# Patient Record
Sex: FEMALE | Race: WHITE | Employment: UNEMPLOYED | URBAN - METROPOLITAN AREA
[De-identification: names, ages, dates, MRNs, and addresses within clinical notes are randomized per-mention and may not be internally consistent; named-entity substitution may affect disease eponyms.]

---

## 2017-01-01 ENCOUNTER — OFFICE VISIT (OUTPATIENT)
Dept: SURGERY | Age: 82
End: 2017-01-01

## 2017-01-01 VITALS — SYSTOLIC BLOOD PRESSURE: 169 MMHG | HEIGHT: 59 IN | HEART RATE: 72 BPM | DIASTOLIC BLOOD PRESSURE: 67 MMHG

## 2017-01-01 DIAGNOSIS — E08.22: Primary | ICD-10-CM

## 2017-01-01 DIAGNOSIS — Z79.4: Primary | ICD-10-CM

## 2017-01-01 DIAGNOSIS — N18.5: Primary | ICD-10-CM

## 2017-01-01 DIAGNOSIS — Z79.4 TYPE 2 DIABETES MELLITUS WITHOUT COMPLICATION, WITH LONG-TERM CURRENT USE OF INSULIN (HCC): ICD-10-CM

## 2017-01-01 DIAGNOSIS — E11.9 TYPE 2 DIABETES MELLITUS WITHOUT COMPLICATION, WITH LONG-TERM CURRENT USE OF INSULIN (HCC): ICD-10-CM

## 2017-01-01 DIAGNOSIS — E03.9 HYPOTHYROIDISM, UNSPECIFIED TYPE: ICD-10-CM

## 2017-01-01 LAB
ANION GAP SERPL CALCULATED.3IONS-SCNC: 10 MEQ/L (ref 7–13)
BUN BLDV-MCNC: 20 MG/DL (ref 8–23)
CALCIUM SERPL-MCNC: 7.7 MG/DL (ref 8.6–10.2)
CHLORIDE BLD-SCNC: 100 MEQ/L (ref 98–107)
CO2: 28 MEQ/L (ref 22–29)
CREAT SERPL-MCNC: 3.03 MG/DL (ref 0.5–0.9)
GFR AFRICAN AMERICAN: 17.9
GFR NON-AFRICAN AMERICAN: 14.8
GLUCOSE BLD-MCNC: 196 MG/DL (ref 74–109)
GLUCOSE BLD-MCNC: 199 MG/DL
HBA1C MFR BLD: 5.1 % (ref 4.8–5.9)
MAGNESIUM: 2.3 MG/DL (ref 1.7–2.3)
POTASSIUM SERPL-SCNC: 4.6 MEQ/L (ref 3.5–5.1)
SODIUM BLD-SCNC: 138 MEQ/L (ref 132–144)

## 2017-01-01 PROCEDURE — G8400 PT W/DXA NO RESULTS DOC: HCPCS | Performed by: INTERNAL MEDICINE

## 2017-01-01 PROCEDURE — 1123F ACP DISCUSS/DSCN MKR DOCD: CPT | Performed by: INTERNAL MEDICINE

## 2017-01-01 PROCEDURE — 1111F DSCHRG MED/CURRENT MED MERGE: CPT | Performed by: INTERNAL MEDICINE

## 2017-01-01 PROCEDURE — G8427 DOCREV CUR MEDS BY ELIG CLIN: HCPCS | Performed by: INTERNAL MEDICINE

## 2017-01-01 PROCEDURE — 99213 OFFICE O/P EST LOW 20 MIN: CPT | Performed by: INTERNAL MEDICINE

## 2017-01-01 PROCEDURE — 4040F PNEUMOC VAC/ADMIN/RCVD: CPT | Performed by: INTERNAL MEDICINE

## 2017-01-01 PROCEDURE — 1090F PRES/ABSN URINE INCON ASSESS: CPT | Performed by: INTERNAL MEDICINE

## 2017-01-01 PROCEDURE — 82962 GLUCOSE BLOOD TEST: CPT | Performed by: INTERNAL MEDICINE

## 2017-01-01 PROCEDURE — 1036F TOBACCO NON-USER: CPT | Performed by: INTERNAL MEDICINE

## 2017-01-01 PROCEDURE — G8420 CALC BMI NORM PARAMETERS: HCPCS | Performed by: INTERNAL MEDICINE

## 2017-01-01 PROCEDURE — G8484 FLU IMMUNIZE NO ADMIN: HCPCS | Performed by: INTERNAL MEDICINE

## 2017-01-01 RX ORDER — INSULIN GLARGINE 100 [IU]/ML
INJECTION, SOLUTION SUBCUTANEOUS
Qty: 75 ML | Refills: 2 | Status: ON HOLD | OUTPATIENT
Start: 2017-01-01 | End: 2018-01-01

## 2017-01-01 ASSESSMENT — ENCOUNTER SYMPTOMS: BLURRED VISION: 1

## 2017-01-10 ENCOUNTER — HOSPITAL ENCOUNTER (OUTPATIENT)
Dept: PHARMACY | Age: 82
Discharge: HOME | End: 2017-01-10
Payer: MEDICARE

## 2017-01-10 DIAGNOSIS — I48.91 ATRIAL FIBRILLATION, UNSPECIFIED TYPE (HCC): ICD-10-CM

## 2017-01-10 LAB
INR BLD: 1.9
PROTIME: 22.9 SECONDS

## 2017-01-10 PROCEDURE — G0463 HOSPITAL OUTPT CLINIC VISIT: HCPCS

## 2017-01-10 PROCEDURE — 85610 PROTHROMBIN TIME: CPT

## 2017-01-31 ENCOUNTER — HOSPITAL ENCOUNTER (OUTPATIENT)
Dept: PHARMACY | Age: 82
Discharge: HOME | End: 2017-01-31
Payer: MEDICARE

## 2017-01-31 DIAGNOSIS — I48.91 ATRIAL FIBRILLATION, UNSPECIFIED TYPE (HCC): ICD-10-CM

## 2017-01-31 LAB
INR BLD: 2.3
PROTIME: 27.9 SECONDS

## 2017-01-31 PROCEDURE — G0463 HOSPITAL OUTPT CLINIC VISIT: HCPCS | Performed by: PHARMACIST

## 2017-01-31 PROCEDURE — 85610 PROTHROMBIN TIME: CPT | Performed by: PHARMACIST

## 2017-02-07 DIAGNOSIS — E10.649 HYPOGLYCEMIA UNAWARENESS IN TYPE 1 DIABETES MELLITUS (HCC): ICD-10-CM

## 2017-02-07 RX ORDER — WARFARIN SODIUM 2.5 MG/1
TABLET ORAL
Qty: 200 TABLET | Refills: 1 | Status: SHIPPED | OUTPATIENT
Start: 2017-02-07 | End: 2017-05-09 | Stop reason: SDUPTHER

## 2017-02-09 ENCOUNTER — APPOINTMENT (OUTPATIENT)
Dept: GENERAL RADIOLOGY | Age: 82
DRG: 309 | End: 2017-02-09
Payer: MEDICARE

## 2017-02-09 ENCOUNTER — HOSPITAL ENCOUNTER (INPATIENT)
Age: 82
LOS: 5 days | Discharge: HOME | DRG: 309 | End: 2017-02-14
Attending: STUDENT IN AN ORGANIZED HEALTH CARE EDUCATION/TRAINING PROGRAM | Admitting: INTERNAL MEDICINE
Payer: MEDICARE

## 2017-02-09 DIAGNOSIS — I48.19 PERSISTENT ATRIAL FIBRILLATION (HCC): Primary | ICD-10-CM

## 2017-02-09 DIAGNOSIS — R79.1 SUBTHERAPEUTIC INTERNATIONAL NORMALIZED RATIO (INR): ICD-10-CM

## 2017-02-09 DIAGNOSIS — N18.9 CHRONIC RENAL FAILURE, UNSPECIFIED STAGE: ICD-10-CM

## 2017-02-09 DIAGNOSIS — R74.8 CARDIAC ENZYMES ELEVATED: ICD-10-CM

## 2017-02-09 LAB
ALBUMIN SERPL-MCNC: 4.5 G/DL (ref 3.9–4.9)
ALP BLD-CCNC: 104 U/L (ref 40–130)
ALT SERPL-CCNC: 20 U/L (ref 0–33)
AMORPHOUS: ABNORMAL
ANION GAP SERPL CALCULATED.3IONS-SCNC: 17 MEQ/L (ref 7–13)
APTT: 27.8 SEC (ref 21.6–35.4)
AST SERPL-CCNC: 20 U/L (ref 0–35)
BACTERIA: ABNORMAL /HPF
BASOPHILS ABSOLUTE: 0 K/UL (ref 0–0.2)
BASOPHILS RELATIVE PERCENT: 0.5 %
BILIRUB SERPL-MCNC: 0.1 MG/DL (ref 0–1.2)
BILIRUBIN URINE: NEGATIVE
BLOOD, URINE: ABNORMAL
BUN BLDV-MCNC: 63 MG/DL (ref 8–23)
C-REACTIVE PROTEIN, HIGH SENSITIVITY: 4.3 MG/L (ref 0–5)
CALCIUM SERPL-MCNC: 9.2 MG/DL (ref 8.6–10.2)
CHLORIDE BLD-SCNC: 95 MEQ/L (ref 98–107)
CK MB: 4.5 NG/ML (ref 0–3.8)
CLARITY: CLEAR
CO2: 20 MEQ/L (ref 22–29)
COLOR: YELLOW
CREAT SERPL-MCNC: 2.75 MG/DL (ref 0.5–0.9)
CREATINE KINASE-MB INDEX: 3.2 % (ref 0–3.5)
EOSINOPHILS ABSOLUTE: 0 K/UL (ref 0–0.7)
EOSINOPHILS RELATIVE PERCENT: 0.5 %
GFR AFRICAN AMERICAN: 20.1
GFR NON-AFRICAN AMERICAN: 16.6
GLOBULIN: 3.1 G/DL (ref 2.3–3.5)
GLUCOSE BLD-MCNC: 239 MG/DL (ref 74–109)
GLUCOSE URINE: 250 MG/DL
HCT VFR BLD CALC: 40.1 % (ref 37–47)
HEMOGLOBIN: 13.3 G/DL (ref 12–16)
INR BLD: 1.1
KETONES, URINE: NEGATIVE MG/DL
LEUKOCYTE ESTERASE, URINE: NEGATIVE
LYMPHOCYTES ABSOLUTE: 1.5 K/UL (ref 1–4.8)
LYMPHOCYTES RELATIVE PERCENT: 18.3 %
MCH RBC QN AUTO: 29.3 PG (ref 27–31.3)
MCHC RBC AUTO-ENTMCNC: 33.2 % (ref 33–37)
MCV RBC AUTO: 88.2 FL (ref 82–100)
MONOCYTES ABSOLUTE: 0.7 K/UL (ref 0.2–0.8)
MONOCYTES RELATIVE PERCENT: 9.2 %
NEUTROPHILS ABSOLUTE: 5.7 K/UL (ref 1.4–6.5)
NEUTROPHILS RELATIVE PERCENT: 71.5 %
NITRITE, URINE: NEGATIVE
PDW BLD-RTO: 15.1 % (ref 11.5–14.5)
PH UA: 7 (ref 5–9)
PLATELET # BLD: 137 K/UL (ref 130–400)
POTASSIUM SERPL-SCNC: 4 MEQ/L (ref 3.5–5.1)
PROTEIN UA: >=300 MG/DL
PROTHROMBIN TIME: 11.7 SEC (ref 8.1–13.7)
RBC # BLD: 4.54 M/UL (ref 4.2–5.4)
RBC UA: ABNORMAL /HPF (ref 0–2)
SODIUM BLD-SCNC: 132 MEQ/L (ref 132–144)
SPECIFIC GRAVITY UA: 1.01 (ref 1–1.03)
TOTAL CK: 140 U/L (ref 0–170)
TOTAL PROTEIN: 7.6 G/DL (ref 6.4–8.1)
TROPONIN: 0.01 NG/ML (ref 0–0.01)
TSH SERPL DL<=0.05 MIU/L-ACNC: 1.22 UIU/ML (ref 0.27–4.2)
URINE REFLEX TO CULTURE: YES
UROBILINOGEN, URINE: 0.2 E.U./DL
WBC # BLD: 8 K/UL (ref 4.8–10.8)
WBC UA: ABNORMAL /HPF (ref 0–5)

## 2017-02-09 PROCEDURE — 2060000000 HC ICU INTERMEDIATE R&B

## 2017-02-09 PROCEDURE — 6370000000 HC RX 637 (ALT 250 FOR IP): Performed by: INTERNAL MEDICINE

## 2017-02-09 PROCEDURE — 85025 COMPLETE CBC W/AUTO DIFF WBC: CPT

## 2017-02-09 PROCEDURE — 85610 PROTHROMBIN TIME: CPT

## 2017-02-09 PROCEDURE — 2580000003 HC RX 258: Performed by: STUDENT IN AN ORGANIZED HEALTH CARE EDUCATION/TRAINING PROGRAM

## 2017-02-09 PROCEDURE — 6360000002 HC RX W HCPCS: Performed by: STUDENT IN AN ORGANIZED HEALTH CARE EDUCATION/TRAINING PROGRAM

## 2017-02-09 PROCEDURE — 84443 ASSAY THYROID STIM HORMONE: CPT

## 2017-02-09 PROCEDURE — 84484 ASSAY OF TROPONIN QUANT: CPT

## 2017-02-09 PROCEDURE — 96375 TX/PRO/DX INJ NEW DRUG ADDON: CPT

## 2017-02-09 PROCEDURE — 36415 COLL VENOUS BLD VENIPUNCTURE: CPT

## 2017-02-09 PROCEDURE — 96365 THER/PROPH/DIAG IV INF INIT: CPT

## 2017-02-09 PROCEDURE — 96372 THER/PROPH/DIAG INJ SC/IM: CPT

## 2017-02-09 PROCEDURE — 85730 THROMBOPLASTIN TIME PARTIAL: CPT

## 2017-02-09 PROCEDURE — 2500000003 HC RX 250 WO HCPCS: Performed by: STUDENT IN AN ORGANIZED HEALTH CARE EDUCATION/TRAINING PROGRAM

## 2017-02-09 PROCEDURE — 80053 COMPREHEN METABOLIC PANEL: CPT

## 2017-02-09 PROCEDURE — 99285 EMERGENCY DEPT VISIT HI MDM: CPT

## 2017-02-09 PROCEDURE — 71010 XR CHEST PORTABLE: CPT

## 2017-02-09 PROCEDURE — 81001 URINALYSIS AUTO W/SCOPE: CPT

## 2017-02-09 PROCEDURE — 87086 URINE CULTURE/COLONY COUNT: CPT

## 2017-02-09 PROCEDURE — 82550 ASSAY OF CK (CPK): CPT

## 2017-02-09 PROCEDURE — 86141 C-REACTIVE PROTEIN HS: CPT

## 2017-02-09 PROCEDURE — 93005 ELECTROCARDIOGRAM TRACING: CPT

## 2017-02-09 PROCEDURE — 82553 CREATINE MB FRACTION: CPT

## 2017-02-09 RX ORDER — ISOSORBIDE MONONITRATE 60 MG/1
60 TABLET, EXTENDED RELEASE ORAL DAILY
Status: DISCONTINUED | OUTPATIENT
Start: 2017-02-10 | End: 2017-02-14 | Stop reason: HOSPADM

## 2017-02-09 RX ORDER — PANTOPRAZOLE SODIUM 40 MG/1
40 TABLET, DELAYED RELEASE ORAL
Status: DISCONTINUED | OUTPATIENT
Start: 2017-02-10 | End: 2017-02-14 | Stop reason: HOSPADM

## 2017-02-09 RX ORDER — SODIUM CHLORIDE 0.9 % (FLUSH) 0.9 %
10 SYRINGE (ML) INJECTION EVERY 12 HOURS SCHEDULED
Status: DISCONTINUED | OUTPATIENT
Start: 2017-02-09 | End: 2017-02-14 | Stop reason: HOSPADM

## 2017-02-09 RX ORDER — WARFARIN SODIUM 2.5 MG/1
2.5 TABLET ORAL DAILY
Status: DISCONTINUED | OUTPATIENT
Start: 2017-02-09 | End: 2017-02-10

## 2017-02-09 RX ORDER — ACETAMINOPHEN 325 MG/1
650 TABLET ORAL EVERY 4 HOURS PRN
Status: DISCONTINUED | OUTPATIENT
Start: 2017-02-09 | End: 2017-02-14 | Stop reason: HOSPADM

## 2017-02-09 RX ORDER — DEXTROSE MONOHYDRATE 25 G/50ML
12.5 INJECTION, SOLUTION INTRAVENOUS PRN
Status: DISCONTINUED | OUTPATIENT
Start: 2017-02-09 | End: 2017-02-14 | Stop reason: HOSPADM

## 2017-02-09 RX ORDER — SODIUM CHLORIDE 0.9 % (FLUSH) 0.9 %
10 SYRINGE (ML) INJECTION PRN
Status: DISCONTINUED | OUTPATIENT
Start: 2017-02-09 | End: 2017-02-14 | Stop reason: HOSPADM

## 2017-02-09 RX ORDER — DOCUSATE SODIUM 100 MG/1
100 CAPSULE, LIQUID FILLED ORAL 2 TIMES DAILY
Status: DISCONTINUED | OUTPATIENT
Start: 2017-02-09 | End: 2017-02-14 | Stop reason: HOSPADM

## 2017-02-09 RX ORDER — INSULIN GLARGINE 100 [IU]/ML
14 INJECTION, SOLUTION SUBCUTANEOUS EVERY MORNING
Status: DISCONTINUED | OUTPATIENT
Start: 2017-02-10 | End: 2017-02-14 | Stop reason: HOSPADM

## 2017-02-09 RX ORDER — CLONIDINE HYDROCHLORIDE 0.1 MG/1
0.1 TABLET ORAL 3 TIMES DAILY
Status: DISCONTINUED | OUTPATIENT
Start: 2017-02-09 | End: 2017-02-14 | Stop reason: HOSPADM

## 2017-02-09 RX ORDER — DILTIAZEM HYDROCHLORIDE 5 MG/ML
10 INJECTION INTRAVENOUS ONCE
Status: COMPLETED | OUTPATIENT
Start: 2017-02-09 | End: 2017-02-09

## 2017-02-09 RX ORDER — ASPIRIN 81 MG/1
81 TABLET ORAL DAILY
Status: DISCONTINUED | OUTPATIENT
Start: 2017-02-10 | End: 2017-02-14 | Stop reason: HOSPADM

## 2017-02-09 RX ORDER — DEXTROSE MONOHYDRATE 50 MG/ML
100 INJECTION, SOLUTION INTRAVENOUS PRN
Status: DISCONTINUED | OUTPATIENT
Start: 2017-02-09 | End: 2017-02-14 | Stop reason: HOSPADM

## 2017-02-09 RX ORDER — ONDANSETRON 2 MG/ML
4 INJECTION INTRAMUSCULAR; INTRAVENOUS EVERY 6 HOURS PRN
Status: DISCONTINUED | OUTPATIENT
Start: 2017-02-09 | End: 2017-02-14 | Stop reason: HOSPADM

## 2017-02-09 RX ORDER — LEVOTHYROXINE SODIUM 0.03 MG/1
25 TABLET ORAL DAILY
Status: DISCONTINUED | OUTPATIENT
Start: 2017-02-10 | End: 2017-02-14 | Stop reason: HOSPADM

## 2017-02-09 RX ORDER — ROSUVASTATIN CALCIUM 40 MG/1
40 TABLET, COATED ORAL NIGHTLY
Status: DISCONTINUED | OUTPATIENT
Start: 2017-02-09 | End: 2017-02-14 | Stop reason: HOSPADM

## 2017-02-09 RX ORDER — NICOTINE POLACRILEX 4 MG
15 LOZENGE BUCCAL PRN
Status: DISCONTINUED | OUTPATIENT
Start: 2017-02-09 | End: 2017-02-14 | Stop reason: HOSPADM

## 2017-02-09 RX ORDER — METOPROLOL TARTRATE 50 MG/1
50 TABLET, FILM COATED ORAL 2 TIMES DAILY
Status: DISCONTINUED | OUTPATIENT
Start: 2017-02-09 | End: 2017-02-14 | Stop reason: HOSPADM

## 2017-02-09 RX ORDER — AMIODARONE HYDROCHLORIDE 200 MG/1
100 TABLET ORAL DAILY
Status: DISCONTINUED | OUTPATIENT
Start: 2017-02-10 | End: 2017-02-10

## 2017-02-09 RX ORDER — ACETAMINOPHEN 80 MG
TABLET,CHEWABLE ORAL ONCE
Status: DISCONTINUED | OUTPATIENT
Start: 2017-02-09 | End: 2017-02-14 | Stop reason: HOSPADM

## 2017-02-09 RX ORDER — MORPHINE SULFATE 2 MG/ML
2 INJECTION, SOLUTION INTRAMUSCULAR; INTRAVENOUS
Status: DISCONTINUED | OUTPATIENT
Start: 2017-02-09 | End: 2017-02-14 | Stop reason: HOSPADM

## 2017-02-09 RX ORDER — CALCIUM CARBONATE 500(1250)
500 TABLET ORAL DAILY
Status: DISCONTINUED | OUTPATIENT
Start: 2017-02-10 | End: 2017-02-14 | Stop reason: HOSPADM

## 2017-02-09 RX ADMIN — DILTIAZEM HYDROCHLORIDE 5 MG/HR: 5 INJECTION INTRAVENOUS at 21:31

## 2017-02-09 RX ADMIN — ROSUVASTATIN CALCIUM 40 MG: 40 TABLET, FILM COATED ORAL at 23:30

## 2017-02-09 RX ADMIN — CLONIDINE HYDROCHLORIDE 0.1 MG: 0.1 TABLET ORAL at 23:31

## 2017-02-09 RX ADMIN — DILTIAZEM HYDROCHLORIDE 10 MG: 5 INJECTION INTRAVENOUS at 19:10

## 2017-02-09 RX ADMIN — DOCUSATE SODIUM 100 MG: 100 CAPSULE, LIQUID FILLED ORAL at 23:30

## 2017-02-09 RX ADMIN — ENOXAPARIN SODIUM 60 MG: 60 INJECTION, SOLUTION INTRAVENOUS; SUBCUTANEOUS at 21:02

## 2017-02-09 RX ADMIN — CARBIDOPA AND LEVODOPA 1 TABLET: 25; 100 TABLET ORAL at 23:30

## 2017-02-09 RX ADMIN — METOPROLOL TARTRATE 50 MG: 50 TABLET ORAL at 23:30

## 2017-02-09 ASSESSMENT — PAIN DESCRIPTION - LOCATION
LOCATION: CHEST;JAW
LOCATION: HEAD

## 2017-02-09 ASSESSMENT — ENCOUNTER SYMPTOMS
VOMITING: 0
SHORTNESS OF BREATH: 1
ABDOMINAL PAIN: 0
CHEST TIGHTNESS: 0
TROUBLE SWALLOWING: 0
NAUSEA: 1
BACK PAIN: 0
COUGH: 0
DIARRHEA: 0
SINUS PRESSURE: 0

## 2017-02-09 ASSESSMENT — PAIN DESCRIPTION - ORIENTATION: ORIENTATION: LEFT

## 2017-02-09 ASSESSMENT — PAIN DESCRIPTION - FREQUENCY
FREQUENCY: INTERMITTENT
FREQUENCY: CONTINUOUS

## 2017-02-09 ASSESSMENT — PAIN DESCRIPTION - DESCRIPTORS
DESCRIPTORS: HEADACHE
DESCRIPTORS: BURNING

## 2017-02-09 ASSESSMENT — PAIN DESCRIPTION - ONSET: ONSET: GRADUAL

## 2017-02-09 ASSESSMENT — PAIN DESCRIPTION - PROGRESSION: CLINICAL_PROGRESSION: GRADUALLY WORSENING

## 2017-02-09 ASSESSMENT — PAIN SCALES - GENERAL
PAINLEVEL_OUTOF10: 6
PAINLEVEL_OUTOF10: 4

## 2017-02-09 ASSESSMENT — PAIN DESCRIPTION - PAIN TYPE: TYPE: ACUTE PAIN

## 2017-02-10 LAB
ALBUMIN SERPL-MCNC: 3.8 G/DL (ref 3.9–4.9)
ALP BLD-CCNC: 99 U/L (ref 40–130)
ALT SERPL-CCNC: <5 U/L (ref 0–33)
ANION GAP SERPL CALCULATED.3IONS-SCNC: 16 MEQ/L (ref 7–13)
AST SERPL-CCNC: <5 U/L (ref 0–35)
BILIRUB SERPL-MCNC: 0.1 MG/DL (ref 0–1.2)
BUN BLDV-MCNC: 64 MG/DL (ref 8–23)
CALCIUM SERPL-MCNC: 9 MG/DL (ref 8.6–10.2)
CHLORIDE BLD-SCNC: 103 MEQ/L (ref 98–107)
CO2: 19 MEQ/L (ref 22–29)
CREAT SERPL-MCNC: 2.88 MG/DL (ref 0.5–0.9)
GFR AFRICAN AMERICAN: 19
GFR NON-AFRICAN AMERICAN: 15.7
GLOBULIN: 2.9 G/DL (ref 2.3–3.5)
GLUCOSE BLD-MCNC: 117 MG/DL (ref 60–115)
GLUCOSE BLD-MCNC: 150 MG/DL (ref 74–109)
GLUCOSE BLD-MCNC: 156 MG/DL (ref 60–115)
GLUCOSE BLD-MCNC: 167 MG/DL (ref 60–115)
HBA1C MFR BLD: 6.5 % (ref 4.8–5.9)
HCT VFR BLD CALC: 40 % (ref 37–47)
HEMOGLOBIN: 13.5 G/DL (ref 12–16)
INR BLD: 1
MCH RBC QN AUTO: 30.4 PG (ref 27–31.3)
MCHC RBC AUTO-ENTMCNC: 33.8 % (ref 33–37)
MCV RBC AUTO: 89.9 FL (ref 82–100)
PDW BLD-RTO: 15.1 % (ref 11.5–14.5)
PERFORMED ON: ABNORMAL
PLATELET # BLD: 140 K/UL (ref 130–400)
POTASSIUM SERPL-SCNC: 4.3 MEQ/L (ref 3.5–5.1)
PROTHROMBIN TIME: 11.3 SEC (ref 8.1–13.7)
RBC # BLD: 4.44 M/UL (ref 4.2–5.4)
SODIUM BLD-SCNC: 138 MEQ/L (ref 132–144)
TOTAL PROTEIN: 6.7 G/DL (ref 6.4–8.1)
TROPONIN: 0.06 NG/ML (ref 0–0.01)
TROPONIN: 0.07 NG/ML (ref 0–0.01)
WBC # BLD: 7.2 K/UL (ref 4.8–10.8)

## 2017-02-10 PROCEDURE — 83036 HEMOGLOBIN GLYCOSYLATED A1C: CPT

## 2017-02-10 PROCEDURE — 36415 COLL VENOUS BLD VENIPUNCTURE: CPT

## 2017-02-10 PROCEDURE — 97161 PT EVAL LOW COMPLEX 20 MIN: CPT

## 2017-02-10 PROCEDURE — 84484 ASSAY OF TROPONIN QUANT: CPT

## 2017-02-10 PROCEDURE — G8988 SELF CARE GOAL STATUS: HCPCS

## 2017-02-10 PROCEDURE — 80053 COMPREHEN METABOLIC PANEL: CPT

## 2017-02-10 PROCEDURE — 6370000000 HC RX 637 (ALT 250 FOR IP): Performed by: INTERNAL MEDICINE

## 2017-02-10 PROCEDURE — G8980 MOBILITY D/C STATUS: HCPCS

## 2017-02-10 PROCEDURE — 85610 PROTHROMBIN TIME: CPT

## 2017-02-10 PROCEDURE — G8989 SELF CARE D/C STATUS: HCPCS

## 2017-02-10 PROCEDURE — 2060000000 HC ICU INTERMEDIATE R&B

## 2017-02-10 PROCEDURE — G8979 MOBILITY GOAL STATUS: HCPCS

## 2017-02-10 PROCEDURE — G8987 SELF CARE CURRENT STATUS: HCPCS

## 2017-02-10 PROCEDURE — 2580000003 HC RX 258: Performed by: INTERNAL MEDICINE

## 2017-02-10 PROCEDURE — 6360000002 HC RX W HCPCS: Performed by: INTERNAL MEDICINE

## 2017-02-10 PROCEDURE — 97165 OT EVAL LOW COMPLEX 30 MIN: CPT

## 2017-02-10 PROCEDURE — G8978 MOBILITY CURRENT STATUS: HCPCS

## 2017-02-10 PROCEDURE — 85027 COMPLETE CBC AUTOMATED: CPT

## 2017-02-10 RX ORDER — AMIODARONE HYDROCHLORIDE 200 MG/1
400 TABLET ORAL DAILY
Status: DISCONTINUED | OUTPATIENT
Start: 2017-02-11 | End: 2017-02-11

## 2017-02-10 RX ORDER — WARFARIN SODIUM 5 MG/1
5 TABLET ORAL DAILY
Status: DISCONTINUED | OUTPATIENT
Start: 2017-02-10 | End: 2017-02-14 | Stop reason: HOSPADM

## 2017-02-10 RX ADMIN — AMIODARONE HYDROCHLORIDE 100 MG: 200 TABLET ORAL at 10:23

## 2017-02-10 RX ADMIN — ENOXAPARIN SODIUM 40 MG: 40 INJECTION SUBCUTANEOUS at 10:24

## 2017-02-10 RX ADMIN — CLONIDINE HYDROCHLORIDE 0.1 MG: 0.1 TABLET ORAL at 14:22

## 2017-02-10 RX ADMIN — INSULIN LISPRO 2 UNITS: 100 INJECTION, SOLUTION INTRAVENOUS; SUBCUTANEOUS at 16:59

## 2017-02-10 RX ADMIN — Medication 500 MG: at 10:23

## 2017-02-10 RX ADMIN — ROSUVASTATIN CALCIUM 40 MG: 40 TABLET, FILM COATED ORAL at 20:20

## 2017-02-10 RX ADMIN — Medication 10 ML: at 10:25

## 2017-02-10 RX ADMIN — SERTRALINE 50 MG: 50 TABLET, FILM COATED ORAL at 10:23

## 2017-02-10 RX ADMIN — CARBIDOPA AND LEVODOPA 1 TABLET: 25; 100 TABLET ORAL at 14:22

## 2017-02-10 RX ADMIN — Medication 400 MG: at 10:22

## 2017-02-10 RX ADMIN — INSULIN LISPRO 1 UNITS: 100 INJECTION, SOLUTION INTRAVENOUS; SUBCUTANEOUS at 10:32

## 2017-02-10 RX ADMIN — PANTOPRAZOLE SODIUM 40 MG: 40 TABLET, DELAYED RELEASE ORAL at 07:13

## 2017-02-10 RX ADMIN — DOCUSATE SODIUM 100 MG: 100 CAPSULE, LIQUID FILLED ORAL at 10:23

## 2017-02-10 RX ADMIN — INSULIN LISPRO 2 UNITS: 100 INJECTION, SOLUTION INTRAVENOUS; SUBCUTANEOUS at 13:09

## 2017-02-10 RX ADMIN — CLONIDINE HYDROCHLORIDE 0.1 MG: 0.1 TABLET ORAL at 10:23

## 2017-02-10 RX ADMIN — CLONIDINE HYDROCHLORIDE 0.1 MG: 0.1 TABLET ORAL at 20:20

## 2017-02-10 RX ADMIN — INSULIN LISPRO 1 UNITS: 100 INJECTION, SOLUTION INTRAVENOUS; SUBCUTANEOUS at 13:08

## 2017-02-10 RX ADMIN — INSULIN LISPRO 2 UNITS: 100 INJECTION, SOLUTION INTRAVENOUS; SUBCUTANEOUS at 10:32

## 2017-02-10 RX ADMIN — Medication 5000 UNITS: at 10:23

## 2017-02-10 RX ADMIN — WARFARIN SODIUM 5 MG: 5 TABLET ORAL at 16:59

## 2017-02-10 RX ADMIN — METOPROLOL TARTRATE 50 MG: 50 TABLET ORAL at 10:25

## 2017-02-10 RX ADMIN — DOCUSATE SODIUM 100 MG: 100 CAPSULE, LIQUID FILLED ORAL at 20:20

## 2017-02-10 RX ADMIN — ISOSORBIDE MONONITRATE 60 MG: 60 TABLET, EXTENDED RELEASE ORAL at 10:25

## 2017-02-10 RX ADMIN — CARBIDOPA AND LEVODOPA 1 TABLET: 25; 100 TABLET ORAL at 20:20

## 2017-02-10 RX ADMIN — ASPIRIN 81 MG: 81 TABLET, COATED ORAL at 10:24

## 2017-02-10 RX ADMIN — Medication 10 ML: at 20:21

## 2017-02-10 RX ADMIN — METOPROLOL TARTRATE 50 MG: 50 TABLET ORAL at 20:20

## 2017-02-10 RX ADMIN — CARBIDOPA AND LEVODOPA 1 TABLET: 25; 100 TABLET ORAL at 10:24

## 2017-02-10 RX ADMIN — LEVOTHYROXINE SODIUM 25 MCG: 25 TABLET ORAL at 07:13

## 2017-02-10 ASSESSMENT — PAIN DESCRIPTION - PAIN TYPE
TYPE: CHRONIC PAIN
TYPE: CHRONIC PAIN

## 2017-02-10 ASSESSMENT — PAIN SCALES - GENERAL
PAINLEVEL_OUTOF10: 3
PAINLEVEL_OUTOF10: 0
PAINLEVEL_OUTOF10: 4
PAINLEVEL_OUTOF10: 0
PAINLEVEL_OUTOF10: 0

## 2017-02-10 ASSESSMENT — ENCOUNTER SYMPTOMS
SPUTUM PRODUCTION: 0
DOUBLE VISION: 0
HEARTBURN: 0
DIARRHEA: 0
WHEEZING: 0
BLOOD IN STOOL: 0
HEMOPTYSIS: 0
VOMITING: 0
CONSTIPATION: 0
BACK PAIN: 0
ABDOMINAL PAIN: 0
NAUSEA: 0
COUGH: 0
BLURRED VISION: 0

## 2017-02-10 ASSESSMENT — PAIN DESCRIPTION - LOCATION
LOCATION: BACK
LOCATION: BACK

## 2017-02-11 LAB
ALBUMIN SERPL-MCNC: 3.1 G/DL (ref 3.9–4.9)
ALBUMIN SERPL-MCNC: 3.4 G/DL (ref 3.9–4.9)
ALP BLD-CCNC: 68 U/L (ref 40–130)
ALP BLD-CCNC: 71 U/L (ref 40–130)
ALT SERPL-CCNC: <5 U/L (ref 0–33)
ALT SERPL-CCNC: <5 U/L (ref 0–33)
ANION GAP SERPL CALCULATED.3IONS-SCNC: 11 MEQ/L (ref 7–13)
ANION GAP SERPL CALCULATED.3IONS-SCNC: 13 MEQ/L (ref 7–13)
AST SERPL-CCNC: 14 U/L (ref 0–35)
AST SERPL-CCNC: 14 U/L (ref 0–35)
BILIRUB SERPL-MCNC: 0.1 MG/DL (ref 0–1.2)
BILIRUB SERPL-MCNC: 0.2 MG/DL (ref 0–1.2)
BUN BLDV-MCNC: 72 MG/DL (ref 8–23)
BUN BLDV-MCNC: 72 MG/DL (ref 8–23)
C-REACTIVE PROTEIN, HIGH SENSITIVITY: 4.5 MG/L (ref 0–5)
C-REACTIVE PROTEIN, HIGH SENSITIVITY: 4.6 MG/L (ref 0–5)
CALCIUM SERPL-MCNC: 8.2 MG/DL (ref 8.6–10.2)
CALCIUM SERPL-MCNC: 8.3 MG/DL (ref 8.6–10.2)
CHLORIDE BLD-SCNC: 100 MEQ/L (ref 98–107)
CHLORIDE BLD-SCNC: 102 MEQ/L (ref 98–107)
CO2: 20 MEQ/L (ref 22–29)
CO2: 21 MEQ/L (ref 22–29)
CREAT SERPL-MCNC: 3.26 MG/DL (ref 0.5–0.9)
CREAT SERPL-MCNC: 3.32 MG/DL (ref 0.5–0.9)
GFR AFRICAN AMERICAN: 16.1
GFR AFRICAN AMERICAN: 16.5
GFR NON-AFRICAN AMERICAN: 13.3
GFR NON-AFRICAN AMERICAN: 13.6
GLOBULIN: 2.2 G/DL (ref 2.3–3.5)
GLOBULIN: 2.2 G/DL (ref 2.3–3.5)
GLUCOSE BLD-MCNC: 128 MG/DL (ref 60–115)
GLUCOSE BLD-MCNC: 129 MG/DL (ref 60–115)
GLUCOSE BLD-MCNC: 154 MG/DL (ref 60–115)
GLUCOSE BLD-MCNC: 169 MG/DL (ref 74–109)
GLUCOSE BLD-MCNC: 192 MG/DL (ref 60–115)
GLUCOSE BLD-MCNC: 237 MG/DL (ref 74–109)
HCT VFR BLD CALC: 29.4 % (ref 37–47)
HCT VFR BLD CALC: 31.1 % (ref 37–47)
HEMOGLOBIN: 10.2 G/DL (ref 12–16)
HEMOGLOBIN: 9.8 G/DL (ref 12–16)
INR BLD: 1
INR BLD: 1
MAGNESIUM: 2.9 MG/DL (ref 1.7–2.3)
MAGNESIUM: 3.1 MG/DL (ref 1.7–2.3)
MCH RBC QN AUTO: 29.2 PG (ref 27–31.3)
MCH RBC QN AUTO: 29.7 PG (ref 27–31.3)
MCHC RBC AUTO-ENTMCNC: 32.8 % (ref 33–37)
MCHC RBC AUTO-ENTMCNC: 33.3 % (ref 33–37)
MCV RBC AUTO: 89.2 FL (ref 82–100)
MCV RBC AUTO: 89.2 FL (ref 82–100)
PDW BLD-RTO: 15 % (ref 11.5–14.5)
PDW BLD-RTO: 15.1 % (ref 11.5–14.5)
PERFORMED ON: ABNORMAL
PLATELET # BLD: 103 K/UL (ref 130–400)
PLATELET # BLD: 99 K/UL (ref 130–400)
POTASSIUM SERPL-SCNC: 4.4 MEQ/L (ref 3.5–5.1)
POTASSIUM SERPL-SCNC: 4.4 MEQ/L (ref 3.5–5.1)
PROTHROMBIN TIME: 10.8 SEC (ref 8.1–13.7)
PROTHROMBIN TIME: 10.8 SEC (ref 8.1–13.7)
RBC # BLD: 3.29 M/UL (ref 4.2–5.4)
RBC # BLD: 3.49 M/UL (ref 4.2–5.4)
SODIUM BLD-SCNC: 132 MEQ/L (ref 132–144)
SODIUM BLD-SCNC: 135 MEQ/L (ref 132–144)
TOTAL PROTEIN: 5.3 G/DL (ref 6.4–8.1)
TOTAL PROTEIN: 5.6 G/DL (ref 6.4–8.1)
URINE CULTURE, ROUTINE: NORMAL
WBC # BLD: 4.6 K/UL (ref 4.8–10.8)
WBC # BLD: 4.7 K/UL (ref 4.8–10.8)

## 2017-02-11 PROCEDURE — 80053 COMPREHEN METABOLIC PANEL: CPT

## 2017-02-11 PROCEDURE — 2580000003 HC RX 258

## 2017-02-11 PROCEDURE — 86141 C-REACTIVE PROTEIN HS: CPT

## 2017-02-11 PROCEDURE — 2060000000 HC ICU INTERMEDIATE R&B

## 2017-02-11 PROCEDURE — 6370000000 HC RX 637 (ALT 250 FOR IP): Performed by: INTERNAL MEDICINE

## 2017-02-11 PROCEDURE — 83735 ASSAY OF MAGNESIUM: CPT

## 2017-02-11 PROCEDURE — 36415 COLL VENOUS BLD VENIPUNCTURE: CPT

## 2017-02-11 PROCEDURE — 2580000003 HC RX 258: Performed by: INTERNAL MEDICINE

## 2017-02-11 PROCEDURE — 85610 PROTHROMBIN TIME: CPT

## 2017-02-11 PROCEDURE — 6360000002 HC RX W HCPCS: Performed by: INTERNAL MEDICINE

## 2017-02-11 PROCEDURE — 85027 COMPLETE CBC AUTOMATED: CPT

## 2017-02-11 RX ORDER — SODIUM CHLORIDE 9 MG/ML
INJECTION, SOLUTION INTRAVENOUS
Status: COMPLETED
Start: 2017-02-11 | End: 2017-02-11

## 2017-02-11 RX ORDER — AMIODARONE HYDROCHLORIDE 200 MG/1
100 TABLET ORAL DAILY
Status: DISCONTINUED | OUTPATIENT
Start: 2017-02-12 | End: 2017-02-14 | Stop reason: HOSPADM

## 2017-02-11 RX ORDER — SODIUM CHLORIDE 9 MG/ML
INJECTION, SOLUTION INTRAVENOUS CONTINUOUS
Status: DISPENSED | OUTPATIENT
Start: 2017-02-11 | End: 2017-02-11

## 2017-02-11 RX ADMIN — AMIODARONE HYDROCHLORIDE 400 MG: 200 TABLET ORAL at 09:06

## 2017-02-11 RX ADMIN — Medication 5000 UNITS: at 09:06

## 2017-02-11 RX ADMIN — CARBIDOPA AND LEVODOPA 1 TABLET: 25; 100 TABLET ORAL at 09:06

## 2017-02-11 RX ADMIN — METOPROLOL TARTRATE 50 MG: 50 TABLET ORAL at 09:07

## 2017-02-11 RX ADMIN — WARFARIN SODIUM 5 MG: 5 TABLET ORAL at 16:38

## 2017-02-11 RX ADMIN — ROSUVASTATIN CALCIUM 40 MG: 40 TABLET, FILM COATED ORAL at 22:25

## 2017-02-11 RX ADMIN — DOCUSATE SODIUM 100 MG: 100 CAPSULE, LIQUID FILLED ORAL at 09:06

## 2017-02-11 RX ADMIN — Medication 500 MG: at 09:06

## 2017-02-11 RX ADMIN — INSULIN LISPRO 2 UNITS: 100 INJECTION, SOLUTION INTRAVENOUS; SUBCUTANEOUS at 09:10

## 2017-02-11 RX ADMIN — Medication 400 MG: at 09:06

## 2017-02-11 RX ADMIN — PANTOPRAZOLE SODIUM 40 MG: 40 TABLET, DELAYED RELEASE ORAL at 06:07

## 2017-02-11 RX ADMIN — LEVOTHYROXINE SODIUM 25 MCG: 25 TABLET ORAL at 06:08

## 2017-02-11 RX ADMIN — DOCUSATE SODIUM 100 MG: 100 CAPSULE, LIQUID FILLED ORAL at 22:25

## 2017-02-11 RX ADMIN — INSULIN LISPRO 1 UNITS: 100 INJECTION, SOLUTION INTRAVENOUS; SUBCUTANEOUS at 09:16

## 2017-02-11 RX ADMIN — CLONIDINE HYDROCHLORIDE 0.1 MG: 0.1 TABLET ORAL at 09:06

## 2017-02-11 RX ADMIN — CARBIDOPA AND LEVODOPA 1 TABLET: 25; 100 TABLET ORAL at 13:48

## 2017-02-11 RX ADMIN — INSULIN GLARGINE 14 UNITS: 100 INJECTION, SOLUTION SUBCUTANEOUS at 09:10

## 2017-02-11 RX ADMIN — ASPIRIN 81 MG: 81 TABLET, COATED ORAL at 09:06

## 2017-02-11 RX ADMIN — SODIUM CHLORIDE: 900 INJECTION, SOLUTION INTRAVENOUS at 11:30

## 2017-02-11 RX ADMIN — ISOSORBIDE MONONITRATE 60 MG: 60 TABLET, EXTENDED RELEASE ORAL at 09:07

## 2017-02-11 RX ADMIN — CLONIDINE HYDROCHLORIDE 0.1 MG: 0.1 TABLET ORAL at 13:48

## 2017-02-11 RX ADMIN — ENOXAPARIN SODIUM 60 MG: 60 INJECTION SUBCUTANEOUS at 09:06

## 2017-02-11 RX ADMIN — CARBIDOPA AND LEVODOPA 1 TABLET: 25; 100 TABLET ORAL at 22:25

## 2017-02-11 RX ADMIN — Medication 10 ML: at 09:37

## 2017-02-11 RX ADMIN — CLONIDINE HYDROCHLORIDE 0.1 MG: 0.1 TABLET ORAL at 22:25

## 2017-02-11 RX ADMIN — SERTRALINE 50 MG: 50 TABLET, FILM COATED ORAL at 09:06

## 2017-02-11 ASSESSMENT — PAIN SCALES - GENERAL
PAINLEVEL_OUTOF10: 0

## 2017-02-12 LAB
ALBUMIN SERPL-MCNC: 3.5 G/DL (ref 3.9–4.9)
ALP BLD-CCNC: 76 U/L (ref 40–130)
ALT SERPL-CCNC: <5 U/L (ref 0–33)
ANION GAP SERPL CALCULATED.3IONS-SCNC: 12 MEQ/L (ref 7–13)
AST SERPL-CCNC: 15 U/L (ref 0–35)
BILIRUB SERPL-MCNC: 0.1 MG/DL (ref 0–1.2)
BUN BLDV-MCNC: 68 MG/DL (ref 8–23)
CALCIUM SERPL-MCNC: 8.6 MG/DL (ref 8.6–10.2)
CHLORIDE BLD-SCNC: 108 MEQ/L (ref 98–107)
CO2: 20 MEQ/L (ref 22–29)
CREAT SERPL-MCNC: 3.6 MG/DL (ref 0.5–0.9)
GFR AFRICAN AMERICAN: 14.7
GFR NON-AFRICAN AMERICAN: 12.2
GLOBULIN: 2.3 G/DL (ref 2.3–3.5)
GLUCOSE BLD-MCNC: 114 MG/DL (ref 74–109)
GLUCOSE BLD-MCNC: 124 MG/DL (ref 60–115)
GLUCOSE BLD-MCNC: 180 MG/DL (ref 60–115)
GLUCOSE BLD-MCNC: 320 MG/DL (ref 60–115)
GLUCOSE BLD-MCNC: 86 MG/DL (ref 60–115)
HCT VFR BLD CALC: 30.8 % (ref 37–47)
HEMOGLOBIN: 10.2 G/DL (ref 12–16)
INR BLD: 1
MCH RBC QN AUTO: 29.6 PG (ref 27–31.3)
MCHC RBC AUTO-ENTMCNC: 33.1 % (ref 33–37)
MCV RBC AUTO: 89.2 FL (ref 82–100)
PDW BLD-RTO: 15.1 % (ref 11.5–14.5)
PERFORMED ON: ABNORMAL
PERFORMED ON: NORMAL
PLATELET # BLD: 105 K/UL (ref 130–400)
POTASSIUM SERPL-SCNC: 4.9 MEQ/L (ref 3.5–5.1)
PROTHROMBIN TIME: 10.9 SEC (ref 8.1–13.7)
RBC # BLD: 3.45 M/UL (ref 4.2–5.4)
SODIUM BLD-SCNC: 140 MEQ/L (ref 132–144)
TOTAL PROTEIN: 5.8 G/DL (ref 6.4–8.1)
WBC # BLD: 4.5 K/UL (ref 4.8–10.8)

## 2017-02-12 PROCEDURE — 2060000000 HC ICU INTERMEDIATE R&B

## 2017-02-12 PROCEDURE — 6370000000 HC RX 637 (ALT 250 FOR IP): Performed by: INTERNAL MEDICINE

## 2017-02-12 PROCEDURE — 2580000003 HC RX 258: Performed by: INTERNAL MEDICINE

## 2017-02-12 PROCEDURE — 93005 ELECTROCARDIOGRAM TRACING: CPT

## 2017-02-12 PROCEDURE — 6360000002 HC RX W HCPCS: Performed by: INTERNAL MEDICINE

## 2017-02-12 PROCEDURE — 36415 COLL VENOUS BLD VENIPUNCTURE: CPT

## 2017-02-12 PROCEDURE — 85610 PROTHROMBIN TIME: CPT

## 2017-02-12 PROCEDURE — 85027 COMPLETE CBC AUTOMATED: CPT

## 2017-02-12 PROCEDURE — 80053 COMPREHEN METABOLIC PANEL: CPT

## 2017-02-12 RX ORDER — SODIUM CHLORIDE 9 MG/ML
INJECTION, SOLUTION INTRAVENOUS CONTINUOUS
Status: DISPENSED | OUTPATIENT
Start: 2017-02-12 | End: 2017-02-12

## 2017-02-12 RX ADMIN — DOCUSATE SODIUM 100 MG: 100 CAPSULE, LIQUID FILLED ORAL at 08:26

## 2017-02-12 RX ADMIN — Medication 500 MG: at 08:26

## 2017-02-12 RX ADMIN — INSULIN LISPRO 2 UNITS: 100 INJECTION, SOLUTION INTRAVENOUS; SUBCUTANEOUS at 21:53

## 2017-02-12 RX ADMIN — CLONIDINE HYDROCHLORIDE 0.1 MG: 0.1 TABLET ORAL at 22:47

## 2017-02-12 RX ADMIN — ROSUVASTATIN CALCIUM 40 MG: 40 TABLET, FILM COATED ORAL at 22:48

## 2017-02-12 RX ADMIN — Medication 5000 UNITS: at 08:26

## 2017-02-12 RX ADMIN — INSULIN LISPRO 2 UNITS: 100 INJECTION, SOLUTION INTRAVENOUS; SUBCUTANEOUS at 16:57

## 2017-02-12 RX ADMIN — CLONIDINE HYDROCHLORIDE 0.1 MG: 0.1 TABLET ORAL at 08:26

## 2017-02-12 RX ADMIN — AMIODARONE HYDROCHLORIDE 100 MG: 200 TABLET ORAL at 08:26

## 2017-02-12 RX ADMIN — INSULIN GLARGINE 14 UNITS: 100 INJECTION, SOLUTION SUBCUTANEOUS at 08:27

## 2017-02-12 RX ADMIN — ISOSORBIDE MONONITRATE 60 MG: 60 TABLET, EXTENDED RELEASE ORAL at 08:26

## 2017-02-12 RX ADMIN — Medication 400 MG: at 08:26

## 2017-02-12 RX ADMIN — ASPIRIN 81 MG: 81 TABLET, COATED ORAL at 08:26

## 2017-02-12 RX ADMIN — CARBIDOPA AND LEVODOPA 1 TABLET: 25; 100 TABLET ORAL at 08:26

## 2017-02-12 RX ADMIN — INSULIN LISPRO 1 UNITS: 100 INJECTION, SOLUTION INTRAVENOUS; SUBCUTANEOUS at 11:55

## 2017-02-12 RX ADMIN — INSULIN LISPRO 2 UNITS: 100 INJECTION, SOLUTION INTRAVENOUS; SUBCUTANEOUS at 11:56

## 2017-02-12 RX ADMIN — WARFARIN SODIUM 5 MG: 5 TABLET ORAL at 16:57

## 2017-02-12 RX ADMIN — SODIUM CHLORIDE: 9 INJECTION, SOLUTION INTRAVENOUS at 13:18

## 2017-02-12 RX ADMIN — PANTOPRAZOLE SODIUM 40 MG: 40 TABLET, DELAYED RELEASE ORAL at 06:44

## 2017-02-12 RX ADMIN — METOPROLOL TARTRATE 50 MG: 50 TABLET ORAL at 08:26

## 2017-02-12 RX ADMIN — CLONIDINE HYDROCHLORIDE 0.1 MG: 0.1 TABLET ORAL at 13:17

## 2017-02-12 RX ADMIN — CARBIDOPA AND LEVODOPA 1 TABLET: 25; 100 TABLET ORAL at 22:48

## 2017-02-12 RX ADMIN — Medication 10 ML: at 08:27

## 2017-02-12 RX ADMIN — LEVOTHYROXINE SODIUM 25 MCG: 25 TABLET ORAL at 06:44

## 2017-02-12 RX ADMIN — CARBIDOPA AND LEVODOPA 1 TABLET: 25; 100 TABLET ORAL at 13:17

## 2017-02-12 RX ADMIN — DOCUSATE SODIUM 100 MG: 100 CAPSULE, LIQUID FILLED ORAL at 22:47

## 2017-02-12 RX ADMIN — ENOXAPARIN SODIUM 60 MG: 60 INJECTION SUBCUTANEOUS at 08:26

## 2017-02-12 RX ADMIN — METOPROLOL TARTRATE 50 MG: 50 TABLET ORAL at 22:47

## 2017-02-12 RX ADMIN — SERTRALINE 50 MG: 50 TABLET, FILM COATED ORAL at 08:26

## 2017-02-12 ASSESSMENT — PAIN SCALES - GENERAL
PAINLEVEL_OUTOF10: 0

## 2017-02-13 ENCOUNTER — APPOINTMENT (OUTPATIENT)
Dept: ULTRASOUND IMAGING | Age: 82
DRG: 309 | End: 2017-02-13
Payer: MEDICARE

## 2017-02-13 LAB
ALBUMIN SERPL-MCNC: 3.2 G/DL (ref 3.9–4.9)
ALP BLD-CCNC: 61 U/L (ref 40–130)
ALT SERPL-CCNC: <5 U/L (ref 0–33)
ANION GAP SERPL CALCULATED.3IONS-SCNC: 12 MEQ/L (ref 7–13)
AST SERPL-CCNC: 12 U/L (ref 0–35)
BILIRUB SERPL-MCNC: 0.1 MG/DL (ref 0–1.2)
BUN BLDV-MCNC: 63 MG/DL (ref 8–23)
CALCIUM SERPL-MCNC: 8 MG/DL (ref 8.6–10.2)
CHLORIDE BLD-SCNC: 109 MEQ/L (ref 98–107)
CO2: 18 MEQ/L (ref 22–29)
CREAT SERPL-MCNC: 3.26 MG/DL (ref 0.5–0.9)
GFR AFRICAN AMERICAN: 16.5
GFR NON-AFRICAN AMERICAN: 13.6
GLOBULIN: 1.9 G/DL (ref 2.3–3.5)
GLUCOSE BLD-MCNC: 114 MG/DL (ref 60–115)
GLUCOSE BLD-MCNC: 122 MG/DL (ref 74–109)
GLUCOSE BLD-MCNC: 124 MG/DL (ref 60–115)
GLUCOSE BLD-MCNC: 174 MG/DL (ref 60–115)
GLUCOSE BLD-MCNC: 193 MG/DL (ref 60–115)
HCT VFR BLD CALC: 27.6 % (ref 37–47)
HEMOGLOBIN: 9.3 G/DL (ref 12–16)
INR BLD: 1.1
MAGNESIUM: 2.8 MG/DL (ref 1.7–2.3)
MCH RBC QN AUTO: 30 PG (ref 27–31.3)
MCHC RBC AUTO-ENTMCNC: 33.7 % (ref 33–37)
MCV RBC AUTO: 89 FL (ref 82–100)
PDW BLD-RTO: 15.2 % (ref 11.5–14.5)
PERFORMED ON: ABNORMAL
PERFORMED ON: NORMAL
PLATELET # BLD: 94 K/UL (ref 130–400)
PLATELET SLIDE REVIEW: ABNORMAL
POTASSIUM SERPL-SCNC: 4.3 MEQ/L (ref 3.5–5.1)
PROTHROMBIN TIME: 11.4 SEC (ref 8.1–13.7)
RBC # BLD: 3.11 M/UL (ref 4.2–5.4)
SODIUM BLD-SCNC: 139 MEQ/L (ref 132–144)
TOTAL PROTEIN: 5.1 G/DL (ref 6.4–8.1)
WBC # BLD: 4 K/UL (ref 4.8–10.8)

## 2017-02-13 PROCEDURE — 2580000003 HC RX 258: Performed by: INTERNAL MEDICINE

## 2017-02-13 PROCEDURE — 6370000000 HC RX 637 (ALT 250 FOR IP): Performed by: INTERNAL MEDICINE

## 2017-02-13 PROCEDURE — 93005 ELECTROCARDIOGRAM TRACING: CPT

## 2017-02-13 PROCEDURE — 2060000000 HC ICU INTERMEDIATE R&B

## 2017-02-13 PROCEDURE — 6360000002 HC RX W HCPCS: Performed by: INTERNAL MEDICINE

## 2017-02-13 PROCEDURE — 85610 PROTHROMBIN TIME: CPT

## 2017-02-13 PROCEDURE — 76775 US EXAM ABDO BACK WALL LIM: CPT

## 2017-02-13 PROCEDURE — 85027 COMPLETE CBC AUTOMATED: CPT

## 2017-02-13 PROCEDURE — 83735 ASSAY OF MAGNESIUM: CPT

## 2017-02-13 PROCEDURE — 80053 COMPREHEN METABOLIC PANEL: CPT

## 2017-02-13 PROCEDURE — 36415 COLL VENOUS BLD VENIPUNCTURE: CPT

## 2017-02-13 RX ADMIN — CARBIDOPA AND LEVODOPA 1 TABLET: 25; 100 TABLET ORAL at 09:54

## 2017-02-13 RX ADMIN — CLONIDINE HYDROCHLORIDE 0.1 MG: 0.1 TABLET ORAL at 09:54

## 2017-02-13 RX ADMIN — AMIODARONE HYDROCHLORIDE 100 MG: 200 TABLET ORAL at 09:55

## 2017-02-13 RX ADMIN — METOPROLOL TARTRATE 50 MG: 50 TABLET ORAL at 09:54

## 2017-02-13 RX ADMIN — CLONIDINE HYDROCHLORIDE 0.1 MG: 0.1 TABLET ORAL at 21:02

## 2017-02-13 RX ADMIN — INSULIN LISPRO 2 UNITS: 100 INJECTION, SOLUTION INTRAVENOUS; SUBCUTANEOUS at 11:36

## 2017-02-13 RX ADMIN — Medication 400 MG: at 09:54

## 2017-02-13 RX ADMIN — ENOXAPARIN SODIUM 60 MG: 60 INJECTION SUBCUTANEOUS at 09:53

## 2017-02-13 RX ADMIN — Medication 10 ML: at 21:04

## 2017-02-13 RX ADMIN — INSULIN LISPRO 1 UNITS: 100 INJECTION, SOLUTION INTRAVENOUS; SUBCUTANEOUS at 11:37

## 2017-02-13 RX ADMIN — INSULIN LISPRO 1 UNITS: 100 INJECTION, SOLUTION INTRAVENOUS; SUBCUTANEOUS at 16:35

## 2017-02-13 RX ADMIN — METOPROLOL TARTRATE 50 MG: 50 TABLET ORAL at 21:02

## 2017-02-13 RX ADMIN — INSULIN LISPRO 2 UNITS: 100 INJECTION, SOLUTION INTRAVENOUS; SUBCUTANEOUS at 16:36

## 2017-02-13 RX ADMIN — ASPIRIN 81 MG: 81 TABLET, COATED ORAL at 09:54

## 2017-02-13 RX ADMIN — CLONIDINE HYDROCHLORIDE 0.1 MG: 0.1 TABLET ORAL at 13:50

## 2017-02-13 RX ADMIN — CARBIDOPA AND LEVODOPA 1 TABLET: 25; 100 TABLET ORAL at 13:50

## 2017-02-13 RX ADMIN — WARFARIN SODIUM 5 MG: 5 TABLET ORAL at 16:09

## 2017-02-13 RX ADMIN — SERTRALINE 50 MG: 50 TABLET, FILM COATED ORAL at 09:54

## 2017-02-13 RX ADMIN — Medication 5000 UNITS: at 09:54

## 2017-02-13 RX ADMIN — Medication 500 MG: at 09:54

## 2017-02-13 RX ADMIN — ROSUVASTATIN CALCIUM 40 MG: 40 TABLET, FILM COATED ORAL at 21:02

## 2017-02-13 RX ADMIN — DARBEPOETIN ALFA 40 MCG: 40 SOLUTION INTRAVENOUS; SUBCUTANEOUS at 11:13

## 2017-02-13 RX ADMIN — ISOSORBIDE MONONITRATE 60 MG: 60 TABLET, EXTENDED RELEASE ORAL at 09:54

## 2017-02-13 RX ADMIN — DOCUSATE SODIUM 100 MG: 100 CAPSULE, LIQUID FILLED ORAL at 09:54

## 2017-02-13 RX ADMIN — PANTOPRAZOLE SODIUM 40 MG: 40 TABLET, DELAYED RELEASE ORAL at 07:48

## 2017-02-13 RX ADMIN — LEVOTHYROXINE SODIUM 25 MCG: 25 TABLET ORAL at 07:48

## 2017-02-13 RX ADMIN — CARBIDOPA AND LEVODOPA 1 TABLET: 25; 100 TABLET ORAL at 21:02

## 2017-02-13 RX ADMIN — INSULIN GLARGINE 14 UNITS: 100 INJECTION, SOLUTION SUBCUTANEOUS at 09:52

## 2017-02-13 ASSESSMENT — PAIN SCALES - GENERAL
PAINLEVEL_OUTOF10: 0

## 2017-02-13 ASSESSMENT — ENCOUNTER SYMPTOMS
SHORTNESS OF BREATH: 0
VOMITING: 1
NAUSEA: 0

## 2017-02-14 VITALS
SYSTOLIC BLOOD PRESSURE: 149 MMHG | OXYGEN SATURATION: 99 % | DIASTOLIC BLOOD PRESSURE: 62 MMHG | WEIGHT: 134.04 LBS | RESPIRATION RATE: 18 BRPM | HEART RATE: 52 BPM | TEMPERATURE: 97.9 F | HEIGHT: 59 IN | BODY MASS INDEX: 27.02 KG/M2

## 2017-02-14 LAB
ANION GAP SERPL CALCULATED.3IONS-SCNC: 11 MEQ/L (ref 7–13)
BUN BLDV-MCNC: 55 MG/DL (ref 8–23)
CALCIUM SERPL-MCNC: 8.6 MG/DL (ref 8.6–10.2)
CHLORIDE BLD-SCNC: 110 MEQ/L (ref 98–107)
CO2: 18 MEQ/L (ref 22–29)
CREAT SERPL-MCNC: 3.07 MG/DL (ref 0.5–0.9)
GFR AFRICAN AMERICAN: 17.7
GFR NON-AFRICAN AMERICAN: 14.6
GLUCOSE BLD-MCNC: 107 MG/DL (ref 60–115)
GLUCOSE BLD-MCNC: 233 MG/DL (ref 60–115)
GLUCOSE BLD-MCNC: 99 MG/DL (ref 74–109)
HEMOCCULT STL QL: NORMAL
INR BLD: 1.2
PERFORMED ON: ABNORMAL
PERFORMED ON: NORMAL
POTASSIUM SERPL-SCNC: 4.9 MEQ/L (ref 3.5–5.1)
PROTHROMBIN TIME: 12.5 SEC (ref 8.1–13.7)
SODIUM BLD-SCNC: 139 MEQ/L (ref 132–144)

## 2017-02-14 PROCEDURE — 85610 PROTHROMBIN TIME: CPT

## 2017-02-14 PROCEDURE — 6370000000 HC RX 637 (ALT 250 FOR IP): Performed by: INTERNAL MEDICINE

## 2017-02-14 PROCEDURE — 36415 COLL VENOUS BLD VENIPUNCTURE: CPT

## 2017-02-14 PROCEDURE — 82274 ASSAY TEST FOR BLOOD FECAL: CPT

## 2017-02-14 PROCEDURE — 2580000003 HC RX 258: Performed by: INTERNAL MEDICINE

## 2017-02-14 PROCEDURE — 80048 BASIC METABOLIC PNL TOTAL CA: CPT

## 2017-02-14 RX ADMIN — Medication 500 MG: at 08:37

## 2017-02-14 RX ADMIN — INSULIN LISPRO 2 UNITS: 100 INJECTION, SOLUTION INTRAVENOUS; SUBCUTANEOUS at 11:57

## 2017-02-14 RX ADMIN — CARBIDOPA AND LEVODOPA 1 TABLET: 25; 100 TABLET ORAL at 13:43

## 2017-02-14 RX ADMIN — ASPIRIN 81 MG: 81 TABLET, COATED ORAL at 08:38

## 2017-02-14 RX ADMIN — ISOSORBIDE MONONITRATE 60 MG: 60 TABLET, EXTENDED RELEASE ORAL at 08:37

## 2017-02-14 RX ADMIN — DOCUSATE SODIUM 100 MG: 100 CAPSULE, LIQUID FILLED ORAL at 08:38

## 2017-02-14 RX ADMIN — METOPROLOL TARTRATE 50 MG: 50 TABLET ORAL at 08:38

## 2017-02-14 RX ADMIN — Medication 10 ML: at 08:38

## 2017-02-14 RX ADMIN — AMIODARONE HYDROCHLORIDE 100 MG: 200 TABLET ORAL at 08:37

## 2017-02-14 RX ADMIN — SERTRALINE 50 MG: 50 TABLET, FILM COATED ORAL at 08:38

## 2017-02-14 RX ADMIN — INSULIN GLARGINE 14 UNITS: 100 INJECTION, SOLUTION SUBCUTANEOUS at 08:38

## 2017-02-14 RX ADMIN — CLONIDINE HYDROCHLORIDE 0.1 MG: 0.1 TABLET ORAL at 08:38

## 2017-02-14 RX ADMIN — INSULIN LISPRO 2 UNITS: 100 INJECTION, SOLUTION INTRAVENOUS; SUBCUTANEOUS at 11:54

## 2017-02-14 RX ADMIN — CARBIDOPA AND LEVODOPA 1 TABLET: 25; 100 TABLET ORAL at 08:38

## 2017-02-14 RX ADMIN — Medication 5000 UNITS: at 08:38

## 2017-02-14 RX ADMIN — LEVOTHYROXINE SODIUM 25 MCG: 25 TABLET ORAL at 08:38

## 2017-02-14 RX ADMIN — Medication 400 MG: at 08:38

## 2017-02-14 RX ADMIN — CLONIDINE HYDROCHLORIDE 0.1 MG: 0.1 TABLET ORAL at 13:43

## 2017-02-14 ASSESSMENT — PAIN SCALES - GENERAL: PAINLEVEL_OUTOF10: 0

## 2017-02-14 ASSESSMENT — ENCOUNTER SYMPTOMS
VOMITING: 1
NAUSEA: 0
SHORTNESS OF BREATH: 0

## 2017-02-15 ENCOUNTER — CARE COORDINATION (OUTPATIENT)
Dept: CASE MANAGEMENT | Age: 82
End: 2017-02-15

## 2017-02-15 LAB
EKG ATRIAL RATE: 141 BPM
EKG ATRIAL RATE: 49 BPM
EKG ATRIAL RATE: 52 BPM
EKG P AXIS: 52 DEGREES
EKG P AXIS: 65 DEGREES
EKG P-R INTERVAL: 234 MS
EKG P-R INTERVAL: 246 MS
EKG Q-T INTERVAL: 306 MS
EKG Q-T INTERVAL: 496 MS
EKG Q-T INTERVAL: 504 MS
EKG QRS DURATION: 90 MS
EKG QRS DURATION: 92 MS
EKG QRS DURATION: 94 MS
EKG QTC CALCULATION (BAZETT): 448 MS
EKG QTC CALCULATION (BAZETT): 468 MS
EKG QTC CALCULATION (BAZETT): 480 MS
EKG R AXIS: -2 DEGREES
EKG R AXIS: 0 DEGREES
EKG R AXIS: 33 DEGREES
EKG T AXIS: -87 DEGREES
EKG T AXIS: 13 DEGREES
EKG T AXIS: 17 DEGREES
EKG VENTRICULAR RATE: 148 BPM
EKG VENTRICULAR RATE: 49 BPM
EKG VENTRICULAR RATE: 52 BPM

## 2017-02-21 ENCOUNTER — ANTI-COAG VISIT (OUTPATIENT)
Dept: PHARMACY | Age: 82
End: 2017-02-21

## 2017-02-21 DIAGNOSIS — I48.91 ATRIAL FIBRILLATION, UNSPECIFIED TYPE (HCC): ICD-10-CM

## 2017-02-22 ENCOUNTER — HOSPITAL ENCOUNTER (OUTPATIENT)
Dept: PHARMACY | Age: 82
Discharge: HOME | End: 2017-02-22
Payer: MEDICARE

## 2017-02-22 DIAGNOSIS — I48.91 ATRIAL FIBRILLATION, UNSPECIFIED TYPE (HCC): ICD-10-CM

## 2017-02-22 LAB
INR BLD: 2.3
PROTIME: 28 SECONDS

## 2017-02-22 PROCEDURE — G0463 HOSPITAL OUTPT CLINIC VISIT: HCPCS

## 2017-02-22 PROCEDURE — 85610 PROTHROMBIN TIME: CPT

## 2017-02-24 ENCOUNTER — CARE COORDINATION (OUTPATIENT)
Dept: CASE MANAGEMENT | Age: 82
End: 2017-02-24

## 2017-03-15 ENCOUNTER — HOSPITAL ENCOUNTER (OUTPATIENT)
Dept: PHARMACY | Age: 82
Discharge: HOME | End: 2017-03-15
Payer: MEDICARE

## 2017-03-15 DIAGNOSIS — I48.91 ATRIAL FIBRILLATION, UNSPECIFIED TYPE (HCC): ICD-10-CM

## 2017-03-15 LAB
INR BLD: 1.7
PROTIME: 19.8 SECONDS

## 2017-03-15 PROCEDURE — 85610 PROTHROMBIN TIME: CPT

## 2017-03-15 PROCEDURE — G0463 HOSPITAL OUTPT CLINIC VISIT: HCPCS

## 2017-03-16 ENCOUNTER — CARE COORDINATION (OUTPATIENT)
Dept: CASE MANAGEMENT | Age: 82
End: 2017-03-16

## 2017-04-05 ENCOUNTER — HOSPITAL ENCOUNTER (OUTPATIENT)
Dept: PHARMACY | Age: 82
Discharge: HOME | End: 2017-04-05
Payer: MEDICARE

## 2017-04-05 DIAGNOSIS — I48.91 ATRIAL FIBRILLATION, UNSPECIFIED TYPE (HCC): ICD-10-CM

## 2017-04-05 LAB
INR BLD: 1.4
PROTIME: 17 SECONDS

## 2017-04-05 PROCEDURE — 85610 PROTHROMBIN TIME: CPT

## 2017-04-05 PROCEDURE — G0463 HOSPITAL OUTPT CLINIC VISIT: HCPCS

## 2017-04-19 ENCOUNTER — HOSPITAL ENCOUNTER (EMERGENCY)
Age: 82
Discharge: HOME | End: 2017-04-19
Attending: EMERGENCY MEDICINE
Payer: MEDICARE

## 2017-04-19 VITALS
DIASTOLIC BLOOD PRESSURE: 51 MMHG | TEMPERATURE: 98.2 F | HEIGHT: 59 IN | SYSTOLIC BLOOD PRESSURE: 125 MMHG | WEIGHT: 133 LBS | BODY MASS INDEX: 26.81 KG/M2 | RESPIRATION RATE: 12 BRPM | OXYGEN SATURATION: 97 % | HEART RATE: 55 BPM

## 2017-04-19 DIAGNOSIS — E86.0 DEHYDRATION: Primary | ICD-10-CM

## 2017-04-19 LAB
ALBUMIN SERPL-MCNC: 4.1 G/DL (ref 3.9–4.9)
ALP BLD-CCNC: 84 U/L (ref 40–130)
ALT SERPL-CCNC: <5 U/L (ref 0–33)
ANION GAP SERPL CALCULATED.3IONS-SCNC: 16 MEQ/L (ref 7–13)
AST SERPL-CCNC: 25 U/L (ref 0–35)
BACTERIA: ABNORMAL /HPF
BASOPHILS ABSOLUTE: 0 K/UL (ref 0–0.2)
BASOPHILS RELATIVE PERCENT: 0.7 %
BILIRUB SERPL-MCNC: 0.2 MG/DL (ref 0–1.2)
BILIRUBIN URINE: NEGATIVE
BLOOD, URINE: ABNORMAL
BUN BLDV-MCNC: 72 MG/DL (ref 8–23)
CALCIUM SERPL-MCNC: 9.1 MG/DL (ref 8.6–10.2)
CHLORIDE BLD-SCNC: 100 MEQ/L (ref 98–107)
CLARITY: CLEAR
CO2: 21 MEQ/L (ref 22–29)
COLOR: YELLOW
CREAT SERPL-MCNC: 3.5 MG/DL (ref 0.5–0.9)
EOSINOPHILS ABSOLUTE: 0.1 K/UL (ref 0–0.7)
EOSINOPHILS RELATIVE PERCENT: 1 %
EPITHELIAL CELLS, UA: ABNORMAL /HPF
GFR AFRICAN AMERICAN: 15.2
GFR NON-AFRICAN AMERICAN: 12.5
GLOBULIN: 2.8 G/DL (ref 2.3–3.5)
GLUCOSE BLD-MCNC: 128 MG/DL (ref 74–109)
GLUCOSE URINE: 100 MG/DL
HCT VFR BLD CALC: 35.6 % (ref 37–47)
HEMOGLOBIN: 11.9 G/DL (ref 12–16)
KETONES, URINE: NEGATIVE MG/DL
LEUKOCYTE ESTERASE, URINE: ABNORMAL
LYMPHOCYTES ABSOLUTE: 1.2 K/UL (ref 1–4.8)
LYMPHOCYTES RELATIVE PERCENT: 24.3 %
MCH RBC QN AUTO: 29.4 PG (ref 27–31.3)
MCHC RBC AUTO-ENTMCNC: 33.3 % (ref 33–37)
MCV RBC AUTO: 88.2 FL (ref 82–100)
MONOCYTES ABSOLUTE: 0.5 K/UL (ref 0.2–0.8)
MONOCYTES RELATIVE PERCENT: 9.2 %
NEUTROPHILS ABSOLUTE: 3.3 K/UL (ref 1.4–6.5)
NEUTROPHILS RELATIVE PERCENT: 64.8 %
NITRITE, URINE: NEGATIVE
PDW BLD-RTO: 15.9 % (ref 11.5–14.5)
PH UA: 6.5 (ref 5–9)
PLATELET # BLD: 128 K/UL (ref 130–400)
POTASSIUM SERPL-SCNC: 3.9 MEQ/L (ref 3.5–5.1)
PROTEIN UA: >=300 MG/DL
RBC # BLD: 4.04 M/UL (ref 4.2–5.4)
RBC UA: ABNORMAL /HPF (ref 0–2)
RENAL EPITHELIAL, UA: ABNORMAL /HPF
SODIUM BLD-SCNC: 137 MEQ/L (ref 132–144)
SPECIFIC GRAVITY UA: 1.01 (ref 1–1.03)
TOTAL PROTEIN: 6.9 G/DL (ref 6.4–8.1)
TROPONIN: <0.01 NG/ML (ref 0–0.01)
URINE REFLEX TO CULTURE: YES
UROBILINOGEN, URINE: 0.2 E.U./DL
WBC # BLD: 5.1 K/UL (ref 4.8–10.8)
WBC UA: ABNORMAL /HPF (ref 0–5)
YEAST: PRESENT

## 2017-04-19 PROCEDURE — 81001 URINALYSIS AUTO W/SCOPE: CPT

## 2017-04-19 PROCEDURE — 93005 ELECTROCARDIOGRAM TRACING: CPT

## 2017-04-19 PROCEDURE — 99284 EMERGENCY DEPT VISIT MOD MDM: CPT

## 2017-04-19 PROCEDURE — 96360 HYDRATION IV INFUSION INIT: CPT

## 2017-04-19 PROCEDURE — 80053 COMPREHEN METABOLIC PANEL: CPT

## 2017-04-19 PROCEDURE — 85025 COMPLETE CBC W/AUTO DIFF WBC: CPT

## 2017-04-19 PROCEDURE — 36415 COLL VENOUS BLD VENIPUNCTURE: CPT

## 2017-04-19 PROCEDURE — 2580000003 HC RX 258: Performed by: EMERGENCY MEDICINE

## 2017-04-19 PROCEDURE — 87086 URINE CULTURE/COLONY COUNT: CPT

## 2017-04-19 PROCEDURE — 84484 ASSAY OF TROPONIN QUANT: CPT

## 2017-04-19 RX ORDER — 0.9 % SODIUM CHLORIDE 0.9 %
500 INTRAVENOUS SOLUTION INTRAVENOUS ONCE
Status: COMPLETED | OUTPATIENT
Start: 2017-04-19 | End: 2017-04-19

## 2017-04-19 RX ADMIN — SODIUM CHLORIDE 500 ML: 9 INJECTION, SOLUTION INTRAVENOUS at 11:34

## 2017-04-19 ASSESSMENT — ENCOUNTER SYMPTOMS
SHORTNESS OF BREATH: 0
ABDOMINAL PAIN: 0
CHEST TIGHTNESS: 0
EYE PAIN: 0
SORE THROAT: 0
VOMITING: 0
NAUSEA: 0

## 2017-04-20 ENCOUNTER — HOSPITAL ENCOUNTER (OUTPATIENT)
Dept: LAB | Age: 82
Discharge: HOME | End: 2017-04-20
Payer: MEDICARE

## 2017-04-20 LAB
ALBUMIN SERPL-MCNC: 4 G/DL (ref 3.9–4.9)
ALP BLD-CCNC: 78 U/L (ref 40–130)
ALT SERPL-CCNC: 13 U/L (ref 0–33)
AST SERPL-CCNC: 24 U/L (ref 0–35)
BILIRUB SERPL-MCNC: 0.1 MG/DL (ref 0–1.2)
BILIRUBIN DIRECT: 0 MG/DL (ref 0–0.3)
BILIRUBIN, INDIRECT: 0.1 MG/DL (ref 0–0.6)
CHOLESTEROL, TOTAL: 338 MG/DL (ref 0–199)
EKG ATRIAL RATE: 208 BPM
EKG Q-T INTERVAL: 412 MS
EKG QRS DURATION: 92 MS
EKG QTC CALCULATION (BAZETT): 484 MS
EKG R AXIS: 6 DEGREES
EKG T AXIS: 14 DEGREES
EKG VENTRICULAR RATE: 83 BPM
HDLC SERPL-MCNC: 43 MG/DL (ref 40–59)
LDL CHOLESTEROL CALCULATED: 240 MG/DL (ref 0–129)
TOTAL PROTEIN: 6.6 G/DL (ref 6.4–8.1)
TRIGL SERPL-MCNC: 276 MG/DL (ref 0–200)

## 2017-04-20 PROCEDURE — 80061 LIPID PANEL: CPT

## 2017-04-20 PROCEDURE — 80076 HEPATIC FUNCTION PANEL: CPT

## 2017-04-20 PROCEDURE — 36415 COLL VENOUS BLD VENIPUNCTURE: CPT

## 2017-04-21 LAB — URINE CULTURE, ROUTINE: NORMAL

## 2017-04-25 ENCOUNTER — ANTI-COAG VISIT (OUTPATIENT)
Dept: PHARMACY | Age: 82
End: 2017-04-25

## 2017-04-25 DIAGNOSIS — I48.91 ATRIAL FIBRILLATION, UNSPECIFIED TYPE (HCC): ICD-10-CM

## 2017-04-27 ENCOUNTER — ANTI-COAG VISIT (OUTPATIENT)
Dept: PHARMACY | Age: 82
End: 2017-04-27

## 2017-04-27 DIAGNOSIS — I48.91 ATRIAL FIBRILLATION, UNSPECIFIED TYPE (HCC): ICD-10-CM

## 2017-05-01 DIAGNOSIS — E03.9 HYPOTHYROIDISM, UNSPECIFIED TYPE: ICD-10-CM

## 2017-05-01 LAB
ALBUMIN SERPL-MCNC: 4.1 G/DL (ref 3.9–4.9)
ANION GAP SERPL CALCULATED.3IONS-SCNC: 11 MEQ/L (ref 7–13)
ANION GAP SERPL CALCULATED.3IONS-SCNC: 13 MEQ/L (ref 7–13)
BUN BLDV-MCNC: 74 MG/DL (ref 8–23)
BUN BLDV-MCNC: 75 MG/DL (ref 8–23)
CALCIUM SERPL-MCNC: 9 MG/DL (ref 8.6–10.2)
CALCIUM SERPL-MCNC: 9 MG/DL (ref 8.6–10.2)
CHLORIDE BLD-SCNC: 101 MEQ/L (ref 98–107)
CHLORIDE BLD-SCNC: 102 MEQ/L (ref 98–107)
CO2: 24 MEQ/L (ref 22–29)
CO2: 25 MEQ/L (ref 22–29)
CREAT SERPL-MCNC: 3.49 MG/DL (ref 0.5–0.9)
CREAT SERPL-MCNC: 3.57 MG/DL (ref 0.5–0.9)
CREATININE URINE: 43.3 MG/DL
GFR AFRICAN AMERICAN: 14.8
GFR AFRICAN AMERICAN: 15.2
GFR NON-AFRICAN AMERICAN: 12.3
GFR NON-AFRICAN AMERICAN: 12.6
GLUCOSE BLD-MCNC: 118 MG/DL (ref 74–109)
GLUCOSE BLD-MCNC: 119 MG/DL (ref 74–109)
HBA1C MFR BLD: 6.4 % (ref 4.8–5.9)
HCT VFR BLD CALC: 34.5 % (ref 37–47)
HEMOGLOBIN: 11.5 G/DL (ref 12–16)
MCH RBC QN AUTO: 29.7 PG (ref 27–31.3)
MCHC RBC AUTO-ENTMCNC: 33.3 % (ref 33–37)
MCV RBC AUTO: 89.2 FL (ref 82–100)
MICROALBUMIN UR-MCNC: 179.5 MG/DL
MICROALBUMIN/CREAT UR-RTO: 4145.5 MG/G (ref 0–30)
PDW BLD-RTO: 15.5 % (ref 11.5–14.5)
PHOSPHORUS: 4.8 MG/DL (ref 2.5–4.5)
PLATELET # BLD: 122 K/UL (ref 130–400)
POTASSIUM SERPL-SCNC: 4.2 MEQ/L (ref 3.5–5.1)
POTASSIUM SERPL-SCNC: 4.2 MEQ/L (ref 3.5–5.1)
RBC # BLD: 3.87 M/UL (ref 4.2–5.4)
SODIUM BLD-SCNC: 138 MEQ/L (ref 132–144)
SODIUM BLD-SCNC: 138 MEQ/L (ref 132–144)
TSH SERPL DL<=0.05 MIU/L-ACNC: 1.93 UIU/ML (ref 0.27–4.2)
WBC # BLD: 4.8 K/UL (ref 4.8–10.8)

## 2017-05-02 LAB — T4 FREE: 1.07 NG/DL (ref 0.93–1.7)

## 2017-05-04 RX ORDER — LEVOTHYROXINE SODIUM 0.05 MG/1
TABLET ORAL
Qty: 45 TABLET | Refills: 2 | Status: SHIPPED | OUTPATIENT
Start: 2017-05-04 | End: 2017-05-08 | Stop reason: SDUPTHER

## 2017-05-04 RX ORDER — LEVOTHYROXINE SODIUM 0.03 MG/1
TABLET ORAL
Qty: 45 TABLET | Refills: 2 | Status: SHIPPED | OUTPATIENT
Start: 2017-05-04 | End: 2017-05-08 | Stop reason: SDUPTHER

## 2017-05-08 ENCOUNTER — OFFICE VISIT (OUTPATIENT)
Dept: SURGERY | Age: 82
End: 2017-05-08

## 2017-05-08 ENCOUNTER — ANTI-COAG VISIT (OUTPATIENT)
Dept: PHARMACY | Age: 82
End: 2017-05-08

## 2017-05-08 VITALS
BODY MASS INDEX: 28.83 KG/M2 | HEART RATE: 55 BPM | SYSTOLIC BLOOD PRESSURE: 178 MMHG | WEIGHT: 143 LBS | DIASTOLIC BLOOD PRESSURE: 80 MMHG | HEIGHT: 59 IN

## 2017-05-08 DIAGNOSIS — I48.91 ATRIAL FIBRILLATION, UNSPECIFIED TYPE (HCC): ICD-10-CM

## 2017-05-08 DIAGNOSIS — E03.9 HYPOTHYROIDISM, UNSPECIFIED TYPE: Primary | ICD-10-CM

## 2017-05-08 LAB — GLUCOSE BLD-MCNC: 95 MG/DL

## 2017-05-08 PROCEDURE — 82962 GLUCOSE BLOOD TEST: CPT | Performed by: INTERNAL MEDICINE

## 2017-05-08 PROCEDURE — G8420 CALC BMI NORM PARAMETERS: HCPCS | Performed by: INTERNAL MEDICINE

## 2017-05-08 PROCEDURE — 1036F TOBACCO NON-USER: CPT | Performed by: INTERNAL MEDICINE

## 2017-05-08 PROCEDURE — 1123F ACP DISCUSS/DSCN MKR DOCD: CPT | Performed by: INTERNAL MEDICINE

## 2017-05-08 PROCEDURE — 4040F PNEUMOC VAC/ADMIN/RCVD: CPT | Performed by: INTERNAL MEDICINE

## 2017-05-08 PROCEDURE — G8427 DOCREV CUR MEDS BY ELIG CLIN: HCPCS | Performed by: INTERNAL MEDICINE

## 2017-05-08 PROCEDURE — 99213 OFFICE O/P EST LOW 20 MIN: CPT | Performed by: INTERNAL MEDICINE

## 2017-05-08 PROCEDURE — G8400 PT W/DXA NO RESULTS DOC: HCPCS | Performed by: INTERNAL MEDICINE

## 2017-05-08 PROCEDURE — 1090F PRES/ABSN URINE INCON ASSESS: CPT | Performed by: INTERNAL MEDICINE

## 2017-05-08 RX ORDER — LEVOTHYROXINE SODIUM 0.03 MG/1
TABLET ORAL
Qty: 50 TABLET | Refills: 2 | Status: SHIPPED | OUTPATIENT
Start: 2017-05-08 | End: 2018-01-01 | Stop reason: SDUPTHER

## 2017-05-08 RX ORDER — LEVOTHYROXINE SODIUM 0.05 MG/1
TABLET ORAL
Qty: 50 TABLET | Refills: 3 | Status: SHIPPED | OUTPATIENT
Start: 2017-05-08 | End: 2018-01-01 | Stop reason: SDUPTHER

## 2017-05-09 ENCOUNTER — HOSPITAL ENCOUNTER (OUTPATIENT)
Dept: PHARMACY | Age: 82
Discharge: HOME | End: 2017-05-09
Payer: MEDICARE

## 2017-05-09 DIAGNOSIS — I48.91 ATRIAL FIBRILLATION, UNSPECIFIED TYPE (HCC): ICD-10-CM

## 2017-05-09 DIAGNOSIS — E10.649 HYPOGLYCEMIA UNAWARENESS IN TYPE 1 DIABETES MELLITUS (HCC): ICD-10-CM

## 2017-05-09 LAB
INR BLD: 1.4
PROTIME: 16.3 SECONDS

## 2017-05-09 PROCEDURE — 85610 PROTHROMBIN TIME: CPT

## 2017-05-09 PROCEDURE — 99211 OFF/OP EST MAY X REQ PHY/QHP: CPT

## 2017-05-09 RX ORDER — WARFARIN SODIUM 2.5 MG/1
TABLET ORAL
Qty: 200 TABLET | Refills: 1 | Status: ON HOLD | OUTPATIENT
Start: 2017-05-09 | End: 2017-11-24 | Stop reason: HOSPADM

## 2017-05-14 ASSESSMENT — ENCOUNTER SYMPTOMS: BLURRED VISION: 1

## 2017-05-26 ENCOUNTER — HOSPITAL ENCOUNTER (OUTPATIENT)
Dept: PHARMACY | Age: 82
Discharge: HOME | End: 2017-05-26
Payer: MEDICARE

## 2017-05-26 DIAGNOSIS — I48.91 ATRIAL FIBRILLATION, UNSPECIFIED TYPE (HCC): ICD-10-CM

## 2017-05-26 LAB
INR BLD: 1.6
PROTIME: 19.4 SECONDS

## 2017-05-26 PROCEDURE — 99211 OFF/OP EST MAY X REQ PHY/QHP: CPT | Performed by: PHARMACIST

## 2017-05-26 PROCEDURE — 85610 PROTHROMBIN TIME: CPT | Performed by: PHARMACIST

## 2017-05-26 RX ORDER — VENLAFAXINE HYDROCHLORIDE 75 MG/1
150 CAPSULE, EXTENDED RELEASE ORAL
Status: ON HOLD | COMMUNITY
Start: 2017-05-03 | End: 2018-01-01 | Stop reason: HOSPADM

## 2017-05-26 RX ORDER — POLYSACCHARIDE-IRON COMPLEX 150 MG/1
CAPSULE ORAL
Status: ON HOLD | COMMUNITY
Start: 2017-04-29 | End: 2017-11-03 | Stop reason: HOSPADM

## 2017-06-08 ENCOUNTER — TELEPHONE (OUTPATIENT)
Dept: PHARMACY | Age: 82
End: 2017-06-08

## 2017-06-08 ENCOUNTER — ANTI-COAG VISIT (OUTPATIENT)
Dept: PHARMACY | Age: 82
End: 2017-06-08

## 2017-06-08 DIAGNOSIS — I48.91 ATRIAL FIBRILLATION, UNSPECIFIED TYPE (HCC): ICD-10-CM

## 2017-06-14 ENCOUNTER — HOSPITAL ENCOUNTER (OUTPATIENT)
Dept: PHARMACY | Age: 82
Setting detail: THERAPIES SERIES
Discharge: HOME | End: 2017-06-14
Payer: MEDICARE

## 2017-06-14 DIAGNOSIS — I48.91 ATRIAL FIBRILLATION, UNSPECIFIED TYPE (HCC): ICD-10-CM

## 2017-06-14 LAB
INR BLD: 1.7
PROTIME: 19.9 SECONDS

## 2017-06-14 PROCEDURE — 85610 PROTHROMBIN TIME: CPT

## 2017-06-14 PROCEDURE — 99211 OFF/OP EST MAY X REQ PHY/QHP: CPT

## 2017-06-28 ENCOUNTER — HOSPITAL ENCOUNTER (OUTPATIENT)
Dept: PHARMACY | Age: 82
Setting detail: THERAPIES SERIES
Discharge: HOME | End: 2017-06-28
Payer: MEDICARE

## 2017-06-28 DIAGNOSIS — I48.91 ATRIAL FIBRILLATION, UNSPECIFIED TYPE (HCC): ICD-10-CM

## 2017-06-28 LAB
INR BLD: 1.6
PROTIME: 19 SECONDS

## 2017-06-28 PROCEDURE — 99211 OFF/OP EST MAY X REQ PHY/QHP: CPT

## 2017-06-28 PROCEDURE — 85610 PROTHROMBIN TIME: CPT

## 2017-07-12 ENCOUNTER — HOSPITAL ENCOUNTER (OUTPATIENT)
Dept: PHARMACY | Age: 82
Setting detail: THERAPIES SERIES
Discharge: HOME | End: 2017-07-12
Payer: MEDICARE

## 2017-07-12 DIAGNOSIS — I48.91 ATRIAL FIBRILLATION, UNSPECIFIED TYPE (HCC): ICD-10-CM

## 2017-07-12 LAB
INR BLD: 1.7
PROTIME: 20.5 SECONDS

## 2017-07-12 PROCEDURE — 85610 PROTHROMBIN TIME: CPT

## 2017-07-12 PROCEDURE — 99211 OFF/OP EST MAY X REQ PHY/QHP: CPT

## 2017-07-17 ENCOUNTER — APPOINTMENT (OUTPATIENT)
Dept: INFUSION THERAPY | Age: 82
End: 2017-07-17
Payer: MEDICARE

## 2017-07-17 ENCOUNTER — HOSPITAL ENCOUNTER (OUTPATIENT)
Dept: INFUSION THERAPY | Age: 82
Setting detail: INFUSION SERIES
Discharge: HOME | End: 2017-07-17
Payer: MEDICARE

## 2017-07-17 DIAGNOSIS — N18.4 CKD (CHRONIC KIDNEY DISEASE), STAGE 4 (SEVERE): ICD-10-CM

## 2017-07-17 PROCEDURE — 6360000002 HC RX W HCPCS: Performed by: INTERNAL MEDICINE

## 2017-07-17 PROCEDURE — 96372 THER/PROPH/DIAG INJ SC/IM: CPT

## 2017-07-17 RX ADMIN — EPOETIN ALFA 4000 UNITS: 4000 SOLUTION INTRAVENOUS; SUBCUTANEOUS at 11:34

## 2017-07-21 ENCOUNTER — HOSPITAL ENCOUNTER (EMERGENCY)
Age: 82
Discharge: HOME | End: 2017-07-21
Attending: EMERGENCY MEDICINE
Payer: MEDICARE

## 2017-07-21 ENCOUNTER — APPOINTMENT (OUTPATIENT)
Dept: GENERAL RADIOLOGY | Age: 82
End: 2017-07-21
Payer: MEDICARE

## 2017-07-21 VITALS
HEIGHT: 59 IN | RESPIRATION RATE: 20 BRPM | HEART RATE: 62 BPM | SYSTOLIC BLOOD PRESSURE: 137 MMHG | WEIGHT: 138 LBS | DIASTOLIC BLOOD PRESSURE: 83 MMHG | OXYGEN SATURATION: 98 % | TEMPERATURE: 97.6 F | BODY MASS INDEX: 27.82 KG/M2

## 2017-07-21 DIAGNOSIS — R14.2 ERUCTATION: ICD-10-CM

## 2017-07-21 DIAGNOSIS — K59.00 ACUTE CONSTIPATION: Primary | ICD-10-CM

## 2017-07-21 LAB
ALBUMIN SERPL-MCNC: 3.8 G/DL (ref 3.9–4.9)
ALP BLD-CCNC: 73 U/L (ref 40–130)
ALT SERPL-CCNC: 21 U/L (ref 0–33)
ANION GAP SERPL CALCULATED.3IONS-SCNC: 11 MEQ/L (ref 7–13)
AST SERPL-CCNC: 18 U/L (ref 0–35)
BASOPHILS ABSOLUTE: 0 K/UL (ref 0–0.2)
BASOPHILS RELATIVE PERCENT: 0.6 %
BILIRUB SERPL-MCNC: 0.2 MG/DL (ref 0–1.2)
BILIRUBIN DIRECT: 0 MG/DL (ref 0–0.3)
BILIRUBIN, INDIRECT: 0.2 MG/DL (ref 0–0.6)
BUN BLDV-MCNC: 57 MG/DL (ref 8–23)
CALCIUM SERPL-MCNC: 8.4 MG/DL (ref 8.6–10.2)
CHLORIDE BLD-SCNC: 104 MEQ/L (ref 98–107)
CO2: 22 MEQ/L (ref 22–29)
CREAT SERPL-MCNC: 3.5 MG/DL (ref 0.5–0.9)
EOSINOPHILS ABSOLUTE: 0 K/UL (ref 0–0.7)
EOSINOPHILS RELATIVE PERCENT: 0 %
GFR AFRICAN AMERICAN: 15.2
GFR NON-AFRICAN AMERICAN: 12.5
GLUCOSE BLD-MCNC: 189 MG/DL (ref 74–109)
HCT VFR BLD CALC: 25.8 % (ref 37–47)
HEMOGLOBIN: 8.6 G/DL (ref 12–16)
LIPASE: 36 U/L (ref 13–60)
LYMPHOCYTES ABSOLUTE: 0.8 K/UL (ref 1–4.8)
LYMPHOCYTES RELATIVE PERCENT: 16.8 %
MCH RBC QN AUTO: 30.3 PG (ref 27–31.3)
MCHC RBC AUTO-ENTMCNC: 33.6 % (ref 33–37)
MCV RBC AUTO: 90.2 FL (ref 82–100)
MONOCYTES ABSOLUTE: 0.5 K/UL (ref 0.2–0.8)
MONOCYTES RELATIVE PERCENT: 10 %
NEUTROPHILS ABSOLUTE: 3.4 K/UL (ref 1.4–6.5)
NEUTROPHILS RELATIVE PERCENT: 72.6 %
PDW BLD-RTO: 14.7 % (ref 11.5–14.5)
PLATELET # BLD: 116 K/UL (ref 130–400)
POTASSIUM SERPL-SCNC: 3.7 MEQ/L (ref 3.5–5.1)
RBC # BLD: 2.85 M/UL (ref 4.2–5.4)
SODIUM BLD-SCNC: 137 MEQ/L (ref 132–144)
TOTAL PROTEIN: 5.9 G/DL (ref 6.4–8.1)
TROPONIN: <0.01 NG/ML (ref 0–0.01)
WBC # BLD: 4.6 K/UL (ref 4.8–10.8)

## 2017-07-21 PROCEDURE — 80048 BASIC METABOLIC PNL TOTAL CA: CPT

## 2017-07-21 PROCEDURE — 36415 COLL VENOUS BLD VENIPUNCTURE: CPT

## 2017-07-21 PROCEDURE — 80076 HEPATIC FUNCTION PANEL: CPT

## 2017-07-21 PROCEDURE — 84484 ASSAY OF TROPONIN QUANT: CPT

## 2017-07-21 PROCEDURE — 85025 COMPLETE CBC W/AUTO DIFF WBC: CPT

## 2017-07-21 PROCEDURE — 6370000000 HC RX 637 (ALT 250 FOR IP): Performed by: EMERGENCY MEDICINE

## 2017-07-21 PROCEDURE — 93005 ELECTROCARDIOGRAM TRACING: CPT

## 2017-07-21 PROCEDURE — 74022 RADEX COMPL AQT ABD SERIES: CPT

## 2017-07-21 PROCEDURE — 99284 EMERGENCY DEPT VISIT MOD MDM: CPT

## 2017-07-21 PROCEDURE — 83690 ASSAY OF LIPASE: CPT

## 2017-07-21 RX ORDER — SODIUM CHLORIDE 0.9 % (FLUSH) 0.9 %
3 SYRINGE (ML) INJECTION EVERY 8 HOURS
Status: DISCONTINUED | OUTPATIENT
Start: 2017-07-21 | End: 2017-07-21 | Stop reason: HOSPADM

## 2017-07-21 RX ORDER — SIMETHICONE 80 MG
80 TABLET,CHEWABLE ORAL EVERY 6 HOURS PRN
Status: DISCONTINUED | OUTPATIENT
Start: 2017-07-21 | End: 2017-07-21 | Stop reason: HOSPADM

## 2017-07-21 RX ADMIN — Medication 45 ML: at 12:43

## 2017-07-21 RX ADMIN — SIMETHICONE CHEW TAB 80 MG 80 MG: 80 TABLET ORAL at 13:36

## 2017-07-21 RX ADMIN — MAGESIUM CITRATE 296 ML: 1.75 LIQUID ORAL at 13:54

## 2017-07-21 ASSESSMENT — ENCOUNTER SYMPTOMS
BACK PAIN: 0
SINUS PRESSURE: 0
EYE DISCHARGE: 0
PHOTOPHOBIA: 0
RECTAL PAIN: 0
SORE THROAT: 0
EYE ITCHING: 0
SHORTNESS OF BREATH: 0
CHEST TIGHTNESS: 0
VOMITING: 0
APNEA: 0
COUGH: 0
CHOKING: 0
NAUSEA: 1
ABDOMINAL PAIN: 1
TROUBLE SWALLOWING: 0
FACIAL SWELLING: 0
CONSTIPATION: 0
ANAL BLEEDING: 0
COLOR CHANGE: 0
EYE REDNESS: 0
BLOOD IN STOOL: 0
WHEEZING: 0
ABDOMINAL DISTENTION: 0
VOICE CHANGE: 0
DIARRHEA: 0
EYE PAIN: 0
RHINORRHEA: 0
STRIDOR: 0

## 2017-07-21 ASSESSMENT — PAIN DESCRIPTION - LOCATION: LOCATION: CHEST

## 2017-07-21 ASSESSMENT — PAIN DESCRIPTION - ORIENTATION: ORIENTATION: ANTERIOR

## 2017-07-21 ASSESSMENT — PAIN SCALES - GENERAL: PAINLEVEL_OUTOF10: 3

## 2017-07-24 ENCOUNTER — HOSPITAL ENCOUNTER (OUTPATIENT)
Dept: INFUSION THERAPY | Age: 82
Setting detail: INFUSION SERIES
Discharge: HOME | End: 2017-07-24
Payer: MEDICARE

## 2017-07-24 VITALS
DIASTOLIC BLOOD PRESSURE: 89 MMHG | TEMPERATURE: 97.9 F | HEART RATE: 55 BPM | SYSTOLIC BLOOD PRESSURE: 144 MMHG | RESPIRATION RATE: 20 BRPM

## 2017-07-24 DIAGNOSIS — N18.4 CKD (CHRONIC KIDNEY DISEASE), STAGE 4 (SEVERE): ICD-10-CM

## 2017-07-24 LAB
EKG ATRIAL RATE: 57 BPM
EKG P AXIS: 65 DEGREES
EKG P-R INTERVAL: 206 MS
EKG Q-T INTERVAL: 468 MS
EKG QRS DURATION: 98 MS
EKG QTC CALCULATION (BAZETT): 455 MS
EKG R AXIS: -2 DEGREES
EKG T AXIS: 16 DEGREES
EKG VENTRICULAR RATE: 57 BPM

## 2017-07-24 PROCEDURE — 96372 THER/PROPH/DIAG INJ SC/IM: CPT

## 2017-07-24 PROCEDURE — 6360000002 HC RX W HCPCS: Performed by: INTERNAL MEDICINE

## 2017-07-24 RX ADMIN — EPOETIN ALFA 4000 UNITS: 4000 SOLUTION INTRAVENOUS; SUBCUTANEOUS at 12:36

## 2017-07-26 ENCOUNTER — HOSPITAL ENCOUNTER (OUTPATIENT)
Dept: PHARMACY | Age: 82
Setting detail: THERAPIES SERIES
Discharge: HOME | End: 2017-07-26
Payer: MEDICARE

## 2017-07-26 DIAGNOSIS — I48.91 ATRIAL FIBRILLATION, UNSPECIFIED TYPE (HCC): ICD-10-CM

## 2017-07-26 LAB
INR BLD: 1.8
PROTIME: 21.7 SECONDS

## 2017-07-26 PROCEDURE — 85610 PROTHROMBIN TIME: CPT

## 2017-07-26 PROCEDURE — 99211 OFF/OP EST MAY X REQ PHY/QHP: CPT

## 2017-07-31 ENCOUNTER — HOSPITAL ENCOUNTER (OUTPATIENT)
Dept: INFUSION THERAPY | Age: 82
Setting detail: INFUSION SERIES
Discharge: HOME | End: 2017-07-31
Payer: MEDICARE

## 2017-07-31 VITALS
HEART RATE: 55 BPM | DIASTOLIC BLOOD PRESSURE: 70 MMHG | SYSTOLIC BLOOD PRESSURE: 151 MMHG | TEMPERATURE: 97.9 F | RESPIRATION RATE: 16 BRPM

## 2017-07-31 DIAGNOSIS — N18.4 CKD (CHRONIC KIDNEY DISEASE), STAGE 4 (SEVERE): ICD-10-CM

## 2017-07-31 PROCEDURE — 6360000002 HC RX W HCPCS: Performed by: INTERNAL MEDICINE

## 2017-07-31 PROCEDURE — 96372 THER/PROPH/DIAG INJ SC/IM: CPT

## 2017-07-31 RX ADMIN — EPOETIN ALFA 4000 UNITS: 4000 SOLUTION INTRAVENOUS; SUBCUTANEOUS at 12:18

## 2017-08-03 ENCOUNTER — TELEPHONE (OUTPATIENT)
Dept: GASTROENTEROLOGY | Age: 82
End: 2017-08-03

## 2017-08-07 ENCOUNTER — HOSPITAL ENCOUNTER (OUTPATIENT)
Dept: INFUSION THERAPY | Age: 82
Setting detail: INFUSION SERIES
Discharge: HOME | End: 2017-08-07
Payer: MEDICARE

## 2017-08-07 VITALS
RESPIRATION RATE: 18 BRPM | SYSTOLIC BLOOD PRESSURE: 159 MMHG | HEART RATE: 55 BPM | DIASTOLIC BLOOD PRESSURE: 67 MMHG | TEMPERATURE: 98.6 F

## 2017-08-07 DIAGNOSIS — N18.4 CKD (CHRONIC KIDNEY DISEASE), STAGE 4 (SEVERE): ICD-10-CM

## 2017-08-07 PROCEDURE — 6360000002 HC RX W HCPCS: Performed by: INTERNAL MEDICINE

## 2017-08-07 PROCEDURE — 96372 THER/PROPH/DIAG INJ SC/IM: CPT

## 2017-08-07 RX ADMIN — EPOETIN ALFA 4000 UNITS: 4000 SOLUTION INTRAVENOUS; SUBCUTANEOUS at 12:20

## 2017-08-09 ENCOUNTER — HOSPITAL ENCOUNTER (OUTPATIENT)
Dept: PHARMACY | Age: 82
Setting detail: THERAPIES SERIES
Discharge: HOME | End: 2017-08-09
Payer: MEDICARE

## 2017-08-09 DIAGNOSIS — I48.91 ATRIAL FIBRILLATION, UNSPECIFIED TYPE (HCC): ICD-10-CM

## 2017-08-09 LAB
INR BLD: 2
PROTIME: 24 SECONDS

## 2017-08-09 PROCEDURE — 85610 PROTHROMBIN TIME: CPT

## 2017-08-09 PROCEDURE — 99211 OFF/OP EST MAY X REQ PHY/QHP: CPT

## 2017-08-14 ENCOUNTER — HOSPITAL ENCOUNTER (OUTPATIENT)
Dept: INFUSION THERAPY | Age: 82
Setting detail: INFUSION SERIES
Discharge: HOME | End: 2017-08-14
Payer: MEDICARE

## 2017-08-14 ENCOUNTER — APPOINTMENT (OUTPATIENT)
Dept: INFUSION THERAPY | Age: 82
End: 2017-08-14
Payer: MEDICARE

## 2017-08-14 VITALS — SYSTOLIC BLOOD PRESSURE: 151 MMHG | HEART RATE: 51 BPM | TEMPERATURE: 97.7 F | DIASTOLIC BLOOD PRESSURE: 65 MMHG

## 2017-08-14 DIAGNOSIS — N18.4 CKD (CHRONIC KIDNEY DISEASE), STAGE 4 (SEVERE): ICD-10-CM

## 2017-08-14 PROCEDURE — 96372 THER/PROPH/DIAG INJ SC/IM: CPT

## 2017-08-14 PROCEDURE — 6360000002 HC RX W HCPCS: Performed by: INTERNAL MEDICINE

## 2017-08-14 RX ADMIN — EPOETIN ALFA 4000 UNITS: 4000 SOLUTION INTRAVENOUS; SUBCUTANEOUS at 12:10

## 2017-08-21 ENCOUNTER — HOSPITAL ENCOUNTER (OUTPATIENT)
Dept: INFUSION THERAPY | Age: 82
Setting detail: INFUSION SERIES
Discharge: HOME | End: 2017-08-21
Payer: MEDICARE

## 2017-08-21 VITALS
DIASTOLIC BLOOD PRESSURE: 58 MMHG | SYSTOLIC BLOOD PRESSURE: 136 MMHG | TEMPERATURE: 98.5 F | HEART RATE: 54 BPM | RESPIRATION RATE: 18 BRPM

## 2017-08-21 DIAGNOSIS — N18.4 CKD (CHRONIC KIDNEY DISEASE), STAGE 4 (SEVERE): ICD-10-CM

## 2017-08-21 LAB
HCT VFR BLD CALC: 26.1 % (ref 37–47)
HEMOGLOBIN: 8.5 G/DL (ref 12–16)
MCH RBC QN AUTO: 29.8 PG (ref 27–31.3)
MCHC RBC AUTO-ENTMCNC: 32.8 % (ref 33–37)
MCV RBC AUTO: 90.8 FL (ref 82–100)
PDW BLD-RTO: 14.6 % (ref 11.5–14.5)
PLATELET # BLD: 138 K/UL (ref 130–400)
RBC # BLD: 2.87 M/UL (ref 4.2–5.4)
WBC # BLD: 4.3 K/UL (ref 4.8–10.8)

## 2017-08-21 PROCEDURE — 6360000002 HC RX W HCPCS: Performed by: INTERNAL MEDICINE

## 2017-08-21 PROCEDURE — 96372 THER/PROPH/DIAG INJ SC/IM: CPT

## 2017-08-21 PROCEDURE — 85027 COMPLETE CBC AUTOMATED: CPT

## 2017-08-21 RX ADMIN — EPOETIN ALFA 4000 UNITS: 4000 SOLUTION INTRAVENOUS; SUBCUTANEOUS at 12:51

## 2017-08-23 ENCOUNTER — HOSPITAL ENCOUNTER (OUTPATIENT)
Dept: PHARMACY | Age: 82
Setting detail: THERAPIES SERIES
Discharge: HOME | End: 2017-08-23
Payer: MEDICARE

## 2017-08-23 DIAGNOSIS — I48.91 ATRIAL FIBRILLATION, UNSPECIFIED TYPE (HCC): ICD-10-CM

## 2017-08-23 LAB
INR BLD: 2.3
PROTIME: 27.4 SECONDS

## 2017-08-23 PROCEDURE — 99211 OFF/OP EST MAY X REQ PHY/QHP: CPT | Performed by: PHARMACIST

## 2017-08-23 PROCEDURE — 85610 PROTHROMBIN TIME: CPT | Performed by: PHARMACIST

## 2017-08-28 ENCOUNTER — HOSPITAL ENCOUNTER (OUTPATIENT)
Dept: INFUSION THERAPY | Age: 82
Setting detail: INFUSION SERIES
Discharge: HOME | End: 2017-08-28
Payer: MEDICARE

## 2017-08-28 VITALS
HEART RATE: 51 BPM | RESPIRATION RATE: 18 BRPM | TEMPERATURE: 97.2 F | SYSTOLIC BLOOD PRESSURE: 139 MMHG | DIASTOLIC BLOOD PRESSURE: 69 MMHG

## 2017-08-28 DIAGNOSIS — N18.4 CKD (CHRONIC KIDNEY DISEASE), STAGE 4 (SEVERE): ICD-10-CM

## 2017-08-28 PROCEDURE — 6360000002 HC RX W HCPCS: Performed by: INTERNAL MEDICINE

## 2017-08-28 PROCEDURE — 96372 THER/PROPH/DIAG INJ SC/IM: CPT

## 2017-08-28 RX ADMIN — EPOETIN ALFA 4000 UNITS: 4000 SOLUTION INTRAVENOUS; SUBCUTANEOUS at 12:35

## 2017-08-31 ENCOUNTER — HOSPITAL ENCOUNTER (OUTPATIENT)
Dept: INFUSION THERAPY | Age: 82
Setting detail: INFUSION SERIES
Discharge: HOME | End: 2017-08-31
Payer: MEDICARE

## 2017-08-31 VITALS
SYSTOLIC BLOOD PRESSURE: 113 MMHG | RESPIRATION RATE: 10 BRPM | TEMPERATURE: 98.3 F | HEART RATE: 54 BPM | DIASTOLIC BLOOD PRESSURE: 56 MMHG | OXYGEN SATURATION: 99 %

## 2017-08-31 DIAGNOSIS — N18.4 CKD (CHRONIC KIDNEY DISEASE), STAGE 4 (SEVERE): ICD-10-CM

## 2017-08-31 PROCEDURE — 6360000002 HC RX W HCPCS: Performed by: INTERNAL MEDICINE

## 2017-08-31 PROCEDURE — 96365 THER/PROPH/DIAG IV INF INIT: CPT

## 2017-08-31 PROCEDURE — 2580000003 HC RX 258

## 2017-08-31 PROCEDURE — 2580000003 HC RX 258: Performed by: INTERNAL MEDICINE

## 2017-08-31 PROCEDURE — 96367 TX/PROPH/DG ADDL SEQ IV INF: CPT

## 2017-08-31 RX ORDER — SODIUM CHLORIDE 0.9 % (FLUSH) 0.9 %
10 SYRINGE (ML) INJECTION 2 TIMES DAILY
Status: DISCONTINUED | OUTPATIENT
Start: 2017-08-31 | End: 2017-09-01 | Stop reason: HOSPADM

## 2017-08-31 RX ADMIN — IRON SUCROSE 200 MG: 20 INJECTION, SOLUTION INTRAVENOUS at 12:50

## 2017-08-31 RX ADMIN — SODIUM CHLORIDE, PRESERVATIVE FREE 10 ML: 5 INJECTION INTRAVENOUS at 12:48

## 2017-09-05 ENCOUNTER — HOSPITAL ENCOUNTER (OUTPATIENT)
Dept: INFUSION THERAPY | Age: 82
Setting detail: INFUSION SERIES
Discharge: HOME | End: 2017-09-05
Payer: MEDICARE

## 2017-09-05 VITALS
TEMPERATURE: 98.6 F | HEART RATE: 55 BPM | SYSTOLIC BLOOD PRESSURE: 152 MMHG | RESPIRATION RATE: 16 BRPM | DIASTOLIC BLOOD PRESSURE: 68 MMHG

## 2017-09-05 DIAGNOSIS — N18.4 CKD (CHRONIC KIDNEY DISEASE), STAGE 4 (SEVERE): ICD-10-CM

## 2017-09-05 LAB
HCT VFR BLD CALC: 27.1 % (ref 37–47)
HEMOGLOBIN: 8.7 G/DL (ref 12–16)
MCH RBC QN AUTO: 29.1 PG (ref 27–31.3)
MCHC RBC AUTO-ENTMCNC: 32.2 % (ref 33–37)
MCV RBC AUTO: 90.5 FL (ref 82–100)
PDW BLD-RTO: 15.2 % (ref 11.5–14.5)
PLATELET # BLD: 115 K/UL (ref 130–400)
RBC # BLD: 3 M/UL (ref 4.2–5.4)
WBC # BLD: 3.8 K/UL (ref 4.8–10.8)

## 2017-09-05 PROCEDURE — 6360000002 HC RX W HCPCS: Performed by: INTERNAL MEDICINE

## 2017-09-05 PROCEDURE — 36415 COLL VENOUS BLD VENIPUNCTURE: CPT

## 2017-09-05 PROCEDURE — 85027 COMPLETE CBC AUTOMATED: CPT

## 2017-09-05 PROCEDURE — 96372 THER/PROPH/DIAG INJ SC/IM: CPT

## 2017-09-05 RX ADMIN — EPOETIN ALFA 4000 UNITS: 4000 SOLUTION INTRAVENOUS; SUBCUTANEOUS at 11:34

## 2017-09-06 ENCOUNTER — HOSPITAL ENCOUNTER (OUTPATIENT)
Dept: INFUSION THERAPY | Age: 82
Setting detail: INFUSION SERIES
Discharge: HOME | End: 2017-09-06
Payer: MEDICARE

## 2017-09-06 DIAGNOSIS — N18.4 CKD (CHRONIC KIDNEY DISEASE), STAGE 4 (SEVERE): ICD-10-CM

## 2017-09-06 PROCEDURE — 2580000003 HC RX 258: Performed by: INTERNAL MEDICINE

## 2017-09-06 PROCEDURE — 96365 THER/PROPH/DIAG IV INF INIT: CPT

## 2017-09-06 PROCEDURE — 2580000003 HC RX 258

## 2017-09-06 PROCEDURE — 6360000002 HC RX W HCPCS: Performed by: INTERNAL MEDICINE

## 2017-09-06 RX ORDER — SODIUM CHLORIDE 0.9 % (FLUSH) 0.9 %
10 SYRINGE (ML) INJECTION 2 TIMES DAILY
Status: DISCONTINUED | OUTPATIENT
Start: 2017-09-06 | End: 2017-09-07 | Stop reason: HOSPADM

## 2017-09-06 RX ADMIN — SODIUM CHLORIDE, PRESERVATIVE FREE 10 ML: 5 INJECTION INTRAVENOUS at 10:22

## 2017-09-06 RX ADMIN — IRON SUCROSE 200 MG: 20 INJECTION, SOLUTION INTRAVENOUS at 10:51

## 2017-09-11 ENCOUNTER — HOSPITAL ENCOUNTER (OUTPATIENT)
Dept: INFUSION THERAPY | Age: 82
Setting detail: INFUSION SERIES
Discharge: HOME | End: 2017-09-11
Payer: MEDICARE

## 2017-09-11 VITALS
RESPIRATION RATE: 16 BRPM | SYSTOLIC BLOOD PRESSURE: 144 MMHG | DIASTOLIC BLOOD PRESSURE: 62 MMHG | TEMPERATURE: 97.5 F | HEART RATE: 52 BPM

## 2017-09-11 DIAGNOSIS — N18.4 CKD (CHRONIC KIDNEY DISEASE), STAGE 4 (SEVERE): ICD-10-CM

## 2017-09-11 PROCEDURE — 6360000002 HC RX W HCPCS: Performed by: INTERNAL MEDICINE

## 2017-09-11 PROCEDURE — 96372 THER/PROPH/DIAG INJ SC/IM: CPT

## 2017-09-11 RX ADMIN — EPOETIN ALFA 4000 UNITS: 4000 SOLUTION INTRAVENOUS; SUBCUTANEOUS at 12:18

## 2017-09-12 ENCOUNTER — HOSPITAL ENCOUNTER (OUTPATIENT)
Dept: INFUSION THERAPY | Age: 82
Setting detail: INFUSION SERIES
Discharge: HOME | End: 2017-09-12
Payer: MEDICARE

## 2017-09-12 VITALS
TEMPERATURE: 97.2 F | RESPIRATION RATE: 18 BRPM | HEART RATE: 50 BPM | DIASTOLIC BLOOD PRESSURE: 63 MMHG | SYSTOLIC BLOOD PRESSURE: 161 MMHG

## 2017-09-12 DIAGNOSIS — N18.4 CKD (CHRONIC KIDNEY DISEASE), STAGE 4 (SEVERE): ICD-10-CM

## 2017-09-12 PROCEDURE — 2580000003 HC RX 258: Performed by: INTERNAL MEDICINE

## 2017-09-12 PROCEDURE — 96365 THER/PROPH/DIAG IV INF INIT: CPT

## 2017-09-12 PROCEDURE — 6360000002 HC RX W HCPCS: Performed by: INTERNAL MEDICINE

## 2017-09-13 ENCOUNTER — HOSPITAL ENCOUNTER (OUTPATIENT)
Dept: PHARMACY | Age: 82
Setting detail: THERAPIES SERIES
Discharge: HOME | End: 2017-09-13
Payer: MEDICARE

## 2017-09-13 DIAGNOSIS — I48.91 ATRIAL FIBRILLATION, UNSPECIFIED TYPE (HCC): ICD-10-CM

## 2017-09-13 LAB
INR BLD: 4.1
PROTIME: 49.1 SECONDS

## 2017-09-13 PROCEDURE — 99211 OFF/OP EST MAY X REQ PHY/QHP: CPT

## 2017-09-13 PROCEDURE — 85610 PROTHROMBIN TIME: CPT

## 2017-09-18 ENCOUNTER — HOSPITAL ENCOUNTER (OUTPATIENT)
Dept: INFUSION THERAPY | Age: 82
Setting detail: INFUSION SERIES
Discharge: HOME | End: 2017-09-18
Payer: MEDICARE

## 2017-09-18 VITALS
TEMPERATURE: 97.2 F | HEART RATE: 51 BPM | SYSTOLIC BLOOD PRESSURE: 152 MMHG | RESPIRATION RATE: 18 BRPM | DIASTOLIC BLOOD PRESSURE: 64 MMHG

## 2017-09-18 DIAGNOSIS — N18.4 CKD (CHRONIC KIDNEY DISEASE), STAGE 4 (SEVERE): ICD-10-CM

## 2017-09-18 LAB
HCT VFR BLD CALC: 27.8 % (ref 37–47)
HEMOGLOBIN: 9 G/DL (ref 12–16)
MCH RBC QN AUTO: 29.3 PG (ref 27–31.3)
MCHC RBC AUTO-ENTMCNC: 32.3 % (ref 33–37)
MCV RBC AUTO: 90.6 FL (ref 82–100)
PDW BLD-RTO: 15.3 % (ref 11.5–14.5)
PLATELET # BLD: 120 K/UL (ref 130–400)
RBC # BLD: 3.06 M/UL (ref 4.2–5.4)
WBC # BLD: 4.1 K/UL (ref 4.8–10.8)

## 2017-09-18 PROCEDURE — 85027 COMPLETE CBC AUTOMATED: CPT

## 2017-09-18 PROCEDURE — 36415 COLL VENOUS BLD VENIPUNCTURE: CPT

## 2017-09-18 PROCEDURE — 6360000002 HC RX W HCPCS: Performed by: INTERNAL MEDICINE

## 2017-09-18 PROCEDURE — 96372 THER/PROPH/DIAG INJ SC/IM: CPT

## 2017-09-18 RX ADMIN — ERYTHROPOIETIN 6000 UNITS: 10000 INJECTION, SOLUTION INTRAVENOUS; SUBCUTANEOUS at 12:37

## 2017-09-18 NOTE — FLOWSHEET NOTE
Procrit injection given in left arm due to bruising on right. Tolerated well. Ambulated off the unit. No distress is noted.

## 2017-09-19 ENCOUNTER — HOSPITAL ENCOUNTER (OUTPATIENT)
Dept: INFUSION THERAPY | Age: 82
Setting detail: INFUSION SERIES
Discharge: HOME | End: 2017-09-19
Payer: MEDICARE

## 2017-09-19 DIAGNOSIS — N18.4 CKD (CHRONIC KIDNEY DISEASE), STAGE 4 (SEVERE): ICD-10-CM

## 2017-09-19 PROCEDURE — 96365 THER/PROPH/DIAG IV INF INIT: CPT

## 2017-09-19 PROCEDURE — 2580000003 HC RX 258: Performed by: INTERNAL MEDICINE

## 2017-09-19 PROCEDURE — 2580000003 HC RX 258

## 2017-09-19 PROCEDURE — 6360000002 HC RX W HCPCS: Performed by: INTERNAL MEDICINE

## 2017-09-19 RX ORDER — SODIUM CHLORIDE 0.9 % (FLUSH) 0.9 %
10 SYRINGE (ML) INJECTION 2 TIMES DAILY
Status: DISCONTINUED | OUTPATIENT
Start: 2017-09-19 | End: 2017-09-20 | Stop reason: HOSPADM

## 2017-09-19 RX ADMIN — IRON SUCROSE 200 MG: 20 INJECTION, SOLUTION INTRAVENOUS at 12:34

## 2017-09-19 RX ADMIN — SODIUM CHLORIDE, PRESERVATIVE FREE 10 ML: 5 INJECTION INTRAVENOUS at 12:33

## 2017-09-22 ENCOUNTER — HOSPITAL ENCOUNTER (OUTPATIENT)
Dept: PHARMACY | Age: 82
Setting detail: THERAPIES SERIES
Discharge: HOME | End: 2017-09-22
Payer: MEDICARE

## 2017-09-22 DIAGNOSIS — I48.91 ATRIAL FIBRILLATION, UNSPECIFIED TYPE (HCC): ICD-10-CM

## 2017-09-22 LAB
INR BLD: 2.8
PROTIME: 33.7 SECONDS

## 2017-09-22 PROCEDURE — 85610 PROTHROMBIN TIME: CPT

## 2017-09-22 PROCEDURE — 99211 OFF/OP EST MAY X REQ PHY/QHP: CPT

## 2017-09-25 ENCOUNTER — HOSPITAL ENCOUNTER (OUTPATIENT)
Dept: INFUSION THERAPY | Age: 82
Setting detail: INFUSION SERIES
Discharge: HOME | End: 2017-09-25
Payer: MEDICARE

## 2017-09-25 VITALS
DIASTOLIC BLOOD PRESSURE: 55 MMHG | HEART RATE: 52 BPM | SYSTOLIC BLOOD PRESSURE: 155 MMHG | TEMPERATURE: 97.3 F | RESPIRATION RATE: 18 BRPM

## 2017-09-25 DIAGNOSIS — N18.4 CKD (CHRONIC KIDNEY DISEASE), STAGE 4 (SEVERE): ICD-10-CM

## 2017-09-25 LAB
HCT VFR BLD CALC: 29.3 % (ref 37–47)
HEMOGLOBIN: 9.5 G/DL (ref 12–16)
MCH RBC QN AUTO: 29 PG (ref 27–31.3)
MCHC RBC AUTO-ENTMCNC: 32.3 % (ref 33–37)
MCV RBC AUTO: 89.7 FL (ref 82–100)
PDW BLD-RTO: 16.1 % (ref 11.5–14.5)
PLATELET # BLD: 135 K/UL (ref 130–400)
RBC # BLD: 3.27 M/UL (ref 4.2–5.4)
WBC # BLD: 4.3 K/UL (ref 4.8–10.8)

## 2017-09-25 PROCEDURE — 6360000002 HC RX W HCPCS: Performed by: INTERNAL MEDICINE

## 2017-09-25 PROCEDURE — 96372 THER/PROPH/DIAG INJ SC/IM: CPT

## 2017-09-25 PROCEDURE — 36415 COLL VENOUS BLD VENIPUNCTURE: CPT

## 2017-09-25 PROCEDURE — 85027 COMPLETE CBC AUTOMATED: CPT

## 2017-09-25 RX ADMIN — ERYTHROPOIETIN 6000 UNITS: 10000 INJECTION, SOLUTION INTRAVENOUS; SUBCUTANEOUS at 12:44

## 2017-09-26 LAB
ALBUMIN SERPL-MCNC: 3.9 G/DL (ref 3.9–4.9)
ALP BLD-CCNC: 84 U/L (ref 40–130)
ALT SERPL-CCNC: 17 U/L (ref 0–33)
ANION GAP SERPL CALCULATED.3IONS-SCNC: 16 MEQ/L (ref 7–13)
AST SERPL-CCNC: 19 U/L (ref 0–35)
BILIRUB SERPL-MCNC: 0.1 MG/DL (ref 0–1.2)
BILIRUBIN DIRECT: 0 MG/DL (ref 0–0.3)
BILIRUBIN, INDIRECT: 0.1 MG/DL (ref 0–0.6)
BUN BLDV-MCNC: 75 MG/DL (ref 8–23)
CALCIUM SERPL-MCNC: 8.1 MG/DL (ref 8.6–10.2)
CHLORIDE BLD-SCNC: 104 MEQ/L (ref 98–107)
CHOLESTEROL, TOTAL: 360 MG/DL (ref 0–199)
CO2: 17 MEQ/L (ref 22–29)
CREAT SERPL-MCNC: 4.12 MG/DL (ref 0.5–0.9)
GFR AFRICAN AMERICAN: 12.6
GFR NON-AFRICAN AMERICAN: 10.4
GLUCOSE BLD-MCNC: 94 MG/DL (ref 74–109)
HCT VFR BLD CALC: 31.4 % (ref 37–47)
HDLC SERPL-MCNC: 37 MG/DL (ref 40–59)
HEMOGLOBIN: 10.1 G/DL (ref 12–16)
LDL CHOLESTEROL CALCULATED: 256 MG/DL (ref 0–129)
MCH RBC QN AUTO: 29.2 PG (ref 27–31.3)
MCHC RBC AUTO-ENTMCNC: 32 % (ref 33–37)
MCV RBC AUTO: 91.2 FL (ref 82–100)
PDW BLD-RTO: 16 % (ref 11.5–14.5)
PLATELET # BLD: 147 K/UL (ref 130–400)
POTASSIUM SERPL-SCNC: 4.9 MEQ/L (ref 3.5–5.1)
RBC # BLD: 3.45 M/UL (ref 4.2–5.4)
SODIUM BLD-SCNC: 137 MEQ/L (ref 132–144)
TOTAL PROTEIN: 6.6 G/DL (ref 6.4–8.1)
TRIGL SERPL-MCNC: 333 MG/DL (ref 0–200)
WBC # BLD: 3.9 K/UL (ref 4.8–10.8)

## 2017-09-28 ENCOUNTER — HOSPITAL ENCOUNTER (EMERGENCY)
Age: 82
Discharge: HOME | End: 2017-09-28
Payer: MEDICARE

## 2017-09-28 ENCOUNTER — APPOINTMENT (OUTPATIENT)
Dept: GENERAL RADIOLOGY | Age: 82
End: 2017-09-28
Payer: MEDICARE

## 2017-09-28 VITALS
HEART RATE: 57 BPM | DIASTOLIC BLOOD PRESSURE: 71 MMHG | TEMPERATURE: 98.2 F | BODY MASS INDEX: 27.21 KG/M2 | WEIGHT: 135 LBS | RESPIRATION RATE: 16 BRPM | SYSTOLIC BLOOD PRESSURE: 173 MMHG | OXYGEN SATURATION: 97 % | HEIGHT: 59 IN

## 2017-09-28 DIAGNOSIS — M25.562 ACUTE PAIN OF LEFT KNEE: Primary | ICD-10-CM

## 2017-09-28 DIAGNOSIS — M79.672 LEFT FOOT PAIN: ICD-10-CM

## 2017-09-28 PROCEDURE — 73562 X-RAY EXAM OF KNEE 3: CPT

## 2017-09-28 PROCEDURE — 99283 EMERGENCY DEPT VISIT LOW MDM: CPT

## 2017-09-28 PROCEDURE — 73630 X-RAY EXAM OF FOOT: CPT

## 2017-09-28 ASSESSMENT — ENCOUNTER SYMPTOMS
NAUSEA: 0
VOMITING: 0
ABDOMINAL PAIN: 0
DIARRHEA: 0
COUGH: 0
SHORTNESS OF BREATH: 0

## 2017-09-28 ASSESSMENT — PAIN DESCRIPTION - DESCRIPTORS: DESCRIPTORS: SHARP

## 2017-09-28 ASSESSMENT — PAIN DESCRIPTION - LOCATION: LOCATION: KNEE

## 2017-09-28 ASSESSMENT — PAIN DESCRIPTION - ORIENTATION: ORIENTATION: LEFT

## 2017-09-28 ASSESSMENT — PAIN SCALES - GENERAL: PAINLEVEL_OUTOF10: 9

## 2017-09-28 ASSESSMENT — PAIN DESCRIPTION - PAIN TYPE: TYPE: ACUTE PAIN

## 2017-10-02 ENCOUNTER — HOSPITAL ENCOUNTER (OUTPATIENT)
Dept: INFUSION THERAPY | Age: 82
Setting detail: INFUSION SERIES
Discharge: HOME | End: 2017-10-02
Payer: MEDICARE

## 2017-10-02 VITALS
HEART RATE: 55 BPM | RESPIRATION RATE: 16 BRPM | TEMPERATURE: 96.7 F | DIASTOLIC BLOOD PRESSURE: 64 MMHG | SYSTOLIC BLOOD PRESSURE: 135 MMHG

## 2017-10-02 DIAGNOSIS — N18.4 CKD (CHRONIC KIDNEY DISEASE), STAGE 4 (SEVERE): ICD-10-CM

## 2017-10-02 LAB
HCT VFR BLD CALC: 27.9 % (ref 37–47)
HEMOGLOBIN: 9.2 G/DL (ref 12–16)
MCH RBC QN AUTO: 29.4 PG (ref 27–31.3)
MCHC RBC AUTO-ENTMCNC: 32.9 % (ref 33–37)
MCV RBC AUTO: 89.6 FL (ref 82–100)
PDW BLD-RTO: 16.3 % (ref 11.5–14.5)
PLATELET # BLD: 133 K/UL (ref 130–400)
RBC # BLD: 3.11 M/UL (ref 4.2–5.4)
WBC # BLD: 4.4 K/UL (ref 4.8–10.8)

## 2017-10-02 PROCEDURE — 96372 THER/PROPH/DIAG INJ SC/IM: CPT

## 2017-10-02 PROCEDURE — 36415 COLL VENOUS BLD VENIPUNCTURE: CPT

## 2017-10-02 PROCEDURE — 6360000002 HC RX W HCPCS: Performed by: INTERNAL MEDICINE

## 2017-10-02 PROCEDURE — 85027 COMPLETE CBC AUTOMATED: CPT

## 2017-10-02 RX ADMIN — ERYTHROPOIETIN 6000 UNITS: 10000 INJECTION, SOLUTION INTRAVENOUS; SUBCUTANEOUS at 13:03

## 2017-10-06 ENCOUNTER — HOSPITAL ENCOUNTER (OUTPATIENT)
Dept: PHARMACY | Age: 82
Setting detail: THERAPIES SERIES
Discharge: HOME | End: 2017-10-06
Payer: MEDICARE

## 2017-10-06 DIAGNOSIS — I48.91 ATRIAL FIBRILLATION, UNSPECIFIED TYPE (HCC): ICD-10-CM

## 2017-10-06 LAB
INR BLD: 3.6
PROTIME: 43.6 SECONDS

## 2017-10-06 PROCEDURE — 85610 PROTHROMBIN TIME: CPT

## 2017-10-06 PROCEDURE — 99211 OFF/OP EST MAY X REQ PHY/QHP: CPT

## 2017-10-09 ENCOUNTER — HOSPITAL ENCOUNTER (OUTPATIENT)
Dept: INFUSION THERAPY | Age: 82
Setting detail: INFUSION SERIES
Discharge: HOME | End: 2017-10-09
Payer: MEDICARE

## 2017-10-09 VITALS
HEART RATE: 57 BPM | RESPIRATION RATE: 18 BRPM | SYSTOLIC BLOOD PRESSURE: 167 MMHG | DIASTOLIC BLOOD PRESSURE: 64 MMHG | TEMPERATURE: 97.1 F

## 2017-10-09 DIAGNOSIS — N18.4 CKD (CHRONIC KIDNEY DISEASE), STAGE 4 (SEVERE): ICD-10-CM

## 2017-10-09 PROCEDURE — 96372 THER/PROPH/DIAG INJ SC/IM: CPT

## 2017-10-09 PROCEDURE — 6360000002 HC RX W HCPCS: Performed by: INTERNAL MEDICINE

## 2017-10-09 RX ADMIN — ERYTHROPOIETIN 6000 UNITS: 10000 INJECTION, SOLUTION INTRAVENOUS; SUBCUTANEOUS at 14:10

## 2017-10-16 ENCOUNTER — HOSPITAL ENCOUNTER (OUTPATIENT)
Dept: INFUSION THERAPY | Age: 82
Setting detail: INFUSION SERIES
Discharge: HOME | End: 2017-10-16
Payer: MEDICARE

## 2017-10-16 VITALS
DIASTOLIC BLOOD PRESSURE: 63 MMHG | SYSTOLIC BLOOD PRESSURE: 149 MMHG | RESPIRATION RATE: 18 BRPM | TEMPERATURE: 98.1 F | HEART RATE: 51 BPM

## 2017-10-16 PROCEDURE — 96372 THER/PROPH/DIAG INJ SC/IM: CPT

## 2017-10-16 PROCEDURE — 6360000002 HC RX W HCPCS: Performed by: INTERNAL MEDICINE

## 2017-10-16 RX ADMIN — EPOETIN ALFA 6000 UNITS: 4000 SOLUTION INTRAVENOUS; SUBCUTANEOUS at 11:49

## 2017-10-20 ENCOUNTER — HOSPITAL ENCOUNTER (OUTPATIENT)
Dept: PHARMACY | Age: 82
Setting detail: THERAPIES SERIES
Discharge: HOME | End: 2017-10-20
Payer: MEDICARE

## 2017-10-20 DIAGNOSIS — I48.91 ATRIAL FIBRILLATION, UNSPECIFIED TYPE (HCC): ICD-10-CM

## 2017-10-20 LAB
INR BLD: 1.4
PROTIME: 16.5 SECONDS

## 2017-10-20 PROCEDURE — 99211 OFF/OP EST MAY X REQ PHY/QHP: CPT

## 2017-10-20 PROCEDURE — 85610 PROTHROMBIN TIME: CPT

## 2017-10-20 NOTE — PROGRESS NOTES
Ms. Kishor Loja is a 81 y.o. y/o female with history of Afib who presents today for anticoagulation monitoring and adjustment.   INR 1.4 is subtherapeutic for this patient (goal range 2-3) and is reflective of 17.5 mg TWD  Patient verifies current dosing regimen, patient able to verbally recall dose  Patient reports holding for procedure since last INR   Patient denies s/sx clotting and/or stroke  Patient denies hematuria, epistaxis, rectal bleeding  Patient denies changes in diet, alcohol, or tobacco use  Reviewed medication list and drug allergies with patient, updated any medication additions or modifications accordingly  Patient also denies any pending medical or dental procedures scheduled at this time  Patient was instructed to take 7.5 mg today and tomorrow then return to previous regimen of 42.5 mg TWD and RTC 2 weeks    Juliann Daily, PharmD  Staff Pharmacist  10/20/2017 10:49 AM

## 2017-10-23 ENCOUNTER — HOSPITAL ENCOUNTER (OUTPATIENT)
Dept: INFUSION THERAPY | Age: 82
Setting detail: INFUSION SERIES
Discharge: HOME | End: 2017-10-23
Payer: MEDICARE

## 2017-10-23 VITALS
TEMPERATURE: 97.3 F | HEART RATE: 59 BPM | DIASTOLIC BLOOD PRESSURE: 56 MMHG | SYSTOLIC BLOOD PRESSURE: 162 MMHG | RESPIRATION RATE: 18 BRPM

## 2017-10-23 DIAGNOSIS — N18.4 STAGE 4 CHRONIC KIDNEY DISEASE (HCC): ICD-10-CM

## 2017-10-23 PROCEDURE — 6360000002 HC RX W HCPCS: Performed by: INTERNAL MEDICINE

## 2017-10-23 PROCEDURE — 96372 THER/PROPH/DIAG INJ SC/IM: CPT

## 2017-10-23 RX ADMIN — ERYTHROPOIETIN 6000 UNITS: 10000 INJECTION, SOLUTION INTRAVENOUS; SUBCUTANEOUS at 11:15

## 2017-10-24 ENCOUNTER — HOSPITAL ENCOUNTER (INPATIENT)
Age: 82
LOS: 10 days | Discharge: NURSING, REHAB OR LONG-TERM CARE | DRG: 177 | End: 2017-11-03
Attending: INTERNAL MEDICINE | Admitting: INTERNAL MEDICINE
Payer: MEDICARE

## 2017-10-24 ENCOUNTER — APPOINTMENT (OUTPATIENT)
Dept: GENERAL RADIOLOGY | Age: 82
DRG: 177 | End: 2017-10-24
Payer: MEDICARE

## 2017-10-24 DIAGNOSIS — N18.9 CHRONIC KIDNEY DISEASE, UNSPECIFIED CKD STAGE: ICD-10-CM

## 2017-10-24 DIAGNOSIS — J18.9 PNEUMONIA DUE TO ORGANISM: Primary | ICD-10-CM

## 2017-10-24 LAB
ALBUMIN SERPL-MCNC: 3.7 G/DL (ref 3.9–4.9)
ALP BLD-CCNC: 93 U/L (ref 40–130)
ALT SERPL-CCNC: 10 U/L (ref 0–33)
ANION GAP SERPL CALCULATED.3IONS-SCNC: 15 MEQ/L (ref 7–13)
APTT: 40.2 SEC (ref 21.6–35.4)
AST SERPL-CCNC: 34 U/L (ref 0–35)
BASOPHILS ABSOLUTE: 0 K/UL (ref 0–0.2)
BASOPHILS RELATIVE PERCENT: 0.9 %
BILIRUB SERPL-MCNC: 0.2 MG/DL (ref 0–1.2)
BUN BLDV-MCNC: 62 MG/DL (ref 8–23)
CALCIUM SERPL-MCNC: 8.1 MG/DL (ref 8.6–10.2)
CHLORIDE BLD-SCNC: 102 MEQ/L (ref 98–107)
CO2: 22 MEQ/L (ref 22–29)
CREAT SERPL-MCNC: 3.99 MG/DL (ref 0.5–0.9)
EOSINOPHILS ABSOLUTE: 0.1 K/UL (ref 0–0.7)
EOSINOPHILS RELATIVE PERCENT: 2.2 %
GFR AFRICAN AMERICAN: 13
GFR NON-AFRICAN AMERICAN: 10.8
GLOBULIN: 2.9 G/DL (ref 2.3–3.5)
GLUCOSE BLD-MCNC: 101 MG/DL (ref 74–109)
GLUCOSE BLD-MCNC: 82 MG/DL (ref 60–115)
HCT VFR BLD CALC: 27.6 % (ref 37–47)
HEMOGLOBIN: 8.8 G/DL (ref 12–16)
INR BLD: 2.1
LYMPHOCYTES ABSOLUTE: 0.5 K/UL (ref 1–4.8)
LYMPHOCYTES RELATIVE PERCENT: 14.7 %
MCH RBC QN AUTO: 28.9 PG (ref 27–31.3)
MCHC RBC AUTO-ENTMCNC: 32 % (ref 33–37)
MCV RBC AUTO: 90.3 FL (ref 82–100)
MONOCYTES ABSOLUTE: 0.4 K/UL (ref 0.2–0.8)
MONOCYTES RELATIVE PERCENT: 10.6 %
NEUTROPHILS ABSOLUTE: 2.6 K/UL (ref 1.4–6.5)
NEUTROPHILS RELATIVE PERCENT: 71.6 %
PDW BLD-RTO: 16.5 % (ref 11.5–14.5)
PERFORMED ON: NORMAL
PLATELET # BLD: 134 K/UL (ref 130–400)
POTASSIUM SERPL-SCNC: 4.7 MEQ/L (ref 3.5–5.1)
PRO-BNP: 8882 PG/ML
PROTHROMBIN TIME: 22.5 SEC (ref 8.1–13.7)
RBC # BLD: 3.05 M/UL (ref 4.2–5.4)
SLIDE REVIEW: ABNORMAL
SODIUM BLD-SCNC: 139 MEQ/L (ref 132–144)
TOTAL CK: 60 U/L (ref 0–170)
TOTAL PROTEIN: 6.6 G/DL (ref 6.4–8.1)
TROPONIN: <0.01 NG/ML (ref 0–0.01)
WBC # BLD: 3.6 K/UL (ref 4.8–10.8)

## 2017-10-24 PROCEDURE — 36415 COLL VENOUS BLD VENIPUNCTURE: CPT

## 2017-10-24 PROCEDURE — 71020 XR CHEST STANDARD TWO VW: CPT

## 2017-10-24 PROCEDURE — 6360000002 HC RX W HCPCS: Performed by: PHYSICIAN ASSISTANT

## 2017-10-24 PROCEDURE — 93005 ELECTROCARDIOGRAM TRACING: CPT

## 2017-10-24 PROCEDURE — 85610 PROTHROMBIN TIME: CPT

## 2017-10-24 PROCEDURE — 2580000003 HC RX 258: Performed by: PHYSICIAN ASSISTANT

## 2017-10-24 PROCEDURE — 85730 THROMBOPLASTIN TIME PARTIAL: CPT

## 2017-10-24 PROCEDURE — 80053 COMPREHEN METABOLIC PANEL: CPT

## 2017-10-24 PROCEDURE — 85025 COMPLETE CBC W/AUTO DIFF WBC: CPT

## 2017-10-24 PROCEDURE — 99285 EMERGENCY DEPT VISIT HI MDM: CPT

## 2017-10-24 PROCEDURE — 83880 ASSAY OF NATRIURETIC PEPTIDE: CPT

## 2017-10-24 PROCEDURE — 87040 BLOOD CULTURE FOR BACTERIA: CPT

## 2017-10-24 PROCEDURE — 1210000000 HC MED SURG R&B

## 2017-10-24 PROCEDURE — 82550 ASSAY OF CK (CPK): CPT

## 2017-10-24 PROCEDURE — 84484 ASSAY OF TROPONIN QUANT: CPT

## 2017-10-24 RX ORDER — SODIUM CHLORIDE 0.9 % (FLUSH) 0.9 %
10 SYRINGE (ML) INJECTION PRN
Status: DISCONTINUED | OUTPATIENT
Start: 2017-10-24 | End: 2017-11-03 | Stop reason: HOSPADM

## 2017-10-24 RX ORDER — SODIUM CHLORIDE 0.9 % (FLUSH) 0.9 %
10 SYRINGE (ML) INJECTION EVERY 12 HOURS SCHEDULED
Status: DISCONTINUED | OUTPATIENT
Start: 2017-10-24 | End: 2017-11-03 | Stop reason: HOSPADM

## 2017-10-24 RX ORDER — DEXTROSE MONOHYDRATE 50 MG/ML
100 INJECTION, SOLUTION INTRAVENOUS PRN
Status: DISCONTINUED | OUTPATIENT
Start: 2017-10-24 | End: 2017-11-03 | Stop reason: HOSPADM

## 2017-10-24 RX ORDER — ACETAMINOPHEN 325 MG/1
650 TABLET ORAL EVERY 4 HOURS PRN
Status: DISCONTINUED | OUTPATIENT
Start: 2017-10-24 | End: 2017-11-03 | Stop reason: HOSPADM

## 2017-10-24 RX ORDER — NICOTINE POLACRILEX 4 MG
15 LOZENGE BUCCAL PRN
Status: DISCONTINUED | OUTPATIENT
Start: 2017-10-24 | End: 2017-11-03 | Stop reason: HOSPADM

## 2017-10-24 RX ORDER — DEXTROSE MONOHYDRATE 25 G/50ML
12.5 INJECTION, SOLUTION INTRAVENOUS PRN
Status: DISCONTINUED | OUTPATIENT
Start: 2017-10-24 | End: 2017-11-03 | Stop reason: HOSPADM

## 2017-10-24 RX ADMIN — AZITHROMYCIN MONOHYDRATE 500 MG: 500 INJECTION, POWDER, LYOPHILIZED, FOR SOLUTION INTRAVENOUS at 18:34

## 2017-10-24 RX ADMIN — CEFTRIAXONE 1 G: 1 INJECTION, POWDER, FOR SOLUTION INTRAMUSCULAR; INTRAVENOUS at 18:22

## 2017-10-24 ASSESSMENT — ENCOUNTER SYMPTOMS
SORE THROAT: 0
EYE DISCHARGE: 0
SHORTNESS OF BREATH: 1
RHINORRHEA: 0
ABDOMINAL PAIN: 0
COLOR CHANGE: 0
ABDOMINAL DISTENTION: 0
CONSTIPATION: 0

## 2017-10-24 NOTE — ED PROVIDER NOTES
1 [facility] [address]  eMERGENCY dEPARTMENT eNCOUnter      Pt Name: Mont Goldberg  MRN: [id number]  Gavingfbonifacio 1935  Date of evaluation: 10/24/2017  Provider: Ector Lock PA-C    CHIEF COMPLAINT       Chief Complaint   Patient presents with    Shortness of Breath     sent from [facility] x 3 days, told Pt to go to ER         HISTORY OF PRESENT ILLNESS   (Location/Symptom, Timing/Onset, Context/Setting, Quality, Duration, Modifying Factors, Severity)  Note limiting factors. Mont Goldberg is a 81 y.o. female who presents to the emergency department With a complaint of shortness of breath which patient states been ongoing for last 3 days. She'll contact her regular doctor through the [facility] clinic to schedule appointment but they advised her to come to the emergency department. She denies any fevers no cough no chest pain no nausea vomiting no dizziness. She states she feels short of breath all the time and doesn't know whether she is exerting herself or others she is at rest.    HPI    Nursing Notes were reviewed. REVIEW OF SYSTEMS    (2-9 systems for level 4, 10 or more for level 5)     Review of Systems   Constitutional: Negative for activity change and appetite change. HENT: Negative for congestion, ear discharge, ear pain, nosebleeds, rhinorrhea and sore throat. Eyes: Negative for discharge. Respiratory: Positive for shortness of breath. Cardiovascular: Negative for chest pain, palpitations and leg swelling. Gastrointestinal: Negative for abdominal distention, abdominal pain and constipation. Genitourinary: Negative for difficulty urinating and dysuria. Musculoskeletal: Negative for arthralgias. Skin: Negative for color change, pallor, rash and wound. Neurological: Negative for dizziness, syncope, numbness and headaches. Psychiatric/Behavioral: Negative for agitation and confusion. Except as noted above the remainder of the review of systems was reviewed and negative. PAST MEDICAL HISTORY     Past Medical History:   Diagnosis Date    Abnormal presence of protein in urine 6/2004    Dr. Cyndi Johnson    Atrial fibrillation ([facility]     Constipation, chronic     Diverticular disease of the colon     GERD (gastroesophageal reflux disease)     GERD, PUD, Hiatal Hernia    Granulomatous lung disease ([facility]     History of endoscopy 2-21-12    Dr. Jayme Ruffin Hyperlipidemia     Hypothyroidism     Kidney stones 11/2001    Dr. Cyndi Johnson    Neuropathy in diabetes ([facility]     legs    Osteoarthritis     Positive ORTEGA (antinuclear antibody)     1:80    Tachycardia     Thrombophlebitis leg superficial     Type II or unspecified type diabetes mellitus without mention of complication, not stated as uncontrolled 1998         SURGICAL HISTORY       Past Surgical History:   Procedure Laterality Date    APPENDECTOMY  2007   [facility] BLADDER SUSPENSION  2008 & 2009    [facility], second at [address]  2/2006    COLONOSCOPY  4/2007    Dr. Anna Pittman  [phone]    Bilateral   [id number] [facility] THYROID SURGERY      URETHRAL STRICTURE DILATATION  3/2003         CURRENT MEDICATIONS       Current Discharge Medication List      CONTINUE these medications which have NOT CHANGED    Details   sodium bicarbonate 325 MG tablet Take 325 mg by mouth 3 times daily       venlafaxine (EFFEXOR XR) 75 MG extended release capsule 150 mg       warfarin (COUMADIN) 2.5 MG tablet Take as directed by [facility] EMERGENCY [facility]. Quantity equals 90 day supply. Qty: 200 tablet, Refills: 1    Associated Diagnoses: Hypoglycemia unawareness in type 1 diabetes mellitus ([facility]      ! ! levothyroxine (SYNTHROID) 50 MCG tablet TAKE 1 TABLET DAILY ALTERNATING WITH THE OTHER DOSE EVERY OTHER DAY  Qty: 50 tablet, Refills: 3    Associated Diagnoses: Hypothyroidism, unspecified type      !! levothyroxine (SYNTHROID) 25 MCG tablet TAKE 1 TABLET DAILY ALTERNATING WITH 50 MCG DOSE EVERY OTHER DAY  Qty: 50 tablet, Refills: 2    Associated Diagnoses: Hypothyroidism, unspecified type      LANTUS SOLOSTAR 100 UNIT/ML injection pen INJECT 14 UNITS IN THE MORNING  Qty: 75 mL, Refills: 2    Associated Diagnoses: Type 2 diabetes mellitus without complication ([facility])      IRON POLYSACCH [id number] PO Take 1 tablet by mouth daily      insulin lispro (HUMALOG KWIKPEN) 100 UNIT/ML pen Inject 2 Units into the skin 3 times daily (before meals)  Qty: 15 mL, Refills: 3    Associated Diagnoses: Type 2 diabetes mellitus without complication ([facility])      magnesium oxide (MAG-OX) 400 MG tablet Take 400 mg by mouth daily      buprenorphine (BUPRENEX) 5 MCG/HR PTWK Place 1 patch onto the skin      pantoprazole sodium (PROTONIX) 40 MG PACK packet Take 40 mg by mouth every morning (before breakfast)       amiodarone (CORDARONE) 200 MG tablet Take 100 mg by mouth daily 1/2 tablet       cloNIDine (CATAPRES) 0.1 MG tablet 3 times daily       isosorbide mononitrate (IMDUR) 60 MG CR tablet       carbidopa-levodopa (SINEMET)  MG per tablet Take 1 tablet by mouth daily     Associated Diagnoses: Diabetes mellitus ([facility])      glucose blood VI test strips (TRUETEST TEST) strip As needed. Qty: 200 strip, Refills: 11    Comments: 6 TIMES  A DAY  Associated Diagnoses: Diabetes mellitus ([address] Hypoglycemia unawareness in type 1 diabetes mellitus ([facility])      metoprolol (LOPRESSOR) 50 MG tablet Take 1 tablet by mouth 2 times daily. Qty: 180 tablet, Refills: 3      aspirin 81 MG EC tablet Take 81 mg by mouth daily       calcium carbonate (OSCAL) 500 MG TABS tablet Take 500 mg by mouth daily.         NOVOFINE 32G X 6 MM MISC USE 4 TIMES DAILY  Qty: 150 each, Refills: 3      IFEREX 150 150 MG capsule       Lomitapide Mesylate (JUXTAPID) 40 MG CAPS Take by mouth daily      CRESTOR 40 MG tablet       chlorthalidone (HYGROTON) 25 MG tablet Take 25 mg by mouth daily      fluticasone (FLONASE) 50 MCG/ACT nasal spray 2 sprays by Nasal route daily. !! - Potential duplicate medications found. Please discuss with provider. ALLERGIES     Arthrotec [diclofenac-misoprostol]; Depakote [divalproex sodium]; Dilantin [phenytoin]; Klonopin [clonazepam]; and Statins    FAMILY HISTORY       Family History   Problem Relation Age of Onset    Heart Disease Father     Early Death Father 47    Diabetes Father     Heart Disease Mother     Diabetes Mother     High Blood Pressure Mother     High Blood Pressure Sister     High Cholesterol Sister           SOCIAL HISTORY       Social History     Social History    Marital status:      Spouse name: N/A    Number of children: N/A    Years of education: N/A     Social History Main Topics    Smoking status: Former Smoker    Smokeless tobacco: Never Used    Alcohol use No    Drug use: No    Sexual activity: Not Asked     Other Topics Concern    None     Social History Narrative    None       SCREENINGS    Taberg Coma Scale  Eye Opening: Spontaneous  Best Verbal Response: Oriented  Best Motor Response: Obeys commands  Taberg Coma Scale Score: 15        PHYSICAL EXAM    (up to 7 for level 4, 8 or more for level 5)     ED Triage Vitals [10/24/17 1505]   BP Temp Temp Source Pulse Resp SpO2 Height Weight   (!) 156/84 97.7 °F (36.5 °C) Oral 54 18 97 % 4' 11\" (1.499 m) 133 lb (60.3 kg)       Physical Exam   Constitutional: She is oriented to person, place, and time. She appears well-developed and well-nourished. HENT:   Head: Normocephalic. Eyes: Pupils are equal, round, and reactive to light. Neck: Neck supple. No JVD present. No tracheal deviation present. Cardiovascular: Normal rate. Pulmonary/Chest: Effort normal. No respiratory distress. She has no wheezes. She has no rales. She exhibits no tenderness.    Lung sounds are clear in all fields no wheezes rales or rhonchi room air saturation is 97% patient is not using any accessory muscles she is able to speak in clear full sentences   Abdominal: Soft. Bowel sounds are normal. She exhibits no distension and no mass. There is no tenderness. There is no guarding. Musculoskeletal: She exhibits no edema or deformity. Neurological: She is oriented to person, place, and time. Coordination normal.   Skin: Skin is warm and dry. Psychiatric: She has a normal mood and affect. DIAGNOSTIC RESULTS     EKG: All EKG's are interpreted by the Emergency Department Physician who either signs or Co-signs this chart in the absence of a cardiologist.    EKG shows sinus bradycardia at 50 bpm there is T-wave inversions in leads 3 and V1 no changes in previous EKG since July 21, 2017. RADIOLOGY:   Non-plain film images such as CT, Ultrasound and MRI are read by the radiologist. Plain radiographic images are visualized and preliminarily interpreted by the emergency physician with the below findings:    Chest x-ray shows small bilateral effusions with bibasilar atelectasis versus pneumonia. Interpretation per the Radiologist below, if available at the time of this note:    XR CHEST STANDARD (2 VW)   Final Result      Small bilateral effusion with bibasilar atelectasis/pneumonia            ED BEDSIDE ULTRASOUND:   Performed by ED Physician - none    LABS:  Labs Reviewed   CBC WITH AUTO DIFFERENTIAL - Abnormal; Notable for the following:        Result Value    WBC 3.6 (*)     RBC 3.05 (*)     Hemoglobin 8.8 (*)     Hematocrit 27.6 (*)     MCHC 32.0 (*)     RDW 16.5 (*)     Lymphocytes # 0.5 (*)     All other components within normal limits   COMPREHENSIVE METABOLIC PANEL - Abnormal; Notable for the following:      Anion Gap 15 (*)     BUN 62 (*)     CREATININE 3.99 (*)     GFR Non- 10.8 (*)     GFR  13.0 (*)     Calcium 8.1 (*)     Alb 3.7 (*)     All other components within normal limits   PROTIME-INR - Abnormal; Notable for the following:     Protime 22.5 (*)     All other this note were completed with a voice recognition program.  Efforts were made to edit the dictations but occasionally words are mis-transcribed.)    Shanti Mcgee PA-C (electronically signed)  Attending Emergency Physician         Shanti Mcgee PA-C  10/25/17 1003    Attending Supervisory Note/Shared Visit   I have personally performed a face to face diagnostic evaluation on this patient. I have reviewed the mid-levels findings and agree.   History and Exam by me shows Pneumonia and will require admission      Araceli Evans DO  Attending Emergency Physician         Araceli Evans DO  10/25/17 8338

## 2017-10-24 NOTE — ED NOTES
PT RESTING COMFORTABLY IN HER BED WITH HER SON AND HER SISTER AND HER BROTHER IN LAW AT HER BEDSIDE. SHE IS ON THE CR MONITOR. SHE IS ALERT AND ORIENTED AND RESPIRATIONS ARE NON LABORED. Kath Irvign RN  10/24/17 2267

## 2017-10-24 NOTE — ED NOTES
PT RESTING COMFORTABLY IN HER BED WITH HER SON AT HER BEDSIDE. SHE IS ON THE CR MONITOR. SHE IS ALERT AND ORIENTED AND RESPIRATIONS ARE NON LABORED. Kath Irving RN  10/24/17 [phone]

## 2017-10-24 NOTE — ED NOTES
PT RESTING COMFORTABLY IN HER BED WITH HER SON AND HER SISTER AND HER BROTHER IN LAW AT HER BEDSIDE. SHE IS ON THE CR MONITOR. SHE IS ALERT AND ORIENTED AND RESPIRATIONS ARE NON LABORED. Kath Irving RN  10/24/17 1566

## 2017-10-25 PROBLEM — Z79.4: Status: ACTIVE | Noted: 2017-05-31

## 2017-10-25 PROBLEM — N18.5: Status: ACTIVE | Noted: 2017-05-31

## 2017-10-25 PROBLEM — J18.9 PNEUMONIA: Status: ACTIVE | Noted: 2017-10-25

## 2017-10-25 PROBLEM — I10 ESSENTIAL HYPERTENSION: Status: ACTIVE | Noted: 2017-05-31

## 2017-10-25 PROBLEM — F41.9 ANXIETY: Status: ACTIVE | Noted: 2017-05-03

## 2017-10-25 PROBLEM — E08.22: Status: ACTIVE | Noted: 2017-05-31

## 2017-10-25 LAB
C-REACTIVE PROTEIN, HIGH SENSITIVITY: 11.8 MG/L (ref 0–5)
GLUCOSE BLD-MCNC: 144 MG/DL (ref 60–115)
GLUCOSE BLD-MCNC: 149 MG/DL (ref 60–115)
GLUCOSE BLD-MCNC: 198 MG/DL (ref 60–115)
GLUCOSE BLD-MCNC: 207 MG/DL (ref 60–115)
HBA1C MFR BLD: 5.3 % (ref 4.8–5.9)
PERFORMED ON: ABNORMAL
SEDIMENTATION RATE, ERYTHROCYTE: 94 MM (ref 0–30)

## 2017-10-25 PROCEDURE — 6370000000 HC RX 637 (ALT 250 FOR IP): Performed by: HOSPITALIST

## 2017-10-25 PROCEDURE — 2700000000 HC OXYGEN THERAPY PER DAY

## 2017-10-25 PROCEDURE — 6370000000 HC RX 637 (ALT 250 FOR IP): Performed by: INTERNAL MEDICINE

## 2017-10-25 PROCEDURE — 36415 COLL VENOUS BLD VENIPUNCTURE: CPT

## 2017-10-25 PROCEDURE — 92610 EVALUATE SWALLOWING FUNCTION: CPT

## 2017-10-25 PROCEDURE — 85652 RBC SED RATE AUTOMATED: CPT

## 2017-10-25 PROCEDURE — G8997 SWALLOW GOAL STATUS: HCPCS

## 2017-10-25 PROCEDURE — G8978 MOBILITY CURRENT STATUS: HCPCS

## 2017-10-25 PROCEDURE — G8979 MOBILITY GOAL STATUS: HCPCS

## 2017-10-25 PROCEDURE — 6360000002 HC RX W HCPCS: Performed by: INTERNAL MEDICINE

## 2017-10-25 PROCEDURE — 97162 PT EVAL MOD COMPLEX 30 MIN: CPT

## 2017-10-25 PROCEDURE — 94760 N-INVAS EAR/PLS OXIMETRY 1: CPT

## 2017-10-25 PROCEDURE — 99223 1ST HOSP IP/OBS HIGH 75: CPT | Performed by: INTERNAL MEDICINE

## 2017-10-25 PROCEDURE — 94640 AIRWAY INHALATION TREATMENT: CPT

## 2017-10-25 PROCEDURE — 86141 C-REACTIVE PROTEIN HS: CPT

## 2017-10-25 PROCEDURE — 2580000003 HC RX 258: Performed by: PERSONAL EMERGENCY RESPONSE ATTENDANT

## 2017-10-25 PROCEDURE — G8996 SWALLOW CURRENT STATUS: HCPCS

## 2017-10-25 PROCEDURE — 93010 ELECTROCARDIOGRAM REPORT: CPT | Performed by: INTERNAL MEDICINE

## 2017-10-25 PROCEDURE — 83036 HEMOGLOBIN GLYCOSYLATED A1C: CPT

## 2017-10-25 PROCEDURE — 1210000000 HC MED SURG R&B

## 2017-10-25 PROCEDURE — 94664 DEMO&/EVAL PT USE INHALER: CPT

## 2017-10-25 RX ORDER — ONDANSETRON 2 MG/ML
4 INJECTION INTRAMUSCULAR; INTRAVENOUS EVERY 6 HOURS PRN
Status: DISCONTINUED | OUTPATIENT
Start: 2017-10-25 | End: 2017-11-03 | Stop reason: HOSPADM

## 2017-10-25 RX ORDER — FLUTICASONE PROPIONATE 50 MCG
2 SPRAY, SUSPENSION (ML) NASAL DAILY
Status: DISCONTINUED | OUTPATIENT
Start: 2017-10-25 | End: 2017-11-03 | Stop reason: HOSPADM

## 2017-10-25 RX ORDER — PANTOPRAZOLE SODIUM 40 MG/1
40 GRANULE, DELAYED RELEASE ORAL
Status: DISCONTINUED | OUTPATIENT
Start: 2017-10-25 | End: 2017-11-03 | Stop reason: HOSPADM

## 2017-10-25 RX ORDER — IPRATROPIUM BROMIDE AND ALBUTEROL SULFATE 2.5; .5 MG/3ML; MG/3ML
1 SOLUTION RESPIRATORY (INHALATION) 3 TIMES DAILY
Status: DISCONTINUED | OUTPATIENT
Start: 2017-10-25 | End: 2017-11-01

## 2017-10-25 RX ORDER — VENLAFAXINE HYDROCHLORIDE 150 MG/1
150 CAPSULE, EXTENDED RELEASE ORAL
Status: DISCONTINUED | OUTPATIENT
Start: 2017-10-25 | End: 2017-11-03 | Stop reason: HOSPADM

## 2017-10-25 RX ORDER — ACETAMINOPHEN 80 MG
TABLET,CHEWABLE ORAL ONCE
Status: DISCONTINUED | OUTPATIENT
Start: 2017-10-25 | End: 2017-11-03 | Stop reason: HOSPADM

## 2017-10-25 RX ORDER — IPRATROPIUM BROMIDE AND ALBUTEROL SULFATE 2.5; .5 MG/3ML; MG/3ML
1 SOLUTION RESPIRATORY (INHALATION)
Status: DISCONTINUED | OUTPATIENT
Start: 2017-10-25 | End: 2017-10-25

## 2017-10-25 RX ORDER — GUAIFENESIN/DEXTROMETHORPHAN 100-10MG/5
5 SYRUP ORAL EVERY 4 HOURS PRN
Status: DISCONTINUED | OUTPATIENT
Start: 2017-10-25 | End: 2017-11-03 | Stop reason: HOSPADM

## 2017-10-25 RX ORDER — ROSUVASTATIN CALCIUM 20 MG/1
40 TABLET, COATED ORAL NIGHTLY
Status: DISCONTINUED | OUTPATIENT
Start: 2017-10-25 | End: 2017-11-03 | Stop reason: HOSPADM

## 2017-10-25 RX ORDER — ALBUTEROL SULFATE 2.5 MG/3ML
2.5 SOLUTION RESPIRATORY (INHALATION)
Status: DISCONTINUED | OUTPATIENT
Start: 2017-10-25 | End: 2017-11-01

## 2017-10-25 RX ORDER — WARFARIN SODIUM 5 MG/1
5 TABLET ORAL
Status: DISCONTINUED | OUTPATIENT
Start: 2017-10-26 | End: 2017-10-27 | Stop reason: DRUGHIGH

## 2017-10-25 RX ORDER — LEVOTHYROXINE SODIUM 0.03 MG/1
25 TABLET ORAL EVERY OTHER DAY
Status: DISCONTINUED | OUTPATIENT
Start: 2017-10-25 | End: 2017-11-03 | Stop reason: HOSPADM

## 2017-10-25 RX ORDER — BUPRENORPHINE 5 UG/H
1 PATCH TRANSDERMAL WEEKLY
Status: DISCONTINUED | OUTPATIENT
Start: 2017-10-25 | End: 2017-11-03 | Stop reason: HOSPADM

## 2017-10-25 RX ORDER — CLONIDINE HYDROCHLORIDE 0.1 MG/1
0.1 TABLET ORAL 3 TIMES DAILY
Status: DISCONTINUED | OUTPATIENT
Start: 2017-10-25 | End: 2017-11-03 | Stop reason: HOSPADM

## 2017-10-25 RX ORDER — HYDRALAZINE HYDROCHLORIDE 20 MG/ML
20 INJECTION INTRAMUSCULAR; INTRAVENOUS EVERY 4 HOURS PRN
Status: DISCONTINUED | OUTPATIENT
Start: 2017-10-25 | End: 2017-10-28

## 2017-10-25 RX ORDER — SODIUM BICARBONATE 325 MG/1
325 TABLET ORAL 3 TIMES DAILY
Status: ON HOLD | COMMUNITY
End: 2018-01-01 | Stop reason: HOSPADM

## 2017-10-25 RX ORDER — IRON POLYSACCHARIDE COMPLEX 150 MG
150 CAPSULE ORAL DAILY
Status: DISCONTINUED | OUTPATIENT
Start: 2017-10-25 | End: 2017-10-25

## 2017-10-25 RX ORDER — CALCIUM CARBONATE 500(1250)
500 TABLET ORAL DAILY
Status: DISCONTINUED | OUTPATIENT
Start: 2017-10-25 | End: 2017-11-03 | Stop reason: HOSPADM

## 2017-10-25 RX ORDER — AMIODARONE HYDROCHLORIDE 200 MG/1
100 TABLET ORAL DAILY
Status: DISCONTINUED | OUTPATIENT
Start: 2017-10-25 | End: 2017-10-29

## 2017-10-25 RX ORDER — LEVOTHYROXINE SODIUM 0.05 MG/1
50 TABLET ORAL EVERY OTHER DAY
Status: DISCONTINUED | OUTPATIENT
Start: 2017-10-26 | End: 2017-11-03 | Stop reason: HOSPADM

## 2017-10-25 RX ORDER — METOPROLOL TARTRATE 50 MG/1
50 TABLET, FILM COATED ORAL 2 TIMES DAILY
Status: DISCONTINUED | OUTPATIENT
Start: 2017-10-25 | End: 2017-11-03 | Stop reason: HOSPADM

## 2017-10-25 RX ORDER — ISOSORBIDE MONONITRATE 60 MG/1
60 TABLET, EXTENDED RELEASE ORAL DAILY
Status: DISCONTINUED | OUTPATIENT
Start: 2017-10-25 | End: 2017-11-03 | Stop reason: HOSPADM

## 2017-10-25 RX ORDER — INSULIN GLARGINE 100 [IU]/ML
14 INJECTION, SOLUTION SUBCUTANEOUS EVERY MORNING
Status: DISCONTINUED | OUTPATIENT
Start: 2017-10-25 | End: 2017-11-03 | Stop reason: HOSPADM

## 2017-10-25 RX ORDER — SODIUM BICARBONATE 650 MG/1
325 TABLET ORAL 3 TIMES DAILY
Status: DISCONTINUED | OUTPATIENT
Start: 2017-10-25 | End: 2017-11-03 | Stop reason: HOSPADM

## 2017-10-25 RX ORDER — ASPIRIN 81 MG/1
81 TABLET ORAL DAILY
Status: DISCONTINUED | OUTPATIENT
Start: 2017-10-25 | End: 2017-11-03 | Stop reason: HOSPADM

## 2017-10-25 RX ORDER — CHLORTHALIDONE 25 MG/1
25 TABLET ORAL DAILY
Status: DISCONTINUED | OUTPATIENT
Start: 2017-10-25 | End: 2017-11-01

## 2017-10-25 RX ADMIN — SODIUM BICARBONATE 325 MG: 650 TABLET ORAL at 17:56

## 2017-10-25 RX ADMIN — IPRATROPIUM BROMIDE AND ALBUTEROL SULFATE 1 AMPULE: .5; 3 SOLUTION RESPIRATORY (INHALATION) at 05:21

## 2017-10-25 RX ADMIN — FLUTICASONE PROPIONATE 2 SPRAY: 50 SPRAY, METERED NASAL at 14:05

## 2017-10-25 RX ADMIN — METOPROLOL TARTRATE 50 MG: 50 TABLET ORAL at 00:54

## 2017-10-25 RX ADMIN — WARFARIN SODIUM 7.5 MG: 2.5 TABLET ORAL at 20:25

## 2017-10-25 RX ADMIN — Medication 10 ML: at 00:33

## 2017-10-25 RX ADMIN — Medication 10 ML: at 20:30

## 2017-10-25 RX ADMIN — METOPROLOL TARTRATE 50 MG: 50 TABLET ORAL at 09:23

## 2017-10-25 RX ADMIN — CLONIDINE HYDROCHLORIDE 0.1 MG: 0.1 TABLET ORAL at 09:23

## 2017-10-25 RX ADMIN — TAZOBACTAM SODIUM AND PIPERACILLIN SODIUM 3.38 G: 375; 3 INJECTION, SOLUTION INTRAVENOUS at 14:06

## 2017-10-25 RX ADMIN — AMIODARONE HYDROCHLORIDE 100 MG: 200 TABLET ORAL at 09:23

## 2017-10-25 RX ADMIN — SODIUM BICARBONATE 325 MG: 650 TABLET ORAL at 20:26

## 2017-10-25 RX ADMIN — ROSUVASTATIN CALCIUM 40 MG: 20 TABLET, FILM COATED ORAL at 20:26

## 2017-10-25 RX ADMIN — INSULIN LISPRO 2 UNITS: 100 INJECTION, SOLUTION INTRAVENOUS; SUBCUTANEOUS at 17:56

## 2017-10-25 RX ADMIN — CLONIDINE HYDROCHLORIDE 0.1 MG: 0.1 TABLET ORAL at 17:56

## 2017-10-25 RX ADMIN — VENLAFAXINE HYDROCHLORIDE 150 MG: 150 CAPSULE, EXTENDED RELEASE ORAL at 09:23

## 2017-10-25 RX ADMIN — METOPROLOL TARTRATE 50 MG: 50 TABLET ORAL at 20:26

## 2017-10-25 RX ADMIN — CLONIDINE HYDROCHLORIDE 0.1 MG: 0.1 TABLET ORAL at 20:26

## 2017-10-25 RX ADMIN — IPRATROPIUM BROMIDE AND ALBUTEROL SULFATE 1 AMPULE: .5; 3 SOLUTION RESPIRATORY (INHALATION) at 01:11

## 2017-10-25 RX ADMIN — CHLORTHALIDONE 25 MG: 25 TABLET ORAL at 09:23

## 2017-10-25 RX ADMIN — IPRATROPIUM BROMIDE AND ALBUTEROL SULFATE 1 AMPULE: .5; 3 SOLUTION RESPIRATORY (INHALATION) at 13:18

## 2017-10-25 RX ADMIN — IPRATROPIUM BROMIDE AND ALBUTEROL SULFATE 1 AMPULE: .5; 3 SOLUTION RESPIRATORY (INHALATION) at 19:37

## 2017-10-25 RX ADMIN — LEVOTHYROXINE SODIUM 25 MCG: 25 TABLET ORAL at 06:27

## 2017-10-25 RX ADMIN — CALCIUM 500 MG: 500 TABLET ORAL at 09:23

## 2017-10-25 RX ADMIN — PANTOPRAZOLE SODIUM 40 MG: 40 GRANULE, DELAYED RELEASE ORAL at 06:28

## 2017-10-25 RX ADMIN — Medication 400 MG: at 09:23

## 2017-10-25 RX ADMIN — HYDRALAZINE HYDROCHLORIDE 20 MG: 20 INJECTION INTRAMUSCULAR; INTRAVENOUS at 09:24

## 2017-10-25 RX ADMIN — SODIUM BICARBONATE 325 MG: 650 TABLET ORAL at 09:23

## 2017-10-25 RX ADMIN — CARBIDOPA AND LEVODOPA 1 TABLET: 25; 100 TABLET ORAL at 14:05

## 2017-10-25 RX ADMIN — ISOSORBIDE MONONITRATE 60 MG: 60 TABLET, EXTENDED RELEASE ORAL at 09:23

## 2017-10-25 RX ADMIN — ASPIRIN 81 MG: 81 TABLET, COATED ORAL at 14:05

## 2017-10-25 ASSESSMENT — ENCOUNTER SYMPTOMS
COUGH: 1
DOUBLE VISION: 0
VOMITING: 0
ORTHOPNEA: 0
DIARRHEA: 0
NAUSEA: 0
ABDOMINAL PAIN: 0
SHORTNESS OF BREATH: 1
HEARTBURN: 0

## 2017-10-25 ASSESSMENT — PAIN SCALES - GENERAL: PAINLEVEL_OUTOF10: 0

## 2017-10-25 NOTE — PROGRESS NOTES
SPEECH/LANGUAGE PATHOLOGY  BEDSIDE SWALLOWING EVALUATION    PATIENT NAME:  Shelly Sherman      :  1935      TODAY'S DATE:  10/25/2017  ROOM: [place] [address]    Time in: 6646    Time out:1000      [x]The admitting diagnosis and active problem list, as listed below have been reviewed prior to initiation of this evaluation.   Bilateral lower lobe pneumonia due to Escherichia coli (HCC) [J15.5]  Pneumonia [J18.9]  Patient Active Problem List   Diagnosis    Uncontrolled diabetes mellitus type 2 without complications ([facility]    Hypothyroidism    Hypercholesteremia    CKD (chronic kidney disease)    Afib (HCC)    Anemia    Acid reflux     Allergies   Allergen Reactions    Arthrotec [Diclofenac-Misoprostol] Nausea Only    Depakote [Divalproex Sodium] Itching    Dilantin [Phenytoin]     Klonopin [Clonazepam]     Statins Other (See Comments)     achy           DYSPHAGIA DIAGNOSIS:   WFL     DIET RECOMMENDATIONS: soft foods (NDD III)/thin liquids     FEEDING RECOMMENDATIONS:    []No assistance required   []Stand by assist    []Full assistance required  []Set up required    []Supervision with all PO intake [x]Up at 90     []Double swallow    []Chin tuck   []Multiple swallow     [x]Small bites/sips      []Alternate solids / liquids  []Check for oral pocketing   []No straw    []Throat clear       []Spoon sip liquids   []Effortful swallow       THERAPY RECOMMENDATIONS:   []  Therapy is not recommended     [x]  Therapy is recommended to:    []  Improve oral motor strength and range of motion    []  Improve tongue base retraction     []  Improve laryngeal strength and range of motion     [x]  Mealtime assessment of patient's tolerance of prescribed diet     []  Repeat bedside swallow study in    []  Modified Barium Swallow (MBS) is recommended and requires a Physician order    [x]  Franco Rueda RN notified                 DIET LEVEL PRIOR TO THIS EVALUATION :    DIET CARB CONTROL;    CURRENT RESPIRATORY plan of care.                  [] Reinforcement needed    Cindy Shields 10/25/17 10:05 AM          Zachary Osborne Current  CI   Swallow Goal  CI           Severity Levels  CH - 0% Impaired or limited  CI - At least 1%, but less than 20%  CJ - At least 20%, but less than 40%  CK - At least 40%, but less than 60%  CL - At least 60%, but less than 80%  CM - At least 80%, but less than 100%  CN - 100% impaired or limited

## 2017-10-25 NOTE — PLAN OF CARE
Problem: IP SWALLOWING  Goal: LTG - patient will tolerate the least restrictive diet consistency to allow for safe consumption of daily meals  Outcome: Ongoing  BSE completed; soft/thin

## 2017-10-25 NOTE — PROGRESS NOTES
Pulmonary Disorder  (acute or chronic)  []   Severe or Chronic w/ Exacerbation  []     Surgical Status No [x]   Surgeries     General []   Surgery Lower []   Abdominal Thoracic or []   Upper Abdominal Thoracic with  PulmonaryDisorder  []     Chest X-ray Clear/Not  Ordered     []  Chronic Changes  Results Pending  []  Infiltrates, atelectasis, pleural effusion, or edema  [x]  Infiltrates in more than one lobe []  Infiltrate + Atelectasis, &/or pleural effusion  []    Respiratory Pattern Regular,  RR = 12-20 [x]  Increased,  RR = 21-25 []  JAMES, irregular,  or RR = 26-30 []  Decreased FEV1  or RR = 31-35 []  Severe SOB, use  of accessory muscles, or RR ? 35  []    Mental Status Alert, oriented,  Cooperative [x]  Confused but Follows commands []  Lethargic or unable to follow commands []  Obtunded  []  Comatose  []    Breath Sounds Clear to  auscultation  []  Decreased unilaterally or  in bases only []  Decreased  bilaterally  []  Crackles or intermittent wheezes []  Wheezes [x]    Cough Strong, Spontan., & nonproductive [x]  Strong,  spontaneous, &  productive []  Weak,  Nonproductive []  Weak, productive or  with wheezes []  No spontaneous  cough or may require suctioning []    Level of Activity Ambulatory []  Ambulatory w/ Assist  [x]  Non-ambulatory []  Paraplegic []  Quadriplegic []    Total     Score:__8_____     Triage Score:__4______      Tri       Triage:     1. (>20) Freq: Q3    2. (16-20) Freq: Q4   3. (11-15) Freq: QID & Albuterol Q2 PRN    4. (6-10) Freq: TID & Albuterol Q2 PRN    5. (0-5) Freq Q4prn    .

## 2017-10-25 NOTE — PROGRESS NOTES
[facility]    HYSTERECTOMY  1990    JOINT REPLACEMENT  [phone]    Bilateral    ROTATOR CUFF REPAIR  1990    THYROID SURGERY      URETHRAL STRICTURE DILATATION  3/2003       General Comment  Comments: Son at bedside     Restrictions: Body mass index is 26.86 kg/m². SUBJECTIVE: Subjective: \"I feel better than yesterday\"  Pre Treatment Pain Screening  Pain at present: 0  Intervention List: Patient able to continue with treatment    Post Treatment Pain Screening:   Pain Screening  Patient Currently in Pain: No    Prior Level of Function:  Social/Functional History  Lives With: Son  Type of Home: House  Home Layout: One level  Home Access: Level entry  Home Equipment:  (none)  ADL Assistance: Independent  Homemaking Assistance: Independent  Ambulation Assistance: Independent  Transfer Assistance: Independent    OBJECTIVE:   Vision/Hearing:  Vision: Within Functional Limits  Hearing: Within functional limits    Cognition:  Overall Orientation Status: Within Normal Limits         ROM:  RLE AROM: WFL  LLE AROM : WFL    Strength:  Strength RLE  Strength RLE: WFL  Strength LLE  Strength LLE: WFL    Neuro:  Balance  Sitting - Static: Good  Sitting - Dynamic: Good  Standing - Static: Fair;+  Standing - Dynamic: Fair;+             Bed mobility  Supine to Sit: Independent (with bedrail)    Transfers  Sit to Stand: Supervision  Stand to sit: Supervision  Comment: Pt completed toilet transfer with use of grab bars and supervision/SBA, chair transfer    Ambulation  Ambulation?: Yes  Ambulation 1  Device: No Device  Assistance: Stand by assistance;Supervision  Quality of Gait: slightly unstable but otherwise no sig deviations  Distance: 25'    Activity Tolerance  Activity Tolerance: Patient Tolerated treatment well          ASSESSMENT:   Body structures, Functions, Activity limitations: Decreased functional mobility ; Decreased endurance  Decision Making: Medium Complexity    Prognosis: Good    DISCHARGE

## 2017-10-25 NOTE — H&P
Chance Wilson is an 81 y.o.  female. Past medical history of Parkinson's disease, diabetes type 2, hypertension, chronic kidney disease stage IV. States she was feeling well, felt shortness of breath with cough. Told to come to the ER. Chest x-ray was suspicious for pneumonia. Patient is admitted at this time was started on IV antibiotics, breathing treatments, oxygen. Past Medical History:   Diagnosis Date    Abnormal presence of protein in urine 6/2004    Dr. Anna Fung    Atrial [facility] ([facility]     Constipation, chronic     Diverticular disease of the colon     GERD (gastroesophageal reflux disease)     GERD, PUD, Hiatal Hernia    Granulomatous lung disease ([facility]     History of endoscopy 2-21-12    Dr. Deb Romero Hyperlipidemia     Hypothyroidism     Kidney stones 11/2001    Dr. Anna Fung    [facility]     legs    Osteoarthritis     Positive ORTEGA (antinuclear antibody)     1:80    Tachycardia     Thrombophlebitis leg superficial     Type II or unspecified type diabetes mellitus without mention of complication, not stated as uncontrolled 1998     Past Surgical History:   Procedure Laterality Date    APPENDECTOMY  2007    BLADDER SUSPENSION  2008 & 2009    [facility], second at [address]  2/2006    COLONOSCOPY  4/2007    Dr. July Dyer  [phone]    Bilateral    [address] THYROID SURGERY      URETHRAL STRICTURE DILATATION  3/2003     Social History     Social History    Marital status:       Spouse name: N/A    Number of children: N/A    Years of education: N/A     Social History Main Topics    Smoking status: Former Smoker    Smokeless tobacco: Never Used    Alcohol use No    Drug use: No    Sexual activity: Not Asked     Other Topics Concern    None     Social History Narrative    None     Family History   Problem Relation Age of Onset    Heart Disease Father Glucose   Result Value Ref Range    POC Glucose 82 60 - 115 mg/dl    Performed on ACCU-CHEK    EKG 12 Lead   Result Value Ref Range    Ventricular Rate 50 BPM    Atrial Rate 50 BPM    P-R Interval 198 ms    QRS Duration 98 ms    Q-T Interval 502 ms    QTc Calculation (Bazett) 457 ms    P Axis 3 degrees    R Axis 20 degrees    T Axis 16 degrees       Assessment:  Shortness of breath, rule out secondary to atelectasis versus pneumonia  Acute kidney injury on chronic kidney disease stage IV  Chronic anemia  Diabetes type 2  PAF  Hypertension  Parkinson's disease      Plan:  Admit to medical floor  Patient continued on Zithromax IV Rocephin IV  Breathing treatments  Insulin coverage  O2 per protocol  Consider IV fluids with caution  May need consult to nephrology  Daily PT and INR  Resume home medications  PT/OT    Cee Valdez PA-C  10/25/2017

## 2017-10-25 NOTE — CONSULTS
Standard (2 Vw)    Result Date: 10/24/2017  Chest 2 views. HISTORY: Shortness of breath. FINDINGS: Comparison, February 9, 2017. Osseous structures intact. Cardiac silhouette normal. Aorta calcified and tortuous. Small bilateral calcified granulomas again identified. Mild blunting right costophrenic angle with ill-defined area of increased opacities at lung bases. Small bilateral effusion with bibasilar atelectasis/pneumonia      Assessment and  plan:     1. Bibasilar pneumonia  2. Acute on chronic kidney disease stage IV  3. Chronic anemia   4. Hypertensive  5. Parkinson disease  6. Diabetes mellitus    Patient is currently on IV Zosyn 3.375 g every 12 hourly. She is on nebulizer with DuoNeb 3 times a day and albuterol every 2 hours when necessary. She is on cough syrup with Robitussin  DM 1 TSH every 4 hours when necessary. Chest x-ray showing Bibasilar infiltration. She had a cough with bloody mucus likely due to pneumonia. If continues then we'll consider CT chest for further evaluation.   Continue O2 to keep saturation 93% or above At this time agree with present treatment plan      Thank you for this consultation    Electronically signed by Kitty Carney MD, FCCP on 10/25/2017 at 12:38 PM

## 2017-10-25 NOTE — FLOWSHEET NOTE
8253 Patient BP elevated >  Call out to Dr. Mane Choudhury for further interventions. Patient asymptomatic of distress. HOB elevated. 0900 orders for BP intervention received. Patient remains asymptomatic.

## 2017-10-26 ENCOUNTER — APPOINTMENT (OUTPATIENT)
Dept: GENERAL RADIOLOGY | Age: 82
DRG: 177 | End: 2017-10-26
Payer: MEDICARE

## 2017-10-26 PROBLEM — E87.5 HYPERKALEMIA: Status: ACTIVE | Noted: 2017-10-26

## 2017-10-26 LAB
ALBUMIN SERPL-MCNC: 3.4 G/DL (ref 3.9–4.9)
ALP BLD-CCNC: 88 U/L (ref 40–130)
ALT SERPL-CCNC: 6 U/L (ref 0–33)
ANION GAP SERPL CALCULATED.3IONS-SCNC: 15 MEQ/L (ref 7–13)
ANION GAP SERPL CALCULATED.3IONS-SCNC: 21 MEQ/L (ref 7–13)
AST SERPL-CCNC: 21 U/L (ref 0–35)
BILIRUB SERPL-MCNC: 0.2 MG/DL (ref 0–1.2)
BUN BLDV-MCNC: 58 MG/DL (ref 8–23)
BUN BLDV-MCNC: 61 MG/DL (ref 8–23)
CALCIUM SERPL-MCNC: 8.2 MG/DL (ref 8.6–10.2)
CALCIUM SERPL-MCNC: 8.5 MG/DL (ref 8.6–10.2)
CHLORIDE BLD-SCNC: 101 MEQ/L (ref 98–107)
CHLORIDE BLD-SCNC: 96 MEQ/L (ref 98–107)
CO2: 18 MEQ/L (ref 22–29)
CO2: 22 MEQ/L (ref 22–29)
CREAT SERPL-MCNC: 4.2 MG/DL (ref 0.5–0.9)
CREAT SERPL-MCNC: 4.22 MG/DL (ref 0.5–0.9)
GFR AFRICAN AMERICAN: 12.2
GFR AFRICAN AMERICAN: 12.3
GFR NON-AFRICAN AMERICAN: 10.1
GFR NON-AFRICAN AMERICAN: 10.2
GLOBULIN: 2.6 G/DL (ref 2.3–3.5)
GLUCOSE BLD-MCNC: 136 MG/DL (ref 74–109)
GLUCOSE BLD-MCNC: 163 MG/DL (ref 60–115)
GLUCOSE BLD-MCNC: 196 MG/DL (ref 60–115)
GLUCOSE BLD-MCNC: 211 MG/DL (ref 60–115)
GLUCOSE BLD-MCNC: 212 MG/DL (ref 74–109)
GLUCOSE BLD-MCNC: 250 MG/DL (ref 60–115)
HCT VFR BLD CALC: 26.3 % (ref 37–47)
HEMOGLOBIN: 8.5 G/DL (ref 12–16)
INR BLD: 2
MCH RBC QN AUTO: 29.3 PG (ref 27–31.3)
MCHC RBC AUTO-ENTMCNC: 32.4 % (ref 33–37)
MCV RBC AUTO: 90.3 FL (ref 82–100)
PDW BLD-RTO: 16.8 % (ref 11.5–14.5)
PERFORMED ON: ABNORMAL
PLATELET # BLD: 123 K/UL (ref 130–400)
POTASSIUM SERPL-SCNC: 5.1 MEQ/L (ref 3.5–5.1)
POTASSIUM SERPL-SCNC: 5.2 MEQ/L (ref 3.5–5.1)
POTASSIUM SERPL-SCNC: 6 MEQ/L (ref 3.5–5.1)
PROTHROMBIN TIME: 20.6 SEC (ref 8.1–13.7)
RBC # BLD: 2.92 M/UL (ref 4.2–5.4)
SODIUM BLD-SCNC: 135 MEQ/L (ref 132–144)
SODIUM BLD-SCNC: 138 MEQ/L (ref 132–144)
TOTAL PROTEIN: 6 G/DL (ref 6.4–8.1)
WBC # BLD: 4.5 K/UL (ref 4.8–10.8)

## 2017-10-26 PROCEDURE — 6370000000 HC RX 637 (ALT 250 FOR IP): Performed by: INTERNAL MEDICINE

## 2017-10-26 PROCEDURE — 84132 ASSAY OF SERUM POTASSIUM: CPT

## 2017-10-26 PROCEDURE — 71010 XR CHEST PORTABLE: CPT

## 2017-10-26 PROCEDURE — 2500000003 HC RX 250 WO HCPCS: Performed by: INTERNAL MEDICINE

## 2017-10-26 PROCEDURE — 94640 AIRWAY INHALATION TREATMENT: CPT

## 2017-10-26 PROCEDURE — 80053 COMPREHEN METABOLIC PANEL: CPT

## 2017-10-26 PROCEDURE — 85027 COMPLETE CBC AUTOMATED: CPT

## 2017-10-26 PROCEDURE — 99232 SBSQ HOSP IP/OBS MODERATE 35: CPT | Performed by: INTERNAL MEDICINE

## 2017-10-26 PROCEDURE — 1210000000 HC MED SURG R&B

## 2017-10-26 PROCEDURE — 36415 COLL VENOUS BLD VENIPUNCTURE: CPT

## 2017-10-26 PROCEDURE — 85610 PROTHROMBIN TIME: CPT

## 2017-10-26 PROCEDURE — 6360000002 HC RX W HCPCS: Performed by: INTERNAL MEDICINE

## 2017-10-26 PROCEDURE — 2580000003 HC RX 258: Performed by: INTERNAL MEDICINE

## 2017-10-26 PROCEDURE — 2700000000 HC OXYGEN THERAPY PER DAY

## 2017-10-26 RX ORDER — SODIUM POLYSTYRENE SULFONATE 15 G/60ML
15 SUSPENSION ORAL; RECTAL ONCE
Status: COMPLETED | OUTPATIENT
Start: 2017-10-26 | End: 2017-10-26

## 2017-10-26 RX ORDER — METHYLPREDNISOLONE SODIUM SUCCINATE 40 MG/ML
40 INJECTION, POWDER, LYOPHILIZED, FOR SOLUTION INTRAMUSCULAR; INTRAVENOUS EVERY 8 HOURS
Status: DISCONTINUED | OUTPATIENT
Start: 2017-10-26 | End: 2017-10-27

## 2017-10-26 RX ADMIN — PANTOPRAZOLE SODIUM 40 MG: 40 GRANULE, DELAYED RELEASE ORAL at 06:10

## 2017-10-26 RX ADMIN — METOPROLOL TARTRATE 50 MG: 50 TABLET ORAL at 08:36

## 2017-10-26 RX ADMIN — HYDRALAZINE HYDROCHLORIDE 20 MG: 20 INJECTION INTRAMUSCULAR; INTRAVENOUS at 06:10

## 2017-10-26 RX ADMIN — Medication 400 MG: at 08:37

## 2017-10-26 RX ADMIN — SODIUM BICARBONATE 325 MG: 650 TABLET ORAL at 08:37

## 2017-10-26 RX ADMIN — ASPIRIN 81 MG: 81 TABLET, COATED ORAL at 08:37

## 2017-10-26 RX ADMIN — INSULIN LISPRO 4 UNITS: 100 INJECTION, SOLUTION INTRAVENOUS; SUBCUTANEOUS at 18:30

## 2017-10-26 RX ADMIN — DARBEPOETIN ALFA 40 MCG: 40 SOLUTION INTRAVENOUS; SUBCUTANEOUS at 14:17

## 2017-10-26 RX ADMIN — CLONIDINE HYDROCHLORIDE 0.1 MG: 0.1 TABLET ORAL at 16:14

## 2017-10-26 RX ADMIN — SODIUM BICARBONATE 325 MG: 650 TABLET ORAL at 16:14

## 2017-10-26 RX ADMIN — CHLORTHALIDONE 25 MG: 25 TABLET ORAL at 08:37

## 2017-10-26 RX ADMIN — TAZOBACTAM SODIUM AND PIPERACILLIN SODIUM 3.38 G: 375; 3 INJECTION, SOLUTION INTRAVENOUS at 14:46

## 2017-10-26 RX ADMIN — METHYLPREDNISOLONE SODIUM SUCCINATE 40 MG: 40 INJECTION, POWDER, FOR SOLUTION INTRAMUSCULAR; INTRAVENOUS at 09:40

## 2017-10-26 RX ADMIN — METHYLPREDNISOLONE SODIUM SUCCINATE 40 MG: 40 INJECTION, POWDER, FOR SOLUTION INTRAMUSCULAR; INTRAVENOUS at 18:26

## 2017-10-26 RX ADMIN — INSULIN GLARGINE 14 UNITS: 100 INJECTION, SOLUTION SUBCUTANEOUS at 08:44

## 2017-10-26 RX ADMIN — INSULIN LISPRO 2 UNITS: 100 INJECTION, SOLUTION INTRAVENOUS; SUBCUTANEOUS at 08:43

## 2017-10-26 RX ADMIN — HYDRALAZINE HYDROCHLORIDE 20 MG: 20 INJECTION INTRAMUSCULAR; INTRAVENOUS at 16:28

## 2017-10-26 RX ADMIN — TAZOBACTAM SODIUM AND PIPERACILLIN SODIUM 3.38 G: 375; 3 INJECTION, SOLUTION INTRAVENOUS at 01:17

## 2017-10-26 RX ADMIN — VENLAFAXINE HYDROCHLORIDE 150 MG: 150 CAPSULE, EXTENDED RELEASE ORAL at 08:36

## 2017-10-26 RX ADMIN — LEVOTHYROXINE SODIUM 50 MCG: 50 TABLET ORAL at 06:10

## 2017-10-26 RX ADMIN — SODIUM BICARBONATE 10 MEQ: 84 INJECTION INTRAVENOUS at 09:35

## 2017-10-26 RX ADMIN — CALCIUM 500 MG: 500 TABLET ORAL at 08:37

## 2017-10-26 RX ADMIN — WARFARIN SODIUM 5 MG: 2.5 TABLET ORAL at 18:26

## 2017-10-26 RX ADMIN — IPRATROPIUM BROMIDE AND ALBUTEROL SULFATE 1 AMPULE: .5; 3 SOLUTION RESPIRATORY (INHALATION) at 19:15

## 2017-10-26 RX ADMIN — FLUTICASONE PROPIONATE 2 SPRAY: 50 SPRAY, METERED NASAL at 08:38

## 2017-10-26 RX ADMIN — IPRATROPIUM BROMIDE AND ALBUTEROL SULFATE 1 AMPULE: .5; 3 SOLUTION RESPIRATORY (INHALATION) at 13:42

## 2017-10-26 RX ADMIN — AMIODARONE HYDROCHLORIDE 100 MG: 200 TABLET ORAL at 08:38

## 2017-10-26 RX ADMIN — INSULIN LISPRO 6 UNITS: 100 INJECTION, SOLUTION INTRAVENOUS; SUBCUTANEOUS at 14:16

## 2017-10-26 RX ADMIN — SODIUM POLYSTYRENE SULFONATE 15 G: 15 SUSPENSION ORAL; RECTAL at 14:46

## 2017-10-26 RX ADMIN — ISOSORBIDE MONONITRATE 60 MG: 60 TABLET, EXTENDED RELEASE ORAL at 08:37

## 2017-10-26 RX ADMIN — IPRATROPIUM BROMIDE AND ALBUTEROL SULFATE 1 AMPULE: .5; 3 SOLUTION RESPIRATORY (INHALATION) at 07:29

## 2017-10-26 RX ADMIN — CALCIUM GLUCONATE 1 G: 94 INJECTION, SOLUTION INTRAVENOUS at 09:40

## 2017-10-26 RX ADMIN — CLONIDINE HYDROCHLORIDE 0.1 MG: 0.1 TABLET ORAL at 08:37

## 2017-10-26 RX ADMIN — CARBIDOPA AND LEVODOPA 1 TABLET: 25; 100 TABLET ORAL at 08:37

## 2017-10-26 ASSESSMENT — ENCOUNTER SYMPTOMS
SHORTNESS OF BREATH: 1
COUGH: 1
NAUSEA: 0

## 2017-10-26 NOTE — CARE COORDINATION
Explained Care Transition Program to patient and informational flyer provided. She is agreeable to a high risk home nursing visit and follow up phone calls. Care Transition Nurse will phone patient after discharge to schedule an appointment for a home visit.

## 2017-10-26 NOTE — CONSULTS
[facility]. Nephrology  Consult Note           Reason for Consult:  ckd stage 5, hyperkalemia, anemia  Requesting Physician:  Dr. Mane Choudhury    Chief Complaint:  Shortness of breath  History Obtained From:  patient, electronic medical record    History of Present Ilness:    81y.o. year old female admitted with shortness of breath. Has bloody sputum. C/w pna. Being treated with abx, etc.  She sees Dr. Edward Conde for CKD stage 5. Has AV fistula placed recently by Dr. Moon Fitzgerald. No swelling. Has underlying proteinuria. GFR 10-12 dating back to April. Doesn't feel better yet. k 6 today. Not on ace/arb. Not on diuretic. Past Medical History:        Diagnosis Date    Abnormal presence of protein in urine 6/2004    Dr. Ryan Kaplan    Atrial fibrillation ([facility] [address]     Constipation, chronic     Diverticular disease of the colon     GERD (gastroesophageal reflux disease)     GERD, PUD, Hiatal Hernia    Granulomatous lung disease ([address]     History of endoscopy 2-21-12    Dr. Antonia Zazueta Hyperlipidemia     Hypothyroidism     Kidney stones 11/2001    Dr. Ryan Kaplan    Neuropathy in diabetes ([facility] [address]     legs    Osteoarthritis     Positive ORTEGA (antinuclear antibody)     1:80    Tachycardia     Thrombophlebitis leg superficial     Type II or unspecified type diabetes mellitus without mention of complication, not stated as uncontrolled 1998       Past Surgical History:        Procedure Laterality Date    APPENDECTOMY  2007    BLADDER SUSPENSION  2008 & 2009    [facility], second at [address]  2/2006    COLONOSCOPY  4/2007    Dr. Letha Kay  [phone]    Bilateral   1 [facility]    THYROID SURGERY      URETHRAL STRICTURE DILATATION  3/2003       Home Medications:    No current facility-administered medications on file prior to encounter.       Current Outpatient Prescriptions on File Prior to Encounter   Medication Sig Dispense Refill    chlorthalidone (HYGROTON) 25 MG tablet Take 25 mg by mouth daily      fluticasone (FLONASE) 50 MCG/ACT nasal spray 2 sprays by Nasal route daily. Allergies:  Arthrotec [diclofenac-misoprostol]; Depakote [divalproex sodium]; Dilantin [phenytoin]; Klonopin [clonazepam]; and Statins    Social History:    Social History     Social History    Marital status:      Spouse name: N/A    Number of children: N/A    Years of education: N/A     Occupational History    Not on file. Social History Main Topics    Smoking status: Former Smoker    Smokeless tobacco: Never Used    Alcohol use No    Drug use: No    Sexual activity: Not on file     Other Topics Concern    Not on file     Social History Narrative    No narrative on file       Family History:   Family History   Problem Relation Age of Onset    Heart Disease Father     Early Death Father 47    Diabetes Father     Heart Disease Mother     Diabetes Mother     High Blood Pressure Mother     High Blood Pressure Sister     High Cholesterol Sister        Review of Systems:   10 point review of systems negative other than what is in HPI      Physical exam:   Constitutional:  VITALS:  BP (!) 187/62   Pulse 67   Temp 98.1 °F (36.7 °C) (Oral)   Resp 22   Ht 4' 11\" (1.499 m)   Wt 133 lb (60.3 kg)   SpO2 99%   BMI 26.86 kg/m²   Gen: alert, awake, nad  Skin: no rash, turgor wnl  Heent:  eomi, mmm  Neck: no bruits or jvd noted, thyroid normal  Lungs:  rhonchi  Heart:  Heart sounds are normal.  Regular rate and rhythm without murmur, gallop or rub. Abdomen:  +bs, soft, nt, nd, no hepatosplenomegaly   Extremities: Extremities warm to touch, pink, with no edema.   Psychiatric: mood and affect appropriate; judgement and insight intact  Musculoskeletal:  Rom, muscular strength intact; digits, nails normal    Data/  CBC:   Lab Results   Component Value Date    WBC 4.5 10/26/2017    RBC 2.92 10/26/2017    RBC 3.24 12/17/2011    HGB 8.5 10/26/2017

## 2017-10-26 NOTE — PROGRESS NOTES
INPATIENT PROGRESS NOTES    PATIENT NAME: Maddie Li  MRN: [id number]  SERVICE DATE:  October 26, 2017   SERVICE TIME:  2:45 PM      PRIMARY SERVICE: Pulmonary Disease    INTERVAL HPI: Patient seen and examined at bedside, Interval Notes, orders reviewed. Nursing notes noted  Patient was having more SOB this morning but feeling better now. CXR show bibasilar pneumonia. No fever or chills. Cough+. No chest pain. OBJECTIVE    Body mass index is 26.86 kg/m². PHYSICAL EXAM:  Vitals:  BP (!) 187/62   Pulse 67   Temp 98.1 °F (36.7 °C) (Oral)   Resp 22   Ht 4' 11\" (1.499 m)   Wt 133 lb (60.3 kg)   SpO2 100%   BMI 26.86 kg/m²   General: Alert, awake . comfortable in bed, No distress. Head: Atraumatic , Normocephalic   Eyes: PERRL. No sclera icterus. No conjunctival injection. No discharge   ENT: No nasal  discharge. Pharynx clear. Neck:  Trachea midline. No thyromegaly, no JVD, No cervical adenopathy. Chest : Bilaterally symmetrical ,Normal effort,  No accessory muscle use  Lung : . Fair BS bilateral, decreased BS at bases. Bibasilar Rales. few scattered  wheezing. No rhonchi. No dullness on percussion. Heart[de-identified] Normal  rate. Regular rhythm. No mumur ,  Rub or gallop  ABD: Non-tender. Non-distended. No masses. No organmegaly. Normal bowel sounds. No hernia.   EXT: No Pitting both leg , No Cyanosis No clubbing  Neuro: no focal weakness        DATA:   Recent Labs      10/24/17   1545  10/26/17   0626   WBC  3.6*  4.5*   HGB  8.8*  8.5*   HCT  27.6*  26.3*   MCV  90.3  90.3   PLT  134  123*     Recent Labs      10/24/17   1545  10/26/17   0625   NA  139  138   K  4.7  6.0*   CL  102  101   CO2  22  22   BUN  62*  61*   CREATININE  3.99*  4.22*   GLUCOSE  101  136*   CALCIUM  8.1*  8.2*   PROT  6.6  6.0*   LABALBU  3.7*  3.4*   BILITOT  0.2  0.2   ALKPHOS  93  88   AST  34  21   ALT  10  6   LABGLOM  10.8*  10.1*   GFRAA  13.0*  12.2*   GLOB  2.9  2.6         No results for input(s): PHART, AYS4QFD, PO2ART, IJQ3TEL, BEART, T5BUSAVM in the last 72 hours. O2 Device: Nasal cannula  O2 Flow Rate (L/min): 2 L/min    DIET CARB CONTROL;     MEDICATIONS during current hospitalization:    Continuous Infusions:   dextrose         Scheduled Meds:   methylPREDNISolone  40 mg Intravenous Q8H    sodium polystyrene  15 g Oral Once    darbepoetin lizeth-polysorbate  40 mcg Subcutaneous Weekly    amiodarone  100 mg Oral Daily    aspirin  81 mg Oral Daily    buprenorphine  1 patch Transdermal Weekly    calcium elemental  500 mg Oral Daily    carbidopa-levodopa  1 tablet Oral Daily    chlorthalidone  25 mg Oral Daily    cloNIDine  0.1 mg Oral TID    rosuvastatin  40 mg Oral Nightly    fluticasone  2 spray Nasal Daily    iron polysaccharide complex-B12-folic acid  1 capsule Oral Daily with breakfast    isosorbide mononitrate  60 mg Oral Daily    levothyroxine  25 mcg Oral Every Other Day    levothyroxine  50 mcg Oral Every Other Day    magnesium oxide  400 mg Oral Daily    metoprolol tartrate  50 mg Oral BID    sodium bicarbonate  325 mg Oral TID    venlafaxine  150 mg Oral Daily with breakfast    warfarin  7.5 mg Oral Once per day on Sun Mon Tue Wed Fri Sat    pantoprazole sodium  40 mg Oral QAM AC    pill splitter   Does not apply Once    warfarin  5 mg Oral Once per day on Thu    ipratropium-albuterol  1 ampule Inhalation TID    insulin glargine  14 Units Subcutaneous QAM    piperacillin-tazobactam  3.375 g Intravenous Q12H    influenza virus vaccine  0.5 mL Intramuscular Once    sodium chloride flush  10 mL Intravenous 2 times per day    insulin lispro  0-12 Units Subcutaneous TID WC    insulin lispro  0-6 Units Subcutaneous Nightly       PRN Meds:guaiFENesin-dextromethorphan, albuterol, hydrALAZINE, ondansetron, sodium chloride flush, acetaminophen, glucose, dextrose, glucagon (rDNA), dextrose    Radiology  Xr Chest Standard (2 Vw)    Result Date: 10/24/2017  Chest 2 views.  HISTORY: Shortness of breath. FINDINGS: Comparison, February 9, 2017. Osseous structures intact. Cardiac silhouette normal. Aorta calcified and tortuous. Small bilateral calcified granulomas again identified. Mild blunting right costophrenic angle with ill-defined area of increased opacities at lung bases. Small bilateral effusion with bibasilar atelectasis/pneumonia  Xr Chest Portable    Result Date: 10/26/2017  XR CHEST PORTABLE : 10/26/2017 CLINICAL HISTORY: pneumonia . COMPARISON: 10/24/2017. TECHNIQUE: A portable upright AP radiograph of the chest was obtained. FINDINGS: Bibasilar infiltrates have worsened when compared to the prior study, greater on the right. Small bilateral pleural effusions appear mildly increased. Findings are suspicious for bronchopneumonia. Cardiac decompensation also possible. There is no significant cardiomegaly, pneumothorax or other findings of concern identified. WORSENING BIBASILAR INFILTRATE/CONSOLIDATION SUSPICIOUS FOR BRONCHOPNEUMONIA WITH SMALL EFFUSIONS. IMPRESSION AND SUGGESTION:  1.  Bibasilar pneumonia  2. Acute on chronic kidney disease stage IV  3. Chronic anemia   4. Hypertensive  5. Parkinson disease  6. Diabetes mellitus     Patient is currently on IV Zosyn 3.375 g every 12 hourly. She is on nebulizer with DuoNeb 3 times a day and albuterol every 2 hours when necessary. She is on cough syrup with Robitussin  DM 1 TSH every 4 hours when necessary. Check MBS for silent aspiration.            Electronically signed by Aashish Hansen MD, FCCP on 10/26/2017 at 2:45 PM

## 2017-10-27 ENCOUNTER — APPOINTMENT (OUTPATIENT)
Dept: GENERAL RADIOLOGY | Age: 82
DRG: 177 | End: 2017-10-27
Payer: MEDICARE

## 2017-10-27 LAB
ANION GAP SERPL CALCULATED.3IONS-SCNC: 14 MEQ/L (ref 7–13)
ANION GAP SERPL CALCULATED.3IONS-SCNC: 17 MEQ/L (ref 7–13)
BUN BLDV-MCNC: 58 MG/DL (ref 8–23)
BUN BLDV-MCNC: 60 MG/DL (ref 8–23)
CALCIUM SERPL-MCNC: 8.2 MG/DL (ref 8.6–10.2)
CALCIUM SERPL-MCNC: 8.5 MG/DL (ref 8.6–10.2)
CHLORIDE BLD-SCNC: 97 MEQ/L (ref 98–107)
CHLORIDE BLD-SCNC: 99 MEQ/L (ref 98–107)
CO2: 22 MEQ/L (ref 22–29)
CO2: 23 MEQ/L (ref 22–29)
CREAT SERPL-MCNC: 4.15 MG/DL (ref 0.5–0.9)
CREAT SERPL-MCNC: 4.19 MG/DL (ref 0.5–0.9)
FERRITIN: 277.9 NG/ML (ref 13–150)
GFR AFRICAN AMERICAN: 12.3
GFR AFRICAN AMERICAN: 12.5
GFR NON-AFRICAN AMERICAN: 10.2
GFR NON-AFRICAN AMERICAN: 10.3
GLUCOSE BLD-MCNC: 151 MG/DL (ref 74–109)
GLUCOSE BLD-MCNC: 186 MG/DL (ref 74–109)
GLUCOSE BLD-MCNC: 200 MG/DL (ref 60–115)
GLUCOSE BLD-MCNC: 224 MG/DL (ref 60–115)
GLUCOSE BLD-MCNC: 255 MG/DL (ref 60–115)
GLUCOSE BLD-MCNC: 305 MG/DL (ref 60–115)
HCT VFR BLD CALC: 25.3 % (ref 37–47)
HEMOGLOBIN: 8.1 G/DL (ref 12–16)
INR BLD: 3.1
IRON SATURATION: 15 % (ref 11–46)
IRON: 31 UG/DL (ref 37–145)
MCH RBC QN AUTO: 28.7 PG (ref 27–31.3)
MCHC RBC AUTO-ENTMCNC: 31.9 % (ref 33–37)
MCV RBC AUTO: 90.1 FL (ref 82–100)
PDW BLD-RTO: 16.8 % (ref 11.5–14.5)
PERFORMED ON: ABNORMAL
PLATELET # BLD: 128 K/UL (ref 130–400)
POTASSIUM SERPL-SCNC: 5 MEQ/L (ref 3.5–5.1)
POTASSIUM SERPL-SCNC: 5.7 MEQ/L (ref 3.5–5.1)
PROTHROMBIN TIME: 32.5 SEC (ref 8.1–13.7)
RBC # BLD: 2.81 M/UL (ref 4.2–5.4)
SODIUM BLD-SCNC: 136 MEQ/L (ref 132–144)
SODIUM BLD-SCNC: 136 MEQ/L (ref 132–144)
TOTAL IRON BINDING CAPACITY: 211 UG/DL (ref 178–450)
WBC # BLD: 6.9 K/UL (ref 4.8–10.8)

## 2017-10-27 PROCEDURE — 6370000000 HC RX 637 (ALT 250 FOR IP): Performed by: INTERNAL MEDICINE

## 2017-10-27 PROCEDURE — 1210000000 HC MED SURG R&B

## 2017-10-27 PROCEDURE — 2700000000 HC OXYGEN THERAPY PER DAY

## 2017-10-27 PROCEDURE — 83550 IRON BINDING TEST: CPT

## 2017-10-27 PROCEDURE — 85610 PROTHROMBIN TIME: CPT

## 2017-10-27 PROCEDURE — 2500000003 HC RX 250 WO HCPCS: Performed by: FAMILY MEDICINE

## 2017-10-27 PROCEDURE — 6370000000 HC RX 637 (ALT 250 FOR IP): Performed by: PERSONAL EMERGENCY RESPONSE ATTENDANT

## 2017-10-27 PROCEDURE — 74230 X-RAY XM SWLNG FUNCJ C+: CPT

## 2017-10-27 PROCEDURE — 94640 AIRWAY INHALATION TREATMENT: CPT

## 2017-10-27 PROCEDURE — 80048 BASIC METABOLIC PNL TOTAL CA: CPT

## 2017-10-27 PROCEDURE — 83540 ASSAY OF IRON: CPT

## 2017-10-27 PROCEDURE — G8996 SWALLOW CURRENT STATUS: HCPCS

## 2017-10-27 PROCEDURE — 82728 ASSAY OF FERRITIN: CPT

## 2017-10-27 PROCEDURE — 6370000000 HC RX 637 (ALT 250 FOR IP): Performed by: PHYSICIAN ASSISTANT

## 2017-10-27 PROCEDURE — 2580000003 HC RX 258: Performed by: PERSONAL EMERGENCY RESPONSE ATTENDANT

## 2017-10-27 PROCEDURE — G8997 SWALLOW GOAL STATUS: HCPCS

## 2017-10-27 PROCEDURE — 6360000002 HC RX W HCPCS: Performed by: INTERNAL MEDICINE

## 2017-10-27 PROCEDURE — 85027 COMPLETE CBC AUTOMATED: CPT

## 2017-10-27 PROCEDURE — 99232 SBSQ HOSP IP/OBS MODERATE 35: CPT | Performed by: INTERNAL MEDICINE

## 2017-10-27 PROCEDURE — 92611 MOTION FLUOROSCOPY/SWALLOW: CPT

## 2017-10-27 PROCEDURE — 84132 ASSAY OF SERUM POTASSIUM: CPT

## 2017-10-27 PROCEDURE — 36415 COLL VENOUS BLD VENIPUNCTURE: CPT

## 2017-10-27 RX ORDER — SODIUM POLYSTYRENE SULFONATE 15 G/60ML
15 SUSPENSION ORAL; RECTAL ONCE
Status: COMPLETED | OUTPATIENT
Start: 2017-10-27 | End: 2017-10-27

## 2017-10-27 RX ORDER — METHYLPREDNISOLONE SODIUM SUCCINATE 40 MG/ML
20 INJECTION, POWDER, LYOPHILIZED, FOR SOLUTION INTRAMUSCULAR; INTRAVENOUS EVERY 8 HOURS
Status: DISCONTINUED | OUTPATIENT
Start: 2017-10-27 | End: 2017-11-01

## 2017-10-27 RX ORDER — SODIUM POLYSTYRENE SULFONATE 15 G/60ML
15 SUSPENSION ORAL; RECTAL ONCE
Status: DISCONTINUED | OUTPATIENT
Start: 2017-10-27 | End: 2017-10-27

## 2017-10-27 RX ADMIN — LEVOTHYROXINE SODIUM 25 MCG: 25 TABLET ORAL at 06:42

## 2017-10-27 RX ADMIN — CARBIDOPA AND LEVODOPA 1 TABLET: 25; 100 TABLET ORAL at 09:29

## 2017-10-27 RX ADMIN — SODIUM BICARBONATE 325 MG: 650 TABLET ORAL at 00:38

## 2017-10-27 RX ADMIN — CLONIDINE HYDROCHLORIDE 0.1 MG: 0.1 TABLET ORAL at 13:42

## 2017-10-27 RX ADMIN — ISOSORBIDE MONONITRATE 60 MG: 60 TABLET, EXTENDED RELEASE ORAL at 09:29

## 2017-10-27 RX ADMIN — INSULIN LISPRO 8 UNITS: 100 INJECTION, SOLUTION INTRAVENOUS; SUBCUTANEOUS at 09:34

## 2017-10-27 RX ADMIN — INSULIN LISPRO 6 UNITS: 100 INJECTION, SOLUTION INTRAVENOUS; SUBCUTANEOUS at 17:47

## 2017-10-27 RX ADMIN — TAZOBACTAM SODIUM AND PIPERACILLIN SODIUM 3.38 G: 375; 3 INJECTION, SOLUTION INTRAVENOUS at 00:42

## 2017-10-27 RX ADMIN — BARIUM SULFATE 50 ML: 0.81 POWDER, FOR SUSPENSION ORAL at 13:53

## 2017-10-27 RX ADMIN — INSULIN GLARGINE 14 UNITS: 100 INJECTION, SOLUTION SUBCUTANEOUS at 09:33

## 2017-10-27 RX ADMIN — SODIUM BICARBONATE 325 MG: 650 TABLET ORAL at 09:29

## 2017-10-27 RX ADMIN — Medication 10 ML: at 22:39

## 2017-10-27 RX ADMIN — ACETAMINOPHEN 650 MG: 325 TABLET ORAL at 01:17

## 2017-10-27 RX ADMIN — PANTOPRAZOLE SODIUM 40 MG: 40 GRANULE, DELAYED RELEASE ORAL at 06:42

## 2017-10-27 RX ADMIN — CLONIDINE HYDROCHLORIDE 0.1 MG: 0.1 TABLET ORAL at 00:39

## 2017-10-27 RX ADMIN — CALCIUM 500 MG: 500 TABLET ORAL at 09:29

## 2017-10-27 RX ADMIN — ACETAMINOPHEN 650 MG: 325 TABLET ORAL at 22:10

## 2017-10-27 RX ADMIN — Medication 10 ML: at 00:40

## 2017-10-27 RX ADMIN — CLONIDINE HYDROCHLORIDE 0.1 MG: 0.1 TABLET ORAL at 09:29

## 2017-10-27 RX ADMIN — ASPIRIN 81 MG: 81 TABLET, COATED ORAL at 09:29

## 2017-10-27 RX ADMIN — METHYLPREDNISOLONE SODIUM SUCCINATE 40 MG: 40 INJECTION, POWDER, FOR SOLUTION INTRAMUSCULAR; INTRAVENOUS at 09:28

## 2017-10-27 RX ADMIN — AMIODARONE HYDROCHLORIDE 100 MG: 200 TABLET ORAL at 09:30

## 2017-10-27 RX ADMIN — HYDRALAZINE HYDROCHLORIDE 20 MG: 20 INJECTION INTRAMUSCULAR; INTRAVENOUS at 22:41

## 2017-10-27 RX ADMIN — METOPROLOL TARTRATE 50 MG: 50 TABLET ORAL at 22:10

## 2017-10-27 RX ADMIN — BARIUM SULFATE 80 ML: 400 SUSPENSION ORAL at 13:52

## 2017-10-27 RX ADMIN — FLUTICASONE PROPIONATE 2 SPRAY: 50 SPRAY, METERED NASAL at 09:30

## 2017-10-27 RX ADMIN — VENLAFAXINE HYDROCHLORIDE 150 MG: 150 CAPSULE, EXTENDED RELEASE ORAL at 09:29

## 2017-10-27 RX ADMIN — SODIUM BICARBONATE 325 MG: 650 TABLET ORAL at 13:42

## 2017-10-27 RX ADMIN — METOPROLOL TARTRATE 50 MG: 50 TABLET ORAL at 00:39

## 2017-10-27 RX ADMIN — METOPROLOL TARTRATE 50 MG: 50 TABLET ORAL at 09:29

## 2017-10-27 RX ADMIN — CHLORTHALIDONE 25 MG: 25 TABLET ORAL at 09:29

## 2017-10-27 RX ADMIN — ROSUVASTATIN CALCIUM 40 MG: 20 TABLET, FILM COATED ORAL at 00:38

## 2017-10-27 RX ADMIN — HYDRALAZINE HYDROCHLORIDE 20 MG: 20 INJECTION INTRAMUSCULAR; INTRAVENOUS at 09:29

## 2017-10-27 RX ADMIN — Medication 400 MG: at 09:29

## 2017-10-27 RX ADMIN — ROSUVASTATIN CALCIUM 40 MG: 20 TABLET, FILM COATED ORAL at 22:11

## 2017-10-27 RX ADMIN — METHYLPREDNISOLONE SODIUM SUCCINATE 20 MG: 40 INJECTION, POWDER, FOR SOLUTION INTRAMUSCULAR; INTRAVENOUS at 17:46

## 2017-10-27 RX ADMIN — INSULIN LISPRO 4 UNITS: 100 INJECTION, SOLUTION INTRAVENOUS; SUBCUTANEOUS at 12:29

## 2017-10-27 RX ADMIN — IPRATROPIUM BROMIDE AND ALBUTEROL SULFATE 1 AMPULE: .5; 3 SOLUTION RESPIRATORY (INHALATION) at 19:38

## 2017-10-27 RX ADMIN — TAZOBACTAM SODIUM AND PIPERACILLIN SODIUM 3.38 G: 375; 3 INJECTION, SOLUTION INTRAVENOUS at 13:41

## 2017-10-27 RX ADMIN — SODIUM BICARBONATE 325 MG: 650 TABLET ORAL at 22:10

## 2017-10-27 RX ADMIN — CLONIDINE HYDROCHLORIDE 0.1 MG: 0.1 TABLET ORAL at 22:11

## 2017-10-27 RX ADMIN — SODIUM POLYSTYRENE SULFONATE 15 G: 15 SUSPENSION ORAL; RECTAL at 09:28

## 2017-10-27 RX ADMIN — IPRATROPIUM BROMIDE AND ALBUTEROL SULFATE 1 AMPULE: .5; 3 SOLUTION RESPIRATORY (INHALATION) at 08:05

## 2017-10-27 RX ADMIN — METHYLPREDNISOLONE SODIUM SUCCINATE 40 MG: 40 INJECTION, POWDER, FOR SOLUTION INTRAMUSCULAR; INTRAVENOUS at 00:42

## 2017-10-27 ASSESSMENT — PAIN SCALES - GENERAL
PAINLEVEL_OUTOF10: 6
PAINLEVEL_OUTOF10: 6

## 2017-10-27 ASSESSMENT — ENCOUNTER SYMPTOMS
COUGH: 0
NAUSEA: 0
SHORTNESS OF BREATH: 0

## 2017-10-27 NOTE — PROCEDURES
[]  Therapy is not recommended     [x]  Therapy is recommended to:     []  Improve oral motor strength and range of motion    [x]  Improve tongue base retraction     [x]  Improve laryngeal strength and range of motion      []  Address cricopharyngeal dysfunction (Shaker Exercises)               [x]  Mealtime assessment of patient's tolerance of prescribed diet     [x] Roger Jung RN notified                   OBJECTIVE ASSESSMENT    Oral mechanism examination:    [x]  Adequate lingual/labial strength  []  Generalized oral weakness  []  Right labiobuccal weakness   []  Left labiobuccal weakness    []  Right lingual deviation    []  Left lingual deviation   []  Decreased velar elevation              []  Decreased lingual coordination               []  Gag response present   []  Gag response absent     []  Volitional swallow    []  Volitional cough   []  Oral apraxia               []  CNT    Dentition: [x] Natural  []  Missing teeth  []  Edentulous  []  Dentures   []  Other    Current respiratory status:    [x] Room Air   [] Nasal cannula [] O2 mask           []  Non-rebreather mask  []  Tracheostomy  []  Vent dependent    Vocal quality prior to PO intake:  [x]  WFL  []  Weak  []  Wet      Cognitive status:    [] Intact   [x] Functional   [] Confusion   [] Aphasia  [] Cognitive deficits                 DIET LEVEL PRIOR TO THIS EVALUATION :    DIET CARB CONTROL;      PROCEDURE   Patient positioning: Seated 70-90°  Viewing Plane(s): LATERAL ONLY  Contrast: commercially prepared, non-standardized barium viscosities; graduated from thin liquid to pudding consistency. Administered via tsp (5 ml boluses), by cup or straw in single and sequentially swallowed boluses as tolerated. Solid evaluated with 1/2 antoine cracker/3 ml barium pudding coated as tolerated.      Consistencies Administered During the Evaluation   Liquids: [x]Thin    []Nectar   []Honey   Solids:  [x]Pureed/Pudding  []Soft Solid   [x]Antoine cracker   Other: Method of Intake:     [x]Self Fed     []Set-Up     []Fed by Clinician     [x]Cup      [x]Spoon     []Straw                    RESULTS   ORAL STAGE []WNL  [x]WFL  []  Exceptions    [] Inadequate labial seal resulting anterior labial spillage from: []right[] left []midline     [x] Decreased mastication due to: [x]missing dentition-antoine cracker []decreased lingual control   []cognitive status    [] Delayed A-P transit due to: []decreased initiation [] reduced lingual strength []cognitive function     [] Decreased bolus formation resulting in premature pharyngeal spillage to the level of the: [] valleculae  [] pyriforms      [x] Oral residuals: []anterior sulcus  []sub lingually  []right buccal cavity     []left buccal cavity [x]on tongue base      []throughout oral cavity []on superior tongue  []on palate []on velum        [x]Oral Stage Impression:    WFL at this time    PHARYNGEAL STAGE:     [] WNL  []WFL  [x]  Exceptions    ONSET TIME:  [x] Onset time of the pharyngeal swallow was adequate    [x] Swallow reflex was triggered at:   [x]tongue base []valleculae []pyriform sinus     PHARYNGEAL RESIDUALS      Vallecula/Pharyngeal Wall  []No significant residuals were noted in the vallecula    [x]Reduced tongue base retraction resulting in:    [x]residuals in vallecula []residual on posterior pharyngeal wall    These residuals were noted for: [x]thin []nectar[] honey []pureed []solid  Which: [x]cleared  []did not clear  []partially cleared []mostly cleared  With:    [x]cued []spontaneous [x]double swallow []multiple swallow (  times)  []liquid  chaser      Pyriform Sinuses  [x]No significant residuals were noted in the pyriform sinuses    LARYNGEAL PENETRATION/ASPIRATION  []Laryngeal penetration was not present during this evaluation    [x]Aspiration was not present during this evaluation     Stasis -  valleculae Pooling - pyriform sinuses LARYNGEAL    PENETRATION ASPIRATION        Trace   Transient   Deep   Before

## 2017-10-27 NOTE — PROGRESS NOTES
INPATIENT PROGRESS NOTES    PATIENT NAME: Trista Weaver  MRN: [id number]  SERVICE DATE:  October 27, 2017   SERVICE TIME:  2:48 PM      PRIMARY SERVICE:  Pulmonary Medicine     INTERVAL HPI: Patient seen and examined at bedside, Interval Notes, orders reviewed. Nursing notes noted: Patient reports significant improvement in her symptoms since admission to the hospital. CXR showed bibasilar pneumonia and patient is being treated with IV zosyn. Patient denies and fever or chills, but does report some coughing and SOB which has remained the same. Review of Systems  Please see HPI above. OBJECTIVE    Body mass index is 26.86 kg/m². PHYSICAL EXAM:  Vitals:  BP (!) 169/58   Pulse 66   Temp 98 °F (36.7 °C) (Oral)   Resp 16   Ht 4' 11\" (1.499 m)   Wt 133 lb (60.3 kg)   SpO2 100%   BMI 26.86 kg/m²   General: Patient is comfortable in bed, No distress. Head: Atraumatic , Normocephalic   Eyes: PERRL. No sclera icterus. No conjunctival injection. No discharge   ENT: No nasal  discharge. Pharynx clear. Neck:  Trachea midline. No thyromegaly, no JVD, No cervical adenopathy. Resp : Fair BS bilaterally, decreased BS at the bases with bibasilar rales. No accessory use with normal effort  CV: Normal  rate. Regular rhythm. No mumur ,  Rub or gallop  ABD:  Non-tender. Non-distended. No masses. No organmegaly. Normal bowel sounds. No hernia. EXT: No Pitting, No Cyanosis No clubbing  Neuro: no focal weakness  Skin: Warm and dry. No erythema rash on exposed extremities.         DATA:   Recent Labs      10/26/17   0626  10/27/17   0606   WBC  4.5*  6.9   HGB  8.5*  8.1*   HCT  26.3*  25.3*   MCV  90.3  90.1   PLT  123*  128*     Recent Labs      10/24/17   1545  10/26/17   0625   10/26/17   2341  10/27/17   0606   NA  139  138   < >  136  136   K  4.7  6.0*   < >  5.0  5.7*   CL  102  101   < >  97*  99   CO2  22  22   < >  22  23   BUN  62*  61*   < >  58*  60*   CREATININE  3.99*  4.22*   < >  4.15*  4.19* GLUCOSE  101  136*   < >  186*  151*   CALCIUM  8.1*  8.2*   < >  8.5*  8.2*   PROT  6.6  6.0*   --    --    --    LABALBU  3.7*  3.4*   --    --    --    BILITOT  0.2  0.2   --    --    --    ALKPHOS  93  88   --    --    --    AST  34  21   --    --    --    ALT  10  6   --    --    --    LABGLOM  10.8*  10.1*   < >  10.3*  10.2*   GFRAA  13.0*  12.2*   < >  12.5*  12.3*   GLOB  2.9  2.6   --    --    --     < > = values in this interval not displayed. No results for input(s): PHART, XFE4JYU, PO2ART, YIX9JXL, BEART, I4ZJKVEG in the last 72 hours.   O2 Device: Nasal cannula  O2 Flow Rate (L/min): 2 L/min  Lab Results   Component Value Date    LACTA 0.7 04/14/2016        MEDICATIONS during current hospitalization:    Continuous Infusions:   dextrose         Scheduled Meds:   methylPREDNISolone  20 mg Intravenous Q8H    warfarin (COUMADIN) daily dosing (placeholder)   Other RX Placeholder    darbepoetin lizeth-polysorbate  40 mcg Subcutaneous Weekly    amiodarone  100 mg Oral Daily    aspirin  81 mg Oral Daily    buprenorphine  1 patch Transdermal Weekly    calcium elemental  500 mg Oral Daily    carbidopa-levodopa  1 tablet Oral Daily    chlorthalidone  25 mg Oral Daily    cloNIDine  0.1 mg Oral TID    rosuvastatin  40 mg Oral Nightly    fluticasone  2 spray Nasal Daily    iron polysaccharide complex-B12-folic acid  1 capsule Oral Daily with breakfast    isosorbide mononitrate  60 mg Oral Daily    levothyroxine  25 mcg Oral Every Other Day    levothyroxine  50 mcg Oral Every Other Day    magnesium oxide  400 mg Oral Daily    metoprolol tartrate  50 mg Oral BID    sodium bicarbonate  325 mg Oral TID    venlafaxine  150 mg Oral Daily with breakfast    pantoprazole sodium  40 mg Oral QAM AC    pill splitter   Does not apply Once    ipratropium-albuterol  1 ampule Inhalation TID    insulin glargine  14 Units Subcutaneous QAM    piperacillin-tazobactam  3.375 g Intravenous Q12H   

## 2017-10-27 NOTE — FLOWSHEET NOTE
AM assessment completed. A&Ox3. VSS. Denies pain/n/v at this time. C/O shortness of breath with exertion. Pt given kayexelate for potassium of 5.7. Will monitor for bowel movement. Denies any other needs at this time. Call light within reach. Will continue to monitor.

## 2017-10-27 NOTE — PROGRESS NOTES
Mont Goldberg is a 81 y.o. female patient. Admitted for pneumonia, possible aspiration.     Current Facility-Administered Medications   Medication Dose Route Frequency Provider Last Rate Last Dose    methylPREDNISolone sodium (SOLU-MEDROL) injection 40 mg  40 mg Intravenous Q8H Curry Santiago MD   40 mg at 10/27/17 0042    darbepoetin lizeth-polysorbate (ARANESP) injection 40 mcg  40 mcg Subcutaneous Weekly Kali Galvan MD   40 mcg at 10/26/17 1417    amiodarone (CORDARONE) tablet 100 mg  100 mg Oral Daily Keya Oakley MD   100 mg at 10/26/17 0838    aspirin EC tablet 81 mg  81 mg Oral Daily Keya Oakley MD   81 mg at 10/26/17 0837    buprenorphine (BUPRENEX) 5 MCG/HR 1 patch  1 patch Transdermal Weekly Keya Oakley MD        calcium elemental (OSCAL) tablet 500 mg  500 mg Oral Daily Keya Oakley MD   500 mg at 10/26/17 0837    carbidopa-levodopa (SINEMET)  MG per tablet 1 tablet  1 tablet Oral Daily Keya Oakley MD   1 tablet at 10/26/17 0837    chlorthalidone (HYGROTON) tablet 25 mg  25 mg Oral Daily Keya Oakley MD   25 mg at 10/26/17 0202    cloNIDine (CATAPRES) tablet 0.1 mg  0.1 mg Oral TID Keya Oakley MD   0.1 mg at 10/27/17 0039    rosuvastatin (CRESTOR) tablet 40 mg  40 mg Oral Nightly Keya Oakley MD   40 mg at 10/27/17 0038    fluticasone (FLONASE) 50 MCG/ACT nasal spray 2 spray  2 spray Nasal Daily Keya Oakley MD   2 spray at 10/26/17 1704    iron polysaccharide complex-B12-folic acid (FERREX-FORTE) 150-0.025-1 MG capsule 1 mg  1 capsule Oral Daily with breakfast Keya Oakley MD        isosorbide mononitrate (IMDUR) extended release tablet 60 mg  60 mg Oral Daily Keya Oakley MD   60 mg at 10/26/17 0837    levothyroxine (SYNTHROID) tablet 25 mcg  25 mcg Oral Every Other Day Keya Oakley MD   25 mcg at 10/27/17 9173    levothyroxine (SYNTHROID) tablet 50 mcg  50 mcg Oral Every Other Day chloride flush 0.9 % injection 10 mL  10 mL Intravenous 2 times per day DYLAN Orr   10 mL at 10/27/17 0040    sodium chloride flush 0.9 % injection 10 mL  10 mL Intravenous PRN DYLAN Orr        acetaminophen (TYLENOL) tablet 650 mg  650 mg Oral Q4H PRN DYLAN Orr   650 mg at 10/27/17 0117    glucose (GLUTOSE) 40 % oral gel 15 g  15 g Oral PRN Shu Mohamud MD        dextrose 50 % solution 12.5 g  12.5 g Intravenous PRN Shu Mohamud MD        glucagon (rDNA) injection 1 mg  1 mg Intramuscular PRN Shu Mohamud MD        dextrose 5 % solution  100 mL/hr Intravenous PRN Shu Mohamud MD        insulin lispro (HUMALOG) injection vial 0-12 Units  0-12 Units Subcutaneous TID WC Shu Mohamud MD   4 Units at 10/26/17 1830    insulin lispro (HUMALOG) injection vial 0-6 Units  0-6 Units Subcutaneous Nightly Shu Mohamud MD   1 Units at 10/27/17 0057     Allergies   Allergen Reactions    Arthrotec [Diclofenac-Misoprostol] Nausea Only    Depakote [Divalproex Sodium] Itching    Dilantin [Phenytoin]     Klonopin [Clonazepam]     Statins Other (See Comments)     achy     Principal Problem:    Pneumonia  Active Problems:    Hypothyroidism    CKD (chronic kidney disease)    Afib ([facility])    Anemia    Acid reflux    Anxiety    Diabetes mellitus due to underlying condition, controlled, with stage 5 chronic kidney disease not on chronic dialysis, with long-term current use of insulin ([facility])    Essential hypertension    Idiopathic Parkinson's disease ([facility])    Hyperkalemia    Blood pressure (!) 156/80, pulse 68, temperature 98.1 °F (36.7 °C), temperature source Oral, resp. rate 16, height 4' 11\" (1.499 m), weight 133 lb (60.3 kg), SpO2 98 %. Subjective:  Symptoms:  Improved. She reports malaise and weakness. No shortness of breath, cough or anxiety. Diet:  Poor intake. No nausea. Activity level: Impaired due to weakness.     Pain:  She complains of pain that is mild. Objective:  General Appearance: In no acute distress. Vital signs: (most recent): Blood pressure (!) 156/80, pulse 68, temperature 98.1 °F (36.7 °C), temperature source Oral, resp. rate 16, height 4' 11\" (1.499 m), weight 133 lb (60.3 kg), SpO2 98 %. No fever. Output: Producing urine. HEENT: Normal HEENT exam.    Lungs:  Normal effort and normal respiratory rate. There are decreased breath sounds. No wheezes. Heart: Normal rate. Regular rhythm. Abdomen: Abdomen is soft. Bowel sounds are normal.     Extremities: There is no dependent edema. Neurological: Patient is alert and oriented to person, place and time. Pupils:  Pupils are equal, round, and reactive to light. Skin:  Warm and dry. Assessment:    Condition: Worsening. (Pneumonia, possible aspiration  COPD, with acute exacerbation, improved today   Chronic kidney disease stage IV with hyper kalemia  Uncontrolled hypertension  Supratherapeutic INR). Plan:   (Chest x-ray shows worsening infiltrate, however patient's breathing better since addition of IV steroids  Noted to be hyperkalemic again today, we will add another dose of Kayexalate, follow labs, nephrology on case  Hold Coumadin today, due to supratherapeutic INR  Decrease steroids when able  MBS was ordered  ).        Mary Kate Andrea PA-C  10/27/2017

## 2017-10-27 NOTE — CARE COORDINATION
Met w pt and her son, explaining C3 role in pt care. Pt from home w son and states she is independent, using no assistive devices. No specific dc needs identified. Is sat well on O2 @ 2l/nc and pt does not have home O2. Remains on IV abx. C3 will follow.

## 2017-10-28 ENCOUNTER — APPOINTMENT (OUTPATIENT)
Dept: GENERAL RADIOLOGY | Age: 82
DRG: 177 | End: 2017-10-28
Payer: MEDICARE

## 2017-10-28 LAB
ANION GAP SERPL CALCULATED.3IONS-SCNC: 16 MEQ/L (ref 7–13)
BUN BLDV-MCNC: 57 MG/DL (ref 8–23)
CALCIUM SERPL-MCNC: 8 MG/DL (ref 8.6–10.2)
CHLORIDE BLD-SCNC: 100 MEQ/L (ref 98–107)
CO2: 24 MEQ/L (ref 22–29)
CREAT SERPL-MCNC: 4.19 MG/DL (ref 0.5–0.9)
GFR AFRICAN AMERICAN: 12.3
GFR NON-AFRICAN AMERICAN: 10.2
GLUCOSE BLD-MCNC: 172 MG/DL (ref 60–115)
GLUCOSE BLD-MCNC: 181 MG/DL (ref 60–115)
GLUCOSE BLD-MCNC: 188 MG/DL (ref 60–115)
GLUCOSE BLD-MCNC: 238 MG/DL (ref 60–115)
GLUCOSE BLD-MCNC: 239 MG/DL (ref 74–109)
HCT VFR BLD CALC: 25.1 % (ref 37–47)
HEMOGLOBIN: 8.1 G/DL (ref 12–16)
INR BLD: 2.9
MCH RBC QN AUTO: 28.5 PG (ref 27–31.3)
MCHC RBC AUTO-ENTMCNC: 32.4 % (ref 33–37)
MCV RBC AUTO: 88 FL (ref 82–100)
PDW BLD-RTO: 17.1 % (ref 11.5–14.5)
PERFORMED ON: ABNORMAL
PLATELET # BLD: 153 K/UL (ref 130–400)
POTASSIUM SERPL-SCNC: 4.8 MEQ/L (ref 3.5–5.1)
POTASSIUM SERPL-SCNC: 4.9 MEQ/L (ref 3.5–5.1)
POTASSIUM SERPL-SCNC: 5 MEQ/L (ref 3.5–5.1)
PROTHROMBIN TIME: 30.2 SEC (ref 8.1–13.7)
RBC # BLD: 2.85 M/UL (ref 4.2–5.4)
SODIUM BLD-SCNC: 140 MEQ/L (ref 132–144)
TOTAL CK: 77 U/L (ref 0–170)
TOTAL CK: 78 U/L (ref 0–170)
TROPONIN: 0.02 NG/ML (ref 0–0.01)
TROPONIN: 0.04 NG/ML (ref 0–0.01)
WBC # BLD: 9.1 K/UL (ref 4.8–10.8)

## 2017-10-28 PROCEDURE — 6370000000 HC RX 637 (ALT 250 FOR IP): Performed by: INTERNAL MEDICINE

## 2017-10-28 PROCEDURE — 6360000002 HC RX W HCPCS: Performed by: INTERNAL MEDICINE

## 2017-10-28 PROCEDURE — 2700000000 HC OXYGEN THERAPY PER DAY

## 2017-10-28 PROCEDURE — 36415 COLL VENOUS BLD VENIPUNCTURE: CPT

## 2017-10-28 PROCEDURE — 84132 ASSAY OF SERUM POTASSIUM: CPT

## 2017-10-28 PROCEDURE — 84484 ASSAY OF TROPONIN QUANT: CPT

## 2017-10-28 PROCEDURE — 94664 DEMO&/EVAL PT USE INHALER: CPT

## 2017-10-28 PROCEDURE — 1210000000 HC MED SURG R&B

## 2017-10-28 PROCEDURE — 93005 ELECTROCARDIOGRAM TRACING: CPT

## 2017-10-28 PROCEDURE — 85027 COMPLETE CBC AUTOMATED: CPT

## 2017-10-28 PROCEDURE — 85610 PROTHROMBIN TIME: CPT

## 2017-10-28 PROCEDURE — 94640 AIRWAY INHALATION TREATMENT: CPT

## 2017-10-28 PROCEDURE — 80048 BASIC METABOLIC PNL TOTAL CA: CPT

## 2017-10-28 PROCEDURE — 71020 XR CHEST STANDARD TWO VW: CPT

## 2017-10-28 PROCEDURE — 99232 SBSQ HOSP IP/OBS MODERATE 35: CPT | Performed by: INTERNAL MEDICINE

## 2017-10-28 PROCEDURE — 82550 ASSAY OF CK (CPK): CPT

## 2017-10-28 PROCEDURE — 2580000003 HC RX 258: Performed by: PERSONAL EMERGENCY RESPONSE ATTENDANT

## 2017-10-28 RX ORDER — HYDRALAZINE HYDROCHLORIDE 20 MG/ML
20 INJECTION INTRAMUSCULAR; INTRAVENOUS EVERY 6 HOURS SCHEDULED
Status: DISCONTINUED | OUTPATIENT
Start: 2017-10-28 | End: 2017-11-03

## 2017-10-28 RX ORDER — WARFARIN SODIUM 2 MG/1
4 TABLET ORAL
Status: COMPLETED | OUTPATIENT
Start: 2017-10-28 | End: 2017-10-28

## 2017-10-28 RX ORDER — CALCIUM CARBONATE 200(500)MG
500 TABLET,CHEWABLE ORAL 2 TIMES DAILY PRN
Status: DISCONTINUED | OUTPATIENT
Start: 2017-10-28 | End: 2017-11-03 | Stop reason: HOSPADM

## 2017-10-28 RX ORDER — METOPROLOL TARTRATE 5 MG/5ML
5 INJECTION INTRAVENOUS
Status: COMPLETED | OUTPATIENT
Start: 2017-10-28 | End: 2017-10-29

## 2017-10-28 RX ORDER — DIGOXIN 0.25 MG/ML
250 INJECTION INTRAMUSCULAR; INTRAVENOUS ONCE
Status: COMPLETED | OUTPATIENT
Start: 2017-10-28 | End: 2017-10-29

## 2017-10-28 RX ORDER — METOPROLOL TARTRATE 5 MG/5ML
5 INJECTION INTRAVENOUS ONCE
Status: COMPLETED | OUTPATIENT
Start: 2017-10-28 | End: 2017-10-29

## 2017-10-28 RX ORDER — AMLODIPINE BESYLATE 5 MG/1
5 TABLET ORAL DAILY
Status: DISCONTINUED | OUTPATIENT
Start: 2017-10-28 | End: 2017-11-03

## 2017-10-28 RX ADMIN — INSULIN GLARGINE 14 UNITS: 100 INJECTION, SOLUTION SUBCUTANEOUS at 10:22

## 2017-10-28 RX ADMIN — WARFARIN SODIUM 4 MG: 2 TABLET ORAL at 18:19

## 2017-10-28 RX ADMIN — ISOSORBIDE MONONITRATE 60 MG: 60 TABLET, EXTENDED RELEASE ORAL at 09:24

## 2017-10-28 RX ADMIN — AMLODIPINE BESYLATE 5 MG: 5 TABLET ORAL at 09:24

## 2017-10-28 RX ADMIN — SODIUM BICARBONATE 325 MG: 650 TABLET ORAL at 15:14

## 2017-10-28 RX ADMIN — HYDRALAZINE HYDROCHLORIDE 20 MG: 20 INJECTION INTRAMUSCULAR; INTRAVENOUS at 18:18

## 2017-10-28 RX ADMIN — METHYLPREDNISOLONE SODIUM SUCCINATE 20 MG: 40 INJECTION, POWDER, FOR SOLUTION INTRAMUSCULAR; INTRAVENOUS at 09:23

## 2017-10-28 RX ADMIN — SODIUM BICARBONATE 325 MG: 650 TABLET ORAL at 09:24

## 2017-10-28 RX ADMIN — ASPIRIN 81 MG: 81 TABLET, COATED ORAL at 09:24

## 2017-10-28 RX ADMIN — INSULIN LISPRO 2 UNITS: 100 INJECTION, SOLUTION INTRAVENOUS; SUBCUTANEOUS at 13:08

## 2017-10-28 RX ADMIN — TAZOBACTAM SODIUM AND PIPERACILLIN SODIUM 3.38 G: 375; 3 INJECTION, SOLUTION INTRAVENOUS at 01:07

## 2017-10-28 RX ADMIN — METOPROLOL TARTRATE 50 MG: 50 TABLET ORAL at 21:48

## 2017-10-28 RX ADMIN — INSULIN LISPRO 2 UNITS: 100 INJECTION, SOLUTION INTRAVENOUS; SUBCUTANEOUS at 18:20

## 2017-10-28 RX ADMIN — IPRATROPIUM BROMIDE AND ALBUTEROL SULFATE 1 AMPULE: .5; 3 SOLUTION RESPIRATORY (INHALATION) at 07:31

## 2017-10-28 RX ADMIN — Medication 400 MG: at 15:14

## 2017-10-28 RX ADMIN — CLONIDINE HYDROCHLORIDE 0.1 MG: 0.1 TABLET ORAL at 09:25

## 2017-10-28 RX ADMIN — METHYLPREDNISOLONE SODIUM SUCCINATE 20 MG: 40 INJECTION, POWDER, FOR SOLUTION INTRAMUSCULAR; INTRAVENOUS at 18:18

## 2017-10-28 RX ADMIN — TAZOBACTAM SODIUM AND PIPERACILLIN SODIUM 3.38 G: 375; 3 INJECTION, SOLUTION INTRAVENOUS at 15:15

## 2017-10-28 RX ADMIN — Medication 10 ML: at 09:31

## 2017-10-28 RX ADMIN — LEVOTHYROXINE SODIUM 50 MCG: 50 TABLET ORAL at 09:25

## 2017-10-28 RX ADMIN — CARBIDOPA AND LEVODOPA 1 TABLET: 25; 100 TABLET ORAL at 09:24

## 2017-10-28 RX ADMIN — INSULIN LISPRO 4 UNITS: 100 INJECTION, SOLUTION INTRAVENOUS; SUBCUTANEOUS at 09:28

## 2017-10-28 RX ADMIN — ROSUVASTATIN CALCIUM 40 MG: 20 TABLET, FILM COATED ORAL at 21:48

## 2017-10-28 RX ADMIN — Medication 10 ML: at 21:49

## 2017-10-28 RX ADMIN — CLONIDINE HYDROCHLORIDE 0.1 MG: 0.1 TABLET ORAL at 15:12

## 2017-10-28 RX ADMIN — IPRATROPIUM BROMIDE AND ALBUTEROL SULFATE 1 AMPULE: .5; 3 SOLUTION RESPIRATORY (INHALATION) at 12:51

## 2017-10-28 RX ADMIN — CALCIUM 500 MG: 500 TABLET ORAL at 09:24

## 2017-10-28 RX ADMIN — SODIUM BICARBONATE 325 MG: 650 TABLET ORAL at 21:48

## 2017-10-28 RX ADMIN — ANTACID TABLETS 500 MG: 500 TABLET, CHEWABLE ORAL at 15:12

## 2017-10-28 RX ADMIN — PANTOPRAZOLE SODIUM 40 MG: 40 GRANULE, DELAYED RELEASE ORAL at 09:24

## 2017-10-28 RX ADMIN — AMIODARONE HYDROCHLORIDE 100 MG: 200 TABLET ORAL at 09:24

## 2017-10-28 RX ADMIN — CHLORTHALIDONE 25 MG: 25 TABLET ORAL at 09:24

## 2017-10-28 RX ADMIN — VENLAFAXINE HYDROCHLORIDE 150 MG: 150 CAPSULE, EXTENDED RELEASE ORAL at 09:24

## 2017-10-28 RX ADMIN — FLUTICASONE PROPIONATE 2 SPRAY: 50 SPRAY, METERED NASAL at 09:32

## 2017-10-28 RX ADMIN — CLONIDINE HYDROCHLORIDE 0.1 MG: 0.1 TABLET ORAL at 21:48

## 2017-10-28 RX ADMIN — METOPROLOL TARTRATE 50 MG: 50 TABLET ORAL at 10:22

## 2017-10-28 RX ADMIN — HYDRALAZINE HYDROCHLORIDE 20 MG: 20 INJECTION INTRAMUSCULAR; INTRAVENOUS at 13:22

## 2017-10-28 RX ADMIN — METHYLPREDNISOLONE SODIUM SUCCINATE 20 MG: 40 INJECTION, POWDER, FOR SOLUTION INTRAMUSCULAR; INTRAVENOUS at 01:06

## 2017-10-28 ASSESSMENT — PAIN SCALES - GENERAL: PAINLEVEL_OUTOF10: 0

## 2017-10-28 NOTE — PROGRESS NOTES
Aura Jones is a 81 y.o. female patient.     Current Facility-Administered Medications   Medication Dose Route Frequency Provider Last Rate Last Dose    amLODIPine (NORVASC) tablet 5 mg  5 mg Oral Daily Aram Conner MD        methylPREDNISolone sodium (SOLU-MEDROL) injection 20 mg  20 mg Intravenous Q8H Ace Webster MD   20 mg at 10/28/17 0106    warfarin (COUMADIN) daily dosing (placeholder)   Other RX Placeholder Ace Webster MD        darbepoetin lizeth-polysorbate (ARANESP) injection 40 mcg  40 mcg Subcutaneous Weekly Aram Conner MD   40 mcg at 10/26/17 1417    amiodarone (CORDARONE) tablet 100 mg  100 mg Oral Daily Shu Mohamud MD   100 mg at 10/27/17 0930    aspirin EC tablet 81 mg  81 mg Oral Daily Shu Mohamud MD   81 mg at 10/27/17 0929    buprenorphine (BUPRENEX) 5 MCG/HR 1 patch  1 patch Transdermal Weekly Shu Mohamud MD        calcium elemental (OSCAL) tablet 500 mg  500 mg Oral Daily Shu Mohamud MD   500 mg at 10/27/17 0929    carbidopa-levodopa (SINEMET)  MG per tablet 1 tablet  1 tablet Oral Daily Shu Mohamud MD   1 tablet at 10/27/17 0929    chlorthalidone (HYGROTON) tablet 25 mg  25 mg Oral Daily Shu Mohamud MD   25 mg at 10/27/17 4303    cloNIDine (CATAPRES) tablet 0.1 mg  0.1 mg Oral TID Shu Mohamud MD   0.1 mg at 10/27/17 2211    rosuvastatin (CRESTOR) tablet 40 mg  40 mg Oral Nightly Shu Mohamud MD   40 mg at 10/27/17 2211    fluticasone (FLONASE) 50 MCG/ACT nasal spray 2 spray  2 spray Nasal Daily Shu Mohamud MD   2 spray at 10/27/17 0930    iron polysaccharide complex-B12-folic acid (FERREX-FORTE) 150-0.025-1 MG capsule 1 mg  1 capsule Oral Daily with breakfast Shu Mohamud MD        isosorbide mononitrate (IMDUR) extended release tablet 60 mg  60 mg Oral Daily Shu Mohamud MD   60 mg at 10/27/17 0929    levothyroxine (SYNTHROID) tablet 25 mcg  25 mcg Oral Every Other Day chloride flush 0.9 % injection 10 mL  10 mL Intravenous PRN DYLAN Fischer        acetaminophen (TYLENOL) tablet 650 mg  650 mg Oral Q4H PRN DYLAN Fischer   650 mg at 10/27/17 2210    glucose (GLUTOSE) 40 % oral gel 15 g  15 g Oral PRN Ryan Naranjo MD        dextrose 50 % solution 12.5 g  12.5 g Intravenous PRN Ryan Naranjo MD        glucagon (rDNA) injection 1 mg  1 mg Intramuscular PRN Ryan Naranjo MD        dextrose 5 % solution  100 mL/hr Intravenous PRN Ryan Naranjo MD        insulin lispro (HUMALOG) injection vial 0-12 Units  0-12 Units Subcutaneous TID WC Ryan Naranjo MD   6 Units at 10/27/17 1747    insulin lispro (HUMALOG) injection vial 0-6 Units  0-6 Units Subcutaneous Nightly Ryan Naranjo MD   2 Units at 10/27/17 2234     Allergies   Allergen Reactions    Arthrotec [Diclofenac-Misoprostol] Nausea Only    Depakote [Divalproex Sodium] Itching    Dilantin [Phenytoin]     Klonopin [Clonazepam]     Statins Other (See Comments)     achy     Principal Problem:    Pneumonia of lower lobe due to infectious organism [facility])  Active Problems:    Hypothyroidism    CKD (chronic kidney disease)    Afib ([facility] [address]    Anemia    Acid reflux    Anxiety    Diabetes mellitus due to underlying condition, controlled, with stage 5 chronic kidney disease not on chronic dialysis, with long-term current use of insulin (HCC)    Essential hypertension    Idiopathic Parkinson's disease (HCC)    Hyperkalemia    Blood pressure (!) 194/74, pulse 79, temperature 98.4 °F (36.9 °C), temperature source Oral, resp. rate 16, height 4' 11\" (1.499 m), weight 133 lb (60.3 kg), SpO2 97 %. Subjective:  Symptoms:  Stable. Diet:  Adequate intake. Pain:  She reports pain is improving. Objective:  General Appearance:  Comfortable. Vital signs: (most recent): Blood pressure (!) 194/74, pulse 79, temperature 98.4 °F (36.9 °C), temperature source Oral, resp.  rate 16, height 4' 11\"

## 2017-10-28 NOTE — PROGRESS NOTES
normocephalic  Neck:supple with no thyromegally  Cardiovascular:  S1, S2 without m/r/g   Respiratory:  Few rhonchi  Abdomen: +bs, soft, nt  Ext: no LE edema  Musculoskeletal:Intact  Neuro:Alert and oriented with no deficit      Electronically signed by Jean Paul Cazares MD on 10/28/2017 at 8:32 AM

## 2017-10-28 NOTE — FLOWSHEET NOTE
Patient c/o SOB with exertion. No chest pain reported. She is on 3L NC and her oxygen saturation is stable. Her BP is elevated. Dr. Ramila Jensen made aware and Norvasc was added to her BP meds. Hydralazine PRN available for additional management. She is asymptomatic of distress. LS diminished, wheezing bilaterally. No edema noted. No crackles. Will continue to monitor.

## 2017-10-29 ENCOUNTER — APPOINTMENT (OUTPATIENT)
Dept: GENERAL RADIOLOGY | Age: 82
DRG: 177 | End: 2017-10-29
Payer: MEDICARE

## 2017-10-29 LAB
ANION GAP SERPL CALCULATED.3IONS-SCNC: 14 MEQ/L (ref 7–13)
BASOPHILS ABSOLUTE: 0 K/UL (ref 0–0.2)
BASOPHILS RELATIVE PERCENT: 0.1 %
BLOOD CULTURE, ROUTINE: NORMAL
BUN BLDV-MCNC: 60 MG/DL (ref 8–23)
CALCIUM SERPL-MCNC: 8.2 MG/DL (ref 8.6–10.2)
CHLORIDE BLD-SCNC: 102 MEQ/L (ref 98–107)
CO2: 23 MEQ/L (ref 22–29)
CREAT SERPL-MCNC: 4.1 MG/DL (ref 0.5–0.9)
CULTURE, BLOOD 2: NORMAL
EOSINOPHILS ABSOLUTE: 0 K/UL (ref 0–0.7)
EOSINOPHILS RELATIVE PERCENT: 0 %
GFR AFRICAN AMERICAN: 12.6
GFR NON-AFRICAN AMERICAN: 10.4
GLUCOSE BLD-MCNC: 162 MG/DL (ref 60–115)
GLUCOSE BLD-MCNC: 186 MG/DL (ref 74–109)
GLUCOSE BLD-MCNC: 200 MG/DL (ref 60–115)
GLUCOSE BLD-MCNC: 223 MG/DL (ref 60–115)
GLUCOSE BLD-MCNC: 243 MG/DL (ref 60–115)
HCT VFR BLD CALC: 26.4 % (ref 37–47)
HEMOGLOBIN: 8.3 G/DL (ref 12–16)
INR BLD: 1.7
LYMPHOCYTES ABSOLUTE: 0.4 K/UL (ref 1–4.8)
LYMPHOCYTES RELATIVE PERCENT: 4.5 %
MCH RBC QN AUTO: 28.5 PG (ref 27–31.3)
MCHC RBC AUTO-ENTMCNC: 31.5 % (ref 33–37)
MCV RBC AUTO: 90.4 FL (ref 82–100)
MONOCYTES ABSOLUTE: 0.5 K/UL (ref 0.2–0.8)
MONOCYTES RELATIVE PERCENT: 5.6 %
NEUTROPHILS ABSOLUTE: 7.9 K/UL (ref 1.4–6.5)
NEUTROPHILS RELATIVE PERCENT: 89.8 %
PDW BLD-RTO: 17.6 % (ref 11.5–14.5)
PERFORMED ON: ABNORMAL
PLATELET # BLD: 171 K/UL (ref 130–400)
POTASSIUM SERPL-SCNC: 5.4 MEQ/L (ref 3.5–5.1)
PROTHROMBIN TIME: 18 SEC (ref 8.1–13.7)
RBC # BLD: 2.92 M/UL (ref 4.2–5.4)
SODIUM BLD-SCNC: 139 MEQ/L (ref 132–144)
TOTAL CK: 57 U/L (ref 0–170)
TOTAL CK: 62 U/L (ref 0–170)
TROPONIN: 0.04 NG/ML (ref 0–0.01)
TROPONIN: 0.04 NG/ML (ref 0–0.01)
WBC # BLD: 8.8 K/UL (ref 4.8–10.8)

## 2017-10-29 PROCEDURE — 6370000000 HC RX 637 (ALT 250 FOR IP): Performed by: INTERNAL MEDICINE

## 2017-10-29 PROCEDURE — 2580000003 HC RX 258: Performed by: PERSONAL EMERGENCY RESPONSE ATTENDANT

## 2017-10-29 PROCEDURE — 85610 PROTHROMBIN TIME: CPT

## 2017-10-29 PROCEDURE — 6360000002 HC RX W HCPCS: Performed by: INTERNAL MEDICINE

## 2017-10-29 PROCEDURE — 94640 AIRWAY INHALATION TREATMENT: CPT

## 2017-10-29 PROCEDURE — 2700000000 HC OXYGEN THERAPY PER DAY

## 2017-10-29 PROCEDURE — 80048 BASIC METABOLIC PNL TOTAL CA: CPT

## 2017-10-29 PROCEDURE — 82550 ASSAY OF CK (CPK): CPT

## 2017-10-29 PROCEDURE — 85025 COMPLETE CBC W/AUTO DIFF WBC: CPT

## 2017-10-29 PROCEDURE — 36415 COLL VENOUS BLD VENIPUNCTURE: CPT

## 2017-10-29 PROCEDURE — 99232 SBSQ HOSP IP/OBS MODERATE 35: CPT | Performed by: INTERNAL MEDICINE

## 2017-10-29 PROCEDURE — 71020 XR CHEST STANDARD TWO VW: CPT

## 2017-10-29 PROCEDURE — 84484 ASSAY OF TROPONIN QUANT: CPT

## 2017-10-29 PROCEDURE — 2500000003 HC RX 250 WO HCPCS: Performed by: INTERNAL MEDICINE

## 2017-10-29 PROCEDURE — 1210000000 HC MED SURG R&B

## 2017-10-29 RX ORDER — METOPROLOL TARTRATE 5 MG/5ML
5 INJECTION INTRAVENOUS EVERY 4 HOURS PRN
Status: DISCONTINUED | OUTPATIENT
Start: 2017-10-29 | End: 2017-11-03 | Stop reason: HOSPADM

## 2017-10-29 RX ORDER — AMIODARONE HYDROCHLORIDE 200 MG/1
200 TABLET ORAL 2 TIMES DAILY
Status: DISCONTINUED | OUTPATIENT
Start: 2017-10-29 | End: 2017-11-03 | Stop reason: HOSPADM

## 2017-10-29 RX ORDER — WARFARIN SODIUM 3 MG/1
6 TABLET ORAL
Status: COMPLETED | OUTPATIENT
Start: 2017-10-29 | End: 2017-10-29

## 2017-10-29 RX ADMIN — ASPIRIN 81 MG: 81 TABLET, COATED ORAL at 09:04

## 2017-10-29 RX ADMIN — INSULIN LISPRO 2 UNITS: 100 INJECTION, SOLUTION INTRAVENOUS; SUBCUTANEOUS at 17:51

## 2017-10-29 RX ADMIN — HYDRALAZINE HYDROCHLORIDE 20 MG: 20 INJECTION INTRAMUSCULAR; INTRAVENOUS at 06:46

## 2017-10-29 RX ADMIN — INSULIN LISPRO 4 UNITS: 100 INJECTION, SOLUTION INTRAVENOUS; SUBCUTANEOUS at 09:05

## 2017-10-29 RX ADMIN — TAZOBACTAM SODIUM AND PIPERACILLIN SODIUM 3.38 G: 375; 3 INJECTION, SOLUTION INTRAVENOUS at 13:35

## 2017-10-29 RX ADMIN — HYDRALAZINE HYDROCHLORIDE 20 MG: 20 INJECTION INTRAMUSCULAR; INTRAVENOUS at 11:35

## 2017-10-29 RX ADMIN — PANTOPRAZOLE SODIUM 40 MG: 40 GRANULE, DELAYED RELEASE ORAL at 06:46

## 2017-10-29 RX ADMIN — IPRATROPIUM BROMIDE AND ALBUTEROL SULFATE 1 AMPULE: .5; 3 SOLUTION RESPIRATORY (INHALATION) at 21:03

## 2017-10-29 RX ADMIN — CHLORTHALIDONE 25 MG: 25 TABLET ORAL at 09:03

## 2017-10-29 RX ADMIN — IPRATROPIUM BROMIDE AND ALBUTEROL SULFATE 1 AMPULE: .5; 3 SOLUTION RESPIRATORY (INHALATION) at 07:26

## 2017-10-29 RX ADMIN — CLONIDINE HYDROCHLORIDE 0.1 MG: 0.1 TABLET ORAL at 22:52

## 2017-10-29 RX ADMIN — WARFARIN SODIUM 6 MG: 3 TABLET ORAL at 17:50

## 2017-10-29 RX ADMIN — CARBIDOPA AND LEVODOPA 1 TABLET: 25; 100 TABLET ORAL at 09:02

## 2017-10-29 RX ADMIN — SODIUM BICARBONATE 325 MG: 650 TABLET ORAL at 13:34

## 2017-10-29 RX ADMIN — INSULIN LISPRO 4 UNITS: 100 INJECTION, SOLUTION INTRAVENOUS; SUBCUTANEOUS at 12:42

## 2017-10-29 RX ADMIN — IPRATROPIUM BROMIDE AND ALBUTEROL SULFATE 1 AMPULE: .5; 3 SOLUTION RESPIRATORY (INHALATION) at 13:16

## 2017-10-29 RX ADMIN — METOPROLOL TARTRATE 5 MG: 5 INJECTION INTRAVENOUS at 00:04

## 2017-10-29 RX ADMIN — AMLODIPINE BESYLATE 5 MG: 5 TABLET ORAL at 09:02

## 2017-10-29 RX ADMIN — DIGOXIN 250 MCG: 250 INJECTION, SOLUTION INTRAMUSCULAR; INTRAVENOUS; PARENTERAL at 00:04

## 2017-10-29 RX ADMIN — LEVOTHYROXINE SODIUM 25 MCG: 25 TABLET ORAL at 06:46

## 2017-10-29 RX ADMIN — METOPROLOL TARTRATE 50 MG: 50 TABLET ORAL at 22:53

## 2017-10-29 RX ADMIN — METHYLPREDNISOLONE SODIUM SUCCINATE 20 MG: 40 INJECTION, POWDER, FOR SOLUTION INTRAMUSCULAR; INTRAVENOUS at 01:43

## 2017-10-29 RX ADMIN — Medication 400 MG: at 09:04

## 2017-10-29 RX ADMIN — HYDRALAZINE HYDROCHLORIDE 20 MG: 20 INJECTION INTRAMUSCULAR; INTRAVENOUS at 17:50

## 2017-10-29 RX ADMIN — SODIUM BICARBONATE 325 MG: 650 TABLET ORAL at 09:04

## 2017-10-29 RX ADMIN — METHYLPREDNISOLONE SODIUM SUCCINATE 20 MG: 40 INJECTION, POWDER, FOR SOLUTION INTRAMUSCULAR; INTRAVENOUS at 17:50

## 2017-10-29 RX ADMIN — METOPROLOL TARTRATE 5 MG: 5 INJECTION INTRAVENOUS at 00:09

## 2017-10-29 RX ADMIN — ROSUVASTATIN CALCIUM 40 MG: 20 TABLET, FILM COATED ORAL at 22:52

## 2017-10-29 RX ADMIN — TAZOBACTAM SODIUM AND PIPERACILLIN SODIUM 3.38 G: 375; 3 INJECTION, SOLUTION INTRAVENOUS at 01:44

## 2017-10-29 RX ADMIN — CLONIDINE HYDROCHLORIDE 0.1 MG: 0.1 TABLET ORAL at 09:02

## 2017-10-29 RX ADMIN — VENLAFAXINE HYDROCHLORIDE 150 MG: 150 CAPSULE, EXTENDED RELEASE ORAL at 09:35

## 2017-10-29 RX ADMIN — METOPROLOL TARTRATE 50 MG: 50 TABLET ORAL at 09:03

## 2017-10-29 RX ADMIN — ISOSORBIDE MONONITRATE 60 MG: 60 TABLET, EXTENDED RELEASE ORAL at 09:04

## 2017-10-29 RX ADMIN — CALCIUM 500 MG: 500 TABLET ORAL at 09:05

## 2017-10-29 RX ADMIN — AMIODARONE HYDROCHLORIDE 200 MG: 200 TABLET ORAL at 22:53

## 2017-10-29 RX ADMIN — CLONIDINE HYDROCHLORIDE 0.1 MG: 0.1 TABLET ORAL at 13:34

## 2017-10-29 RX ADMIN — INSULIN GLARGINE 14 UNITS: 100 INJECTION, SOLUTION SUBCUTANEOUS at 09:05

## 2017-10-29 RX ADMIN — Medication 10 ML: at 06:49

## 2017-10-29 RX ADMIN — FLUTICASONE PROPIONATE 2 SPRAY: 50 SPRAY, METERED NASAL at 09:25

## 2017-10-29 RX ADMIN — METHYLPREDNISOLONE SODIUM SUCCINATE 20 MG: 40 INJECTION, POWDER, FOR SOLUTION INTRAMUSCULAR; INTRAVENOUS at 09:05

## 2017-10-29 RX ADMIN — AMIODARONE HYDROCHLORIDE 100 MG: 200 TABLET ORAL at 09:04

## 2017-10-29 RX ADMIN — METOPROLOL TARTRATE 5 MG: 5 INJECTION INTRAVENOUS at 01:45

## 2017-10-29 RX ADMIN — Medication 10 ML: at 09:24

## 2017-10-29 ASSESSMENT — PAIN SCALES - GENERAL: PAINLEVEL_OUTOF10: 0

## 2017-10-29 NOTE — FLOWSHEET NOTE
Patient assessment complete, she is resting in bed at this time with no c/o pain or discomfort. Updated patient on plan of care for the evening, and she verbalizes understanding. Gave pt HS snack of juice, antoine crackers and peanut butter. Patient verbalizes no further needs at this time, will continue to monitor.

## 2017-10-29 NOTE — CONSULTS
response. She denies any chest pain. She denies any orthopnea, PND, or worsening peripheral edema. She denies any palpitations, lightheadedness, near-syncope or syncope. Allergies:   Allergies   Allergen Reactions    Arthrotec [Diclofenac-Misoprostol] Nausea Only    Depakote [Divalproex Sodium] Itching    Dilantin [Phenytoin]     Klonopin [Clonazepam]     Statins Other (See Comments)     achy         Past Medical History:  Past Medical History:   Diagnosis Date    Abnormal presence of protein in urine 6/2004    Dr. Arlene Calderon    Atrial fibrillation ([facility] [address]     Constipation, chronic     Diverticular disease of the colon     GERD (gastroesophageal reflux disease)     GERD, PUD, Hiatal Hernia    Granulomatous lung disease ([facility] [address]     History of endoscopy 2-21-12    Dr. Leif Hernandez Hyperlipidemia     Hypothyroidism     Kidney stones 11/2001    Dr. Arlene Calderon    Neuropathy in diabetes ([facility] [address]     legs    Osteoarthritis     Positive ORTEGA (antinuclear antibody)     1:80    Tachycardia     Thrombophlebitis leg superficial     Type II or unspecified type diabetes mellitus without mention of complication, not stated as uncontrolled 1998       Past Surgical History:  Past Surgical History:   Procedure Laterality Date    APPENDECTOMY  2007    BLADDER SUSPENSION  2008 & 2009    [facility], second at [address]  2/2006    COLONOSCOPY  4/2007    Dr. Michael Genao  [phone]    Bilateral    [place]    THYROID SURGERY      URETHRAL STRICTURE DILATATION  3/2003       Family History:       Problem Relation Age of Onset    Heart Disease Father     Early Death Father 47    Diabetes Father     Heart Disease Mother     Diabetes Mother     High Blood Pressure Mother     High Blood Pressure Sister     High Cholesterol Sister        Social  History:     Social History   Substance Use Topics    Smoking status: Former Smoker    Smokeless [id number]    aspirin EC tablet 81 mg  81 mg Oral Daily Rayo Molina MD   81 mg at 10/29/17 0904    buprenorphine (BUPRENEX) 5 MCG/HR 1 patch  1 patch Transdermal Weekly Rayo Molina MD        calcium elemental (OSCAL) tablet 500 mg  500 mg Oral Daily Rayo Molina MD   500 mg at 10/29/17 0905    carbidopa-levodopa (SINEMET)  MG per tablet 1 tablet  1 tablet Oral Daily Rayo Molina MD   1 tablet at 10/29/17 0902    chlorthalidone (HYGROTON) tablet 25 mg  25 mg Oral Daily Rayo Molina MD   25 mg at 10/29/17 9801    cloNIDine (CATAPRES) tablet 0.1 mg  0.1 mg Oral TID Rayo Molina MD   0.1 mg at 10/29/17 0902    rosuvastatin (CRESTOR) tablet 40 mg  40 mg Oral Nightly Rayo Molina MD   40 mg at 10/28/17 2148    fluticasone (FLONASE) 50 MCG/ACT nasal spray 2 spray  2 spray Nasal Daily Rayo Molina MD   2 spray at 10/29/17 7071    iron polysaccharide complex-B12-folic acid (FERREX-FORTE) 150-0.025-1 MG capsule 1 mg  1 capsule Oral Daily with breakfast Rayo Molina MD        isosorbide mononitrate (IMDUR) extended release tablet 60 mg  60 mg Oral Daily Rayo Molina MD   60 mg at 10/29/17 0904    levothyroxine (SYNTHROID) tablet 25 mcg  25 mcg Oral Every Other Day Rayo Molina MD   25 mcg at 10/29/17 0646    levothyroxine (SYNTHROID) tablet 50 mcg  50 mcg Oral Every Other Day Rayo Molina MD   50 mcg at 10/28/17 0925    magnesium oxide (MAG-OX) tablet 400 mg  400 mg Oral Daily Rayo Molina MD   400 mg at 10/29/17 0904    metoprolol tartrate (LOPRESSOR) tablet 50 mg  50 mg Oral BID Rayo Molina MD   50 mg at 10/29/17 4435    sodium bicarbonate tablet 325 mg  325 mg Oral TID Rayo Molina MD   325 mg at 10/29/17 0904    venlafaxine (EFFEXOR XR) extended release capsule 150 mg  150 mg Oral Daily with breakfast Rayo Molina MD   150 mg at 10/29/17 0935    guaiFENesin-dextromethorphan (ROBITUSSIN DM) disease ([facility] [address]    Hyperkalemia      Recurrent atrial fibrillation with rapid ventricular response. Likely precipitated by underlying acute lung process. Chronically treated with amiodarone and warfarin. Borderline elevated troponin levels in a flat pattern. Likely related to underlying chronic kidney disease. Doubt acute coronary syndrome. Moderate to severe calcific aortic stenosis. Currently well compensated  No Overt Congestive Heart Failure. Recommendations:  1. Monitor on telemetry. 2. Will increase amiodarone to 200 mg by mouth twice daily. 3. Continue oral Lopressor. We'll also give IV Lopressor as needed for increased heart rates. 4. Continue warfarin with target INR 2-3. Will need to hold if she has persistent hemoptysis. 5. Continue intravenous antibiotics. Thank you for the opportunity to participate in the care of your patient. Do not hesitate to call if you have any questions.     Electronically signed by Maria Luisa Gabriel MD, [facility] on 10/29/2017 at 10:01 AM

## 2017-10-29 NOTE — PROGRESS NOTES
Renal Progress Note    Assessment and Plan:   82 yo with ckd stage 5. Has av fistula maturing but not ready. Has proteinuria. Has pna. Looks euvolemic. Has anemia and high k as well. Sees Dr. Peng Joy as outpt     Plan/  1- no hd for now  2-k is better. Can stop q 12 hour k checks  3- aranesp for anemia given. Iron levels ok  4- norvasc 5 added for bp.   5- ok for d/c at any point from renal stanpoint    Patient Active Problem List:     Hypothyroidism     Hypercholesteremia     CKD (chronic kidney disease)     Afib (HCC)     Anemia     Acid reflux     Anxiety     Diabetes mellitus due to underlying condition, controlled, with stage 5 chronic kidney disease not on chronic dialysis, with long-term current use of insulin (HCC)     Essential hypertension     Idiopathic Parkinson's disease ([facility] [address]     Pneumonia     Hyperkalemia      Subjective:   Admit Date: 10/24/2017    Interval History: no complaints. Some sob. On steroids and abx.   No swelling        Medications:   Scheduled Meds:   amiodarone  200 mg Oral BID    warfarin  6 mg Oral Once    amLODIPine  5 mg Oral Daily    hydrALAZINE  20 mg Intravenous 4 times per day    methylPREDNISolone  20 mg Intravenous Q8H    warfarin (COUMADIN) daily dosing (placeholder)   Other RX Placeholder    darbepoetin lizeth-polysorbate  40 mcg Subcutaneous Weekly    aspirin  81 mg Oral Daily    buprenorphine  1 patch Transdermal Weekly    calcium elemental  500 mg Oral Daily    carbidopa-levodopa  1 tablet Oral Daily    chlorthalidone  25 mg Oral Daily    cloNIDine  0.1 mg Oral TID    rosuvastatin  40 mg Oral Nightly    fluticasone  2 spray Nasal Daily    iron polysaccharide complex-B12-folic acid  1 capsule Oral Daily with breakfast    isosorbide mononitrate  60 mg Oral Daily    levothyroxine  25 mcg Oral Every Other Day    levothyroxine  50 mcg Oral Every Other Day    magnesium oxide  400 mg Oral Daily    metoprolol tartrate  50 mg Oral BID    sodium hours) at 10/29/17 1516  Last data filed at 10/29/17 0609   Gross per 24 hour   Intake             1610 ml   Output              660 ml   Net              950 ml       Constitutional:  Alert, awake, no apparent distress   Skin:normal, no rash  HEENT:sclera anicteric.   Head atraumatic normocephalic  Neck:supple with no thyromegally  Cardiovascular:  S1, S2 without m/r/g   Respiratory:  Few rhonchi  Abdomen: +bs, soft, nt  Ext: no LE edema  Musculoskeletal:Intact  Neuro:Alert and oriented with no deficit      Electronically signed by Young Cobb MD on 10/29/2017 at 3:16 PM

## 2017-10-29 NOTE — PROGRESS NOTES
INPATIENT PROGRESS NOTES    PATIENT NAME: Hoa Post  MRN: [id number]  SERVICE DATE:  October 29, 2017   SERVICE TIME:  4:28 PM      PRIMARY SERVICE:  Pulmonary Medicine     INTERVAL HPI: Patient seen and examined at bedside, Interval Notes, orders reviewed. Nursing notes noted:     Patient said she was having palpitations last night. She had paroxysmal A. fib with rapid ventricular rate. Cardiology is following. Shortness of breath is at. She has no chest pain or pleuritic pain no fever or chills   Review of Systems  Please see HPI above. OBJECTIVE    Body mass index is 26.86 kg/m². PHYSICAL EXAM:  Vitals:  /73   Pulse 65   Temp 98 °F (36.7 °C) (Oral)   Resp 16   Ht 4' 11\" (1.499 m)   Wt 133 lb (60.3 kg)   SpO2 99%   BMI 26.86 kg/m²   General: Patient is comfortable in bed, No distress. Head: Atraumatic , Normocephalic   Eyes: PERRL. No sclera icterus. No conjunctival injection. No discharge   ENT: No nasal  discharge. Pharynx clear. Neck:  Trachea midline. No thyromegaly, no JVD, No cervical adenopathy. Resp : Fair BS bilaterally, decreased BS at the bases with bibasilar rales. No accessory use with normal effort  CV: Normal  rate. Regular rhythm. No mumur ,  Rub or gallop  ABD:  Non-tender. Non-distended. No masses. No organmegaly. Normal bowel sounds. No hernia. EXT: No Pitting, No Cyanosis No clubbing  Neuro: no focal weakness  Skin: Warm and dry. No erythema rash on exposed extremities.         DATA:   Recent Labs      10/28/17   0611  10/29/17   0826   WBC  9.1  8.8   HGB  8.1*  8.3*   HCT  25.1*  26.4*   MCV  88.0  90.4   PLT  153  171     Recent Labs      10/28/17   0611  10/29/17   0655   NA  140  139   K  4.9  4.8  5.4*   CL  100  102   CO2  24  23   BUN  57*  60*   CREATININE  4.19*  4.10*   GLUCOSE  239*  186*   CALCIUM  8.0*  8.2*   LABGLOM  10.2*  10.4*   GFRAA  12.3*  12.6*         No results for input(s): PHART, IST3MZH, PO2ART, AFD3KBH, BEART, R3TFTTTP in the last 72 hours.   O2 Device: Nasal cannula  O2 Flow Rate (L/min): 2 L/min  Lab Results   Component Value Date    BUBBA 0.Tho 04/14/2016        MEDICATIONS during current hospitalization:    Continuous Infusions:   dextrose         Scheduled Meds:   amiodarone  200 mg Oral BID    warfarin  6 mg Oral Once    amLODIPine  5 mg Oral Daily    hydrALAZINE  20 mg Intravenous 4 times per day    methylPREDNISolone  20 mg Intravenous Q8H    warfarin (COUMADIN) daily dosing (placeholder)   Other RX Placeholder    darbepoetin lizeth-polysorbate  40 mcg Subcutaneous Weekly    aspirin  81 mg Oral Daily    buprenorphine  1 patch Transdermal Weekly    calcium elemental  500 mg Oral Daily    carbidopa-levodopa  1 tablet Oral Daily    chlorthalidone  25 mg Oral Daily    cloNIDine  0.1 mg Oral TID    rosuvastatin  40 mg Oral Nightly    fluticasone  2 spray Nasal Daily    iron polysaccharide complex-B12-folic acid  1 capsule Oral Daily with breakfast    isosorbide mononitrate  60 mg Oral Daily    levothyroxine  25 mcg Oral Every Other Day    levothyroxine  50 mcg Oral Every Other Day    magnesium oxide  400 mg Oral Daily    metoprolol tartrate  50 mg Oral BID    sodium bicarbonate  325 mg Oral TID    venlafaxine  150 mg Oral Daily with breakfast    pantoprazole sodium  40 mg Oral QAM AC    pill splitter   Does not apply Once    ipratropium-albuterol  1 ampule Inhalation TID    insulin glargine  14 Units Subcutaneous QAM    piperacillin-tazobactam  3.375 g Intravenous Q12H    influenza virus vaccine  0.5 mL Intramuscular Once    sodium chloride flush  10 mL Intravenous 2 times per day    insulin lispro  0-12 Units Subcutaneous TID WC    insulin lispro  0-6 Units Subcutaneous Nightly       PRN Meds:metoprolol, calcium carbonate, guaiFENesin-dextromethorphan, albuterol, ondansetron, sodium chloride flush, acetaminophen, glucose, dextrose, glucagon (rDNA), dextrose    Radiology  Xr Chest Standard (2 Vw)    Result Date:

## 2017-10-29 NOTE — PROGRESS NOTES
Jose J Masters is a 81 y.o. female patient.     Current Facility-Administered Medications   Medication Dose Route Frequency Provider Last Rate Last Dose    amiodarone (CORDARONE) tablet 200 mg  200 mg Oral BID Jami Brewer MD        metoprolol (LOPRESSOR) injection 5 mg  5 mg Intravenous Q4H PRN Jami Brewer MD        warfarin (COUMADIN) tablet 6 mg  6 mg Oral Once Luis Daniel Hoffman MD        amLODIPine (NORVASC) tablet 5 mg  5 mg Oral Daily Terri Mcduffie MD   5 mg at 10/29/17 1886    hydrALAZINE (APRESOLINE) injection 20 mg  20 mg Intravenous 4 times per day Rachele Croft MD   20 mg at 10/29/17 1135    calcium carbonate (TUMS) chewable tablet 500 mg  500 mg Oral BID PRN Vimal Casillas MD   500 mg at 10/28/17 1512    methylPREDNISolone sodium (SOLU-MEDROL) injection 20 mg  20 mg Intravenous Q8H Luis Danile Hoffman MD   20 mg at 10/29/17 9269    warfarin (COUMADIN) daily dosing (placeholder)   Other RX Placeholder Luis Daniel Hoffman MD        darbepoetin lizeth-polysorbate (ARANESP) injection 40 mcg  40 mcg Subcutaneous Weekly Terri Mcduffie MD   40 mcg at 10/26/17 1417    aspirin EC tablet 81 mg  81 mg Oral Daily Rachele Croft MD   81 mg at 10/29/17 0904    buprenorphine (BUPRENEX) 5 MCG/HR 1 patch  1 patch Transdermal Weekly Rachele Croft MD        calcium elemental (OSCAL) tablet 500 mg  500 mg Oral Daily Rachele Croft MD   500 mg at 10/29/17 0905    carbidopa-levodopa (SINEMET)  MG per tablet 1 tablet  1 tablet Oral Daily Rachele Croft MD   1 tablet at 10/29/17 0902    chlorthalidone (HYGROTON) tablet 25 mg  25 mg Oral Daily Rachele Croft MD   25 mg at 10/29/17 0903    cloNIDine (CATAPRES) tablet 0.1 mg  0.1 mg Oral TID Rachele Croft MD   0.1 mg at 10/29/17 0902    rosuvastatin (CRESTOR) tablet 40 mg  40 mg Oral Nightly Rachele Croft MD   40 mg at 10/28/17 2148    fluticasone (FLONASE) 50 MCG/ACT nasal spray 2 spray  2 spray Nasal Daily Ry Jones MD   2 spray at 10/29/17 3450    iron polysaccharide complex-B12-folic acid (FERREX-FORTE) 150-0.025-1 MG capsule 1 mg  1 capsule Oral Daily with breakfast Ry Jones MD        isosorbide mononitrate (IMDUR) extended release tablet 60 mg  60 mg Oral Daily Ry Jones MD   60 mg at 10/29/17 0904    levothyroxine (SYNTHROID) tablet 25 mcg  25 mcg Oral Every Other Day Ry Jones MD   25 mcg at 10/29/17 0646    levothyroxine (SYNTHROID) tablet 50 mcg  50 mcg Oral Every Other Day Ry Jones MD   50 mcg at 10/28/17 0925    magnesium oxide (MAG-OX) tablet 400 mg  400 mg Oral Daily Ry Jones MD   400 mg at 10/29/17 0904    metoprolol tartrate (LOPRESSOR) tablet 50 mg  50 mg Oral BID Ry Jones MD   50 mg at 10/29/17 5088    sodium bicarbonate tablet 325 mg  325 mg Oral TID Ry Jones MD   325 mg at 10/29/17 0904    venlafaxine (EFFEXOR XR) extended release capsule 150 mg  150 mg Oral Daily with breakfast Ry Jones MD   150 mg at 10/29/17 0935    guaiFENesin-dextromethorphan (ROBITUSSIN DM) 100-10 MG/5ML syrup 5 mL  5 mL Oral Q4H PRN Faith Partida MD        pantoprazole sodium (PROTONIX) packet 40 mg  40 mg Oral QAM AC Ry Jones MD   40 mg at 10/29/17 0646    pill splitter   Does not apply Once Ry Jones MD   Stopped at 10/25/17 0055    ipratropium-albuterol (DUONEB) nebulizer solution 1 ampule  1 ampule Inhalation TID Ry Jones MD   1 ampule at 10/29/17 0726    albuterol (PROVENTIL) nebulizer solution 2.5 mg  2.5 mg Nebulization Q2H PRN Ry Jones MD        insulin glargine (LANTUS) injection vial 14 Units  14 Units Subcutaneous QAM Fauzia Hannon MD   14 Units at 10/29/17 0905    ondansetron (ZOFRAN) injection 4 mg  4 mg Intravenous Q6H PRN Fauzia Hannon MD        piperacillin-tazobactam (ZOSYN) 3.375 g in dextrose 50 mL IVPB (premix)  3.375 g Intravenous SpO2 100 %. Subjective:  Symptoms:  Stable. Diet:  Adequate intake. Pain:  She reports pain is improving. Objective:  General Appearance:  Comfortable. Vital signs: (most recent): Blood pressure 138/73, pulse 117, temperature 98 °F (36.7 °C), temperature source Oral, resp. rate 18, height 4' 11\" (1.499 m), weight 133 lb (60.3 kg), SpO2 100 %. No fever. Lungs:  Normal effort and tachypnea. Heart: Normal rate. Regular rhythm. Abdomen: Abdomen is soft. Bowel sounds are normal.       Assessment:  (1) bronchopneumonia remained stable continue antibiotics. She still has shortness of breath and fairly significant findings on the chest x-ray, patient will stay in the hospital through the weekend. 2) Uncontrolled hypertension, possibly renal related, she cannot be dialyzed using her fistula is still maturing. She is currently on multiple antihypertensive medications including amlodipine most recently added today. Has appeared to have responded to treatment and blood pressure is improving  3) hypothyroidism. Stable  4) generalized anxiety. Continue Effexor  Start physical therapy and occupational therapy today. ).        Jonathan Laughlin MD  10/29/2017

## 2017-10-29 NOTE — PROGRESS NOTES
Clinical Pharmacy Note    Recent Labs      10/29/17   0655   INR  1.7     Recent Labs      10/27/17   0606  10/28/17   0611  10/29/17   0826   HGB  8.1*  8.1*  8.3*   HCT  25.3*  25.1*  26.4*   PLT  128*  153  171       Significant drug:drug interactions:  New warfarin drug-drug interactions: -  Discontinued drug-drug interactions: -      Current warfarin drug-drug interactions: methylprednisolone, levothyroxine, amiodarone, venlafaxine, rosuvastatin    Date INR Warfarin Dose   10/25/17  7.5 mg   10/26/17 2 5 mg   10/27/17 3.1 HOLD (Pharmacy Consulted)   10/28/17 2.9 4 mg   10/29/17 1.7 6 mg               INR is 1.7 today which is sub-therapeutic for target goal is 2-3. Will dose  warfarin 6 mg today in an attempt to boost to therapeutic range. Will continue to monitor INR and adjust dose   Daily PT/INR until stable within therapeutic range.

## 2017-10-30 ENCOUNTER — HOSPITAL ENCOUNTER (OUTPATIENT)
Dept: INFUSION THERAPY | Age: 82
Setting detail: INFUSION SERIES
Discharge: HOME | End: 2017-10-30
Payer: MEDICARE

## 2017-10-30 PROBLEM — I50.33 ACUTE ON CHRONIC DIASTOLIC CONGESTIVE HEART FAILURE (HCC): Status: ACTIVE | Noted: 2017-10-30

## 2017-10-30 LAB
ANION GAP SERPL CALCULATED.3IONS-SCNC: 17 MEQ/L (ref 7–13)
BASE EXCESS ARTERIAL: 1 (ref -3–3)
BUN BLDV-MCNC: 65 MG/DL (ref 8–23)
CALCIUM SERPL-MCNC: 8 MG/DL (ref 8.6–10.2)
CHLORIDE BLD-SCNC: 100 MEQ/L (ref 98–107)
CO2: 23 MEQ/L (ref 22–29)
CREAT SERPL-MCNC: 4.77 MG/DL (ref 0.5–0.9)
GFR AFRICAN AMERICAN: 10.6
GFR NON-AFRICAN AMERICAN: 8.8
GLUCOSE BLD-MCNC: 251 MG/DL (ref 60–115)
GLUCOSE BLD-MCNC: 302 MG/DL (ref 74–109)
GLUCOSE BLD-MCNC: 337 MG/DL (ref 60–115)
GLUCOSE BLD-MCNC: 341 MG/DL (ref 60–115)
GLUCOSE BLD-MCNC: 349 MG/DL (ref 60–115)
HCO3 ARTERIAL: 24.5 MMOL/L (ref 21–29)
HCT VFR BLD CALC: 24.6 % (ref 37–47)
HEMOGLOBIN: 7.8 G/DL (ref 12–16)
INR BLD: 1.7
LACTATE: 0.8 MMOL/L (ref 0.4–2)
MCH RBC QN AUTO: 28.4 PG (ref 27–31.3)
MCHC RBC AUTO-ENTMCNC: 31.6 % (ref 33–37)
MCV RBC AUTO: 89.9 FL (ref 82–100)
O2 SAT, ARTERIAL: 99 % (ref 93–100)
PCO2 ARTERIAL: 35 MM HG (ref 35–45)
PDW BLD-RTO: 17.6 % (ref 11.5–14.5)
PERFORMED ON: ABNORMAL
PH ARTERIAL: 7.45 (ref 7.35–7.45)
PLATELET # BLD: 161 K/UL (ref 130–400)
PO2 ARTERIAL: 151 MM HG (ref 75–108)
POC SAMPLE TYPE: ABNORMAL
POTASSIUM SERPL-SCNC: 5.3 MEQ/L (ref 3.5–5.1)
PRO-BNP: NORMAL PG/ML
PROTHROMBIN TIME: 18.4 SEC (ref 8.1–13.7)
RBC # BLD: 2.74 M/UL (ref 4.2–5.4)
SODIUM BLD-SCNC: 140 MEQ/L (ref 132–144)
TCO2 ARTERIAL: 26 (ref 22–29)
TROPONIN: 0.03 NG/ML (ref 0–0.01)
WBC # BLD: 8 K/UL (ref 4.8–10.8)

## 2017-10-30 PROCEDURE — 84484 ASSAY OF TROPONIN QUANT: CPT

## 2017-10-30 PROCEDURE — 94640 AIRWAY INHALATION TREATMENT: CPT

## 2017-10-30 PROCEDURE — 93005 ELECTROCARDIOGRAM TRACING: CPT

## 2017-10-30 PROCEDURE — 6370000000 HC RX 637 (ALT 250 FOR IP): Performed by: INTERNAL MEDICINE

## 2017-10-30 PROCEDURE — 36600 WITHDRAWAL OF ARTERIAL BLOOD: CPT

## 2017-10-30 PROCEDURE — 6370000000 HC RX 637 (ALT 250 FOR IP): Performed by: OTOLARYNGOLOGY

## 2017-10-30 PROCEDURE — 83605 ASSAY OF LACTIC ACID: CPT

## 2017-10-30 PROCEDURE — 80048 BASIC METABOLIC PNL TOTAL CA: CPT

## 2017-10-30 PROCEDURE — 1210000000 HC MED SURG R&B

## 2017-10-30 PROCEDURE — 6360000002 HC RX W HCPCS: Performed by: INTERNAL MEDICINE

## 2017-10-30 PROCEDURE — 85027 COMPLETE CBC AUTOMATED: CPT

## 2017-10-30 PROCEDURE — 99222 1ST HOSP IP/OBS MODERATE 55: CPT | Performed by: INTERNAL MEDICINE

## 2017-10-30 PROCEDURE — 93010 ELECTROCARDIOGRAM REPORT: CPT | Performed by: INTERNAL MEDICINE

## 2017-10-30 PROCEDURE — 99232 SBSQ HOSP IP/OBS MODERATE 35: CPT | Performed by: INTERNAL MEDICINE

## 2017-10-30 PROCEDURE — 83880 ASSAY OF NATRIURETIC PEPTIDE: CPT

## 2017-10-30 PROCEDURE — 82948 REAGENT STRIP/BLOOD GLUCOSE: CPT

## 2017-10-30 PROCEDURE — 82803 BLOOD GASES ANY COMBINATION: CPT

## 2017-10-30 PROCEDURE — 2580000003 HC RX 258: Performed by: PERSONAL EMERGENCY RESPONSE ATTENDANT

## 2017-10-30 PROCEDURE — 6370000000 HC RX 637 (ALT 250 FOR IP): Performed by: PERSONAL EMERGENCY RESPONSE ATTENDANT

## 2017-10-30 PROCEDURE — 2700000000 HC OXYGEN THERAPY PER DAY

## 2017-10-30 PROCEDURE — 85610 PROTHROMBIN TIME: CPT

## 2017-10-30 PROCEDURE — 36415 COLL VENOUS BLD VENIPUNCTURE: CPT

## 2017-10-30 RX ORDER — FUROSEMIDE 10 MG/ML
40 INJECTION INTRAMUSCULAR; INTRAVENOUS ONCE
Status: COMPLETED | OUTPATIENT
Start: 2017-10-30 | End: 2017-10-30

## 2017-10-30 RX ORDER — ALPRAZOLAM 0.5 MG/1
0.5 TABLET ORAL 3 TIMES DAILY PRN
Status: DISCONTINUED | OUTPATIENT
Start: 2017-10-30 | End: 2017-11-03 | Stop reason: HOSPADM

## 2017-10-30 RX ORDER — FUROSEMIDE 10 MG/ML
40 INJECTION INTRAMUSCULAR; INTRAVENOUS 2 TIMES DAILY
Status: DISCONTINUED | OUTPATIENT
Start: 2017-10-30 | End: 2017-11-01

## 2017-10-30 RX ORDER — MORPHINE SULFATE 2 MG/ML
2 INJECTION, SOLUTION INTRAMUSCULAR; INTRAVENOUS ONCE
Status: COMPLETED | OUTPATIENT
Start: 2017-10-30 | End: 2017-10-30

## 2017-10-30 RX ORDER — WARFARIN SODIUM 3 MG/1
6 TABLET ORAL
Status: COMPLETED | OUTPATIENT
Start: 2017-10-30 | End: 2017-10-30

## 2017-10-30 RX ORDER — MORPHINE SULFATE 2 MG/ML
2 INJECTION, SOLUTION INTRAMUSCULAR; INTRAVENOUS EVERY 4 HOURS PRN
Status: DISCONTINUED | OUTPATIENT
Start: 2017-10-30 | End: 2017-11-03 | Stop reason: HOSPADM

## 2017-10-30 RX ADMIN — CLONIDINE HYDROCHLORIDE 0.1 MG: 0.1 TABLET ORAL at 22:22

## 2017-10-30 RX ADMIN — IPRATROPIUM BROMIDE AND ALBUTEROL SULFATE 1 AMPULE: .5; 3 SOLUTION RESPIRATORY (INHALATION) at 07:38

## 2017-10-30 RX ADMIN — METHYLPREDNISOLONE SODIUM SUCCINATE 20 MG: 40 INJECTION, POWDER, FOR SOLUTION INTRAMUSCULAR; INTRAVENOUS at 01:34

## 2017-10-30 RX ADMIN — AMIODARONE HYDROCHLORIDE 200 MG: 200 TABLET ORAL at 09:16

## 2017-10-30 RX ADMIN — ACETAMINOPHEN 650 MG: 325 TABLET ORAL at 01:34

## 2017-10-30 RX ADMIN — HYDRALAZINE HYDROCHLORIDE 20 MG: 20 INJECTION INTRAMUSCULAR; INTRAVENOUS at 13:23

## 2017-10-30 RX ADMIN — FUROSEMIDE 40 MG: 10 INJECTION, SOLUTION INTRAMUSCULAR; INTRAVENOUS at 13:23

## 2017-10-30 RX ADMIN — CLONIDINE HYDROCHLORIDE 0.1 MG: 0.1 TABLET ORAL at 13:24

## 2017-10-30 RX ADMIN — INSULIN GLARGINE 14 UNITS: 100 INJECTION, SOLUTION SUBCUTANEOUS at 10:05

## 2017-10-30 RX ADMIN — CARBIDOPA AND LEVODOPA 1 TABLET: 25; 100 TABLET ORAL at 09:16

## 2017-10-30 RX ADMIN — CARBAMIDE PEROXIDE 6.5% 5 DROP: 6.5 LIQUID AURICULAR (OTIC) at 22:33

## 2017-10-30 RX ADMIN — WARFARIN SODIUM 6 MG: 3 TABLET ORAL at 18:26

## 2017-10-30 RX ADMIN — CALCIUM 500 MG: 500 TABLET ORAL at 09:17

## 2017-10-30 RX ADMIN — METOPROLOL TARTRATE 50 MG: 50 TABLET ORAL at 22:21

## 2017-10-30 RX ADMIN — METHYLPREDNISOLONE SODIUM SUCCINATE 20 MG: 40 INJECTION, POWDER, FOR SOLUTION INTRAMUSCULAR; INTRAVENOUS at 09:16

## 2017-10-30 RX ADMIN — ROSUVASTATIN CALCIUM 40 MG: 20 TABLET, FILM COATED ORAL at 22:22

## 2017-10-30 RX ADMIN — SODIUM BICARBONATE 325 MG: 650 TABLET ORAL at 18:02

## 2017-10-30 RX ADMIN — CLONIDINE HYDROCHLORIDE 0.1 MG: 0.1 TABLET ORAL at 09:17

## 2017-10-30 RX ADMIN — HYDRALAZINE HYDROCHLORIDE 20 MG: 20 INJECTION INTRAMUSCULAR; INTRAVENOUS at 06:18

## 2017-10-30 RX ADMIN — SODIUM BICARBONATE 325 MG: 650 TABLET ORAL at 11:27

## 2017-10-30 RX ADMIN — IPRATROPIUM BROMIDE AND ALBUTEROL SULFATE 1 AMPULE: .5; 3 SOLUTION RESPIRATORY (INHALATION) at 11:38

## 2017-10-30 RX ADMIN — FUROSEMIDE 40 MG: 10 INJECTION, SOLUTION INTRAVENOUS at 07:14

## 2017-10-30 RX ADMIN — Medication 10 ML: at 13:25

## 2017-10-30 RX ADMIN — ASPIRIN 81 MG: 81 TABLET, COATED ORAL at 09:16

## 2017-10-30 RX ADMIN — Medication 400 MG: at 09:17

## 2017-10-30 RX ADMIN — Medication 10 ML: at 01:35

## 2017-10-30 RX ADMIN — TAZOBACTAM SODIUM AND PIPERACILLIN SODIUM 3.38 G: 375; 3 INJECTION, SOLUTION INTRAVENOUS at 01:32

## 2017-10-30 RX ADMIN — Medication 10 ML: at 22:25

## 2017-10-30 RX ADMIN — HYDRALAZINE HYDROCHLORIDE 20 MG: 20 INJECTION INTRAMUSCULAR; INTRAVENOUS at 01:33

## 2017-10-30 RX ADMIN — INSULIN LISPRO 8 UNITS: 100 INJECTION, SOLUTION INTRAVENOUS; SUBCUTANEOUS at 18:02

## 2017-10-30 RX ADMIN — SODIUM BICARBONATE 325 MG: 650 TABLET ORAL at 01:33

## 2017-10-30 RX ADMIN — METHYLPREDNISOLONE SODIUM SUCCINATE 20 MG: 40 INJECTION, POWDER, FOR SOLUTION INTRAMUSCULAR; INTRAVENOUS at 18:20

## 2017-10-30 RX ADMIN — AMIODARONE HYDROCHLORIDE 200 MG: 200 TABLET ORAL at 22:22

## 2017-10-30 RX ADMIN — IPRATROPIUM BROMIDE AND ALBUTEROL SULFATE 1 AMPULE: .5; 3 SOLUTION RESPIRATORY (INHALATION) at 19:59

## 2017-10-30 RX ADMIN — INSULIN LISPRO 8 UNITS: 100 INJECTION, SOLUTION INTRAVENOUS; SUBCUTANEOUS at 13:23

## 2017-10-30 RX ADMIN — ANTACID TABLETS 500 MG: 500 TABLET, CHEWABLE ORAL at 09:16

## 2017-10-30 RX ADMIN — Medication 2 MG: at 09:17

## 2017-10-30 RX ADMIN — AMLODIPINE BESYLATE 5 MG: 5 TABLET ORAL at 09:16

## 2017-10-30 RX ADMIN — CHLORTHALIDONE 25 MG: 25 TABLET ORAL at 09:16

## 2017-10-30 RX ADMIN — VENLAFAXINE HYDROCHLORIDE 150 MG: 150 CAPSULE, EXTENDED RELEASE ORAL at 09:16

## 2017-10-30 RX ADMIN — SODIUM BICARBONATE 325 MG: 650 TABLET ORAL at 22:33

## 2017-10-30 RX ADMIN — ISOSORBIDE MONONITRATE 60 MG: 60 TABLET, EXTENDED RELEASE ORAL at 10:04

## 2017-10-30 RX ADMIN — TAZOBACTAM SODIUM AND PIPERACILLIN SODIUM 3.38 G: 375; 3 INJECTION, SOLUTION INTRAVENOUS at 13:25

## 2017-10-30 RX ADMIN — HYDRALAZINE HYDROCHLORIDE 20 MG: 20 INJECTION INTRAMUSCULAR; INTRAVENOUS at 22:21

## 2017-10-30 RX ADMIN — LEVOTHYROXINE SODIUM 50 MCG: 50 TABLET ORAL at 06:17

## 2017-10-30 RX ADMIN — PANTOPRAZOLE SODIUM 40 MG: 40 GRANULE, DELAYED RELEASE ORAL at 06:17

## 2017-10-30 RX ADMIN — METOPROLOL TARTRATE 50 MG: 50 TABLET ORAL at 09:17

## 2017-10-30 ASSESSMENT — ENCOUNTER SYMPTOMS
NAUSEA: 0
HEMOPTYSIS: 0
SHORTNESS OF BREATH: 1
WHEEZING: 0
COUGH: 0
GASTROINTESTINAL NEGATIVE: 1
SPUTUM PRODUCTION: 0

## 2017-10-30 ASSESSMENT — PAIN SCALES - GENERAL
PAINLEVEL_OUTOF10: 4
PAINLEVEL_OUTOF10: 5

## 2017-10-30 NOTE — PROGRESS NOTES
[facility] Occupational Therapy      Date: 10/30/2017  Patient Name: Janie Villafana        MRN: [id number]  Account: [de-identified]   : 1935  (81 y.o.)  Room: [place] [address]    Chart reviewed, attempted OT tx at 11:57 AM, but pt. unavailable 2° to:    [] Hold per nsg request    [] Pt declined     [x] Pt with ST. Will attempt again when able.     Electronically signed by PRIYA Schmitt on 10/30/2017 at 11:57 AM

## 2017-10-30 NOTE — PROGRESS NOTES
metoprolol tartrate  50 mg Oral BID    sodium bicarbonate  325 mg Oral TID    venlafaxine  150 mg Oral Daily with breakfast    pantoprazole sodium  40 mg Oral QAM AC    pill splitter   Does not apply Once    ipratropium-albuterol  1 ampule Inhalation TID    insulin glargine  14 Units Subcutaneous QAM    piperacillin-tazobactam  3.375 g Intravenous Q12H    influenza virus vaccine  0.5 mL Intramuscular Once    sodium chloride flush  10 mL Intravenous 2 times per day    insulin lispro  0-12 Units Subcutaneous TID WC    insulin lispro  0-6 Units Subcutaneous Nightly     Continuous Infusions:   dextrose         CBC:   Recent Labs      10/28/17   0611  10/29/17   0826   WBC  9.1  8.8   HGB  8.1*  8.3*   PLT  153  171     CMP:    Recent Labs      10/27/17   2028  10/28/17   0611  10/29/17   0655   NA   --   140  139   K  5.0  4.9  4.8  5.4*   CL   --   100  102   CO2   --   24  23   BUN   --   57*  60*   CREATININE   --   4.19*  4.10*   GLUCOSE   --   239*  186*   CALCIUM   --   8.0*  8.2*   LABGLOM   --   10.2*  10.4*     Troponin:   Recent Labs      10/30/17   0644   TROPONINI  0.030*     BNP: No results for input(s): BNP in the last 72 hours. INR:   Recent Labs      10/30/17   0639   INR  1.7     Lipids: No results for input(s): CHOL, LDLDIRECT, TRIG, HDL, AMYLASE, LIPASE in the last 72 hours. Liver:   No results for input(s): AST, ALT, ALKPHOS, PROT, LABALBU, BILITOT in the last 72 hours. Invalid input(s): BILDIR  Iron:    No results for input(s): IRONS, FERRITIN in the last 72 hours. Invalid input(s): LABIRONS  Urinalysis: No results for input(s): UA in the last 72 hours.     Objective:   Vitals: BP (!) 166/60   Pulse 76   Temp 98.3 °F (36.8 °C) (Oral)   Resp 28   Ht 4' 11\" (1.499 m)   Wt 133 lb (60.3 kg)   SpO2 100%   BMI 26.86 kg/m²    Wt Readings from Last 3 Encounters:   10/24/17 133 lb (60.3 kg)   09/28/17 135 lb (61.2 kg)   07/21/17 138 lb (62.6 kg)      24HR INTAKE/OUTPUT:    No intake or output data in the 24 hours ending 10/30/17 0571    Constitutional:  Alert, awake, no apparent distress   Skin:normal, no rash  HEENT:sclera anicteric.   Head atraumatic normocephalic  Neck:supple with no thyromegally  Cardiovascular:  S1, S2 without m/r/g   Respiratory:  Few rhonchi  Abdomen: +bs, soft, nt  Ext: no LE edema  Musculoskeletal:Intact  Neuro:Alert and oriented with no deficit      Electronically signed by Anna Foster MD on 10/30/2017 at 9:38 AM

## 2017-10-30 NOTE — PROGRESS NOTES
Rapid response called on the patient at 0615 related to patient complaints of being unable to breath and having chest tightness , rapid ran by Dr. Ary Edward  Who determined all labs and vitals looked good and 40 mg of iv lasix and a breathing treatment was given , however afterward the patient was still complaining of the same symptoms and a stat page was placed to Dr. Penny Cleary orders for a stat portable chest xray and Morphine 2 mg iv stat were received , patient remained stable at 145/58,67 , 18 98.0 temperature , spo2 on 2 liters 100% , patient still had same complaints , ecg normal sinus , telemetry normal sinus with hr of 66 , care handed off to the morning nurse , Dr. Brett Alexander will be into see the patient this am   .Electronically signed by Lulu Peñaloza RN on 10/30/2017 at 8:19 AM

## 2017-10-30 NOTE — FLOWSHEET NOTE
[id number]  Patient c/o sob but denies chest pain. Patient's VS reviewed. Patient's lab values reviewed. No visible bleeding noted. Per Dr. Ric Tinsley, repeat lasix dose. Dose given. Family members at bedside around 1800; educated. Right hearing aid given to family member (son)  Dr. Castrejon First advised for the patient not to wear her hearing aid on right side.

## 2017-10-30 NOTE — PROGRESS NOTES
pantoprazole sodium  40 mg Oral QAM AC    pill splitter   Does not apply Once    ipratropium-albuterol  1 ampule Inhalation TID    insulin glargine  14 Units Subcutaneous QAM    piperacillin-tazobactam  3.375 g Intravenous Q12H    influenza virus vaccine  0.5 mL Intramuscular Once    sodium chloride flush  10 mL Intravenous 2 times per day    insulin lispro  0-12 Units Subcutaneous TID WC    insulin lispro  0-6 Units Subcutaneous Nightly       PRN Meds:ALPRAZolam, morphine, metoprolol, calcium carbonate, guaiFENesin-dextromethorphan, albuterol, ondansetron, sodium chloride flush, acetaminophen, glucose, dextrose, glucagon (rDNA), dextrose    Radiology  Xr Chest Standard (2 Vw)    Result Date: 10/29/2017  EXAMINATION: CHEST TWO VIEWS  CLINICAL HISTORY: Pneumonia, follow-up COMPARISONS: October 28, 2017 at 0820 hours  FINDINGS: Two views of the chest are submitted. The cardiac silhouette is enlarged. Unchanged. The mediastinum is unremarkable. Pulmonary vascular is attenuated, lungs are hyperinflated and there is some widening of the AP diameter the chest and coarsening of the interstitium. Right sided trachea. There is worsening patchy multifocal to confluent areas of airspace disease within the parenchyma. There is bibasilar trace to small pleural effusions. . Trace bilateral pleural effusions Pneumothoraces. EXAMINATION: CHEST TWO VIEWS  CLINICAL HISTORY: Pneumonia, follow-up COMPARISONS: October 26, 2017  FINDINGS: Two views of the chest are submitted. The cardiac silhouette is enlarged. Unchanged. The mediastinum is unremarkable. Pulmonary vascular is attenuated, lungs are hyperinflated and there is some widening of the AP diameter the chest and coarsening of the interstitium. Right sided trachea. There is worsening patchy multifocal to confluent areas of airspace disease within the parenchyma.  There is bibasilar trace to FINDINGS: A poor inspiratory volume is present with worsening patchy probable bilateral pulmonary edema and/or bronchopneumonia. Vascular congestion appears worsening. Small pleural effusions appear to be developing. There is no pneumothorax, or displaced fractures identified. Small calcified granulomas in the right lung apex are again noted. WORSENING BRONCHOPNEUMONIA AND/OR PULMONARY EDEMA. SMALL BILATERAL PLEURAL EFFUSIONS. ASSESSMENT Plan:  1.  Bibasilar pneumonia chest x-ray showed worsening infiltration vs pulmonary edema  2.  Acute on chronic kidney disease stage IV  3. Congestive heart failure/ fluid overload BNP 31,080 today  4.  Hypertension  5.  Parkinson disease  6. A. fib with rapid ventricular rate, pt managed by cardiology    Patient is being treated with zosyn and duoneb. Hospitalist consulted for ID to see is antibiotic adjustment is needed. CXR shows possible bronchopneumonia and pulmonary edema. Patient has increasing BNP to 31,080. Cardiology is following, BNP very elevated and concerning for heart failure. We will continue to follow as well with other speciality care.      Electronically signed by Aditya Young PA-C on 10/30/2017 at 12:28 PM

## 2017-10-30 NOTE — PROGRESS NOTES
P-R Interval 180 ms    QRS Duration 90 ms    Q-T Interval 446 ms    QTc Calculation (Bazett) 463 ms    P Axis 63 degrees    R Axis 20 degrees    T Axis 43 degrees   Protime-INR    Collection Time: 10/30/17  6:39 AM   Result Value Ref Range    Protime 18.4 (H) 8.1 - 13.7 sec    INR 1.7    Troponin    Collection Time: 10/30/17  6:44 AM   Result Value Ref Range    Troponin 0.030 (HH) 0.000 - 0.010 ng/mL   Brain Natriuretic Peptide    Collection Time: 10/30/17  6:44 AM   Result Value Ref Range    Pro-BNP 31,080 pg/mL   POCT Arterial    Collection Time: 10/30/17  6:48 AM   Result Value Ref Range    pH, Arterial 7.452 (H) 7.350 - 7.450    pCO2, Arterial 35 35 - 45 mm Hg    pO2, Arterial 151 (HH) 75 - 108 mm Hg    HCO3, Arterial 24.5 21.0 - 29.0 mmol/L    Base Excess, Arterial 1 -3 - 3    O2 Sat, Arterial 99 (HH) 93 - 100 %    TCO2, Arterial 26 22 - 29    Lactate 0.80 0.40 - 2.00 mmol/L    Sample Type ART     Performed on SEE BELOW    POCT Glucose    Collection Time: 10/30/17  8:20 AM   Result Value Ref Range    POC Glucose 251 (H) 60 - 115 mg/dl    Performed on ACCU-CHEK    POCT Glucose    Collection Time: 10/30/17 11:38 AM   Result Value Ref Range    POC Glucose 337 (H) 60 - 115 mg/dl    Performed on ACCU-CHEK    Basic Metabolic Panel    Collection Time: 10/30/17 12:16 PM   Result Value Ref Range    Sodium 140 132 - 144 mEq/L    Potassium 5.3 (H) 3.5 - 5.1 mEq/L    Chloride 100 98 - 107 mEq/L    CO2 23 22 - 29 mEq/L    Anion Gap 17 (H) 7 - 13 mEq/L    Glucose 302 (H) 74 - 109 mg/dL    BUN 65 (H) 8 - 23 mg/dL    CREATININE 4.77 (H) 0.50 - 0.90 mg/dL    GFR Non-African American 8.8 (L) >60    GFR  10.6 (L) >60    Calcium 8.0 (L) 8.6 - 10.2 mg/dL   CBC    Collection Time: 10/30/17 12:16 PM   Result Value Ref Range    WBC 8.0 4.8 - 10.8 K/uL    RBC 2.74 (L) 4.20 - 5.40 M/uL    Hemoglobin 7.8 (L) 12.0 - 16.0 g/dL    Hematocrit 24.6 (L) 37.0 - 47.0 %    MCV 89.9 82.0 - 100.0 fL    MCH 28.4 27.0 - 31.3 pg

## 2017-10-30 NOTE — PROGRESS NOTES
Clinical Pharmacy Note    Warfarin consult follow-up    Recent Labs      10/30/17   0639   INR  1.7     Recent Labs      10/28/17   0611  10/29/17   0826   HGB  8.1*  8.3*   HCT  25.1*  26.4*   PLT  153  171       Significant drug:drug interactions:  Current warfarin drug-drug interactions: methylprednisolone, levothyroxine, amiodarone, venlafaxine, rosuvastatin  New warfarin drug-drug interactions: N/A  Discontinued drug-drug interactions: N/A    Notes:  Date INR Warfarin Dose   10/25/17   7.5 mg   10/26/17 2 5 mg   10/27/17 3.1 HOLD (Pharmacy Consulted)   10/28/17 2.9 4 mg   10/29/17 1.7 6 mg   10/30/17 1.7  6 mg             INR is subtherapeutic at 1.7 goal is 2-3. Will order warfarin 6 mg PO ONCE again for today. Daily PT/INR until stable within therapeutic range. Thank you,  Ellen Olmos.  Leana Melendez, PharmD  PGY-1 Pharmacy Resident  10/30/2017 12:44 PM

## 2017-10-30 NOTE — CARE COORDINATION
SPOKE WITH PATIENT REGARDING D/C PLANS/NEEDS. PATIENT STATES SHE IS FROM HOME WITH HER SON. SHE STILL DRIVES, USES NO DME'S OR SERVICES. SHE PLANS TO RETURN HOME. SHE IS CURRENTLY USING O2 HERE. WILL NEED TO FOLLOW FOR ? HOME O2 NEED. PAGE 2 OF IMM INITIALED PER PATIENT WITH VERBALIZATION OF UNDERSTANDING.  Electronically signed by Villa Denson RN on 10/30/2017 at 12:54 PM

## 2017-10-30 NOTE — PROGRESS NOTES
While caring for patient said nurse observed wet blood on the pillow case and upon investigation of the cause the right ear was observed to be filled with wet blood and a moderate size clot came out of the ear , patient has also had dry nose and some blood from her nose , on call doctor was messaged about the that and the need for medication for restless leg , on call md answered back to have the rounding doctor look at patients ear and also address the restless leg syndrome , continuing to monitor patient no further bleeding observed   . Electronically signed by Joan Flores RN on 10/30/2017 at 4:53 AM

## 2017-10-30 NOTE — CONSULTS
Consults   Infectious Disease     Patient Name: Soila Escobar  Date: 10/30/2017  YOB: 1935  Medical Record Number: [id number]    Shortness of breath pulmonary edema    History of Present Illness:    From nursing home after being in [facility] clinic brought in with shortness of breath  Fevers chills sweats  Nausea vomiting  Productive sputum  No Cough          Review of Systems   Constitutional: Negative. HENT: Negative. Respiratory: Positive for shortness of breath. Negative for cough, hemoptysis, sputum production and wheezing. Cardiovascular: Negative. Gastrointestinal: Negative. Genitourinary: Negative. Musculoskeletal: Negative. Neurological: Negative.         Review of Systems: All 14 review of systems negative other than as stated above    Social History   Substance Use Topics    Smoking status: Former Smoker    Smokeless tobacco: Never Used    Alcohol use No         Past Medical History:   Diagnosis Date    Abnormal presence of protein in urine 6/2004    Dr. Nahed Perdomo. Twylla Savory Atrial fibrillation ([address]     Constipation, chronic     Diverticular disease of the colon     GERD (gastroesophageal reflux disease)     GERD, PUD, Hiatal Hernia    Granulomatous lung disease ([address]     History of endoscopy 2-21-12    Dr. Megan Wilkinson Hyperlipidemia     Hypothyroidism     Kidney stones 11/2001    Dr. 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 Eloy Salazar  [phone]    Bilateral    ROTATOR CUFF REPAIR  1990    THYROID SURGERY      URETHRAL STRICTURE DILATATION  3/2003         No current facility-administered medications on file prior to encounter. Current Outpatient Prescriptions on File Prior to Encounter   Medication Sig Dispense Refill    venlafaxine (EFFEXOR XR) 75 MG extended release capsule 150 mg       warfarin (COUMADIN) 2.5 MG tablet Take as directed by [facility]. Quantity equals 90 day supply. (Patient taking differently: Take 7.5 mg by mouth Take as directed by [facility]. Quantity equals 90 day supply. Everyday except Thursday) 200 tablet 1    levothyroxine (SYNTHROID) 50 MCG tablet TAKE 1 TABLET DAILY ALTERNATING WITH THE OTHER DOSE EVERY OTHER DAY 50 tablet 3    levothyroxine (SYNTHROID) 25 MCG tablet TAKE 1 TABLET DAILY ALTERNATING WITH 50 MCG DOSE EVERY OTHER DAY 50 tablet 2    LANTUS SOLOSTAR 100 UNIT/ML injection pen INJECT 14 UNITS IN THE MORNING 75 mL 2    IRON POLYSACCH [id number] PO Take 1 tablet by mouth daily      insulin lispro (HUMALOG KWIKPEN) 100 UNIT/ML pen Inject 2 Units into the skin 3 times daily (before meals) 15 mL 3    magnesium oxide (MAG-OX) 400 MG tablet Take 400 mg by mouth daily      buprenorphine (BUPRENEX) 5 MCG/HR PTWK Place 1 patch onto the skin      pantoprazole sodium (PROTONIX) 40 MG PACK packet Take 40 mg by mouth every morning (before breakfast)       amiodarone (CORDARONE) 200 MG tablet Take 100 mg by mouth daily 1/2 tablet       cloNIDine (CATAPRES) 0.1 MG tablet 3 times daily       isosorbide mononitrate (IMDUR) 60 MG CR tablet       carbidopa-levodopa (SINEMET)  MG per tablet Take 1 tablet by mouth daily       glucose blood VI test strips (TRUETEST TEST) strip As needed. 200 strip 11    metoprolol (LOPRESSOR) 50 MG tablet Take 1 tablet by mouth 2 times daily.  180 tablet 3    aspirin 81 MG EC tablet Take 81 mg by mouth daily       calcium no pneumothorax, or displaced fractures identified.  Small calcified granulomas in the right lung apex are again noted.                   Impression       WORSENING BRONCHOPNEUMONIA AND/OR PULMONARY EDEMA.       SMALL BILATERAL PLEURAL EFFUSIONS     Chest x-ray appears consistent with pulmonary edema from congestive heart failure not pneumonia      Patient Active Problem List   Diagnosis    Hypothyroidism    Hypercholesteremia    CKD (chronic kidney disease)    Afib ([facility]    Anemia    Acid reflux    Anxiety    Diabetes mellitus due to underlying condition, controlled, with stage 5 chronic kidney disease not on chronic dialysis, with long-term current use of insulin ([facility])    Essential hypertension    Idiopathic Parkinson's disease ([address]    Pneumonia of lower lobe due to infectious organism ([address]    Hyperkalemia    Acute on chronic diastolic congestive heart failure ([facility])         ASSESSMENT:PLAN:    Shortness of breath pulmonary edema  No fevers chills leukocytosis sputum production  Doubt pneumonia  DC Zosyn and observe

## 2017-10-31 ENCOUNTER — APPOINTMENT (OUTPATIENT)
Dept: CT IMAGING | Age: 82
DRG: 177 | End: 2017-10-31
Payer: MEDICARE

## 2017-10-31 ENCOUNTER — APPOINTMENT (OUTPATIENT)
Dept: GENERAL RADIOLOGY | Age: 82
DRG: 177 | End: 2017-10-31
Payer: MEDICARE

## 2017-10-31 ENCOUNTER — APPOINTMENT (OUTPATIENT)
Dept: NUCLEAR MEDICINE | Age: 82
DRG: 177 | End: 2017-10-31
Payer: MEDICARE

## 2017-10-31 LAB
ANION GAP SERPL CALCULATED.3IONS-SCNC: 17 MEQ/L (ref 7–13)
BUN BLDV-MCNC: 75 MG/DL (ref 8–23)
CALCIUM SERPL-MCNC: 7.9 MG/DL (ref 8.6–10.2)
CHLORIDE BLD-SCNC: 99 MEQ/L (ref 98–107)
CO2: 24 MEQ/L (ref 22–29)
CREAT SERPL-MCNC: 4.39 MG/DL (ref 0.5–0.9)
GFR AFRICAN AMERICAN: 11.7
GFR NON-AFRICAN AMERICAN: 9.6
GLUCOSE BLD-MCNC: 196 MG/DL (ref 60–115)
GLUCOSE BLD-MCNC: 227 MG/DL (ref 74–109)
GLUCOSE BLD-MCNC: 243 MG/DL (ref 60–115)
GLUCOSE BLD-MCNC: 280 MG/DL (ref 60–115)
GLUCOSE BLD-MCNC: 283 MG/DL (ref 60–115)
GLUCOSE BLD-MCNC: 298 MG/DL (ref 60–115)
HCT VFR BLD CALC: 22.8 % (ref 37–47)
HEMOGLOBIN: 7.3 G/DL (ref 12–16)
INR BLD: 2.2
MCH RBC QN AUTO: 28.6 PG (ref 27–31.3)
MCHC RBC AUTO-ENTMCNC: 32 % (ref 33–37)
MCV RBC AUTO: 89.4 FL (ref 82–100)
PDW BLD-RTO: 17.8 % (ref 11.5–14.5)
PERFORMED ON: ABNORMAL
PLATELET # BLD: 155 K/UL (ref 130–400)
POTASSIUM SERPL-SCNC: 5.1 MEQ/L (ref 3.5–5.1)
PROTHROMBIN TIME: 22.8 SEC (ref 8.1–13.7)
RBC # BLD: 2.55 M/UL (ref 4.2–5.4)
SODIUM BLD-SCNC: 140 MEQ/L (ref 132–144)
WBC # BLD: 8.5 K/UL (ref 4.8–10.8)

## 2017-10-31 PROCEDURE — 71020 XR CHEST STANDARD TWO VW: CPT

## 2017-10-31 PROCEDURE — 6370000000 HC RX 637 (ALT 250 FOR IP): Performed by: INTERNAL MEDICINE

## 2017-10-31 PROCEDURE — 94640 AIRWAY INHALATION TREATMENT: CPT

## 2017-10-31 PROCEDURE — 80048 BASIC METABOLIC PNL TOTAL CA: CPT

## 2017-10-31 PROCEDURE — A9540 TC99M MAA: HCPCS | Performed by: INTERNAL MEDICINE

## 2017-10-31 PROCEDURE — 6360000002 HC RX W HCPCS: Performed by: INTERNAL MEDICINE

## 2017-10-31 PROCEDURE — 3430000000 HC RX DIAGNOSTIC RADIOPHARMACEUTICAL: Performed by: INTERNAL MEDICINE

## 2017-10-31 PROCEDURE — 92526 ORAL FUNCTION THERAPY: CPT

## 2017-10-31 PROCEDURE — 36415 COLL VENOUS BLD VENIPUNCTURE: CPT

## 2017-10-31 PROCEDURE — 78582 LUNG VENTILAT&PERFUS IMAGING: CPT

## 2017-10-31 PROCEDURE — 1210000000 HC MED SURG R&B

## 2017-10-31 PROCEDURE — 2700000000 HC OXYGEN THERAPY PER DAY

## 2017-10-31 PROCEDURE — 85610 PROTHROMBIN TIME: CPT

## 2017-10-31 PROCEDURE — A9539 TC99M PENTETATE: HCPCS | Performed by: INTERNAL MEDICINE

## 2017-10-31 PROCEDURE — 99232 SBSQ HOSP IP/OBS MODERATE 35: CPT | Performed by: INTERNAL MEDICINE

## 2017-10-31 PROCEDURE — 85027 COMPLETE CBC AUTOMATED: CPT

## 2017-10-31 PROCEDURE — 71250 CT THORAX DX C-: CPT

## 2017-10-31 PROCEDURE — 2580000003 HC RX 258: Performed by: PERSONAL EMERGENCY RESPONSE ATTENDANT

## 2017-10-31 RX ORDER — WARFARIN SODIUM 2 MG/1
4 TABLET ORAL
Status: COMPLETED | OUTPATIENT
Start: 2017-10-31 | End: 2017-10-31

## 2017-10-31 RX ORDER — KIT FOR THE PREPARATION OF TECHNETIUM TC 99M PENTETATE 20 MG/1
1 INJECTION, POWDER, LYOPHILIZED, FOR SOLUTION INTRAVENOUS; RESPIRATORY (INHALATION)
Status: COMPLETED | OUTPATIENT
Start: 2017-10-31 | End: 2017-10-31

## 2017-10-31 RX ADMIN — VENLAFAXINE HYDROCHLORIDE 150 MG: 150 CAPSULE, EXTENDED RELEASE ORAL at 09:01

## 2017-10-31 RX ADMIN — Medication 400 MG: at 09:01

## 2017-10-31 RX ADMIN — CLONIDINE HYDROCHLORIDE 0.1 MG: 0.1 TABLET ORAL at 20:27

## 2017-10-31 RX ADMIN — CLONIDINE HYDROCHLORIDE 0.1 MG: 0.1 TABLET ORAL at 09:01

## 2017-10-31 RX ADMIN — Medication 10 ML: at 09:05

## 2017-10-31 RX ADMIN — METHYLPREDNISOLONE SODIUM SUCCINATE 20 MG: 40 INJECTION, POWDER, FOR SOLUTION INTRAMUSCULAR; INTRAVENOUS at 18:12

## 2017-10-31 RX ADMIN — INSULIN GLARGINE 14 UNITS: 100 INJECTION, SOLUTION SUBCUTANEOUS at 09:15

## 2017-10-31 RX ADMIN — LEVOTHYROXINE SODIUM 25 MCG: 25 TABLET ORAL at 05:57

## 2017-10-31 RX ADMIN — METOPROLOL TARTRATE 50 MG: 50 TABLET ORAL at 09:00

## 2017-10-31 RX ADMIN — CARBAMIDE PEROXIDE 6.5% 5 DROP: 6.5 LIQUID AURICULAR (OTIC) at 20:28

## 2017-10-31 RX ADMIN — INSULIN LISPRO 6 UNITS: 100 INJECTION, SOLUTION INTRAVENOUS; SUBCUTANEOUS at 13:44

## 2017-10-31 RX ADMIN — FUROSEMIDE 40 MG: 10 INJECTION, SOLUTION INTRAMUSCULAR; INTRAVENOUS at 02:08

## 2017-10-31 RX ADMIN — KIT FOR THE PREPARATION OF TECHNETIUM TC 99M PENTETATE 1 MILLICURIE: 20 INJECTION, POWDER, LYOPHILIZED, FOR SOLUTION INTRAVENOUS; RESPIRATORY (INHALATION) at 15:18

## 2017-10-31 RX ADMIN — WARFARIN SODIUM 4 MG: 2 TABLET ORAL at 19:05

## 2017-10-31 RX ADMIN — METOPROLOL TARTRATE 50 MG: 50 TABLET ORAL at 20:26

## 2017-10-31 RX ADMIN — HYDRALAZINE HYDROCHLORIDE 20 MG: 20 INJECTION INTRAMUSCULAR; INTRAVENOUS at 13:54

## 2017-10-31 RX ADMIN — Medication 10 ML: at 15:40

## 2017-10-31 RX ADMIN — CARBAMIDE PEROXIDE 6.5% 5 DROP: 6.5 LIQUID AURICULAR (OTIC) at 09:02

## 2017-10-31 RX ADMIN — FUROSEMIDE 40 MG: 10 INJECTION, SOLUTION INTRAMUSCULAR; INTRAVENOUS at 13:54

## 2017-10-31 RX ADMIN — SODIUM BICARBONATE 325 MG: 650 TABLET ORAL at 13:34

## 2017-10-31 RX ADMIN — ROSUVASTATIN CALCIUM 40 MG: 20 TABLET, FILM COATED ORAL at 20:26

## 2017-10-31 RX ADMIN — CARBAMIDE PEROXIDE 6.5% 5 DROP: 6.5 LIQUID AURICULAR (OTIC) at 13:34

## 2017-10-31 RX ADMIN — CALCIUM 500 MG: 500 TABLET ORAL at 09:01

## 2017-10-31 RX ADMIN — Medication 10 ML: at 20:30

## 2017-10-31 RX ADMIN — AMIODARONE HYDROCHLORIDE 200 MG: 200 TABLET ORAL at 09:01

## 2017-10-31 RX ADMIN — CARBIDOPA AND LEVODOPA 1 TABLET: 25; 100 TABLET ORAL at 09:01

## 2017-10-31 RX ADMIN — FLUTICASONE PROPIONATE 2 SPRAY: 50 SPRAY, METERED NASAL at 09:00

## 2017-10-31 RX ADMIN — AMIODARONE HYDROCHLORIDE 200 MG: 200 TABLET ORAL at 20:26

## 2017-10-31 RX ADMIN — INSULIN LISPRO 2 UNITS: 100 INJECTION, SOLUTION INTRAVENOUS; SUBCUTANEOUS at 17:47

## 2017-10-31 RX ADMIN — METHYLPREDNISOLONE SODIUM SUCCINATE 20 MG: 40 INJECTION, POWDER, FOR SOLUTION INTRAMUSCULAR; INTRAVENOUS at 10:30

## 2017-10-31 RX ADMIN — PANTOPRAZOLE SODIUM 40 MG: 40 GRANULE, DELAYED RELEASE ORAL at 09:00

## 2017-10-31 RX ADMIN — Medication 10 ML: at 13:54

## 2017-10-31 RX ADMIN — INSULIN LISPRO 4 UNITS: 100 INJECTION, SOLUTION INTRAVENOUS; SUBCUTANEOUS at 09:11

## 2017-10-31 RX ADMIN — HYDRALAZINE HYDROCHLORIDE 20 MG: 20 INJECTION INTRAMUSCULAR; INTRAVENOUS at 20:26

## 2017-10-31 RX ADMIN — IPRATROPIUM BROMIDE AND ALBUTEROL SULFATE 1 AMPULE: .5; 3 SOLUTION RESPIRATORY (INHALATION) at 18:57

## 2017-10-31 RX ADMIN — Medication 5.5 MILLICURIE: at 15:40

## 2017-10-31 RX ADMIN — AMLODIPINE BESYLATE 5 MG: 5 TABLET ORAL at 09:01

## 2017-10-31 RX ADMIN — SODIUM BICARBONATE 325 MG: 650 TABLET ORAL at 09:10

## 2017-10-31 RX ADMIN — ASPIRIN 81 MG: 81 TABLET, COATED ORAL at 09:01

## 2017-10-31 RX ADMIN — SODIUM BICARBONATE 325 MG: 650 TABLET ORAL at 20:27

## 2017-10-31 RX ADMIN — CHLORTHALIDONE 25 MG: 25 TABLET ORAL at 09:00

## 2017-10-31 RX ADMIN — IPRATROPIUM BROMIDE AND ALBUTEROL SULFATE 1 AMPULE: .5; 3 SOLUTION RESPIRATORY (INHALATION) at 12:01

## 2017-10-31 RX ADMIN — IPRATROPIUM BROMIDE AND ALBUTEROL SULFATE 1 AMPULE: .5; 3 SOLUTION RESPIRATORY (INHALATION) at 07:59

## 2017-10-31 RX ADMIN — CLONIDINE HYDROCHLORIDE 0.1 MG: 0.1 TABLET ORAL at 13:34

## 2017-10-31 RX ADMIN — Medication 10 ML: at 05:46

## 2017-10-31 RX ADMIN — HYDRALAZINE HYDROCHLORIDE 20 MG: 20 INJECTION INTRAMUSCULAR; INTRAVENOUS at 05:46

## 2017-10-31 RX ADMIN — Medication 10 ML: at 02:08

## 2017-10-31 RX ADMIN — ISOSORBIDE MONONITRATE 60 MG: 60 TABLET, EXTENDED RELEASE ORAL at 09:01

## 2017-10-31 RX ADMIN — METHYLPREDNISOLONE SODIUM SUCCINATE 20 MG: 40 INJECTION, POWDER, FOR SOLUTION INTRAMUSCULAR; INTRAVENOUS at 02:41

## 2017-10-31 ASSESSMENT — ENCOUNTER SYMPTOMS
COUGH: 0
NAUSEA: 0
SHORTNESS OF BREATH: 0

## 2017-10-31 NOTE — PROGRESS NOTES
INTAKE/OUTPUT:      Intake/Output Summary (Last 24 hours) at 10/31/17 1307  Last data filed at 10/30/17 1848   Gross per 24 hour   Intake              600 ml   Output              350 ml   Net              250 ml       Constitutional:  Alert, awake, no apparent distress   Skin:normal, no rash  HEENT:sclera anicteric.   Head atraumatic normocephalic  Neck:supple with no thyromegally  Cardiovascular:  S1, S2 without m/r/g   Respiratory:  Few rhonchi  Abdomen: +bs, soft, nt  Ext: no LE edema  Musculoskeletal:Intact  Neuro:Alert and oriented with no deficit      Electronically signed by Sruthi Gomes MD on 10/31/2017 at 1:07 PM

## 2017-10-31 NOTE — PROGRESS NOTES
[facility]  Speech Language/Pathology  Dysphagia Note    Bonnie Sherman  10/31/2017    Time in: 1455 Time out: 1305      Pt being seen for : [x] Dysphagia exercises  [] Oral Motor Exercises             [x] Therapeutic meal monitor  [] Other:    Subjective:  Behavior: [x] Alert  [x] Cooperative  [x]  Pleasant  [] Confused  [] Agitated   [] Uncooperative  [] Distractible [] Motivated  [] Self-Limiting [] Anxious         [] Other:    Endurance:  [x] Adequate for participation in SLP sessions  [] Reduced overall              [] Lethargic  [] Other:      Objective/Assessment:     Pt seen with family present. Pt did not recall the swallow strategy of chin tuck for thin liquids, indicating that the safe swallow strategy has not been followed, putting pt as risk for aspiration. Pt re-educated on results of MBS and importance of the chin tuck for liquids. Family stated they will aid in pt's recall of the strategy. Pt tolerated sips of thin liquid via cup with no overt s/s of aspiration. Pt completed effortful swallows x6 with good effort. [x] Pt demonstrated no s/s of pain during this visit. none  N/A  [] Nursing notified    Safety Devices:  [x] Call light within reach [] Chair alarm activated         [x] Bed alarm activated    Plan:  [x] Continue as per plan of care  [] Prepare for Discharge   [] Discharge      Patient/Caregiver Education:  Treatment goals discussed with [x]  Patient  [x]  family. The [x]  Patient  [x]  family understand the diagnosis, prognosis and plan of care.     Signature:  Beto Tenorio, CF-SLP

## 2017-10-31 NOTE — PROGRESS NOTES
has significant hyperlipidemia but is been intolerant to statin therapy. Mega Vaughan has stage V chronic kidney disease and follows regularly with Dr. Vanesa Rowe. Mega Vaughan had an AV fistula placed however that has not completely matured.  Most recently saw Dr. Moises Tran in early September and was noted on EKG to be in normal sinus rhythm.     She presented to the emergency department 5 days ago with complaints of worsening shortness of breath occurring over a period of 3 days.  She's had a productive cough and has been having some bloody sputum. Mega Vaughan was diagnosed with pneumonia and is being treated with intravenous antibiotics. Mega Vaughan notes that she is now feeling much better. Mega Vaughan is also being treated for hyperkalemia.  She initially was noted to be normal sinus rhythm, however, yesterday she developed tachycardia and was noted to be in atrial fibrillation with a rapid ventricular response.  She denies any chest pain.  She denies any orthopnea, PND, or worsening peripheral edema.  She denies any palpitations, lightheadedness, near-syncope or syncope.     OBJECTIVE:     MEDICATIONS:     Scheduled Meds:   warfarin  4 mg Oral Once    furosemide  40 mg Intravenous BID    carbamide peroxide  5 drop Right Ear TID    amiodarone  200 mg Oral BID    amLODIPine  5 mg Oral Daily    hydrALAZINE  20 mg Intravenous 4 times per day    methylPREDNISolone  20 mg Intravenous Q8H    warfarin (COUMADIN) daily dosing (placeholder)   Other RX Placeholder    darbepoetin lizeth-polysorbate  40 mcg Subcutaneous Weekly    aspirin  81 mg Oral Daily    buprenorphine  1 patch Transdermal Weekly    calcium elemental  500 mg Oral Daily    carbidopa-levodopa  1 tablet Oral Daily    chlorthalidone  25 mg Oral Daily    cloNIDine  0.1 mg Oral TID    rosuvastatin  40 mg Oral Nightly    fluticasone  2 spray Nasal Daily    iron polysaccharide complex-B12-folic acid  1 capsule Oral Daily with breakfast    isosorbide mononitrate  60 mg Oral Daily    levothyroxine  25 mcg Oral Every Other Day    levothyroxine  50 mcg Oral Every Other Day    magnesium oxide  400 mg Oral Daily    metoprolol tartrate  50 mg Oral BID    sodium bicarbonate  325 mg Oral TID    venlafaxine  150 mg Oral Daily with breakfast    pantoprazole sodium  40 mg Oral QAM AC    pill splitter   Does not apply Once    ipratropium-albuterol  1 ampule Inhalation TID    insulin glargine  14 Units Subcutaneous QAM    influenza virus vaccine  0.5 mL Intramuscular Once    sodium chloride flush  10 mL Intravenous 2 times per day    insulin lispro  0-12 Units Subcutaneous TID WC    insulin lispro  0-6 Units Subcutaneous Nightly     Continuous Infusions:   dextrose       PRN Meds:ALPRAZolam, morphine, sodium chloride, phenylephrine, metoprolol, calcium carbonate, guaiFENesin-dextromethorphan, albuterol, ondansetron, sodium chloride flush, acetaminophen, glucose, dextrose, glucagon (rDNA), dextrose    PHYSICAL EXAM:    CURRENT VITALS: BP (!) 182/57   Pulse 71   Temp 98.2 °F (36.8 °C) (Oral)   Resp 16   Ht 4' 11\" (1.499 m)   Wt 133 lb (60.3 kg)   SpO2 98%   BMI 26.86 kg/m²     CONSTITUTIONAL:  awake, alert, cooperative, no apparent distress,   ENT:  Normocephalic, without obvious abnormality, atraumatic, sinuses nontender on palpation, external ears without lesions,  NECK:  Supple, symmetrical, trachea midline, no adenopathy, thyroid symmetric, not enlarged and no tenderness, skin normal  LUNGS:  No increased work of breathing, good air exchange, clear to auscultation bilaterally, no crackles or wheezing, PM left Chest.  CARDIOVASCULAR:  Normal apical impulse, regular rate and rhythm, normal S1 and S2,  and 2/6 systolic murmur noted  ABDOMEN:  Normal bowel sounds, soft, non-distended, non-tender, no masses palpated, no hepatosplenomegally  EXTREMITIES:  No edema, Pulses strong throughout. NEUROLOGIC:  Awake, alert, oriented to name, place and time.  Following all commands and moving all

## 2017-10-31 NOTE — PROGRESS NOTES
Clinical Pharmacy Note    Warfarin consult follow-up    Recent Labs      10/31/17   0617   INR  2.2     Recent Labs      10/29/17   0826  10/30/17   1216  10/31/17   0616   HGB  8.3*  7.8*  7.3*   HCT  26.4*  24.6*  22.8*   PLT  171  161  155       Significant drug:drug interactions:  Current warfarin drug-drug interactions: methylprednisolone, levothyroxine, amiodarone, venlafaxine, rosuvastatin  New warfarin drug-drug interactions: N/A  Discontinued drug-drug interactions: N/A    Notes:  Date INR Warfarin Dose   10/25/17   7.5 mg   10/26/17 2 5 mg   10/27/17 3.1 HOLD (Pharmacy Consulted)   10/28/17 2.9 4 mg   10/29/17 1.7 6 mg   10/30/17 1.7  6 mg   10/31/17 2.2  4 mg       INR is therapeutic at 2.2 goal is 2-3. Will order warfarin 4 mg PO ONCE for today. Daily PT/INR until stable within therapeutic range. Thank you,  Maggie Ram.  Jenaro Baltazar, PharmD  PGY-1 Pharmacy Resident  10/31/2017 11:50 AM

## 2017-10-31 NOTE — PLAN OF CARE
Problem: Airway Clearance - Ineffective:  Goal: Clear lung sounds  Clear lung sounds   Outcome: Ongoing    Goal: Ability to maintain a clear airway will improve  Ability to maintain a clear airway will improve   Outcome: Ongoing      Problem: Falls - Risk of  Goal: Absence of falls  Outcome: Ongoing      Problem: Pain:  Goal: Pain level will decrease  Pain level will decrease   Outcome: Ongoing    Goal: Control of acute pain  Control of acute pain   Outcome: Ongoing

## 2017-10-31 NOTE — CONSULTS
[address]                     [address], [address] [facility]                                 CONSULTATION    PATIENT NAME: Alee Theodore                   :             1935  MED REC NO:   [id number]                           ROOM:           [id number]  ACCOUNT NO:   [de-identified]                          ADMISSION DATE:  10/24/2017  PROVIDER:     Lucia Solano MD    CONSULT DATE:  10/30/2017    REASON FOR CONSULTATION:  ENT evaluation and management for bleeding  from the right ear. REPORT OF CONSULTATION:  This is an 71-year-old female who has been  admitted at [facility] with history of pneumonia. She has multiple other medical problems. Her medical problems include  hypothyroidism, hypercholesterolemia, chronic kidney disease, atrial  fibrillation, anemia, gastroesophageal reflux disease, anxiety,  diabetes mellitus, essential hypertension, idiopathic Parkinson  disease, hearing loss bilateral, hearing aid use. The patient's meds were reviewed and chart was reviewed. Labs were  reviewed. It is noted the patient's potassium is high, BUN is high,  and creatinine is high. She is going to be on dialysis. PHYSICAL EXAMINATION:  GENERAL:  This is an 71-year-old female who is awake, alert, and not  in any distress. VITAL SIGNS:  Stable. HEENT:  Ears, auricles, no lesions noted. Ear canals revealed right  ear to be completely plugged with cerumen and there is suggestion of  recent bleeding, no active bleeding. Left ear is clear. Tympanic  membrane is intact. The patient advised to use the left ear hearing  aid only. Eyes, pupils equal and reactive to light. EOM intact. Nose, no external nasal deformity. Anterior rhinoscopy, mucosa is  congested, crusting is noted bilaterally. Oral cavity, no mucosal  lesions noted, many missing teeth.   There is a suggestion of oral  cavity buccal mucosa trauma, but no ecchymosis, no

## 2017-10-31 NOTE — PROGRESS NOTES
Arabella Theodore is a 81 y.o. female patient. Admitted for pneumonia, possible aspiration.     Current Facility-Administered Medications   Medication Dose Route Frequency Provider Last Rate Last Dose    furosemide (LASIX) injection 40 mg  40 mg Intravenous BID Martha Nuñez MD   40 mg at 10/31/17 0208    ALPRAZolam [place]) tablet 0.5 mg  0.5 mg Oral TID PRN Martha Nuñez MD        morphine injection 2 mg  2 mg Intravenous Q4H PRN Martha Nuñez MD        sodium chloride (OCEAN, BABY AYR) 0.65 % nasal spray 2 spray  2 spray Each Nare Q4H PRN James Goncalves MD        phenylephrine (BEL-SYNEPHRINE) 0.5 % nasal spray 1 spray  1 spray Each Nare Q6H PRN James Goncalves MD        carbamide peroxide (DEBROX) 6.5 % otic solution 5 drop  5 drop Right Ear TID James Goncalves MD   5 drop at 10/30/17 2233    amiodarone (CORDARONE) tablet 200 mg  200 mg Oral BID Heather Rojas MD   200 mg at 10/30/17 2222    metoprolol (LOPRESSOR) injection 5 mg  5 mg Intravenous Q4H PRN Heather Rojas MD        amLODIPine (NORVASC) tablet 5 mg  5 mg Oral Daily Uzair Dacosta MD   5 mg at 10/30/17 9874    hydrALAZINE (APRESOLINE) injection 20 mg  20 mg Intravenous 4 times per day Laurie Bahena MD   20 mg at 10/31/17 0546    calcium carbonate (TUMS) chewable tablet 500 mg  500 mg Oral BID PRN Patti Santana MD   500 mg at 10/30/17 0916    methylPREDNISolone sodium (SOLU-MEDROL) injection 20 mg  20 mg Intravenous Q8H Martha Nuñez MD   20 mg at 10/31/17 0241    warfarin (COUMADIN) daily dosing (placeholder)   Other RX Placeholder Martha Nuñez MD        darbepoetin lizeth-polysorbate (ARANESP) injection 40 mcg  40 mcg Subcutaneous Weekly Uzair Dacosta MD   40 mcg at 10/26/17 1417    aspirin EC tablet 81 mg  81 mg Oral Daily Laurie Bahena MD   81 mg at 10/30/17 0916    buprenorphine (BUPRENEX) 5 MCG/HR 1 patch  1 patch Transdermal Weekly Laurie Bahena MD        calcium

## 2017-11-01 LAB
ALBUMIN SERPL-MCNC: 3.5 G/DL (ref 3.9–4.9)
ALP BLD-CCNC: 71 U/L (ref 40–130)
ALT SERPL-CCNC: 13 U/L (ref 0–33)
ANION GAP SERPL CALCULATED.3IONS-SCNC: 20 MEQ/L (ref 7–13)
AST SERPL-CCNC: 24 U/L (ref 0–35)
BILIRUB SERPL-MCNC: 0.2 MG/DL (ref 0–1.2)
BUN BLDV-MCNC: 89 MG/DL (ref 8–23)
C-REACTIVE PROTEIN, HIGH SENSITIVITY: 8.2 MG/L (ref 0–5)
CALCIUM SERPL-MCNC: 7.7 MG/DL (ref 8.6–10.2)
CHLORIDE BLD-SCNC: 96 MEQ/L (ref 98–107)
CO2: 21 MEQ/L (ref 22–29)
CREAT SERPL-MCNC: 5.69 MG/DL (ref 0.5–0.9)
D DIMER: 0.8 MG/L FEU (ref 0–0.5)
GFR AFRICAN AMERICAN: 8.7
GFR NON-AFRICAN AMERICAN: 7.2
GLOBULIN: 2.4 G/DL (ref 2.3–3.5)
GLUCOSE BLD-MCNC: 167 MG/DL (ref 60–115)
GLUCOSE BLD-MCNC: 184 MG/DL (ref 74–109)
GLUCOSE BLD-MCNC: 205 MG/DL (ref 60–115)
GLUCOSE BLD-MCNC: 228 MG/DL (ref 60–115)
HCT VFR BLD CALC: 23.4 % (ref 37–47)
HEMOGLOBIN: 7.4 G/DL (ref 12–16)
INR BLD: 2.4
MCH RBC QN AUTO: 28.8 PG (ref 27–31.3)
MCHC RBC AUTO-ENTMCNC: 31.6 % (ref 33–37)
MCV RBC AUTO: 91.2 FL (ref 82–100)
PDW BLD-RTO: 18.1 % (ref 11.5–14.5)
PERFORMED ON: ABNORMAL
PLATELET # BLD: 174 K/UL (ref 130–400)
PLATELET SLIDE REVIEW: ADEQUATE
POTASSIUM SERPL-SCNC: 5.4 MEQ/L (ref 3.5–5.1)
PRO-BNP: NORMAL PG/ML
PROTHROMBIN TIME: 25.3 SEC (ref 8.1–13.7)
RBC # BLD: 2.57 M/UL (ref 4.2–5.4)
SEDIMENTATION RATE, ERYTHROCYTE: 74 MM (ref 0–30)
SODIUM BLD-SCNC: 137 MEQ/L (ref 132–144)
TOTAL PROTEIN: 5.9 G/DL (ref 6.4–8.1)
TROPONIN: 0.03 NG/ML (ref 0–0.01)
WBC # BLD: 9.2 K/UL (ref 4.8–10.8)

## 2017-11-01 PROCEDURE — 93005 ELECTROCARDIOGRAM TRACING: CPT

## 2017-11-01 PROCEDURE — 6370000000 HC RX 637 (ALT 250 FOR IP): Performed by: INTERNAL MEDICINE

## 2017-11-01 PROCEDURE — 6360000002 HC RX W HCPCS: Performed by: INTERNAL MEDICINE

## 2017-11-01 PROCEDURE — 99232 SBSQ HOSP IP/OBS MODERATE 35: CPT | Performed by: INTERNAL MEDICINE

## 2017-11-01 PROCEDURE — 94060 EVALUATION OF WHEEZING: CPT

## 2017-11-01 PROCEDURE — 93010 ELECTROCARDIOGRAM REPORT: CPT | Performed by: INTERNAL MEDICINE

## 2017-11-01 PROCEDURE — 85379 FIBRIN DEGRADATION QUANT: CPT

## 2017-11-01 PROCEDURE — 2700000000 HC OXYGEN THERAPY PER DAY

## 2017-11-01 PROCEDURE — 2580000003 HC RX 258: Performed by: PERSONAL EMERGENCY RESPONSE ATTENDANT

## 2017-11-01 PROCEDURE — 84484 ASSAY OF TROPONIN QUANT: CPT

## 2017-11-01 PROCEDURE — 92526 ORAL FUNCTION THERAPY: CPT

## 2017-11-01 PROCEDURE — G8979 MOBILITY GOAL STATUS: HCPCS

## 2017-11-01 PROCEDURE — G8978 MOBILITY CURRENT STATUS: HCPCS

## 2017-11-01 PROCEDURE — 99231 SBSQ HOSP IP/OBS SF/LOW 25: CPT | Performed by: INTERNAL MEDICINE

## 2017-11-01 PROCEDURE — 94726 PLETHYSMOGRAPHY LUNG VOLUMES: CPT

## 2017-11-01 PROCEDURE — 85027 COMPLETE CBC AUTOMATED: CPT

## 2017-11-01 PROCEDURE — 97162 PT EVAL MOD COMPLEX 30 MIN: CPT

## 2017-11-01 PROCEDURE — 36415 COLL VENOUS BLD VENIPUNCTURE: CPT

## 2017-11-01 PROCEDURE — 85610 PROTHROMBIN TIME: CPT

## 2017-11-01 PROCEDURE — 86141 C-REACTIVE PROTEIN HS: CPT

## 2017-11-01 PROCEDURE — 83880 ASSAY OF NATRIURETIC PEPTIDE: CPT

## 2017-11-01 PROCEDURE — 6370000000 HC RX 637 (ALT 250 FOR IP): Performed by: PHYSICIAN ASSISTANT

## 2017-11-01 PROCEDURE — 1210000000 HC MED SURG R&B

## 2017-11-01 PROCEDURE — 94664 DEMO&/EVAL PT USE INHALER: CPT

## 2017-11-01 PROCEDURE — 6370000000 HC RX 637 (ALT 250 FOR IP): Performed by: PERSONAL EMERGENCY RESPONSE ATTENDANT

## 2017-11-01 PROCEDURE — 80053 COMPREHEN METABOLIC PANEL: CPT

## 2017-11-01 PROCEDURE — 85652 RBC SED RATE AUTOMATED: CPT

## 2017-11-01 PROCEDURE — 94729 DIFFUSING CAPACITY: CPT

## 2017-11-01 RX ORDER — SODIUM POLYSTYRENE SULFONATE 15 G/60ML
15 SUSPENSION ORAL; RECTAL ONCE
Status: COMPLETED | OUTPATIENT
Start: 2017-11-01 | End: 2017-11-01

## 2017-11-01 RX ORDER — IPRATROPIUM BROMIDE AND ALBUTEROL SULFATE 2.5; .5 MG/3ML; MG/3ML
1 SOLUTION RESPIRATORY (INHALATION) EVERY 4 HOURS PRN
Status: DISCONTINUED | OUTPATIENT
Start: 2017-11-01 | End: 2017-11-03 | Stop reason: HOSPADM

## 2017-11-01 RX ORDER — ALBUTEROL SULFATE 2.5 MG/3ML
2.5 SOLUTION RESPIRATORY (INHALATION) ONCE
Status: COMPLETED | OUTPATIENT
Start: 2017-11-01 | End: 2017-11-01

## 2017-11-01 RX ORDER — WARFARIN SODIUM 2 MG/1
4 TABLET ORAL
Status: DISCONTINUED | OUTPATIENT
Start: 2017-11-01 | End: 2017-11-01

## 2017-11-01 RX ORDER — PREDNISONE 10 MG/1
10 TABLET ORAL 2 TIMES DAILY
Status: DISCONTINUED | OUTPATIENT
Start: 2017-11-01 | End: 2017-11-03

## 2017-11-01 RX ORDER — PHYTONADIONE 5 MG/1
2.5 TABLET ORAL ONCE
Status: COMPLETED | OUTPATIENT
Start: 2017-11-01 | End: 2017-11-01

## 2017-11-01 RX ADMIN — CLONIDINE HYDROCHLORIDE 0.1 MG: 0.1 TABLET ORAL at 21:21

## 2017-11-01 RX ADMIN — HYDRALAZINE HYDROCHLORIDE 20 MG: 20 INJECTION INTRAMUSCULAR; INTRAVENOUS at 01:40

## 2017-11-01 RX ADMIN — LEVOTHYROXINE SODIUM 50 MCG: 50 TABLET ORAL at 06:06

## 2017-11-01 RX ADMIN — CALCIUM 500 MG: 500 TABLET ORAL at 08:43

## 2017-11-01 RX ADMIN — FLUTICASONE PROPIONATE 2 SPRAY: 50 SPRAY, METERED NASAL at 08:43

## 2017-11-01 RX ADMIN — INSULIN GLARGINE 14 UNITS: 100 INJECTION, SOLUTION SUBCUTANEOUS at 08:52

## 2017-11-01 RX ADMIN — INSULIN LISPRO 2 UNITS: 100 INJECTION, SOLUTION INTRAVENOUS; SUBCUTANEOUS at 13:13

## 2017-11-01 RX ADMIN — CARBIDOPA AND LEVODOPA 1 TABLET: 25; 100 TABLET ORAL at 08:42

## 2017-11-01 RX ADMIN — ACETAMINOPHEN 650 MG: 325 TABLET ORAL at 15:07

## 2017-11-01 RX ADMIN — AMIODARONE HYDROCHLORIDE 200 MG: 200 TABLET ORAL at 08:42

## 2017-11-01 RX ADMIN — Medication 10 ML: at 21:36

## 2017-11-01 RX ADMIN — CARBAMIDE PEROXIDE 6.5% 5 DROP: 6.5 LIQUID AURICULAR (OTIC) at 21:25

## 2017-11-01 RX ADMIN — ASPIRIN 81 MG: 81 TABLET, COATED ORAL at 08:42

## 2017-11-01 RX ADMIN — PREDNISONE 10 MG: 10 TABLET ORAL at 21:21

## 2017-11-01 RX ADMIN — FUROSEMIDE 40 MG: 10 INJECTION, SOLUTION INTRAMUSCULAR; INTRAVENOUS at 09:07

## 2017-11-01 RX ADMIN — SODIUM BICARBONATE 325 MG: 650 TABLET ORAL at 08:41

## 2017-11-01 RX ADMIN — INSULIN LISPRO 4 UNITS: 100 INJECTION, SOLUTION INTRAVENOUS; SUBCUTANEOUS at 08:52

## 2017-11-01 RX ADMIN — FUROSEMIDE 40 MG: 10 INJECTION, SOLUTION INTRAMUSCULAR; INTRAVENOUS at 01:40

## 2017-11-01 RX ADMIN — SODIUM BICARBONATE 325 MG: 650 TABLET ORAL at 21:15

## 2017-11-01 RX ADMIN — Medication 400 MG: at 08:42

## 2017-11-01 RX ADMIN — Medication 10 ML: at 09:07

## 2017-11-01 RX ADMIN — ROSUVASTATIN CALCIUM 40 MG: 20 TABLET, FILM COATED ORAL at 21:15

## 2017-11-01 RX ADMIN — ISOSORBIDE MONONITRATE 60 MG: 60 TABLET, EXTENDED RELEASE ORAL at 08:42

## 2017-11-01 RX ADMIN — AMIODARONE HYDROCHLORIDE 200 MG: 200 TABLET ORAL at 21:21

## 2017-11-01 RX ADMIN — SODIUM BICARBONATE 325 MG: 650 TABLET ORAL at 15:04

## 2017-11-01 RX ADMIN — CARBAMIDE PEROXIDE 6.5% 5 DROP: 6.5 LIQUID AURICULAR (OTIC) at 15:12

## 2017-11-01 RX ADMIN — PHYTONADIONE 2.5 MG: 5 TABLET ORAL at 15:04

## 2017-11-01 RX ADMIN — CARBAMIDE PEROXIDE 6.5% 5 DROP: 6.5 LIQUID AURICULAR (OTIC) at 08:43

## 2017-11-01 RX ADMIN — VENLAFAXINE HYDROCHLORIDE 150 MG: 150 CAPSULE, EXTENDED RELEASE ORAL at 08:42

## 2017-11-01 RX ADMIN — HYDRALAZINE HYDROCHLORIDE 20 MG: 20 INJECTION INTRAMUSCULAR; INTRAVENOUS at 15:08

## 2017-11-01 RX ADMIN — METOPROLOL TARTRATE 50 MG: 50 TABLET ORAL at 21:21

## 2017-11-01 RX ADMIN — CHLORTHALIDONE 25 MG: 25 TABLET ORAL at 08:42

## 2017-11-01 RX ADMIN — CLONIDINE HYDROCHLORIDE 0.1 MG: 0.1 TABLET ORAL at 08:42

## 2017-11-01 RX ADMIN — AMLODIPINE BESYLATE 5 MG: 5 TABLET ORAL at 08:42

## 2017-11-01 RX ADMIN — HYDRALAZINE HYDROCHLORIDE 20 MG: 20 INJECTION INTRAMUSCULAR; INTRAVENOUS at 22:35

## 2017-11-01 RX ADMIN — PANTOPRAZOLE SODIUM 40 MG: 40 GRANULE, DELAYED RELEASE ORAL at 06:07

## 2017-11-01 RX ADMIN — ALBUTEROL SULFATE 2.5 MG: 2.5 SOLUTION RESPIRATORY (INHALATION) at 17:10

## 2017-11-01 RX ADMIN — SODIUM POLYSTYRENE SULFONATE 15 G: 15 SUSPENSION ORAL; RECTAL at 11:04

## 2017-11-01 RX ADMIN — CLONIDINE HYDROCHLORIDE 0.1 MG: 0.1 TABLET ORAL at 15:05

## 2017-11-01 RX ADMIN — METHYLPREDNISOLONE SODIUM SUCCINATE 20 MG: 40 INJECTION, POWDER, FOR SOLUTION INTRAMUSCULAR; INTRAVENOUS at 09:07

## 2017-11-01 RX ADMIN — METOPROLOL TARTRATE 50 MG: 50 TABLET ORAL at 08:42

## 2017-11-01 ASSESSMENT — ENCOUNTER SYMPTOMS
COUGH: 0
SHORTNESS OF BREATH: 0
NAUSEA: 0

## 2017-11-01 ASSESSMENT — PAIN SCALES - GENERAL: PAINLEVEL_OUTOF10: 0

## 2017-11-01 NOTE — PROGRESS NOTES
Shortness of breath pulmonary edema     History of Present Illness:     From nursing home after being in [facility] brought in with shortness of breath  Fevers chills sweats  Nausea vomiting  Productive sputum  No Cough              Review of Systems   Constitutional: Negative. HENT: Negative. Respiratory: Positive for shortness of breath. Negative for cough, hemoptysis, sputum production and wheezing. Cardiovascular: Negative. Gastrointestinal: Negative. Genitourinary: Negative. Musculoskeletal: Negative.     Neurological: Negative.       BP (!) 174/56   Pulse 63   Temp 97.9 °F (36.6 °C) (Oral)   Resp 20   Ht 4' 11\" (1.499 m)   Wt 133 lb (60.3 kg)   SpO2 96%   BMI 26.86 kg/m²   Alert and still short of breath  Able to talk in full sentences  Lungs have a few scattered rhonchi  cv S1-S2 soft murmur  Abdomen soft nontender without mass no organomegaly bowel sounds are heard     CULTURES are negative            Patient Active Problem List   Diagnosis    Hypothyroidism    Hypercholesteremia    CKD (chronic kidney disease)    Afib (HCC)    Anemia    Acid reflux    Anxiety    Diabetes mellitus due to underlying condition, controlled, with stage 5 chronic kidney disease not on chronic dialysis, with long-term current use of insulin ([address]    Essential hypertension    Idiopathic Parkinson's disease ([address]    Pneumonia of lower lobe due to infectious organism ([address]    Hyperkalemia    Acute on chronic diastolic congestive heart failure (HCC)            ASSESSMENT:PLAN:     Shortness of breath pulmonary edema  No fevers chills leukocytosis sputum production  Doubt pneumonia  No antibiotic therapy

## 2017-11-01 NOTE — PROGRESS NOTES
INPATIENT PROGRESS NOTES    PATIENT NAME: Aura Jones  MRN: [id number]  SERVICE DATE:  November 1, 2017   SERVICE TIME:  2:05 PM      PRIMARY SERVICE: Pulmonary Disease    CHIEF COMPLAINTS: Shortness of breath    INTERVAL HPI: Patient seen and examined at bedside, Interval Notes, orders reviewed. Nursing notes noted  Patient states that she feels a little worse today than she did yesterday. She is more short of breath. She appears comfortable at rest in no distress though. All of her workup so far have been consistent with decompensated heart failure. She has no evidence of obstructive lung disease, pulmonary embolism, pneumonia, interstitial lung disease or pulmonary fibrosis. Patient has interstitial edema, bilateral pleural effusions, markedly elevated BNP, as well as abnormal valvular, diastolic, and arrhythmic heart disease      OBJECTIVE    Body mass index is 26.86 kg/m². PHYSICAL EXAM:  Vitals:  BP (!) 190/57   Pulse 63   Temp 97.9 °F (36.6 °C) (Oral)   Resp 20   Ht 4' 11\" (1.499 m)   Wt 133 lb (60.3 kg)   SpO2 96%   BMI 26.86 kg/m²   General: Patient is  Alert, awake . comfortable in bed, No distress. Head: Atraumatic , Normocephalic   Eyes: PERRL. No sclera icterus. No conjunctival injection. No discharge   ENT: No nasal  discharge. Pharynx clear. Neck:  Trachea midline. No thyromegaly, no JVD, No cervical adenopathy. Chest : Bilaterally symmetrical ,Normal effort,  No accessory muscle use  Lung : . Fair BS bilateral, decreased BS at bases. No Rales. No wheezing. No rhonchi. No dullness on percussion. Heart[de-identified] Irregular rate and rhythm. Regular rhythm. 3/6 systolic mumur ,  Rub or gallop  ABD: Non-tender. Non-distended. No masses. No organmegaly. Normal bowel sounds. No hernia. EXT: No Pitting both leg , No Cyanosis No clubbing  Neuro: no focal weakness  Skin: Warm and dry. No erythema rash on exposed extremities.       DATA:   Recent Labs      10/31/17   0616  11/01/17   0632   WBC 8.5  9.2   HGB  7.3*  7.4*   HCT  22.8*  23.4*   MCV  89.4  91.2   PLT  155  174     Recent Labs      10/31/17   0616  11/01/17   0632   NA  140  137   K  5.1  5.4*   CL  99  96*   CO2  24  21*   BUN  75*  89*   CREATININE  4.39*  5.69*   GLUCOSE  227*  184*   CALCIUM  7.9*  7.7*   PROT   --   5.9*   LABALBU   --   3.5*   BILITOT   --   0.2   ALKPHOS   --   71   AST   --   24   ALT   --   13   LABGLOM  9.6*  7.2*   GFRAA  11.7*  8.7*   GLOB   --   2.4       MV Settings:          Recent Labs      10/30/17   0648   PHART  7.452*   [id number]  35   PO2ART  151*   [id number]  24.5   BEART  1   [id number]  99*       O2 Device: Nasal cannula  O2 Flow Rate (L/min): 2 L/min    DIET CARB CONTROL;     MEDICATIONS during current hospitalization:    Continuous Infusions:   dextrose         Scheduled Meds:   predniSONE  10 mg Oral BID    phytonadione  2.5 mg Oral Once    carbamide peroxide  5 drop Right Ear TID    amiodarone  200 mg Oral BID    amLODIPine  5 mg Oral Daily    hydrALAZINE  20 mg Intravenous 4 times per day    warfarin (COUMADIN) daily dosing (placeholder)   Other RX Placeholder    darbepoetin lizeth-polysorbate  40 mcg Subcutaneous Weekly    aspirin  81 mg Oral Daily    buprenorphine  1 patch Transdermal Weekly    calcium elemental  500 mg Oral Daily    carbidopa-levodopa  1 tablet Oral Daily    cloNIDine  0.1 mg Oral TID    rosuvastatin  40 mg Oral Nightly    fluticasone  2 spray Nasal Daily    iron polysaccharide complex-B12-folic acid  1 capsule Oral Daily with breakfast    isosorbide mononitrate  60 mg Oral Daily    levothyroxine  25 mcg Oral Every Other Day    levothyroxine  50 mcg Oral Every Other Day    magnesium oxide  400 mg Oral Daily    metoprolol tartrate  50 mg Oral BID    sodium bicarbonate  325 mg Oral TID    venlafaxine  150 mg Oral Daily with breakfast    pantoprazole sodium  40 mg Oral QAM AC    pill splitter   Does not apply Once    insulin glargine  14 Units Subcutaneous QAM  influenza virus vaccine  0.5 mL Intramuscular Once    sodium chloride flush  10 mL Intravenous 2 times per day    insulin lispro  0-12 Units Subcutaneous TID WC    insulin lispro  0-6 Units Subcutaneous Nightly       PRN Meds:ipratropium-albuterol, ALPRAZolam, morphine, sodium chloride, phenylephrine, metoprolol, calcium carbonate, guaiFENesin-dextromethorphan, ondansetron, sodium chloride flush, acetaminophen, glucose, dextrose, glucagon (rDNA), dextrose    Radiology  Xr Chest Standard (2 Vw)    Result Date: 10/31/2017  EXAMINATION: XR CHEST STANDARD TWO VW CLINICAL HISTORY:  SOB, PNA COMPARISONS: October 30, 2017 FINDINGS: Frontal and lateral views of chest were obtained compared to yesterday's portable AP chest. Vascular congestion has markedly improved. Edema has diminished. Small subpulmonic pleural effusions remain, best shown on lateral view. Heart size is slightly smaller. The mediastinum is not widened or shifted. The aorta is not dilated. CONCLUSION: IMPROVED CONGESTIVE HEART FAILURE, WITHOUT NEW ABNORMALITY. Xr Chest Standard (2 Vw)    Result Date: 10/29/2017  EXAMINATION: CHEST TWO VIEWS  CLINICAL HISTORY: Pneumonia, follow-up COMPARISONS: October 28, 2017 at 0820 hours  FINDINGS: Two views of the chest are submitted. The cardiac silhouette is enlarged. Unchanged. The mediastinum is unremarkable. Pulmonary vascular is attenuated, lungs are hyperinflated and there is some widening of the AP diameter the chest and coarsening of the interstitium. Right sided trachea. There is worsening patchy multifocal to confluent areas of airspace disease within the parenchyma. There is bibasilar trace to small pleural effusions. . Trace bilateral pleural effusions Pneumothoraces. EXAMINATION: CHEST TWO VIEWS  CLINICAL HISTORY: Pneumonia, follow-up COMPARISONS: October 26, 2017  FINDINGS: Two views of the chest are submitted. FURTHER EVALUATE. 5. THERE ARE NONOBSTRUCTING CALCULI IN THE SUPERIOR POLE OF THE RIGHT KIDNEY. All CT scans at this facility use dose modulation, iterative reconstruction, and/or weight based dosing when appropriate to reduce radiation dose to as low as reasonably achievable. Nm Lung Vent/perfusion (vq)    Result Date: 10/31/2017  VENTILATION-PERFUSION LUNG SCAN: CLINICAL DATA:  Short of breath for 2 weeks TECHNIQUE:  Ventilation and perfusion scan is performed following the uneventful inhalation of 1.0 mCi of Tc-99m DTPA and following the uneventful intravenous administration of  5.5 mCi of Tc-99m MAA. Multi-projectional ventilation and perfusion images are then performed. Corresponding chest x-ray and CT scan the chest from October 31, 2017 were also available FINDINGS: There are matched defects seen bilaterally. As well as corresponding findings most likely consistent with pleural effusions. In addition there is what is considered a triple matched defect within the left lower lobe. Overall constellation of findings are consistent for nondiagnostic V/Q study     FINDINGS CONSISTENT WITH NONDIAGNOSTIC V/Q STUDY. WOULD CONSIDER REPEAT CT SCAN STUDY WITH CONTRAST IN ASSOCIATION WITH DIALYSIS FOR THE PATIENT GIVEN HER RENAL STATUS. Xr Chest Portable    Result Date: 10/30/2017  PORTABLE CHEST: 10/30/2017 CLINICAL HISTORY: Shortness of breath . COMPARISON: 10/29/2017. A portable upright AP radiograph of the chest was obtained. FINDINGS: A poor inspiratory volume is present with worsening patchy probable bilateral pulmonary edema and/or bronchopneumonia. Vascular congestion appears worsening. Small pleural effusions appear to be developing. There is no pneumothorax, or displaced fractures identified. Small calcified granulomas in the right lung apex are again noted. WORSENING BRONCHOPNEUMONIA AND/OR PULMONARY EDEMA. SMALL BILATERAL PLEURAL EFFUSIONS.     Xr Chest Portable    Result Date: 10/26/2017  XR CHEST

## 2017-11-01 NOTE — PROGRESS NOTES
Renal Progress Note    Assessment and Plan:   82 yo with ckd stage 5. Has av fistula maturing but not ready. Has proteinuria. Has pna on admission that didn't respond to diuretics. I thought looked euvolemic initially, but cxr worsening despite abx with concern for chf. Has anemia and high k as well. Sees Dr. Daniella Shah as outpt. Got iv lasix. Kidney function worsening. ID doesn't feel there is any infection and stopped abx. They feel it is more CHF. On coumadin for afib.     Plan/  1-aranesp for anemia given. Iron levels ok  2- norvasc 5 added for bp. 3- do not think she can do without dialysis now. Her risk of readmission, etc is high. Her fistula is only couple weeks old. 4- will give 2.5 vit k and hold coumadin. Consult Dr. Miguel Rojas for tunnelled permcath  5- HD tomorrow if INR low enough and can get permcath    Patient Active Problem List:     Hypothyroidism     Hypercholesteremia     CKD (chronic kidney disease)     Afib (HCC)     Anemia     Acid reflux     Anxiety     Diabetes mellitus due to underlying condition, controlled, with stage 5 chronic kidney disease not on chronic dialysis, with long-term current use of insulin (HCC)     Essential hypertension     Idiopathic Parkinson's disease ([facility] [address] [facility]     Pneumonia     Hyperkalemia      Subjective:   Admit Date: 10/24/2017    Interval History: SOB still there maybe worse. More tired today. Labs worse. No fevers.   Cardio/ med / and ID notes noted      Medications:   Scheduled Meds:   predniSONE  10 mg Oral BID    phytonadione  2.5 mg Oral Once    carbamide peroxide  5 drop Right Ear TID    amiodarone  200 mg Oral BID    amLODIPine  5 mg Oral Daily    hydrALAZINE  20 mg Intravenous 4 times per day    warfarin (COUMADIN) daily dosing (placeholder)   Other RX Placeholder    darbepoetin lizeth-polysorbate  40 mcg Subcutaneous Weekly    aspirin  81 mg Oral Daily    buprenorphine  1 patch Transdermal Weekly    calcium elemental  500 mg Oral Daily  carbidopa-levodopa  1 tablet Oral Daily    cloNIDine  0.1 mg Oral TID    rosuvastatin  40 mg Oral Nightly    fluticasone  2 spray Nasal Daily    iron polysaccharide complex-B12-folic acid  1 capsule Oral Daily with breakfast    isosorbide mononitrate  60 mg Oral Daily    levothyroxine  25 mcg Oral Every Other Day    levothyroxine  50 mcg Oral Every Other Day    magnesium oxide  400 mg Oral Daily    metoprolol tartrate  50 mg Oral BID    sodium bicarbonate  325 mg Oral TID    venlafaxine  150 mg Oral Daily with breakfast    pantoprazole sodium  40 mg Oral QAM AC    pill splitter   Does not apply Once    insulin glargine  14 Units Subcutaneous QAM    influenza virus vaccine  0.5 mL Intramuscular Once    sodium chloride flush  10 mL Intravenous 2 times per day    insulin lispro  0-12 Units Subcutaneous TID WC    insulin lispro  0-6 Units Subcutaneous Nightly     Continuous Infusions:   dextrose         CBC:   Recent Labs      10/31/17   0616  11/01/17   0632   WBC  8.5  9.2   HGB  7.3*  7.4*   PLT  155  174     CMP:    Recent Labs      10/30/17   1216  10/31/17   0616  11/01/17   0632   NA  140  140  137   K  5.3*  5.1  5.4*   CL  100  99  96*   CO2  23  24  21*   BUN  65*  75*  89*   CREATININE  4.77*  4.39*  5.69*   GLUCOSE  302*  227*  184*   CALCIUM  8.0*  7.9*  7.7*   LABGLOM  8.8*  9.6*  7.2*     Troponin:   Recent Labs      11/01/17   0632   TROPONINI  0.033*     BNP: No results for input(s): BNP in the last 72 hours. INR:   Recent Labs      11/01/17   0632   INR  2.4     Lipids: No results for input(s): CHOL, LDLDIRECT, TRIG, HDL, AMYLASE, LIPASE in the last 72 hours. Liver:   Recent Labs      11/01/17   0632   AST  24   ALT  13   ALKPHOS  71   PROT  5.9*   LABALBU  3.5*   BILITOT  0.2     Iron:    No results for input(s): IRONS, FERRITIN in the last 72 hours. Invalid input(s): LABIRONS  Urinalysis: No results for input(s): UA in the last 72 hours.     Objective:   Vitals: BP (!) 190/57   Pulse 63   Temp 97.9 °F (36.6 °C) (Oral)   Resp 20   Ht 4' 11\" (1.499 m)   Wt 133 lb (60.3 kg)   SpO2 96%   BMI 26.86 kg/m²    Wt Readings from Last 3 Encounters:   10/24/17 133 lb (60.3 kg)   09/28/17 135 lb (61.2 kg)   07/21/17 138 lb (62.6 kg)      24HR INTAKE/OUTPUT:      Intake/Output Summary (Last 24 hours) at 11/01/17 1342  Last data filed at 11/01/17 0907   Gross per 24 hour   Intake              720 ml   Output                0 ml   Net              720 ml       Constitutional:  Alert, awake, no apparent distress   Skin:normal, no rash  HEENT:sclera anicteric.   Head atraumatic normocephalic  Neck:supple with no thyromegally  Cardiovascular:  S1, S2 without m/r/g   Respiratory:  Few rhonchi  Abdomen: +bs, soft, nt  Ext: trace LE edema  Musculoskeletal:Intact  Neuro:Alert and oriented with no deficit      Electronically signed by Alexandro Diaz MD on 11/1/2017 at 1:42 PM

## 2017-11-01 NOTE — PROGRESS NOTES
mention of complication, not stated as uncontrolled 1998     Past Surgical History:   Procedure Laterality Date    APPENDECTOMY  2007    BLADDER SUSPENSION  2008 & 2009    [facility], second at [address]  2/2006    COLONOSCOPY  4/2007    Dr. Jose Luis Lawler  [phone]    Bilateral    ROTATOR CUFF REPAIR  1990    THYROID SURGERY      URETHRAL STRICTURE DILATATION  3/2003       Chart Reviewed: Yes  Patient assessed for rehabilitation services?: Yes  Family / Caregiver Present: No  General Comment  Comments: Son at bedside     Restrictions:  Restrictions/Precautions: Fall Risk  Body mass index is 26.86 kg/m².      SUBJECTIVE: Subjective: \"I feel alright\"       Post Treatment Pain Screening:   Pain Screening  Patient Currently in Pain: No    Prior Level of Function:  Social/Functional History  Lives With: Son  Type of Home: House  Home Layout: One level  Home Access: Level entry  Home Equipment:  (no DME)  Receives Help From: Family (son available to assist)  ADL Assistance: Independent  Homemaking Assistance: Independent  Ambulation Assistance: Independent (no AD)  Transfer Assistance: Independent    OBJECTIVE:   Vision/Hearing:  Vision: Impaired  Vision Exceptions: Wears glasses at all times  Hearing: Within functional limits    Cognition:  Overall Orientation Status: Within Normal Limits  Follows Commands: Within Functional Limits    Observation/Palpation  Observation: no acute distress noted    ROM:  RLE AROM: WNL  LLE AROM : WNL    Strength:  Strength RLE  Strength RLE: WFL  Strength LLE  Strength LLE: WFL    Neuro:  Balance  Sitting - Static: Good  Sitting - Dynamic: Good  Standing - Static: Good (no LOB with static standing)  Standing - Dynamic: Good (functional reaching 6 inches OOBOS performing reaching in bathroom )     Motor Control  Gross Motor?: WNL  Sensation  Overall Sensation Status: WNL (denies parasthesias)    Bed mobility  Supine to Sit: Modified independent  Sit to Supine: Modified independent    Transfers  Sit to Stand: Supervision  Stand to sit: Supervision    Ambulation  Ambulation?: Yes  Ambulation 1  Device: No Device  Assistance: Stand by assistance  Quality of Gait: decreased gait speed, some path deviation noted however pt is able to negotiate environment safety without LOB  Distance: 25ft x 2  Comments: distance limited by destination to toilet and back to bed    Activity Tolerance  Activity Tolerance: Patient Tolerated treatment well  Activity Tolerance: denies SOB          ASSESSMENT:   Body structures, Functions, Activity limitations: Decreased functional mobility ; Decreased endurance  Decision Making: Medium Complexity    Prognosis: Good    DISCHARGE RECOMMENDATIONS:  Discharge Recommendations: Continue to assess pending progress    Assessment: Pt re-assesed for PT services d/t pt not seen by PT in 7 days. Pt lives with her son who is able to assist prn upon D/C. Pt denies any D/C needs. Inpatient PT recommended to improve endurance and mobility, prevent secondary complications from prolonged immobility. REQUIRES PT FOLLOW UP: Yes      PLAN OF CARE:  Plan  Times per week: 3-6  Current Treatment Recommendations: Strengthening, Functional Mobility Training, Neuromuscular Re-education, Gait Training, Endurance Training, Balance Training, Home Exercise Program, Safety Education & Training  Safety Devices  Type of devices: Call light within reach, All fall risk precautions in place    G-Code:  PT G-Codes  Functional Assessment Tool Used: clinical judgement  Mobility: Walking and Moving Around Current Status (): At least 20 percent but less than 40 percent impaired, limited or restricted  Mobility: Walking and Moving Around Goal Status ():  At least 1 percent but less than 20 percent impaired, limited or restricted    Goals:  Short term goals  Short term goal 1: Pt will complete transfers with indep  Short term goal 2: Pt will be

## 2017-11-01 NOTE — PLAN OF CARE
Problem: Mobility - Impaired:  Goal: Mobility will improve  Mobility will improve   Outcome: Ongoing  Therapy evaluation completed. Please see daily notes and/or progress notes for details related to planned treatment interventions, goals and functional abilities.

## 2017-11-01 NOTE — PROGRESS NOTES
[facility]  Speech Language/Pathology  Dysphagia Note    Millie Sherman  11/1/2017    Time in: 1315 Time out: 1330      Pt being seen for : [x] Dysphagia exercises  [] Oral Motor Exercises             [x] Therapeutic meal monitor  [] Other:    Subjective:  Behavior: [x] Alert  [x] Cooperative  [x]  Pleasant  [] Confused  [] Agitated   [] Uncooperative  [] Distractible [] Motivated  [] Self-Limiting [] Anxious         [] Other:    Endurance:  [x] Adequate for participation in SLP sessions  [] Reduced overall              [] Lethargic  [] Other:      Objective/Assessment:     Pt seen with family present. Pt was able to independently recall the swallow strategy of chin tuck for thin liquids, however, she was only able to demonstrate use of the strategy in 50% of given opportunities independently. With verbal reminders, she was able to follow the strategy with 100% accuracy. Pt re-educated on results of MBS and importance of the chin tuck for liquids. Family stated they will aid in pt's recall of the strategy. Pt tolerated sips of thin liquid via cup with verbal cues for chin tuck with no overt s/s of aspiration. Pt completed effortful swallows x5 with good effort. Pt was unable to complete the [place] in any given opportunities. Pt completed 3 sets of 10 base of tongue ROM exercises and encouraged to complete 3 sets of 10 in the morning and the evening. [x] Pt demonstrated no s/s of pain during this visit. none  N/A  [] Nursing notified    Safety Devices:  [x] Call light within reach [] Chair alarm activated         [x] Bed alarm activated    Plan:  [x] Continue as per plan of care  [] Prepare for Discharge   [] Discharge      Patient/Caregiver Education:  Treatment goals discussed with [x]  Patient  [x]  family. The [x]  Patient  [x]  family understand the diagnosis, prognosis and plan of care.     Signature:  Mary Jane Mast MS, CCC-SLP

## 2017-11-01 NOTE — PROGRESS NOTES
[facility] EMERGENCY [facility] AT [place] Respiratory Therapy Evaluation   Current Order:  duoneb tid, albuterol q2prn      Home Regimen:   no    Ordering Physician:ryne  Re-evaluation Date:  11/04     Diagnosis:       Patient Status: Stable / Unstable + Physician notified    The following MDI Criteria must be met in order to convert aerosol to MDI with spacer. If unable to meet, MDI will be converted to aerosol:  []  Patient able to demonstrate the ability to use MDI effectively  []  Patient alert and cooperative  []  Patient able to take deep breath with 5-10 second hold  []  Medication(s) available in this delivery method   []  Peak flow greater than or equal to 200 ml/min            Current Order Substituted To  (same drug, same frequency)   Aerosol to MDI [] Albuterol Sulfate 0.083% unit dose by aerosol Albuterol Sulfate MDI 2 puffs by inhalation with spacer    [] Levalbuterol 1.25 mg unit dose by aerosol Levalbuterol MDI 2 puffs by inhalation with spacer    [] Levalbuterol 0.63 mg unit dose by aerosol Levalbuterol MDI 2 puffs by inhalation with spacer    [] Ipratropium Bromide 0.02% unit dose by aerosol Ipratropium Bromide MDI 2 puffs by inhalation with spacer    [] Duoneb (Ipratropium + Albuterol) unit dose by aerosol Ipratropium MDI + Albuterol MDI 2 puffs by inhalation w/spacer   MDI to Aerosol [] Albuterol Sulfate MDI Albuterol Sulfate 0.083% unit dose by aerosol    [] Levalbuterol MDI 2 puffs by inhalation Levalbuterol 1.25 mg unit dose by aerosol    [] Ipratropium Bromide MDI by inhalation Ipratropium Bromide 0.02% unit dose by aerosol    [] Combivent (Ipratropium + Albuterol) MDI by inhalation Duoneb (Ipratropium + Albuterol) unit dose by aerosol   Treatment Assessment [Frequency/Schedule]:  Change frequency to: ___duoneb q4prn_______________________________________________per Protocol, P&T, MEC      Points 0 1 2 3 4   Pulmonary Status  Non-Smoker  []   Smoking history   < 20 pack years  [x]   Smoking history  ?  20 pack years  [] Pulmonary Disorder  (acute or chronic)  []   Severe or Chronic w/ Exacerbation  []     Surgical Status No [x]   Surgeries     General []   Surgery Lower []   Abdominal Thoracic or []   Upper Abdominal Thoracic with  PulmonaryDisorder  []     Chest X-ray Clear/Not  Ordered     []  Chronic Changes  Results Pending  []  Infiltrates, atelectasis, pleural effusion, or edema  [x]  Infiltrates in more than one lobe []  Infiltrate + Atelectasis, &/or pleural effusion  []    Respiratory Pattern Regular,  RR = 12-20 [x]  Increased,  RR = 21-25 []  JAMES, irregular,  or RR = 26-30 []  Decreased FEV1  or RR = 31-35 []  Severe SOB, use  of accessory muscles, or RR ? 35  []    Mental Status Alert, oriented,  Cooperative [x]  Confused but Follows commands []  Lethargic or unable to follow commands []  Obtunded  []  Comatose  []    Breath Sounds Clear to  auscultation  []  Decreased unilaterally or  in bases only [x]  Decreased  bilaterally  []  Crackles or intermittent wheezes []  Wheezes []    Cough Strong, Spontan., & nonproductive [x]  Strong,  spontaneous, &  productive []  Weak,  Nonproductive []  Weak, productive or  with wheezes []  No spontaneous  cough or may require suctioning []    Level of Activity Ambulatory []  Ambulatory w/ Assist  [x]  Non-ambulatory []  Paraplegic []  Quadriplegic []    Total    Score:__5_____     Triage Score:_5_____      Tri       Triage:     1. (>20) Freq: Q3    2. (16-20) Freq: Q4   3. (11-15) Freq: QID & Albuterol Q2 PRN    4. (6-10) Freq: TID & Albuterol Q2 PRN    5. (0-5) Freq Q4prn

## 2017-11-01 NOTE — CARE COORDINATION
SPOKE WITH PATIENT REGARDING D/C PLANS. SHE STILL PLANS TO RETURN HOME. SHE STATES HER SON IS THERE IF NEEDED. SHE IS STILL REQUIRING O2 AND WILL MORE THAN LIKELY NEED A HOME O2 EVAL PRIOR TO D/C. IDID DISCUSS THE PROCESS OF HOME O2 EVAL WITH PATIENT VERBALIZING UNDERSTANDING. BUN&CRET ARE ELEVATED-RENAL FOLLOWING. CARE TEAM TO FOLLOW.  Electronically signed by Sunni Corea RN on 11/1/2017 at 12:59 PM

## 2017-11-01 NOTE — CARE COORDINATION
SPOKE WITH PATIENT AND SON, SANIYA AT THIS TIME. AFTER SPEAKING WITH DR. AKBAR, PATIENT WILL NEED TO BE STARTED ON HEMODIALYSIS. HE PLANS TO CONSULT DR. LOU FOR A DIALYSIS CATHTER AS PATIENTS AVF IS NOT YET MATURE. PATIENT WOULD LIKE TO GO HOME AND SON SANIYA CAN TRANSPORT, BUT WILL HAVE PT/OT EVAL. SHE HAS PLANS TO GO TO [facility] IN [place]. CARE TEAM TO FOLLOW.  Electronically signed by Rafaeal Guerrero RN on 11/1/2017 at 1:55 PM

## 2017-11-01 NOTE — PROGRESS NOTES
detail. Annalisa Cardona is followed on a regular basis by Dr. Emely Ferrari.     She has a history of paroxysmal atrial fibrillation for which she is maintained chronically on amiodarone.  She is chronically antiquated without warfarin. Annalisa Cadrona has a history of mild coronary artery disease. She also has a history of moderate to severe calcific aortic stenosis.  She has significant hyperlipidemia but is been intolerant to statin therapy. Annalisa Cardona has stage V chronic kidney disease and follows regularly with Dr. Yanely Lazar. Annalisa Cardoan had an AV fistula placed however that has not completely matured.  Most recently saw Dr. Emely Ferrari in early September and was noted on EKG to be in normal sinus rhythm.     She presented to the emergency department 5 days ago with complaints of worsening shortness of breath occurring over a period of 3 days.  She's had a productive cough and has been having some bloody sputum. Annalisa Cardona was diagnosed with pneumonia and is being treated with intravenous antibiotics. Annalisa Cardona notes that she is now feeling much better. Annalisa Cardona is also being treated for hyperkalemia.  She initially was noted to be normal sinus rhythm, however, yesterday she developed tachycardia and was noted to be in atrial fibrillation with a rapid ventricular response.  She denies any chest pain.  She denies any orthopnea, PND, or worsening peripheral edema.  She denies any palpitations, lightheadedness, near-syncope or syncope.     OBJECTIVE:     MEDICATIONS:     Scheduled Meds:   predniSONE  10 mg Oral BID    warfarin  4 mg Oral Once    carbamide peroxide  5 drop Right Ear TID    amiodarone  200 mg Oral BID    amLODIPine  5 mg Oral Daily    hydrALAZINE  20 mg Intravenous 4 times per day    warfarin (COUMADIN) daily dosing (placeholder)   Other RX Placeholder    darbepoetin lizeth-polysorbate  40 mcg Subcutaneous Weekly    aspirin  81 mg Oral Daily    buprenorphine  1 patch Transdermal Weekly    calcium elemental  500 mg Oral Daily    carbidopa-levodopa  1 tablet Oral Daily    cloNIDine  0.1 mg Oral TID    rosuvastatin  40 mg Oral Nightly    fluticasone  2 spray Nasal Daily    iron polysaccharide complex-B12-folic acid  1 capsule Oral Daily with breakfast    isosorbide mononitrate  60 mg Oral Daily    levothyroxine  25 mcg Oral Every Other Day    levothyroxine  50 mcg Oral Every Other Day    magnesium oxide  400 mg Oral Daily    metoprolol tartrate  50 mg Oral BID    sodium bicarbonate  325 mg Oral TID    venlafaxine  150 mg Oral Daily with breakfast    pantoprazole sodium  40 mg Oral QAM AC    pill splitter   Does not apply Once    insulin glargine  14 Units Subcutaneous QAM    influenza virus vaccine  0.5 mL Intramuscular Once    sodium chloride flush  10 mL Intravenous 2 times per day    insulin lispro  0-12 Units Subcutaneous TID WC    insulin lispro  0-6 Units Subcutaneous Nightly     Continuous Infusions:   dextrose       PRN Meds:ipratropium-albuterol, ALPRAZolam, morphine, sodium chloride, phenylephrine, metoprolol, calcium carbonate, guaiFENesin-dextromethorphan, ondansetron, sodium chloride flush, acetaminophen, glucose, dextrose, glucagon (rDNA), dextrose    PHYSICAL EXAM:    CURRENT VITALS: BP (!) 190/57   Pulse 63   Temp 97.9 °F (36.6 °C) (Oral)   Resp 20   Ht 4' 11\" (1.499 m)   Wt 133 lb (60.3 kg)   SpO2 96%   BMI 26.86 kg/m²     CONSTITUTIONAL:  awake, alert, cooperative, no apparent distress,   ENT:  Normocephalic, without obvious abnormality, atraumatic, sinuses nontender on palpation, external ears without lesions,  NECK:  Supple, symmetrical, trachea midline, no adenopathy, thyroid symmetric, not enlarged and no tenderness, skin normal  LUNGS:  No increased work of breathing, good air exchange, clear to auscultation bilaterally, no crackles or wheezing, PM left Chest.  CARDIOVASCULAR:  Normal apical impulse, regular rate and rhythm, normal S1 and S2,  and 2/6 systolic murmur noted  ABDOMEN:  Normal bowel sounds, soft, non-distended, non-tender, no masses palpated, no hepatosplenomegally  EXTREMITIES:  No edema, Pulses strong throughout. NEUROLOGIC:  Awake, alert, oriented to name, place and time.  Following all commands and moving all extremties  SKIN:  no bruising or bleeding, normal skin color, texture, turgor and no rashes     Data:        LABS:      Recent Results (from the past 24 hour(s))   POCT Glucose    Collection Time: 10/31/17  5:25 PM   Result Value Ref Range    POC Glucose 196 (H) 60 - 115 mg/dl    Performed on ACCU-CHEK    POCT Glucose    Collection Time: 10/31/17  9:22 PM   Result Value Ref Range    POC Glucose 298 (H) 60 - 115 mg/dl    Performed on ACCU-CHEK    POCT Glucose    Collection Time: 10/31/17  9:56 PM   Result Value Ref Range    POC Glucose 280 (H) 60 - 115 mg/dl    Performed on ACCU-CHEK    EKG 12 Lead    Collection Time: 11/01/17  5:25 AM   Result Value Ref Range    Ventricular Rate 65 BPM    Atrial Rate 65 BPM    P-R Interval 160 ms    QRS Duration 92 ms    Q-T Interval 438 ms    QTc Calculation (Bazett) 455 ms    P Axis 0 degrees    R Axis 27 degrees    T Axis 43 degrees   Protime-INR    Collection Time: 11/01/17  6:32 AM   Result Value Ref Range    Protime 25.3 (H) 8.1 - 13.7 sec    INR 2.4    CBC    Collection Time: 11/01/17  6:32 AM   Result Value Ref Range    WBC 9.2 4.8 - 10.8 K/uL    RBC 2.57 (L) 4.20 - 5.40 M/uL    Hemoglobin 7.4 (L) 12.0 - 16.0 g/dL    Hematocrit 23.4 (L) 37.0 - 47.0 %    MCV 91.2 82.0 - 100.0 fL    MCH 28.8 27.0 - 31.3 pg    MCHC 31.6 (L) 33.0 - 37.0 %    RDW 18.1 (H) 11.5 - 14.5 %    Platelets 860 346 - 618 K/uL    PLATELET SLIDE REVIEW Adequate    High sensitivity CRP    Collection Time: 11/01/17  6:32 AM   Result Value Ref Range    CRP High Sensitivity 8.2 (H) 0.0 - 5.0 mg/L   Troponin    Collection Time: 11/01/17  6:32 AM   Result Value Ref Range    Troponin 0.033 (HH) 0.000 - 0.010 ng/mL   Comprehensive Metabolic Panel    Collection Time: 11/01/17

## 2017-11-01 NOTE — CONSULTS
Consults                         Consult dictated. Impression: Renal faliure. Recommend: Agree with placement of a HD catheter. Scheduled for tomorrow. Discussed with the pt. And her son.

## 2017-11-01 NOTE — PROGRESS NOTES
 buprenorphine (BUPRENEX) 5 MCG/HR 1 patch  1 patch Transdermal Weekly Rachele Croft MD        calcium elemental (OSCAL) tablet 500 mg  500 mg Oral Daily Rachele Croft MD   500 mg at 11/01/17 0843    carbidopa-levodopa (SINEMET)  MG per tablet 1 tablet  1 tablet Oral Daily Rachele Croft MD   1 tablet at 11/01/17 0842    chlorthalidone (HYGROTON) tablet 25 mg  25 mg Oral Daily Rachele Croft MD   25 mg at 11/01/17 0842    cloNIDine (CATAPRES) tablet 0.1 mg  0.1 mg Oral TID Rachele Croft MD   0.1 mg at 11/01/17 0842    rosuvastatin (CRESTOR) tablet 40 mg  40 mg Oral Nightly Rachele Croft MD   40 mg at 10/31/17 2026    fluticasone (FLONASE) 50 MCG/ACT nasal spray 2 spray  2 spray Nasal Daily Rachele Croft MD   2 spray at 11/01/17 0843    iron polysaccharide complex-B12-folic acid (FERREX-FORTE) 150-0.025-1 MG capsule 1 mg  1 capsule Oral Daily with breakfast Rachele Croft MD   1 mg at 11/01/17 0855    isosorbide mononitrate (IMDUR) extended release tablet 60 mg  60 mg Oral Daily Rachele Croft MD   60 mg at 11/01/17 0842    levothyroxine (SYNTHROID) tablet 25 mcg  25 mcg Oral Every Other Day Rachele Croft MD   25 mcg at 10/31/17 0557    levothyroxine (SYNTHROID) tablet 50 mcg  50 mcg Oral Every Other Day Rachele Croft MD   50 mcg at 11/01/17 0606    magnesium oxide (MAG-OX) tablet 400 mg  400 mg Oral Daily Rachele Croft MD   400 mg at 11/01/17 0842    metoprolol tartrate (LOPRESSOR) tablet 50 mg  50 mg Oral BID Rachele Croft MD   50 mg at 11/01/17 0842    sodium bicarbonate tablet 325 mg  325 mg Oral TID Rachele Croft MD   325 mg at 11/01/17 0841    venlafaxine (EFFEXOR XR) extended release capsule 150 mg  150 mg Oral Daily with breakfast Rachele Croft MD   150 mg at 11/01/17 0842    guaiFENesin-dextromethorphan (ROBITUSSIN DM) 100-10 MG/5ML syrup 5 mL  5 mL Oral Q4H PRN MD Mark Dan pantoprazole sodium (PROTONIX) packet 40 mg  40 mg Oral QAM AC Brianne Lyons MD   40 mg at 11/01/17 0607    pill splitter   Does not apply Once Brianne Lyons MD   Stopped at 10/25/17 0055    insulin glargine (LANTUS) injection vial 14 Units  14 Units Subcutaneous QAM Brand Lesch, MD   14 Units at 11/01/17 0852    ondansetron (ZOFRAN) injection 4 mg  4 mg Intravenous Q6H PRN Brand Lesch, MD        influenza type A&B vaccine (FLUVIRIN) injection 0.5 mL  0.5 mL Intramuscular Once Brand Lesch, MD        sodium chloride flush 0.9 % injection 10 mL  10 mL Intravenous 2 times per day DYLAN Rios   10 mL at 10/31/17 2030    sodium chloride flush 0.9 % injection 10 mL  10 mL Intravenous PRN DYLAN Rios   10 mL at 10/31/17 1540    acetaminophen (TYLENOL) tablet 650 mg  650 mg Oral Q4H PRN DYLAN Rios   650 mg at 10/30/17 0134    glucose (GLUTOSE) 40 % oral gel 15 g  15 g Oral PRN Brianne Lyons MD        dextrose 50 % solution 12.5 g  12.5 g Intravenous PRN Brianne Lyons MD        glucagon (rDNA) injection 1 mg  1 mg Intramuscular PRN Brianne Lyons MD        dextrose 5 % solution  100 mL/hr Intravenous PRN Brianne Lyons MD        insulin lispro (HUMALOG) injection vial 0-12 Units  0-12 Units Subcutaneous TID WC Brianne Lyons MD   4 Units at 11/01/17 2167    insulin lispro (HUMALOG) injection vial 0-6 Units  0-6 Units Subcutaneous Nightly Brianne Lyons MD   3 Units at 10/31/17 2157     Allergies   Allergen Reactions    Arthrotec [Diclofenac-Misoprostol] Nausea Only    Depakote [Divalproex Sodium] Itching    Dilantin [Phenytoin]     Klonopin [Clonazepam]     Statins Other (See Comments)     achy     Principal Problem:    Pneumonia of lower lobe due to infectious organism [facility])  Active Problems:    Hypothyroidism    CKD (chronic kidney disease)    Afib ([facility] [address]    Anemia    Acid reflux    Anxiety    Diabetes mellitus due to underlying condition, controlled, with stage 5 chronic kidney disease not on chronic dialysis, with long-term current use of insulin (HCC)    Essential hypertension    Idiopathic Parkinson's disease ([address]    Hyperkalemia    Acute on chronic diastolic congestive heart failure (HCC)    Pulmonary edema with congestive heart failure (HCC)    Shortness of breath    Acute pulmonary edema (HCC)    Blood pressure (!) 188/74, pulse 72, temperature 98.3 °F (36.8 °C), temperature source Oral, resp. rate 18, height 4' 11\" (1.499 m), weight 133 lb (60.3 kg), SpO2 96 %. Subjective:  Symptoms:  She reports malaise and weakness. No shortness of breath, cough, chest pressure or anxiety. Diet:  Adequate intake. No nausea. Activity level: Impaired due to weakness. Pain:  She complains of pain that is mild. Objective:  General Appearance: In no acute distress. Vital signs: (most recent): Blood pressure (!) 188/74, pulse 72, temperature 98.3 °F (36.8 °C), temperature source Oral, resp. rate 18, height 4' 11\" (1.499 m), weight 133 lb (60.3 kg), SpO2 96 %. No fever. Output: Producing urine. HEENT: Normal HEENT exam.    Lungs:  Normal effort and normal respiratory rate. There are decreased breath sounds. Heart: Normal rate. Regular rhythm. Abdomen: Abdomen is soft. Bowel sounds are normal.     Extremities: There is no dependent edema. Neurological: Patient is alert and oriented to person, place and time. Pupils:  Pupils are equal, round, and reactive to light. Skin:  Warm and dry. Assessment:    Condition: In stable condition. Improving.   (  COPD   CHF exacerbation, diastolic, acute on top of chronic  Questionable pneumonia on CT chest  Chronic kidney disease stage IV with hyper kalemia  Uncontrolled hypertension  Cerumen impacted right ear canal  ).      Plan:   (CT of chest showed questionable pneumonia, ID felt patient did not have pneumonia, and did not need antibiotics  Chronic kidney disease

## 2017-11-01 NOTE — CARE COORDINATION
LSW spoke with [facility] and started a new referral for dialysis. They do not have any availability at the [facility] and the next closest is on [facility]  In [facility]. They will work on getting a chair time ready for pt. They only do M-W-F at that center.

## 2017-11-02 ENCOUNTER — ANESTHESIA EVENT (OUTPATIENT)
Dept: OPERATING ROOM | Age: 82
DRG: 177 | End: 2017-11-02
Payer: MEDICARE

## 2017-11-02 ENCOUNTER — ANESTHESIA (OUTPATIENT)
Dept: OPERATING ROOM | Age: 82
DRG: 177 | End: 2017-11-02
Payer: MEDICARE

## 2017-11-02 ENCOUNTER — APPOINTMENT (OUTPATIENT)
Dept: GENERAL RADIOLOGY | Age: 82
DRG: 177 | End: 2017-11-02
Payer: MEDICARE

## 2017-11-02 VITALS — SYSTOLIC BLOOD PRESSURE: 162 MMHG | OXYGEN SATURATION: 100 % | DIASTOLIC BLOOD PRESSURE: 71 MMHG

## 2017-11-02 LAB
ABO/RH: NORMAL
ALBUMIN SERPL-MCNC: 3.1 G/DL (ref 3.9–4.9)
ALP BLD-CCNC: 64 U/L (ref 40–130)
ALT SERPL-CCNC: 12 U/L (ref 0–33)
ANION GAP SERPL CALCULATED.3IONS-SCNC: 17 MEQ/L (ref 7–13)
ANTIBODY SCREEN: NORMAL
AST SERPL-CCNC: 19 U/L (ref 0–35)
BILIRUB SERPL-MCNC: 0.2 MG/DL (ref 0–1.2)
BUN BLDV-MCNC: 96 MG/DL (ref 8–23)
CALCIUM SERPL-MCNC: 7.4 MG/DL (ref 8.6–10.2)
CHLORIDE BLD-SCNC: 98 MEQ/L (ref 98–107)
CO2: 22 MEQ/L (ref 22–29)
CREAT SERPL-MCNC: 5.64 MG/DL (ref 0.5–0.9)
GFR AFRICAN AMERICAN: 8.7
GFR NON-AFRICAN AMERICAN: 7.2
GLOBULIN: 2.4 G/DL (ref 2.3–3.5)
GLUCOSE BLD-MCNC: 100 MG/DL (ref 60–115)
GLUCOSE BLD-MCNC: 142 MG/DL (ref 60–115)
GLUCOSE BLD-MCNC: 148 MG/DL (ref 74–109)
GLUCOSE BLD-MCNC: 150 MG/DL (ref 60–115)
GLUCOSE BLD-MCNC: 176 MG/DL (ref 60–115)
HBV SURFACE AB TITR SER: NORMAL MIU/ML
HCT VFR BLD CALC: 23 % (ref 37–47)
HCT VFR BLD CALC: 23.9 % (ref 37–47)
HEMOGLOBIN: 7.3 G/DL (ref 12–16)
HEMOGLOBIN: 7.6 G/DL (ref 12–16)
HEPATITIS B SURFACE ANTIGEN INTERPRETATION: NORMAL
INR BLD: 1.3
INR BLD: 1.6
MCH RBC QN AUTO: 29 PG (ref 27–31.3)
MCHC RBC AUTO-ENTMCNC: 31.8 % (ref 33–37)
MCV RBC AUTO: 91.1 FL (ref 82–100)
PDW BLD-RTO: 18.1 % (ref 11.5–14.5)
PERFORMED ON: ABNORMAL
PERFORMED ON: NORMAL
PLATELET # BLD: 150 K/UL (ref 130–400)
POTASSIUM SERPL-SCNC: 5.3 MEQ/L (ref 3.5–5.1)
PROTHROMBIN TIME: 13.7 SEC (ref 8.1–13.7)
PROTHROMBIN TIME: 17.1 SEC (ref 8.1–13.7)
RBC # BLD: 2.52 M/UL (ref 4.2–5.4)
SEDIMENTATION RATE, ERYTHROCYTE: 57 MM (ref 0–30)
SODIUM BLD-SCNC: 137 MEQ/L (ref 132–144)
TOTAL PROTEIN: 5.5 G/DL (ref 6.4–8.1)
WBC # BLD: 7.3 K/UL (ref 4.8–10.8)

## 2017-11-02 PROCEDURE — 6370000000 HC RX 637 (ALT 250 FOR IP): Performed by: INTERNAL MEDICINE

## 2017-11-02 PROCEDURE — 3600000012 HC SURGERY LEVEL 2 ADDTL 15MIN: Performed by: SURGERY

## 2017-11-02 PROCEDURE — 2500000003 HC RX 250 WO HCPCS: Performed by: SURGERY

## 2017-11-02 PROCEDURE — 87340 HEPATITIS B SURFACE AG IA: CPT

## 2017-11-02 PROCEDURE — 93010 ELECTROCARDIOGRAM REPORT: CPT | Performed by: INTERNAL MEDICINE

## 2017-11-02 PROCEDURE — 2700000000 HC OXYGEN THERAPY PER DAY

## 2017-11-02 PROCEDURE — 86850 RBC ANTIBODY SCREEN: CPT

## 2017-11-02 PROCEDURE — 85652 RBC SED RATE AUTOMATED: CPT

## 2017-11-02 PROCEDURE — 1210000000 HC MED SURG R&B

## 2017-11-02 PROCEDURE — 85610 PROTHROMBIN TIME: CPT

## 2017-11-02 PROCEDURE — 02HV33Z INSERTION OF INFUSION DEVICE INTO SUPERIOR VENA CAVA, PERCUTANEOUS APPROACH: ICD-10-PCS | Performed by: SURGERY

## 2017-11-02 PROCEDURE — 2500000003 HC RX 250 WO HCPCS: Performed by: INTERNAL MEDICINE

## 2017-11-02 PROCEDURE — 86900 BLOOD TYPING SEROLOGIC ABO: CPT

## 2017-11-02 PROCEDURE — 7100000001 HC PACU RECOVERY - ADDTL 15 MIN: Performed by: SURGERY

## 2017-11-02 PROCEDURE — 97116 GAIT TRAINING THERAPY: CPT

## 2017-11-02 PROCEDURE — 6360000002 HC RX W HCPCS: Performed by: INTERNAL MEDICINE

## 2017-11-02 PROCEDURE — 2580000003 HC RX 258: Performed by: SURGERY

## 2017-11-02 PROCEDURE — 6370000000 HC RX 637 (ALT 250 FOR IP): Performed by: HOSPITALIST

## 2017-11-02 PROCEDURE — 2580000003 HC RX 258

## 2017-11-02 PROCEDURE — 99231 SBSQ HOSP IP/OBS SF/LOW 25: CPT | Performed by: INTERNAL MEDICINE

## 2017-11-02 PROCEDURE — 94726 PLETHYSMOGRAPHY LUNG VOLUMES: CPT | Performed by: INTERNAL MEDICINE

## 2017-11-02 PROCEDURE — 86706 HEP B SURFACE ANTIBODY: CPT

## 2017-11-02 PROCEDURE — 3209999900 FLUORO FOR SURGICAL PROCEDURES

## 2017-11-02 PROCEDURE — 6360000002 HC RX W HCPCS: Performed by: SURGERY

## 2017-11-02 PROCEDURE — 3600000002 HC SURGERY LEVEL 2 BASE: Performed by: SURGERY

## 2017-11-02 PROCEDURE — 94729 DIFFUSING CAPACITY: CPT | Performed by: INTERNAL MEDICINE

## 2017-11-02 PROCEDURE — 3700000000 HC ANESTHESIA ATTENDED CARE: Performed by: SURGERY

## 2017-11-02 PROCEDURE — 2580000003 HC RX 258: Performed by: NURSE ANESTHETIST, CERTIFIED REGISTERED

## 2017-11-02 PROCEDURE — 5A1D70Z PERFORMANCE OF URINARY FILTRATION, INTERMITTENT, LESS THAN 6 HOURS PER DAY: ICD-10-PCS | Performed by: INTERNAL MEDICINE

## 2017-11-02 PROCEDURE — A4649 SURGICAL SUPPLIES: HCPCS | Performed by: SURGERY

## 2017-11-02 PROCEDURE — 7100000000 HC PACU RECOVERY - FIRST 15 MIN: Performed by: SURGERY

## 2017-11-02 PROCEDURE — 86901 BLOOD TYPING SEROLOGIC RH(D): CPT

## 2017-11-02 PROCEDURE — 36415 COLL VENOUS BLD VENIPUNCTURE: CPT

## 2017-11-02 PROCEDURE — C1750 CATH, HEMODIALYSIS,LONG-TERM: HCPCS | Performed by: SURGERY

## 2017-11-02 PROCEDURE — 93005 ELECTROCARDIOGRAM TRACING: CPT

## 2017-11-02 PROCEDURE — 80053 COMPREHEN METABOLIC PANEL: CPT

## 2017-11-02 PROCEDURE — 85014 HEMATOCRIT: CPT

## 2017-11-02 PROCEDURE — 86920 COMPATIBILITY TEST SPIN: CPT

## 2017-11-02 PROCEDURE — 85018 HEMOGLOBIN: CPT

## 2017-11-02 PROCEDURE — C1894 INTRO/SHEATH, NON-LASER: HCPCS | Performed by: SURGERY

## 2017-11-02 PROCEDURE — 3700000001 HC ADD 15 MINUTES (ANESTHESIA): Performed by: SURGERY

## 2017-11-02 PROCEDURE — 85027 COMPLETE CBC AUTOMATED: CPT

## 2017-11-02 PROCEDURE — 2580000003 HC RX 258: Performed by: INTERNAL MEDICINE

## 2017-11-02 PROCEDURE — 94060 EVALUATION OF WHEEZING: CPT | Performed by: INTERNAL MEDICINE

## 2017-11-02 PROCEDURE — 0JH63XZ INSERTION OF TUNNELED VASCULAR ACCESS DEVICE INTO CHEST SUBCUTANEOUS TISSUE AND FASCIA, PERCUTANEOUS APPROACH: ICD-10-PCS | Performed by: SURGERY

## 2017-11-02 PROCEDURE — 6360000002 HC RX W HCPCS: Performed by: NURSE ANESTHETIST, CERTIFIED REGISTERED

## 2017-11-02 RX ORDER — MAGNESIUM HYDROXIDE 1200 MG/15ML
LIQUID ORAL CONTINUOUS PRN
Status: DISCONTINUED | OUTPATIENT
Start: 2017-11-02 | End: 2017-11-02 | Stop reason: HOSPADM

## 2017-11-02 RX ORDER — HEPARIN SODIUM (PORCINE) LOCK FLUSH IV SOLN 100 UNIT/ML 100 UNIT/ML
SOLUTION INTRAVENOUS PRN
Status: DISCONTINUED | OUTPATIENT
Start: 2017-11-02 | End: 2017-11-02 | Stop reason: HOSPADM

## 2017-11-02 RX ORDER — HEPARIN SODIUM 1000 [USP'U]/ML
4000 INJECTION, SOLUTION INTRAVENOUS; SUBCUTANEOUS PRN
Status: DISCONTINUED | OUTPATIENT
Start: 2017-11-02 | End: 2017-11-03 | Stop reason: HOSPADM

## 2017-11-02 RX ORDER — PROPOFOL 10 MG/ML
INJECTION, EMULSION INTRAVENOUS CONTINUOUS PRN
Status: DISCONTINUED | OUTPATIENT
Start: 2017-11-02 | End: 2017-11-02 | Stop reason: SDUPTHER

## 2017-11-02 RX ORDER — SODIUM CHLORIDE 9 MG/ML
INJECTION, SOLUTION INTRAVENOUS CONTINUOUS PRN
Status: DISCONTINUED | OUTPATIENT
Start: 2017-11-02 | End: 2017-11-02 | Stop reason: SDUPTHER

## 2017-11-02 RX ORDER — HYDROCODONE BITARTRATE AND ACETAMINOPHEN 5; 325 MG/1; MG/1
2 TABLET ORAL PRN
Status: DISCONTINUED | OUTPATIENT
Start: 2017-11-02 | End: 2017-11-02 | Stop reason: HOSPADM

## 2017-11-02 RX ORDER — BUPIVACAINE HYDROCHLORIDE 5 MG/ML
INJECTION, SOLUTION EPIDURAL; INTRACAUDAL PRN
Status: DISCONTINUED | OUTPATIENT
Start: 2017-11-02 | End: 2017-11-02 | Stop reason: HOSPADM

## 2017-11-02 RX ORDER — SODIUM CHLORIDE 9 MG/ML
INJECTION, SOLUTION INTRAVENOUS
Status: COMPLETED
Start: 2017-11-02 | End: 2017-11-02

## 2017-11-02 RX ORDER — MEPERIDINE HYDROCHLORIDE 25 MG/ML
12.5 INJECTION INTRAMUSCULAR; INTRAVENOUS; SUBCUTANEOUS EVERY 5 MIN PRN
Status: DISCONTINUED | OUTPATIENT
Start: 2017-11-02 | End: 2017-11-02 | Stop reason: HOSPADM

## 2017-11-02 RX ORDER — METOCLOPRAMIDE HYDROCHLORIDE 5 MG/ML
10 INJECTION INTRAMUSCULAR; INTRAVENOUS
Status: DISCONTINUED | OUTPATIENT
Start: 2017-11-02 | End: 2017-11-02 | Stop reason: HOSPADM

## 2017-11-02 RX ORDER — HYDROCODONE BITARTRATE AND ACETAMINOPHEN 5; 325 MG/1; MG/1
1 TABLET ORAL PRN
Status: DISCONTINUED | OUTPATIENT
Start: 2017-11-02 | End: 2017-11-02 | Stop reason: HOSPADM

## 2017-11-02 RX ORDER — CHOLECALCIFEROL (VITAMIN D3) 10 MCG
1 TABLET ORAL DAILY
Status: DISCONTINUED | OUTPATIENT
Start: 2017-11-02 | End: 2017-11-03 | Stop reason: HOSPADM

## 2017-11-02 RX ORDER — PHYTONADIONE 5 MG/1
5 TABLET ORAL PRN
Status: DISCONTINUED | OUTPATIENT
Start: 2017-11-02 | End: 2017-11-03 | Stop reason: HOSPADM

## 2017-11-02 RX ORDER — 0.9 % SODIUM CHLORIDE 0.9 %
250 INTRAVENOUS SOLUTION INTRAVENOUS ONCE
Status: COMPLETED | OUTPATIENT
Start: 2017-11-02 | End: 2017-11-03

## 2017-11-02 RX ORDER — FENTANYL CITRATE 50 UG/ML
50 INJECTION, SOLUTION INTRAMUSCULAR; INTRAVENOUS EVERY 10 MIN PRN
Status: DISCONTINUED | OUTPATIENT
Start: 2017-11-02 | End: 2017-11-02 | Stop reason: HOSPADM

## 2017-11-02 RX ORDER — HEPARIN SODIUM 1000 [USP'U]/ML
1000 INJECTION, SOLUTION INTRAVENOUS; SUBCUTANEOUS PRN
Status: DISCONTINUED | OUTPATIENT
Start: 2017-11-02 | End: 2017-11-03 | Stop reason: HOSPADM

## 2017-11-02 RX ORDER — HYDROXYZINE HYDROCHLORIDE 25 MG/1
25 TABLET, FILM COATED ORAL ONCE
Status: COMPLETED | OUTPATIENT
Start: 2017-11-02 | End: 2017-11-02

## 2017-11-02 RX ORDER — HEPARIN SODIUM 1000 [USP'U]/ML
2000 INJECTION, SOLUTION INTRAVENOUS; SUBCUTANEOUS PRN
Status: DISCONTINUED | OUTPATIENT
Start: 2017-11-02 | End: 2017-11-03 | Stop reason: HOSPADM

## 2017-11-02 RX ORDER — DIPHENHYDRAMINE HYDROCHLORIDE 50 MG/ML
12.5 INJECTION INTRAMUSCULAR; INTRAVENOUS
Status: DISCONTINUED | OUTPATIENT
Start: 2017-11-02 | End: 2017-11-02 | Stop reason: HOSPADM

## 2017-11-02 RX ORDER — SODIUM CHLORIDE 9 MG/ML
INJECTION, SOLUTION INTRAVENOUS
Status: DISPENSED
Start: 2017-11-02 | End: 2017-11-02

## 2017-11-02 RX ORDER — PROPOFOL 10 MG/ML
INJECTION, EMULSION INTRAVENOUS PRN
Status: DISCONTINUED | OUTPATIENT
Start: 2017-11-02 | End: 2017-11-02 | Stop reason: SDUPTHER

## 2017-11-02 RX ORDER — ONDANSETRON 2 MG/ML
4 INJECTION INTRAMUSCULAR; INTRAVENOUS
Status: DISCONTINUED | OUTPATIENT
Start: 2017-11-02 | End: 2017-11-02 | Stop reason: HOSPADM

## 2017-11-02 RX ORDER — ALBUMIN (HUMAN) 12.5 G/50ML
25 SOLUTION INTRAVENOUS DAILY PRN
Status: DISCONTINUED | OUTPATIENT
Start: 2017-11-02 | End: 2017-11-03 | Stop reason: HOSPADM

## 2017-11-02 RX ADMIN — SODIUM CHLORIDE 250 ML: 9 INJECTION, SOLUTION INTRAVENOUS at 17:30

## 2017-11-02 RX ADMIN — PROPOFOL 20 MG: 10 INJECTION, EMULSION INTRAVENOUS at 11:51

## 2017-11-02 RX ADMIN — ROSUVASTATIN CALCIUM 40 MG: 20 TABLET, FILM COATED ORAL at 22:01

## 2017-11-02 RX ADMIN — SODIUM CHLORIDE: 900 INJECTION, SOLUTION INTRAVENOUS at 17:38

## 2017-11-02 RX ADMIN — CLONIDINE HYDROCHLORIDE 0.1 MG: 0.1 TABLET ORAL at 15:50

## 2017-11-02 RX ADMIN — HYDRALAZINE HYDROCHLORIDE 20 MG: 20 INJECTION INTRAMUSCULAR; INTRAVENOUS at 15:48

## 2017-11-02 RX ADMIN — ASPIRIN 81 MG: 81 TABLET, COATED ORAL at 15:51

## 2017-11-02 RX ADMIN — CARBAMIDE PEROXIDE 6.5% 5 DROP: 6.5 LIQUID AURICULAR (OTIC) at 17:33

## 2017-11-02 RX ADMIN — AMLODIPINE BESYLATE 5 MG: 5 TABLET ORAL at 15:52

## 2017-11-02 RX ADMIN — SODIUM BICARBONATE 325 MG: 650 TABLET ORAL at 22:01

## 2017-11-02 RX ADMIN — SODIUM CHLORIDE: 900 INJECTION, SOLUTION INTRAVENOUS at 11:50

## 2017-11-02 RX ADMIN — METOPROLOL TARTRATE 50 MG: 50 TABLET ORAL at 15:53

## 2017-11-02 RX ADMIN — PREDNISONE 10 MG: 10 TABLET ORAL at 22:01

## 2017-11-02 RX ADMIN — HYDRALAZINE HYDROCHLORIDE 20 MG: 20 INJECTION INTRAMUSCULAR; INTRAVENOUS at 05:41

## 2017-11-02 RX ADMIN — FLUTICASONE PROPIONATE 2 SPRAY: 50 SPRAY, METERED NASAL at 17:33

## 2017-11-02 RX ADMIN — AMIODARONE HYDROCHLORIDE 200 MG: 200 TABLET ORAL at 22:01

## 2017-11-02 RX ADMIN — AMIODARONE HYDROCHLORIDE 200 MG: 200 TABLET ORAL at 15:52

## 2017-11-02 RX ADMIN — PROPOFOL 120 MCG/KG/MIN: 10 INJECTION, EMULSION INTRAVENOUS at 11:51

## 2017-11-02 RX ADMIN — HYDROXYZINE HYDROCHLORIDE 25 MG: 25 TABLET ORAL at 02:21

## 2017-11-02 RX ADMIN — METOPROLOL TARTRATE 5 MG: 5 INJECTION INTRAVENOUS at 10:51

## 2017-11-02 ASSESSMENT — ENCOUNTER SYMPTOMS
COUGH: 0
NAUSEA: 0
SHORTNESS OF BREATH: 1
SHORTNESS OF BREATH: 0

## 2017-11-02 ASSESSMENT — PAIN SCALES - GENERAL
PAINLEVEL_OUTOF10: 0

## 2017-11-02 ASSESSMENT — PAIN - FUNCTIONAL ASSESSMENT: PAIN_FUNCTIONAL_ASSESSMENT: 0-10

## 2017-11-02 NOTE — PROGRESS NOTES
10/30/17 0916    warfarin (COUMADIN) daily dosing (placeholder)   Other RX Placeholder Willow Hill MD        darbepoetin lizeth-polysorbate (ARANESP) injection 40 mcg  40 mcg Subcutaneous Weekly Cecilia Kruse MD   40 mcg at 10/26/17 1417    aspirin EC tablet 81 mg  81 mg Oral Daily Baljinder Cali MD   81 mg at 11/01/17 0842    buprenorphine (BUPRENEX) 5 MCG/HR 1 patch  1 patch Transdermal Weekly Baljinder Cali MD   Stopped at 11/01/17 1105    calcium elemental (OSCAL) tablet 500 mg  500 mg Oral Daily Baljinder Cali MD   500 mg at 11/01/17 0843    carbidopa-levodopa (SINEMET)  MG per tablet 1 tablet  1 tablet Oral Daily Baljinder Cali MD   1 tablet at 11/01/17 0842    cloNIDine (CATAPRES) tablet 0.1 mg  0.1 mg Oral TID Baljinder Cali MD   0.1 mg at 11/01/17 2121    rosuvastatin (CRESTOR) tablet 40 mg  40 mg Oral Nightly Baljinder Cali MD   40 mg at 11/01/17 2115    fluticasone (FLONASE) 50 MCG/ACT nasal spray 2 spray  2 spray Nasal Daily Baljinder Cali MD   2 spray at 11/01/17 0843    iron polysaccharide complex-B12-folic acid (FERREX-FORTE) 150-0.025-1 MG capsule 1 mg  1 capsule Oral Daily with breakfast Baljinder Cali MD   1 mg at 11/01/17 0855    isosorbide mononitrate (IMDUR) extended release tablet 60 mg  60 mg Oral Daily Baljinder Cali MD   60 mg at 11/01/17 0842    levothyroxine (SYNTHROID) tablet 25 mcg  25 mcg Oral Every Other Day Baljinder Cali MD   25 mcg at 10/31/17 0557    levothyroxine (SYNTHROID) tablet 50 mcg  50 mcg Oral Every Other Day Baljinder Cali MD   50 mcg at 11/01/17 0606    magnesium oxide (MAG-OX) tablet 400 mg  400 mg Oral Daily Baljinder Cali MD   400 mg at 11/01/17 0842    metoprolol tartrate (LOPRESSOR) tablet 50 mg  50 mg Oral BID Baljinder Cali MD   50 mg at 11/01/17 2121    sodium bicarbonate tablet 325 mg  325 mg Oral TID Baljinder Cali MD   325 mg at 11/01/17 2114    venlafaxine (EFFEXOR XR) extended release capsule 150 mg  150 mg Oral Daily with breakfast Bruce Macario MD   150 mg at 11/01/17 0842    guaiFENesin-dextromethorphan (ROBITUSSIN DM) 100-10 MG/5ML syrup 5 mL  5 mL Oral Q4H PRN Jennifer Hamilton MD        pantoprazole sodium (PROTONIX) packet 40 mg  40 mg Oral QAM AC Bruce Macario MD   40 mg at 11/01/17 0607    pill splitter   Does not apply Once Bruce Macario MD   Stopped at 10/25/17 0055    insulin glargine (LANTUS) injection vial 14 Units  14 Units Subcutaneous QAM Xander Moreno MD   14 Units at 11/01/17 0852    ondansetron (ZOFRAN) injection 4 mg  4 mg Intravenous Q6H PRN Xander Moreno MD        influenza type A&B vaccine (FLUVIRIN) injection 0.5 mL  0.5 mL Intramuscular Once Xander Moreno MD        sodium chloride flush 0.9 % injection 10 mL  10 mL Intravenous 2 times per day Lacinda Pastures, PA   10 mL at 11/01/17 2136    sodium chloride flush 0.9 % injection 10 mL  10 mL Intravenous PRN Augie Pastures, PA   10 mL at 10/31/17 1540    acetaminophen (TYLENOL) tablet 650 mg  650 mg Oral Q4H PRN Augie Bal, PA   650 mg at 11/01/17 1507    glucose (GLUTOSE) 40 % oral gel 15 g  15 g Oral PRN Bruce Macario MD        dextrose 50 % solution 12.5 g  12.5 g Intravenous PRN Bruce Macario MD        glucagon (rDNA) injection 1 mg  1 mg Intramuscular PRN Bruce Macario MD        dextrose 5 % solution  100 mL/hr Intravenous PRN Bruce Macario MD        insulin lispro (HUMALOG) injection vial 0-12 Units  0-12 Units Subcutaneous TID WC Bruce Macario MD   2 Units at 11/01/17 1313    insulin lispro (HUMALOG) injection vial 0-6 Units  0-6 Units Subcutaneous Nightly Bruce Macario MD   2 Units at 11/01/17 2131     Allergies   Allergen Reactions    Arthrotec [Diclofenac-Misoprostol] Nausea Only    Depakote [Divalproex Sodium] Itching    Dilantin [Phenytoin]     Klonopin [Clonazepam]     Statins Other (See Comments)

## 2017-11-02 NOTE — ANESTHESIA PRE PROCEDURE
Inhalation Q4H PRN Smitha Mckenna MD        predniSONE (DELTASONE) tablet 10 mg  10 mg Oral BID Smitha Mckenna MD   10 mg at 11/01/17 2121    ALPRAZolam (XANAX) tablet 0.5 mg  0.5 mg Oral TID PRN Smitha Mckenna MD        morphine injection 2 mg  2 mg Intravenous Q4H PRN Smitha Mckenna MD        sodium chloride (OCEAN, BABY AYR) 0.65 % nasal spray 2 spray  2 spray Each Nare Q4H PRN Laya Araujo MD        phenylephrine (BEL-SYNEPHRINE) 0.5 % nasal spray 1 spray  1 spray Each Nare Q6H PRN Laya Araujo MD        carbamide peroxide (DEBROX) 6.5 % otic solution 5 drop  5 drop Right Ear TID Laya Araujo MD   5 drop at 11/01/17 2125    amiodarone (CORDARONE) tablet 200 mg  200 mg Oral BID Imelda Pardo MD   200 mg at 11/01/17 2121    metoprolol (LOPRESSOR) injection 5 mg  5 mg Intravenous Q4H PRN Imelda Pardo MD   5 mg at 11/02/17 1051    amLODIPine (NORVASC) tablet 5 mg  5 mg Oral Daily Anna Foster MD   5 mg at 11/01/17 9692    hydrALAZINE (APRESOLINE) injection 20 mg  20 mg Intravenous 4 times per day Chantelle Irvin MD   20 mg at 11/02/17 0541    calcium carbonate (TUMS) chewable tablet 500 mg  500 mg Oral BID PRN Marie Mckeon MD   500 mg at 10/30/17 1985    warfarin (COUMADIN) daily dosing (placeholder)   Other RX Placeholder Smitha Mckenna MD        darbepoetin lizeth-polysorbate (ARANESP) injection 40 mcg  40 mcg Subcutaneous Weekly Anna Foster MD   40 mcg at 10/26/17 1417    aspirin EC tablet 81 mg  81 mg Oral Daily Chantelle Irvin MD   81 mg at 11/01/17 0842    buprenorphine (BUPRENEX) 5 MCG/HR 1 patch  1 patch Transdermal Weekly Chantelle Irvin MD   Stopped at 11/01/17 1105    calcium elemental (OSCAL) tablet 500 mg  500 mg Oral Daily Chantelle Irvin MD   500 mg at 11/01/17 0843    carbidopa-levodopa (SINEMET)  MG per tablet 1 tablet  1 tablet Oral Daily Chantelle Irvin MD   1 tablet at 11/01/17 0842    cloNIDine 0.5 mL Intramuscular Once Curry Santiago MD        sodium chloride flush 0.9 % injection 10 mL  10 mL Intravenous 2 times per day DYLAN Waldrop   10 mL at 11/01/17 2136    sodium chloride flush 0.9 % injection 10 mL  10 mL Intravenous PRN DYLAN Waldrop   10 mL at 10/31/17 1540    acetaminophen (TYLENOL) tablet 650 mg  650 mg Oral Q4H PRN DYLAN Waldrop   650 mg at 11/01/17 1507    glucose (GLUTOSE) 40 % oral gel 15 g  15 g Oral PRN Keya Oakley MD        dextrose 50 % solution 12.5 g  12.5 g Intravenous PRN Keya Oakley MD        glucagon (rDNA) injection 1 mg  1 mg Intramuscular PRN Keya Oakley MD        dextrose 5 % solution  100 mL/hr Intravenous PRN Keya Oakley MD        insulin lispro (HUMALOG) injection vial 0-12 Units  0-12 Units Subcutaneous TID WC Keya Oakley MD   2 Units at 11/01/17 1313    insulin lispro (HUMALOG) injection vial 0-6 Units  0-6 Units Subcutaneous Nightly Keya Oakley MD   2 Units at 11/01/17 2131       Allergies:     Allergies   Allergen Reactions    Arthrotec [Diclofenac-Misoprostol] Nausea Only    Depakote [Divalproex Sodium] Itching    Dilantin [Phenytoin]     Klonopin [Clonazepam]     Statins Other (See Comments)     achy       Problem List:    Patient Active Problem List   Diagnosis Code    Hypothyroidism E03.9    Hypercholesteremia E78.00    CKD (chronic kidney disease) N18.9    Afib ([facility]) I48.91    Anemia D64.9    Acid reflux K21.9    Anxiety F41.9    Diabetes mellitus due to underlying condition, controlled, with stage 5 chronic kidney disease not on chronic dialysis, with long-term current use of insulin ([facility]) E08.22, N18.5, Z79.4    Essential hypertension I10    Idiopathic Parkinson's disease ([facility] G20    Pneumonia of lower lobe due to infectious organism ([facility] J18.1    Hyperkalemia E87.5    Acute on chronic diastolic congestive heart failure ([facility]) I50.33    Pulmonary edema with congestive heart failure (HCC) I50.1    Shortness of breath R06.02    Acute pulmonary edema (HCC) J81.0       Past Medical History:        Diagnosis Date    Abnormal presence of protein in urine 6/2004    Dr. Juan Luis Bhandari. [place] [facility] Atrial fibrillation ([facility]     Constipation, chronic     Diverticular disease of the colon     GERD (gastroesophageal reflux disease)     GERD, PUD, Hiatal Hernia    Granulomatous lung disease ([facility]     History of endoscopy 2-21-12    Dr. Joce Richards Hyperlipidemia     Hypothyroidism     Kidney stones 11/2001    Dr. 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 Veronica Brooke  [phone]    Bilateral    [address] THYROID SURGERY      URETHRAL STRICTURE DILATATION  3/2003       Social History:    Social History   Substance Use Topics    Smoking status: Former Smoker    Smokeless tobacco: Never Used    Alcohol use No                                Counseling given: Not Answered      Vital Signs (Current):   Vitals:    11/01/17 1609 11/01/17 2121 11/01/17 2235 11/02/17 0541   BP: (!) 158/54 (!) 164/55 (!) 154/55 (!) 162/62   Pulse: 68 61     Resp: 16      Temp: 98.2 °F (36.8 °C)      TempSrc: Oral      SpO2:       Weight:       Height:                                                  BP Readings from Last 3 Encounters:   11/02/17 (!) 162/62   10/23/17 (!) 162/56   10/16/17 (!) 149/63       NPO Status:                                                                                 BMI:   Wt

## 2017-11-02 NOTE — PROGRESS NOTES
Renal Progress Note    Assessment and Plan:   80 yo with ckd stage 5. Has av fistula maturing but not ready. Has proteinuria. Has pna on admission that didn't respond to diuretics. I thought looked euvolemic initially, but cxr worsening despite abx with concern for chf. Has anemia and high k as well. Sees Dr. Vanesa Rowe as outpt. Got iv lasix. Kidney function worsening. ID doesn't feel there is any infection and stopped abx. They feel it is more CHF. On coumadin for afib.     Plan/  1-aranesp for anemia given. Iron levels ok  2- norvasc 5 added for bp. 3- do not think she can do without dialysis now. Her risk of readmission, etc is high. Her fistula is only couple weeks old. 4- gave 2.5 vit k and held coumadin. Consuledt Dr. Baylee Vo for tunnelled permcath  5- HD today if INR low enough and can get permcath  6- HD orders written  7- SW to set up outpt HD depending on where she is going. If going home, would prefer closest [facility] unit to where she lives    Patient Active Problem List:     Hypothyroidism     Hypercholesteremia     CKD (chronic kidney disease)     Afib (HCC)     Anemia     Acid reflux     Anxiety     Diabetes mellitus due to underlying condition, controlled, with stage 5 chronic kidney disease not on chronic dialysis, with long-term current use of insulin ([address]     Essential hypertension     Idiopathic Parkinson's disease ([facility]     Pneumonia     Hyperkalemia      Subjective:   Admit Date: 10/24/2017    Interval History: feels tired this am.  Some sob. For permcath today.   Labs pending      Medications:   Scheduled Meds:   predniSONE  10 mg Oral BID    carbamide peroxide  5 drop Right Ear TID    amiodarone  200 mg Oral BID    amLODIPine  5 mg Oral Daily    hydrALAZINE  20 mg Intravenous 4 times per day    warfarin (COUMADIN) daily dosing (placeholder)   Other RX Placeholder    darbepoetin lizeth-polysorbate  40 mcg Subcutaneous Weekly    aspirin  81 mg Oral Daily    buprenorphine

## 2017-11-02 NOTE — PLAN OF CARE
Problem: Airway Clearance - Ineffective:  Goal: Clear lung sounds  Clear lung sounds   Outcome: Ongoing    Goal: Ability to maintain a clear airway will improve  Ability to maintain a clear airway will improve   Outcome: Ongoing      Problem: IP SWALLOWING  Goal: LTG - patient will tolerate the least restrictive diet consistency to allow for safe consumption of daily meals  Outcome: Ongoing      Problem: Mobility - Impaired:  Goal: Mobility will improve  Mobility will improve   Outcome: Ongoing    Goal: Mobility will improve  Mobility will improve   Outcome: Ongoing      Problem: Risk for Impaired Skin Integrity  Goal: Tissue integrity - skin and mucous membranes  Structural intactness and normal physiological function of skin and  mucous membranes.    Outcome: Ongoing      Problem: Falls - Risk of  Goal: Absence of falls  Outcome: Ongoing      Problem: Pain:  Goal: Pain level will decrease  Pain level will decrease   Outcome: Ongoing    Goal: Control of acute pain  Control of acute pain   Outcome: Ongoing    Goal: Control of chronic pain  Control of chronic pain   Outcome: Ongoing

## 2017-11-02 NOTE — ANESTHESIA POSTPROCEDURE EVALUATION
Department of Anesthesiology  Postprocedure Note    Patient: Clarisa Gomez  MRN: [id number]  YOB: 1935  Date of evaluation: 11/2/2017  Time:  12:32 PM     Procedure Summary     Date:  11/02/17 Room / Location:  [facility] [place] [facility] 10 / [address] [place]    Anesthesia Start:  6866 Anesthesia Stop:      Procedure:  CATHETER INSERTION HEMODIALYSIS (N/A ) Diagnosis:  (INPATIENT)    Surgeon:  Martha Hui MD Responsible Provider:  Daryle Oxford, MD    Anesthesia Type:  MAC ASA Status:  3          Anesthesia Type: MAC    [place]ell Phase I:      [place]ell Phase II:      Last vitals: Reviewed and per EMR flowsheets.        Anesthesia Post Evaluation    Patient location during evaluation: PACU  Patient participation: complete - patient participated  Level of consciousness: awake  Pain score: 0  Airway patency: patent  Nausea & Vomiting: no nausea and no vomiting  Complications: no  Cardiovascular status: hemodynamically stable  Respiratory status: acceptable  Hydration status: euvolemic

## 2017-11-02 NOTE — CONSULTS
[facility], [address] and [address] PHYSICAL EXAMINATION:  GENERAL:  Otherwise, the patient as mentioned is comfortable. VITAL SIGNS:  Stable with the temperature of 98.2, respirations 16,  pulse 68 and blood pressure 150/54. HEENT:  Examination of sclerae, no jaundice. NECK:  Supple. LUNGS:  Clear. ABDOMEN:  Soft. LABORATORY DATA:   The patient has electrolytes with the potassium  5.4, chloride 96, sodium 137, BUN 89, creatinine 5.69, which is up  from 4.39; lactic acid 0.80, glucose 184, calcium 7.7, troponin 0.033,  proBNP _____. The liver function tests with the globulin 2.4,  otherwise the liver function tests are normal.  The CBC with the WBC  9.2, hemoglobin 7.4, hematocrit 23.4 and platelet count of 983,112. PROCEDURE: The placement of the catheter and the _____ of the  procedure has been discussed with the patient and the son who was  present, both agreed as well as the risks and complications. We will  schedule it possibly in the next 24 to 48 hours. Solis Clayton MD    D: 11/01/2017 17:59:04       T: 11/02/2017 11:13:23     [email or web address]  Job#: [id number]     Doc#: [id number]    CC:   MD Terri Wiggins MD

## 2017-11-02 NOTE — PROGRESS NOTES
HPI Notes:    Katiana Sherman is a 81 y.o.  female patient who is being at the request of Dr. Lore Berger inpatient consultation of atrial fibrillation. She was admitted on 10/24/2017. Sruthi Zamorabettymadeline and [id number] [place] [address] records have been reviewed in detail.  She is followed on a regular basis by Dr. Anahy Farah.     She has a history of paroxysmal atrial fibrillation for which she is maintained chronically on amiodarone.  She is chronically antiquated without warfarin. Elda Guerrier has a history of mild coronary artery disease. She also has a history of moderate to severe calcific aortic stenosis.  She has significant hyperlipidemia but is been intolerant to statin therapy. Elda Guerrier has stage V chronic kidney disease and follows regularly with Dr. Susana Crow. Elda Guerrier had an AV fistula placed however that has not completely matured.  Most recently saw Dr. Anahy Farah in early September and was noted on EKG to be in normal sinus rhythm.     She presented to the emergency department 5 days ago with complaints of worsening shortness of breath occurring over a period of 3 days.  She's had a productive cough and has been having some bloody sputum. Elda Guerrier was diagnosed with pneumonia and is being treated with intravenous antibiotics. Elda Guerrier notes that she is now feeling much better. Elda Guerrier is also being treated for hyperkalemia.  She initially was noted to be normal sinus rhythm, however, yesterday she developed tachycardia and was noted to be in atrial fibrillation with a rapid ventricular response.  She denies any chest pain.  She denies any orthopnea, PND, or worsening peripheral edema.  She denies any palpitations, lightheadedness, near-syncope or syncope.     OBJECTIVE:     MEDICATIONS:     Scheduled Meds:   b complex-C-folic acid  1 capsule Oral Daily    predniSONE  10 mg Oral BID    carbamide peroxide  5 drop Right Ear TID    amiodarone  200 mg Oral BID    amLODIPine  5 mg Oral Daily    hydrALAZINE  20 mg Intravenous 4 times per day    warfarin (COUMADIN) daily dosing (placeholder)   Other RX Placeholder    darbepoetin lizeth-polysorbate  40 mcg Subcutaneous Weekly    aspirin  81 mg Oral Daily    buprenorphine  1 patch Transdermal Weekly    calcium elemental  500 mg Oral Daily    carbidopa-levodopa  1 tablet Oral Daily    cloNIDine  0.1 mg Oral TID    rosuvastatin  40 mg Oral Nightly    fluticasone  2 spray Nasal Daily    iron polysaccharide complex-B12-folic acid  1 capsule Oral Daily with breakfast    isosorbide mononitrate  60 mg Oral Daily    levothyroxine  25 mcg Oral Every Other Day    levothyroxine  50 mcg Oral Every Other Day    magnesium oxide  400 mg Oral Daily    metoprolol tartrate  50 mg Oral BID    sodium bicarbonate  325 mg Oral TID    venlafaxine  150 mg Oral Daily with breakfast    pantoprazole sodium  40 mg Oral QAM AC    pill splitter   Does not apply Once    insulin glargine  14 Units Subcutaneous QAM    influenza virus vaccine  0.5 mL Intramuscular Once    sodium chloride flush  10 mL Intravenous 2 times per day    insulin lispro  0-12 Units Subcutaneous TID WC    insulin lispro  0-6 Units Subcutaneous Nightly     Continuous Infusions:   sodium chloride      sodium chloride      dextrose       PRN Meds:albumin human, heparin (porcine), heparin (porcine), heparin (porcine), ipratropium-albuterol, ALPRAZolam, morphine, sodium chloride, phenylephrine, metoprolol, calcium carbonate, guaiFENesin-dextromethorphan, ondansetron, sodium chloride flush, acetaminophen, glucose, dextrose, glucagon (rDNA), dextrose    PHYSICAL EXAM:    CURRENT VITALS: BP (!) 173/146   Pulse 136   Temp 97.5 °F (36.4 °C)   Resp 14   Ht 4' 11\" (1.499 m)   Wt 133 lb (60.3 kg)   SpO2 100%   BMI 26.86 kg/m²     CONSTITUTIONAL:  awake, alert, cooperative, no apparent distress,   ENT:  Normocephalic, without obvious abnormality, atraumatic, sinuses nontender on palpation, external ears without lesions,  NECK:  Supple, symmetrical, Metabolic Panel    Collection Time: 11/02/17  7:58 AM   Result Value Ref Range    Sodium 137 132 - 144 mEq/L    Potassium 5.3 (H) 3.5 - 5.1 mEq/L    Chloride 98 98 - 107 mEq/L    CO2 22 22 - 29 mEq/L    Anion Gap 17 (H) 7 - 13 mEq/L    Glucose 148 (H) 74 - 109 mg/dL    BUN 96 (HH) 8 - 23 mg/dL    CREATININE 5.64 (H) 0.50 - 0.90 mg/dL    GFR Non-African American 7.2 (L) >60    GFR  8.7 (L) >60    Calcium 7.4 (L) 8.6 - 10.2 mg/dL    Total Protein 5.5 (L) 6.4 - 8.1 g/dL    Alb 3.1 (L) 3.9 - 4.9 g/dL    Total Bilirubin 0.2 0.0 - 1.2 mg/dL    Alkaline Phosphatase 64 40 - 130 U/L    ALT 12 0 - 33 U/L    AST 19 0 - 35 U/L    Globulin 2.4 2.3 - 3.5 g/dL   Sedimentation Rate    Collection Time: 11/02/17  7:58 AM   Result Value Ref Range    Sed Rate 57 (H) 0 - 30 mm   POCT Glucose    Collection Time: 11/02/17  8:07 AM   Result Value Ref Range    POC Glucose 176 (H) 60 - 115 mg/dl    Performed on ACCU-CHEK    POCT Glucose    Collection Time: 11/02/17 12:38 PM   Result Value Ref Range    POC Glucose 142 (H) 60 - 115 mg/dl    Performed on ACCU-CHEK      Pro-BNP 30,906     D-Dimer, Quant 0.80 (H)            CXR: ( portable )  worsening patchy probable bilateral pulmonary edema and/or bronchopneumonia. CT CHEST:  There are patchy multifocal areas of groundglass infiltrates within the left upper lobe left lower lobe, right upper lobe, right middle lobe. There is also more small focal areas of infiltrate consolidation air bronchograms in both bases. There are bilateral pleural effusions. No pneumothoraces. VQ LUNGS:  Negative ( non diagnostic ) for PE     EKG:   SR now, FDB, now in afib with rvr    TELEMETRY:  SR with PAF prior    Echo: (10/17)  Normal LV function 35%  Mild Diastolic Dysfunction  No significant VHD  Moderate PHTN, RVSP 45.   PM noted        Pleasant Nazario, MD ,[address] [place] [address] Cardiology

## 2017-11-02 NOTE — ANESTHESIA PRE PROCEDURE
(CORDARONE) 200 MG tablet Take 100 mg by mouth daily 1/2 tablet    Yes Historical Provider, MD   cloNIDine (CATAPRES) 0.1 MG tablet 3 times daily  4/17/15  Yes Historical Provider, MD   isosorbide mononitrate (IMDUR) 60 MG CR tablet  2/18/15  Yes Historical Provider, MD   carbidopa-levodopa (SINEMET)  MG per tablet Take 1 tablet by mouth daily  10/16/13  Yes Historical Provider, MD   glucose blood VI test strips (TRUETEST TEST) strip As needed. 5/1/13  Yes Jovon Robin MD   metoprolol (LOPRESSOR) 50 MG tablet Take 1 tablet by mouth 2 times daily. 9/27/11  Yes Swathi Watson MD   aspirin 81 MG EC tablet Take 81 mg by mouth daily    Yes Historical Provider, MD   calcium carbonate (OSCAL) 500 MG TABS tablet Take 500 mg by mouth daily. Yes Historical Provider, MD   NOVOFINE 32G X 6 MM MISC USE 4 TIMES DAILY 10/13/17   Lucy Perez MD   IFEREX 150 150 MG capsule  4/29/17   Historical Provider, MD   Lomitapide Mesylate (JUXTAPID) 40 MG CAPS Take by mouth daily    Historical Provider, MD   CRESTOR 40 MG tablet  4/26/15   Historical Provider, MD   chlorthalidone (HYGROTON) 25 MG tablet Take 25 mg by mouth daily    Historical Provider, MD   fluticasone (FLONASE) 50 MCG/ACT nasal spray 2 sprays by Nasal route daily.       Historical Provider, MD       Current medications:    Current Facility-Administered Medications   Medication Dose Route Frequency Provider Last Rate Last Dose    albumin human 25 % solution 25 g  25 g Intravenous Daily PRN Bryant Cabot, MD        heparin (porcine) injection 2,000 Units  2,000 Units Intravenous PRN Bryant Cabot, MD        heparin (porcine) injection 4,000 Units  4,000 Units Intercatheter PRN Bryant Cabot, MD        heparin (porcine) injection 1,000 Units  1,000 Units Intravenous PRN Bryant Cabot, MD        b complex-C-folic acid (NEPHROCAPS) capsule 1 mg  1 capsule Oral Daily Bryant Cabot, MD        fentaNYL (SUBLIMAZE) injection 50 mcg  50 mcg Intravenous Q10 Min PRN Nella Moody MD        HYDROcodone-acetaminophen [facility]) 5-325 MG per tablet 1 tablet  1 tablet Oral PRN Nella Moody MD        Or    HYDROcodone-acetaminophen (NORCO) 5-325 MG per tablet 2 tablet  2 tablet Oral PRN Nella Moody MD        diphenhydrAMINE (BENADRYL) injection 12.5 mg  12.5 mg Intravenous Once PRN Nella Moody MD        ondansetron [facility]) injection 4 mg  4 mg Intravenous Once PRN Nella Moody MD        metoclopramide (REGLAN) injection 10 mg  10 mg Intravenous Once PRN Nella Moody MD        meperidine (DEMEROL) injection 12.5 mg  12.5 mg Intravenous Q5 Min PRN Nella Moody MD        ipratropium-albuterol (DUONEB) nebulizer solution 1 ampule  1 ampule Inhalation Q4H PRN Vipin Lester MD        predniSONE (DELTASONE) tablet 10 mg  10 mg Oral BID Vipin Lester MD   10 mg at 11/01/17 2121    ALPRAZolam (XANAX) tablet 0.5 mg  0.5 mg Oral TID PRN Vipin Lester MD        morphine injection 2 mg  2 mg Intravenous Q4H PRN Vipin Lester MD        sodium chloride (OCEAN, BABY AYR) 0.65 % nasal spray 2 spray  2 spray Each Nare Q4H PRN Deric Gutierrez MD        phenylephrine (BEL-SYNEPHRINE) 0.5 % nasal spray 1 spray  1 spray Each Nare Q6H PRN Deric Gutierrez MD        carbamide peroxide (DEBROX) 6.5 % otic solution 5 drop  5 drop Right Ear TID Deric Gutierrez MD   5 drop at 11/01/17 2125    amiodarone (CORDARONE) tablet 200 mg  200 mg Oral BID Aura Varela MD   200 mg at 11/01/17 2121    metoprolol (LOPRESSOR) injection 5 mg  5 mg Intravenous Q4H PRN Aura Varela MD   5 mg at 11/02/17 1051    amLODIPine (NORVASC) tablet 5 mg  5 mg Oral Daily Ann Roe MD   5 mg at 11/01/17 0842    hydrALAZINE (APRESOLINE) injection 20 mg  20 mg Intravenous 4 times per day Leonides Saldaña MD   20 mg at 11/02/17 0541    calcium carbonate (TUMS) chewable tablet 500 mg  500 mg Oral BID PRN Chsatity Arias MD   500 mg at 10/30/17 0916    warfarin capsule 150 mg  150 mg Oral Daily with breakfast Sathish Marmolejo MD   150 mg at 11/01/17 0842    guaiFENesin-dextromethorphan (ROBITUSSIN DM) 100-10 MG/5ML syrup 5 mL  5 mL Oral Q4H PRN Fannie Pickens MD        pantoprazole sodium (PROTONIX) packet 40 mg  40 mg Oral QAM AC Sathish Marmolejo MD   40 mg at 11/01/17 0607    pill splitter   Does not apply Once Sathish Marmolejo MD   Stopped at 10/25/17 0055    insulin glargine (LANTUS) injection vial 14 Units  14 Units Subcutaneous QAM Santosh Reardon MD   14 Units at 11/01/17 0852    ondansetron (ZOFRAN) injection 4 mg  4 mg Intravenous Q6H PRN Santosh Reardon MD        influenza type A&B vaccine (FLUVIRIN) injection 0.5 mL  0.5 mL Intramuscular Once Santosh Reardon MD        sodium chloride flush 0.9 % injection 10 mL  10 mL Intravenous 2 times per day DYLAN Lopez   10 mL at 11/01/17 2136    sodium chloride flush 0.9 % injection 10 mL  10 mL Intravenous PRN DYLAN Lopez   10 mL at 10/31/17 1540    acetaminophen (TYLENOL) tablet 650 mg  650 mg Oral Q4H PRN DYLAN Lopez   650 mg at 11/01/17 1507    glucose (GLUTOSE) 40 % oral gel 15 g  15 g Oral PRN Sathish Marmolejo MD        dextrose 50 % solution 12.5 g  12.5 g Intravenous PRN Sathish Marmolejo MD        glucagon (rDNA) injection 1 mg  1 mg Intramuscular PRN Sathish Marmolejo MD        dextrose 5 % solution  100 mL/hr Intravenous PRN Sathish Marmolejo MD        insulin lispro (HUMALOG) injection vial 0-12 Units  0-12 Units Subcutaneous TID WC Sathish Marmolejo MD   2 Units at 11/01/17 1313    insulin lispro (HUMALOG) injection vial 0-6 Units  0-6 Units Subcutaneous Nightly Sathish Marmolejo MD   2 Units at 11/01/17 2131       Allergies:     Allergies   Allergen Reactions    Arthrotec [Diclofenac-Misoprostol] Nausea Only    Depakote [Divalproex Sodium] Itching    Dilantin [Phenytoin]     Klonopin [Clonazepam]     Statins Other (See Comments)     achy Problem List:    Patient Active Problem List   Diagnosis Code    Hypothyroidism E03.9    Hypercholesteremia E78.00    CKD (chronic kidney disease) N18.9    Afib ([facility] I48.91    Anemia D64.9    Acid reflux K21.9    Anxiety F41.9    Diabetes mellitus due to underlying condition, controlled, with stage 5 chronic kidney disease not on chronic dialysis, with long-term current use of insulin ([facility]) E08.22, N18.5, Z79.4    Essential hypertension I10    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 Zaida Shin    Atrial fibrillation ([facility]     Constipation, chronic     Diverticular disease of the colon     GERD (gastroesophageal reflux disease)     GERD, PUD, Hiatal Hernia    Granulomatous lung disease ([facility]     History of endoscopy 2-21-12    Dr. Megan Wilkinson Hyperlipidemia     Hypothyroidism     Kidney stones 11/2001    Dr. 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 Billie Oleary REPLACEMENT  [id number]    Bilateral    ROTATOR CUFF REPAIR  1990    THYROID SURGERY      URETHRAL STRICTURE DILATATION  3/2003       Social History:    Social History   Substance Use Topics    Smoking status: Former Smoker    Smokeless tobacco: Never Used    Alcohol use No                                Counseling given: Not Answered      Vital Signs (Current):   Vitals:    11/01/17 1609 11/01/17 2121 11/01/17 2235 11/02/17 0541   BP: (!) 158/54 (!) 164/55 (!) 154/55 (!) 162/62   Pulse: 68 61     Resp: 16      Temp: 98.2 °F (36.8 °C)      TempSrc: Oral      SpO2:       Weight:       Height:                                                  BP Readings from Last 3 Encounters:   11/02/17 (!) 162/62   10/23/17 (!) 162/56   10/16/17 (!) 149/63       NPO Status:                                                                                 BMI:   Wt Readings from Last 3 Encounters:   10/24/17 133 lb (60.3 kg)   09/28/17 135 lb (61.2 kg)   07/21/17 138 lb (62.6 kg)     Body mass index is 26.86 kg/m².     CBC:   Lab Results   Component Value Date    WBC 7.3 11/02/2017    RBC 2.52 11/02/2017    RBC 3.24 12/17/2011    HGB 7.3 11/02/2017    HCT 23.0 11/02/2017    MCV 91.1 11/02/2017    RDW 18.1 11/02/2017     11/02/2017       CMP:   Lab Results   Component Value Date     11/02/2017    K 5.3 11/02/2017    CL 98 11/02/2017    CO2 22 11/02/2017    BUN 96 11/02/2017    CREATININE 5.64 11/02/2017    GFRAA 8.7 11/02/2017    LABGLOM 7.2 11/02/2017    GLUCOSE 148 11/02/2017    GLUCOSE 197 02/17/2012    PROT 5.5 11/02/2017    CALCIUM 7.4 11/02/2017    BILITOT 0.2 11/02/2017    ALKPHOS 64 11/02/2017    AST 19 11/02/2017    ALT 12 11/02/2017       POC Tests:   Recent Labs      11/02/17   0807   POCGLU  176*       Coags:   Lab Results   Component Value Date    PROTIME 17.1 11/02/2017    PROTIME 16.5 10/20/2017    PROTIME 25.7 09/29/2016    INR 1.6 11/02/2017    APTT 40.2 10/24/2017       HCG (If Applicable): No results found for: PREGTESTUR, PREGSERUM, HCG, HCGQUANT     ABGs:   Lab Results   Component Value Date    PHART 7.452 10/30/2017    PO2ART 151 10/30/2017    KVB5CJV 35

## 2017-11-02 NOTE — PROGRESS NOTES
Physical Therapy  Facility/Department: [facility] MED SURG G780/C886-09  Daily Progress Note    NAME: Jennifer Sherman    : 1935 (81 y.o.)  MRN: [id number]    Account: [de-identified]  Gender: female    Date of Service: 17    Patient Diagnosis(es):   Patient Active Problem List    Diagnosis Date Noted    Acute on chronic diastolic congestive heart failure ([address] 10/30/2017    Pulmonary edema with congestive heart failure (HCC)     Shortness of breath     Acute pulmonary edema (HCC)     Hyperkalemia 10/26/2017    Pneumonia of lower lobe due to infectious organism ([address] 10/25/2017    Diabetes mellitus due to underlying condition, controlled, with stage 5 chronic kidney disease not on chronic dialysis, with long-term current use of insulin ([address] 2017    Essential hypertension 2017    Anxiety 2017    Afib ([address] 10/27/2016    Acid reflux 2015    Idiopathic Parkinson's disease ([facility] 2014    CKD (chronic kidney disease) 10/15/2012    Anemia 2011    Hypothyroidism 10/04/2011    Hypercholesteremia 10/04/2011       Precautions/Weight Bearing Restrictions: Full weight bearing  Restrictions/Precautions: Fall Risk  Falls Safety Armband Present:  [x] Yes  [] No    SUBJECTIVE: Pt in bed upon arrival. Agreeable to therapy. Pt very [place].     Pain:   Initial Pain level: none  Action:  []  Pt declined intervention  [] Nursing notified     [x] Pain acceptable level for treatment  [] Other:      Reassessment of Pain: 6 /10   Location: B knees   Description: aching  Action:  [x]  Pt declined intervention  [] Nursing notified     [] Pain acceptable level for treatment  [] Other:    OBJECTIVE:     Bed Mobility:   Rolling: - Not Tested  Supine to Sit: -  Modified Independent   Sit to Supine: -  Modified Independent    With HOB elevated    Transfers:   Sit to Stand: - Supervision   Stand to Sit: - Supervision     Gait/Ambulation:   Assistive Device: None  Assist Level: Contact Guard Assist  Distance: 25'x2, 50'x2  Surface: linoleum/hardwood  Gait Deviations: Decreased gait speed, NBOS with VC to correct with poor carryover. Pt occ reaching for rail in hallway. Plan   Plan  Times per week: 3-6  Current Treatment Recommendations: Strengthening, Functional Mobility Training, Neuromuscular Re-education, Gait Training, Endurance Training, Balance Training, Home Exercise Program, Safety Education & Training  Safety Devices  Type of devices: Call light within reach, All fall risk precautions in place    Goals  Short term goals  Short term goal 1: Pt will complete transfers with indep  Short term goal 2: Pt will be indep with HEP  Short term goal 3: Pt will amb 100' with indep  Short term goal 4: Pt will complete 10' standing exercise/activity with 0-1 UE support and indep without rest break     Comments: Fair tolerance to tx. Pt in bed with alarm on, call light within reach, and family present post tx.      Therapy Time   Individual   Time In 1009   Time Out 1025   Minutes 16     Activity Minutes Units   Ther Ex     Manual      Neuro Ed     Gait 16 1         Electronically signed by Jocy Cheney PTA on 11/2/2017 at 10:26 AM

## 2017-11-03 VITALS
DIASTOLIC BLOOD PRESSURE: 62 MMHG | HEIGHT: 59 IN | RESPIRATION RATE: 18 BRPM | WEIGHT: 124.34 LBS | OXYGEN SATURATION: 100 % | BODY MASS INDEX: 25.07 KG/M2 | TEMPERATURE: 98 F | SYSTOLIC BLOOD PRESSURE: 150 MMHG | HEART RATE: 60 BPM

## 2017-11-03 LAB
ALBUMIN SERPL-MCNC: 3.3 G/DL (ref 3.9–4.9)
ALP BLD-CCNC: 70 U/L (ref 40–130)
ALT SERPL-CCNC: 20 U/L (ref 0–33)
ANION GAP SERPL CALCULATED.3IONS-SCNC: 16 MEQ/L (ref 7–13)
AST SERPL-CCNC: 19 U/L (ref 0–35)
BASOPHILS ABSOLUTE: 0 K/UL (ref 0–0.2)
BASOPHILS RELATIVE PERCENT: 0.1 %
BILIRUB SERPL-MCNC: 0.3 MG/DL (ref 0–1.2)
BUN BLDV-MCNC: 54 MG/DL (ref 8–23)
CALCIUM SERPL-MCNC: 7.6 MG/DL (ref 8.6–10.2)
CHLORIDE BLD-SCNC: 97 MEQ/L (ref 98–107)
CO2: 23 MEQ/L (ref 22–29)
CREAT SERPL-MCNC: 3.46 MG/DL (ref 0.5–0.9)
EKG ATRIAL RATE: 131 BPM
EKG ATRIAL RATE: 50 BPM
EKG ATRIAL RATE: 59 BPM
EKG ATRIAL RATE: 62 BPM
EKG ATRIAL RATE: 65 BPM
EKG ATRIAL RATE: 65 BPM
EKG ATRIAL RATE: 66 BPM
EKG ATRIAL RATE: 89 BPM
EKG P AXIS: 0 DEGREES
EKG P AXIS: 3 DEGREES
EKG P AXIS: 3 DEGREES
EKG P AXIS: 5 DEGREES
EKG P AXIS: 54 DEGREES
EKG P AXIS: 63 DEGREES
EKG P AXIS: 70 DEGREES
EKG P-R INTERVAL: 160 MS
EKG P-R INTERVAL: 166 MS
EKG P-R INTERVAL: 172 MS
EKG P-R INTERVAL: 176 MS
EKG P-R INTERVAL: 180 MS
EKG P-R INTERVAL: 186 MS
EKG P-R INTERVAL: 198 MS
EKG Q-T INTERVAL: 356 MS
EKG Q-T INTERVAL: 402 MS
EKG Q-T INTERVAL: 436 MS
EKG Q-T INTERVAL: 438 MS
EKG Q-T INTERVAL: 446 MS
EKG Q-T INTERVAL: 454 MS
EKG Q-T INTERVAL: 470 MS
EKG Q-T INTERVAL: 502 MS
EKG QRS DURATION: 90 MS
EKG QRS DURATION: 92 MS
EKG QRS DURATION: 92 MS
EKG QRS DURATION: 94 MS
EKG QRS DURATION: 98 MS
EKG QTC CALCULATION (BAZETT): 449 MS
EKG QTC CALCULATION (BAZETT): 455 MS
EKG QTC CALCULATION (BAZETT): 457 MS
EKG QTC CALCULATION (BAZETT): 457 MS
EKG QTC CALCULATION (BAZETT): 463 MS
EKG QTC CALCULATION (BAZETT): 477 MS
EKG QTC CALCULATION (BAZETT): 484 MS
EKG QTC CALCULATION (BAZETT): 489 MS
EKG R AXIS: 16 DEGREES
EKG R AXIS: 16 DEGREES
EKG R AXIS: 20 DEGREES
EKG R AXIS: 27 DEGREES
EKG R AXIS: 28 DEGREES
EKG R AXIS: 32 DEGREES
EKG T AXIS: -14 DEGREES
EKG T AXIS: 14 DEGREES
EKG T AXIS: 16 DEGREES
EKG T AXIS: 37 DEGREES
EKG T AXIS: 42 DEGREES
EKG T AXIS: 43 DEGREES
EKG T AXIS: 43 DEGREES
EKG T AXIS: 44 DEGREES
EKG VENTRICULAR RATE: 111 BPM
EKG VENTRICULAR RATE: 50 BPM
EKG VENTRICULAR RATE: 59 BPM
EKG VENTRICULAR RATE: 62 BPM
EKG VENTRICULAR RATE: 65 BPM
EKG VENTRICULAR RATE: 65 BPM
EKG VENTRICULAR RATE: 66 BPM
EKG VENTRICULAR RATE: 89 BPM
EOSINOPHILS ABSOLUTE: 0 K/UL (ref 0–0.7)
EOSINOPHILS RELATIVE PERCENT: 0.1 %
GFR AFRICAN AMERICAN: 15.4
GFR NON-AFRICAN AMERICAN: 12.7
GLOBULIN: 2.2 G/DL (ref 2.3–3.5)
GLUCOSE BLD-MCNC: 114 MG/DL (ref 60–115)
GLUCOSE BLD-MCNC: 180 MG/DL (ref 60–115)
GLUCOSE BLD-MCNC: 192 MG/DL (ref 74–109)
GLUCOSE BLD-MCNC: 224 MG/DL (ref 60–115)
HCT VFR BLD CALC: 24.2 % (ref 37–47)
HEMOGLOBIN: 7.7 G/DL (ref 12–16)
INR BLD: 1.2
LYMPHOCYTES ABSOLUTE: 0.3 K/UL (ref 1–4.8)
LYMPHOCYTES RELATIVE PERCENT: 2.6 %
MCH RBC QN AUTO: 28.8 PG (ref 27–31.3)
MCHC RBC AUTO-ENTMCNC: 31.9 % (ref 33–37)
MCV RBC AUTO: 90 FL (ref 82–100)
MONOCYTES ABSOLUTE: 1 K/UL (ref 0.2–0.8)
MONOCYTES RELATIVE PERCENT: 9.6 %
NEUTROPHILS ABSOLUTE: 9 K/UL (ref 1.4–6.5)
NEUTROPHILS RELATIVE PERCENT: 87.6 %
PDW BLD-RTO: 17.4 % (ref 11.5–14.5)
PERFORMED ON: ABNORMAL
PERFORMED ON: ABNORMAL
PERFORMED ON: NORMAL
PLATELET # BLD: 153 K/UL (ref 130–400)
POTASSIUM SERPL-SCNC: 4.5 MEQ/L (ref 3.5–5.1)
PROTHROMBIN TIME: 12.9 SEC (ref 8.1–13.7)
RBC # BLD: 2.69 M/UL (ref 4.2–5.4)
SODIUM BLD-SCNC: 136 MEQ/L (ref 132–144)
TOTAL PROTEIN: 5.5 G/DL (ref 6.4–8.1)
WBC # BLD: 10.3 K/UL (ref 4.8–10.8)

## 2017-11-03 PROCEDURE — 6370000000 HC RX 637 (ALT 250 FOR IP): Performed by: INTERNAL MEDICINE

## 2017-11-03 PROCEDURE — 6360000002 HC RX W HCPCS: Performed by: INTERNAL MEDICINE

## 2017-11-03 PROCEDURE — 97116 GAIT TRAINING THERAPY: CPT

## 2017-11-03 PROCEDURE — 85025 COMPLETE CBC W/AUTO DIFF WBC: CPT

## 2017-11-03 PROCEDURE — 97535 SELF CARE MNGMENT TRAINING: CPT

## 2017-11-03 PROCEDURE — 6370000000 HC RX 637 (ALT 250 FOR IP): Performed by: PERSONAL EMERGENCY RESPONSE ATTENDANT

## 2017-11-03 PROCEDURE — 36415 COLL VENOUS BLD VENIPUNCTURE: CPT

## 2017-11-03 PROCEDURE — 2700000000 HC OXYGEN THERAPY PER DAY

## 2017-11-03 PROCEDURE — 85610 PROTHROMBIN TIME: CPT

## 2017-11-03 PROCEDURE — 90937 HEMODIALYSIS REPEATED EVAL: CPT

## 2017-11-03 PROCEDURE — 93010 ELECTROCARDIOGRAM REPORT: CPT | Performed by: INTERNAL MEDICINE

## 2017-11-03 PROCEDURE — 80053 COMPREHEN METABOLIC PANEL: CPT

## 2017-11-03 PROCEDURE — 93005 ELECTROCARDIOGRAM TRACING: CPT

## 2017-11-03 RX ORDER — SODIUM CHLORIDE 9 MG/ML
INJECTION, SOLUTION INTRAVENOUS
Status: DISCONTINUED
Start: 2017-11-03 | End: 2017-11-03 | Stop reason: HOSPADM

## 2017-11-03 RX ORDER — WARFARIN SODIUM 3 MG/1
6 TABLET ORAL
Status: COMPLETED | OUTPATIENT
Start: 2017-11-03 | End: 2017-11-03

## 2017-11-03 RX ORDER — AMLODIPINE BESYLATE 5 MG/1
5 TABLET ORAL DAILY
Qty: 30 TABLET | Refills: 3 | DISCHARGE
Start: 2017-11-03 | End: 2017-11-03

## 2017-11-03 RX ORDER — ALPRAZOLAM 0.5 MG/1
0.5 TABLET ORAL 3 TIMES DAILY PRN
Qty: 10 TABLET | Refills: 0 | Status: ON HOLD | OUTPATIENT
Start: 2017-11-03 | End: 2018-01-01

## 2017-11-03 RX ORDER — AMOXICILLIN AND CLAVULANATE POTASSIUM 500; 125 MG/1; MG/1
1 TABLET, FILM COATED ORAL 3 TIMES DAILY
Qty: 30 TABLET | Refills: 0 | Status: ON HOLD | DISCHARGE
Start: 2017-11-03 | End: 2017-11-21 | Stop reason: HOSPADM

## 2017-11-03 RX ORDER — IPRATROPIUM BROMIDE AND ALBUTEROL SULFATE 2.5; .5 MG/3ML; MG/3ML
3 SOLUTION RESPIRATORY (INHALATION) EVERY 4 HOURS PRN
Qty: 360 ML | Status: ON HOLD | DISCHARGE
Start: 2017-11-03 | End: 2018-01-01 | Stop reason: HOSPADM

## 2017-11-03 RX ORDER — BUPRENORPHINE 5 UG/H
1 PATCH TRANSDERMAL WEEKLY
Qty: 1 PATCH | Refills: 0 | Status: ON HOLD | OUTPATIENT
Start: 2017-11-03 | End: 2018-01-01 | Stop reason: HOSPADM

## 2017-11-03 RX ORDER — HYDRALAZINE HYDROCHLORIDE 100 MG/1
100 TABLET, FILM COATED ORAL 3 TIMES DAILY
Qty: 90 TABLET | Refills: 3 | DISCHARGE
Start: 2017-11-03 | End: 2018-01-01 | Stop reason: SDUPTHER

## 2017-11-03 RX ORDER — AMOXICILLIN AND CLAVULANATE POTASSIUM 500; 125 MG/1; MG/1
1 TABLET, FILM COATED ORAL 3 TIMES DAILY
Status: DISCONTINUED | OUTPATIENT
Start: 2017-11-03 | End: 2017-11-03 | Stop reason: HOSPADM

## 2017-11-03 RX ORDER — HYDRALAZINE HYDROCHLORIDE 50 MG/1
100 TABLET, FILM COATED ORAL 3 TIMES DAILY
Status: DISCONTINUED | OUTPATIENT
Start: 2017-11-03 | End: 2017-11-03 | Stop reason: HOSPADM

## 2017-11-03 RX ORDER — AMLODIPINE BESYLATE 5 MG/1
5 TABLET ORAL 2 TIMES DAILY
Qty: 30 TABLET | Refills: 3 | Status: ON HOLD | DISCHARGE
Start: 2017-11-03 | End: 2017-11-23 | Stop reason: HOSPADM

## 2017-11-03 RX ORDER — GUAIFENESIN/DEXTROMETHORPHAN 100-10MG/5
5 SYRUP ORAL EVERY 4 HOURS PRN
Qty: 120 ML | Status: ON HOLD | DISCHARGE
Start: 2017-11-03 | End: 2017-11-21 | Stop reason: HOSPADM

## 2017-11-03 RX ORDER — AMLODIPINE BESYLATE 5 MG/1
5 TABLET ORAL 2 TIMES DAILY
Status: DISCONTINUED | OUTPATIENT
Start: 2017-11-04 | End: 2017-11-03 | Stop reason: HOSPADM

## 2017-11-03 RX ADMIN — WARFARIN SODIUM 6 MG: 3 TABLET ORAL at 18:41

## 2017-11-03 RX ADMIN — AMOXICILLIN AND CLAVULANATE POTASSIUM 1 TABLET: 500; 125 TABLET, FILM COATED ORAL at 14:02

## 2017-11-03 RX ADMIN — SODIUM BICARBONATE 325 MG: 650 TABLET ORAL at 14:00

## 2017-11-03 RX ADMIN — ACETAMINOPHEN 650 MG: 325 TABLET ORAL at 03:30

## 2017-11-03 RX ADMIN — ISOSORBIDE MONONITRATE 60 MG: 60 TABLET, EXTENDED RELEASE ORAL at 14:01

## 2017-11-03 RX ADMIN — AMLODIPINE BESYLATE 5 MG: 5 TABLET ORAL at 14:01

## 2017-11-03 RX ADMIN — HYDRALAZINE HYDROCHLORIDE 20 MG: 20 INJECTION INTRAMUSCULAR; INTRAVENOUS at 03:31

## 2017-11-03 RX ADMIN — ASPIRIN 81 MG: 81 TABLET, COATED ORAL at 14:00

## 2017-11-03 RX ADMIN — Medication 400 MG: at 14:02

## 2017-11-03 RX ADMIN — VENLAFAXINE HYDROCHLORIDE 150 MG: 150 CAPSULE, EXTENDED RELEASE ORAL at 14:01

## 2017-11-03 RX ADMIN — METOPROLOL TARTRATE 50 MG: 50 TABLET ORAL at 14:01

## 2017-11-03 RX ADMIN — CARBIDOPA AND LEVODOPA 1 TABLET: 25; 100 TABLET ORAL at 14:00

## 2017-11-03 RX ADMIN — HEPARIN SODIUM 2000 UNITS: 1000 INJECTION, SOLUTION INTRAVENOUS; SUBCUTANEOUS at 08:40

## 2017-11-03 RX ADMIN — AMIODARONE HYDROCHLORIDE 200 MG: 200 TABLET ORAL at 14:00

## 2017-11-03 RX ADMIN — HEPARIN SODIUM 4000 UNITS: 1000 INJECTION, SOLUTION INTRAVENOUS; SUBCUTANEOUS at 11:50

## 2017-11-03 RX ADMIN — CALCIUM 500 MG: 500 TABLET ORAL at 14:01

## 2017-11-03 RX ADMIN — HYDRALAZINE HYDROCHLORIDE 100 MG: 50 TABLET, FILM COATED ORAL at 14:02

## 2017-11-03 RX ADMIN — ANTACID TABLETS 500 MG: 500 TABLET, CHEWABLE ORAL at 14:02

## 2017-11-03 RX ADMIN — PANTOPRAZOLE SODIUM 40 MG: 40 GRANULE, DELAYED RELEASE ORAL at 06:30

## 2017-11-03 RX ADMIN — LEVOTHYROXINE SODIUM 50 MCG: 50 TABLET ORAL at 06:30

## 2017-11-03 RX ADMIN — CLONIDINE HYDROCHLORIDE 0.1 MG: 0.1 TABLET ORAL at 18:41

## 2017-11-03 RX ADMIN — Medication 1 MG: at 13:59

## 2017-11-03 RX ADMIN — INSULIN LISPRO 2 UNITS: 100 INJECTION, SOLUTION INTRAVENOUS; SUBCUTANEOUS at 18:45

## 2017-11-03 RX ADMIN — HEPARIN SODIUM 1000 UNITS: 1000 INJECTION, SOLUTION INTRAVENOUS; SUBCUTANEOUS at 10:15

## 2017-11-03 ASSESSMENT — PAIN SCALES - GENERAL
PAINLEVEL_OUTOF10: 5
PAINLEVEL_OUTOF10: 0
PAINLEVEL_OUTOF10: 0

## 2017-11-03 ASSESSMENT — ENCOUNTER SYMPTOMS
COUGH: 0
NAUSEA: 0
SHORTNESS OF BREATH: 0

## 2017-11-03 NOTE — CARE COORDINATION
LSW spent a lot of time with pt's son today discussing his concerns about her going home. Pt is new to dialysis and is very weak at this time. LSW discussed this with pt and she agreed that she needs to have some time at a SNF to feel better and get stronger. Pt agrees to [facility]. Info was sent to [facility] for review. Pt is set up for dialysis at [facility] and starts there on 11/6/17.

## 2017-11-03 NOTE — DISCHARGE INSTR - DIET

## 2017-11-03 NOTE — PROGRESS NOTES
mg Oral Daily with breakfast    pantoprazole sodium  40 mg Oral QAM AC    pill splitter   Does not apply Once    insulin glargine  14 Units Subcutaneous QAM    influenza virus vaccine  0.5 mL Intramuscular Once    sodium chloride flush  10 mL Intravenous 2 times per day    insulin lispro  0-12 Units Subcutaneous TID WC    insulin lispro  0-6 Units Subcutaneous Nightly     Continuous Infusions:   sodium chloride      dextrose         CBC:   Recent Labs      11/02/17   0758  11/02/17   2101  11/03/17   0706   WBC  7.3   --   10.3   HGB  7.3*  7.6*  7.7*   PLT  150   --   153     CMP:  Recent Labs      11/01/17   0632  11/02/17   0758  11/03/17   0706   NA  137  137  136   K  5.4*  5.3*  4.5   CL  96*  98  97*   CO2  21*  22  23   BUN  89*  96*  54*   CREATININE  5.69*  5.64*  3.46*   GLUCOSE  184*  148*  192*   CALCIUM  7.7*  7.4*  7.6*   LABGLOM  7.2*  7.2*  12.7*     Troponin:   Recent Labs      11/01/17   0632   TROPONINI  0.033*     BNP: No results for input(s): BNP in the last 72 hours. INR:   Recent Labs      11/03/17   0706   INR  1.2     Lipids: No results for input(s): CHOL, LDLDIRECT, TRIG, HDL, AMYLASE, LIPASE in the last 72 hours. Liver: Recent Labs      11/03/17   0706   AST  19   ALT  20   ALKPHOS  70   PROT  5.5*   LABALBU  3.3*   BILITOT  0.3     Iron:  No results for input(s): IRONS, FERRITIN in the last 72 hours. Invalid input(s): LABIRONS  Urinalysis: No results for input(s): UA in the last 72 hours.     Objective:   Vitals: BP (!) 150/62   Pulse 60   Temp 98 °F (36.7 °C)   Resp 18   Ht 4' 11\" (1.499 m)   Wt 124 lb 5.4 oz (56.4 kg)   SpO2 100%   BMI 25.11 kg/m²    Wt Readings from Last 3 Encounters:   11/03/17 124 lb 5.4 oz (56.4 kg)   09/28/17 135 lb (61.2 kg)   07/21/17 138 lb (62.6 kg)      24HR INTAKE/OUTPUT:    Intake/Output Summary (Last 24 hours) at 11/03/17 1640  Last data filed at 11/03/17 1150   Gross per 24 hour   Intake              400 ml   Output             2402 ml Net            -2002 ml       Constitutional:  Alert, awake, no apparent distress   Skin:normal, no rash  HEENT:sclera anicteric.   Head atraumatic normocephalic  Neck:supple with no thyromegally  Cardiovascular:  S1, S2 without m/r/g   Respiratory:  CTA B without w/r/r  Abdomen: +bs, soft, nt  Ext: no LE edema  Musculoskeletal:Intact  Neuro:Alert and oriented with no deficit      Electronically signed by Brooke Jones MD on 11/3/2017 at 4:40 PM

## 2017-11-03 NOTE — PROGRESS NOTES
INPATIENT PROGRESS NOTES    PATIENT NAME: Joselin Pichardo  MRN: [id number]  SERVICE DATE:  November 2, 2017   SERVICE TIME:  9:20 PM      PRIMARY SERVICE: Pulmonary Disease    CHIEF COMPLAINTS: Shortness of breath    INTERVAL HPI: Patient seen and examined at bedside, Interval Notes, orders reviewed. Nursing notes noted    Patient underwent for dialysis after dialysis catheter place today. Patient developed A. fib with rapid ventricular rate during dialysis. Currently patient is in A. fib with controlled rate. Shortness of breath is less. Patient has interstitial edema, bilateral pleural effusions, markedly elevated BNP, as well as abnormal valvular, diastolic, and arrhythmic heart disease. She has no fever or chills. No nausea vomiting or diarrhea. No chest pain or pleuritic pain. OBJECTIVE    Body mass index is 27.3 kg/m². PHYSICAL EXAM:  Vitals:  BP (!) 137/116   Pulse 102   Temp 97.5 °F (36.4 °C)   Resp 16   Ht 4' 11\" (1.499 m)   Wt 135 lb 2.3 oz (61.3 kg)   SpO2 100%   BMI 27.30 kg/m²   General: Patient is  Alert, awake . comfortable in bed, No distress. Head: Atraumatic , Normocephalic   Eyes: PERRL. No sclera icterus. No conjunctival injection. No discharge   ENT: No nasal  discharge. Pharynx clear. Neck:  Trachea midline. No thyromegaly, no JVD, No cervical adenopathy. Chest : Bilaterally symmetrical ,Normal effort,  No accessory muscle use  Lung : . Fair BS bilateral, decreased BS at bases. No Rales. No wheezing. No rhonchi. No dullness on percussion. Heart[de-identified] Irregular rate and rhythm. Regular rhythm. 3/6 systolic mumur ,  Rub or gallop  ABD: Non-tender. Non-distended. No masses. No organmegaly. Normal bowel sounds. No hernia. EXT: No Pitting both leg , No Cyanosis No clubbing  Neuro: no focal weakness  Skin: Warm and dry. No erythema rash on exposed extremities.       DATA:   Recent Labs      11/01/17   0632  11/02/17   0758  11/02/17   2101   WBC  9.2  7.3   --    HGB  7.4*  7.3* influenza virus vaccine  0.5 mL Intramuscular Once    sodium chloride flush  10 mL Intravenous 2 times per day    insulin lispro  0-12 Units Subcutaneous TID WC    insulin lispro  0-6 Units Subcutaneous Nightly       PRN Meds:albumin human, heparin (porcine), heparin (porcine), heparin (porcine), phytonadione, ipratropium-albuterol, ALPRAZolam, morphine, sodium chloride, phenylephrine, metoprolol, calcium carbonate, guaiFENesin-dextromethorphan, ondansetron, sodium chloride flush, acetaminophen, glucose, dextrose, glucagon (rDNA), dextrose    Radiology  Xr Chest Standard (2 Vw)    Result Date: 10/31/2017  EXAMINATION: XR CHEST STANDARD TWO VW CLINICAL HISTORY:  SOB, PNA COMPARISONS: October 30, 2017 FINDINGS: Frontal and lateral views of chest were obtained compared to yesterday's portable AP chest. Vascular congestion has markedly improved. Edema has diminished. Small subpulmonic pleural effusions remain, best shown on lateral view. Heart size is slightly smaller. The mediastinum is not widened or shifted. The aorta is not dilated. CONCLUSION: IMPROVED CONGESTIVE HEART FAILURE, WITHOUT NEW ABNORMALITY. Xr Chest Standard (2 Vw)    Result Date: 10/29/2017  EXAMINATION: CHEST TWO VIEWS  CLINICAL HISTORY: Pneumonia, follow-up COMPARISONS: October 28, 2017 at 0820 hours  FINDINGS: Two views of the chest are submitted. The cardiac silhouette is enlarged. Unchanged. The mediastinum is unremarkable. Pulmonary vascular is attenuated, lungs are hyperinflated and there is some widening of the AP diameter the chest and coarsening of the interstitium. Right sided trachea. There is worsening patchy multifocal to confluent areas of airspace disease within the parenchyma. There is bibasilar trace to small pleural effusions. . Trace bilateral pleural effusions Pneumothoraces.                                                                                    EXAMINATION: CHEST TWO VIEWS  CLINICAL HISTORY: Pneumonia, follow-up COMPARISONS: October 26, 2017  FINDINGS: Two views of the chest are submitted. The cardiac silhouette is enlarged. Unchanged. The mediastinum is unremarkable. Pulmonary vascular is attenuated, lungs are hyperinflated and there is some widening of the AP diameter the chest and coarsening of the interstitium. Right sided trachea. There is worsening patchy multifocal to confluent areas of airspace disease within the parenchyma. There is bibasilar trace to small pleural effusions. .  Pneumothoraces. Degenerative changes of the left shoulder                                                                               IMPRESSION: PERSISTENT PATCHY MULTIFOCAL TO CONFLUENT AREAS OF AIRSPACE DISEASE WITHIN THE PARENCHYMA. TRACE TO SMALL BILATERAL PLEURAL EFFUSIONS RADIOGRAPHIC FINDINGS SUGGESTIVE OF COPD. CORRELATE CLINICALLY. Xr Chest Standard (2 Vw)    Result Date: 10/28/2017  EXAMINATION: CHEST TWO VIEWS  CLINICAL HISTORY: Pneumonia, follow-up COMPARISONS: October 26, 2017  FINDINGS: Two views of the chest are submitted. The cardiac silhouette is enlarged. Unchanged. The mediastinum is unremarkable. Pulmonary vascular is attenuated, lungs are hyperinflated and there is some widening of the AP diameter the chest and coarsening of the interstitium. Right sided trachea. There is worsening patchy multifocal to confluent areas of airspace disease within the parenchyma. There is bibasilar trace to small pleural effusions. .  No effusions. Pneumothoraces. WORSENING PATCHY MULTIFOCAL TO CONFLUENT AREAS OF AIRSPACE DISEASE WITHIN THE PARENCHYMA. TRACE TO SMALL BILATERAL PLEURAL EFFUSIONS RADIOGRAPHIC FINDINGS SUGGESTIVE OF COPD. CORRELATE CLINICALLY. Xr Chest Standard (2 Vw)    Result Date: 10/24/2017  Chest 2 views. HISTORY: Shortness of breath. FINDINGS: Comparison, February 9, 2017. Osseous structures intact.  Cardiac silhouette LOW-ATTENUATION NOT FULLY CHARACTERIZED IN THIS STUDY. CONSIDER FOLLOW-UP ULTRASOUND TO FURTHER EVALUATE. 5. THERE ARE NONOBSTRUCTING CALCULI IN THE SUPERIOR POLE OF THE RIGHT KIDNEY. All CT scans at this facility use dose modulation, iterative reconstruction, and/or weight based dosing when appropriate to reduce radiation dose to as low as reasonably achievable. Nm Lung Vent/perfusion (vq)    Result Date: 10/31/2017  VENTILATION-PERFUSION LUNG SCAN: CLINICAL DATA:  Short of breath for 2 weeks TECHNIQUE:  Ventilation and perfusion scan is performed following the uneventful inhalation of 1.0 mCi of Tc-99m DTPA and following the uneventful intravenous administration of  5.5 mCi of Tc-99m MAA. Multi-projectional ventilation and perfusion images are then performed. Corresponding chest x-ray and CT scan the chest from October 31, 2017 were also available FINDINGS: There are matched defects seen bilaterally. As well as corresponding findings most likely consistent with pleural effusions. In addition there is what is considered a triple matched defect within the left lower lobe. Overall constellation of findings are consistent for nondiagnostic V/Q study     FINDINGS CONSISTENT WITH NONDIAGNOSTIC V/Q STUDY. WOULD CONSIDER REPEAT CT SCAN STUDY WITH CONTRAST IN ASSOCIATION WITH DIALYSIS FOR THE PATIENT GIVEN HER RENAL STATUS. Xr Chest Portable    Result Date: 10/30/2017  PORTABLE CHEST: 10/30/2017 CLINICAL HISTORY: Shortness of breath . COMPARISON: 10/29/2017. A portable upright AP radiograph of the chest was obtained. FINDINGS: A poor inspiratory volume is present with worsening patchy probable bilateral pulmonary edema and/or bronchopneumonia. Vascular congestion appears worsening. Small pleural effusions appear to be developing. There is no pneumothorax, or displaced fractures identified. Small calcified granulomas in the right lung apex are again noted. WORSENING BRONCHOPNEUMONIA AND/OR PULMONARY EDEMA.  SMALL BILATERAL PLEURAL EFFUSIONS. Xr Chest Portable    Result Date: 10/26/2017  XR CHEST PORTABLE : 10/26/2017 CLINICAL HISTORY: pneumonia . COMPARISON: 10/24/2017. TECHNIQUE: A portable upright AP radiograph of the chest was obtained. FINDINGS: Bibasilar infiltrates have worsened when compared to the prior study, greater on the right. Small bilateral pleural effusions appear mildly increased. Findings are suspicious for bronchopneumonia. Cardiac decompensation also possible. There is no significant cardiomegaly, pneumothorax or other findings of concern identified. WORSENING BIBASILAR INFILTRATE/CONSOLIDATION SUSPICIOUS FOR BRONCHOPNEUMONIA WITH SMALL EFFUSIONS. Fluoroscopy Modified Barium Swallow With Video    Result Date: 10/27/2017  Modified barium swallow. HISTORY: Dysphasia. FINDINGS: Imaging obtained with patient sitting upright and filmed in lateral projection. Nonstandardized barium viscosities including thin liquid, pureed, and antoine cracker administered by a member of the department of speech pathology in attendance. Transient laryngeal penetration was identified with thin consistency. No aspiration evident. Please see speech pathology report for more information. Penetration as described. No aspiration. 13 cine loops. One static image. 1.9 minutes fluoroscopy time. IMPRESSION AND SUGGESTION:  1. Dyspnea related to congestive heart failure  2. Renal failure on hemodialysis  3. Parkinson disease  4. Hypertension  5. A. fib with RVR    Patient had dialysis catheter placement and had a dialysis today. Cardiology following regarding A. fib with RVR. Shortness of breath is improving which is likely due to congestive heart failure/fluid overload. Clinically pneumonia less likely.   Continue present treatment plan          Electronically signed by Yessi Schmidt MD, FCCP on 11/2/2017 at 9:20 PM

## 2017-11-03 NOTE — PROGRESS NOTES
Notified from monitor room that patient is in a fib. Patient is currently in dialysis. Notified dialysis tech and put call out to [facility].

## 2017-11-03 NOTE — PROGRESS NOTES
Hospitalist Progress Note      PCP: Livan Christine    Date of Admission: 10/24/2017    Asked to evaluate patient regarding blood oozing from vasc-cath site    Medications:  Reviewed    Infusion Medications    sodium chloride      dextrose       Scheduled Medications    b complex-C-folic acid  1 capsule Oral Daily    sodium chloride  250 mL Intravenous Once    predniSONE  10 mg Oral BID    amiodarone  200 mg Oral BID    amLODIPine  5 mg Oral Daily    hydrALAZINE  20 mg Intravenous 4 times per day    warfarin (COUMADIN) daily dosing (placeholder)   Other RX Placeholder    darbepoetin lizeth-polysorbate  40 mcg Subcutaneous Weekly    aspirin  81 mg Oral Daily    buprenorphine  1 patch Transdermal Weekly    calcium elemental  500 mg Oral Daily    carbidopa-levodopa  1 tablet Oral Daily    cloNIDine  0.1 mg Oral TID    rosuvastatin  40 mg Oral Nightly    fluticasone  2 spray Nasal Daily    iron polysaccharide complex-B12-folic acid  1 capsule Oral Daily with breakfast    isosorbide mononitrate  60 mg Oral Daily    levothyroxine  25 mcg Oral Every Other Day    levothyroxine  50 mcg Oral Every Other Day    magnesium oxide  400 mg Oral Daily    metoprolol tartrate  50 mg Oral BID    sodium bicarbonate  325 mg Oral TID    venlafaxine  150 mg Oral Daily with breakfast    pantoprazole sodium  40 mg Oral QAM AC    pill splitter   Does not apply Once    insulin glargine  14 Units Subcutaneous QAM    influenza virus vaccine  0.5 mL Intramuscular Once    sodium chloride flush  10 mL Intravenous 2 times per day    insulin lispro  0-12 Units Subcutaneous TID WC    insulin lispro  0-6 Units Subcutaneous Nightly     PRN Meds: albumin human, heparin (porcine), heparin (porcine), heparin (porcine), phytonadione, ipratropium-albuterol, ALPRAZolam, morphine, sodium chloride, phenylephrine, metoprolol, calcium carbonate, guaiFENesin-dextromethorphan, ondansetron, sodium chloride flush, acetaminophen, glucose, dextrose, glucagon (rDNA), dextrose      Intake/Output Summary (Last 24 hours) at 17 2301  Last data filed at 17 1915   Gross per 24 hour   Intake              900 ml   Output             2122 ml   Net            -1222 ml       Exam:  BP (!) 148/52   Pulse 60   Temp 97.5 °F (36.4 °C)   Resp 18   Ht 4' 11\" (1.499 m)   Wt 135 lb 2.3 oz (61.3 kg)   SpO2 100%   BMI 27.30 kg/m²     Average, Min, and Max for last 24 hours Vitals:  TEMPERATURE:  Temp  Av.9 °F (36.6 °C)  Min: 97.5 °F (36.4 °C)  Max: 99.2 °F (37.3 °C)    RESPIRATIONS RANGE: Resp  Av.3  Min: 0  Max: 18    PULSE RANGE: Pulse  Av  Min: 56  Max: 136    BLOOD PRESSURE RANGE:  Systolic (83QHP), EPU:991 , Min:137 , XGT:075   ; Diastolic (42ACL), NNW:26, Min:52, Max:146      PULSE OXIMETRY RANGE: SpO2  Av.8 %  Min: 98 %  Max: 100 %    I/O last 3 completed shifts: In: 900 [I.V.:300]  Out:  [Urine:2; Blood:20]    General appearance: No apparent distress, appears stated age and cooperative.   Ext:Dressing and Vas-Cath site dry and intact    Labs:     Recent Results (from the past 24 hour(s))   EKG 12 Lead    Collection Time: 17  5:23 AM   Result Value Ref Range    Ventricular Rate 59 BPM    Atrial Rate 59 BPM    P-R Interval 166 ms    QRS Duration 94 ms    Q-T Interval 454 ms    QTc Calculation (Bazett) 449 ms    P Axis 3 degrees    R Axis 28 degrees    T Axis 44 degrees   Protime-INR    Collection Time: 17  7:58 AM   Result Value Ref Range    Protime 17.1 (H) 8.1 - 13.7 sec    INR 1.6    CBC    Collection Time: 17  7:58 AM   Result Value Ref Range    WBC 7.3 4.8 - 10.8 K/uL    RBC 2.52 (L) 4.20 - 5.40 M/uL    Hemoglobin 7.3 (L) 12.0 - 16.0 g/dL    Hematocrit 23.0 (L) 37.0 - 47.0 %    MCV 91.1 82.0 - 100.0 fL    MCH 29.0 27.0 - 31.3 pg    MCHC 31.8 (L) 33.0 - 37.0 %    RDW 18.1 (H) 11.5 - 14.5 %    Platelets 704 024 - 073 K/uL   HEPATITIS B SURFACE ANTIBODY    Collection Time: 17  7:58 AM   Result Value Ref Range    Hep B S Ab Non-reactive mIU/mL   HEPATITIS B SURFACE ANTIGEN    Collection Time: 11/02/17  7:58 AM   Result Value Ref Range    Hep B S Ag Interp Non-reactive    Comprehensive Metabolic Panel    Collection Time: 11/02/17  7:58 AM   Result Value Ref Range    Sodium 137 132 - 144 mEq/L    Potassium 5.3 (H) 3.5 - 5.1 mEq/L    Chloride 98 98 - 107 mEq/L    CO2 22 22 - 29 mEq/L    Anion Gap 17 (H) 7 - 13 mEq/L    Glucose 148 (H) 74 - 109 mg/dL    BUN 96 (HH) 8 - 23 mg/dL    CREATININE 5.64 (H) 0.50 - 0.90 mg/dL    GFR Non-African American 7.2 (L) >60    GFR  8.7 (L) >60    Calcium 7.4 (L) 8.6 - 10.2 mg/dL    Total Protein 5.5 (L) 6.4 - 8.1 g/dL    Alb 3.1 (L) 3.9 - 4.9 g/dL    Total Bilirubin 0.2 0.0 - 1.2 mg/dL    Alkaline Phosphatase 64 40 - 130 U/L    ALT 12 0 - 33 U/L    AST 19 0 - 35 U/L    Globulin 2.4 2.3 - 3.5 g/dL   Sedimentation Rate    Collection Time: 11/02/17  7:58 AM   Result Value Ref Range    Sed Rate 57 (H) 0 - 30 mm   POCT Glucose    Collection Time: 11/02/17  8:07 AM   Result Value Ref Range    POC Glucose 176 (H) 60 - 115 mg/dl    Performed on ACCU-CHEK    POCT Glucose    Collection Time: 11/02/17 12:38 PM   Result Value Ref Range    POC Glucose 142 (H) 60 - 115 mg/dl    Performed on ACCU-CHEK    TYPE AND SCREEN    Collection Time: 11/02/17  5:11 PM   Result Value Ref Range    ABO/Rh A POS     Antibody Screen NEG    PREPARE RBC (CROSSMATCH) Number of Units: 2, 2 Units    Collection Time: 11/02/17  5:11 PM   Result Value Ref Range    Product Code Blood Bank [id number]     Description Blood Bank Red Blood Cells, Leuko-reduced     Unit Number [id number]     Dispense Status Blood Bank selected     Product Code Blood Bank [id number]     Description Blood Bank Red Blood Cells, Leuko-reduced     Unit Number [id number]     Dispense Status Blood Bank selected    POCT Glucose    Collection Time: 11/02/17  5:40 PM   Result Value Ref Range    POC Glucose 100 60 - 115 mg/dl Performed on ACCU-CHEK    Protime-INR    Collection Time: 11/02/17  9:01 PM   Result Value Ref Range    Protime 13.7 8.1 - 13.7 sec    INR 1.3    Hemoglobin and Hematocrit, Blood    Collection Time: 11/02/17  9:01 PM   Result Value Ref Range    Hemoglobin 7.6 (L) 12.0 - 16.0 g/dL    Hematocrit 23.9 (L) 37.0 - 47.0 %   POCT Glucose    Collection Time: 11/02/17  9:17 PM   Result Value Ref Range    POC Glucose 150 (H) 60 - 115 mg/dl    Performed on ACCU-CHEK            Assessment/Plan:  1. Blood oozing from vasc cath site: place gel foam at site of oozing     H/H has been stable.           Active Hospital Problems    Diagnosis Date Noted    Acute on chronic diastolic congestive heart failure (HCC) [I50.33] 10/30/2017    Pulmonary edema with congestive heart failure (HCC) [I50.1]     Shortness of breath [R06.02]     Acute pulmonary edema (HCC) [J81.0]     Hyperkalemia [E87.5] 10/26/2017    Pneumonia of lower lobe due to infectious organism [facility]) [J18.1] 10/25/2017    Diabetes mellitus due to underlying condition, controlled, with stage 5 chronic kidney disease not on chronic dialysis, with long-term current use of insulin ([facility] [address] [E08.22, N18.5, Z79.4] 05/31/2017    Essential hypertension [I10] 05/31/2017    Anxiety [F41.9] 05/03/2017    Afib ([facility] [address] [I48.91] 10/27/2016    Acid reflux [K21.9] 05/13/2015    Idiopathic Parkinson's disease ([facility] [address] Belkis Dux 08/28/2014    CKD (chronic kidney disease) [N18.9] 10/15/2012    Anemia [D64.9] 11/08/2011    Hypothyroidism [E03.9] 10/04/2011         Diet: DIET CARB CONTROL;  Code Status: Full Code      Electronically signed by Arianna Olivas MD on 11/2/2017 at 11:01 PM

## 2017-11-03 NOTE — PROCEDURES
[address]                     [address], [address] [facility]                              PULMONARY FUNCTION    PATIENT NAME: Leti Alex                   :             1935  MED REC NO:   [id number]                           ROOM:           A382  ACCOUNT NO:   [de-identified]                          ADMISSION DATE:  10/24/2017  PROVIDER:     Renetta Lancaster MD    DATE OF PROCEDURE:  10/24/2017    Complete pulmonary functions were done on this 81year-old patient who  is 59 inches in height, weighs 133 pounds with remote brief smoking  history of 8 years, quit 50 years ago, presenting with dyspnea. Spirometry showed a forced vital capacity of 1.14 L which is 57% of  predicted. FEV1 was 0.93 L which is 63% of predicted. FEV1/FVC ratio  was normal at 81%. FEF 25-75% was normal at 0.89 L per second which  is 82% of predicted. Improvement noted in forced vital capacity,  FEV1, and terminal airflow after bronchodilators. Improvements were  noted to be equally distributed with no change in FEV1/FVC ratio. This suggests a change in effort as opposed to true response to  bronchodilators. Lung volumes done by body plethysmography showed a total lung capacity  of 3.19 L which is 74% of predicted. Residual volume was 1.88 L which  is 86% of predicted. RV/TLC ratio was 59% which is slightly  increased. Diffusion capacity was normal at 15.4 which is 89% of predicted. Airway resistance was normal.  Specific airway conductance was  increased. OVERALL IMPRESSION:  This study shows evidence of mild restrictive  pulmonary impairment with no evidence of any obstructive lung disease. Improvement after bronchodilator therapy again could be related to  changing effort. Reactivity cannot be excluded.       Omar Glez MD    D: 2017 10:09:06       T: 2017 21:16:21     [email or web address]  Job#: [id number]     Doc#: [id number]

## 2017-11-03 NOTE — PROGRESS NOTES
Physical Therapy Med Surg Daily Treatment Note  Facility/Department: [facility]  Room: [facility]/U007-66       NAME: Carol Mccartney  : 1935 (82 y.o.)  MRN: [id number]  CODE STATUS: Full Code    Date of Service: 11/3/2017    Patient Diagnosis(es): Bilateral lower lobe pneumonia due to Escherichia coli [facility]) [J15.5]  Pneumonia [J18.9]   Chief Complaint   Patient presents with    Shortness of Breath     sent from [facility] x 3 days, told Pt to go to ER     Patient Active Problem List    Diagnosis Date Noted    Acute on chronic diastolic congestive heart failure ([address] 10/30/2017    Pulmonary edema with congestive heart failure ([address]     Shortness of breath     Acute pulmonary edema ([address]     Hyperkalemia 10/26/2017    Pneumonia of lower lobe due to infectious organism ([address] 10/25/2017    Diabetes mellitus due to underlying condition, controlled, with stage 5 chronic kidney disease not on chronic dialysis, with long-term current use of insulin ([address] 2017    Essential hypertension 2017    Anxiety 2017    Afib ([address] 10/27/2016    Acid reflux 2015    Idiopathic Parkinson's disease ([address] 2014    CKD (chronic kidney disease) 10/15/2012    Anemia 2011    Hypothyroidism 10/04/2011    Hypercholesteremia 10/04/2011        Past Medical History:   Diagnosis Date    Abnormal presence of protein in urine 2004    Dr. Fidel Borges. Peyton Gray Atrial fibrillation ([address]     Constipation, chronic     Diverticular disease of the colon     GERD (gastroesophageal reflux disease)     GERD, PUD, Hiatal Hernia    Granulomatous lung disease ([address]     History of endoscopy 12    Dr. Lord Olivares Hyperlipidemia     Hypothyroidism     Kidney stones 2001    Dr. Emelyn Alex    Neuropathy in diabetes ([address]     legs    Osteoarthritis     Positive ORTEGA (antinuclear antibody)     1:80    Tachycardia     Thrombophlebitis leg superficial     Type II or unspecified type diabetes mellitus without mention of complication, not stated as uncontrolled 1998     Past Surgical History:   Procedure Laterality Date    APPENDECTOMY  2007    [facility]  2008 & [facility]    [facility] [address]  2/2006    COLONOSCOPY  4/2007    Dr. Domonique Terrell [address] N/A 11/2/2017    CATHETER INSERTION HEMODIALYSIS performed by Herminia Schaefer MD at [address] [place] [address] Rd  [id number]    Bilateral    [place]    THYROID SURGERY      URETHRAL STRICTURE DILATATION  3/2003         Restrictions  Restrictions/Precautions: Fall Risk    Subjective   General  Chart Reviewed: Yes  Family / Caregiver Present: Yes  Pre Treatment Pain Screening  Pain at present: 0  Scale Used: Numeric Score  Intervention List: Patient able to continue with treatment    Pain Screening  Patient Currently in Pain: Denies     Pain Reassessment:   Pain Assessment  Pain Assessment: 0-10  Pain Level: 0 (\"I just don't feel good and I feel weak\")       Orientation  Orientation  Overall Orientation Status: Within Functional Limits    Objective   Bed mobility  Supine to Sit: Modified independent  Sit to Supine: Modified independent  Scooting: Dependent/Total (to scoot up to [facility])  Comment: flat bed    Transfers  Sit to Stand: Contact guard assistance;Minimal Assistance  Stand to sit: Minimal Assistance  Comment: pt's legs became weak during ambulation and pt needing assist to sit.   Min A to restand and sit EOB    Ambulation  Ambulation?: Yes  Ambulation 1  Surface: level tile  Device: No Device  Other Apparatus: O2 (spo2 100% on 3L)  Quality of Gait: small step, narrow RODRIGUE, reaching for handrail in hallway, needing to sit at 30' legs buckled(B) chair brought to pt  Distance: 30'  Comments: nsg notified       Balance  Standing - Static: Fair  Standing - Dynamic: Poor             Greater than one attempt with sit <-> stand with increased assist and increased assist during gait when legs buckled           Assessment   Activity Tolerance  Activity Tolerance: legs buckled during gait needing to sit and BTB with min A     Discharge Recommendations:  Continue to assess pending progress    Goals  Short term goals  Short term goal 1: Pt will complete transfers with indep  Short term goal 2: Pt will be indep with HEP  Short term goal 3: Pt will amb 100' with indep  Short term goal 4: Pt will complete 10' standing exercise/activity with 0-1 UE support and indep without rest break    Plan    Plan  Times per week: 3-6  Current Treatment Recommendations: Strengthening, Functional Mobility Training, Neuromuscular Re-education, Gait Training, Endurance Training, Balance Training, Home Exercise Program, Safety Education & Training  Plan Comment: cont current POC  Safety Devices  Type of devices: Bed alarm in place, Call light within reach, Left in bed, Nurse notified     Therapy Time   Individual   Time In 1455   Time Out 1520   Minutes 25      Gait 15  Trsfs 10       Fabricio Recio PTA, 11/03/17 at 3:20 PM

## 2017-11-03 NOTE — PROGRESS NOTES
Patient has been doing well and offers no specific complaints. Patient has been having no bleeding from the nose. Patient is on eardrops for the right ear. Physical examination reveals the right ear cerumen impaction and tympanic membrane not visualized. Right ear was cleaned. Tympanic membranes intact bilaterally. Examination of the nose reveals normal external nasal deformity and anterior  Rhinoscopy     reveals no bleeding. Plan okay for patient to be discharged to be seen in the office as needed.

## 2017-11-03 NOTE — PROGRESS NOTES
Was instructed by Dr. Verena Mcleod to give all cardiac meds held this am for procedure. Sent meds to dialysis to be given.

## 2017-11-03 NOTE — PROGRESS NOTES
Clinical Pharmacy Note    Warfarin consult follow-up    Recent Labs      11/03/17   0706   INR  1.2     Recent Labs      11/01/17   0632  11/02/17   0758  11/02/17   2101  11/03/17   0706   HGB  7.4*  7.3*  7.6*  7.7*   HCT  23.4*  23.0*  23.9*  24.2*   PLT  174  150   --   153       Significant drug:drug interactions:  Current warfarin drug-drug interactions: levothyroxine, amiodarone, venlafaxine, rosuvastatin  New warfarin drug-drug interactions: augmentin  Discontinued drug-drug interactions: prednisone    Notes:  Date INR Warfarin Dose   10/25/17   7.5 mg   10/26/17 2 5 mg   10/27/17 3.1 HOLD (Pharmacy Consulted)   10/28/17 2.9 4 mg   10/29/17 1.7 6 mg   10/30/17 1.7  6 mg   10/31/17 2.2  4 mg   11/01/17 2.4 Vitamin K 2.5mg po prior to procedure   11/02/17 1.6 No dose, held d/t procedure   11/03/17 1.2  6 mg                              INR is subtherapeutic at 1.2 goal is 2-3. Verified with Dr. Kimberley Brian that patient is to continue on warfarin today, will order warfarin 6 mg PO ONCE for today. Daily PT/INR until stable within therapeutic range. Thank you,  Denise Watson.  Enrique Bear, PharmD  PGY-1 Pharmacy Resident  11/3/2017 10:50 AM

## 2017-11-03 NOTE — PROGRESS NOTES
CARDIOLOGY [address] PROGRESS NOTE         11/3/2017      Henny Faith    [id number]  1935    Rounding MD: Aileen Rosa MD ,[facility]    Primary Cardiologist:  Myah Martinez MD         SUBJECTIVE:                           Patient now back in SR with Medications restarted. Had Dialysis and transfusion. Better overall. Tired. Denies chest pain and orthopnea.                           Cardiac and general ROS otherwise negative and unchanged.     ASSESSMENT:     1. SOB, persistent  2. COUGH, better  3. Pneumonia of lower lobe due to infectious organism ([facility]  4. Elevated BNP, Can be seen in both CHF and PNA  5. Moderate PHTN  6. Diastolic Dysfunction, mild, possible HFpEF, but doubt primary cause  7. PAFIB now back in SR post Medications restarted. 8. SSS s/p Pacemaker  9. CAD hx Prior PCI, LCX BMS 2010, negative repeat Cath  6/2014  10. Normal LV function 55% ( echo 10/17 )  11. COPD  15. IVON  13. CKD (chronic kidney disease), now on HD  14. Essential hypertension  15. Hyperlipidemia  16. Diabetes mellitus  17. PVD  18. Prior DVT  19. Hypothyroidism  20. CLBP  21. Former Smoker  25. Polycythemia Vera  23. Hyperkalemia  24. Anemia  25. GERD  26. Anxiety  27. Idiopathic Parkinson's disease ([facility]         PLAN:     CV supportive Care with restart of medications. Agree with Nephrology and HD. Transfuse further for Anemia with next HD. CT CHEST notes infiltrates suggesting PNA primary illness  HSCRP, ESR, BNP levels all elevated suggesting PNA  General and pulmonary care  Nephrology care and treatment  Cardiac maximal care  OK to DC to [facility] once ok with others  Follow up with [facility] [address] [facility] as Noted. See Orders.        Prior HPI Notes:    Katiana Sherman is a 81 y.o.  female patient who is being at the request of Dr. Ana Burns inpatient consultation of atrial fibrillation.  She was admitted on 10/24/2017. Cornelia Dyer and [id number] records have been degrees    R Axis 16 degrees    T Axis 14 degrees   Protime-INR    Collection Time: 11/03/17  7:06 AM   Result Value Ref Range    Protime 12.9 8.1 - 13.7 sec    INR 1.2    CBC Auto Differential    Collection Time: 11/03/17  7:06 AM   Result Value Ref Range    WBC 10.3 4.8 - 10.8 K/uL    RBC 2.69 (L) 4.20 - 5.40 M/uL    Hemoglobin 7.7 (L) 12.0 - 16.0 g/dL    Hematocrit 24.2 (L) 37.0 - 47.0 %    MCV 90.0 82.0 - 100.0 fL    MCH 28.8 27.0 - 31.3 pg    MCHC 31.9 (L) 33.0 - 37.0 %    RDW 17.4 (H) 11.5 - 14.5 %    Platelets 984 966 - 754 K/uL    Neutrophils % 87.6 %    Lymphocytes % 2.6 %    Monocytes % 9.6 %    Eosinophils % 0.1 %    Basophils % 0.1 %    Neutrophils # 9.0 (H) 1.4 - 6.5 K/uL    Lymphocytes # 0.3 (L) 1.0 - 4.8 K/uL    Monocytes # 1.0 (H) 0.2 - 0.8 K/uL    Eosinophils # 0.0 0.0 - 0.7 K/uL    Basophils # 0.0 0.0 - 0.2 K/uL   Comprehensive Metabolic Panel    Collection Time: 11/03/17  7:06 AM   Result Value Ref Range    Sodium 136 132 - 144 mEq/L    Potassium 4.5 3.5 - 5.1 mEq/L    Chloride 97 (L) 98 - 107 mEq/L    CO2 23 22 - 29 mEq/L    Anion Gap 16 (H) 7 - 13 mEq/L    Glucose 192 (H) 74 - 109 mg/dL    BUN 54 (H) 8 - 23 mg/dL    CREATININE 3.46 (H) 0.50 - 0.90 mg/dL    GFR Non-African American 12.7 (L) >60    GFR  15.4 (L) >60    Calcium 7.6 (L) 8.6 - 10.2 mg/dL    Total Protein 5.5 (L) 6.4 - 8.1 g/dL    Alb 3.3 (L) 3.9 - 4.9 g/dL    Total Bilirubin 0.3 0.0 - 1.2 mg/dL    Alkaline Phosphatase 70 40 - 130 U/L    ALT 20 0 - 33 U/L    AST 19 0 - 35 U/L    Globulin 2.2 (L) 2.3 - 3.5 g/dL   POCT Glucose    Collection Time: 11/03/17  8:03 AM   Result Value Ref Range    POC Glucose 224 (H) 60 - 115 mg/dl    Performed on ACCU-CHEK    POCT Glucose    Collection Time: 11/03/17 12:58 PM   Result Value Ref Range    POC Glucose 114 60 - 115 mg/dl    Performed on ACCU-CHEK      Pro-BNP 30,906     D-Dimer, Quant 0.80 (H)            CXR: ( portable )  worsening patchy probable bilateral pulmonary edema and/or bronchopneumonia. CT CHEST:  There are patchy multifocal areas of groundglass infiltrates within the left upper lobe left lower lobe, right upper lobe, right middle lobe. There is also more small focal areas of infiltrate consolidation air bronchograms in both bases. There are bilateral pleural effusions. No pneumothoraces. VQ LUNGS:  Negative ( non diagnostic ) for PE     EKG:   SR now, FDB, now in afib with rvr    TELEMETRY:  SR with PAF prior    Echo: (10/17)  Normal LV function 22%  Mild Diastolic Dysfunction  No significant VHD  Moderate PHTN, RVSP 45.   PM noted        Aileen Rosa MD ,[facility] Cardiology

## 2017-11-03 NOTE — PROGRESS NOTES
Assessment:  (Acute on top of chronic diastolic CHF with aspiration pneumonia with acute of top of chronic kidney disease stage 5.present on admission. Patient Active Problem List:     Hypothyroidism     Hypercholesteremia     CKD (chronic kidney disease)     Afib (HCC)     Anemia     Acid reflux     Anxiety     Diabetes mellitus due to underlying condition, controlled, with stage 5 chronic kidney disease not on chronic dialysis, with long-term current use of insulin (HCC)     Essential hypertension     Idiopathic Parkinson's disease ([address]     Pneumonia of lower lobe due to infectious organism ([address]     Hyperkalemia     Acute on chronic diastolic congestive heart failure (HCC)     Pulmonary edema with congestive heart failure (HCC)     Shortness of breath     Acute pulmonary edema ([address]      ). Plan:   (May be discharged from medicine standpoint if ok with other docs. ).        Ck Malave MD  11/3/2017

## 2017-11-04 LAB
BLOOD BANK DISPENSE STATUS: NORMAL
BLOOD BANK DISPENSE STATUS: NORMAL
BLOOD BANK PRODUCT CODE: NORMAL
BLOOD BANK PRODUCT CODE: NORMAL
BPU ID: NORMAL
BPU ID: NORMAL
DESCRIPTION BLOOD BANK: NORMAL
DESCRIPTION BLOOD BANK: NORMAL

## 2017-11-06 ENCOUNTER — HOSPITAL ENCOUNTER (OUTPATIENT)
Dept: INFUSION THERAPY | Age: 82
Setting detail: INFUSION SERIES
Discharge: HOME | End: 2017-11-06
Payer: MEDICARE

## 2017-11-06 ENCOUNTER — TELEPHONE (OUTPATIENT)
Dept: PHARMACY | Age: 82
End: 2017-11-06

## 2017-11-06 NOTE — TELEPHONE ENCOUNTER
Patient currently admitted to [facility]. Son stated he is taking her to all appointments.  Rescheduled for 11/14/17

## 2017-11-09 ENCOUNTER — APPOINTMENT (OUTPATIENT)
Dept: INTERVENTIONAL RADIOLOGY/VASCULAR | Age: 82
DRG: 371 | End: 2017-11-09
Payer: MEDICARE

## 2017-11-09 ENCOUNTER — APPOINTMENT (OUTPATIENT)
Dept: GENERAL RADIOLOGY | Age: 82
DRG: 371 | End: 2017-11-09
Payer: MEDICARE

## 2017-11-09 ENCOUNTER — APPOINTMENT (OUTPATIENT)
Dept: CT IMAGING | Age: 82
DRG: 371 | End: 2017-11-09
Payer: MEDICARE

## 2017-11-09 ENCOUNTER — HOSPITAL ENCOUNTER (INPATIENT)
Age: 82
LOS: 15 days | Discharge: NURSING, REHAB OR LONG-TERM CARE | DRG: 371 | End: 2017-11-24
Attending: STUDENT IN AN ORGANIZED HEALTH CARE EDUCATION/TRAINING PROGRAM | Admitting: HOSPITALIST
Payer: MEDICARE

## 2017-11-09 DIAGNOSIS — D63.1 ANEMIA IN CHRONIC KIDNEY DISEASE, ON CHRONIC DIALYSIS (HCC): Primary | ICD-10-CM

## 2017-11-09 DIAGNOSIS — N18.6 ANEMIA IN CHRONIC KIDNEY DISEASE, ON CHRONIC DIALYSIS (HCC): Primary | ICD-10-CM

## 2017-11-09 DIAGNOSIS — K51.00 PANCOLITIS (HCC): ICD-10-CM

## 2017-11-09 DIAGNOSIS — Z78.9 FAILURE OF OUTPATIENT TREATMENT: ICD-10-CM

## 2017-11-09 DIAGNOSIS — D72.829 LEUKOCYTOSIS, UNSPECIFIED TYPE: ICD-10-CM

## 2017-11-09 DIAGNOSIS — Z99.2 ANEMIA IN CHRONIC KIDNEY DISEASE, ON CHRONIC DIALYSIS (HCC): Primary | ICD-10-CM

## 2017-11-09 DIAGNOSIS — J18.9 PNEUMONIA OF BOTH LUNGS DUE TO INFECTIOUS ORGANISM, UNSPECIFIED PART OF LUNG: ICD-10-CM

## 2017-11-09 LAB
ABO/RH: NORMAL
ALBUMIN SERPL-MCNC: 2.7 G/DL (ref 3.9–4.9)
ALP BLD-CCNC: 98 U/L (ref 40–130)
ALT SERPL-CCNC: 10 U/L (ref 0–33)
ANION GAP SERPL CALCULATED.3IONS-SCNC: 20 MEQ/L (ref 7–13)
ANTIBODY SCREEN: NORMAL
AST SERPL-CCNC: 38 U/L (ref 0–35)
BILIRUB SERPL-MCNC: 0.3 MG/DL (ref 0–1.2)
BUN BLDV-MCNC: 41 MG/DL (ref 8–23)
CALCIUM SERPL-MCNC: 7.1 MG/DL (ref 8.6–10.2)
CHLORIDE BLD-SCNC: 88 MEQ/L (ref 98–107)
CHP ED QC CHECK: YES
CHP ED QC CHECK: YES
CO2: 23 MEQ/L (ref 22–29)
CREAT SERPL-MCNC: 5.58 MG/DL (ref 0.5–0.9)
GFR AFRICAN AMERICAN: 8.9
GFR NON-AFRICAN AMERICAN: 7.3
GLOBULIN: 2.9 G/DL (ref 2.3–3.5)
GLUCOSE BLD-MCNC: 191 MG/DL (ref 74–109)
GLUCOSE BLD-MCNC: 222 MG/DL
GLUCOSE BLD-MCNC: 222 MG/DL (ref 60–115)
HCT VFR BLD CALC: 21.8 % (ref 37–47)
HEMOCCULT STL QL: NORMAL
HEMOGLOBIN: 7 G/DL (ref 12–16)
INR BLD: 2.8
LACTIC ACID: 2 MMOL/L (ref 0.5–2.2)
MCH RBC QN AUTO: 28.2 PG (ref 27–31.3)
MCHC RBC AUTO-ENTMCNC: 32 % (ref 33–37)
MCV RBC AUTO: 88.1 FL (ref 82–100)
PDW BLD-RTO: 17.1 % (ref 11.5–14.5)
PERFORMED ON: ABNORMAL
PLATELET # BLD: 135 K/UL (ref 130–400)
POTASSIUM SERPL-SCNC: 3.5 MEQ/L (ref 3.5–5.1)
PROTHROMBIN TIME: 30.1 SEC (ref 8.1–13.7)
RBC # BLD: 2.47 M/UL (ref 4.2–5.4)
SODIUM BLD-SCNC: 131 MEQ/L (ref 132–144)
TOTAL PROTEIN: 5.6 G/DL (ref 6.4–8.1)
TROPONIN: 0.09 NG/ML (ref 0–0.01)
WBC # BLD: 28.1 K/UL (ref 4.8–10.8)

## 2017-11-09 PROCEDURE — 83605 ASSAY OF LACTIC ACID: CPT

## 2017-11-09 PROCEDURE — 74177 CT ABD & PELVIS W/CONTRAST: CPT

## 2017-11-09 PROCEDURE — 99285 EMERGENCY DEPT VISIT HI MDM: CPT

## 2017-11-09 PROCEDURE — 85027 COMPLETE CBC AUTOMATED: CPT

## 2017-11-09 PROCEDURE — 87506 IADNA-DNA/RNA PROBE TQ 6-11: CPT

## 2017-11-09 PROCEDURE — 84484 ASSAY OF TROPONIN QUANT: CPT

## 2017-11-09 PROCEDURE — 87493 C DIFF AMPLIFIED PROBE: CPT

## 2017-11-09 PROCEDURE — 86850 RBC ANTIBODY SCREEN: CPT

## 2017-11-09 PROCEDURE — 80053 COMPREHEN METABOLIC PANEL: CPT

## 2017-11-09 PROCEDURE — 36415 COLL VENOUS BLD VENIPUNCTURE: CPT

## 2017-11-09 PROCEDURE — 2580000003 HC RX 258: Performed by: NURSE PRACTITIONER

## 2017-11-09 PROCEDURE — 71010 XR CHEST PORTABLE: CPT

## 2017-11-09 PROCEDURE — C1751 CATH, INF, PER/CENT/MIDLINE: HCPCS

## 2017-11-09 PROCEDURE — 93005 ELECTROCARDIOGRAM TRACING: CPT

## 2017-11-09 PROCEDURE — 87040 BLOOD CULTURE FOR BACTERIA: CPT

## 2017-11-09 PROCEDURE — 86900 BLOOD TYPING SEROLOGIC ABO: CPT

## 2017-11-09 PROCEDURE — 2500000003 HC RX 250 WO HCPCS

## 2017-11-09 PROCEDURE — 87328 CRYPTOSPORIDIUM AG IA: CPT

## 2017-11-09 PROCEDURE — 86923 COMPATIBILITY TEST ELECTRIC: CPT

## 2017-11-09 PROCEDURE — 6360000004 HC RX CONTRAST MEDICATION: Performed by: NURSE PRACTITIONER

## 2017-11-09 PROCEDURE — 1210000000 HC MED SURG R&B

## 2017-11-09 PROCEDURE — 6360000002 HC RX W HCPCS: Performed by: NURSE PRACTITIONER

## 2017-11-09 PROCEDURE — 86901 BLOOD TYPING SEROLOGIC RH(D): CPT

## 2017-11-09 PROCEDURE — 96374 THER/PROPH/DIAG INJ IV PUSH: CPT

## 2017-11-09 RX ORDER — LEVOFLOXACIN 5 MG/ML
750 INJECTION, SOLUTION INTRAVENOUS ONCE
Status: COMPLETED | OUTPATIENT
Start: 2017-11-09 | End: 2017-11-10

## 2017-11-09 RX ORDER — SODIUM CHLORIDE 0.9 % (FLUSH) 0.9 %
10 SYRINGE (ML) INJECTION EVERY 12 HOURS SCHEDULED
Status: DISCONTINUED | OUTPATIENT
Start: 2017-11-09 | End: 2017-11-20

## 2017-11-09 RX ORDER — SODIUM CHLORIDE 0.9 % (FLUSH) 0.9 %
10 SYRINGE (ML) INJECTION PRN
Status: DISCONTINUED | OUTPATIENT
Start: 2017-11-09 | End: 2017-11-24 | Stop reason: HOSPADM

## 2017-11-09 RX ORDER — ONDANSETRON 2 MG/ML
4 INJECTION INTRAMUSCULAR; INTRAVENOUS ONCE
Status: COMPLETED | OUTPATIENT
Start: 2017-11-09 | End: 2017-11-09

## 2017-11-09 RX ORDER — SODIUM CHLORIDE 9 MG/ML
250 INJECTION, SOLUTION INTRAVENOUS ONCE
Status: DISCONTINUED | OUTPATIENT
Start: 2017-11-09 | End: 2017-11-20

## 2017-11-09 RX ORDER — LIDOCAINE HYDROCHLORIDE 20 MG/ML
5 INJECTION, SOLUTION INFILTRATION; PERINEURAL ONCE
Status: DISCONTINUED | OUTPATIENT
Start: 2017-11-09 | End: 2017-11-24 | Stop reason: HOSPADM

## 2017-11-09 RX ORDER — ACETAMINOPHEN 325 MG/1
650 TABLET ORAL EVERY 4 HOURS PRN
Status: DISCONTINUED | OUTPATIENT
Start: 2017-11-09 | End: 2017-11-24 | Stop reason: HOSPADM

## 2017-11-09 RX ORDER — SODIUM CHLORIDE 0.9 % (FLUSH) 0.9 %
10 SYRINGE (ML) INJECTION EVERY 12 HOURS
Status: DISCONTINUED | OUTPATIENT
Start: 2017-11-09 | End: 2017-11-20

## 2017-11-09 RX ORDER — 0.9 % SODIUM CHLORIDE 0.9 %
1000 INTRAVENOUS SOLUTION INTRAVENOUS ONCE
Status: DISCONTINUED | OUTPATIENT
Start: 2017-11-09 | End: 2017-11-20

## 2017-11-09 RX ORDER — 0.9 % SODIUM CHLORIDE 0.9 %
1000 INTRAVENOUS SOLUTION INTRAVENOUS ONCE
Status: COMPLETED | OUTPATIENT
Start: 2017-11-09 | End: 2017-11-10

## 2017-11-09 RX ORDER — METRONIDAZOLE 500 MG/1
500 TABLET ORAL ONCE
Status: COMPLETED | OUTPATIENT
Start: 2017-11-09 | End: 2017-11-10

## 2017-11-09 RX ADMIN — SODIUM CHLORIDE 1000 ML: 9 INJECTION, SOLUTION INTRAVENOUS at 17:37

## 2017-11-09 RX ADMIN — ONDANSETRON 4 MG: 2 INJECTION INTRAMUSCULAR; INTRAVENOUS at 17:38

## 2017-11-09 RX ADMIN — IOPAMIDOL 100 ML: 755 INJECTION, SOLUTION INTRAVENOUS at 17:17

## 2017-11-09 RX ADMIN — BARIUM SULFATE: 21 SUSPENSION ORAL at 17:45

## 2017-11-09 ASSESSMENT — ENCOUNTER SYMPTOMS
COUGH: 0
BLOOD IN STOOL: 0
ABDOMINAL DISTENTION: 0
ABDOMINAL PAIN: 0
NAUSEA: 0
SORE THROAT: 0
CONSTIPATION: 0
SINUS PRESSURE: 0
VOMITING: 0
SINUS PAIN: 0
DIARRHEA: 1
COLOR CHANGE: 0
SHORTNESS OF BREATH: 0
TROUBLE SWALLOWING: 0
CHEST TIGHTNESS: 0
BACK PAIN: 0

## 2017-11-09 NOTE — ED PROVIDER NOTES
[address] [facility] ED  eMERGENCY dEPARTMENT eNCOUnter      Pt Name: Henny Faith  MRN: [id number]  [place] 1935  Date of evaluation: 11/9/2017  Provider: Radha Boyer NP     CHIEF COMPLAINT       Chief Complaint   Patient presents with    Abnormal Lab     pt sent from [facility] for WBC 26.5, HGB 7, currently being Tx for C diff       HISTORY OF PRESENT ILLNESS   (Location/Symptom, Timing/Onset, Context/Setting, Quality, Duration, Modifying Factors, Severity) Note limiting factors. Patient presents to the emergency room from [address] [place] [address] with an elevated white blood count of 26.5, hemoglobin of 7 and currently being treated for C. diff. Patient has a chronic dialysis patient with a dialysis schedule of Monday Wednesday Friday. Patient is also hard of hearing. Patient denies any chest pain, shortness of breath, but states that she does feel winded with movement. She denies any cough or phlegm production. Patient denies any fever, chills, nausea, vomiting and has had diarrhea that she is being treated for C. diff per the nursing home. Patient denies any recent travel or sick contacts. Patient denies any dysuria frequency or urgency. The patient denies any blood in her stool. The history is provided by the patient, the nursing home and a relative. No  was used. Nursing Notes were reviewed. REVIEW OF SYSTEMS    (2+ for level 4; 10+ for level 5)     Review of Systems   Constitutional: Positive for fatigue. Negative for activity change, appetite change, chills, diaphoresis, fever and unexpected weight change. HENT: Negative for congestion, sinus pain, sinus pressure, sneezing, sore throat and trouble swallowing. Respiratory: Negative for cough, chest tightness and shortness of breath. Gastrointestinal: Positive for diarrhea. Negative for abdominal distention, abdominal pain, blood in stool, constipation, nausea and vomiting. (NORVASC) 5 MG TABLET    Take 1 tablet by mouth 2 times daily    AMOXICILLIN-CLAVULANATE (AUGMENTIN) 500-125 MG PER TABLET    Take 1 tablet by mouth 3 times daily for 10 days    ASPIRIN 81 MG EC TABLET    Take 81 mg by mouth daily     BUPRENORPHINE (BUPRENEX) 5 MCG/HR PTWK    Place 1 patch onto the skin once a week    CALCIUM CARBONATE (OSCAL) 500 MG TABS TABLET    Take 500 mg by mouth daily. CARBIDOPA-LEVODOPA (SINEMET)  MG PER TABLET    Take 1 tablet by mouth daily     CHLORTHALIDONE (HYGROTON) 25 MG TABLET    Take 25 mg by mouth daily    CLONIDINE (CATAPRES) 0.1 MG TABLET    3 times daily     CRESTOR 40 MG TABLET        FLUTICASONE (FLONASE) 50 MCG/ACT NASAL SPRAY    2 sprays by Nasal route daily. GLUCOSE BLOOD VI TEST STRIPS (TRUETEST TEST) STRIP    As needed. GUAIFENESIN-DEXTROMETHORPHAN (ROBITUSSIN DM) 100-10 MG/5ML SYRUP    Take 5 mLs by mouth every 4 hours as needed for Cough    HYDRALAZINE (APRESOLINE) 100 MG TABLET    Take 1 tablet by mouth 3 times daily    INSULIN LISPRO (HUMALOG KWIKPEN) 100 UNIT/ML PEN    Inject 2 Units into the skin 3 times daily (before meals)    IPRATROPIUM-ALBUTEROL (DUONEB) 0.5-2.5 (3) MG/3ML SOLN NEBULIZER SOLUTION    Inhale 3 mLs into the lungs every 4 hours as needed for Shortness of Breath    IRON POLYSACCH [id number] PO    Take 1 tablet by mouth daily    ISOSORBIDE MONONITRATE (IMDUR) 60 MG CR TABLET        LANTUS SOLOSTAR 100 UNIT/ML INJECTION PEN    INJECT 14 UNITS IN THE MORNING    LEVOTHYROXINE (SYNTHROID) 25 MCG TABLET    TAKE 1 TABLET DAILY ALTERNATING WITH 50 MCG DOSE EVERY OTHER DAY    LEVOTHYROXINE (SYNTHROID) 50 MCG TABLET    TAKE 1 TABLET DAILY ALTERNATING WITH THE OTHER DOSE EVERY OTHER DAY    LOMITAPIDE MESYLATE (JUXTAPID) 40 MG CAPS    Take by mouth daily    MAGNESIUM OXIDE (MAG-OX) 400 MG TABLET    Take 400 mg by mouth daily    METOPROLOL (LOPRESSOR) 50 MG TABLET    Take 1 tablet by mouth 2 times daily.     PANTOPRAZOLE SODIUM (PROTONIX) 40 MG PACK PACKET    Take 40 mg by mouth every morning (before breakfast)     SODIUM BICARBONATE 325 MG TABLET    Take 325 mg by mouth 3 times daily     VENLAFAXINE (EFFEXOR XR) 75 MG EXTENDED RELEASE CAPSULE    150 mg     WARFARIN (COUMADIN) 2.5 MG TABLET    Take as directed by [facility] [place] AT [place] [facility]. Quantity equals 90 day supply. ALLERGIES     Arthrotec [diclofenac-misoprostol]; Depakote [divalproex sodium]; Dilantin [phenytoin]; Klonopin [clonazepam]; and Statins    FAMILY HISTORY       Family History   Problem Relation Age of Onset    Heart Disease Father     Early Death Father 47    Diabetes Father     Heart Disease Mother     Diabetes Mother     High Blood Pressure Mother     High Blood Pressure Sister     High Cholesterol Sister           SOCIAL HISTORY       Social History     Social History    Marital status:      Spouse name: N/A    Number of children: N/A    Years of education: N/A     Social History Main Topics    Smoking status: Former Smoker    Smokeless tobacco: Never Used    Alcohol use No    Drug use: No    Sexual activity: Not Asked     Other Topics Concern    None     Social History Narrative    None       SCREENINGS           PHYSICAL EXAM    (up to 7 for level 4, 8 or more for level 5)     ED Triage Vitals [11/09/17 1422]   BP Temp Temp Source Pulse Resp SpO2 Height Weight   (!) 97/49 98.5 °F (36.9 °C) Oral 87 18 100 % 4' 11\" (1.499 m) 129 lb (58.5 kg)       Physical Exam   Constitutional: She is oriented to person, place, and time. She appears well-developed and well-nourished. HENT:   Head: Normocephalic and atraumatic. Eyes: Conjunctivae are normal.   Neck: Normal range of motion. Neck supple. Cardiovascular: Normal rate and regular rhythm. Pulmonary/Chest: Effort normal and breath sounds normal.   Abdominal: Soft. Bowel sounds are normal. She exhibits no distension.  There is tenderness (is no point tenderness just generalized PANEL - Abnormal; Notable for the following:     Sodium 131 (*)     Chloride 88 (*)     Anion Gap 20 (*)     Glucose 191 (*)     BUN 41 (*)     CREATININE 5.58 (*)     GFR Non- 7.3 (*)     GFR  8.9 (*)     Calcium 7.1 (*)     Total Protein 5.6 (*)     Alb 2.7 (*)     AST 38 (*)     All other components within normal limits   TROPONIN - Abnormal; Notable for the following:     Troponin 0.090 (*)     All other components within normal limits    Narrative:     CALL  [facility]  [facility] tel. [phone],  Troponin results called to and read back by GLORY Anand, 11/09/2017 15:55,  by TIMA   POCT GLUCOSE - Abnormal; Notable for the following:     POC Glucose 222 (*)     All other components within normal limits   POCT GLUCOSE - Normal   POCT OCCULT BLOOD STOOL NON CA SCREEN - Normal   CULTURE BLOOD #1   CULTURE BLOOD #2   C DIFF TOXIN B BY RT PCR   GASTROINTESTINAL PANEL BY DNA   LACTIC ACID, PLASMA   URINE RT REFLEX TO CULTURE   CRYPTOSPORIDIUM ANTIGEN, STOOL   TYPE AND SCREEN   PREPARE RBC (CROSSMATCH)       All other labs were within normal range or not returned as of this dictation.     EMERGENCY DEPARTMENT COURSE and DIFFERENTIAL DIAGNOSIS/MDM:   Vitals:    Vitals:    11/09/17 1422 11/09/17 1555 11/09/17 1749   BP: (!) 97/49 (!) 96/43 (!) 98/46   Pulse: 87 85 87   Resp: 18 16 18   Temp: 98.5 °F (36.9 °C)     TempSrc: Oral     SpO2: 100% 100% 99%   Weight: 129 lb (58.5 kg)     Height: 4' 11\" (1.499 m)         MDM  Number of Diagnoses or Management Options  Anemia in chronic kidney disease, on chronic dialysis [facility]): minor  Failure of outpatient treatment: established and worsening  Leukocytosis, unspecified type: established and worsening  Pancolitis ([address]): new and does not require workup  Pneumonia of both lungs due to infectious organism, unspecified part of lung: established and worsening  Diagnosis management comments: Patient presents to emergency room with an elevated white blood count of 25.1, hemoglobin of 7.0 from [place] [facility] [address] [place] [address]. She has a chronic dialysis patient with dialysis schedule of Monday Wednesday Friday. On assessment the patient's abdomen was tender however she did not complain of any abdominal pain and lasts the abdomen was palpated. A CT scan with IV dye was ordered with approval from Dr. Luna Simms to use IV dye. Patient's CT scan shows pancolitis, bilateral pleural effusions, and bibasilar atelectasis. Patient's chest x-ray shows bilateral pneumonia. Patient was being treated at the nursing home with oral Augmentin for the pneumonia. However patient's white blood count has gone up from 25.1 at 6:00 this morning to 28.1. This is consistent with failure of outpatient therapy. Patient was treated with 150 mg of IV Levaquin for her 1st initial dose and will be redosed by pharmacy for renal use, and was treated with by mouth Flagyl for the pancolitis. So given IV fluids while in the emergency room. Patient's troponin was indeterminate at 0.090 however with patient's renal condition cannot say if the elevation of the troponin is related to cardiac or not. However I did look at patient's previous troponins which also were indeterminate in the past. Recent does not complain of any chest pain. I also ordered a stool culture and stool for C. diff for further evaluation. Patient will benefit from an admission to the hospital for further evaluation and treatment of failed outpatient pneumonia. I spoke to Dr. Scarlett Urban who is accepting the patient. I guess the admission with the patient and her 2 sons who have no further questions, concerns at this time. Brian García did provide verbal consent for me to discuss her care and treatment with her sons prior to given them information of her plan of care. The patient was seen and evaluated by the attending physician as well as myself       Amount and/or Complexity of Data Reviewed  Clinical lab tests: ordered and reviewed  Tests in the

## 2017-11-09 NOTE — PROGRESS NOTES
Ct called er to have this patient drink her barium about 1430. I was told that this patient had a 20g in her arm by Cristiano Evans, so ct was waiting for the patient to drink and let it travel. Ct noticed er had put an order in for a midline, so I called [facility] and Pedrito Borges had said she was on her way to ct about 4pm, I told her that we needed her after the midline. We assumed she had already drank the barium, so we did the ct with iv contrast and the patient was sent back to er, a little while later I received a call from James Astorga that this patient had finished drinking her barium, I told him that she had already had her test, she was brought from [facility] because we thought that er had already given her the barium.

## 2017-11-09 NOTE — ED TRIAGE NOTES
Pt sent from [facility] for low HGB  And elevated WBC, pt denies SOB and chest pain and SOB c/o being tired

## 2017-11-09 NOTE — ED NOTES
Several attempts made for IV starts between myself and Chuck Donald RN with no results. Patient has a fistula on the left arm so we are unable to use left arm . Dr. Keena Velasquez and DYLAN Alberto made aware and was told to call specials. Orders received to have midline inserted. Patient updated.      Elvis Ellis RN  11/09/17 [id number]

## 2017-11-10 PROBLEM — A04.72 C. DIFFICILE COLITIS: Status: ACTIVE | Noted: 2017-11-10

## 2017-11-10 PROBLEM — D72.829 LEUKOCYTOSIS: Status: ACTIVE | Noted: 2017-11-10

## 2017-11-10 PROBLEM — D64.9 ANEMIA: Status: ACTIVE | Noted: 2017-11-10

## 2017-11-10 PROBLEM — N18.6 ESRD (END STAGE RENAL DISEASE) ON DIALYSIS (HCC): Status: ACTIVE | Noted: 2017-11-10

## 2017-11-10 PROBLEM — K51.00 PANCOLITIS (HCC): Status: ACTIVE | Noted: 2017-11-10

## 2017-11-10 PROBLEM — Z99.2 ESRD (END STAGE RENAL DISEASE) ON DIALYSIS (HCC): Status: ACTIVE | Noted: 2017-11-10

## 2017-11-10 LAB
ANION GAP SERPL CALCULATED.3IONS-SCNC: 20 MEQ/L (ref 7–13)
ANISOCYTOSIS: ABNORMAL
BANDED NEUTROPHILS RELATIVE PERCENT: 10 % (ref 5–11)
BASOPHILS ABSOLUTE: 0 K/UL (ref 0–0.2)
BASOPHILS RELATIVE PERCENT: 0 %
BUN BLDV-MCNC: 45 MG/DL (ref 8–23)
C-REACTIVE PROTEIN, HIGH SENSITIVITY: 263.2 MG/L (ref 0–5)
CALCIUM SERPL-MCNC: 6.4 MG/DL (ref 8.6–10.2)
CHLORIDE BLD-SCNC: 93 MEQ/L (ref 98–107)
CLOSTRIDIUM DIFFICILE DNA AMPLIFICATION: ABNORMAL
CO2: 19 MEQ/L (ref 22–29)
CREAT SERPL-MCNC: 5.61 MG/DL (ref 0.5–0.9)
CRYPTOSPORIDIUM ANTIGEN STOOL: NORMAL
EOSINOPHILS ABSOLUTE: 0 K/UL (ref 0–0.7)
EOSINOPHILS RELATIVE PERCENT: 0 %
GFR AFRICAN AMERICAN: 8.8
GFR NON-AFRICAN AMERICAN: 7.3
GI BACTERIAL PATHOGENS BY PCR: NORMAL
GLUCOSE BLD-MCNC: 102 MG/DL (ref 60–115)
GLUCOSE BLD-MCNC: 103 MG/DL (ref 60–115)
GLUCOSE BLD-MCNC: 170 MG/DL (ref 60–115)
GLUCOSE BLD-MCNC: 89 MG/DL (ref 74–109)
HCT VFR BLD CALC: 18.8 % (ref 37–47)
HEMOGLOBIN: 6.2 G/DL (ref 12–16)
LYMPHOCYTES ABSOLUTE: 1.4 K/UL (ref 1–4.8)
LYMPHOCYTES RELATIVE PERCENT: 6 %
MAGNESIUM: 2.1 MG/DL (ref 1.7–2.3)
MCH RBC QN AUTO: 29.1 PG (ref 27–31.3)
MCHC RBC AUTO-ENTMCNC: 32.9 % (ref 33–37)
MCV RBC AUTO: 88.5 FL (ref 82–100)
MONOCYTES ABSOLUTE: 0.7 K/UL (ref 0.2–0.8)
MONOCYTES RELATIVE PERCENT: 3 %
NEUTROPHILS ABSOLUTE: 21.2 K/UL (ref 1.4–6.5)
NEUTROPHILS RELATIVE PERCENT: 81 %
PDW BLD-RTO: 17.6 % (ref 11.5–14.5)
PERFORMED ON: ABNORMAL
PERFORMED ON: NORMAL
PERFORMED ON: NORMAL
PLATELET # BLD: 109 K/UL (ref 130–400)
POTASSIUM SERPL-SCNC: 3.5 MEQ/L (ref 3.5–5.1)
RBC # BLD: 2.12 M/UL (ref 4.2–5.4)
SEDIMENTATION RATE, ERYTHROCYTE: 96 MM (ref 0–30)
SODIUM BLD-SCNC: 132 MEQ/L (ref 132–144)
WBC # BLD: 23.3 K/UL (ref 4.8–10.8)

## 2017-11-10 PROCEDURE — 6370000000 HC RX 637 (ALT 250 FOR IP): Performed by: STUDENT IN AN ORGANIZED HEALTH CARE EDUCATION/TRAINING PROGRAM

## 2017-11-10 PROCEDURE — 83735 ASSAY OF MAGNESIUM: CPT

## 2017-11-10 PROCEDURE — P9016 RBC LEUKOCYTES REDUCED: HCPCS

## 2017-11-10 PROCEDURE — 2580000003 HC RX 258

## 2017-11-10 PROCEDURE — 6370000000 HC RX 637 (ALT 250 FOR IP): Performed by: SPECIALIST

## 2017-11-10 PROCEDURE — 6370000000 HC RX 637 (ALT 250 FOR IP): Performed by: HOSPITALIST

## 2017-11-10 PROCEDURE — 2700000000 HC OXYGEN THERAPY PER DAY

## 2017-11-10 PROCEDURE — 2580000003 HC RX 258: Performed by: NURSE PRACTITIONER

## 2017-11-10 PROCEDURE — 86923 COMPATIBILITY TEST ELECTRIC: CPT

## 2017-11-10 PROCEDURE — 5A1D70Z PERFORMANCE OF URINARY FILTRATION, INTERMITTENT, LESS THAN 6 HOURS PER DAY: ICD-10-PCS | Performed by: INTERNAL MEDICINE

## 2017-11-10 PROCEDURE — 6370000000 HC RX 637 (ALT 250 FOR IP): Performed by: NURSE PRACTITIONER

## 2017-11-10 PROCEDURE — 85025 COMPLETE CBC W/AUTO DIFF WBC: CPT

## 2017-11-10 PROCEDURE — 6360000002 HC RX W HCPCS: Performed by: STUDENT IN AN ORGANIZED HEALTH CARE EDUCATION/TRAINING PROGRAM

## 2017-11-10 PROCEDURE — 80048 BASIC METABOLIC PNL TOTAL CA: CPT

## 2017-11-10 PROCEDURE — 36592 COLLECT BLOOD FROM PICC: CPT

## 2017-11-10 PROCEDURE — 85652 RBC SED RATE AUTOMATED: CPT

## 2017-11-10 PROCEDURE — 94664 DEMO&/EVAL PT USE INHALER: CPT

## 2017-11-10 PROCEDURE — 1210000000 HC MED SURG R&B

## 2017-11-10 PROCEDURE — 6360000002 HC RX W HCPCS: Performed by: INTERNAL MEDICINE

## 2017-11-10 PROCEDURE — 99232 SBSQ HOSP IP/OBS MODERATE 35: CPT | Performed by: INTERNAL MEDICINE

## 2017-11-10 PROCEDURE — 36430 TRANSFUSION BLD/BLD COMPNT: CPT

## 2017-11-10 PROCEDURE — 90937 HEMODIALYSIS REPEATED EVAL: CPT

## 2017-11-10 PROCEDURE — 86141 C-REACTIVE PROTEIN HS: CPT

## 2017-11-10 RX ORDER — METOPROLOL TARTRATE 50 MG/1
50 TABLET, FILM COATED ORAL 2 TIMES DAILY
Status: DISCONTINUED | OUTPATIENT
Start: 2017-11-10 | End: 2017-11-15

## 2017-11-10 RX ORDER — METRONIDAZOLE 500 MG/1
500 TABLET ORAL EVERY 8 HOURS SCHEDULED
Status: DISCONTINUED | OUTPATIENT
Start: 2017-11-10 | End: 2017-11-10

## 2017-11-10 RX ORDER — HYDRALAZINE HYDROCHLORIDE 50 MG/1
100 TABLET, FILM COATED ORAL 3 TIMES DAILY
Status: DISCONTINUED | OUTPATIENT
Start: 2017-11-10 | End: 2017-11-10

## 2017-11-10 RX ORDER — VENLAFAXINE HYDROCHLORIDE 150 MG/1
150 CAPSULE, EXTENDED RELEASE ORAL
Status: DISCONTINUED | OUTPATIENT
Start: 2017-11-11 | End: 2017-11-24 | Stop reason: HOSPADM

## 2017-11-10 RX ORDER — DEXTROSE MONOHYDRATE 50 MG/ML
100 INJECTION, SOLUTION INTRAVENOUS PRN
Status: DISCONTINUED | OUTPATIENT
Start: 2017-11-10 | End: 2017-11-24 | Stop reason: HOSPADM

## 2017-11-10 RX ORDER — ALBUMIN (HUMAN) 12.5 G/50ML
25 SOLUTION INTRAVENOUS DAILY PRN
Status: DISCONTINUED | OUTPATIENT
Start: 2017-11-10 | End: 2017-11-24 | Stop reason: HOSPADM

## 2017-11-10 RX ORDER — ALPRAZOLAM 0.5 MG/1
0.5 TABLET ORAL 3 TIMES DAILY PRN
Status: DISCONTINUED | OUTPATIENT
Start: 2017-11-10 | End: 2017-11-24 | Stop reason: HOSPADM

## 2017-11-10 RX ORDER — 0.9 % SODIUM CHLORIDE 0.9 %
250 INTRAVENOUS SOLUTION INTRAVENOUS ONCE
Status: DISCONTINUED | OUTPATIENT
Start: 2017-11-10 | End: 2017-11-20

## 2017-11-10 RX ORDER — AMLODIPINE BESYLATE 5 MG/1
5 TABLET ORAL 2 TIMES DAILY
Status: DISCONTINUED | OUTPATIENT
Start: 2017-11-10 | End: 2017-11-10

## 2017-11-10 RX ORDER — ACETAMINOPHEN 80 MG
TABLET,CHEWABLE ORAL ONCE
Status: COMPLETED | OUTPATIENT
Start: 2017-11-10 | End: 2017-11-10

## 2017-11-10 RX ORDER — PANTOPRAZOLE SODIUM 40 MG/1
40 GRANULE, DELAYED RELEASE ORAL
Status: DISCONTINUED | OUTPATIENT
Start: 2017-11-11 | End: 2017-11-24 | Stop reason: HOSPADM

## 2017-11-10 RX ORDER — LEVOTHYROXINE SODIUM 0.05 MG/1
50 TABLET ORAL DAILY
Status: DISCONTINUED | OUTPATIENT
Start: 2017-11-10 | End: 2017-11-24 | Stop reason: HOSPADM

## 2017-11-10 RX ORDER — BUPRENORPHINE 5 UG/H
1 PATCH TRANSDERMAL WEEKLY
Status: DISCONTINUED | OUTPATIENT
Start: 2017-11-10 | End: 2017-11-24 | Stop reason: HOSPADM

## 2017-11-10 RX ORDER — IRON ASPGLY,PS/C/B12/FA/CA/SUC 150-25-1
1 CAPSULE ORAL
Status: DISCONTINUED | OUTPATIENT
Start: 2017-11-11 | End: 2017-11-24 | Stop reason: HOSPADM

## 2017-11-10 RX ORDER — IPRATROPIUM BROMIDE AND ALBUTEROL SULFATE 2.5; .5 MG/3ML; MG/3ML
3 SOLUTION RESPIRATORY (INHALATION) EVERY 4 HOURS PRN
Status: DISCONTINUED | OUTPATIENT
Start: 2017-11-10 | End: 2017-11-24 | Stop reason: HOSPADM

## 2017-11-10 RX ORDER — SODIUM BICARBONATE 650 MG/1
325 TABLET ORAL 3 TIMES DAILY
Status: DISCONTINUED | OUTPATIENT
Start: 2017-11-10 | End: 2017-11-12

## 2017-11-10 RX ORDER — HEPARIN SODIUM 1000 [USP'U]/ML
4000 INJECTION, SOLUTION INTRAVENOUS; SUBCUTANEOUS PRN
Status: DISCONTINUED | OUTPATIENT
Start: 2017-11-10 | End: 2017-11-24 | Stop reason: HOSPADM

## 2017-11-10 RX ORDER — SODIUM CHLORIDE 9 MG/ML
INJECTION, SOLUTION INTRAVENOUS
Status: COMPLETED
Start: 2017-11-10 | End: 2017-11-10

## 2017-11-10 RX ORDER — INSULIN GLARGINE 100 [IU]/ML
14 INJECTION, SOLUTION SUBCUTANEOUS EVERY MORNING
Status: DISCONTINUED | OUTPATIENT
Start: 2017-11-10 | End: 2017-11-24 | Stop reason: HOSPADM

## 2017-11-10 RX ORDER — ISOSORBIDE MONONITRATE 60 MG/1
60 TABLET, EXTENDED RELEASE ORAL DAILY
Status: DISCONTINUED | OUTPATIENT
Start: 2017-11-10 | End: 2017-11-24 | Stop reason: HOSPADM

## 2017-11-10 RX ORDER — FUROSEMIDE 10 MG/ML
40 INJECTION INTRAMUSCULAR; INTRAVENOUS ONCE
Status: COMPLETED | OUTPATIENT
Start: 2017-11-10 | End: 2017-11-10

## 2017-11-10 RX ORDER — HEPARIN SODIUM 1000 [USP'U]/ML
1000 INJECTION, SOLUTION INTRAVENOUS; SUBCUTANEOUS PRN
Status: DISCONTINUED | OUTPATIENT
Start: 2017-11-10 | End: 2017-11-10

## 2017-11-10 RX ORDER — DEXTROSE MONOHYDRATE 25 G/50ML
12.5 INJECTION, SOLUTION INTRAVENOUS PRN
Status: DISCONTINUED | OUTPATIENT
Start: 2017-11-10 | End: 2017-11-24 | Stop reason: HOSPADM

## 2017-11-10 RX ORDER — CALCIUM CARBONATE 500(1250)
500 TABLET ORAL DAILY
Status: DISCONTINUED | OUTPATIENT
Start: 2017-11-10 | End: 2017-11-24 | Stop reason: HOSPADM

## 2017-11-10 RX ORDER — GUAIFENESIN/DEXTROMETHORPHAN 100-10MG/5
5 SYRUP ORAL EVERY 4 HOURS PRN
Status: DISCONTINUED | OUTPATIENT
Start: 2017-11-10 | End: 2017-11-24 | Stop reason: HOSPADM

## 2017-11-10 RX ORDER — FLUTICASONE PROPIONATE 50 MCG
2 SPRAY, SUSPENSION (ML) NASAL DAILY
Status: DISCONTINUED | OUTPATIENT
Start: 2017-11-10 | End: 2017-11-24 | Stop reason: HOSPADM

## 2017-11-10 RX ORDER — AMIODARONE HYDROCHLORIDE 200 MG/1
200 TABLET ORAL DAILY
Status: DISCONTINUED | OUTPATIENT
Start: 2017-11-11 | End: 2017-11-11

## 2017-11-10 RX ORDER — ATORVASTATIN CALCIUM 40 MG/1
40 TABLET, FILM COATED ORAL NIGHTLY
Status: DISCONTINUED | OUTPATIENT
Start: 2017-11-10 | End: 2017-11-10 | Stop reason: ALTCHOICE

## 2017-11-10 RX ORDER — AMIODARONE HYDROCHLORIDE 200 MG/1
100 TABLET ORAL DAILY
Status: DISCONTINUED | OUTPATIENT
Start: 2017-11-10 | End: 2017-11-10

## 2017-11-10 RX ORDER — ROSUVASTATIN CALCIUM 40 MG/1
40 TABLET, COATED ORAL NIGHTLY
Status: DISCONTINUED | OUTPATIENT
Start: 2017-11-10 | End: 2017-11-24 | Stop reason: HOSPADM

## 2017-11-10 RX ORDER — NICOTINE POLACRILEX 4 MG
15 LOZENGE BUCCAL PRN
Status: DISCONTINUED | OUTPATIENT
Start: 2017-11-10 | End: 2017-11-24 | Stop reason: HOSPADM

## 2017-11-10 RX ADMIN — Medication 125 MG: at 21:14

## 2017-11-10 RX ADMIN — ROSUVASTATIN CALCIUM 40 MG: 40 TABLET, FILM COATED ORAL at 21:12

## 2017-11-10 RX ADMIN — SODIUM CHLORIDE 1000 ML: 9 INJECTION, SOLUTION INTRAVENOUS at 14:03

## 2017-11-10 RX ADMIN — Medication 10 ML: at 21:14

## 2017-11-10 RX ADMIN — SODIUM BICARBONATE 650 MG TABLET 325 MG: at 21:13

## 2017-11-10 RX ADMIN — ACETAMINOPHEN 650 MG: 325 TABLET ORAL at 11:11

## 2017-11-10 RX ADMIN — FUROSEMIDE 40 MG: 10 INJECTION, SOLUTION INTRAVENOUS at 09:52

## 2017-11-10 RX ADMIN — Medication: at 21:15

## 2017-11-10 RX ADMIN — DARBEPOETIN ALFA 100 MCG: 100 SOLUTION INTRAVENOUS; SUBCUTANEOUS at 21:23

## 2017-11-10 RX ADMIN — LEVOFLOXACIN 750 MG: 5 INJECTION, SOLUTION INTRAVENOUS at 02:08

## 2017-11-10 RX ADMIN — METRONIDAZOLE 500 MG: 500 TABLET ORAL at 02:09

## 2017-11-10 RX ADMIN — ALPRAZOLAM 0.5 MG: 0.5 TABLET ORAL at 21:11

## 2017-11-10 RX ADMIN — METOPROLOL TARTRATE 50 MG: 50 TABLET ORAL at 21:15

## 2017-11-10 RX ADMIN — Medication 10 ML: at 09:55

## 2017-11-10 ASSESSMENT — PAIN DESCRIPTION - DESCRIPTORS: DESCRIPTORS: ACHING

## 2017-11-10 ASSESSMENT — PAIN DESCRIPTION - LOCATION: LOCATION: BACK

## 2017-11-10 ASSESSMENT — PAIN SCALES - GENERAL
PAINLEVEL_OUTOF10: 0
PAINLEVEL_OUTOF10: 4
PAINLEVEL_OUTOF10: 0

## 2017-11-10 ASSESSMENT — PAIN DESCRIPTION - PAIN TYPE: TYPE: CHRONIC PAIN

## 2017-11-10 ASSESSMENT — ENCOUNTER SYMPTOMS
NAUSEA: 1
DIARRHEA: 1
RESPIRATORY NEGATIVE: 1
VOMITING: 0
ABDOMINAL PAIN: 1

## 2017-11-10 ASSESSMENT — PAIN DESCRIPTION - ONSET: ONSET: GRADUAL

## 2017-11-10 ASSESSMENT — PAIN DESCRIPTION - FREQUENCY: FREQUENCY: CONTINUOUS

## 2017-11-10 ASSESSMENT — PAIN DESCRIPTION - PROGRESSION: CLINICAL_PROGRESSION: NOT CHANGED

## 2017-11-10 NOTE — CONSULTS
BETTYE Sherman is a 81 y.o.  female patient who is being at the request of Dr. Arian Brown inpatient consultation of atrial fibrillation. She was admitted on 10/24/2017. Anika Mauricio and [id number] [facility] records have been reviewed in detail.  She is followed on a regular basis by Dr. Jun Pop.     She has a history of paroxysmal atrial fibrillation for which she is maintained chronically on amiodarone.  She is chronically antiquated without warfarin. Tayler Laureano has a history of mild coronary artery disease. She also has a history of moderate to severe calcific aortic stenosis.  She has significant hyperlipidemia but is been intolerant to statin therapy. Tayler Laureano has stage V chronic kidney disease and follows regularly with Dr. Martinez Huddleston. Tayler Laureano had an AV fistula placed however that has not completely matured.  Most recently saw Dr. Jun Pop in early September and was noted on EKG to be in normal sinus rhythm.     She presented to the emergency department 5 days ago with complaints of worsening shortness of breath occurring over a period of 3 days.  She's had a productive cough and has been having some bloody sputum. Tayler Laureano was diagnosed with pneumonia and is being treated with intravenous antibiotics. Tayler Laureano notes that she is now feeling much better. Tayler Laureano is also being treated for hyperkalemia. Taylre Laureano initially was noted to be normal sinus rhythm, however, yesterday she developed tachycardia and was noted to be in atrial fibrillation with a rapid ventricular response.    =====    She denies any chest pain.      She denies any orthopnea, PND, or worsening peripheral edema.  She denies any palpitations, lightheadedness, near-syncope or syncope.     Cardiac and general ROS otherwise Negative      Past Medical History:   Diagnosis Date    Abnormal presence of protein in urine 6/2004    Dr. Londono Call    Atrial fibrillation ([facility] [address]     Constipation, chronic     Diverticular disease of the colon     GERD (gastroesophageal reflux disease) tablet Take 1 tablet by mouth 2 times daily 11/3/17  Yes Blair Langley MD   sodium bicarbonate 325 MG tablet Take 325 mg by mouth 3 times daily    Yes Historical Provider, MD   venlafaxine (EFFEXOR XR) 75 MG extended release capsule 150 mg  5/3/17  Yes Historical Provider, MD   warfarin (COUMADIN) 2.5 MG tablet Take as directed by [facility] [place] [facility]. Quantity equals 90 day supply. Patient taking differently: Take 2.5 mg by mouth daily Take as directed by [facility]. Quantity equals 90 day supply. 5/9/17  Yes Blair Langley MD   levothyroxine (SYNTHROID) 50 MCG tablet TAKE 1 TABLET DAILY ALTERNATING WITH THE OTHER DOSE EVERY OTHER DAY 5/8/17  Yes Ugo Green MD   levothyroxine (SYNTHROID) 25 MCG tablet TAKE 1 TABLET DAILY ALTERNATING WITH 50 MCG DOSE EVERY OTHER DAY 5/8/17  Yes Ugo Green MD   IRON POLYSACCH [id number] PO Take 1 tablet by mouth daily   Yes Historical Provider, MD   insulin lispro (HUMALOG KWIKPEN) 100 UNIT/ML pen Inject 2 Units into the skin 3 times daily (before meals)  Patient taking differently: Inject into the skin 3 times daily (before meals)  10/17/16  Yes Jovon Robin MD   Lomitapide Mesylate (JUXTAPID) 40 MG CAPS Take by mouth daily   Yes Historical Provider, MD   amiodarone (CORDARONE) 200 MG tablet Take 100 mg by mouth daily 1/2 tablet    Yes Historical Provider, MD   cloNIDine (CATAPRES) 0.1 MG tablet 3 times daily  4/17/15  Yes Historical Provider, MD   isosorbide mononitrate (IMDUR) 60 MG CR tablet  2/18/15  Yes Historical Provider, MD   carbidopa-levodopa (SINEMET)  MG per tablet Take 1 tablet by mouth daily  10/16/13  Yes Historical Provider, MD   glucose blood VI test strips (TRUETEST TEST) strip As needed. 5/1/13  Yes Jovon Robin MD   metoprolol (LOPRESSOR) 50 MG tablet Take 1 tablet by mouth 2 times daily.  9/27/11  Yes Clinton Ross MD   aspirin 81 MG EC tablet Take 81 mg by mouth daily    Yes apical impulse, irregular rate and rhythm, normal S1 and S2,  2/6 murmur noted. ABDOMEN:  normal bowel sounds, soft, non-distended, non-tender, no masses palpated, no hepatosplenomegally  EXTREMETIES: No edema, Pulses Strong Thruout. No ulcers. NEUROLOGIC:  Awake, alert, oriented to name, place and time. Following all commands and moving all extremties. SKIN:  no bruising or bleeding, normal skin color, texture, turgor and no rashes    Labs:      Recent Results (from the past 24 hour(s))   C Diff Toxin by RT PCR    Collection Time: 11/09/17  6:29 PM   Result Value Ref Range    CLOSTRIDIUM DIFFICILE DNA AMPLIFICATION (A)      POSITIVE  for  Clostridia Difficile A/B  CONTACT PRECAUTIONS INDICATED  Normal Range:  Negative     Cryptosporidium Antigen, Stool    Collection Time: 11/09/17  6:29 PM   Result Value Ref Range    Cryptosporidium Ag Negative  Normal Range: Negative      Gastrointestinal Panel by DNA    Collection Time: 11/09/17  6:29 PM   Result Value Ref Range    GI Bacterial Pathogens By PCR       DNA of organisms below NOT DETECTED  The following organisms have been tested using nucleic acid  testing technology.   Campylobacter species  Salmonella species  Shigella species  Vibrio species  Yersinia enterocolitica  Shigatoxin 1 and 2 (STEC)  Norovirus  Rotavirus  Normal Range: Not Detected     Sedimentation Rate    Collection Time: 11/10/17  5:32 AM   Result Value Ref Range    Sed Rate 96 (H) 0 - 30 mm   CBC Auto Differential    Collection Time: 11/10/17  5:32 AM   Result Value Ref Range    WBC 23.3 (H) 4.8 - 10.8 K/uL    RBC 2.12 (L) 4.20 - 5.40 M/uL    Hemoglobin 6.2 (LL) 12.0 - 16.0 g/dL    Hematocrit 18.8 (LL) 37.0 - 47.0 %    MCV 88.5 82.0 - 100.0 fL    MCH 29.1 27.0 - 31.3 pg    MCHC 32.9 (L) 33.0 - 37.0 %    RDW 17.6 (H) 11.5 - 14.5 %    Platelets 756 (L) 287 - 400 K/uL    Neutrophils % 81.0 %    Lymphocytes % 6.0 %    Monocytes % 3.0 %    Eosinophils % 0.0 %    Basophils % 0.0 %    Neutrophils # 21. 2 (H) 1.4 - 6.5 K/uL    Lymphocytes # 1.4 1.0 - 4.8 K/uL    Monocytes # 0.7 0.2 - 0.8 K/uL    Eosinophils # 0.0 0.0 - 0.7 K/uL    Basophils # 0.0 0.0 - 0.2 K/uL    Bands Relative 10 5 - 11 %    Anisocytosis 1+    High sensitivity CRP    Collection Time: 11/10/17  5:51 AM   Result Value Ref Range    CRP High Sensitivity 263.2 (H) 0.0 - 5.0 mg/L   Basic Metabolic Panel    Collection Time: 11/10/17  5:51 AM   Result Value Ref Range    Sodium 132 132 - 144 mEq/L    Potassium 3.5 3.5 - 5.1 mEq/L    Chloride 93 (L) 98 - 107 mEq/L    CO2 19 (L) 22 - 29 mEq/L    Anion Gap 20 (H) 7 - 13 mEq/L    Glucose 89 74 - 109 mg/dL    BUN 45 (H) 8 - 23 mg/dL    CREATININE 5.61 (H) 0.50 - 0.90 mg/dL    GFR Non-African American 7.3 (L) >60    GFR  8.8 (L) >60    Calcium 6.4 (L) 8.6 - 10.2 mg/dL   Magnesium    Collection Time: 11/10/17  5:51 AM   Result Value Ref Range    Magnesium 2.1 1.7 - 2.3 mg/dL   POCT Glucose    Collection Time: 11/10/17  8:12 AM   Result Value Ref Range    POC Glucose 103 60 - 115 mg/dl    Performed on ACCU-CHEK    POCT Glucose    Collection Time: 11/10/17 11:19 AM   Result Value Ref Range    POC Glucose 170 (H) 60 - 115 mg/dl    Performed on ACCU-CHEK      POSITIVE  for  Clostridia Difficile A/B       ECG:    Atrial fibrillation  ST & T wave abnormality, consider anterior ischemia  Abnormal ECG          Emmitt Schaumann, MD  [facility] Cardiologist    Electronically signed  on 11/10/17 at 5:56 PM    -----

## 2017-11-10 NOTE — H&P
[facility]  MEDICINE    HISTORY AND PHYSICAL EXAM    PATIENT NAME:  Joy Headley    MRN:  [id number]  SERVICE DATE:  11/10/2017   SERVICE TIME:  9:23 AM    Primary Care Physician: Judge Mckeon     ASSESSMENT/PLAN: 44-year-old female with past medical history of pneumonia, C. diff, atrial fib, end-stage renal disease on HD MWF, diabetes type 2. Sent from nursing facility due to elevated white blood cell count and anemia. Admitted for pancolitis, acute on chronic anemia, end-stage renal disease. Pancolitis: secondary to C. Diff  - Was on PO Flagyl  - ID is consulted, steph recs  - Stools for c.diff, occult blood and culture  - Contact precautions    Acute on chronic anemia. Hgb is 6.2, hct is 18.8 today. - Blood transfusion in progress  - Recheck labs posttransfusion, and serial H&H's  - Hold anticoagulation at this time  - Daily PT and INR  - Continue PPI  - Consult GI, steph recs  - Aranesp per nephrology  - Telemetry  - Continue to monitor closely    End-stage renal disease: On HD  - Nephrology consulted. Per Dr. Suman Bui, new esrd. Has anemia, hypotension, leukocytosis, c diff. Have to be concerned about catheter related bacteremia as well. hold hydralazine, norvasc and clonidine. agree with blood. aranesp 100. HD today. f/u cx's. If blood cx positive will need permcath changed. Fistula is not ready. Steph further recs  - Continue to monitor    Elevated troponin  - Will order serial troponins  - Place on telemetry  - Consult cardiology, as patient has significant cardiac history. Steph recs    Atrial fibrillation  -Coagulation is held given anemia  -Cardio consulted    DM II  -Insulin on sliding scale    Hypotension  -Numerous blood pressure medications held  -IVF  - Continue to monitor closely    Parkinson's disease    Dispo: Await consultant eval and clearance prior to DC. Advised F/U with PCP 1 - 2 days of discharge.      SUBJECTIVE  CHIEF COMPLAINT:  Weakness, elevated white blood cell count,

## 2017-11-10 NOTE — CONSULTS
Consults   Infectious Disease     Patient Name: Carol Mccartney  Date: 11/10/2017  YOB: 1935  Medical Record Number: [id number]    C DIFF DIARRHEA    History of Present Illness:    SOB WAS DC [facility] 3 DAYS PTA  NO F/C  NO COUGH  ELEVATED WBC  ESRD  RECENT RX PNEUMONIA    + DIARRHEA ABD PAIN RLL QUAD      Review of Systems   Constitutional: Positive for chills, fever and malaise/fatigue. HENT: Negative. Respiratory: Negative. Cardiovascular: Negative. Gastrointestinal: Positive for abdominal pain, diarrhea and nausea. Negative for vomiting. Genitourinary: Negative. Musculoskeletal: Negative. Skin: Negative. Neurological: Positive for weakness. Negative for dizziness, tingling, tremors, sensory change and headaches. Review of Systems   Constitutional: Negative for activity change and appetite change. HENT: Negative for congestion, ear discharge, ear pain, nosebleeds, rhinorrhea and sore throat. Eyes: Negative for discharge. Respiratory: Positive for shortness of breath. Cardiovascular: Negative for chest pain, palpitations and leg swelling. Gastrointestinal:+N/V/D  PAIN  . Genitourinary: Negative for difficulty urinating and dysuria. Musculoskeletal: Negative for arthralgias. Skin: Negative for color change, pallor, rash and wound. Neurological: Negative for dizziness, syncope, numbness and headaches. Psychiatric/Behavioral: Negative for agitation and confusion.   Review of Systems: All 14 review of systems negative other than as stated above    Social History   Substance Use Topics    Smoking status: Former Smoker    Smokeless tobacco: Never Used    Alcohol use No         Past Medical History:   Diagnosis Date    Abnormal presence of protein in urine 6/2004    Dr. Emelyn Alex    [facility]     Constipation, chronic     Diverticular disease of the colon     GERD (gastroesophageal reflux disease)     GERD, PUD, Hiatal Hernia    [facility]ulomatous lung disease ([facility] [address]     History of endoscopy 2-21-12    Dr. An Small Hyperlipidemia     Hypothyroidism     Kidney stones 11/2001    Dr. Candi Silver    Neuropathy in diabetes ([facility] [address]     legs    Osteoarthritis     Positive ORTEGA (antinuclear antibody)     1:80    Tachycardia     Thrombophlebitis leg superficial     Type II or unspecified type diabetes mellitus without mention of complication, not stated as uncontrolled 1998           Past Surgical History:   Procedure Laterality Date    APPENDECTOMY  2007    BLADDER SUSPENSION  2008 & 2009    [facility], second at [address]  2/2006    COLONOSCOPY  4/2007    Dr. Kenda Jeans    [address] N/A 11/2/2017    CATHETER INSERTION HEMODIALYSIS performed by Yunior Ho MD at [address] [place] [address] Rd  [phone]    Bilateral    [address] [facility]  3/2003         No current facility-administered medications on file prior to encounter.       Current Outpatient Prescriptions on File Prior to Encounter   Medication Sig Dispense Refill    buprenorphine ([address]) 5 MCG/HR PTWK Place 1 patch onto the skin once a week 1 patch 0    ALPRAZolam (XANAX) 0.5 MG tablet Take 1 tablet by mouth 3 times daily as needed for Anxiety 10 tablet 0    ipratropium-albuterol (DUONEB) 0.5-2.5 (3) MG/3ML SOLN nebulizer solution Inhale 3 mLs into the lungs every 4 hours as needed for Shortness of Breath 360 mL     hydrALAZINE (APRESOLINE) 100 MG tablet Take 1 tablet by mouth 3 times daily 90 tablet 3    guaiFENesin-dextromethorphan (ROBITUSSIN DM) 100-10 MG/5ML syrup Take 5 mLs by mouth every 4 hours as needed for Cough 120 mL     amoxicillin-clavulanate (AUGMENTIN) 500-125 MG per tablet Take 1 tablet by mouth 3 times daily for 10 days 30 tablet 0    amLODIPine (NORVASC) 5 MG tablet Take 1 tablet by mouth 2 times daily 30 tablet 3    sodium bicarbonate 325 MG tablet Take 325 mg by mouth 3 times daily       venlafaxine (EFFEXOR XR) 75 MG extended release capsule 150 mg       warfarin (COUMADIN) 2.5 MG tablet Take as directed by [facility] [place] [facility]. Quantity equals 90 day supply. (Patient taking differently: Take 2.5 mg by mouth daily Take as directed by [facility] [place] [facility]. Quantity equals 90 day supply.) 200 tablet 1    levothyroxine (SYNTHROID) 50 MCG tablet TAKE 1 TABLET DAILY ALTERNATING WITH THE OTHER DOSE EVERY OTHER DAY 50 tablet 3    levothyroxine (SYNTHROID) 25 MCG tablet TAKE 1 TABLET DAILY ALTERNATING WITH 50 MCG DOSE EVERY OTHER DAY 50 tablet 2    IRON POLYSACCH [id number]-F20-MG PO Take 1 tablet by mouth daily      insulin lispro (HUMALOG KWIKPEN) 100 UNIT/ML pen Inject 2 Units into the skin 3 times daily (before meals) (Patient taking differently: Inject into the skin 3 times daily (before meals) ) 15 mL 3    Lomitapide Mesylate (JUXTAPID) 40 MG CAPS Take by mouth daily      amiodarone (CORDARONE) 200 MG tablet Take 100 mg by mouth daily 1/2 tablet       cloNIDine (CATAPRES) 0.1 MG tablet 3 times daily       isosorbide mononitrate (IMDUR) 60 MG CR tablet       carbidopa-levodopa (SINEMET)  MG per tablet Take 1 tablet by mouth daily       glucose blood VI test strips (TRUETEST TEST) strip As needed. 200 strip 11    metoprolol (LOPRESSOR) 50 MG tablet Take 1 tablet by mouth 2 times daily. 180 tablet 3    aspirin 81 MG EC tablet Take 81 mg by mouth daily       fluticasone (FLONASE) 50 MCG/ACT nasal spray 2 sprays by Nasal route daily.  calcium carbonate (OSCAL) 500 MG TABS tablet Take 500 mg by mouth daily.         LANTUS SOLOSTAR 100 UNIT/ML injection pen INJECT 14 UNITS IN THE MORNING 75 mL 2    magnesium oxide (MAG-OX) 400 MG tablet Take 400 mg by mouth daily      pantoprazole sodium (PROTONIX) 40 MG PACK packet Take 40 mg by mouth every morning (before breakfast)       CRESTOR 40 MG tablet  chlorthalidone (HYGROTON) 25 MG tablet Take 25 mg by mouth daily         Allergies   Allergen Reactions    Arthrotec [Diclofenac-Misoprostol] Nausea Only    Depakote [Divalproex Sodium] Itching    Dilantin [Phenytoin]     Klonopin [Clonazepam]     Statins Other (See Comments)     achy         Family History   Problem Relation Age of Onset    Heart Disease Father     Early Death Father 47    Diabetes Father     Heart Disease Mother     Diabetes Mother     High Blood Pressure Mother     High Blood Pressure Sister     High Cholesterol Sister          Physical Exam:     Blood pressure (!) 101/53, pulse 100, temperature 98.1 °F (36.7 °C), temperature source Oral, resp. rate 18, height 4' 11\" (1.499 m), weight 129 lb (58.5 kg), SpO2 99 %. Physical Exam   Constitutional: She appears well-developed. HENT:   Head: Normocephalic. Eyes: Pupils are equal, round, and reactive to light. Neck: No JVD present. No tracheal deviation present. No thyromegaly present. Cardiovascular: Normal rate, regular rhythm and intact distal pulses. Exam reveals no gallop. No murmur heard. Pulmonary/Chest: No respiratory distress. She has no wheezes. She has no rales. She exhibits no tenderness. Abdominal: She exhibits no distension and no mass. There is no hepatosplenomegaly, splenomegaly or hepatomegaly. There is tenderness. There is no rebound, no guarding and no CVA tenderness. Musculoskeletal: She exhibits no edema or tenderness. Lymphadenopathy:     She has no cervical adenopathy. She has no axillary adenopathy. Right: No inguinal, no supraclavicular and no epitrochlear adenopathy present. Left: No inguinal, no supraclavicular and no epitrochlear adenopathy present. Neurological: She is alert. Skin: No rash noted. No erythema. No pallor.          Lab Results   Component Value Date    WBC 23.3 (H) 11/10/2017    HGB 6.2 (LL) 11/10/2017    HCT 18.8 (LL) 11/10/2017    MCV 88.5 11/10/2017

## 2017-11-10 NOTE — CONSULTS
Medication Sig Dispense Refill    buprenorphine (BUPRENEX) 5 MCG/HR PTWK Place 1 patch onto the skin once a week 1 patch 0    ALPRAZolam (XANAX) 0.5 MG tablet Take 1 tablet by mouth 3 times daily as needed for Anxiety 10 tablet 0    ipratropium-albuterol (DUONEB) 0.5-2.5 (3) MG/3ML SOLN nebulizer solution Inhale 3 mLs into the lungs every 4 hours as needed for Shortness of Breath 360 mL     hydrALAZINE (APRESOLINE) 100 MG tablet Take 1 tablet by mouth 3 times daily 90 tablet 3    guaiFENesin-dextromethorphan (ROBITUSSIN DM) 100-10 MG/5ML syrup Take 5 mLs by mouth every 4 hours as needed for Cough 120 mL     amoxicillin-clavulanate (AUGMENTIN) 500-125 MG per tablet Take 1 tablet by mouth 3 times daily for 10 days 30 tablet 0    amLODIPine (NORVASC) 5 MG tablet Take 1 tablet by mouth 2 times daily 30 tablet 3    sodium bicarbonate 325 MG tablet Take 325 mg by mouth 3 times daily       venlafaxine (EFFEXOR XR) 75 MG extended release capsule 150 mg       warfarin (COUMADIN) 2.5 MG tablet Take as directed by [facility] [place] [facility]. Quantity equals 90 day supply. (Patient taking differently: Take 2.5 mg by mouth daily Take as directed by [facility] [place] [facility] [place] [facility].  Quantity equals 90 day supply.) 200 tablet 1    levothyroxine (SYNTHROID) 50 MCG tablet TAKE 1 TABLET DAILY ALTERNATING WITH THE OTHER DOSE EVERY OTHER DAY 50 tablet 3    levothyroxine (SYNTHROID) 25 MCG tablet TAKE 1 TABLET DAILY ALTERNATING WITH 50 MCG DOSE EVERY OTHER DAY 50 tablet 2    IRON POLYSACCH [id number] PO Take 1 tablet by mouth daily      insulin lispro (HUMALOG KWIKPEN) 100 UNIT/ML pen Inject 2 Units into the skin 3 times daily (before meals) (Patient taking differently: Inject into the skin 3 times daily (before meals) ) 15 mL 3    Lomitapide Mesylate (JUXTAPID) 40 MG CAPS Take by mouth daily      amiodarone (CORDARONE) 200 MG tablet Take 100 mg by mouth daily 1/2 tablet       attenuated, lungs are hyperinflated and there is some widening of the AP diameter the chest and coarsening of the interstitium. Right sided trachea. There is worsening patchy multifocal to confluent areas of airspace disease within the parenchyma. There is bibasilar trace to small pleural effusions. .  No effusions. Pneumothoraces. WORSENING PATCHY MULTIFOCAL TO CONFLUENT AREAS OF AIRSPACE DISEASE WITHIN THE PARENCHYMA. TRACE TO SMALL BILATERAL PLEURAL EFFUSIONS RADIOGRAPHIC FINDINGS SUGGESTIVE OF COPD. CORRELATE CLINICALLY. Xr Chest Standard (2 Vw)    Result Date: 10/24/2017  Chest 2 views. HISTORY: Shortness of breath. FINDINGS: Comparison, February 9, 2017. Osseous structures intact. Cardiac silhouette normal. Aorta calcified and tortuous. Small bilateral calcified granulomas again identified. Mild blunting right costophrenic angle with ill-defined area of increased opacities at lung bases. Small bilateral effusion with bibasilar atelectasis/pneumonia    Ct Chest Wo Contrast    Result Date: 10/31/2017  EXAMINATION:  CT SCAN CHEST CLINICAL HISTORY:  Short of breath COMPARISON: TECHNIQUE:  Multiple serial axial images from the base neck through the upper abdomen with both sagittal and coronal reconstruction was performed without intravenous or oral administration of contrast. FINDINGS:  There are patchy multifocal areas of groundglass infiltrates within the left upper lobe left lower lobe, right upper lobe, right middle lobe. There is also more small focal areas of infiltrate consolidation air bronchograms in both bases. There are bilateral pleural effusions. No pneumothoraces. No significant mediastinal adenopathy. Within the field-of-view there is an area of low-attenuation within the left lobe of the thyroid measuring 1 cm. The right lobe is not appreciated. Correlate with prior thyroidectomy.  Within the field-of-view IDENTIFIED. Xr Chest Portable    Result Date: 11/9/2017  PORTABLE CHEST: 11/9/2017 CLINICAL HISTORY:  Fatigue . COMPARISON: 10/31/2017. A portable upright AP radiograph of the chest was obtained. FINDINGS: A poor inspiratory volume is present with mild probable bibasilar atelectasis, and suspect trace pleural effusions. There is no significant cardiomegaly, vascular congestion, pneumothorax, or displaced fractures identified. Since the prior study, there has been interval placement of a right internal jugular approach tunneled hemodialysis catheter, with its tip overlying the cavoatrial junction. Small calcified granulomas are again noted. POOR INSPIRATION WITH MILD PROBABLE BIBASILAR ATELECTASIS. BRONCHOPNEUMONIA SHOULD BE EXCLUDED CLINICALLY. Xr Chest Portable    Result Date: 10/30/2017  PORTABLE CHEST: 10/30/2017 CLINICAL HISTORY: Shortness of breath . COMPARISON: 10/29/2017. A portable upright AP radiograph of the chest was obtained. FINDINGS: A poor inspiratory volume is present with worsening patchy probable bilateral pulmonary edema and/or bronchopneumonia. Vascular congestion appears worsening. Small pleural effusions appear to be developing. There is no pneumothorax, or displaced fractures identified. Small calcified granulomas in the right lung apex are again noted. WORSENING BRONCHOPNEUMONIA AND/OR PULMONARY EDEMA. SMALL BILATERAL PLEURAL EFFUSIONS. Xr Chest Portable    Result Date: 10/26/2017  XR CHEST PORTABLE : 10/26/2017 CLINICAL HISTORY: pneumonia . COMPARISON: 10/24/2017. TECHNIQUE: A portable upright AP radiograph of the chest was obtained. FINDINGS: Bibasilar infiltrates have worsened when compared to the prior study, greater on the right. Small bilateral pleural effusions appear mildly increased. Findings are suspicious for bronchopneumonia. Cardiac decompensation also possible. There is no significant cardiomegaly, pneumothorax or other findings of concern identified. WORSENING BIBASILAR INFILTRATE/CONSOLIDATION SUSPICIOUS FOR BRONCHOPNEUMONIA WITH SMALL EFFUSIONS. Fluoro For Surgical Procedures    Result Date: 11/2/2017  FLUORO FOR SURGICAL PROCEDURES : 11/2/2017 CLINICAL HISTORY:  Insertion Of hemo Dialysis Catheter . COMPARISON: None available. Intraoperative fluoroscopy was provided for Dr. Dana Murphy right internal jugular approach tunneled hemodialysis catheter placement procedure. A total of 9.1 seconds of fluoroscopy was used, with 2 fluoroscopic stills saved. No diagnostic images were obtained. The catheter appears in good position on the 2 images submitted. Please see Dr. Dana Murphy surgical notes for completeness. Fluoroscopy Modified Barium Swallow With Video    Result Date: 10/27/2017  Modified barium swallow. HISTORY: Dysphasia. FINDINGS: Imaging obtained with patient sitting upright and filmed in lateral projection. Nonstandardized barium viscosities including thin liquid, pureed, and antoine cracker administered by a member of the department of speech pathology in attendance. Transient laryngeal penetration was identified with thin consistency. No aspiration evident. Please see speech pathology report for more information. Penetration as described. No aspiration. 13 cine loops. One static image. 1.9 minutes fluoroscopy time. Ir Midline Cath    Result Date: 11/9/2017  NO FILMS. NO DICTATION. Assessment/  82 yo with new esrd. Has anemia, hypotension, leukocytosis, c diff. Have to be concerned about catheter related bacteremia as well. Plan/  1- hold hydralazine, norvasc and clonidine  2- agree with blood  3- aranesp 100  4- HD today  5- f/u cx's. If blood cx positive will need permcath changed. Fistula is not ready    Thank you for the consultation. Please do not hesitate to call with questions.     Clarita Evans

## 2017-11-10 NOTE — FLOWSHEET NOTE
Dr Mindi Ozuna in to see pt and review home meds for reconciliation. . Pt is scheduled for Hemo dialsis  meds to be given after treatment

## 2017-11-11 ENCOUNTER — APPOINTMENT (OUTPATIENT)
Dept: GENERAL RADIOLOGY | Age: 82
DRG: 371 | End: 2017-11-11
Payer: MEDICARE

## 2017-11-11 LAB
ANION GAP SERPL CALCULATED.3IONS-SCNC: 19 MEQ/L (ref 7–13)
BASOPHILS ABSOLUTE: 0 K/UL (ref 0–0.2)
BASOPHILS RELATIVE PERCENT: 0.1 %
BUN BLDV-MCNC: 30 MG/DL (ref 8–23)
CALCIUM SERPL-MCNC: 7.1 MG/DL (ref 8.6–10.2)
CHLORIDE BLD-SCNC: 92 MEQ/L (ref 98–107)
CO2: 23 MEQ/L (ref 22–29)
CREAT SERPL-MCNC: 3.88 MG/DL (ref 0.5–0.9)
EOSINOPHILS ABSOLUTE: 0.1 K/UL (ref 0–0.7)
EOSINOPHILS RELATIVE PERCENT: 0.3 %
GFR AFRICAN AMERICAN: 13.5
GFR NON-AFRICAN AMERICAN: 11.1
GLUCOSE BLD-MCNC: 158 MG/DL (ref 60–115)
GLUCOSE BLD-MCNC: 161 MG/DL (ref 74–109)
GLUCOSE BLD-MCNC: 200 MG/DL (ref 60–115)
GLUCOSE BLD-MCNC: 204 MG/DL (ref 60–115)
GLUCOSE BLD-MCNC: 89 MG/DL (ref 60–115)
HCT VFR BLD CALC: 30.8 % (ref 37–47)
HEMOGLOBIN: 10.4 G/DL (ref 12–16)
LYMPHOCYTES ABSOLUTE: 0.4 K/UL (ref 1–4.8)
LYMPHOCYTES RELATIVE PERCENT: 2.2 %
MAGNESIUM: 2 MG/DL (ref 1.7–2.3)
MCH RBC QN AUTO: 28.8 PG (ref 27–31.3)
MCHC RBC AUTO-ENTMCNC: 33.7 % (ref 33–37)
MCV RBC AUTO: 85.3 FL (ref 82–100)
MONOCYTES ABSOLUTE: 0.9 K/UL (ref 0.2–0.8)
MONOCYTES RELATIVE PERCENT: 4.8 %
NEUTROPHILS ABSOLUTE: 17.6 K/UL (ref 1.4–6.5)
NEUTROPHILS RELATIVE PERCENT: 92.6 %
PDW BLD-RTO: 16.9 % (ref 11.5–14.5)
PERFORMED ON: ABNORMAL
PERFORMED ON: NORMAL
PLATELET # BLD: 131 K/UL (ref 130–400)
POTASSIUM SERPL-SCNC: 3.5 MEQ/L (ref 3.5–5.1)
RBC # BLD: 3.61 M/UL (ref 4.2–5.4)
SODIUM BLD-SCNC: 134 MEQ/L (ref 132–144)
WBC # BLD: 19 K/UL (ref 4.8–10.8)

## 2017-11-11 PROCEDURE — 93005 ELECTROCARDIOGRAM TRACING: CPT

## 2017-11-11 PROCEDURE — 6370000000 HC RX 637 (ALT 250 FOR IP): Performed by: SPECIALIST

## 2017-11-11 PROCEDURE — 2580000003 HC RX 258: Performed by: INTERNAL MEDICINE

## 2017-11-11 PROCEDURE — P9016 RBC LEUKOCYTES REDUCED: HCPCS

## 2017-11-11 PROCEDURE — 71020 XR CHEST STANDARD TWO VW: CPT

## 2017-11-11 PROCEDURE — 86923 COMPATIBILITY TEST ELECTRIC: CPT

## 2017-11-11 PROCEDURE — 6370000000 HC RX 637 (ALT 250 FOR IP): Performed by: HOSPITALIST

## 2017-11-11 PROCEDURE — 6360000002 HC RX W HCPCS: Performed by: INTERNAL MEDICINE

## 2017-11-11 PROCEDURE — 2060000000 HC ICU INTERMEDIATE R&B

## 2017-11-11 PROCEDURE — 85025 COMPLETE CBC W/AUTO DIFF WBC: CPT

## 2017-11-11 PROCEDURE — 36430 TRANSFUSION BLD/BLD COMPNT: CPT

## 2017-11-11 PROCEDURE — 2580000003 HC RX 258: Performed by: NURSE PRACTITIONER

## 2017-11-11 PROCEDURE — 80048 BASIC METABOLIC PNL TOTAL CA: CPT

## 2017-11-11 PROCEDURE — 83735 ASSAY OF MAGNESIUM: CPT

## 2017-11-11 PROCEDURE — 2700000000 HC OXYGEN THERAPY PER DAY

## 2017-11-11 PROCEDURE — 6370000000 HC RX 637 (ALT 250 FOR IP): Performed by: INTERNAL MEDICINE

## 2017-11-11 RX ORDER — METOPROLOL TARTRATE 5 MG/5ML
5 INJECTION INTRAVENOUS EVERY 6 HOURS PRN
Status: DISCONTINUED | OUTPATIENT
Start: 2017-11-11 | End: 2017-11-24 | Stop reason: HOSPADM

## 2017-11-11 RX ADMIN — Medication 125 MG: at 06:43

## 2017-11-11 RX ADMIN — Medication 10 ML: at 10:14

## 2017-11-11 RX ADMIN — LEVOTHYROXINE SODIUM 50 MCG: 50 TABLET ORAL at 06:50

## 2017-11-11 RX ADMIN — CALCIUM 500 MG: 500 TABLET ORAL at 10:04

## 2017-11-11 RX ADMIN — AMIODARONE HYDROCHLORIDE 150 MG: 50 INJECTION, SOLUTION INTRAVENOUS at 12:19

## 2017-11-11 RX ADMIN — SODIUM CHLORIDE, PRESERVATIVE FREE 10 ML: 5 INJECTION INTRAVENOUS at 21:32

## 2017-11-11 RX ADMIN — SODIUM BICARBONATE 650 MG TABLET 325 MG: at 10:03

## 2017-11-11 RX ADMIN — AMIODARONE HYDROCHLORIDE 1 MG/MIN: 50 INJECTION, SOLUTION INTRAVENOUS at 14:53

## 2017-11-11 RX ADMIN — ROSUVASTATIN CALCIUM 40 MG: 40 TABLET, FILM COATED ORAL at 21:29

## 2017-11-11 RX ADMIN — PANTOPRAZOLE SODIUM 40 MG: 40 GRANULE, DELAYED RELEASE ORAL at 06:50

## 2017-11-11 RX ADMIN — INSULIN GLARGINE 14 UNITS: 100 INJECTION, SOLUTION SUBCUTANEOUS at 10:09

## 2017-11-11 RX ADMIN — Medication 125 MG: at 17:23

## 2017-11-11 RX ADMIN — FLUTICASONE PROPIONATE 2 SPRAY: 50 SPRAY, METERED NASAL at 10:08

## 2017-11-11 RX ADMIN — METOPROLOL TARTRATE 50 MG: 50 TABLET ORAL at 10:03

## 2017-11-11 RX ADMIN — SODIUM BICARBONATE 650 MG TABLET 325 MG: at 21:30

## 2017-11-11 RX ADMIN — VENLAFAXINE HYDROCHLORIDE 150 MG: 150 CAPSULE, EXTENDED RELEASE ORAL at 10:03

## 2017-11-11 RX ADMIN — AMIODARONE HYDROCHLORIDE 200 MG: 200 TABLET ORAL at 10:04

## 2017-11-11 RX ADMIN — INSULIN LISPRO 4 UNITS: 100 INJECTION, SOLUTION INTRAVENOUS; SUBCUTANEOUS at 12:33

## 2017-11-11 RX ADMIN — Medication 125 MG: at 10:04

## 2017-11-11 RX ADMIN — AMIODARONE HYDROCHLORIDE 0.5 MG/MIN: 50 INJECTION, SOLUTION INTRAVENOUS at 23:31

## 2017-11-11 RX ADMIN — METOPROLOL TARTRATE 50 MG: 50 TABLET ORAL at 21:30

## 2017-11-11 RX ADMIN — SODIUM BICARBONATE 650 MG TABLET 325 MG: at 15:03

## 2017-11-11 RX ADMIN — CARBIDOPA AND LEVODOPA 1 TABLET: 25; 100 TABLET ORAL at 10:04

## 2017-11-11 RX ADMIN — ISOSORBIDE MONONITRATE 60 MG: 60 TABLET, EXTENDED RELEASE ORAL at 10:03

## 2017-11-11 ASSESSMENT — PAIN DESCRIPTION - PROGRESSION: CLINICAL_PROGRESSION: NOT CHANGED

## 2017-11-11 ASSESSMENT — ENCOUNTER SYMPTOMS: SHORTNESS OF BREATH: 0

## 2017-11-11 NOTE — DISCHARGE SUMMARY
MG tablet Take 1 tablet by mouth 3 times daily, Disp-90 tablet, R-3DC to [facility]      guaiFENesin-dextromethorphan (ROBITUSSIN DM) 100-10 MG/5ML syrup Take 5 mLs by mouth every 4 hours as needed for Cough, Disp-120 mLDC to SNF      amoxicillin-clavulanate (AUGMENTIN) 500-125 MG per tablet Take 1 tablet by mouth 3 times daily for 10 days, Disp-30 tablet, R-0DC to SNF         CONTINUE these medications which have CHANGED    Details   buprenorphine (BUPRENEX) 5 MCG/HR PTWK Place 1 patch onto the skin once a week, Disp-1 patch, R-0Print      amLODIPine (NORVASC) 5 MG tablet Take 1 tablet by mouth 2 times daily, Disp-30 tablet, R-3DC to [facility]         CONTINUE these medications which have NOT CHANGED    Details   sodium bicarbonate 325 MG tablet Take 325 mg by mouth 3 times daily Historical Med      venlafaxine (EFFEXOR XR) 75 MG extended release capsule 150 mg Historical Med      warfarin (COUMADIN) 2.5 MG tablet Take as directed by [facility] AT [place] [facility]. Quantity equals 90 day supply. , Disp-200 tablet, R-1Normal      !! levothyroxine (SYNTHROID) 50 MCG tablet TAKE 1 TABLET DAILY ALTERNATING WITH THE OTHER DOSE EVERY OTHER DAY, Disp-50 tablet, R-3Normal      !! levothyroxine (SYNTHROID) 25 MCG tablet TAKE 1 TABLET DAILY ALTERNATING WITH 50 MCG DOSE EVERY OTHER DAY, Disp-50 tablet, R-2Normal      LANTUS SOLOSTAR 100 UNIT/ML injection pen INJECT 14 UNITS IN THE MORNING, Disp-75 mL, R-2      IRON POLYSACCH [id number] PO Take 1 tablet by mouth daily      insulin lispro (HUMALOG KWIKPEN) 100 UNIT/ML pen Inject 2 Units into the skin 3 times daily (before meals), Disp-15 mL, R-3      magnesium oxide (MAG-OX) 400 MG tablet Take 400 mg by mouth daily      Lomitapide Mesylate (JUXTAPID) 40 MG CAPS Take by mouth daily      pantoprazole sodium (PROTONIX) 40 MG PACK packet Take 40 mg by mouth every morning (before breakfast) Historical Med      CRESTOR 40 MG tablet       amiodarone (CORDARONE) 200 MG tablet Take 100 mg by mouth daily 1/2 tablet Historical Med      chlorthalidone (HYGROTON) 25 MG tablet Take 25 mg by mouth daily      cloNIDine (CATAPRES) 0.1 MG tablet 3 times daily       isosorbide mononitrate (IMDUR) 60 MG CR tablet       carbidopa-levodopa (SINEMET)  MG per tablet Take 1 tablet by mouth daily Historical Med      glucose blood VI test strips (TRUETEST TEST) strip Disp-200 strip, R-11, NormalAs needed. metoprolol (LOPRESSOR) 50 MG tablet Take 1 tablet by mouth 2 times daily. , Disp-180 tablet, R-3      aspirin 81 MG EC tablet Take 81 mg by mouth daily Historical Med      fluticasone (FLONASE) 50 MCG/ACT nasal spray 2 sprays by Nasal route daily. calcium carbonate (OSCAL) 500 MG TABS tablet Take 500 mg by mouth daily. !! - Potential duplicate medications found. Please discuss with provider. STOP taking these medications       NOVOFINE 32G X 6 MM MISC Comments:   Reason for Stopping:         IFEREX 150 150 MG capsule Comments:   Reason for Stopping:             Activity: activity as tolerated  Diet: diabetic diet  Wound Care: as directed    Follow-up with Dunia Glez   in 1 week. Signed:   Regan Davis    11/11/2017  11:01 AM

## 2017-11-11 NOTE — PROGRESS NOTES
[facility] AT [place] Respiratory Therapy Evaluation   Current Order: PRN duoneb     Home RegimenPRN duoneb    Ordering Physician: Blu Watson  Re-evaluation Date: na    Diagnosis:anemia     Patient Status: Stable / Unstable + Physician notified    The following MDI Criteria must be met in order to convert aerosol to MDI with spacer. If unable to meet, MDI will be converted to aerosol:  []  Patient able to demonstrate the ability to use MDI effectively  []  Patient alert and cooperative  []  Patient able to take deep breath with 5-10 second hold  []  Medication(s) available in this delivery method   []  Peak flow greater than or equal to 200 ml/min            Current Order Substituted To  (same drug, same frequency)   Aerosol to MDI [] Albuterol Sulfate 0.083% unit dose by aerosol Albuterol Sulfate MDI 2 puffs by inhalation with spacer    [] Levalbuterol 1.25 mg unit dose by aerosol Levalbuterol MDI 2 puffs by inhalation with spacer    [] Levalbuterol 0.63 mg unit dose by aerosol Levalbuterol MDI 2 puffs by inhalation with spacer    [] Ipratropium Bromide 0.02% unit dose by aerosol Ipratropium Bromide MDI 2 puffs by inhalation with spacer    [] Duoneb (Ipratropium + Albuterol) unit dose by aerosol Ipratropium MDI + Albuterol MDI 2 puffs by inhalation w/spacer   MDI to Aerosol [] Albuterol Sulfate MDI Albuterol Sulfate 0.083% unit dose by aerosol    [] Levalbuterol MDI 2 puffs by inhalation Levalbuterol 1.25 mg unit dose by aerosol    [] Ipratropium Bromide MDI by inhalation Ipratropium Bromide 0.02% unit dose by aerosol    [] Combivent (Ipratropium + Albuterol) MDI by inhalation Duoneb (Ipratropium + Albuterol) unit dose by aerosol   Treatment Assessment [Frequency/Schedule]:  Change frequency to: _____prn duoneb_____________________________________________per Protocol, P&T, MEC      Points 0 1 2 3 4   Pulmonary Status  Non-Smoker  [x]   Smoking history   < 20 pack years  []   Smoking history  ?  20 pack years  []   Pulmonary

## 2017-11-11 NOTE — PROGRESS NOTES
having some bloody sputum. She was diagnosed with pneumonia and is being treated with intravenous antibiotics. She notes that she is now feeling much better. She is also being treated for hyperkalemia. She initially was noted to be normal sinus rhythm, however, yesterday she developed tachycardia and was noted to be in atrial fibrillation with a rapid ventricular response. VITALS   BP (!) 108/52   Pulse (!) 37   Temp 98.4 °F (36.9 °C)   Resp 20   Ht 4' 11\" (1.499 m)   Wt 125 lb 14.1 oz (57.1 kg)   SpO2 100%   BMI 25.43 kg/m²    MAXIMUM TEMPERATURE OVER 24HRS:  Temp (24hrs), Av.5 °F (36.9 °C), Min:97 °F (36.1 °C), Max:99.9 °F (37.7 °C)    24HR BLOOD PRESSURE RANGE:  Systolic (60YKX), UQA:105 , Min:92 , DVN:575   ; Diastolic (11RBE), OFQ:29, Min:44, Max:72      Intake/Output Summary (Last 24 hours) at 17 1120  Last data filed at 11/10/17 1830   Gross per 24 hour   Intake              400 ml   Output             2400 ml   Net            -2000 ml     Patient Vitals for the past 96 hrs (Last 3 readings):   Weight   11/10/17 1830 125 lb 14.1 oz (57.1 kg)   11/10/17 1415 131 lb 2.8 oz (59.5 kg)   17 1422 129 lb (58.5 kg)         REVIEW OF SYSTEMS   Review of Systems   Respiratory: Negative for shortness of breath. Cardiovascular: Negative for chest pain. PHYSICAL EXAM   Objective:  General Appearance:  Comfortable. Vital signs: (most recent): Blood pressure (!) 108/52, pulse (!) 37, temperature 98.4 °F (36.9 °C), resp. rate 20, height 4' 11\" (1.499 m), weight 125 lb 14.1 oz (57.1 kg), SpO2 100 %. Output: Producing urine. HEENT: Normal HEENT exam.    Lungs:  Normal effort and normal respiratory rate. Breath sounds clear to auscultation. She is not in respiratory distress. No rales, wheezes or rhonchi. Heart: Normal rate. Irregular rhythm. S1 normal and S2 normal.  No murmur (II/VI GENA RUSB), gallop or friction rub. Chest: No chest wall tenderness.     Abdomen: FULLY CHARACTERIZED IN THIS STUDY. CONSIDER FOLLOW-UP ULTRASOUND TO FURTHER EVALUATE. 5. THERE ARE NONOBSTRUCTING CALCULI IN THE SUPERIOR POLE OF THE RIGHT KIDNEY. All CT scans at this facility use dose modulation, iterative reconstruction, and/or weight based dosing when appropriate to reduce radiation dose to as low as reasonably achievable. Result Date: 10/31/2017  VENTILATION-PERFUSION LUNG SCAN: CLINICAL DATA:  Short of breath for 2 weeks     FINDINGS CONSISTENT WITH NONDIAGNOSTIC V/Q STUDY. WOULD CONSIDER REPEAT CT SCAN STUDY WITH CONTRAST IN ASSOCIATION WITH DIALYSIS FOR THE PATIENT GIVEN HER RENAL STATUS. Result Date: 11/9/2017  CT ABDOMEN AND PELVIS WITH CONTRAST: 11/9/2017 CLINICAL HISTORY:  Right lower quadrant pain . COMPARISON: 4/14/2016. MODERATE UNCOMPLICATED PANCOLITIS, MOST LIKELY INFECTION OR INFLAMMATORY BOWEL DISEASE. SMALL RIGHT AND TRACE LEFT PLEURAL EFFUSIONS WITH PROBABLE MILD BIBASILAR ATELECTASIS. NO OTHER SIGNIFICANT CHANGE FROM 4/16/2016 IDENTIFIED. Result Date: 11/9/2017  PORTABLE CHEST: 11/9/2017 CLINICAL HISTORY:  Fatigue . POOR INSPIRATION WITH MILD PROBABLE BIBASILAR ATELECTASIS. BRONCHOPNEUMONIA SHOULD BE EXCLUDED CLINICALLY.            CURRENT MEDICATIONS       amiodarone bolus  150 mg Intravenous Once    sodium chloride  250 mL Intravenous Once    darbepoetin lizeth-polysorbate  100 mcg Subcutaneous Weekly    buprenorphine  1 patch Transdermal Weekly    calcium elemental  500 mg Oral Daily    carbidopa-levodopa  1 tablet Oral Daily    fluticasone  2 spray Nasal Daily    iron polysaccaride  1 capsule Oral Daily with breakfast    isosorbide mononitrate  60 mg Oral Daily    insulin glargine  14 Units Subcutaneous QAM    levothyroxine  50 mcg Oral Daily    metoprolol tartrate  50 mg Oral BID    pantoprazole sodium  40 mg Oral QAM AC    sodium bicarbonate  325 mg Oral TID    venlafaxine  150 mg Oral Daily with breakfast    insulin lispro  0-12 Units Subcutaneous TID     insulin lispro  0-6 Units Subcutaneous Nightly    rosuvastatin  40 mg Oral Nightly    sodium chloride  250 mL Intravenous Once    vancomycin  125 mg Oral 4 times per day    lidocaine  5 mL Intradermal Once    sodium chloride flush  10 mL Intravenous Q12H    sodium chloride  1,000 mL Intravenous Once    sodium chloride flush  10 mL Intravenous 2 times per day      amiodarone 450mg/250ml D5W infusion      Followed by   Chyrl Collins amiodarone 450mg/250ml D5W infusion      dextrose      sodium chloride           ASSESSMENT   Principal Problem:    Pancolitis (HCC)  Active Problems:    C. difficile colitis    Anemia    ESRD (end stage renal disease) on dialysis (HCC)    Leukocytosis     Assessment:     1. Weakness  2. C Diff Colitis  3. Anemia, Hgb 6  4. Recent Pneumonia of lower lobe due to infectious organism ([facility] [address]  5. Moderate PHTN  6. Diastolic Dysfunction, mild, possible HFpEF, but doubt primary cause  7. Recurrent atrial fibrillation with rapid ventricular response. 8. SSS s/p Pacemaker  9. CAD hx Prior PCI, LCX BMS 2010, negative repeat Cath  6/2014  10. Normal LV function 55% ( echo 10/17 )  11. COPD  15. IVON  13. CKD (chronic kidney disease), now on HD  14. Essential hypertension  15. Hyperlipidemia  16. Diabetes mellitus  17. PVD  18. Prior DVT  19. Hypothyroidism  20. CLBP  21. Former Smoker  25. Polycythemia Vera  23. Hyperkalemia  24. GERD  25. Anxiety  26. Idiopathic Parkinson's disease ([facility] [address]      PLAN      1. Will change po amiodarone to IV for improved rate control as BP is borderline low. 2. IV lopressor prn  3. Oral medications resumed  4. Transfuse Blood to maintain Hgb > 8        Assessment & Plan      Please do not hesitate to call with questions.   Electronically signed by Miguel Ángel Da Silva MD, [facility] on 11/11/2017 at 11:20 AM

## 2017-11-11 NOTE — PROGRESS NOTES
 sodium bicarbonate tablet 325 mg  325 mg Oral TID Chayito Valerio MD   325 mg at 11/11/17 1003    venlafaxine (EFFEXOR XR) extended release capsule 150 mg  150 mg Oral Daily with breakfast Chayito Valerio MD   150 mg at 11/11/17 1003    glucose (GLUTOSE) 40 % oral gel 15 g  15 g Oral PRN Chayito Valerio MD        dextrose 50 % solution 12.5 g  12.5 g Intravenous PRN Chayito Valerio MD        glucagon (rDNA) injection 1 mg  1 mg Intramuscular PRN Chayito Valerio MD        dextrose 5 % solution  100 mL/hr Intravenous PRN Chayito Valerio MD        insulin lispro (HUMALOG) injection vial 0-12 Units  0-12 Units Subcutaneous TID WC Chayito Valerio MD        insulin lispro (HUMALOG) injection vial 0-6 Units  0-6 Units Subcutaneous Nightly Chayito Valerio MD   Stopped at 11/10/17 2238    rosuvastatin (CRESTOR) tablet 40 mg  40 mg Oral Nightly Chayito Valerio MD   40 mg at 11/10/17 2112    heparin (porcine) injection 4,000 Units  4,000 Units Intercatheter PRN Veda Carr MD        0.9 % sodium chloride bolus  250 mL Intravenous Once Mildred Kaminski MD        amiodarone (CORDARONE) tablet 200 mg  200 mg Oral Daily Mildred Kaminski MD   200 mg at 11/11/17 1004    vancomycin (VANCOCIN) oral solution 125 mg  125 mg Oral 4 times per day Catherine Corley MD   125 mg at 11/11/17 1004    lidocaine 2 % injection 5 mL  5 mL Intradermal Once Adjondi Cristi Bradshaw NP        sodium chloride flush 0.9 % injection 10 mL  10 mL Intravenous Q12H Adjondi Cristi Bradshaw NP        sodium chloride flush 0.9 % injection 10 mL  10 mL Intravenous PRN Adjondi Cristi Bradshaw NP        0.9 % sodium chloride infusion  250 mL Intravenous Once Adjondi Cristi Bradshaw NP        sodium chloride flush 0.9 % injection 10 mL  10 mL Intravenous PRN Latishaondi Cristi Bradshaw NP        0.9 % sodium chloride bolus  1,000 mL Intravenous Once Adjondi Cristi Bradshaw NP        sodium chloride flush 0.9 % injection 10 mL  10 mL Intravenous 2 times per day Jose M Torrez NP   10 mL at 11/10/17 2114    sodium chloride flush 0.9 % injection 10 mL  10 mL Intravenous PRN Jose M Torrez NP        acetaminophen (TYLENOL) tablet 650 mg  650 mg Oral Q4H PRN Jose M Torrez, NP   650 mg at 11/10/17 1111     Allergies   Allergen Reactions    Arthrotec [Diclofenac-Misoprostol] Nausea Only    Depakote [Divalproex Sodium] Itching    Dilantin [Phenytoin]     Klonopin [Clonazepam]     Statins Other (See Comments)     achy     Principal Problem:    Pancolitis ([facility] [address]  Active Problems:    C. difficile colitis    Anemia    ESRD (end stage renal disease) on dialysis ([facility])    Leukocytosis    Blood pressure 111/64, pulse 117, temperature 98.4 °F (36.9 °C), resp. rate 20, height 4' 11\" (1.499 m), weight 125 lb 14.1 oz (57.1 kg), SpO2 100 %. Subjective no emesis or rectal bleeding, diarrhea is subsiding  Objective abdomen soft,non tender  Assessment & Plan  C diff colitis,continue po vancomycin.   Delores Bower MD  11/11/2017

## 2017-11-11 NOTE — PROGRESS NOTES
Oral TID    venlafaxine  150 mg Oral Daily with breakfast    insulin lispro  0-12 Units Subcutaneous TID WC    insulin lispro  0-6 Units Subcutaneous Nightly    rosuvastatin  40 mg Oral Nightly    sodium chloride  250 mL Intravenous Once    vancomycin  125 mg Oral 4 times per day    lidocaine  5 mL Intradermal Once    sodium chloride flush  10 mL Intravenous Q12H    sodium chloride  1,000 mL Intravenous Once    sodium chloride flush  10 mL Intravenous 2 times per day     Continuous Infusions:   amiodarone 450mg/250ml D5W infusion      Followed by    amiodarone 450mg/250ml D5W infusion      dextrose      sodium chloride         CBC:   Recent Labs      11/09/17   1500  11/10/17   0532   WBC  28.1*  23.3*   HGB  7.0*  6.2*   PLT  135  109*     CMP:  Recent Labs      11/09/17   0600  11/09/17   1500  11/09/17   1632  11/10/17   0551   NA  133  131*   --   132   K  3.8  3.5   --   3.5   CL  92*  88*   --   93*   CO2  23  23   --   19*   BUN  36*  41*   --   45*   CREATININE  4.89*  5.58*   --   5.61*   GLUCOSE  34*  191*  222  89   CALCIUM  6.9*  7.1*   --   6.4*   LABGLOM  8.5*  7.3*   --   7.3*     Troponin:   Recent Labs      11/09/17   1500   TROPONINI  0.090*     BNP: No results for input(s): BNP in the last 72 hours. INR:   Recent Labs      11/09/17   0600   INR  2.8     Lipids: No results for input(s): CHOL, LDLDIRECT, TRIG, HDL, AMYLASE, LIPASE in the last 72 hours. Liver: Recent Labs      11/09/17   1500   AST  38*   ALT  10   ALKPHOS  98   PROT  5.6*   LABALBU  2.7*   BILITOT  0.3     Iron:  No results for input(s): IRONS, FERRITIN in the last 72 hours. Invalid input(s): LABIRONS  Urinalysis: No results for input(s): UA in the last 72 hours.     Objective:   Vitals: BP (!) 108/52   Pulse (!) 37   Temp 98.4 °F (36.9 °C)   Resp 20   Ht 4' 11\" (1.499 m)   Wt 125 lb 14.1 oz (57.1 kg)   SpO2 100%   BMI 25.43 kg/m²    Wt Readings from Last 3 Encounters:   11/10/17 125 lb 14.1 oz (57.1 kg)

## 2017-11-11 NOTE — PROGRESS NOTES
INPATIENT PROGRESS NOTE    SERVICE DATE:  11/11/2017   SERVICE TIME:  11:53    ASSESSMENT AND PLAN   45-year-old female with past medical history of pneumonia, C. diff, atrial fib, end-stage renal disease on HD MWF, diabetes type 2. Sent from nursing facility due to elevated white blood cell count and anemia. Admitted for pancolitis, acute on chronic anemia, end-stage renal disease.     Pancolitis: secondary to C. Diff  - Was on PO Flagyl  - ID is consulted. Per Dr. Angelo Greene. CONT PO FLAGYL. AGREE NO MORE LEVAQUIN DOUBT PNEUMONIA. CHECK BLOOD CULT. Steph further recs  - Stools for c.diff, occult blood and culture  - Contact precautions  - GI consult: Per Dr. Artem Sanchez, Pancolitis probably, C diff colitis. Will put the patient on p.o. vancomycin. We will discontinue the Flagyl as this may have contributed to the high INR since the patient was on Coumadin. Coumadin and Flagyl have a drug interaction.     Acute on chronic anemia. Hgb 6.2  - Blood transfusion done  - Recheck labs posttransfusion  - Hold anticoagulation   - Daily PT and INR  - Continue PPI  - Consult GI, recs as above  - Aranesp per nephrology  - Telemetry  - Continue to monitor closely    Afib with RVR: HR to the 140's this AM.   - Telemetry  - Cardio consulted, steph recs  - Amio gtt per cardio  - Transfer to [facility] per cardio  - Continue to monitor closely     End-stage renal disease: On HD  - Nephrology consulted. Per Dr. Amber Dhillon, new esrd.  Has anemia, hypotension, leukocytosis, c diff.  Have to be concerned about catheter related bacteremia as well. hold hydralazine, norvasc and clonidine. agree with blood. aranesp 100. HD today. f/u cx's.  If blood cx positive will need permcath changed. Fistula is not ready. Steph further recs  - Continue to monitor     Elevated troponin  - Will order serial troponins  - Place on telemetry  - Consult cardiology, as patient has significant cardiac history.  Steph recs     Atrial fibrillation  -Coagulation is held given anemia  -Cardio consulted     DM II  -Insulin on sliding scale     Hypotension  -Numerous blood pressure medications held  -IVF  - Continue to monitor closely     Parkinson's disease     Dispo: Await consultant clearance prior to DC. Advised F/U with PCP 1 - 2 days of discharge. SUBJECTIVE  CHIEF COMPLAINT: Abdominal pain    PRIMARY SERVICE: Medicine    INTERVAL HPI: Pt feels tired and fatigued. No other concerns or complaints.      MEDICATIONS:    Current Facility-Administered Medications   Medication Dose Route Frequency Provider Last Rate Last Dose    amiodarone (CORDARONE) 150 mg in dextrose 5 % 100 mL bolus  150 mg Intravenous Once Eduardo Bang MD        Followed by   Stiven Jackson amiodarone (CORDARONE) 450 mg in dextrose 5 % 250 mL infusion  1 mg/min Intravenous Continuous Eduardo Bang MD        Followed by   Stiven Jackson amiodarone (CORDARONE) 450 mg in dextrose 5 % 250 mL infusion  0.5 mg/min Intravenous Continuous Eduardo Bang MD        metoprolol (LOPRESSOR) injection 5 mg  5 mg Intravenous Q6H PRN Eduardo Bang MD        0.9 % sodium chloride bolus  250 mL Intravenous Once Schuyler Waterman DO        albumin human 25 % solution 25 g  25 g Intravenous Daily PRN Christine Walker MD        darbepoetin lizeth-polysorbate (ARANESP) injection 100 mcg  100 mcg Subcutaneous Weekly Christine Walker MD   100 mcg at 11/10/17 2123    ALPRAZolam Cirilo Bolder) tablet 0.5 mg  0.5 mg Oral TID PRN Joe Moreira MD   0.5 mg at 11/10/17 2111    buprenorphine (BUPRENEX) 5 MCG/HR 1 patch  1 patch Transdermal Weekly Joe Moreira MD   Stopped at 11/10/17 1326    calcium elemental (OSCAL) tablet 500 mg  500 mg Oral Daily Joe Moreira MD   500 mg at 11/11/17 1004    carbidopa-levodopa (SINEMET)  MG per tablet 1 tablet  1 tablet Oral Daily Joe Moreira MD   1 tablet at 11/11/17 1004    fluticasone (FLONASE) 50 MCG/ACT nasal spray 2 spray  2 spray Nasal Daily Joe Moreira MD   2 spray at 11/11/17 1008    guaiFENesin-dextromethorphan (ROBITUSSIN DM) 100-10 MG/5ML syrup 5 mL  5 mL Oral Q4H PRN Sloane Shepherd MD        ipratropium-albuterol (DUONEB) nebulizer solution 3 mL  3 mL Inhalation Q4H PRN Sloane Shepherd MD        iron polysaccaride (FERREX 150 FORTE PLUS) capsule 1 capsule  1 capsule Oral Daily with breakfast Sloane Shepherd MD        isosorbide mononitrate (IMDUR) extended release tablet 60 mg  60 mg Oral Daily Sloane Shepherd MD   60 mg at 11/11/17 1003    insulin glargine (LANTUS) injection vial 14 Units  14 Units Subcutaneous QAM Sloane Shepherd MD   14 Units at 11/11/17 1009    levothyroxine (SYNTHROID) tablet 50 mcg  50 mcg Oral Daily Sloane Shepherd MD   50 mcg at 11/11/17 0650    metoprolol tartrate (LOPRESSOR) tablet 50 mg  50 mg Oral BID Sloane Shepherd MD   50 mg at 11/11/17 1003    pantoprazole sodium (PROTONIX) packet 40 mg  40 mg Oral QAM AC Sloane Shepherd MD   40 mg at 11/11/17 0650    sodium bicarbonate tablet 325 mg  325 mg Oral TID Sloane Shephedr MD   325 mg at 11/11/17 1003    venlafaxine (EFFEXOR XR) extended release capsule 150 mg  150 mg Oral Daily with breakfast Sloane Shepherd MD   150 mg at 11/11/17 1003    glucose (GLUTOSE) 40 % oral gel 15 g  15 g Oral PRN Sloane Shephred MD        dextrose 50 % solution 12.5 g  12.5 g Intravenous PRN Sloane Shepherd MD        glucagon (rDNA) injection 1 mg  1 mg Intramuscular PRN Sloane Shepherd MD        dextrose 5 % solution  100 mL/hr Intravenous PRN Sloane Shepherd MD        insulin lispro (HUMALOG) injection vial 0-12 Units  0-12 Units Subcutaneous TID WC Sloane Shepherd MD        insulin lispro (HUMALOG) injection vial 0-6 Units  0-6 Units Subcutaneous Nightly Sloane Shepherd MD   Stopped at 11/10/17 2238    rosuvastatin (CRESTOR) tablet 40 mg  40 mg Oral Nightly Sloane Shepherd MD   40 mg at 11/10/17 2112    heparin (porcine) injection 4,000 Units  4,000 Units Intercatheter PRN Daquan Knight MD        0.9 % sodium chloride bolus  250 mL Intravenous Once Emmitt Schaumann, MD        vancomycin (VANCOCIN) oral solution 125 mg  125 mg Oral 4 times per day Shoshana Mendoza MD   125 mg at 11/11/17 1004    lidocaine 2 % injection 5 mL  5 mL Intradermal Once Adjondi Zuleyma Bonus, NP        sodium chloride flush 0.9 % injection 10 mL  10 mL Intravenous Q12H Adjondi Zuleyma Bonus, NP        sodium chloride flush 0.9 % injection 10 mL  10 mL Intravenous PRN Adjondi Zuleyma Bonus, NP        0.9 % sodium chloride infusion  250 mL Intravenous Once Adjondi Zuleyma Bonus, NP        sodium chloride flush 0.9 % injection 10 mL  10 mL Intravenous PRN Adjondi Zuleyma Bonus, NP        0.9 % sodium chloride bolus  1,000 mL Intravenous Once Adjondi Zuleyma Bonus, NP        sodium chloride flush 0.9 % injection 10 mL  10 mL Intravenous 2 times per day Adjondi Zuleyma Bonus, NP   10 mL at 11/11/17 1014    sodium chloride flush 0.9 % injection 10 mL  10 mL Intravenous PRN Adjondi Zuleyma Bonus, NP        acetaminophen (TYLENOL) tablet 650 mg  650 mg Oral Q4H PRN Adjondi Zuleyma Bonus, NP   650 mg at 11/10/17 1111       OBJECTIVE  PHYSICAL EXAM:   BP (!) 108/52   Pulse (!) 37   Temp 98.4 °F (36.9 °C)   Resp 20   Ht 4' 11\" (1.499 m)   Wt 125 lb 14.1 oz (57.1 kg)   SpO2 100%   BMI 25.43 kg/m²   Body mass index is 25.43 kg/m².   CONSTITUTIONAL:  awake, alert, cooperative, no apparent distress, and appears stated age  EYES:  Lids and lashes normal, pupils equal, round and reactive to light, extra ocular muscles intact, sclera clear, conjunctiva normal  LUNGS:  No increased work of breathing, good air exchange, clear to auscultation bilaterally, no crackles or wheezing  CARDIOVASCULAR:  Irreg Irreg  ABDOMEN:  No scars, normal bowel sounds, soft, non-distended, non-tender, no masses palpated, no hepatosplenomegally  EXT: No c/c/e    DATA:   Diagnostic tests reviewed for today's visit:    Most recent labs and imaging results

## 2017-11-11 NOTE — CONSULTS
[address]                       [address], [address] [facility]                                   CONSULTATION    PATIENT NAME: Francisca Cotto                    :        1935  MED REC NO:   [id number]                            ROOM:       [id number]  ACCOUNT NO:   [de-identified]                           ADMIT DATE: 2017  PROVIDER:     Anita Catalan MD    CONSULT DATE:  11/10/2017    REASON FOR CONSULTATION:  Colitis. HISTORY OF PRESENT ILLNESS:  The patient is an 25-year-old female who was  admitted because of weakness, anemia, and history of C diff colitis. The  patient has been experiencing some abdominal discomfort and has been  feeling weak and tired. She reports no emesis. She has diarrhea, but no  history of any rectal bleeding. PAST MEDICAL HISTORY:  Includes history of chronic renal failure on  hemodialysis, diabetes, history of C diff colitis, hypertension, atrial  fibrillation on Coumadin. SURGICAL HISTORY:  Includes appendectomy, cholecystectomy, colonoscopy in  the past by Dr. Nara Taveras, orthopedic surgeries. FAMILY HISTORY:  Father had coronary artery disease. Mother has high blood  pressure. MEDICATIONS:  Xanax, Apresoline, and Augmentin. Prior to coming to the  hospital, Effexor Coumadin, Synthroid, and insulin. SOCIAL HISTORY:  Does not smoke, does not drink any alcohol. ALLERGIES:  Include ARTHROTEC and DILANTIN. PHYSICAL EXAMINATION:  GENERAL:  An 25-year-old female who seems to be in no acute distress. VITAL SIGNS:  Blood pressure is 100/68, is afebrile, respiratory rate is  18. HEAD AND NECK:  Oropharynx is normal.  Neck is supple. HEART:  S1, S2 regular. ABDOMEN:  Soft, nontender. No palpable mass. EXTREMITIES:  Shows no edema. LABS:  Her lab shows sodium 132, potassium 3.5, chloride is 93, CO2 of 19,  BUN is 45, creatinine is 5.61. Albumin is 2.7. Liver enzymes were  unremarkable. Bilirubin is 0.3.

## 2017-11-11 NOTE — FLOWSHEET NOTE
11/10/17 1830   Vital Signs   BP (!) 147/64   Temp 98.1 °F (36.7 °C)   Pulse 121   Weight 125 lb 14.1 oz (57.1 kg)   Weight Method Bed scale   Percent Weight Change -4.03   Post-Hemodialysis Assessment   Post-Treatment Procedures Blood returned   Machine Disinfection Process Exterior Machine Disinfection;Acid/Vinegar Clean;Bleach; Bicarb Jug Disinfection;Verified Absence of Bleach in HCO3 Jug;Machine Absence of Bleach Machine   Rinseback Volume (ml) 200 ml   Total Liters Processed (l/min) 76.5 l/min   Dialyzer Clearance Lightly streaked   Duration of Treatment (minutes) 240 minutes   Heparin amount administered during treatment (units) 0 units   Hemodialysis Intake (ml) 400 ml   Hemodialysis Output (ml) 2400 ml   NET Removed (ml) 2000 ml   Tolerated Treatment (well)   Bilateral Breath Sounds Clear;Diminished

## 2017-11-12 PROBLEM — I49.5 SSS (SICK SINUS SYNDROME) (HCC): Status: ACTIVE | Noted: 2017-11-12

## 2017-11-12 PROBLEM — Z95.0 CARDIAC PACEMAKER: Status: ACTIVE | Noted: 2017-11-12

## 2017-11-12 LAB
ANION GAP SERPL CALCULATED.3IONS-SCNC: 16 MEQ/L (ref 7–13)
BASOPHILS ABSOLUTE: 0 K/UL (ref 0–0.2)
BASOPHILS RELATIVE PERCENT: 0.2 %
BUN BLDV-MCNC: 38 MG/DL (ref 8–23)
CALCIUM SERPL-MCNC: 6.8 MG/DL (ref 8.6–10.2)
CHLORIDE BLD-SCNC: 88 MEQ/L (ref 98–107)
CO2: 23 MEQ/L (ref 22–29)
CREAT SERPL-MCNC: 4.88 MG/DL (ref 0.5–0.9)
EOSINOPHILS ABSOLUTE: 0.1 K/UL (ref 0–0.7)
EOSINOPHILS RELATIVE PERCENT: 0.8 %
GFR AFRICAN AMERICAN: 10.3
GFR NON-AFRICAN AMERICAN: 8.5
GLUCOSE BLD-MCNC: 105 MG/DL (ref 74–109)
GLUCOSE BLD-MCNC: 188 MG/DL (ref 60–115)
GLUCOSE BLD-MCNC: 192 MG/DL (ref 60–115)
GLUCOSE BLD-MCNC: 205 MG/DL (ref 60–115)
GLUCOSE BLD-MCNC: 259 MG/DL (ref 60–115)
HCT VFR BLD CALC: 32.5 % (ref 37–47)
HEMOGLOBIN: 10.7 G/DL (ref 12–16)
LYMPHOCYTES ABSOLUTE: 0.3 K/UL (ref 1–4.8)
LYMPHOCYTES RELATIVE PERCENT: 2.1 %
MAGNESIUM: 2.1 MG/DL (ref 1.7–2.3)
MCH RBC QN AUTO: 28.4 PG (ref 27–31.3)
MCHC RBC AUTO-ENTMCNC: 33 % (ref 33–37)
MCV RBC AUTO: 86.1 FL (ref 82–100)
MONOCYTES ABSOLUTE: 0.8 K/UL (ref 0.2–0.8)
MONOCYTES RELATIVE PERCENT: 5.8 %
NEUTROPHILS ABSOLUTE: 13.2 K/UL (ref 1.4–6.5)
NEUTROPHILS RELATIVE PERCENT: 91.1 %
PDW BLD-RTO: 16.8 % (ref 11.5–14.5)
PERFORMED ON: ABNORMAL
PLATELET # BLD: 129 K/UL (ref 130–400)
POTASSIUM SERPL-SCNC: 2.9 MEQ/L (ref 3.5–5.1)
RBC # BLD: 3.77 M/UL (ref 4.2–5.4)
SODIUM BLD-SCNC: 127 MEQ/L (ref 132–144)
WBC # BLD: 14.5 K/UL (ref 4.8–10.8)

## 2017-11-12 PROCEDURE — 6370000000 HC RX 637 (ALT 250 FOR IP): Performed by: HOSPITALIST

## 2017-11-12 PROCEDURE — 6370000000 HC RX 637 (ALT 250 FOR IP): Performed by: SPECIALIST

## 2017-11-12 PROCEDURE — 6370000000 HC RX 637 (ALT 250 FOR IP): Performed by: NURSE PRACTITIONER

## 2017-11-12 PROCEDURE — 80048 BASIC METABOLIC PNL TOTAL CA: CPT

## 2017-11-12 PROCEDURE — 2580000003 HC RX 258: Performed by: NURSE PRACTITIONER

## 2017-11-12 PROCEDURE — 85025 COMPLETE CBC W/AUTO DIFF WBC: CPT

## 2017-11-12 PROCEDURE — 2700000000 HC OXYGEN THERAPY PER DAY

## 2017-11-12 PROCEDURE — 6370000000 HC RX 637 (ALT 250 FOR IP): Performed by: INTERNAL MEDICINE

## 2017-11-12 PROCEDURE — 83735 ASSAY OF MAGNESIUM: CPT

## 2017-11-12 PROCEDURE — 2060000000 HC ICU INTERMEDIATE R&B

## 2017-11-12 RX ORDER — POTASSIUM CHLORIDE 20MEQ/15ML
40 LIQUID (ML) ORAL ONCE
Status: COMPLETED | OUTPATIENT
Start: 2017-11-12 | End: 2017-11-12

## 2017-11-12 RX ORDER — HEPARIN SODIUM 1000 [USP'U]/ML
1000 INJECTION, SOLUTION INTRAVENOUS; SUBCUTANEOUS PRN
Status: DISCONTINUED | OUTPATIENT
Start: 2017-11-12 | End: 2017-11-24 | Stop reason: HOSPADM

## 2017-11-12 RX ORDER — AMIODARONE HYDROCHLORIDE 200 MG/1
200 TABLET ORAL 2 TIMES DAILY
Status: DISCONTINUED | OUTPATIENT
Start: 2017-11-12 | End: 2017-11-21

## 2017-11-12 RX ADMIN — SODIUM BICARBONATE 650 MG TABLET 325 MG: at 12:06

## 2017-11-12 RX ADMIN — CARBIDOPA AND LEVODOPA 1 TABLET: 25; 100 TABLET ORAL at 08:52

## 2017-11-12 RX ADMIN — METOPROLOL TARTRATE 50 MG: 50 TABLET ORAL at 08:52

## 2017-11-12 RX ADMIN — LEVOTHYROXINE SODIUM 50 MCG: 50 TABLET ORAL at 06:06

## 2017-11-12 RX ADMIN — POTASSIUM CHLORIDE 40 MEQ: 20 SOLUTION ORAL at 16:59

## 2017-11-12 RX ADMIN — Medication 125 MG: at 06:06

## 2017-11-12 RX ADMIN — INSULIN LISPRO 6 UNITS: 100 INJECTION, SOLUTION INTRAVENOUS; SUBCUTANEOUS at 12:07

## 2017-11-12 RX ADMIN — SODIUM BICARBONATE 650 MG TABLET 325 MG: at 08:52

## 2017-11-12 RX ADMIN — ROSUVASTATIN CALCIUM 40 MG: 40 TABLET, FILM COATED ORAL at 22:00

## 2017-11-12 RX ADMIN — INSULIN LISPRO 2 UNITS: 100 INJECTION, SOLUTION INTRAVENOUS; SUBCUTANEOUS at 17:03

## 2017-11-12 RX ADMIN — PANTOPRAZOLE SODIUM 40 MG: 40 GRANULE, DELAYED RELEASE ORAL at 06:06

## 2017-11-12 RX ADMIN — Medication 125 MG: at 01:09

## 2017-11-12 RX ADMIN — ACETAMINOPHEN 650 MG: 325 TABLET ORAL at 22:05

## 2017-11-12 RX ADMIN — CALCIUM 500 MG: 500 TABLET ORAL at 08:52

## 2017-11-12 RX ADMIN — Medication 125 MG: at 23:29

## 2017-11-12 RX ADMIN — FLUTICASONE PROPIONATE 2 SPRAY: 50 SPRAY, METERED NASAL at 08:54

## 2017-11-12 RX ADMIN — ISOSORBIDE MONONITRATE 60 MG: 60 TABLET, EXTENDED RELEASE ORAL at 08:52

## 2017-11-12 RX ADMIN — Medication 125 MG: at 12:06

## 2017-11-12 RX ADMIN — Medication 125 MG: at 18:22

## 2017-11-12 RX ADMIN — AMIODARONE HYDROCHLORIDE 200 MG: 200 TABLET ORAL at 16:59

## 2017-11-12 RX ADMIN — Medication 10 ML: at 22:05

## 2017-11-12 RX ADMIN — METOPROLOL TARTRATE 50 MG: 50 TABLET ORAL at 22:00

## 2017-11-12 RX ADMIN — VENLAFAXINE HYDROCHLORIDE 150 MG: 150 CAPSULE, EXTENDED RELEASE ORAL at 08:52

## 2017-11-12 RX ADMIN — Medication 1 CAPSULE: at 08:52

## 2017-11-12 ASSESSMENT — PAIN SCALES - GENERAL
PAINLEVEL_OUTOF10: 0
PAINLEVEL_OUTOF10: 4

## 2017-11-12 ASSESSMENT — ENCOUNTER SYMPTOMS: SHORTNESS OF BREATH: 0

## 2017-11-12 NOTE — PROGRESS NOTES
bloody sputum. She was diagnosed with pneumonia and is being treated with intravenous antibiotics. She notes that she is now feeling much better. She is also being treated for hyperkalemia. She initially was noted to be normal sinus rhythm, however, yesterday she developed tachycardia and was noted to be in atrial fibrillation with a rapid ventricular response. VITALS   BP (!) 111/55 Comment: left leg  Pulse 105   Temp 97.5 °F (36.4 °C) (Oral)   Resp 16   Ht 4' 11\" (1.499 m)   Wt 125 lb 14.1 oz (57.1 kg)   SpO2 100%   BMI 25.43 kg/m²    MAXIMUM TEMPERATURE OVER 24HRS:  Temp (24hrs), Av °F (37.2 °C), Min:97.5 °F (36.4 °C), Max:100.2 °F (37.9 °C)    24HR BLOOD PRESSURE RANGE:  Systolic (79OBP), TMN:328 , Min:106 , EUR:174   ; Diastolic (17KYA), GDK:41, Min:55, Max:77      Intake/Output Summary (Last 24 hours) at 17 1108  Last data filed at 17 2310   Gross per 24 hour   Intake              786 ml   Output                0 ml   Net              786 ml     Patient Vitals for the past 96 hrs (Last 3 readings):   Weight   11/10/17 1830 125 lb 14.1 oz (57.1 kg)   11/10/17 1415 131 lb 2.8 oz (59.5 kg)   17 1422 129 lb (58.5 kg)         REVIEW OF SYSTEMS   Review of Systems   Respiratory: Negative for shortness of breath. Cardiovascular: Negative for chest pain. PHYSICAL EXAM   Objective:  General Appearance:  Comfortable. Vital signs: (most recent): Blood pressure (!) 111/55, pulse 105, temperature 97.5 °F (36.4 °C), temperature source Oral, resp. rate 16, height 4' 11\" (1.499 m), weight 125 lb 14.1 oz (57.1 kg), SpO2 100 %. Output: Producing urine. HEENT: Normal HEENT exam.    Lungs:  Normal effort and normal respiratory rate. Breath sounds clear to auscultation. She is not in respiratory distress. No rales, wheezes or rhonchi. Heart: Normal rate. Irregular rhythm. S1 normal and S2 normal.  No murmur (II/VI GENA RUSB), gallop or friction rub.    Chest: No chest wall tenderness. Abdomen: Abdomen is soft. Bowel sounds are normal.   There is no abdominal tenderness. Extremities: Normal range of motion. There is no dependent edema or local swelling. Pulses: Distal pulses are intact. Neurological: Patient is alert and oriented to person, place and time. Skin:  Warm and dry. No rash. DIAGNOSTIC RESULTS   EKG:  Atrial fibrillation with rapid ventricular response  ST & T wave abnormality, consider inferior ischemia  ST & T wave abnormality, consider anterolateral ischemia  Abnormal ECG  When compared with ECG of 09-NOV-2017 15:28,  T wave inversion more evident in Lateral leads      Telemetry: atrial fibrillation - rapid.       LAB DATA   BMP:  Recent Labs      11/09/17   1500  11/10/17   0551  11/11/17   1740   NA  131*  132  134   K  3.5  3.5  3.5   CL  88*  93*  92*   CO2  23  19*  23   BUN  41*  45*  30*   CREATININE  5.58*  5.61*  3.88*   LABGLOM  7.3*  7.3*  11.1*       Cardiac Enzymes:  Recent Labs      11/09/17   1500   TROPONINI  0.090*       CBC:   Lab Results   Component Value Date    WBC 19.0 11/11/2017    RBC 3.61 11/11/2017    RBC 3.24 12/17/2011    HGB 10.4 11/11/2017    HCT 30.8 11/11/2017     11/11/2017       CMP:    Lab Results   Component Value Date     11/11/2017    K 3.5 11/11/2017    CL 92 11/11/2017    CO2 23 11/11/2017    BUN 30 11/11/2017    CREATININE 3.88 11/11/2017    GFRAA 13.5 11/11/2017    LABGLOM 11.1 11/11/2017    GLUCOSE 161 11/11/2017    GLUCOSE 197 02/17/2012    CALCIUM 7.1 11/11/2017       Hepatic Function Panel:    Lab Results   Component Value Date    ALKPHOS 98 11/09/2017    ALT 10 11/09/2017    AST 38 11/09/2017    PROT 5.6 11/09/2017    BILITOT 0.3 11/09/2017    BILIDIR 0.0 09/26/2017    IBILI 0.1 09/26/2017    LABALBU 2.7 11/09/2017    LABALBU 3.5 12/17/2011       Magnesium:    Lab Results   Component Value Date    MG 2.0 11/11/2017    MG 1.4 12/17/2011       PT/INR:    Lab Results   Component Value Date    PROTIME 30.1 11/09/2017    PROTIME 16.5 10/20/2017    PROTIME 25.7 09/29/2016    INR 2.8 11/09/2017       HgBA1c:    Lab Results   Component Value Date    LABA1C 5.4 11/09/2017       Lipid Profile:    Lab Results   Component Value Date    TRIG 333 09/26/2017    HDL 37 09/26/2017    LDLCALC 256 09/26/2017       TSH:    Lab Results   Component Value Date    TSH 1.930 05/01/2017       PRO-BNP:   Lab Results   Component Value Date    PROBNP 30,906 11/01/2017    PROBNP 31,080 10/30/2017        RAD:     Result Date: 11/11/2017  EXAMINATION: XR CHEST STANDARD TWO VW CLINICAL HISTORY: Infection, recent pneumonia COMPARISONS: 11/9/2017 FINDINGS: There is small pleural effusion in the right posterior costophrenic angle, not appreciably changed from prior imaging studies 2 days ago. There is trace left pleural effusion. Cardiac size is borderline. Pulmonary vascularity is normal. There is mild basilar atelectasis. There are scattered calcifications from old granulomatous disease. Central dialysis catheter tip is in the superior vena cava. There is no pneumothorax. There are atherosclerotic changes of the thoracic aorta. There is mild spondylosis. There is been resection of the distal right clavicle. There is no significant change from prior exam.     SMALL RIGHT AND TRACE LEFT PLEURAL EFFUSIONS. MILD BASILAR ATELECTASIS. NO SIGNIFICANT CHANGE FROM 11/9/2017. Result Date: 10/31/2017  EXAMINATION:  CT SCAN CHEST CLINICAL HISTORY:  Short of breath COMPARISON:       PATCHY MULTIFOCAL GROUNDGLASS INFILTRATE WITHIN LEFT FRONTAL DESCRIBED ABOVE. 2. THERE IS AREAS OF INFILTRATE CONSOLIDATION AIR PROGRESS AT BOTH BASES WITH ASSOCIATED BILATERAL PLEURAL EFFUSIONS. 3. THERE IS A NODULE LEFT LOBE OF THYROID. CORRELATE CLINICALLY AND FOLLOW-UP . 4. THERE IS BILATERAL AREAS LOW-ATTENUATION BOTH KIDNEYS AS WELL AS A HYPERDENSE MASS IN THE RIGHT KIDNEY. THE KIDNEYS DO APPEAR ATROPHIC.  THESE AREAS LOW-ATTENUATION NOT FULLY CHARACTERIZED IN THIS STUDY. CONSIDER FOLLOW-UP ULTRASOUND TO FURTHER EVALUATE. 5. THERE ARE NONOBSTRUCTING CALCULI IN THE SUPERIOR POLE OF THE RIGHT KIDNEY. All CT scans at this facility use dose modulation, iterative reconstruction, and/or weight based dosing when appropriate to reduce radiation dose to as low as reasonably achievable. Result Date: 10/31/2017  VENTILATION-PERFUSION LUNG SCAN: CLINICAL DATA:  Short of breath for 2 weeks     FINDINGS CONSISTENT WITH NONDIAGNOSTIC V/Q STUDY. WOULD CONSIDER REPEAT CT SCAN STUDY WITH CONTRAST IN ASSOCIATION WITH DIALYSIS FOR THE PATIENT GIVEN HER RENAL STATUS. Result Date: 11/9/2017  CT ABDOMEN AND PELVIS WITH CONTRAST: 11/9/2017 CLINICAL HISTORY:  Right lower quadrant pain . COMPARISON: 4/14/2016. MODERATE UNCOMPLICATED PANCOLITIS, MOST LIKELY INFECTION OR INFLAMMATORY BOWEL DISEASE. SMALL RIGHT AND TRACE LEFT PLEURAL EFFUSIONS WITH PROBABLE MILD BIBASILAR ATELECTASIS. NO OTHER SIGNIFICANT CHANGE FROM 4/16/2016 IDENTIFIED. Result Date: 11/9/2017  PORTABLE CHEST: 11/9/2017 CLINICAL HISTORY:  Fatigue . POOR INSPIRATION WITH MILD PROBABLE BIBASILAR ATELECTASIS. BRONCHOPNEUMONIA SHOULD BE EXCLUDED CLINICALLY.          CURRENT MEDICATIONS       sodium chloride  250 mL Intravenous Once    darbepoetin lizeth-polysorbate  100 mcg Subcutaneous Weekly    buprenorphine  1 patch Transdermal Weekly    calcium elemental  500 mg Oral Daily    carbidopa-levodopa  1 tablet Oral Daily    fluticasone  2 spray Nasal Daily    iron polysaccaride  1 capsule Oral Daily with breakfast    isosorbide mononitrate  60 mg Oral Daily    insulin glargine  14 Units Subcutaneous QAM    levothyroxine  50 mcg Oral Daily    metoprolol tartrate  50 mg Oral BID    pantoprazole sodium  40 mg Oral QAM AC    sodium bicarbonate  325 mg Oral TID    venlafaxine  150 mg Oral Daily with breakfast    insulin lispro  0-12 Units Subcutaneous TID WC    insulin lispro  0-6 Units

## 2017-11-12 NOTE — PROGRESS NOTES
Renal Progress Note    Assessment and Plan:      82 yo with new esrd. Has anemia, hypotension, leukocytosis, c diff. Have to be concerned about catheter related bacteremia as well. blood cultures are negative to date. Has some slight fluid overload. Got blood for anemia. Wbc trending down     Plan/  1- held hydralazine, norvasc and clonidine  2- agree with blood  3- aranesp 100  4- HD next on monday  5- f/u cx's. If blood cx positive will need permcath changed. Fistula is not ready  6- can d/c na hco3    Patient Active Problem List:     Hypothyroidism     Hypercholesteremia     CKD (chronic kidney disease)     Afib (HCC)     Anemia     Acid reflux     Anxiety     Diabetes mellitus due to underlying condition, controlled, with stage 5 chronic kidney disease not on chronic dialysis, with long-term current use of insulin (HCC)     Essential hypertension     Idiopathic Parkinson's disease ([facility]     Pneumonia of lower lobe due to infectious organism ([facility] [address]     Hyperkalemia     Acute on chronic diastolic congestive heart failure (HCC)     Pulmonary edema with congestive heart failure (HCC)     Shortness of breath     Acute pulmonary edema (HCC)     Pancolitis (HCC)     C. difficile colitis     Anemia     ESRD (end stage renal disease) on dialysis ([facility] [address]     Leukocytosis      Subjective:   Admit Date: 11/9/2017    Interval History: feels better. Got blood. No cp. No sob.         Medications:   Scheduled Meds:   amiodarone  200 mg Oral BID    sodium chloride  250 mL Intravenous Once    darbepoetin lizeth-polysorbate  100 mcg Subcutaneous Weekly    buprenorphine  1 patch Transdermal Weekly    calcium elemental  500 mg Oral Daily    carbidopa-levodopa  1 tablet Oral Daily    fluticasone  2 spray Nasal Daily    iron polysaccaride  1 capsule Oral Daily with breakfast    isosorbide mononitrate  60 mg Oral Daily    insulin glargine  14 Units Subcutaneous QAM    levothyroxine  50 mcg Oral Daily    metoprolol tartrate  50 mg Oral BID    pantoprazole sodium  40 mg Oral QAM AC    sodium bicarbonate  325 mg Oral TID    venlafaxine  150 mg Oral Daily with breakfast    insulin lispro  0-12 Units Subcutaneous TID WC    insulin lispro  0-6 Units Subcutaneous Nightly    rosuvastatin  40 mg Oral Nightly    sodium chloride  250 mL Intravenous Once    vancomycin  125 mg Oral 4 times per day    lidocaine  5 mL Intradermal Once    sodium chloride flush  10 mL Intravenous Q12H    sodium chloride  1,000 mL Intravenous Once    sodium chloride flush  10 mL Intravenous 2 times per day     Continuous Infusions:   amiodarone 450mg/250ml D5W infusion      dextrose      sodium chloride         CBC:   Recent Labs      11/10/17   0532  11/11/17   1739   WBC  23.3*  19.0*   HGB  6.2*  10.4*   PLT  109*  131     CMP:    Recent Labs      11/09/17   1500  11/09/17   1632  11/10/17   0551  11/11/17   1740   NA  131*   --   132  134   K  3.5   --   3.5  3.5   CL  88*   --   93*  92*   CO2  23   --   19*  23   BUN  41*   --   45*  30*   CREATININE  5.58*   --   5.61*  3.88*   GLUCOSE  191*  222  89  161*   CALCIUM  7.1*   --   6.4*  7.1*   LABGLOM  7.3*   --   7.3*  11.1*     Troponin:   Recent Labs      11/09/17   1500   TROPONINI  0.090*     BNP: No results for input(s): BNP in the last 72 hours. INR:   No results for input(s): INR in the last 72 hours. Lipids: No results for input(s): CHOL, LDLDIRECT, TRIG, HDL, AMYLASE, LIPASE in the last 72 hours. Liver:   Recent Labs      11/09/17   1500   AST  38*   ALT  10   ALKPHOS  98   PROT  5.6*   LABALBU  2.7*   BILITOT  0.3     Iron:  No results for input(s): IRONS, FERRITIN in the last 72 hours. Invalid input(s): LABIRONS  Urinalysis: No results for input(s): UA in the last 72 hours.     Objective:   Vitals: BP (!) 111/55 Comment: left leg  Pulse 105   Temp 97.5 °F (36.4 °C) (Oral)   Resp 16   Ht 4' 11\" (1.499 m)   Wt 125 lb 14.1 oz (57.1 kg)   SpO2 100%   BMI 25.43 kg/m²

## 2017-11-12 NOTE — PROGRESS NOTES
INPATIENT PROGRESS NOTE    SERVICE DATE:  11/12/2017   SERVICE TIME:  11:38    ASSESSMENT AND PLAN   20-year-old female with past medical history of pneumonia, C. diff, atrial fib, end-stage renal disease on HD MWF, diabetes type 2. Sent from nursing facility due to elevated white blood cell count and anemia. Admitted for pancolitis, acute on chronic anemia, end-stage renal disease.     Pancolitis: secondary to C. Diff  - Was on PO Flagyl  - ID is consulted. Per Dr. Uri Sauceda. CONT PO FLAGYL. AGREE NO MORE LEVAQUIN DOUBT PNEUMONIA. CHECK BLOOD CULT. Steph further recs  - Stools for c.diff, occult blood and culture  - Contact precautions  - GI consult: Per Dr. Bjorn Shone, Pancolitis probably, C diff colitis. Will put the patient on p.o. vancomycin. We will discontinue the Flagyl as this may have contributed to the high INR since the patient was on Coumadin. Coumadin and Flagyl have a drug interaction.     Acute on chronic anemia. Hgb 6.2 yesterday, now 10.4 s/p 3 units PRBC's  - Blood transfusion done  - Hold anticoagulation   - Daily PT and INR  - Continue PPI  - Consult GI. Steph further recs  - Aranesp per nephrology  - Telemetry  - Continue to monitor closely    Afib with RVR: HR to the 140's, now better. - Telemetry  - Cardio consulted. Per Dr. Tristen Bennett, Will change po amiodarone to IV for improved rate control as BP is borderline low. IV lopressor prn. Oral medications resumed. Steph further recs. - Transferred to [address] [place] [address] per cardio  - Continue to monitor closely     End-stage renal disease: On HD  - Nephrology consulted. Per Dr. Vickey Ferreira, new esrd.  Has anemia, hypotension, leukocytosis, c diff.  Have to be concerned about catheter related bacteremia as well. hold hydralazine, norvasc and clonidine. agree with blood. aranesp 100. HD today. f/u cx's.  If blood cx positive will need permcath changed. Fistula is not ready.  Steph further recs  - Continue to monitor     Elevated troponin  - serial troponins  - telemetry  - Consulted cardiology as above     DM II  -Insulin on sliding scale     Hypotension  -Numerous blood pressure medications held  -IVF  - Continue to monitor closely     Parkinson's disease     Dispo: Await consultant clearance prior to DC, likely in next 48 hours. Advised F/U with PCP 1 - 2 days of discharge. SUBJECTIVE  CHIEF COMPLAINT: Abdominal pain    PRIMARY SERVICE: Medicine    INTERVAL HPI: Pt continues to feel tired and fatigued. No other concerns or complaints.      MEDICATIONS:    Current Facility-Administered Medications   Medication Dose Route Frequency Provider Last Rate Last Dose    amiodarone (CORDARONE) 450 mg in dextrose 5 % 250 mL infusion  0.5 mg/min Intravenous Continuous Delores Norman MD 16.7 mL/hr at 11/11/17 2331 0.5 mg/min at 11/11/17 2331    metoprolol (LOPRESSOR) injection 5 mg  5 mg Intravenous Q6H PRN Delores Norman MD        0.9 % sodium chloride bolus  250 mL Intravenous Once Cathy Alfonso, DO        albumin human 25 % solution 25 g  25 g Intravenous Daily PRN Ernie Perez MD        darbepoetin lizeth-polysorbate (ARANESP) injection 100 mcg  100 mcg Subcutaneous Weekly Ernie Perez MD   100 mcg at 11/10/17 2123    ALPRAZolam (XANAX) tablet 0.5 mg  0.5 mg Oral TID PRN Macy Lemons MD   0.5 mg at 11/10/17 2111    buprenorphine (BUPRENEX) 5 MCG/HR 1 patch  1 patch Transdermal Weekly Macy Lemons MD   Stopped at 11/10/17 1326    calcium elemental (OSCAL) tablet 500 mg  500 mg Oral Daily Macy Lemons MD   500 mg at 11/12/17 0852    carbidopa-levodopa (SINEMET)  MG per tablet 1 tablet  1 tablet Oral Daily Macy Lemons MD   1 tablet at 11/12/17 0852    fluticasone (FLONASE) 50 MCG/ACT nasal spray 2 spray  2 spray Nasal Daily Macy Lemons MD   2 spray at 11/12/17 0854    guaiFENesin-dextromethorphan (ROBITUSSIN DM) 100-10 MG/5ML syrup 5 mL  5 mL Oral Q4H PRN Macy Lemons MD        ipratropium-albuterol (DUONEB) nebulizer solution 3 mL  3 mL Inhalation Q4H PRN Yvonne Knapp MD        iron polysaccaride (FERREX 150 FORTE PLUS) capsule 1 capsule  1 capsule Oral Daily with breakfast Yvonne Knapp MD   1 capsule at 11/12/17 0852    isosorbide mononitrate (IMDUR) extended release tablet 60 mg  60 mg Oral Daily Yvonne Knapp MD   60 mg at 11/12/17 0852    insulin glargine (LANTUS) injection vial 14 Units  14 Units Subcutaneous QAM Yvonne Knapp MD   14 Units at 11/11/17 1009    levothyroxine (SYNTHROID) tablet 50 mcg  50 mcg Oral Daily Yvonne Knapp MD   50 mcg at 11/12/17 0606    metoprolol tartrate (LOPRESSOR) tablet 50 mg  50 mg Oral BID Yvonne Knapp MD   50 mg at 11/12/17 0852    pantoprazole sodium (PROTONIX) packet 40 mg  40 mg Oral QAM AC Yvonne Knapp MD   40 mg at 11/12/17 0606    sodium bicarbonate tablet 325 mg  325 mg Oral TID Yvonne Knapp MD   325 mg at 11/12/17 0852    venlafaxine (EFFEXOR XR) extended release capsule 150 mg  150 mg Oral Daily with breakfast Yvonne Knapp MD   150 mg at 11/12/17 0852    glucose (GLUTOSE) 40 % oral gel 15 g  15 g Oral PRN Yvonne Knapp MD        dextrose 50 % solution 12.5 g  12.5 g Intravenous PRN Yvonne Knapp MD        glucagon (rDNA) injection 1 mg  1 mg Intramuscular PRN Yvonne Knapp MD        dextrose 5 % solution  100 mL/hr Intravenous PRN Yvonne Knapp MD        insulin lispro (HUMALOG) injection vial 0-12 Units  0-12 Units Subcutaneous TID WC Yvonne Knapp MD   4 Units at 11/11/17 1233    insulin lispro (HUMALOG) injection vial 0-6 Units  0-6 Units Subcutaneous Nightly Yvonne Knapp MD   2 Units at 11/11/17 2130    rosuvastatin (CRESTOR) tablet 40 mg  40 mg Oral Nightly Yvonne Knapp MD   40 mg at 11/11/17 2129    heparin (porcine) injection 4,000 Units  4,000 Units Intercatheter PRN Bishop Buckner MD        0.9 % sodium chloride bolus  250 mL Intravenous Once Andrew Cox MD        vancomycin [facility]) oral solution 125

## 2017-11-13 LAB
ANION GAP SERPL CALCULATED.3IONS-SCNC: 16 MEQ/L (ref 7–13)
BASOPHILS ABSOLUTE: 0 K/UL (ref 0–0.2)
BASOPHILS RELATIVE PERCENT: 0.2 %
BLOOD BANK DISPENSE STATUS: NORMAL
BLOOD BANK PRODUCT CODE: NORMAL
BPU ID: NORMAL
BUN BLDV-MCNC: 42 MG/DL (ref 8–23)
CALCIUM SERPL-MCNC: 6.7 MG/DL (ref 8.6–10.2)
CHLORIDE BLD-SCNC: 92 MEQ/L (ref 98–107)
CO2: 22 MEQ/L (ref 22–29)
CREAT SERPL-MCNC: 5.95 MG/DL (ref 0.5–0.9)
DESCRIPTION BLOOD BANK: NORMAL
EOSINOPHILS ABSOLUTE: 0.1 K/UL (ref 0–0.7)
EOSINOPHILS RELATIVE PERCENT: 1 %
GFR AFRICAN AMERICAN: 8.2
GFR NON-AFRICAN AMERICAN: 6.8
GLUCOSE BLD-MCNC: 134 MG/DL (ref 60–115)
GLUCOSE BLD-MCNC: 134 MG/DL (ref 60–115)
GLUCOSE BLD-MCNC: 134 MG/DL (ref 74–109)
GLUCOSE BLD-MCNC: 309 MG/DL (ref 60–115)
HCT VFR BLD CALC: 33.2 % (ref 37–47)
HEMOGLOBIN: 11.1 G/DL (ref 12–16)
LYMPHOCYTES ABSOLUTE: 0.4 K/UL (ref 1–4.8)
LYMPHOCYTES RELATIVE PERCENT: 3 %
MAGNESIUM: 2 MG/DL (ref 1.7–2.3)
MCH RBC QN AUTO: 29.1 PG (ref 27–31.3)
MCHC RBC AUTO-ENTMCNC: 33.4 % (ref 33–37)
MCV RBC AUTO: 87.1 FL (ref 82–100)
MONOCYTES ABSOLUTE: 0.7 K/UL (ref 0.2–0.8)
MONOCYTES RELATIVE PERCENT: 5.6 %
NEUTROPHILS ABSOLUTE: 10.6 K/UL (ref 1.4–6.5)
NEUTROPHILS RELATIVE PERCENT: 90.2 %
PDW BLD-RTO: 16.6 % (ref 11.5–14.5)
PERFORMED ON: ABNORMAL
PLATELET # BLD: 118 K/UL (ref 130–400)
POTASSIUM SERPL-SCNC: 3.6 MEQ/L (ref 3.5–5.1)
RBC # BLD: 3.81 M/UL (ref 4.2–5.4)
SODIUM BLD-SCNC: 130 MEQ/L (ref 132–144)
TSH REFLEX: 1.77 UIU/ML (ref 0.27–4.2)
WBC # BLD: 11.8 K/UL (ref 4.8–10.8)

## 2017-11-13 PROCEDURE — 83735 ASSAY OF MAGNESIUM: CPT

## 2017-11-13 PROCEDURE — 93010 ELECTROCARDIOGRAM REPORT: CPT | Performed by: INTERNAL MEDICINE

## 2017-11-13 PROCEDURE — 84443 ASSAY THYROID STIM HORMONE: CPT

## 2017-11-13 PROCEDURE — 93005 ELECTROCARDIOGRAM TRACING: CPT

## 2017-11-13 PROCEDURE — 6370000000 HC RX 637 (ALT 250 FOR IP): Performed by: INTERNAL MEDICINE

## 2017-11-13 PROCEDURE — 2060000000 HC ICU INTERMEDIATE R&B

## 2017-11-13 PROCEDURE — 6370000000 HC RX 637 (ALT 250 FOR IP): Performed by: NURSE PRACTITIONER

## 2017-11-13 PROCEDURE — 2700000000 HC OXYGEN THERAPY PER DAY

## 2017-11-13 PROCEDURE — 6370000000 HC RX 637 (ALT 250 FOR IP): Performed by: HOSPITALIST

## 2017-11-13 PROCEDURE — 2580000003 HC RX 258: Performed by: NURSE PRACTITIONER

## 2017-11-13 PROCEDURE — 80048 BASIC METABOLIC PNL TOTAL CA: CPT

## 2017-11-13 PROCEDURE — 85025 COMPLETE CBC W/AUTO DIFF WBC: CPT

## 2017-11-13 PROCEDURE — 6370000000 HC RX 637 (ALT 250 FOR IP): Performed by: SPECIALIST

## 2017-11-13 RX ADMIN — AMIODARONE HYDROCHLORIDE 200 MG: 200 TABLET ORAL at 16:55

## 2017-11-13 RX ADMIN — FLUTICASONE PROPIONATE 2 SPRAY: 50 SPRAY, METERED NASAL at 13:54

## 2017-11-13 RX ADMIN — Medication 1 CAPSULE: at 13:52

## 2017-11-13 RX ADMIN — Medication 125 MG: at 06:07

## 2017-11-13 RX ADMIN — Medication 10 ML: at 13:53

## 2017-11-13 RX ADMIN — Medication 10 ML: at 16:55

## 2017-11-13 RX ADMIN — METOPROLOL TARTRATE 50 MG: 50 TABLET ORAL at 13:52

## 2017-11-13 RX ADMIN — Medication 125 MG: at 13:53

## 2017-11-13 RX ADMIN — Medication 125 MG: at 23:21

## 2017-11-13 RX ADMIN — Medication 125 MG: at 17:30

## 2017-11-13 RX ADMIN — SODIUM CHLORIDE, PRESERVATIVE FREE 10 ML: 5 INJECTION INTRAVENOUS at 23:21

## 2017-11-13 RX ADMIN — Medication 10 ML: at 21:22

## 2017-11-13 RX ADMIN — LEVOTHYROXINE SODIUM 50 MCG: 50 TABLET ORAL at 06:07

## 2017-11-13 RX ADMIN — PANTOPRAZOLE SODIUM 40 MG: 40 GRANULE, DELAYED RELEASE ORAL at 13:52

## 2017-11-13 RX ADMIN — ROSUVASTATIN CALCIUM 40 MG: 40 TABLET, FILM COATED ORAL at 21:22

## 2017-11-13 RX ADMIN — CALCIUM 500 MG: 500 TABLET ORAL at 13:53

## 2017-11-13 RX ADMIN — METOPROLOL TARTRATE 50 MG: 50 TABLET ORAL at 21:22

## 2017-11-13 RX ADMIN — ACETAMINOPHEN 650 MG: 325 TABLET ORAL at 14:06

## 2017-11-13 RX ADMIN — ACETAMINOPHEN 650 MG: 325 TABLET ORAL at 21:22

## 2017-11-13 RX ADMIN — ISOSORBIDE MONONITRATE 60 MG: 60 TABLET, EXTENDED RELEASE ORAL at 13:52

## 2017-11-13 RX ADMIN — VENLAFAXINE HYDROCHLORIDE 150 MG: 150 CAPSULE, EXTENDED RELEASE ORAL at 13:53

## 2017-11-13 RX ADMIN — AMIODARONE HYDROCHLORIDE 200 MG: 200 TABLET ORAL at 06:07

## 2017-11-13 RX ADMIN — Medication 10 ML: at 16:57

## 2017-11-13 RX ADMIN — INSULIN LISPRO 8 UNITS: 100 INJECTION, SOLUTION INTRAVENOUS; SUBCUTANEOUS at 17:31

## 2017-11-13 RX ADMIN — CARBIDOPA AND LEVODOPA 1 TABLET: 25; 100 TABLET ORAL at 13:52

## 2017-11-13 ASSESSMENT — PAIN SCALES - GENERAL
PAINLEVEL_OUTOF10: 0
PAINLEVEL_OUTOF10: 6
PAINLEVEL_OUTOF10: 0
PAINLEVEL_OUTOF10: 5
PAINLEVEL_OUTOF10: 0

## 2017-11-13 NOTE — PROGRESS NOTES
INPATIENT PROGRESS NOTE    SERVICE DATE:  11/13/2017   SERVICE TIME:  11:01    ASSESSMENT AND PLAN   56-year-old female with past medical history of pneumonia, C. diff, atrial fib, end-stage renal disease on HD MWF, diabetes type 2. Sent from nursing facility due to elevated white blood cell count and anemia. Admitted for pancolitis, acute on chronic anemia, end-stage renal disease. Afib with RVR: HR to the 140's, now better. Remains on amio gtt  - Telemetry  - Cardio consulted. Per Dr. Felicia Judd, Continue IV amiodarone for total of 24 hour infusion then change to po. IV lopressor prn. Steph further recs. - Transferred to [address] [place] [address] per cardio  - Continue to monitor closely     Pancolitis: secondary to C. Diff  - Was on PO Flagyl  - ID is consulted. Per Dr. Amanda Borrego. CONT PO FLAGYL. AGREE NO MORE LEVAQUIN DOUBT PNEUMONIA. CHECK BLOOD CULT. Steph further recs  - Stools for c.diff, occult blood and culture  - Contact precautions  - GI consult: Per Dr. Cecily Peña, Pancolitis probably, C diff colitis. Will put the patient on p.o. vancomycin. We will discontinue the Flagyl as this may have contributed to the high INR since the patient was on Coumadin. Coumadin and Flagyl have a drug interaction.     Acute on chronic anemia. Hgb 6.2 yesterday, now 10.4 s/p 3 units PRBC's  - Blood transfusion done  - Hold anticoagulation   - Daily PT and INR  - Continue PPI  - Consult GI. Steph further recs  - Aranesp per nephrology  - Telemetry  - Continue to monitor closely     End-stage renal disease: On HD  - Nephrology consulted. Per Dr. Phu Mcmullen, new esrd.  Has anemia, hypotension, leukocytosis, c diff.  Have to be concerned about catheter related bacteremia as well.  blood cultures are negative to date. Has some slight fluid overload. Got blood for anemia. Wbc trending down. held hydralazine, norvasc and clonidine, agree with blood, aranesp 100. HD next on Monday.  f/u cx's.  If blood cx positive will need permcath changed.  Fistula Joe Moreira MD   1 tablet at 11/12/17 0852    fluticasone (FLONASE) 50 MCG/ACT nasal spray 2 spray  2 spray Nasal Daily Joe Moreira MD   2 spray at 11/12/17 0854    guaiFENesin-dextromethorphan (ROBITUSSIN DM) 100-10 MG/5ML syrup 5 mL  5 mL Oral Q4H PRN Joe Moreira MD        ipratropium-albuterol (DUONEB) nebulizer solution 3 mL  3 mL Inhalation Q4H PRN Joe Moreira MD        iron polysaccaride (FERREX 150 FORTE PLUS) capsule 1 capsule  1 capsule Oral Daily with breakfast Joe Moreira MD   1 capsule at 11/12/17 0852    isosorbide mononitrate (IMDUR) extended release tablet 60 mg  60 mg Oral Daily Joe Moreira MD   60 mg at 11/12/17 0852    insulin glargine (LANTUS) injection vial 14 Units  14 Units Subcutaneous QAM Jeo Moreira MD   14 Units at 11/11/17 1009    levothyroxine (SYNTHROID) tablet 50 mcg  50 mcg Oral Daily Joe Moreira MD   50 mcg at 11/13/17 0607    metoprolol tartrate (LOPRESSOR) tablet 50 mg  50 mg Oral BID Joe Moreira MD   50 mg at 11/12/17 2200    pantoprazole sodium (PROTONIX) packet 40 mg  40 mg Oral QAM AC Joe Moreira MD   40 mg at 11/12/17 0606    venlafaxine (EFFEXOR XR) extended release capsule 150 mg  150 mg Oral Daily with breakfast Joe Moreira MD   150 mg at 11/12/17 0852    glucose (GLUTOSE) 40 % oral gel 15 g  15 g Oral PRN Joe Moreira MD        dextrose 50 % solution 12.5 g  12.5 g Intravenous PRN Joe Moreira MD        glucagon (rDNA) injection 1 mg  1 mg Intramuscular PRN Joe Moreira MD        dextrose 5 % solution  100 mL/hr Intravenous PRN Joe Moreira MD        insulin lispro (HUMALOG) injection vial 0-12 Units  0-12 Units Subcutaneous TID WC Joe Moreira MD   2 Units at 11/12/17 1703    insulin lispro (HUMALOG) injection vial 0-6 Units  0-6 Units Subcutaneous Nightly Joe Moreira MD   2 Units at 11/12/17 2200    rosuvastatin (CRESTOR) tablet 40 mg  40 mg Oral Nightly Joe Moreira MD   40 mg at 11/12/17 hepatosplenomegally  EXT: No c/c/e    DATA:   Diagnostic tests reviewed for today's visit:    Most recent labs and imaging results reviewed.      SIGNATURE: Piero Hannon MD PATIENT NAMEJaswant Ellsworth   DATE: November 13, 2017 MRN: [id number]   TIME: 11:01 AM PAGER: [phone]

## 2017-11-13 NOTE — PROGRESS NOTES
CARDIOLOGY 1451  [facility] PROGRESS NOTE         11/13/2017      Soila Escobar    [id number]  1935    Rounding MD: Gutierrez Pierre MD ,[facility]    Primary Cardiologist:  Lazara Cabral MD       SUBJECTIVE:                          Much improved with current treatment. Doing well on HD. Denies chest pain and orthopnea.                           Cardiac and general ROS otherwise negative and unchanged.     ASSESSMENT:     1. SOB, persistent  2. Renal Failure on HD  3. C Diff Colitis  4. Pneumonia of lower lobe due to infectious organism ([facility]  5. Elevated BNP, Can be seen in both CHF and PNA  6. Moderate PHTN  7. Diastolic Dysfunction, mild, possible HFpEF  8. PAFIB . 9. SSS s/p Pacemaker  10. CAD hx Prior PCI, LCX BMS 2010, negative repeat Cath  6/2014  11. Normal LV function 55% ( echo 10/17 )  12. COPD  15. IVON  14. CKD (chronic kidney disease), now on HD  15. Essential hypertension  16. Hyperlipidemia  17. Diabetes mellitus  18. PVD  19. Prior DVT  20. Hypothyroidism  21. CLBP  22. Former Smoker  21. Polycythemia Vera  24. Hyperkalemia  25. Anemia  26. GERD  27. Anxiety  28. Idiopathic Parkinson's disease ([facility]         PLAN:     CV supportive Care with contination of medications. Nephrology care with HD. Cardiac maximal care  OK to DC home once OK with others  See Orders.        Prior HPI Notes:    Katiana Sherman is a 81 y.o.  female patient who is being at the request of Dr. Eunice Gomez inpatient consultation of atrial fibrillation. She was admitted on 10/24/2017. [facility] and [id number] [place] [address] records have been reviewed in detail.  She is followed on a regular basis by Dr. Kelley Barthel.     She has a history of paroxysmal atrial fibrillation for which she is maintained chronically on amiodarone.  She is chronically antiquated without warfarin. Denisse Elkins has a history of mild coronary artery disease.  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  250 mL Intravenous Once    vancomycin  125 mg Oral 4 times per day    lidocaine  5 mL Intradermal Once    sodium chloride flush  10 mL Intravenous Q12H    sodium chloride  1,000 mL Intravenous Once    sodium chloride flush  10 mL Intravenous 2 times per day     Continuous Infusions:   amiodarone 450mg/250ml D5W infusion Stopped (11/12/17 1427)    dextrose      sodium chloride       PRN Meds:heparin (porcine), heparin (porcine), metoprolol, albumin human, ALPRAZolam, guaiFENesin-dextromethorphan, ipratropium-albuterol, glucose, dextrose, glucagon (rDNA), dextrose, heparin (porcine), sodium chloride flush, sodium chloride flush, sodium chloride flush, acetaminophen    PHYSICAL EXAM:    CURRENT VITALS: BP (!) 115/54   Pulse 108   Temp 97.9 °F (36.6 °C)   Resp 16   Ht 4' 11\" (1.499 m)   Wt 147 lb 14.9 oz (67.1 kg)   SpO2 100%   BMI 29.88 kg/m²     CONSTITUTIONAL:  awake, alert, cooperative, no apparent distress,   ENT:  Normocephalic, without obvious abnormality, atraumatic, sinuses nontender on palpation, external ears without lesions,  NECK:  Supple, symmetrical, trachea midline, no adenopathy, thyroid symmetric, not enlarged and no tenderness, skin normal  LUNGS:  No increased work of breathing, good air exchange, clear to auscultation bilaterally, no crackles or wheezing, PM left Chest.  CARDIOVASCULAR:  Normal apical impulse, regular rate and rhythm, normal S1 and S2,  and 2/6 systolic murmur noted  ABDOMEN:  Normal bowel sounds, soft, non-distended, non-tender, no masses palpated, no hepatosplenomegally  EXTREMITIES:  No edema, Pulses strong throughout. NEUROLOGIC:  Awake, alert, oriented to name, place and time.  Following all commands and moving all extremties  SKIN:  no bruising or bleeding, normal skin color, texture, turgor and no rashes     Data:        LABS:      Recent Results (from the past 24 hour(s))   POCT Glucose    Collection Time: 11/12/17 11:05 AM   Result Value Ref Range POC Glucose 259 (H) 60 - 115 mg/dl    Performed on ACCU-CHEK    EKG 12 Lead    Collection Time: 11/12/17  1:09 PM   Result Value Ref Range    Ventricular Rate 87 BPM    Atrial Rate 104 BPM    QRS Duration 110 ms    Q-T Interval 420 ms    QTc Calculation (Bazett) 505 ms    R Axis 8 degrees    T Axis 50 degrees   CBC Auto Differential    Collection Time: 11/12/17  2:48 PM   Result Value Ref Range    WBC 14.5 (H) 4.8 - 10.8 K/uL    RBC 3.77 (L) 4.20 - 5.40 M/uL    Hemoglobin 10.7 (L) 12.0 - 16.0 g/dL    Hematocrit 32.5 (L) 37.0 - 47.0 %    MCV 86.1 82.0 - 100.0 fL    MCH 28.4 27.0 - 31.3 pg    MCHC 33.0 33.0 - 37.0 %    RDW 16.8 (H) 11.5 - 14.5 %    Platelets 206 (L) 340 - 400 K/uL    Neutrophils % 91.1 %    Lymphocytes % 2.1 %    Monocytes % 5.8 %    Eosinophils % 0.8 %    Basophils % 0.2 %    Neutrophils # 13.2 (H) 1.4 - 6.5 K/uL    Lymphocytes # 0.3 (L) 1.0 - 4.8 K/uL    Monocytes # 0.8 0.2 - 0.8 K/uL    Eosinophils # 0.1 0.0 - 0.7 K/uL    Basophils # 0.0 0.0 - 0.2 K/uL   Basic Metabolic Panel    Collection Time: 11/12/17  2:49 PM   Result Value Ref Range    Sodium 127 (L) 132 - 144 mEq/L    Potassium 2.9 (LL) 3.5 - 5.1 mEq/L    Chloride 88 (L) 98 - 107 mEq/L    CO2 23 22 - 29 mEq/L    Anion Gap 16 (H) 7 - 13 mEq/L    Glucose 105 74 - 109 mg/dL    BUN 38 (H) 8 - 23 mg/dL    CREATININE 4.88 (H) 0.50 - 0.90 mg/dL    GFR Non-African American 8.5 (L) >60    GFR  10.3 (L) >60    Calcium 6.8 (L) 8.6 - 10.2 mg/dL   Magnesium    Collection Time: 11/12/17  2:49 PM   Result Value Ref Range    Magnesium 2.1 1.7 - 2.3 mg/dL   POCT Glucose    Collection Time: 11/12/17  4:15 PM   Result Value Ref Range    POC Glucose 192 (H) 60 - 115 mg/dl    Performed on ACCU-CHEK    POCT Glucose    Collection Time: 11/12/17  9:12 PM   Result Value Ref Range    POC Glucose 205 (H) 60 - 115 mg/dl    Performed on ACCU-CHEK    EKG 12 Lead    Collection Time: 11/13/17  4:36 AM   Result Value Ref Range    Ventricular Rate 92 BPM Atrial Rate 94 BPM    QRS Duration 104 ms    Q-T Interval 392 ms    QTc Calculation (Bazett) 484 ms    R Axis 9 degrees    T Axis 72 degrees   CBC Auto Differential    Collection Time: 11/13/17  6:00 AM   Result Value Ref Range    WBC 11.8 (H) 4.8 - 10.8 K/uL    RBC 3.81 (L) 4.20 - 5.40 M/uL    Hemoglobin 11.1 (L) 12.0 - 16.0 g/dL    Hematocrit 33.2 (L) 37.0 - 47.0 %    MCV 87.1 82.0 - 100.0 fL    MCH 29.1 27.0 - 31.3 pg    MCHC 33.4 33.0 - 37.0 %    RDW 16.6 (H) 11.5 - 14.5 %    Platelets 776 (L) 338 - 400 K/uL    Neutrophils % 90.2 %    Lymphocytes % 3.0 %    Monocytes % 5.6 %    Eosinophils % 1.0 %    Basophils % 0.2 %    Neutrophils # 10.6 (H) 1.4 - 6.5 K/uL    Lymphocytes # 0.4 (L) 1.0 - 4.8 K/uL    Monocytes # 0.7 0.2 - 0.8 K/uL    Eosinophils # 0.1 0.0 - 0.7 K/uL    Basophils # 0.0 0.0 - 0.2 K/uL   Basic Metabolic Panel    Collection Time: 11/13/17  6:00 AM   Result Value Ref Range    Sodium 130 (L) 132 - 144 mEq/L    Potassium 3.6 3.5 - 5.1 mEq/L    Chloride 92 (L) 98 - 107 mEq/L    CO2 22 22 - 29 mEq/L    Anion Gap 16 (H) 7 - 13 mEq/L    Glucose 134 (H) 74 - 109 mg/dL    BUN 42 (H) 8 - 23 mg/dL    CREATININE 5.95 (H) 0.50 - 0.90 mg/dL    GFR Non-African American 6.8 (L) >60    GFR  8.2 (L) >60    Calcium 6.7 (L) 8.6 - 10.2 mg/dL   Magnesium    Collection Time: 11/13/17  6:00 AM   Result Value Ref Range    Magnesium 2.0 1.7 - 2.3 mg/dL   TSH with Reflex    Collection Time: 11/13/17  6:00 AM   Result Value Ref Range    TSH 1.770 0.270 - 4.200 uIU/mL   POCT Glucose    Collection Time: 11/13/17  6:33 AM   Result Value Ref Range    POC Glucose 134 (H) 60 - 115 mg/dl    Performed on ACCU-CHEK          VQ LUNGS:  Negative ( non diagnostic ) for PE     EKG:   AFIB    TELEMETRY:  PAF    Echo: (10/17)  Normal LV function 32%  Mild Diastolic Dysfunction  No significant VHD  Moderate PHTN, RVSP 45.   PM noted        Rupinder Rojo MD ,[address] [place] [address] Cardiology

## 2017-11-13 NOTE — FLOWSHEET NOTE
Notified per monitor room tech that patient had a 1.56 sec pause followed by a 1.15 sec pause then converted to NSR with rate in the 80's. Patient has a history of the Atrial Fibrillation and is on PO amiodarone.

## 2017-11-13 NOTE — CARE COORDINATION
LSW spoke to Galdino Dasilva, admissions at [facility]. Per Galdino Dasilva, pt is short term and not a bed hold at [facility]. Discharge plan is to return to [facility].  Electronically signed by ALIDA Huff on 11/13/17 at 2:58 PM

## 2017-11-13 NOTE — FLOWSHEET NOTE
Patient returned from dialysis. Has no complaints at this time. Denies any chest pain. Atrial Fibrillation on the monitor. No edema noted. Pedal pulses palpable. Denies any shortness of breath. Lungs clear but diminished bilaterally. SATS 98% on 2L NC. Patient is A/Ox3 and up with 1 person assist in the room. Denies any pain with elimination but is incontinent of urine and stool. Still having small amounts of diarrhea. Skin remains warm and pink, but varinder-area, groin and buttocks are excoriated. Aloe vesta applied liberally after each incontinent episode. Right arm single lumen midline present, flushed and patent. Left arm AV fistula noted with + bruit and thrill. Right upper chest Vas Cath noted, and dressing remains dry and intact. Call light remains in reach.

## 2017-11-14 LAB
ALBUMIN SERPL-MCNC: 2.3 G/DL (ref 3.9–4.9)
ALP BLD-CCNC: 82 U/L (ref 40–130)
ALT SERPL-CCNC: 10 U/L (ref 0–33)
ANION GAP SERPL CALCULATED.3IONS-SCNC: 12 MEQ/L (ref 7–13)
AST SERPL-CCNC: 30 U/L (ref 0–35)
BASOPHILS ABSOLUTE: 0.1 K/UL (ref 0–0.2)
BASOPHILS RELATIVE PERCENT: 0.5 %
BILIRUB SERPL-MCNC: 0.3 MG/DL (ref 0–1.2)
BLOOD CULTURE, ROUTINE: NORMAL
BUN BLDV-MCNC: 18 MG/DL (ref 8–23)
CALCIUM SERPL-MCNC: 6.5 MG/DL (ref 8.6–10.2)
CHLORIDE BLD-SCNC: 96 MEQ/L (ref 98–107)
CO2: 32 MEQ/L (ref 22–29)
CREAT SERPL-MCNC: 3.6 MG/DL (ref 0.5–0.9)
CULTURE, BLOOD 2: NORMAL
EOSINOPHILS ABSOLUTE: 0.1 K/UL (ref 0–0.7)
EOSINOPHILS RELATIVE PERCENT: 0.7 %
GFR AFRICAN AMERICAN: 14.7
GFR NON-AFRICAN AMERICAN: 12.1
GLOBULIN: 2.7 G/DL (ref 2.3–3.5)
GLUCOSE BLD-MCNC: 124 MG/DL (ref 74–109)
GLUCOSE BLD-MCNC: 129 MG/DL (ref 60–115)
GLUCOSE BLD-MCNC: 166 MG/DL (ref 60–115)
GLUCOSE BLD-MCNC: 222 MG/DL (ref 60–115)
GLUCOSE BLD-MCNC: 86 MG/DL (ref 60–115)
GLUCOSE BLD-MCNC: 89 MG/DL (ref 60–115)
HCT VFR BLD CALC: 31.7 % (ref 37–47)
HEMOGLOBIN: 10.6 G/DL (ref 12–16)
LYMPHOCYTES ABSOLUTE: 0.3 K/UL (ref 1–4.8)
LYMPHOCYTES RELATIVE PERCENT: 2.6 %
MAGNESIUM: 1.9 MG/DL (ref 1.7–2.3)
MCH RBC QN AUTO: 29.1 PG (ref 27–31.3)
MCHC RBC AUTO-ENTMCNC: 33.4 % (ref 33–37)
MCV RBC AUTO: 87 FL (ref 82–100)
MONOCYTES ABSOLUTE: 0.7 K/UL (ref 0.2–0.8)
MONOCYTES RELATIVE PERCENT: 7.4 %
NEUTROPHILS ABSOLUTE: 8.8 K/UL (ref 1.4–6.5)
NEUTROPHILS RELATIVE PERCENT: 88.8 %
PDW BLD-RTO: 16.7 % (ref 11.5–14.5)
PERFORMED ON: ABNORMAL
PERFORMED ON: NORMAL
PERFORMED ON: NORMAL
PLATELET # BLD: 102 K/UL (ref 130–400)
POTASSIUM SERPL-SCNC: 3.6 MEQ/L (ref 3.5–5.1)
RBC # BLD: 3.65 M/UL (ref 4.2–5.4)
SODIUM BLD-SCNC: 140 MEQ/L (ref 132–144)
TOTAL PROTEIN: 5 G/DL (ref 6.4–8.1)
WBC # BLD: 9.9 K/UL (ref 4.8–10.8)

## 2017-11-14 PROCEDURE — 6370000000 HC RX 637 (ALT 250 FOR IP): Performed by: SPECIALIST

## 2017-11-14 PROCEDURE — 97167 OT EVAL HIGH COMPLEX 60 MIN: CPT

## 2017-11-14 PROCEDURE — G8987 SELF CARE CURRENT STATUS: HCPCS

## 2017-11-14 PROCEDURE — 2580000003 HC RX 258: Performed by: NURSE PRACTITIONER

## 2017-11-14 PROCEDURE — 83735 ASSAY OF MAGNESIUM: CPT

## 2017-11-14 PROCEDURE — 97162 PT EVAL MOD COMPLEX 30 MIN: CPT

## 2017-11-14 PROCEDURE — 80053 COMPREHEN METABOLIC PANEL: CPT

## 2017-11-14 PROCEDURE — 6370000000 HC RX 637 (ALT 250 FOR IP): Performed by: INTERNAL MEDICINE

## 2017-11-14 PROCEDURE — 36592 COLLECT BLOOD FROM PICC: CPT

## 2017-11-14 PROCEDURE — G8978 MOBILITY CURRENT STATUS: HCPCS

## 2017-11-14 PROCEDURE — 2700000000 HC OXYGEN THERAPY PER DAY

## 2017-11-14 PROCEDURE — 2060000000 HC ICU INTERMEDIATE R&B

## 2017-11-14 PROCEDURE — 6370000000 HC RX 637 (ALT 250 FOR IP): Performed by: HOSPITALIST

## 2017-11-14 PROCEDURE — G8988 SELF CARE GOAL STATUS: HCPCS

## 2017-11-14 PROCEDURE — 93010 ELECTROCARDIOGRAM REPORT: CPT | Performed by: INTERNAL MEDICINE

## 2017-11-14 PROCEDURE — G8979 MOBILITY GOAL STATUS: HCPCS

## 2017-11-14 PROCEDURE — 85025 COMPLETE CBC W/AUTO DIFF WBC: CPT

## 2017-11-14 PROCEDURE — 93005 ELECTROCARDIOGRAM TRACING: CPT

## 2017-11-14 RX ADMIN — Medication 10 ML: at 12:56

## 2017-11-14 RX ADMIN — Medication 125 MG: at 22:54

## 2017-11-14 RX ADMIN — METOPROLOL TARTRATE 50 MG: 50 TABLET ORAL at 08:52

## 2017-11-14 RX ADMIN — AMIODARONE HYDROCHLORIDE 200 MG: 200 TABLET ORAL at 18:05

## 2017-11-14 RX ADMIN — FLUTICASONE PROPIONATE 2 SPRAY: 50 SPRAY, METERED NASAL at 08:54

## 2017-11-14 RX ADMIN — Medication 125 MG: at 06:13

## 2017-11-14 RX ADMIN — PANTOPRAZOLE SODIUM 40 MG: 40 GRANULE, DELAYED RELEASE ORAL at 06:14

## 2017-11-14 RX ADMIN — CARBIDOPA AND LEVODOPA 1 TABLET: 25; 100 TABLET ORAL at 08:51

## 2017-11-14 RX ADMIN — Medication 125 MG: at 12:54

## 2017-11-14 RX ADMIN — INSULIN LISPRO 4 UNITS: 100 INJECTION, SOLUTION INTRAVENOUS; SUBCUTANEOUS at 12:50

## 2017-11-14 RX ADMIN — Medication 10 ML: at 22:46

## 2017-11-14 RX ADMIN — Medication 125 MG: at 18:05

## 2017-11-14 RX ADMIN — CALCIUM 500 MG: 500 TABLET ORAL at 08:51

## 2017-11-14 RX ADMIN — Medication 10 ML: at 18:08

## 2017-11-14 RX ADMIN — INSULIN GLARGINE 14 UNITS: 100 INJECTION, SOLUTION SUBCUTANEOUS at 08:57

## 2017-11-14 RX ADMIN — ROSUVASTATIN CALCIUM 40 MG: 40 TABLET, FILM COATED ORAL at 22:46

## 2017-11-14 RX ADMIN — VENLAFAXINE HYDROCHLORIDE 150 MG: 150 CAPSULE, EXTENDED RELEASE ORAL at 08:52

## 2017-11-14 RX ADMIN — Medication 1 CAPSULE: at 08:51

## 2017-11-14 RX ADMIN — METOPROLOL TARTRATE 50 MG: 50 TABLET ORAL at 22:46

## 2017-11-14 RX ADMIN — ISOSORBIDE MONONITRATE 60 MG: 60 TABLET, EXTENDED RELEASE ORAL at 08:51

## 2017-11-14 RX ADMIN — AMIODARONE HYDROCHLORIDE 200 MG: 200 TABLET ORAL at 06:14

## 2017-11-14 RX ADMIN — LEVOTHYROXINE SODIUM 50 MCG: 50 TABLET ORAL at 06:13

## 2017-11-14 ASSESSMENT — PAIN DESCRIPTION - PROGRESSION
CLINICAL_PROGRESSION: NOT CHANGED

## 2017-11-14 ASSESSMENT — PAIN SCALES - GENERAL
PAINLEVEL_OUTOF10: 0

## 2017-11-14 NOTE — PROGRESS NOTES
Hyperlipidemia     Hypothyroidism     Kidney stones 11/2001    Dr. Candi Silver    Neuropathy in diabetes ([facility]     legs    Osteoarthritis     Positive ORTEGA (antinuclear antibody)     1:80    Tachycardia     Thrombophlebitis leg superficial     Type II or unspecified type diabetes mellitus without mention of complication, not stated as uncontrolled 1998     Past Surgical History:   Procedure Laterality Date    APPENDECTOMY  2007    BLADDER SUSPENSION  2008 & 2009    [facility], vitaly at [address]  2/2006    COLONOSCOPY  4/2007    Dr. Kenda Jeans    [address] N/A 11/2/2017    CATHETER INSERTION HEMODIALYSIS performed by Yunior Ho MD at [address] [place] [address] Rd  [id number]    Bilateral    [address] [facility]  3/2003            Restrictions:  Restrictions/Precautions:  (enteric precautions)  Body mass index is 26.05 kg/m². SUBJECTIVE: Subjective: \"I guess I'm not very steady on my feet\" pt reporting during amb.    Pre Treatment Pain Screening  Pain at present: 0    Post Treatment Pain Screening:   Pain Screening  Patient Currently in Pain: No  Pain Assessment  Pain Level: 0    Prior Level of Function:  Social/Functional History  Lives With: Son  Type of Home: House  Home Layout: One level  Home Access: Level entry  Home Equipment:  (none)  ADL Assistance: Independent  Homemaking Assistance: Independent  Ambulation Assistance: Independent  Transfer Assistance: Independent    OBJECTIVE:   Vision/Hearing:  Vision: Within Functional Limits  Hearing: Exceptions to [facility]  Hearing Exceptions: Hard of hearing/hearing concerns    Cognition:  Overall Orientation Status: Within Normal Limits         ROM:  RLE AROM: WFL  LLE AROM : WFL    Strength: functionally observed to be impaired in trunk and B LEs, able to lift LEs against gravity and maintain supported sitting indep Neuro:  Balance  Sitting - Static: Good  Sitting - Dynamic: Fair;+  Standing - Static: Fair  Standing - Dynamic: Fair;-             Bed mobility  Supine to Sit: Independent  Sit to Supine: Independent    Transfers  Sit to Stand: Contact guard assistance;Minimal Assistance  Stand to sit: Contact guard assistance;Minimal Assistance  Comment: Unsteady, no device used    Ambulation  Ambulation?: Yes  Ambulation 1  Device: No Device  Assistance: Moderate assistance  Quality of Gait: postural instability with decreased WB stability B LEs, pt reaching out for wall and furniture, several LOBs, requiring intervention from PT  Distance: 20'  Comments: Pt declined to use AD despite encouragement from PT    Activity Tolerance  Activity Tolerance: Patient Tolerated treatment well  PT Equipment Recommendations  Equipment Needed: Yes  Other: recommend possible AD pending progress and mobility improvement, TBD           ASSESSMENT:   Body structures, Functions, Activity limitations: Decreased functional mobility ; Decreased strength;Decreased endurance;Decreased safe awareness;Decreased balance;Decreased high-level IADLs  Decision Making: Medium Complexity  History: high  Exam: high  Clinical Presentation: evolving     Prognosis: Good  Patient Education: POC, role of PT  Barriers to Learning: none    DISCHARGE RECOMMENDATIONS:       Assessment: Pt demonstrates impaired stability and safety with mobility, further therapy is recommneded prior to homegoing to reduce falls risk, improve stregth and safety with mobility  Other: recommend possible AD pending progress and mobility improvement, TBD   REQUIRES PT FOLLOW UP: Yes      PLAN OF CARE:  Plan  Times per week: 3-6  Current Treatment Recommendations: Strengthening, Functional Mobility Training, Transfer Training, Gait Training, Endurance Training, Neuromuscular Re-education, Home Exercise Program, Safety Education & Training, Equipment Evaluation, Education, & procurement,

## 2017-11-14 NOTE — PROGRESS NOTES
hour   Intake              480 ml   Output                0 ml   Net              480 ml       Constitutional:  Alert, awake, no apparent distress   Skin:normal, no rash  HEENT:sclera anicteric.   Head atraumatic normocephalic  Neck:supple with no thyromegally  Cardiovascular:  S1, S2 without m/r/g   Respiratory:  CTA B without w/r/r   Abdomen: +bs, soft, nt  Ext: trace LE edema  Musculoskeletal:Intact  Neuro:Alert and oriented with no deficit      Electronically signed by Dash Abel MD on 11/14/2017 at 2:22 PM

## 2017-11-14 NOTE — PROGRESS NOTES
Cardiology Progress Note      Patient:  Mont Goldberg  YOB: 1935  MRN: [id number]   Acct: [de-identified]  Admit Date:  11/9/2017      SHARED VISIT CNP: Radha Salcedo NP  PRIMARY CARE PHYSICIAN: Kateryna Velasco  CARDIOLOGIST:Dr. Jesika Osullivan        Impression/Plan:  1. Atrial fib with RVR, converted to NSR with 2 second conversion pause 11/13/17. Prior anticoagulation with warfarin, presently off all anticoagulation secondary to anemia, will need input from GI regarding resumption, last INR 11/9 was 2.78. Will stop IV amiodarone, as has been well over 24 hours, continue with 200 mg po BID  2. Elevated troponin, last [facility] 2013 showing extensive calcification of LAD with decreased vessel size leading to a 100% stenosis of the distal segment, filling with R to L collaterals, mild disease of LCH and RCA. Medically managed. May have component of demand ischemia secondary to severe anemia on admission. Unfortunately serial troponins ordered but not drawn. Will draw troponin in am.  3. Pancolitis secondary to c diff, per ID/GI  4. Acute blood loss anemia, anticoagulation remains on hold, HgB presently stable  5. ESRD on HD      I have personally interviewed and examined the patient. I have personally and independently reviewed labs and diagnostic testing. I have personally verified the elements of the history and physical listed above and changes, if any, are noted. I have personally reviewed the assessment and plan as documented by Kemal Irving RN, CNP and concur with her. Mrs. Sherman has now converted to NSR. She is without any specific cardiovascular complaints. She does not have any angina pectoris or CHF symptoms. Her examination is remarkable for a II/VI GENA LSB. Her blood pressures are well controlled. We are currently unable to anticoagulate her secondary to anemia. She will be continued on her same medications. Faustino Schwab MD, [facility]      Chief Complaint/Active Symptoms: No chest pain, mild dyspnea, + fatigue    Objective:   BP (!) 137/54   Pulse 76   Temp 98.6 °F (37 °C) (Oral)   Resp 18   Ht 4' 11\" (1.499 m)   Wt 128 lb 15.5 oz (58.5 kg)   SpO2 100%   BMI 26.05 kg/m²    Wt Readings from Last 3 Encounters:   11/14/17 128 lb 15.5 oz (58.5 kg)   11/03/17 124 lb 5.4 oz (56.4 kg)   09/28/17 135 lb (61.2 kg)       TELEMETRY: normal sinus                             Rhythm Strip: NSR      Physical Exam:  Physical Exam   Constitutional: She is oriented to person, place, and time. No distress. frail   HENT:   Head: Normocephalic. Eyes: Pupils are equal, round, and reactive to light. Neck: No JVD present. Cardiovascular: Normal rate and regular rhythm. 2/6 systolic murmur   Pulmonary/Chest: Effort normal and breath sounds normal.   Diminished at bases otherwise CTA   Abdominal: Soft. Bowel sounds are normal. She exhibits no distension. Musculoskeletal: Normal range of motion. She exhibits no edema. Neurological: She is alert and oriented to person, place, and time. Skin: There is pallor. Psychiatric: She has a normal mood and affect.         Medications:    amiodarone  200 mg Oral BID    sodium chloride  250 mL Intravenous Once    darbepoetin lizeth-polysorbate  100 mcg Subcutaneous Weekly    buprenorphine  1 patch Transdermal Weekly    calcium elemental  500 mg Oral Daily    carbidopa-levodopa  1 tablet Oral Daily    fluticasone  2 spray Nasal Daily    iron polysaccaride  1 capsule Oral Daily with breakfast    isosorbide mononitrate  60 mg Oral Daily    insulin glargine  14 Units Subcutaneous QAM    levothyroxine  50 mcg Oral Daily    metoprolol tartrate  50 mg Oral BID    pantoprazole sodium  40 mg Oral QAM AC    venlafaxine  150 mg Oral Daily with breakfast    insulin lispro  0-12 Units Subcutaneous TID WC    insulin lispro  0-6 Units Subcutaneous Nightly    rosuvastatin  40 mg Oral Nightly    sodium chloride  250 mL Intravenous Once    vancomycin  125 mg Oral 4 times per day    lidocaine  5 mL Intradermal Once    sodium chloride flush  10 mL Intravenous Q12H    sodium chloride  1,000 mL Intravenous Once    sodium chloride flush  10 mL Intravenous 2 times per day      amiodarone 450mg/250ml D5W infusion Stopped (17 0947)    dextrose      sodium chloride         heparin (porcine) 1,000 Units PRN   heparin (porcine) 1,000 Units PRN   metoprolol 5 mg Q6H PRN   albumin human 25 g Daily PRN   ALPRAZolam 0.5 mg TID PRN   guaiFENesin-dextromethorphan 5 mL Q4H PRN   ipratropium-albuterol 3 mL Q4H PRN   glucose 15 g PRN   dextrose 12.5 g PRN   glucagon (rDNA) 1 mg PRN   dextrose 100 mL/hr PRN   heparin (porcine) 4,000 Units PRN   sodium chloride flush 10 mL PRN   sodium chloride flush 10 mL PRN   sodium chloride flush 10 mL PRN   acetaminophen 650 mg Q4H PRN       Diagnostics:                                                                               EK2017  7:36 AM - Mario, Chpo Incoming Results From Muse     Component Results     Component Value Ref Range & Units Status Collected Lab   Ventricular Rate 70  BPM Preliminary 2017  3:53 AM Unknown   Atrial Rate 70  BPM Preliminary 2017  3:53 AM Unknown   P-R Interval 210  ms Preliminary 2017  3:53 AM Unknown   QRS Duration 106  ms Preliminary 2017  3:53 AM Unknown   Q-T Interval 458  ms Preliminary 2017  3:53 AM Unknown   QTc Calculation (Bazett) 494  ms Preliminary 2017  3:53 AM Unknown   P Axis 12  degrees Preliminary 2017  3:53 AM Unknown   R Axis 1  degrees Preliminary 2017  3:53 AM Unknown   T Axis 85  degrees Preliminary 2017  3:53 AM Unknown   Narrative     Sinus rhythm with 1st degree AV block  ST & T wave abnormality, consider anterior ischemia  Prolonged QT  Abnormal ECG  When compared with ECG of 2017 04:36,  Sinus rhythm has replaced Atrial fibrillation           Lab Data:    Cardiac Enzymes:  No results for input(s): CKTOTAL, CKMB, CKMBINDEX, TROPONINI in the last 72 hours.   ProBNP:   Lab Results   Component Value Date    PROBNP 30,906 11/01/2017       CBC:   Lab Results   Component Value Date    WBC 9.9 11/14/2017    RBC 3.65 11/14/2017    RBC 3.24 12/17/2011    HGB 10.6 11/14/2017    HCT 31.7 11/14/2017     11/14/2017       BMP: @(bmp:3)@    CMP:  Lab Results   Component Value Date     11/14/2017    K 3.6 11/14/2017    CL 96 11/14/2017    CO2 32 11/14/2017    BUN 18 11/14/2017    CREATININE 3.60 11/14/2017    GFRAA 14.7 11/14/2017    LABGLOM 12.1 11/14/2017    GLUCOSE 124 11/14/2017    GLUCOSE 197 02/17/2012    CALCIUM 6.5 11/14/2017       Hepatic Function Panel:  Lab Results   Component Value Date    ALKPHOS 82 11/14/2017    ALT 10 11/14/2017    AST 30 11/14/2017    PROT 5.0 11/14/2017    BILITOT 0.3 11/14/2017    BILIDIR 0.0 09/26/2017    IBILI 0.1 09/26/2017    LABALBU 2.3 11/14/2017    LABALBU 3.5 12/17/2011       Magnesium:    Lab Results   Component Value Date    MG 1.9 11/14/2017    MG 1.4 12/17/2011       PT/INR:    Lab Results   Component Value Date    PROTIME 30.1 11/09/2017    PROTIME 16.5 10/20/2017    PROTIME 25.7 09/29/2016    INR 2.8 11/09/2017          Electronically signed by Radha Salcedo NP on 11/14/2017 at 12:11 PM

## 2017-11-14 NOTE — CARE COORDINATION
LSW met with pt and her brother. Discharge plan is to return to [facility]. LSW called and talked to Ashutosh Dick, dale at [facility]. Ashutosh Dick may call and have Vargas Likes stop by to visit with pt. Pt initialed the IMM and verbalized an understanding of the medicare appeal process.  Electronically signed by ALIDA Dye on 11/14/17 at 11:57 AM

## 2017-11-14 NOTE — PROGRESS NOTES
INPATIENT PROGRESS NOTE    SERVICE DATE:  11/14/2017   SERVICE TIME:  10:48 am    ASSESSMENT AND PLAN   71-year-old female with past medical history of pneumonia, C. diff, atrial fib, end-stage renal disease on HD MWF, diabetes type 2. Sent from nursing facility due to elevated white blood cell count and anemia. Admitted for pancolitis, acute on chronic anemia, end-stage renal disease. Afib with RVR: HR to the 140's, now better. Remains on amio gtt  - Telemetry  - Cardio consulted. Per Dr. Anitra Donohue, Continue IV amiodarone for total of 24 hour infusion then change to po. IV lopressor prn. Steph further recs. - Transferred to [address] [place] [address] per cardio  - Continue to monitor closely     Pancolitis: secondary to C. Diff  - Was on PO Flagyl  - ID is consulted. Per Dr. Larene Opitz. CONT PO FLAGYL. AGREE NO MORE LEVAQUIN DOUBT PNEUMONIA. CHECK BLOOD CULT. Steph further recs  - Stools for c.diff, occult blood and culture  - Contact precautions  - GI consult: Per Dr. Jenae Booth, Pancolitis probably, C diff colitis. Will put the patient on p.o. vancomycin. We will discontinue the Flagyl as this may have contributed to the high INR since the patient was on Coumadin. Coumadin and Flagyl have a drug interaction.     Acute on chronic anemia. Hgb 6.2 yesterday, now 10.4 s/p 3 units PRBC's  - Blood transfusion done  - Hold anticoagulation   - Daily PT and INR  - Continue PPI  - Consult GI. Steph further recs  - Aranesp per nephrology  - Telemetry  - Continue to monitor closely     End-stage renal disease: On HD  - Nephrology consulted. Per Dr. Shelton Alvarado, new esrd.  Has anemia, hypotension, leukocytosis, c diff.  Have to be concerned about catheter related bacteremia as well.  blood cultures are negative to date. Has some slight fluid overload. Got blood for anemia. Wbc trending down. held hydralazine, norvasc and clonidine, agree with blood, aranesp 100. HD next on Monday. f/u cx's.  If blood cx positive will need permcath changed.  Fistula is not ready, can d/c na hco3. Steph further recs  - Continue to monitor     Elevated troponin  - serial troponins  - telemetry  - Consulted cardiology as above     DM II  -Insulin on sliding scale     Hypotension  -Numerous blood pressure medications held  -IVF  - Continue to monitor closely     Parkinson's disease     Dispo: Await consultant clearance prior to DC. Advised F/U with PCP 1 - 2 days of discharge. SUBJECTIVE  CHIEF COMPLAINT: Abdominal pain    PRIMARY SERVICE: Medicine    INTERVAL HPI:  Patient states overall she is feeling better today.     MEDICATIONS:    Current Facility-Administered Medications   Medication Dose Route Frequency Provider Last Rate Last Dose    amiodarone (CORDARONE) 450 mg in dextrose 5 % 250 mL infusion  0.5 mg/min Intravenous Continuous Heather Rojas MD   Stopped at 11/12/17 1427    amiodarone (CORDARONE) tablet 200 mg  200 mg Oral BID Heather Rojas MD   200 mg at 11/14/17 4572    heparin (porcine) injection 1,000 Units  1,000 Units Intravenous PRN Uzair Dacosta MD        heparin (porcine) injection 1,000 Units  1,000 Units Intercatheter PRN Uzair Dacosta MD        metoprolol (LOPRESSOR) injection 5 mg  5 mg Intravenous Q6H PRN Heather Rojas MD        0.9 % sodium chloride bolus  250 mL Intravenous Once [place] Pollen, DO        albumin human 25 % solution 25 g  25 g Intravenous Daily PRN Uzair Dacosta MD        darbepoetin lizeth-polysorbate (ARANESP) injection 100 mcg  100 mcg Subcutaneous Weekly Uzair Dacosta MD   100 mcg at 11/10/17 2123    ALPRAZolam (XANAX) tablet 0.5 mg  0.5 mg Oral TID PRN Michelle Reed MD   0.5 mg at 11/10/17 2111    buprenorphine (BUPRENEX) 5 MCG/HR 1 patch  1 patch Transdermal Weekly Michelle Reed MD   Stopped at 11/10/17 1326    calcium elemental (OSCAL) tablet 500 mg  500 mg Oral Daily Michelle Reed MD   500 mg at 11/14/17 0851    carbidopa-levodopa (SINEMET)  MG per tablet 1 tablet  1 tablet Oral Daily Aimee Cronin MD   1 tablet at 11/14/17 0851    fluticasone (FLONASE) 50 MCG/ACT nasal spray 2 spray  2 spray Nasal Daily Aimee Cronin MD   2 spray at 11/14/17 0854    guaiFENesin-dextromethorphan (ROBITUSSIN DM) 100-10 MG/5ML syrup 5 mL  5 mL Oral Q4H PRN Aimee Cronin MD        ipratropium-albuterol (DUONEB) nebulizer solution 3 mL  3 mL Inhalation Q4H PRN Aimee Cronin MD        iron polysaccaride (FERREX 150 FORTE PLUS) capsule 1 capsule  1 capsule Oral Daily with breakfast Aimee Cronin MD   1 capsule at 11/14/17 0851    isosorbide mononitrate (IMDUR) extended release tablet 60 mg  60 mg Oral Daily Aimee Cronin MD   60 mg at 11/14/17 0851    insulin glargine (LANTUS) injection vial 14 Units  14 Units Subcutaneous QAM Aimee Cronin MD   14 Units at 11/14/17 0857    levothyroxine (SYNTHROID) tablet 50 mcg  50 mcg Oral Daily Aimee Cronin MD   50 mcg at 11/14/17 2001    metoprolol tartrate (LOPRESSOR) tablet 50 mg  50 mg Oral BID Aimee Cronin MD   50 mg at 11/14/17 0852    pantoprazole sodium (PROTONIX) packet 40 mg  40 mg Oral QAM AC Aimee Cronin MD   40 mg at 11/14/17 0614    venlafaxine (EFFEXOR XR) extended release capsule 150 mg  150 mg Oral Daily with breakfast Aimee Cronin MD   150 mg at 11/14/17 0852    glucose (GLUTOSE) 40 % oral gel 15 g  15 g Oral PRN Aimee Cronin MD        dextrose 50 % solution 12.5 g  12.5 g Intravenous PRN Aimee Cronin MD        glucagon (rDNA) injection 1 mg  1 mg Intramuscular PRN Aimee Cronin MD        dextrose 5 % solution  100 mL/hr Intravenous PRN Aimee Cronin MD        insulin lispro (HUMALOG) injection vial 0-12 Units  0-12 Units Subcutaneous TID WC Aimee Cronin MD   8 Units at 11/13/17 1731    insulin lispro (HUMALOG) injection vial 0-6 Units  0-6 Units Subcutaneous Nightly Aimee Cronin MD   2 Units at 11/12/17 2200    rosuvastatin (CRESTOR) tablet 40 mg  40 mg Oral Nightly Aimee Cronin MD   40 mg normal bowel sounds, soft, non-distended, non-tender, no masses palpated, no hepatosplenomegally  EXT: No c/c/e    DATA:   Diagnostic tests reviewed for today's visit:    Most recent labs and imaging results reviewed.      SIGNATURE: Nimco Sam PA-C PATIENT NAME: Soila Escobar   DATE: November 14, 2017 MRN: [id number]   TIME: 10:48 AM PAGER: [phone]

## 2017-11-15 ENCOUNTER — APPOINTMENT (OUTPATIENT)
Dept: GENERAL RADIOLOGY | Age: 82
DRG: 371 | End: 2017-11-15
Payer: MEDICARE

## 2017-11-15 LAB
ANION GAP SERPL CALCULATED.3IONS-SCNC: 13 MEQ/L (ref 7–13)
ANION GAP SERPL CALCULATED.3IONS-SCNC: 19 MEQ/L (ref 7–13)
BASOPHILS ABSOLUTE: 0 K/UL (ref 0–0.2)
BASOPHILS ABSOLUTE: 0.1 K/UL (ref 0–0.2)
BASOPHILS RELATIVE PERCENT: 0.2 %
BASOPHILS RELATIVE PERCENT: 0.6 %
BUN BLDV-MCNC: 21 MG/DL (ref 8–23)
BUN BLDV-MCNC: 26 MG/DL (ref 8–23)
CALCIUM SERPL-MCNC: 6.1 MG/DL (ref 8.6–10.2)
CALCIUM SERPL-MCNC: 6.7 MG/DL (ref 8.6–10.2)
CHLORIDE BLD-SCNC: 94 MEQ/L (ref 98–107)
CHLORIDE BLD-SCNC: 94 MEQ/L (ref 98–107)
CO2: 24 MEQ/L (ref 22–29)
CO2: 28 MEQ/L (ref 22–29)
CREAT SERPL-MCNC: 3.58 MG/DL (ref 0.5–0.9)
CREAT SERPL-MCNC: 4.58 MG/DL (ref 0.5–0.9)
EKG ATRIAL RATE: 70 BPM
EKG P AXIS: 12 DEGREES
EKG P-R INTERVAL: 210 MS
EKG Q-T INTERVAL: 458 MS
EKG QRS DURATION: 106 MS
EKG QTC CALCULATION (BAZETT): 494 MS
EKG R AXIS: 1 DEGREES
EKG T AXIS: 85 DEGREES
EKG VENTRICULAR RATE: 70 BPM
EOSINOPHILS ABSOLUTE: 0 K/UL (ref 0–0.7)
EOSINOPHILS ABSOLUTE: 0.1 K/UL (ref 0–0.7)
EOSINOPHILS RELATIVE PERCENT: 0.6 %
EOSINOPHILS RELATIVE PERCENT: 0.6 %
GFR AFRICAN AMERICAN: 11.1
GFR AFRICAN AMERICAN: 14.8
GFR NON-AFRICAN AMERICAN: 12.2
GFR NON-AFRICAN AMERICAN: 9.2
GLUCOSE BLD-MCNC: 106 MG/DL (ref 60–115)
GLUCOSE BLD-MCNC: 128 MG/DL (ref 74–109)
GLUCOSE BLD-MCNC: 137 MG/DL (ref 60–115)
GLUCOSE BLD-MCNC: 241 MG/DL (ref 60–115)
GLUCOSE BLD-MCNC: 76 MG/DL (ref 74–109)
GLUCOSE BLD-MCNC: 98 MG/DL (ref 60–115)
HCT VFR BLD CALC: 30.4 % (ref 37–47)
HCT VFR BLD CALC: 34 % (ref 37–47)
HEMOGLOBIN: 10.1 G/DL (ref 12–16)
HEMOGLOBIN: 11 G/DL (ref 12–16)
INR BLD: 1.9
LYMPHOCYTES ABSOLUTE: 0.4 K/UL (ref 1–4.8)
LYMPHOCYTES ABSOLUTE: 1.5 K/UL (ref 1–4.8)
LYMPHOCYTES RELATIVE PERCENT: 17.7 %
LYMPHOCYTES RELATIVE PERCENT: 5 %
MAGNESIUM: 1.8 MG/DL (ref 1.7–2.3)
MCH RBC QN AUTO: 28.7 PG (ref 27–31.3)
MCH RBC QN AUTO: 29.6 PG (ref 27–31.3)
MCHC RBC AUTO-ENTMCNC: 32.4 % (ref 33–37)
MCHC RBC AUTO-ENTMCNC: 33.4 % (ref 33–37)
MCV RBC AUTO: 88.5 FL (ref 82–100)
MCV RBC AUTO: 88.6 FL (ref 82–100)
MONOCYTES ABSOLUTE: 0.2 K/UL (ref 0.2–0.8)
MONOCYTES ABSOLUTE: 0.6 K/UL (ref 0.2–0.8)
MONOCYTES RELATIVE PERCENT: 2.7 %
MONOCYTES RELATIVE PERCENT: 7.3 %
NEUTROPHILS ABSOLUTE: 6.5 K/UL (ref 1.4–6.5)
NEUTROPHILS ABSOLUTE: 7.6 K/UL (ref 1.4–6.5)
NEUTROPHILS RELATIVE PERCENT: 78.4 %
NEUTROPHILS RELATIVE PERCENT: 86.9 %
PDW BLD-RTO: 16.5 % (ref 11.5–14.5)
PDW BLD-RTO: 16.9 % (ref 11.5–14.5)
PERFORMED ON: ABNORMAL
PERFORMED ON: ABNORMAL
PERFORMED ON: NORMAL
PERFORMED ON: NORMAL
PLATELET # BLD: 100 K/UL (ref 130–400)
PLATELET # BLD: 51 K/UL (ref 130–400)
POTASSIUM SERPL-SCNC: 3.1 MEQ/L (ref 3.5–5.1)
POTASSIUM SERPL-SCNC: 3.1 MEQ/L (ref 3.5–5.1)
POTASSIUM SERPL-SCNC: 3.3 MEQ/L (ref 3.5–5.1)
PROTHROMBIN TIME: 20.2 SEC (ref 8.1–13.7)
RBC # BLD: 3.43 M/UL (ref 4.2–5.4)
RBC # BLD: 3.85 M/UL (ref 4.2–5.4)
SODIUM BLD-SCNC: 135 MEQ/L (ref 132–144)
SODIUM BLD-SCNC: 137 MEQ/L (ref 132–144)
TROPONIN: 0.05 NG/ML (ref 0–0.01)
TROPONIN: 0.07 NG/ML (ref 0–0.01)
WBC # BLD: 8.2 K/UL (ref 4.8–10.8)
WBC # BLD: 8.7 K/UL (ref 4.8–10.8)

## 2017-11-15 PROCEDURE — 85025 COMPLETE CBC W/AUTO DIFF WBC: CPT

## 2017-11-15 PROCEDURE — 2580000003 HC RX 258

## 2017-11-15 PROCEDURE — 83735 ASSAY OF MAGNESIUM: CPT

## 2017-11-15 PROCEDURE — 84132 ASSAY OF SERUM POTASSIUM: CPT

## 2017-11-15 PROCEDURE — 2700000000 HC OXYGEN THERAPY PER DAY

## 2017-11-15 PROCEDURE — 6370000000 HC RX 637 (ALT 250 FOR IP): Performed by: INTERNAL MEDICINE

## 2017-11-15 PROCEDURE — 2580000003 HC RX 258: Performed by: NURSE PRACTITIONER

## 2017-11-15 PROCEDURE — 6370000000 HC RX 637 (ALT 250 FOR IP): Performed by: HOSPITALIST

## 2017-11-15 PROCEDURE — 84484 ASSAY OF TROPONIN QUANT: CPT

## 2017-11-15 PROCEDURE — 85610 PROTHROMBIN TIME: CPT

## 2017-11-15 PROCEDURE — 87040 BLOOD CULTURE FOR BACTERIA: CPT

## 2017-11-15 PROCEDURE — 6370000000 HC RX 637 (ALT 250 FOR IP): Performed by: SPECIALIST

## 2017-11-15 PROCEDURE — 94002 VENT MGMT INPAT INIT DAY: CPT

## 2017-11-15 PROCEDURE — 6360000002 HC RX W HCPCS

## 2017-11-15 PROCEDURE — 80048 BASIC METABOLIC PNL TOTAL CA: CPT

## 2017-11-15 PROCEDURE — 6360000002 HC RX W HCPCS: Performed by: INTERNAL MEDICINE

## 2017-11-15 PROCEDURE — 87493 C DIFF AMPLIFIED PROBE: CPT

## 2017-11-15 PROCEDURE — 2000000000 HC ICU R&B

## 2017-11-15 PROCEDURE — 5A1935Z RESPIRATORY VENTILATION, LESS THAN 24 CONSECUTIVE HOURS: ICD-10-PCS | Performed by: ANESTHESIOLOGY

## 2017-11-15 RX ORDER — MIDAZOLAM HYDROCHLORIDE 1 MG/ML
INJECTION INTRAMUSCULAR; INTRAVENOUS
Status: COMPLETED
Start: 2017-11-15 | End: 2017-11-15

## 2017-11-15 RX ORDER — FENTANYL CITRATE 50 UG/ML
50 INJECTION, SOLUTION INTRAMUSCULAR; INTRAVENOUS
Status: DISCONTINUED | OUTPATIENT
Start: 2017-11-15 | End: 2017-11-24 | Stop reason: HOSPADM

## 2017-11-15 RX ORDER — POTASSIUM CHLORIDE 20 MEQ/1
20 TABLET, EXTENDED RELEASE ORAL ONCE
Status: COMPLETED | OUTPATIENT
Start: 2017-11-15 | End: 2017-11-15

## 2017-11-15 RX ORDER — SODIUM CHLORIDE 9 MG/ML
INJECTION, SOLUTION INTRAVENOUS
Status: COMPLETED
Start: 2017-11-15 | End: 2017-11-15

## 2017-11-15 RX ADMIN — CARBIDOPA AND LEVODOPA 1 TABLET: 25; 100 TABLET ORAL at 12:03

## 2017-11-15 RX ADMIN — HEPARIN SODIUM 4000 UNITS: 1000 INJECTION, SOLUTION INTRAVENOUS; SUBCUTANEOUS at 08:00

## 2017-11-15 RX ADMIN — HEPARIN SODIUM 1000 UNITS: 1000 INJECTION, SOLUTION INTRAVENOUS; SUBCUTANEOUS at 07:30

## 2017-11-15 RX ADMIN — Medication 125 MG: at 18:20

## 2017-11-15 RX ADMIN — POTASSIUM CHLORIDE 20 MEQ: 20 TABLET, EXTENDED RELEASE ORAL at 18:19

## 2017-11-15 RX ADMIN — INSULIN LISPRO 4 UNITS: 100 INJECTION, SOLUTION INTRAVENOUS; SUBCUTANEOUS at 18:19

## 2017-11-15 RX ADMIN — Medication 10 ML: at 05:20

## 2017-11-15 RX ADMIN — PANTOPRAZOLE SODIUM 40 MG: 40 GRANULE, DELAYED RELEASE ORAL at 05:20

## 2017-11-15 RX ADMIN — LEVOTHYROXINE SODIUM 50 MCG: 50 TABLET ORAL at 05:20

## 2017-11-15 RX ADMIN — Medication 125 MG: at 12:43

## 2017-11-15 RX ADMIN — CALCIUM 500 MG: 500 TABLET ORAL at 12:02

## 2017-11-15 RX ADMIN — MIDAZOLAM HYDROCHLORIDE: 1 INJECTION, SOLUTION INTRAMUSCULAR; INTRAVENOUS at 08:09

## 2017-11-15 RX ADMIN — ROSUVASTATIN CALCIUM 40 MG: 40 TABLET, FILM COATED ORAL at 21:57

## 2017-11-15 RX ADMIN — SODIUM CHLORIDE 2000 ML: 900 INJECTION, SOLUTION INTRAVENOUS at 14:28

## 2017-11-15 RX ADMIN — AMIODARONE HYDROCHLORIDE 200 MG: 200 TABLET ORAL at 05:20

## 2017-11-15 RX ADMIN — AMIODARONE HYDROCHLORIDE 200 MG: 200 TABLET ORAL at 18:18

## 2017-11-15 RX ADMIN — Medication 125 MG: at 05:20

## 2017-11-15 RX ADMIN — Medication 10 ML: at 21:56

## 2017-11-15 RX ADMIN — ISOSORBIDE MONONITRATE 60 MG: 60 TABLET, EXTENDED RELEASE ORAL at 12:03

## 2017-11-15 ASSESSMENT — PAIN SCALES - GENERAL
PAINLEVEL_OUTOF10: 0

## 2017-11-15 ASSESSMENT — PULMONARY FUNCTION TESTS: PIF_VALUE: 13

## 2017-11-15 NOTE — PROGRESS NOTES
Yaquelin Patel called to dialysis unit. Pt agonal breathing, went pulseless and unresponsive. CPR started as per ACLS protocol, by the time monitor was available patient actually achieved ROSC. No meds were given. Review of the tele strips from monitor show asystole for more than 1 min around 7:55 am. Pt was intubated during code and transferred to ICU after ROSC was achieved. She was medicated with 2 mg Versed and 50 Fentanyl for sedation. Please refer to code documentation for more detailed. Primary attending updated by staff. Pt discussed with ICU attending, Dr Jessica Tyler. Family updated ny staff.      Rukhsana Donato, [facility]

## 2017-11-15 NOTE — PROGRESS NOTES
Patient seen upon arrival to ICU. Dr. Kevin Kapadia informed me that she briefly lost pulse while receiving dialysis requiring CPR including intubation. On my exam she is HD stable, awake and very appropriate so I decided to extubate her and she has done well since. Issues discussed as RN is to f/u with primary MD for non-critical care issues as patient may be transferred out of ICU once admitting attending is comfortable.      Adonay Standing  3:45 PM  11/15/17

## 2017-11-15 NOTE — PROGRESS NOTES
CARDIOLOGY [address] PROGRESS NOTE         11/15/2017      Libby Warner    [id number]  1935    Rounding MD: Lydia Tovar MD ,[facility]    Primary Cardiologist:  Sylvia Cabezas MD       SUBJECTIVE:     Yaquelin Patel called to dialysis unit. Pt agonal breathing, went pulseless and unresponsive. CPR started as per ACLS protocol, by the time monitor was available patient actually achieved ROSC. No meds were given. Review of the tele strips from monitor show asystole for more than 1 min around 7:55 am. Pt was intubated during code and transferred to ICU after ROSC was achieved. Now extubated in ICU, In SR and without complaints except for being tired. No symptoms recalled varinder event. Cardiac and general ROS otherwise negative and unchanged.     ASSESSMENT:     1. Post Asystolic arrest during Dialysis, question etiology  2. Elevated Troponin, RO MI vs Demand rise  3. Hypokalemia  4. Renal Failure on HD  5. C Diff Colitis  6. Pneumonia of lower lobe due to infectious organism ([facility] [address]  7. Elevated BNP, Can be seen in both CHF and PNA  8. Moderate PHTN  9. Diastolic Dysfunction, mild, possible HFpEF  10. PAFIB , now Back in SR  11. SSS s/p Pacemaker  12. CAD hx Prior PCI, LCX BMS 2010, negative repeat Cath  6/2014  13. Normal LV function 55% ( echo 10/17 )  14. COPD  13. IVON  16. CKD (chronic kidney disease), now on HD  17. Essential hypertension  18. Hyperlipidemia  19. Diabetes mellitus  20. PVD  21. Prior DVT  22. Hypothyroidism  23. CLBP  24. Former Smoker  22. Polycythemia Vera  26. Hyperkalemia  27. Anemia  28. GERD  29. Anxiety  30. Idiopathic Parkinson's disease ([facility] [address]         PLAN:     CV supportive Care with contination of medications. Telemetry  Serial Labs. Replace K+. General, GI and Nephrology care with HD. To address anticoagulation restart once ok with others  Cardiac maximal care as tolerated.   See Orders.        Prior HPI Notes:    Katiana Sherman is a 81 y.o.  female patient who is Potassium 3.1 (L) 3.5 - 5.1 mEq/L    Chloride 94 (L) 98 - 107 mEq/L    CO2 28 22 - 29 mEq/L    Anion Gap 13 7 - 13 mEq/L    Glucose 76 74 - 109 mg/dL    BUN 26 (H) 8 - 23 mg/dL    CREATININE 4.58 (H) 0.50 - 0.90 mg/dL    GFR Non-African American 9.2 (L) >60    GFR  11.1 (L) >60    Calcium 6.1 (L) 8.6 - 10.2 mg/dL   Magnesium    Collection Time: 11/15/17  5:56 AM   Result Value Ref Range    Magnesium 1.8 1.7 - 2.3 mg/dL   POCT Glucose    Collection Time: 11/15/17  6:57 AM   Result Value Ref Range    POC Glucose 106 60 - 115 mg/dl    Performed on ACCU-CHEK    CBC Auto Differential    Collection Time: 11/15/17  9:05 AM   Result Value Ref Range    WBC 8.2 4.8 - 10.8 K/uL    RBC 3.85 (L) 4.20 - 5.40 M/uL    Hemoglobin 11.0 (L) 12.0 - 16.0 g/dL    Hematocrit 34.0 (L) 37.0 - 47.0 %    MCV 88.5 82.0 - 100.0 fL    MCH 28.7 27.0 - 31.3 pg    MCHC 32.4 (L) 33.0 - 37.0 %    RDW 16.9 (H) 11.5 - 14.5 %    Platelets 51 (L) 608 - 400 K/uL    Neutrophils % 78.4 %    Lymphocytes % 17.7 %    Monocytes % 2.7 %    Eosinophils % 0.6 %    Basophils % 0.6 %    Neutrophils # 6.5 1.4 - 6.5 K/uL    Lymphocytes # 1.5 1.0 - 4.8 K/uL    Monocytes # 0.2 0.2 - 0.8 K/uL    Eosinophils # 0.0 0.0 - 0.7 K/uL    Basophils # 0.1 0.0 - 0.2 K/uL   Basic Metabolic Panel    Collection Time: 11/15/17  9:05 AM   Result Value Ref Range    Sodium 137 132 - 144 mEq/L    Potassium 3.3 (L) 3.5 - 5.1 mEq/L    Chloride 94 (L) 98 - 107 mEq/L    CO2 24 22 - 29 mEq/L    Anion Gap 19 (H) 7 - 13 mEq/L    Glucose 128 (H) 74 - 109 mg/dL    BUN 21 8 - 23 mg/dL    CREATININE 3.58 (H) 0.50 - 0.90 mg/dL    GFR Non-African American 12.2 (L) >60    GFR  14.8 (L) >60    Calcium 6.7 (L) 8.6 - 10.2 mg/dL   Troponin    Collection Time: 11/15/17  9:05 AM   Result Value Ref Range    Troponin 0.068 (HH) 0.000 - 0.010 ng/mL         VQ LUNGS:  Negative ( non diagnostic ) for PE     EKG:   SR    TELEMETRY:  AF , Asytolic > 1 min, > SR spontaneously    Echo: (10/17)  Normal LV function 96%  Mild Diastolic Dysfunction  No significant VHD  Moderate PHTN, RVSP 45.   PM noted        Susana Dao MD ,[address] [place] [address] Cardiology

## 2017-11-15 NOTE — PROGRESS NOTES
have contributed to the high INR since the patient was on Coumadin. Coumadin and Flagyl have a drug interaction.     Acute on chronic anemia. Hgb was 6.2 , now 11 s/p 3 units PRBC's  - Blood transfusion done  - Holding anticoagulation   - Daily PT and INR  - Continue PPI  - Consulted GI. Steph further recs  - Aranesp per nephrology  - Telemetry  - Continue to monitor closely     End-stage renal disease: On HD  - Nephrology consulted with recs as above. Steph further recs  - Continue to monitor     Elevated troponin  - serial troponins  - telemetry  - Consulted cardiology as above     DM II  -Insulin on sliding scale     Hypertensive urgency: Was hypotesive. BP's labile  - Steph cardio input  - Restart BP meds as appropriate  - Continue to monitor closely     Parkinson's disease     Dispo: Await consultant clearance prior to transfer out of ICU. Advised F/U with PCP 1 - 2 days of discharge. SUBJECTIVE  CHIEF COMPLAINT: Abdominal pain    PRIMARY SERVICE: Medicine    INTERVAL HPI:  Patient states she feels tired but no other concerns or complaints at this time.      MEDICATIONS:    Current Facility-Administered Medications   Medication Dose Route Frequency Provider Last Rate Last Dose    sodium chloride 0.9 % infusion             fentaNYL (SUBLIMAZE) injection 50 mcg  50 mcg Intravenous Q1H PRN Papo Pablo DO        amiodarone (CORDARONE) tablet 200 mg  200 mg Oral BID Sam Woods MD   200 mg at 11/15/17 0520    heparin (porcine) injection 1,000 Units  1,000 Units Intravenous PRN Osvaldo Starr MD        heparin (porcine) injection 1,000 Units  1,000 Units Intercatheter PRN Osvaldo Starr MD        metoprolol (LOPRESSOR) injection 5 mg  5 mg Intravenous Q6H PRN Sam Woods MD        0.9 % sodium chloride bolus  250 mL Intravenous Once Anant Bucio DO        albumin human 25 % solution 25 g  25 g Intravenous Daily PRN Osvaldo Starr MD        darbepoetin lizeth-polysorbate [place]) alert, cooperative, no apparent distress, and appears stated age  EYES:  Lids and lashes normal, pupils equal, round and reactive to light, extra ocular muscles intact, sclera clear, conjunctiva normal  LUNGS:  No increased work of breathing, good air exchange, clear to auscultation bilaterally, no crackles or wheezing  CARDIOVASCULAR:  Irreg Irreg rate is controlled  ABDOMEN:  No scars, normal bowel sounds, soft, non-distended, non-tender, no masses palpated, no hepatosplenomegally  EXT: No c/c/e    DATA:   Diagnostic tests reviewed for today's visit:    Most recent labs and imaging results reviewed. SIGNATURE: Whit Fountain MD PATIENT NAMEMaria Teresa López   DATE: November 15, 2017 MRN: [id number]   TIME: 11:38 AM PAGER: [phone]     I have independently seen and examined this patient. I have reviewed and agree with the above documentation, assessment and plan.      Whit Fountain MD  11:49 AM  November 15, 2017

## 2017-11-15 NOTE — PROGRESS NOTES
24HR INTAKE/OUTPUT:      Intake/Output Summary (Last 24 hours) at 11/15/17 1034  Last data filed at 11/15/17 0649   Gross per 24 hour   Intake              480 ml   Output                0 ml   Net              480 ml       Constitutional:  Alert, awake, no apparent distress   Skin:normal, no rash  HEENT:sclera anicteric.   Head atraumatic normocephalic  Neck:supple with no thyromegally  Cardiovascular:  S1, S2 without m/r/g   Respiratory:  CTA B without w/r/r   Abdomen: +bs, soft, nt  Ext: trace LE edema  Musculoskeletal:Intact  Neuro:Alert and oriented with no deficit      Electronically signed by Uzair Dacosta MD on 11/15/2017 at 10:34 AM

## 2017-11-15 NOTE — CARE COORDINATION
LSW met with Sister, brother and son, Pt was sleeping and just got to ICU. Plan is [place] when stable. LSW to follow. .Electronically signed by ALIDA Strong on 11/15/2017 at 11:51 AM

## 2017-11-16 ENCOUNTER — TELEPHONE (OUTPATIENT)
Dept: PHARMACY | Age: 82
End: 2017-11-16

## 2017-11-16 LAB
ALBUMIN SERPL-MCNC: 2.3 G/DL (ref 3.9–4.9)
ALP BLD-CCNC: 97 U/L (ref 40–130)
ALT SERPL-CCNC: 9 U/L (ref 0–33)
ANION GAP SERPL CALCULATED.3IONS-SCNC: 17 MEQ/L (ref 7–13)
AST SERPL-CCNC: 40 U/L (ref 0–35)
BASOPHILS ABSOLUTE: 0 K/UL (ref 0–0.2)
BASOPHILS RELATIVE PERCENT: 0.3 %
BILIRUB SERPL-MCNC: 0.2 MG/DL (ref 0–1.2)
BUN BLDV-MCNC: 29 MG/DL (ref 8–23)
C-REACTIVE PROTEIN, HIGH SENSITIVITY: 96.9 MG/L (ref 0–5)
CALCIUM SERPL-MCNC: 6.6 MG/DL (ref 8.6–10.2)
CHLORIDE BLD-SCNC: 96 MEQ/L (ref 98–107)
CLOSTRIDIUM DIFFICILE DNA AMPLIFICATION: NORMAL
CO2: 25 MEQ/L (ref 22–29)
CREAT SERPL-MCNC: 5.94 MG/DL (ref 0.5–0.9)
EKG ATRIAL RATE: 79 BPM
EKG P AXIS: 1 DEGREES
EKG P-R INTERVAL: 196 MS
EKG Q-T INTERVAL: 422 MS
EKG QRS DURATION: 108 MS
EKG QTC CALCULATION (BAZETT): 483 MS
EKG R AXIS: 1 DEGREES
EKG T AXIS: 16 DEGREES
EKG VENTRICULAR RATE: 79 BPM
EOSINOPHILS ABSOLUTE: 0 K/UL (ref 0–0.7)
EOSINOPHILS RELATIVE PERCENT: 0.4 %
GFR AFRICAN AMERICAN: 8.2
GFR NON-AFRICAN AMERICAN: 6.8
GLOBULIN: 2.8 G/DL (ref 2.3–3.5)
GLUCOSE BLD-MCNC: 126 MG/DL (ref 74–109)
GLUCOSE BLD-MCNC: 136 MG/DL (ref 60–115)
GLUCOSE BLD-MCNC: 146 MG/DL (ref 60–115)
GLUCOSE BLD-MCNC: 148 MG/DL (ref 60–115)
GLUCOSE BLD-MCNC: 151 MG/DL (ref 60–115)
HCT VFR BLD CALC: 33.9 % (ref 37–47)
HEMOGLOBIN: 11.3 G/DL (ref 12–16)
LYMPHOCYTES ABSOLUTE: 0.6 K/UL (ref 1–4.8)
LYMPHOCYTES RELATIVE PERCENT: 5.5 %
MAGNESIUM: 2 MG/DL (ref 1.7–2.3)
MCH RBC QN AUTO: 29.5 PG (ref 27–31.3)
MCHC RBC AUTO-ENTMCNC: 33.3 % (ref 33–37)
MCV RBC AUTO: 88.6 FL (ref 82–100)
MONOCYTES ABSOLUTE: 0.8 K/UL (ref 0.2–0.8)
MONOCYTES RELATIVE PERCENT: 8.1 %
NEUTROPHILS ABSOLUTE: 8.9 K/UL (ref 1.4–6.5)
NEUTROPHILS RELATIVE PERCENT: 85.7 %
PDW BLD-RTO: 16.7 % (ref 11.5–14.5)
PERFORMED ON: ABNORMAL
PLATELET # BLD: 75 K/UL (ref 130–400)
POTASSIUM SERPL-SCNC: 3.9 MEQ/L (ref 3.5–5.1)
RBC # BLD: 3.83 M/UL (ref 4.2–5.4)
SEDIMENTATION RATE, ERYTHROCYTE: 38 MM (ref 0–30)
SODIUM BLD-SCNC: 138 MEQ/L (ref 132–144)
TOTAL PROTEIN: 5.1 G/DL (ref 6.4–8.1)
TROPONIN: 0.1 NG/ML (ref 0–0.01)
WBC # BLD: 10.4 K/UL (ref 4.8–10.8)

## 2017-11-16 PROCEDURE — 6370000000 HC RX 637 (ALT 250 FOR IP): Performed by: SPECIALIST

## 2017-11-16 PROCEDURE — 2700000000 HC OXYGEN THERAPY PER DAY

## 2017-11-16 PROCEDURE — 6370000000 HC RX 637 (ALT 250 FOR IP): Performed by: INTERNAL MEDICINE

## 2017-11-16 PROCEDURE — 80053 COMPREHEN METABOLIC PANEL: CPT

## 2017-11-16 PROCEDURE — 2580000003 HC RX 258: Performed by: NURSE PRACTITIONER

## 2017-11-16 PROCEDURE — 86141 C-REACTIVE PROTEIN HS: CPT

## 2017-11-16 PROCEDURE — 6370000000 HC RX 637 (ALT 250 FOR IP): Performed by: HOSPITALIST

## 2017-11-16 PROCEDURE — 83735 ASSAY OF MAGNESIUM: CPT

## 2017-11-16 PROCEDURE — 85025 COMPLETE CBC W/AUTO DIFF WBC: CPT

## 2017-11-16 PROCEDURE — 2500000003 HC RX 250 WO HCPCS: Performed by: INTERNAL MEDICINE

## 2017-11-16 PROCEDURE — 2060000000 HC ICU INTERMEDIATE R&B

## 2017-11-16 PROCEDURE — 84484 ASSAY OF TROPONIN QUANT: CPT

## 2017-11-16 PROCEDURE — 85652 RBC SED RATE AUTOMATED: CPT

## 2017-11-16 PROCEDURE — 93005 ELECTROCARDIOGRAM TRACING: CPT

## 2017-11-16 RX ORDER — METOPROLOL TARTRATE 5 MG/5ML
2.5 INJECTION INTRAVENOUS
Status: DISCONTINUED | OUTPATIENT
Start: 2017-11-16 | End: 2017-11-24 | Stop reason: HOSPADM

## 2017-11-16 RX ADMIN — ISOSORBIDE MONONITRATE 60 MG: 60 TABLET, EXTENDED RELEASE ORAL at 07:59

## 2017-11-16 RX ADMIN — Medication 125 MG: at 17:04

## 2017-11-16 RX ADMIN — Medication 10 ML: at 08:02

## 2017-11-16 RX ADMIN — VENLAFAXINE HYDROCHLORIDE 150 MG: 150 CAPSULE, EXTENDED RELEASE ORAL at 07:59

## 2017-11-16 RX ADMIN — CARBIDOPA AND LEVODOPA 1 TABLET: 25; 100 TABLET ORAL at 07:59

## 2017-11-16 RX ADMIN — ROSUVASTATIN CALCIUM 40 MG: 40 TABLET, FILM COATED ORAL at 21:14

## 2017-11-16 RX ADMIN — Medication 10 ML: at 21:13

## 2017-11-16 RX ADMIN — Medication 125 MG: at 11:52

## 2017-11-16 RX ADMIN — INSULIN LISPRO 2 UNITS: 100 INJECTION, SOLUTION INTRAVENOUS; SUBCUTANEOUS at 17:07

## 2017-11-16 RX ADMIN — PANTOPRAZOLE SODIUM 40 MG: 40 GRANULE, DELAYED RELEASE ORAL at 06:28

## 2017-11-16 RX ADMIN — Medication 125 MG: at 06:29

## 2017-11-16 RX ADMIN — Medication 10 ML: at 21:00

## 2017-11-16 RX ADMIN — INSULIN LISPRO 2 UNITS: 100 INJECTION, SOLUTION INTRAVENOUS; SUBCUTANEOUS at 08:04

## 2017-11-16 RX ADMIN — INSULIN GLARGINE 14 UNITS: 100 INJECTION, SOLUTION SUBCUTANEOUS at 08:04

## 2017-11-16 RX ADMIN — CALCIUM 500 MG: 500 TABLET ORAL at 07:59

## 2017-11-16 RX ADMIN — Medication 125 MG: at 00:55

## 2017-11-16 RX ADMIN — LEVOTHYROXINE SODIUM 50 MCG: 50 TABLET ORAL at 06:28

## 2017-11-16 RX ADMIN — DILTIAZEM HYDROCHLORIDE 30 MG: 30 TABLET, FILM COATED ORAL at 17:04

## 2017-11-16 RX ADMIN — FLUTICASONE PROPIONATE 2 SPRAY: 50 SPRAY, METERED NASAL at 07:59

## 2017-11-16 RX ADMIN — INSULIN LISPRO 2 UNITS: 100 INJECTION, SOLUTION INTRAVENOUS; SUBCUTANEOUS at 11:52

## 2017-11-16 RX ADMIN — Medication 1 CAPSULE: at 09:58

## 2017-11-16 RX ADMIN — AMIODARONE HYDROCHLORIDE 200 MG: 200 TABLET ORAL at 17:04

## 2017-11-16 RX ADMIN — AMIODARONE HYDROCHLORIDE 200 MG: 200 TABLET ORAL at 06:28

## 2017-11-16 RX ADMIN — METOPROLOL TARTRATE 5 MG: 5 INJECTION INTRAVENOUS at 23:44

## 2017-11-16 RX ADMIN — Medication 10 ML: at 08:07

## 2017-11-16 ASSESSMENT — PAIN SCALES - GENERAL
PAINLEVEL_OUTOF10: 0

## 2017-11-16 NOTE — PROGRESS NOTES
[facility] [place] Note      Patient: Iftikhar Sherman  Unit/Bed: IC12/IC12-01  YOB: 1935  MRN: [id number]  Admit Date:  11/9/2017  Hospital Day: 7    Subjective Complaint:   Denies any chest pain with exertion or at rest, palpitations, syncope, or edema. .     Physical Examination:     /75   Pulse 121   Temp 98.4 °F (36.9 °C) (Oral)   Resp 21   Ht 4' 11\" (1.499 m)   Wt 131 lb 6.3 oz (59.6 kg)   SpO2 100%   BMI 26.54 kg/m²       Intake/Output Summary (Last 24 hours) at 11/16/17 1624  Last data filed at 11/16/17 0500   Gross per 24 hour   Intake              400 ml   Output                0 ml   Net              400 ml                  Katiana Sherman examined at bedside in in no apparent distress. Focused exam reveals:     Cardiac: Heart regular rate and rhythm     Lungs:  clear to auscultation bilaterally- no wheezes, rales or rhonchi, normal air movement, no respiratory distress    Extremities:   Trace edema    Telemetry:      normal sinus , atrial fibrillation - controlled and atrial fibrillation - rapid         LABS:   CBC:   Recent Labs      11/15/17   0554  11/15/17   0905  11/16/17   0549   WBC  8.7  8.2  10.4   HGB  10.1*  11.0*  11.3*   PLT  100*  51*  75*      BMP:  Recent Labs      11/15/17   0556  11/15/17   0905  11/15/17   1425  11/16/17   0549   NA  135  137   --   138   K  3.1*  3.3*  3.1*  3.9   CL  94*  94*   --   96*   CO2  28  24   --   25   BUN  26*  21   --   29*   CREATININE  4.58*  3.58*   --   5.94*   GLUCOSE  76  128*   --   126*              Troponin: No results for input(s): TROPONINT in the last 72 hours. BNP: No results for input(s): PROBNP in the last 72 hours. INR:   Recent Labs      11/15/17   0554   INR  1.9      Mg:   Recent Labs      11/16/17   0549   MG  2.0       Cardiac Testing:    monitor showing a long period of asystole    Assessment:    PAF   Asystole with syncope and \"rapid response\"  Dialysis  Plan:  1.  Need better heart rate control  2. Discussed PPM with pt and family due to SSS and long stretch of asystole. Risks of procedure (death, cardiac tamponade, bleeding, lead dislodgement , infection, pneumothorax, thrombosis, ? Renal insuff if contrast is needed)  3.  Will increase anti-arrhythmic and will discuss pacemaker with dr Tate Stringer  Electronically signed by Becky Rowan MD on 11/16/2017 at 4:24 PM

## 2017-11-16 NOTE — PROGRESS NOTES
Patient visiting with family at this time. . No complaints of chest pain/discomfort. No signs of distress. Will monitor.

## 2017-11-16 NOTE — PROGRESS NOTES
INPATIENT PROGRESS NOTES    PATIENT NAME: Carol Mccartney  MRN: [id number]  SERVICE DATE:  November 16, 2017   SERVICE TIME:  9:56 AM      PRIMARY SERVICE:Internal Medicine    INTERVAL HPI: Patient seen and examined at bedside, Interval Notes, orders reviewed. Nursing notes noted    SUBJECTIVE    CHIEF COMPLAINT: During dialysis patient had a syncopal episode yesterday and had to be intubated. Patient was brought to ICU and shortly thereafter she was extubated. Review of Systems  Please see HPI above. All bolded are positive. All un-bolded are negative. Gen: fever, chills, fatigue, weakness, diaphoresis, increase in thirst, unintentional weight change, loss of appetite  Head: headache, vision change, hearing loss  Chest: chest pain, chest heaviness, palpitations, orthopnea, PND, JAMES  Lungs: shortness of breath, wheezing, coughing  Abdomen: abdominal pain, nausea, vomiting, diarrhea, constipation, melena, hematochezia, hematemesis  Extremities: lower extremity edema, myalgias, arthralgias  Urinary: dysuria, hematuria, or increase in frequency  Neurologic: lightheadedness, dizziness, confusion, syncope  Psychiatric: depression, suicidal ideation, or anxiety   Denies any chest pain or shortness of breath. Denies any nausea vomiting or diarrhea. Does not remember events surrounding her intubation yesterday. OBJECTIVE    Body mass index is 26.54 kg/m². PHYSICAL EXAM:  Vitals:  /62   Pulse 85   Temp 98.3 °F (36.8 °C) (Oral)   Resp 22   Ht 4' 11\" (1.499 m)   Wt 131 lb 6.3 oz (59.6 kg)   SpO2 97%   BMI 26.54 kg/m²   General: comfortable in bed, No distress. Head: Atraumatic , Normocephalic   Eyes: PERRL. No sclera icterus. No conjunctival injection. No discharge   ENT: No nasal  discharge. Pharynx clear. Neck:  Trachea midline. No thyromegaly, no JVD, No cervical adenopathy. Resp : Normal effort,  No accessory muscle use. No Rales. No wheezing. No rhonchi. No dullness on percussion.    CV: Normal  rate. Regular rhythm. No mumur ,  Rub or gallop  ABD: Non-tender. Non-distended. No masses. No organmegaly. Normal bowel sounds. No hernia. EXT: No Pitting, No Cyanosis No clubbing  Neuro: no focal weakness  Skin: Warm and dry. No erythema rash on exposed extremities. Exam is unremarkable. No focal deficits noted. DATA:   Recent Labs      11/15/17   0905  11/16/17   0549   WBC  8.2  10.4   HGB  11.0*  11.3*   HCT  34.0*  33.9*   MCV  88.5  88.6   PLT  51*  75*     Recent Labs      11/14/17   0600   11/15/17   0905  11/15/17   1425  11/16/17   0549   NA  140   < >  137   --   138   K  3.6   < >  3.3*  3.1*  3.9   CL  96*   < >  94*   --   96*   CO2  32*   < >  24   --   25   BUN  18   < >  21   --   29*   CREATININE  3.60*   < >  3.58*   --   5.94*   GLUCOSE  124*   < >  128*   --   126*   CALCIUM  6.5*   < >  6.7*   --   6.6*   PROT  5.0*   --    --    --   5.1*   LABALBU  2.3*   --    --    --   2.3*   BILITOT  0.3   --    --    --   0.2   ALKPHOS  82   --    --    --   97   AST  30   --    --    --   40*   ALT  10   --    --    --   9   LABGLOM  12.1*   < >  12.2*   --   6.8*   GFRAA  14.7*   < >  14.8*   --   8.2*   GLOB  2.7   --    --    --   2.8    < > = values in this interval not displayed. No results for input(s): PHART, NKD8EMM, PO2ART, WEO0KCH, BEART, H8LHXBHT in the last 72 hours.   O2 Device: Nasal cannula  O2 Flow Rate (L/min): 3 L/min  Lab Results   Component Value Date    LACTA 2.0 11/09/2017        MEDICATIONS during current hospitalization:    Continuous Infusions:   dextrose      sodium chloride         Scheduled Meds:   amiodarone  200 mg Oral BID    sodium chloride  250 mL Intravenous Once    darbepoetin lizeth-polysorbate  100 mcg Subcutaneous Weekly    buprenorphine  1 patch Transdermal Weekly    calcium elemental  500 mg Oral Daily    carbidopa-levodopa  1 tablet Oral Daily    fluticasone  2 spray Nasal Daily    iron polysaccaride  1 capsule Oral Daily with breakfast    isosorbide mononitrate  60 mg Oral Daily    insulin glargine  14 Units Subcutaneous QAM    levothyroxine  50 mcg Oral Daily    pantoprazole sodium  40 mg Oral QAM AC    venlafaxine  150 mg Oral Daily with breakfast    insulin lispro  0-12 Units Subcutaneous TID WC    insulin lispro  0-6 Units Subcutaneous Nightly    rosuvastatin  40 mg Oral Nightly    sodium chloride  250 mL Intravenous Once    vancomycin  125 mg Oral 4 times per day    lidocaine  5 mL Intradermal Once    sodium chloride flush  10 mL Intravenous Q12H    sodium chloride  1,000 mL Intravenous Once    sodium chloride flush  10 mL Intravenous 2 times per day       PRN Meds:fentanNYL, heparin (porcine), heparin (porcine), metoprolol, albumin human, ALPRAZolam, guaiFENesin-dextromethorphan, ipratropium-albuterol, glucose, dextrose, glucagon (rDNA), dextrose, heparin (porcine), sodium chloride flush, sodium chloride flush, sodium chloride flush, acetaminophen    Radiology  Xr Chest Standard (2 Vw)    Result Date: 11/11/2017  EXAMINATION: XR CHEST STANDARD TWO VW CLINICAL HISTORY: Infection, recent pneumonia COMPARISONS: 11/9/2017 FINDINGS: There is small pleural effusion in the right posterior costophrenic angle, not appreciably changed from prior imaging studies 2 days ago. There is trace left pleural effusion. Cardiac size is borderline. Pulmonary vascularity is normal. There is mild basilar atelectasis. There are scattered calcifications from old granulomatous disease. Central dialysis catheter tip is in the superior vena cava. There is no pneumothorax. There are atherosclerotic changes of the thoracic aorta. There is mild spondylosis. There is been resection of the distal right clavicle. There is no significant change from prior exam.     SMALL RIGHT AND TRACE LEFT PLEURAL EFFUSIONS. MILD BASILAR ATELECTASIS. NO SIGNIFICANT CHANGE FROM 11/9/2017.     Xr Chest Standard (2 Vw)    Result Date: 10/31/2017  EXAMINATION: XR CHEST STANDARD TWO VW CLINICAL HISTORY:  SOB, PNA COMPARISONS: October 30, 2017 FINDINGS: Frontal and lateral views of chest were obtained compared to yesterday's portable AP chest. Vascular congestion has markedly improved. Edema has diminished. Small subpulmonic pleural effusions remain, best shown on lateral view. Heart size is slightly smaller. The mediastinum is not widened or shifted. The aorta is not dilated. CONCLUSION: IMPROVED CONGESTIVE HEART FAILURE, WITHOUT NEW ABNORMALITY. Xr Chest Standard (2 Vw)    Result Date: 10/29/2017  EXAMINATION: CHEST TWO VIEWS  CLINICAL HISTORY: Pneumonia, follow-up COMPARISONS: October 28, 2017 at 0820 hours  FINDINGS: Two views of the chest are submitted. The cardiac silhouette is enlarged. Unchanged. The mediastinum is unremarkable. Pulmonary vascular is attenuated, lungs are hyperinflated and there is some widening of the AP diameter the chest and coarsening of the interstitium. Right sided trachea. There is worsening patchy multifocal to confluent areas of airspace disease within the parenchyma. There is bibasilar trace to small pleural effusions. . Trace bilateral pleural effusions Pneumothoraces. EXAMINATION: CHEST TWO VIEWS  CLINICAL HISTORY: Pneumonia, follow-up COMPARISONS: October 26, 2017  FINDINGS: Two views of the chest are submitted. The cardiac silhouette is enlarged. Unchanged. The mediastinum is unremarkable. Pulmonary vascular is attenuated, lungs are hyperinflated and there is some widening of the AP diameter the chest and coarsening of the interstitium. Right sided trachea. There is worsening patchy multifocal to confluent areas of airspace disease within the parenchyma. There is bibasilar trace to small pleural effusions. .  Pneumothoraces.  Degenerative changes of the left shoulder                                                                               IMPRESSION: PERSISTENT PATCHY MULTIFOCAL TO CONFLUENT AREAS OF AIRSPACE DISEASE WITHIN THE PARENCHYMA. TRACE TO SMALL BILATERAL PLEURAL EFFUSIONS RADIOGRAPHIC FINDINGS SUGGESTIVE OF COPD. CORRELATE CLINICALLY. Xr Chest Standard (2 Vw)    Result Date: 10/28/2017  EXAMINATION: CHEST TWO VIEWS  CLINICAL HISTORY: Pneumonia, follow-up COMPARISONS: October 26, 2017  FINDINGS: Two views of the chest are submitted. The cardiac silhouette is enlarged. Unchanged. The mediastinum is unremarkable. Pulmonary vascular is attenuated, lungs are hyperinflated and there is some widening of the AP diameter the chest and coarsening of the interstitium. Right sided trachea. There is worsening patchy multifocal to confluent areas of airspace disease within the parenchyma. There is bibasilar trace to small pleural effusions. .  No effusions. Pneumothoraces. WORSENING PATCHY MULTIFOCAL TO CONFLUENT AREAS OF AIRSPACE DISEASE WITHIN THE PARENCHYMA. TRACE TO SMALL BILATERAL PLEURAL EFFUSIONS RADIOGRAPHIC FINDINGS SUGGESTIVE OF COPD. CORRELATE CLINICALLY. Xr Chest Standard (2 Vw)    Result Date: 10/24/2017  Chest 2 views. HISTORY: Shortness of breath. FINDINGS: Comparison, February 9, 2017. Osseous structures intact. Cardiac silhouette normal. Aorta calcified and tortuous. Small bilateral calcified granulomas again identified. Mild blunting right costophrenic angle with ill-defined area of increased opacities at lung bases.      Small bilateral effusion with bibasilar atelectasis/pneumonia    Ct Chest Wo Contrast    Result Date: 10/31/2017  EXAMINATION:  CT SCAN CHEST CLINICAL HISTORY:  Short of breath COMPARISON: TECHNIQUE:  Multiple serial axial images from the base neck through the upper abdomen with both sagittal and coronal reconstruction was performed without intravenous or oral administration of contrast. FINDINGS:  There are patchy multifocal areas of 10/26/2017  XR CHEST PORTABLE : 10/26/2017 CLINICAL HISTORY: pneumonia . COMPARISON: 10/24/2017. TECHNIQUE: A portable upright AP radiograph of the chest was obtained. FINDINGS: Bibasilar infiltrates have worsened when compared to the prior study, greater on the right. Small bilateral pleural effusions appear mildly increased. Findings are suspicious for bronchopneumonia. Cardiac decompensation also possible. There is no significant cardiomegaly, pneumothorax or other findings of concern identified. WORSENING BIBASILAR INFILTRATE/CONSOLIDATION SUSPICIOUS FOR BRONCHOPNEUMONIA WITH SMALL EFFUSIONS. Fluoro For Surgical Procedures    Result Date: 11/2/2017  FLUORO FOR SURGICAL PROCEDURES : 11/2/2017 CLINICAL HISTORY:  Insertion Of hemo Dialysis Catheter . COMPARISON: None available. Intraoperative fluoroscopy was provided for Dr. Benja Stone right internal jugular approach tunneled hemodialysis catheter placement procedure. A total of 9.1 seconds of fluoroscopy was used, with 2 fluoroscopic stills saved. No diagnostic images were obtained. The catheter appears in good position on the 2 images submitted. Please see Dr. Benja Stone surgical notes for completeness. Ir Ultrasound Guidance Vascular Access    Result Date: 11/15/2017  NO FILMS NO DICTATION    Fluoroscopy Modified Barium Swallow With Video    Result Date: 10/27/2017  Modified barium swallow. HISTORY: Dysphasia. FINDINGS: Imaging obtained with patient sitting upright and filmed in lateral projection. Nonstandardized barium viscosities including thin liquid, pureed, and antoine cracker administered by a member of the department of speech pathology in attendance. Transient laryngeal penetration was identified with thin consistency. No aspiration evident. Please see speech pathology report for more information. Penetration as described. No aspiration. 13 cine loops. One static image. 1.9 minutes fluoroscopy time.     Ir Midline Cath    Result Date:

## 2017-11-16 NOTE — PROGRESS NOTES
Renal Progress Note    Assessment and Plan:      82 yo with new esrd. Has anemia, hypotension, leukocytosis, c diff. blood cx clear now. Had cardiac arrest during hd yesterday. Cause not clear.       Plan/  1- arrest happened in HD but not clear right now how it would have been dialysis related other than just general stress on body  2- f/u cardio recs  3- replete k  4- hd tomorrow. Will write to make sure patient eats before going down for hd as this was concern of family  5- will run blood flow lower at 300    Patient Active Problem List:     Hypothyroidism     Hypercholesteremia     CKD (chronic kidney disease)     Afib (HCC)     Anemia     Acid reflux     Anxiety     Diabetes mellitus due to underlying condition, controlled, with stage 5 chronic kidney disease not on chronic dialysis, with long-term current use of insulin (HCC)     Essential hypertension     Idiopathic Parkinson's disease ([address]     Pneumonia of lower lobe due to infectious organism ([facility]     Hyperkalemia     Acute on chronic diastolic congestive heart failure (HCC)     Pulmonary edema with congestive heart failure (HCC)     Shortness of breath     Acute pulmonary edema (HCC)     Pancolitis (HCC)     C. difficile colitis     Anemia     ESRD (end stage renal disease) on dialysis ([facility]     Leukocytosis      Subjective:   Admit Date: 11/9/2017    Interval History: events noted. Getting transferred to floor. No cp. No sob.         Medications:   Scheduled Meds:   amiodarone  200 mg Oral BID    sodium chloride  250 mL Intravenous Once    darbepoetin lizeth-polysorbate  100 mcg Subcutaneous Weekly    buprenorphine  1 patch Transdermal Weekly    calcium elemental  500 mg Oral Daily    carbidopa-levodopa  1 tablet Oral Daily    fluticasone  2 spray Nasal Daily    iron polysaccaride  1 capsule Oral Daily with breakfast    isosorbide mononitrate  60 mg Oral Daily    insulin glargine  14 Units Subcutaneous QAM    levothyroxine  50 mcg Oral 24HR INTAKE/OUTPUT:      Intake/Output Summary (Last 24 hours) at 11/16/17 0744  Last data filed at 11/16/17 0500   Gross per 24 hour   Intake              640 ml   Output                0 ml   Net              640 ml       Constitutional:  Alert, awake, no apparent distress   Skin:normal, no rash  HEENT:sclera anicteric.   Head atraumatic normocephalic  Neck:supple with no thyromegally  Cardiovascular:  S1, S2 without m/r/g   Respiratory:  CTA B without w/r/r   Abdomen: +bs, soft, nt  Ext: trace LE edema  Musculoskeletal:Intact  Neuro:Alert and oriented with no deficit      Electronically signed by Young Cobb MD on 11/16/2017 at 7:44 AM

## 2017-11-16 NOTE — FLOWSHEET NOTE
0700-Report from [employer] at bedside. Most recent set of vitals are height is 4' 11\" (1.499 m) and weight is 131 lb 6.3 oz (59.6 kg). Her oral temperature is 98.3 °F (36.8 °C). Her blood pressure is 161/71 (abnormal) and her pulse is 85. Her respiration is 22 and oxygen saturation is 97%.   .  0800-Am assessment complete Pt sitting up in bed    1700- Pt sitting up eating dinner no complaints voiced to transfer to       1900-Report to 1

## 2017-11-16 NOTE — CARE COORDINATION
Patient to ICU 11/15 after episode of asystole CPR during dialysis. Patient intubated and extubated 11/15. No recurrent episodes or identified cause for event, planned for patient to return to floor today. Continue to follow for return to [facility] once stable. ID consult pending.  Patient on po Vanco.

## 2017-11-17 ENCOUNTER — APPOINTMENT (OUTPATIENT)
Dept: GENERAL RADIOLOGY | Age: 82
DRG: 371 | End: 2017-11-17
Payer: MEDICARE

## 2017-11-17 LAB
ANION GAP SERPL CALCULATED.3IONS-SCNC: 16 MEQ/L (ref 7–13)
BASOPHILS ABSOLUTE: 0 K/UL (ref 0–0.2)
BASOPHILS RELATIVE PERCENT: 0.4 %
BUN BLDV-MCNC: 32 MG/DL (ref 8–23)
CALCIUM SERPL-MCNC: 5.9 MG/DL (ref 8.6–10.2)
CHLORIDE BLD-SCNC: 99 MEQ/L (ref 98–107)
CK MB: 1.4 NG/ML (ref 0–3.8)
CO2: 23 MEQ/L (ref 22–29)
CREAT SERPL-MCNC: 6.68 MG/DL (ref 0.5–0.9)
CREATINE KINASE-MB INDEX: 4.5 % (ref 0–3.5)
EKG ATRIAL RATE: 220 BPM
EKG Q-T INTERVAL: 386 MS
EKG QRS DURATION: 100 MS
EKG QTC CALCULATION (BAZETT): 487 MS
EKG R AXIS: 19 DEGREES
EKG T AXIS: -22 DEGREES
EKG VENTRICULAR RATE: 96 BPM
EOSINOPHILS ABSOLUTE: 0 K/UL (ref 0–0.7)
EOSINOPHILS RELATIVE PERCENT: 0.2 %
GFR AFRICAN AMERICAN: 7.2
GFR NON-AFRICAN AMERICAN: 5.9
GLUCOSE BLD-MCNC: 112 MG/DL (ref 74–109)
GLUCOSE BLD-MCNC: 125 MG/DL (ref 60–115)
GLUCOSE BLD-MCNC: 174 MG/DL (ref 60–115)
GLUCOSE BLD-MCNC: 196 MG/DL (ref 60–115)
HCT VFR BLD CALC: 32.2 % (ref 37–47)
HEMOGLOBIN: 10.7 G/DL (ref 12–16)
LYMPHOCYTES ABSOLUTE: 0.7 K/UL (ref 1–4.8)
LYMPHOCYTES RELATIVE PERCENT: 6.4 %
MAGNESIUM: 1.8 MG/DL (ref 1.7–2.3)
MCH RBC QN AUTO: 29.5 PG (ref 27–31.3)
MCHC RBC AUTO-ENTMCNC: 33.3 % (ref 33–37)
MCV RBC AUTO: 88.7 FL (ref 82–100)
MONOCYTES ABSOLUTE: 1 K/UL (ref 0.2–0.8)
MONOCYTES RELATIVE PERCENT: 8.8 %
NEUTROPHILS ABSOLUTE: 9.1 K/UL (ref 1.4–6.5)
NEUTROPHILS RELATIVE PERCENT: 84.2 %
PDW BLD-RTO: 16.8 % (ref 11.5–14.5)
PERFORMED ON: ABNORMAL
PLATELET # BLD: 77 K/UL (ref 130–400)
PLATELET SLIDE REVIEW: ABNORMAL
POTASSIUM SERPL-SCNC: 2.9 MEQ/L (ref 3.5–5.1)
RBC # BLD: 3.63 M/UL (ref 4.2–5.4)
SODIUM BLD-SCNC: 138 MEQ/L (ref 132–144)
TOTAL CK: 31 U/L (ref 0–170)
TROPONIN: 0.07 NG/ML (ref 0–0.01)
WBC # BLD: 10.8 K/UL (ref 4.8–10.8)

## 2017-11-17 PROCEDURE — 82553 CREATINE MB FRACTION: CPT

## 2017-11-17 PROCEDURE — 93005 ELECTROCARDIOGRAM TRACING: CPT

## 2017-11-17 PROCEDURE — 80048 BASIC METABOLIC PNL TOTAL CA: CPT

## 2017-11-17 PROCEDURE — 2060000000 HC ICU INTERMEDIATE R&B

## 2017-11-17 PROCEDURE — 6370000000 HC RX 637 (ALT 250 FOR IP): Performed by: INTERNAL MEDICINE

## 2017-11-17 PROCEDURE — 2580000003 HC RX 258: Performed by: NURSE PRACTITIONER

## 2017-11-17 PROCEDURE — 83735 ASSAY OF MAGNESIUM: CPT

## 2017-11-17 PROCEDURE — 99222 1ST HOSP IP/OBS MODERATE 55: CPT | Performed by: INTERNAL MEDICINE

## 2017-11-17 PROCEDURE — 85025 COMPLETE CBC W/AUTO DIFF WBC: CPT

## 2017-11-17 PROCEDURE — 2700000000 HC OXYGEN THERAPY PER DAY

## 2017-11-17 PROCEDURE — 6370000000 HC RX 637 (ALT 250 FOR IP): Performed by: HOSPITALIST

## 2017-11-17 PROCEDURE — 82550 ASSAY OF CK (CPK): CPT

## 2017-11-17 PROCEDURE — 84132 ASSAY OF SERUM POTASSIUM: CPT

## 2017-11-17 PROCEDURE — 71020 XR CHEST STANDARD TWO VW: CPT

## 2017-11-17 PROCEDURE — 84484 ASSAY OF TROPONIN QUANT: CPT

## 2017-11-17 PROCEDURE — 6370000000 HC RX 637 (ALT 250 FOR IP): Performed by: SPECIALIST

## 2017-11-17 RX ORDER — POTASSIUM CHLORIDE 20 MEQ/1
40 TABLET, EXTENDED RELEASE ORAL EVERY 4 HOURS
Status: DISPENSED | OUTPATIENT
Start: 2017-11-17 | End: 2017-11-17

## 2017-11-17 RX ORDER — POTASSIUM CHLORIDE 20 MEQ/1
40 TABLET, EXTENDED RELEASE ORAL EVERY 4 HOURS
Status: COMPLETED | OUTPATIENT
Start: 2017-11-17 | End: 2017-11-17

## 2017-11-17 RX ORDER — POTASSIUM CHLORIDE 7.45 MG/ML
10 INJECTION INTRAVENOUS PRN
Status: DISCONTINUED | OUTPATIENT
Start: 2017-11-17 | End: 2017-11-24 | Stop reason: HOSPADM

## 2017-11-17 RX ORDER — SODIUM CHLORIDE 9 MG/ML
INJECTION, SOLUTION INTRAVENOUS
Status: DISPENSED
Start: 2017-11-17 | End: 2017-11-17

## 2017-11-17 RX ADMIN — Medication 10 ML: at 22:24

## 2017-11-17 RX ADMIN — DILTIAZEM HYDROCHLORIDE 30 MG: 30 TABLET, FILM COATED ORAL at 12:37

## 2017-11-17 RX ADMIN — VENLAFAXINE HYDROCHLORIDE 150 MG: 150 CAPSULE, EXTENDED RELEASE ORAL at 12:37

## 2017-11-17 RX ADMIN — ROSUVASTATIN CALCIUM 40 MG: 40 TABLET, FILM COATED ORAL at 22:24

## 2017-11-17 RX ADMIN — ISOSORBIDE MONONITRATE 60 MG: 60 TABLET, EXTENDED RELEASE ORAL at 12:38

## 2017-11-17 RX ADMIN — CARBIDOPA AND LEVODOPA 1 TABLET: 25; 100 TABLET ORAL at 12:37

## 2017-11-17 RX ADMIN — AMIODARONE HYDROCHLORIDE 200 MG: 200 TABLET ORAL at 23:56

## 2017-11-17 RX ADMIN — Medication 1 CAPSULE: at 12:37

## 2017-11-17 RX ADMIN — PANTOPRAZOLE SODIUM 40 MG: 40 GRANULE, DELAYED RELEASE ORAL at 06:41

## 2017-11-17 RX ADMIN — Medication 125 MG: at 06:41

## 2017-11-17 RX ADMIN — DILTIAZEM HYDROCHLORIDE 30 MG: 30 TABLET, FILM COATED ORAL at 18:45

## 2017-11-17 RX ADMIN — POTASSIUM CHLORIDE 40 MEQ: 20 TABLET, EXTENDED RELEASE ORAL at 18:45

## 2017-11-17 RX ADMIN — DILTIAZEM HYDROCHLORIDE 30 MG: 30 TABLET, FILM COATED ORAL at 06:41

## 2017-11-17 RX ADMIN — DILTIAZEM HYDROCHLORIDE 30 MG: 30 TABLET, FILM COATED ORAL at 00:42

## 2017-11-17 RX ADMIN — LEVOTHYROXINE SODIUM 50 MCG: 50 TABLET ORAL at 06:41

## 2017-11-17 RX ADMIN — DILTIAZEM HYDROCHLORIDE 30 MG: 30 TABLET, FILM COATED ORAL at 23:56

## 2017-11-17 RX ADMIN — POTASSIUM CHLORIDE 40 MEQ: 20 TABLET, EXTENDED RELEASE ORAL at 22:24

## 2017-11-17 RX ADMIN — CALCIUM 500 MG: 500 TABLET ORAL at 12:37

## 2017-11-17 RX ADMIN — AMIODARONE HYDROCHLORIDE 200 MG: 200 TABLET ORAL at 12:37

## 2017-11-17 RX ADMIN — Medication 125 MG: at 18:44

## 2017-11-17 RX ADMIN — Medication 125 MG: at 23:56

## 2017-11-17 RX ADMIN — Medication 125 MG: at 00:42

## 2017-11-17 RX ADMIN — INSULIN LISPRO 2 UNITS: 100 INJECTION, SOLUTION INTRAVENOUS; SUBCUTANEOUS at 18:46

## 2017-11-17 RX ADMIN — Medication 125 MG: at 12:38

## 2017-11-17 ASSESSMENT — ENCOUNTER SYMPTOMS
ABDOMINAL PAIN: 0
RESPIRATORY NEGATIVE: 1
DIARRHEA: 1

## 2017-11-17 ASSESSMENT — PAIN SCALES - GENERAL
PAINLEVEL_OUTOF10: 0

## 2017-11-17 NOTE — CONSULTS
Consults   Infectious Disease     Patient Name: Arabella Theodore  Date: 11/17/2017  YOB: 1935  Medical Record Number: [id number]    C. diff diarrhea    History of Present Illness:  Patient was admitted to 10/30/17 for pulmonary edema and shortness of breath less of the patient that time as I believe there is active pneumonia all antibiotics were discontinued  She has atrial fibrillation was readmitted on 11/10/17  Hypotension leukocytosis and diarrhea  Diarrhea was present found to be C. diff positive on 11/9/17 placed on oral vancomycin  CT scan at admission showed pancolitis  At this point time patient needs a pacemaker  No significant leukocytosis at this time    Review of Systems   Constitutional: Negative. HENT: Negative. Respiratory: Negative. Cardiovascular: Negative. Gastrointestinal: Positive for diarrhea. Negative for abdominal pain. Genitourinary: Negative. Musculoskeletal: Negative. Skin: Negative.         Review of Systems: All 14 review of systems negative other than as stated above    Social History   Substance Use Topics    Smoking status: Former Smoker    Smokeless tobacco: Never Used    Alcohol use No         Past Medical History:   Diagnosis Date    Abnormal presence of protein in urine 6/2004    Dr. Elier Amor. [place] Atrial fibrillation ([facility]     Constipation, chronic     Diverticular disease of the colon     GERD (gastroesophageal reflux disease)     GERD, PUD, Hiatal Hernia    Granulomatous lung disease ([facility]     History of endoscopy 2-21-12    Dr. Camila Benjamin Hyperlipidemia     Hypothyroidism     Kidney stones 11/2001    Dr. Solon Claude    Neuropathy in diabetes ([facility]     legs    Osteoarthritis     Positive ORTEGA (antinuclear antibody)     1:80    Tachycardia     Thrombophlebitis leg superficial     Type II or unspecified type diabetes mellitus without mention of complication, not stated as uncontrolled 1998           Past Surgical History:   Procedure Laterality Date    APPENDECTOMY  2007    BLADDER SUSPENSION  2008 & 2009    [facility], second at [address]  2/2006    COLONOSCOPY  4/2007    Dr. Meghan Shukla    [address] N/A 11/2/2017    CATHETER INSERTION HEMODIALYSIS performed by Felicitas Kemp MD at [address] [place] [address] Rd  [id number]    Bilateral    [facility]    THYROID SURGERY      URETHRAL STRICTURE DILATATION  3/2003         No current facility-administered medications on file prior to encounter. Current Outpatient Prescriptions on File Prior to Encounter   Medication Sig Dispense Refill    buprenorphine ([address]) 5 MCG/HR PTWK Place 1 patch onto the skin once a week 1 patch 0    ALPRAZolam (XANAX) 0.5 MG tablet Take 1 tablet by mouth 3 times daily as needed for Anxiety 10 tablet 0    ipratropium-albuterol (DUONEB) 0.5-2.5 (3) MG/3ML SOLN nebulizer solution Inhale 3 mLs into the lungs every 4 hours as needed for Shortness of Breath 360 mL     hydrALAZINE (APRESOLINE) 100 MG tablet Take 1 tablet by mouth 3 times daily 90 tablet 3    amLODIPine (NORVASC) 5 MG tablet Take 1 tablet by mouth 2 times daily 30 tablet 3    sodium bicarbonate 325 MG tablet Take 325 mg by mouth 3 times daily       venlafaxine (EFFEXOR XR) 75 MG extended release capsule 150 mg       warfarin (COUMADIN) 2.5 MG tablet Take as directed by [facility] [place] [facility]. Quantity equals 90 day supply. (Patient taking differently: Take 2.5 mg by mouth daily Take as directed by [facility] EMERGENC[facility] [place] [facility].  Quantity equals 90 day supply.) 200 tablet 1    levothyroxine (SYNTHROID) 50 MCG tablet TAKE 1 TABLET DAILY ALTERNATING WITH THE OTHER DOSE EVERY OTHER DAY 50 tablet 3    levothyroxine (SYNTHROID) 25 MCG tablet TAKE 1 TABLET DAILY ALTERNATING WITH 50 MCG DOSE EVERY OTHER DAY 50 tablet 2    IRON POLYSACCH [id number] PO Take 1 tablet by mouth daily      insulin lispro (HUMALOG KWIKPEN) 100 UNIT/ML pen Inject 2 Units into the skin 3 times daily (before meals) (Patient taking differently: Inject into the skin 3 times daily (before meals) ) 15 mL 3    Lomitapide Mesylate (JUXTAPID) 40 MG CAPS Take by mouth daily      amiodarone (CORDARONE) 200 MG tablet Take 100 mg by mouth daily 1/2 tablet       cloNIDine (CATAPRES) 0.1 MG tablet 3 times daily       isosorbide mononitrate (IMDUR) 60 MG CR tablet       carbidopa-levodopa (SINEMET)  MG per tablet Take 1 tablet by mouth daily       glucose blood VI test strips (TRUETEST TEST) strip As needed. 200 strip 11    metoprolol (LOPRESSOR) 50 MG tablet Take 1 tablet by mouth 2 times daily. 180 tablet 3    aspirin 81 MG EC tablet Take 81 mg by mouth daily       fluticasone (FLONASE) 50 MCG/ACT nasal spray 2 sprays by Nasal route daily.  calcium carbonate (OSCAL) 500 MG TABS tablet Take 500 mg by mouth daily.  LANTUS SOLOSTAR 100 UNIT/ML injection pen INJECT 14 UNITS IN THE MORNING 75 mL 2    magnesium oxide (MAG-OX) 400 MG tablet Take 400 mg by mouth daily      pantoprazole sodium (PROTONIX) 40 MG PACK packet Take 40 mg by mouth every morning (before breakfast)       CRESTOR 40 MG tablet       chlorthalidone (HYGROTON) 25 MG tablet Take 25 mg by mouth daily         Allergies   Allergen Reactions    Arthrotec [Diclofenac-Misoprostol] Nausea Only    Depakote [Divalproex Sodium] Itching    Dilantin [Phenytoin]     Klonopin [Clonazepam]     Statins Other (See Comments)     achy         Family History   Problem Relation Age of Onset    Heart Disease Father     Early Death Father 47    Diabetes Father     Heart Disease Mother     Diabetes Mother     High Blood Pressure Mother     High Blood Pressure Sister     High Cholesterol Sister          Physical Exam:     Blood pressure (!) 104/90, pulse 84, temperature 98.2 °F (36.8 °C), temperature source Oral, resp.  rate 16, height 4' 11\" (1.499 Pancolitis (HCC)    C. difficile colitis    Anemia    ESRD (end stage renal disease) on dialysis (HCC)    Leukocytosis    SSS (sick sinus syndrome) [facility]    Cardiac pacemaker         ASSESSMENT:PLAN:    C. diff diarrhea    Okay for pacemaker    Vancomycin for 2 weeks

## 2017-11-17 NOTE — FLOWSHEET NOTE
Pt continues to have frequent small, brown, loose, runny, stools. Buttocks remains reddened and is tender. Incontinence spray and barrier cream are applied with each episode. Will continue to monitor.  Electronically signed by Sunday Polk RN on 11/17/2017 at 4:37 AM

## 2017-11-17 NOTE — CARE COORDINATION
PT TRANSFERRED FROM ICU S/P RAPID RESPONSE 11/15. PT FOR HEMODIALYSIS TODAY. D/C PLAN REMAINS [place].

## 2017-11-17 NOTE — H&P
1.  Pancolitis/C. diff    Improving. No further diarrhea. 2.  Chronic renal failure on dialysis. 3.  Episode of syncope with asystole. Patient was in the ICU on vent for a few hours and successfully extubated. 4.  A. fib. Coumadin is on hold. 5.  DM type II. 6.  Bibasilar pneumonia by chest x-ray. Clinically patient is asymptomatic at this time. Dr. Hermelindo Erickson doubts this is actual pneumonia. 7.  Fluid overload due to renal failure. Continue supportive care for now. Await input from consultants. Possible discharge to skilled nursing soon.

## 2017-11-17 NOTE — FLOWSHEET NOTE
Page to Dr. Korin Paredes, notified that patient is in afib. EKG to confirm. No new medication orders. Korin Paredes said to notify the rounding physician the first thing in the AM to review patient strips. Will continue to monitor.  Electronically signed by Faina Broussard RN on 11/17/2017 at 3:51 AM

## 2017-11-17 NOTE — PROGRESS NOTES
weakness  Skin: Warm and dry. No erythema rash on exposed extremities. DATA:   Recent Labs      11/16/17   0549  11/17/17   0600   WBC  10.4  10.8   HGB  11.3*  10.7*   HCT  33.9*  32.2*   MCV  88.6  88.7   PLT  75*  77*     Recent Labs      11/16/17   0549  11/17/17   0600   NA  138  138   K  3.9  2.9*   CL  96*  99   CO2  25  23   BUN  29*  32*   CREATININE  5.94*  6.68*   GLUCOSE  126*  112*   CALCIUM  6.6*  5.9*   PROT  5.1*   --    LABALBU  2.3*   --    BILITOT  0.2   --    ALKPHOS  97   --    AST  40*   --    ALT  9   --    LABGLOM  6.8*  5.9*   GFRAA  8.2*  7.2*   GLOB  2.8   --          No results for input(s): PHART, IEB5WIS, PO2ART, FFB6WNU, BEART, Z7HMOZUD in the last 72 hours.   O2 Device: Nasal cannula  O2 Flow Rate (L/min): 3 L/min  Lab Results   Component Value Date    LACTA 2.0 11/09/2017        MEDICATIONS during current hospitalization:    Continuous Infusions:   sodium chloride      dextrose      sodium chloride         Scheduled Meds:   diltiazem  30 mg Oral 4 times per day    amiodarone  200 mg Oral BID    sodium chloride  250 mL Intravenous Once    darbepoetin lizeth-polysorbate  100 mcg Subcutaneous Weekly    buprenorphine  1 patch Transdermal Weekly    calcium elemental  500 mg Oral Daily    carbidopa-levodopa  1 tablet Oral Daily    fluticasone  2 spray Nasal Daily    iron polysaccaride  1 capsule Oral Daily with breakfast    isosorbide mononitrate  60 mg Oral Daily    insulin glargine  14 Units Subcutaneous QAM    levothyroxine  50 mcg Oral Daily    pantoprazole sodium  40 mg Oral QAM AC    venlafaxine  150 mg Oral Daily with breakfast    insulin lispro  0-12 Units Subcutaneous TID WC    insulin lispro  0-6 Units Subcutaneous Nightly    rosuvastatin  40 mg Oral Nightly    sodium chloride  250 mL Intravenous Once    vancomycin  125 mg Oral 4 times per day    lidocaine  5 mL Intradermal Once    sodium chloride flush  10 mL Intravenous Q12H    sodium chloride CHEST TWO VIEWS  CLINICAL HISTORY: Pneumonia, follow-up COMPARISONS: October 28, 2017 at 0820 hours  FINDINGS: Two views of the chest are submitted. The cardiac silhouette is enlarged. Unchanged. The mediastinum is unremarkable. Pulmonary vascular is attenuated, lungs are hyperinflated and there is some widening of the AP diameter the chest and coarsening of the interstitium. Right sided trachea. There is worsening patchy multifocal to confluent areas of airspace disease within the parenchyma. There is bibasilar trace to small pleural effusions. . Trace bilateral pleural effusions Pneumothoraces. EXAMINATION: CHEST TWO VIEWS  CLINICAL HISTORY: Pneumonia, follow-up COMPARISONS: October 26, 2017  FINDINGS: Two views of the chest are submitted. The cardiac silhouette is enlarged. Unchanged. The mediastinum is unremarkable. Pulmonary vascular is attenuated, lungs are hyperinflated and there is some widening of the AP diameter the chest and coarsening of the interstitium. Right sided trachea. There is worsening patchy multifocal to confluent areas of airspace disease within the parenchyma. There is bibasilar trace to small pleural effusions. .  Pneumothoraces. Degenerative changes of the left shoulder                                                                               IMPRESSION: PERSISTENT PATCHY MULTIFOCAL TO CONFLUENT AREAS OF AIRSPACE DISEASE WITHIN THE PARENCHYMA. TRACE TO SMALL BILATERAL PLEURAL EFFUSIONS RADIOGRAPHIC FINDINGS SUGGESTIVE OF COPD. CORRELATE CLINICALLY. Xr Chest Standard (2 Vw)    Result Date: 10/28/2017  EXAMINATION: CHEST TWO VIEWS  CLINICAL HISTORY: Pneumonia, follow-up COMPARISONS: October 26, 2017  FINDINGS: Two views of the chest are submitted. The cardiac silhouette is enlarged. Unchanged. The mediastinum is unremarkable.  Pulmonary vascular is attenuated, lungs are hyperinflated and there is some widening of the AP diameter the chest and coarsening of the interstitium. Right sided trachea. There is worsening patchy multifocal to confluent areas of airspace disease within the parenchyma. There is bibasilar trace to small pleural effusions. .  No effusions. Pneumothoraces. WORSENING PATCHY MULTIFOCAL TO CONFLUENT AREAS OF AIRSPACE DISEASE WITHIN THE PARENCHYMA. TRACE TO SMALL BILATERAL PLEURAL EFFUSIONS RADIOGRAPHIC FINDINGS SUGGESTIVE OF COPD. CORRELATE CLINICALLY. Xr Chest Standard (2 Vw)    Result Date: 10/24/2017  Chest 2 views. HISTORY: Shortness of breath. FINDINGS: Comparison, February 9, 2017. Osseous structures intact. Cardiac silhouette normal. Aorta calcified and tortuous. Small bilateral calcified granulomas again identified. Mild blunting right costophrenic angle with ill-defined area of increased opacities at lung bases. Small bilateral effusion with bibasilar atelectasis/pneumonia    Ct Chest Wo Contrast    Result Date: 10/31/2017  EXAMINATION:  CT SCAN CHEST CLINICAL HISTORY:  Short of breath COMPARISON: TECHNIQUE:  Multiple serial axial images from the base neck through the upper abdomen with both sagittal and coronal reconstruction was performed without intravenous or oral administration of contrast. FINDINGS:  There are patchy multifocal areas of groundglass infiltrates within the left upper lobe left lower lobe, right upper lobe, right middle lobe. There is also more small focal areas of infiltrate consolidation air bronchograms in both bases. There are bilateral pleural effusions. No pneumothoraces. No significant mediastinal adenopathy. Within the field-of-view there is an area of low-attenuation within the left lobe of the thyroid measuring 1 cm. The right lobe is not appreciated. Correlate with prior thyroidectomy. Within the field-of-view there is areas of low-attenuation both kidneys.  The NONDIAGNOSTIC V/Q STUDY. WOULD CONSIDER REPEAT CT SCAN STUDY WITH CONTRAST IN ASSOCIATION WITH DIALYSIS FOR THE PATIENT GIVEN HER RENAL STATUS. Ct Abdomen Pelvis W Iv Contrast Additional Contrast? Oral    Result Date: 11/9/2017  CT ABDOMEN AND PELVIS WITH CONTRAST: 11/9/2017 CLINICAL HISTORY:  Right lower quadrant pain . COMPARISON: 4/14/2016. TECHNIQUE: Spiral images were obtained of the abdomen and pelvis after the uneventful intravenous administration of approximately 100 mL of Isovue-370 contrast. All CT scans at this facility use dose modulation, iterative reconstruction, and/or weight based dosing when appropriate to reduce radiation dose to as low as reasonably achievable. FINDINGS: There is moderate circumferential wall thickening of the entire colon, which is essentially collapsed, without significant stool. Most likely possibilities are infection or inflammatory bowel disease. Moderate to marked diverticulosis, predominantly of the sigmoid is again noted, without findings to suggest acute diverticulitis. There is no pneumatosis, ascites, abscess, or significant small bowel dilatation. A small layering right and trace left pleural effusions are present, with mild probable atelectasis posteriorly. The gallbladder has been removed. The liver, spleen, pancreas, adrenal glands, great vessels, urinary bladder, and additional images of pelvis appear within normal limits, with moderate atherosclerotic plaquing of a normal caliber abdominal aorta and branch vessels again noted. Moderate atrophy of both kidneys with small cysts appear unchanged. Degenerative changes of the lumbar spine appear unchanged, predominantly at the L4-L5 level. MODERATE UNCOMPLICATED PANCOLITIS, MOST LIKELY INFECTION OR INFLAMMATORY BOWEL DISEASE. SMALL RIGHT AND TRACE LEFT PLEURAL EFFUSIONS WITH PROBABLE MILD BIBASILAR ATELECTASIS. NO OTHER SIGNIFICANT CHANGE FROM 4/16/2016 IDENTIFIED.       Xr Chest Portable    Result Date: 11/9/2017  PORTABLE CHEST: 11/9/2017 CLINICAL HISTORY:  Fatigue . COMPARISON: 10/31/2017. A portable upright AP radiograph of the chest was obtained. FINDINGS: A poor inspiratory volume is present with mild probable bibasilar atelectasis, and suspect trace pleural effusions. There is no significant cardiomegaly, vascular congestion, pneumothorax, or displaced fractures identified. Since the prior study, there has been interval placement of a right internal jugular approach tunneled hemodialysis catheter, with its tip overlying the cavoatrial junction. Small calcified granulomas are again noted. POOR INSPIRATION WITH MILD PROBABLE BIBASILAR ATELECTASIS. BRONCHOPNEUMONIA SHOULD BE EXCLUDED CLINICALLY. Xr Chest Portable    Result Date: 10/30/2017  PORTABLE CHEST: 10/30/2017 CLINICAL HISTORY: Shortness of breath . COMPARISON: 10/29/2017. A portable upright AP radiograph of the chest was obtained. FINDINGS: A poor inspiratory volume is present with worsening patchy probable bilateral pulmonary edema and/or bronchopneumonia. Vascular congestion appears worsening. Small pleural effusions appear to be developing. There is no pneumothorax, or displaced fractures identified. Small calcified granulomas in the right lung apex are again noted. WORSENING BRONCHOPNEUMONIA AND/OR PULMONARY EDEMA. SMALL BILATERAL PLEURAL EFFUSIONS. Xr Chest Portable    Result Date: 10/26/2017  XR CHEST PORTABLE : 10/26/2017 CLINICAL HISTORY: pneumonia . COMPARISON: 10/24/2017. TECHNIQUE: A portable upright AP radiograph of the chest was obtained. FINDINGS: Bibasilar infiltrates have worsened when compared to the prior study, greater on the right. Small bilateral pleural effusions appear mildly increased. Findings are suspicious for bronchopneumonia. Cardiac decompensation also possible. There is no significant cardiomegaly, pneumothorax or other findings of concern identified.      WORSENING BIBASILAR INFILTRATE/CONSOLIDATION vent for a few hours and successfully extubated. 4.  A. fib. Coumadin is on hold. 5.  DM type II. 6.  Bibasilar pneumonia by chest x-ray. Clinically patient is asymptomatic at this time. Dr. Calli Dickerson doubts this is actual pneumonia. 7.  Fluid overload due to renal failure. 8.Poss PPM given the syncopal episode    Continue supportive care for now. Await input from consultants. Possible discharge to skilled nursing soon.             Talbert Fothergill MD

## 2017-11-17 NOTE — PROGRESS NOTES
[facility] EMERGENCY [facility] AT [place] Respiratory Therapy Evaluation   Current Order:  duoneb q4prn  Home Regimen: no per pt   Ordering Physician:karen  Re-evaluation Date:  eval done   Diagnosis: syncopal episode   Patient Status: Stable / Unstable + Physician notified    The following MDI Criteria must be met in order to convert aerosol to MDI with spacer. If unable to meet, MDI will be converted to aerosol:  []  Patient able to demonstrate the ability to use MDI effectively  []  Patient alert and cooperative  []  Patient able to take deep breath with 5-10 second hold  []  Medication(s) available in this delivery method   []  Peak flow greater than or equal to 200 ml/min            Current Order Substituted To  (same drug, same frequency)   Aerosol to MDI [] Albuterol Sulfate 0.083% unit dose by aerosol Albuterol Sulfate MDI 2 puffs by inhalation with spacer    [] Levalbuterol 1.25 mg unit dose by aerosol Levalbuterol MDI 2 puffs by inhalation with spacer    [] Levalbuterol 0.63 mg unit dose by aerosol Levalbuterol MDI 2 puffs by inhalation with spacer    [] Ipratropium Bromide 0.02% unit dose by aerosol Ipratropium Bromide MDI 2 puffs by inhalation with spacer    [] Duoneb (Ipratropium + Albuterol) unit dose by aerosol Ipratropium MDI + Albuterol MDI 2 puffs by inhalation w/spacer   MDI to Aerosol [] Albuterol Sulfate MDI Albuterol Sulfate 0.083% unit dose by aerosol    [] Levalbuterol MDI 2 puffs by inhalation Levalbuterol 1.25 mg unit dose by aerosol    [] Ipratropium Bromide MDI by inhalation Ipratropium Bromide 0.02% unit dose by aerosol    [] Combivent (Ipratropium + Albuterol) MDI by inhalation Duoneb (Ipratropium + Albuterol) unit dose by aerosol   Treatment Assessment [Frequency/Schedule]:  Change frequency to: ____duoneb q4prn______________________________________________per Protocol, P&T, MEC      Points 0 1 2 3 4   Pulmonary Status  Non-Smoker  []   Smoking history   < 20 pack years  []   Smoking history  ?  20 pack years  [x] Pulmonary Disorder  (acute or chronic)  []   Severe or Chronic w/ Exacerbation  []     Surgical Status No [x]   Surgeries     General []   Surgery Lower []   Abdominal Thoracic or []   Upper Abdominal Thoracic with  PulmonaryDisorder  []     Chest X-ray Clear/Not  Ordered     [x]  Chronic Changes  Results Pending  []  Infiltrates, atelectasis, pleural effusion, or edema  []  Infiltrates in more than one lobe []  Infiltrate + Atelectasis, &/or pleural effusion  []    Respiratory Pattern Regular,  RR = 12-20 [x]  Increased,  RR = 21-25 []  JAMES, irregular,  or RR = 26-30 []  Decreased FEV1  or RR = 31-35 []  Severe SOB, use  of accessory muscles, or RR ? 35  []    Mental Status Alert, oriented,  Cooperative [x]  Confused but Follows commands []  Lethargic or unable to follow commands []  Obtunded  []  Comatose  []    Breath Sounds Clear to  auscultation  [x]  Decreased unilaterally or  in bases only []  Decreased  bilaterally  []  Crackles or intermittent wheezes []  Wheezes []    Cough Strong, Spontan., & nonproductive [x]  Strong,  spontaneous, &  productive []  Weak,  Nonproductive []  Weak, productive or  with wheezes []  No spontaneous  cough or may require suctioning []    Level of Activity Ambulatory []  Ambulatory w/ Assist  [x]  Non-ambulatory []  Paraplegic []  Quadriplegic []    Total    Score:__4_____     Triage Score:___5_____      Tri       Triage:     1. (>20) Freq: Q3    2. (16-20) Freq: Q4   3. (11-15) Freq: QID & Albuterol Q2 PRN    4. (6-10) Freq: TID & Albuterol Q2 PRN    5. (0-5) Freq Q4prn

## 2017-11-17 NOTE — PROGRESS NOTES
Renal Progress Note    Assessment and Plan:      80 yo with new esrd. Has anemia, hypotension, leukocytosis, c diff. blood cx clear now. Had cardiac arrest during hd Wednesday. No problems with dilaysis today. Got HD with 4k bath due to low k.         Plan/  1- arrest happened in HD but not clear right now how it would have been dialysis related other than just general stress on body  2- f/u cardio recs  3- replete k  4- hd next on monday  5- will run blood flow lower at 300    Patient Active Problem List:     Hypothyroidism     Hypercholesteremia     CKD (chronic kidney disease)     Afib (HCC)     Anemia     Acid reflux     Anxiety     Diabetes mellitus due to underlying condition, controlled, with stage 5 chronic kidney disease not on chronic dialysis, with long-term current use of insulin (HCC)     Essential hypertension     Idiopathic Parkinson's disease ([facility] [address]     Pneumonia of lower lobe due to infectious organism ([facility] [address]     Hyperkalemia     Acute on chronic diastolic congestive heart failure (HCC)     Pulmonary edema with congestive heart failure (HCC)     Shortness of breath     Acute pulmonary edema (HCC)     Pancolitis ([facility])     C. difficile colitis     Anemia     ESRD (end stage renal disease) on dialysis ([facility] [address]     Leukocytosis      Subjective:   Admit Date: 11/9/2017    Interval History: events noted. Got hd earlier. Tolerated ok. No cp. No sob.         Medications:   Scheduled Meds:   diltiazem  30 mg Oral 4 times per day    amiodarone  200 mg Oral BID    sodium chloride  250 mL Intravenous Once    darbepoetin lizeth-polysorbate  100 mcg Subcutaneous Weekly    buprenorphine  1 patch Transdermal Weekly    calcium elemental  500 mg Oral Daily    carbidopa-levodopa  1 tablet Oral Daily    fluticasone  2 spray Nasal Daily    iron polysaccaride  1 capsule Oral Daily with breakfast    isosorbide mononitrate  60 mg Oral Daily    insulin glargine  14 Units Subcutaneous QAM    levothyroxine  50 mcg Oral Daily    pantoprazole sodium  40 mg Oral QAM AC    venlafaxine  150 mg Oral Daily with breakfast    insulin lispro  0-12 Units Subcutaneous TID WC    insulin lispro  0-6 Units Subcutaneous Nightly    rosuvastatin  40 mg Oral Nightly    sodium chloride  250 mL Intravenous Once    vancomycin  125 mg Oral 4 times per day    lidocaine  5 mL Intradermal Once    sodium chloride flush  10 mL Intravenous Q12H    sodium chloride  1,000 mL Intravenous Once    sodium chloride flush  10 mL Intravenous 2 times per day     Continuous Infusions:   sodium chloride      dextrose      sodium chloride         CBC:   Recent Labs      11/16/17   0549  11/17/17   0600   WBC  10.4  10.8   HGB  11.3*  10.7*   PLT  75*  77*     CMP:    Recent Labs      11/15/17   0905  11/15/17   1425  11/16/17   0549  11/17/17   0600   NA  137   --   138  138   K  3.3*  3.1*  3.9  2.9*   CL  94*   --   96*  99   CO2  24   --   25  23   BUN  21   --   29*  32*   CREATININE  3.58*   --   5.94*  6.68*   GLUCOSE  128*   --   126*  112*   CALCIUM  6.7*   --   6.6*  5.9*   LABGLOM  12.2*   --   6.8*  5.9*     Troponin:   Recent Labs      11/17/17   0600   TROPONINI  0.071*     BNP: No results for input(s): BNP in the last 72 hours. INR:   Recent Labs      11/15/17   0554   INR  1.9     Lipids: No results for input(s): CHOL, LDLDIRECT, TRIG, HDL, AMYLASE, LIPASE in the last 72 hours. Liver:   Recent Labs      11/16/17   0549   AST  40*   ALT  9   ALKPHOS  97   PROT  5.1*   LABALBU  2.3*   BILITOT  0.2     Iron:  No results for input(s): IRONS, FERRITIN in the last 72 hours. Invalid input(s): LABIRONS  Urinalysis: No results for input(s): UA in the last 72 hours.     Objective:   Vitals: BP (!) 104/90   Pulse 84   Temp 98.2 °F (36.8 °C) (Oral)   Resp 16   Ht 4' 11\" (1.499 m)   Wt 130 lb 8.2 oz (59.2 kg)   SpO2 94%   BMI 26.36 kg/m²    Wt Readings from Last 3 Encounters:   11/17/17 130 lb 8.2 oz (59.2 kg)   11/03/17 124 lb 5.4 oz (56.4 kg)   09/28/17 135 lb (61.2 kg)      24HR INTAKE/OUTPUT:      Intake/Output Summary (Last 24 hours) at 11/17/17 1320  Last data filed at 11/17/17 1115   Gross per 24 hour   Intake             1410 ml   Output             2100 ml   Net             -690 ml       Constitutional:  Alert, awake, no apparent distress   Skin:normal, no rash  HEENT:sclera anicteric.   Head atraumatic normocephalic  Neck:supple with no thyromegally  Cardiovascular:  S1, S2 without m/r/g   Respiratory:  CTA B without w/r/r   Abdomen: +bs, soft, nt  Ext: trace LE edema  Musculoskeletal:Intact  Neuro:Alert and oriented with no deficit      Electronically signed by Jeremias Baltazar MD on 11/17/2017 at 1:20 PM

## 2017-11-17 NOTE — PROGRESS NOTES
CARDIOLOGY 1451  [address] [facility] PROGRESS NOTE         11/17/2017      Amber Alston    [id number]  1935    Rounding MD: Rupinder Rojo MD ,[facility]    Primary Cardiologist:  Rachele Rivera MD       SUBJECTIVE:     No further Complaints. Just extremely tired. No CP, SOB or SXs. Cardiac and general ROS otherwise negative and unchanged.       ASSESSMENT:     1. Post Asystolic arrest during Dialysis, question etiology  2. Elevated Troponin, RO MI vs Demand rise  3. Hypokalemia  4. Renal Failure on HD  5. C Diff Colitis  6. Pneumonia of lower lobe due to infectious organism ([facility] [address]  7. Elevated BNP, Can be seen in both CHF and PNA  8. Moderate PHTN  9. Diastolic Dysfunction, mild, possible HFpEF  10. PAFIB , now Back in SR  11. SSS s/p Pacemaker  12. CAD hx Prior PCI, LCX BMS 2010, negative repeat Cath  6/2014  13. Normal LV function 55% ( echo 10/17 )  14. COPD  13. IVON  16. CKD (chronic kidney disease), now on HD  17. Essential hypertension  18. Hyperlipidemia  19. Diabetes mellitus  20. PVD  21. Prior DVT  22. Hypothyroidism  23. CLBP  24. Former Smoker  22. Polycythemia Vera  26. Hyperkalemia  27. Anemia  28. GERD  29. Anxiety  30. Idiopathic Parkinson's disease ([facility] [address]         PLAN:     CV supportive Care with contination of medications. Telemetry  Serial Labs. Replace K+. General, GI and Nephrology care with HD. Hold anticoagulation for now  Given Astystolic event, best to place PPM, Needs ID to Clear first. Tentatively planned for Tuesday as inpatient. ID consult, Blood and Stool cultures. Cardiac maximal care as tolerated. See Orders.        Prior HPI Notes:    Katiana Sherman is a 81 y.o.  female patient who is being at the request of Dr. Siri Dutta inpatient consultation of atrial fibrillation.  She was admitted on 10/24/2017. Leonardo Whitlock and [id number] [place] records have been reviewed in detail. Aaron Guillaume is followed on a regular basis by Dr. Neelam Mo.     She has a history of paroxysmal atrial fibrillation for which she is maintained chronically on amiodarone.  She is chronically antiquated without warfarin. [facility] has a history of mild coronary artery disease. She also has a history of moderate to severe calcific aortic stenosis.  She has significant hyperlipidemia but is been intolerant to statin therapy. [facility] has stage V chronic kidney disease and follows regularly with Dr. Tony Muñoz. [facility] had an AV fistula placed however that has not completely matured.  Most recently saw Dr. Renetta Mcallister in early September and was noted on EKG to be in normal sinus rhythm.     She presented to the emergency department 5 days ago with complaints of worsening shortness of breath occurring over a period of 3 days.  She's had a productive cough and has been having some bloody sputum. [facility] was diagnosed with pneumonia and is being treated with intravenous antibiotics. [facility] notes that she is now feeling much better. [facility] is also being treated for hyperkalemia.  She initially was noted to be normal sinus rhythm, however, yesterday she developed tachycardia and was noted to be in atrial fibrillation with a rapid ventricular response.  She denies any chest pain.  She denies any orthopnea, PND, or worsening peripheral edema.  She denies any palpitations, lightheadedness, near-syncope or syncope. Now readmitted with C Difficle infection.     OBJECTIVE:     MEDICATIONS:     Scheduled Meds:   diltiazem  30 mg Oral 4 times per day    amiodarone  200 mg Oral BID    sodium chloride  250 mL Intravenous Once    darbepoetin lizeth-polysorbate  100 mcg Subcutaneous Weekly    buprenorphine  1 patch Transdermal Weekly    calcium elemental  500 mg Oral Daily    carbidopa-levodopa  1 tablet Oral Daily    fluticasone  2 spray Nasal Daily    iron polysaccaride  1 capsule Oral Daily with breakfast    isosorbide mononitrate  60 mg Oral Daily    insulin glargine  14 Units Subcutaneous QAM    levothyroxine  50 mcg Oral Daily    pantoprazole sodium  40 mg Oral QAM AC    venlafaxine  150 mg Oral Daily with breakfast    insulin lispro  0-12 Units Subcutaneous TID WC    insulin lispro  0-6 Units Subcutaneous Nightly    rosuvastatin  40 mg Oral Nightly    sodium chloride  250 mL Intravenous Once    vancomycin  125 mg Oral 4 times per day    lidocaine  5 mL Intradermal Once    sodium chloride flush  10 mL Intravenous Q12H    sodium chloride  1,000 mL Intravenous Once    sodium chloride flush  10 mL Intravenous 2 times per day     Continuous Infusions:   sodium chloride      dextrose      sodium chloride       PRN Meds:potassium chloride, metoprolol, fentanNYL, heparin (porcine), heparin (porcine), metoprolol, albumin human, ALPRAZolam, guaiFENesin-dextromethorphan, ipratropium-albuterol, glucose, dextrose, glucagon (rDNA), dextrose, heparin (porcine), sodium chloride flush, sodium chloride flush, sodium chloride flush, acetaminophen    PHYSICAL EXAM:    CURRENT VITALS: BP (!) 104/90   Pulse 84   Temp 98.2 °F (36.8 °C) (Oral)   Resp 16   Ht 4' 11\" (1.499 m)   Wt 130 lb 8.2 oz (59.2 kg)   SpO2 94%   BMI 26.36 kg/m²     CONSTITUTIONAL:  awake, alert, cooperative, no apparent distress,   ENT:  Normocephalic, without obvious abnormality, atraumatic, sinuses nontender on palpation, external ears without lesions,  NECK:  Supple, symmetrical, trachea midline, no adenopathy, thyroid symmetric, not enlarged and no tenderness, skin normal  LUNGS:  No increased work of breathing, good air exchange, clear to auscultation bilaterally, no crackles or wheezing, PM left Chest.  CARDIOVASCULAR:  Normal apical impulse, regular rate and rhythm, normal S1 and S2,  and 2/6 systolic murmur noted  ABDOMEN:  Normal bowel sounds, soft, non-distended, non-tender, no masses palpated, no hepatosplenomegally  EXTREMITIES:  No edema, Pulses strong throughout. NEUROLOGIC:  Awake, alert, oriented to name, place and time.  Following all commands and moving all extremties  SKIN:  no bruising or bleeding, normal skin color, texture, turgor and no rashes     Data:        LABS:      Recent Results (from the past 24 hour(s))   POCT Glucose    Collection Time: 11/16/17  4:06 PM   Result Value Ref Range    POC Glucose 146 (H) 60 - 115 mg/dl    Performed on ACCU-CHEK    POCT Glucose    Collection Time: 11/16/17  9:12 PM   Result Value Ref Range    POC Glucose 136 (H) 60 - 115 mg/dl    Performed on ACCU-CHEK    EKG 12 Lead    Collection Time: 11/17/17  2:11 AM   Result Value Ref Range    Ventricular Rate 96 BPM    Atrial Rate 220 BPM    QRS Duration 100 ms    Q-T Interval 386 ms    QTc Calculation (Bazett) 487 ms    R Axis 19 degrees    T Axis -22 degrees   POCT Glucose    Collection Time: 11/17/17  5:59 AM   Result Value Ref Range    POC Glucose 125 (H) 60 - 115 mg/dl    Performed on ACCU-CHEK    CBC Auto Differential    Collection Time: 11/17/17  6:00 AM   Result Value Ref Range    WBC 10.8 4.8 - 10.8 K/uL    RBC 3.63 (L) 4.20 - 5.40 M/uL    Hemoglobin 10.7 (L) 12.0 - 16.0 g/dL    Hematocrit 32.2 (L) 37.0 - 47.0 %    MCV 88.7 82.0 - 100.0 fL    MCH 29.5 27.0 - 31.3 pg    MCHC 33.3 33.0 - 37.0 %    RDW 16.8 (H) 11.5 - 14.5 %    Platelets 77 (L) 946 - 400 K/uL    PLATELET SLIDE REVIEW Decreased     Neutrophils % 84.2 %    Lymphocytes % 6.4 %    Monocytes % 8.8 %    Eosinophils % 0.2 %    Basophils % 0.4 %    Neutrophils # 9.1 (H) 1.4 - 6.5 K/uL    Lymphocytes # 0.7 (L) 1.0 - 4.8 K/uL    Monocytes # 1.0 (H) 0.2 - 0.8 K/uL    Eosinophils # 0.0 0.0 - 0.7 K/uL    Basophils # 0.0 0.0 - 0.2 K/uL   Magnesium    Collection Time: 11/17/17  6:00 AM   Result Value Ref Range    Magnesium 1.8 1.7 - 2.3 mg/dL   Basic Metabolic Panel    Collection Time: 11/17/17  6:00 AM   Result Value Ref Range    Sodium 138 132 - 144 mEq/L    Potassium 2.9 (LL) 3.5 - 5.1 mEq/L    Chloride 99 98 - 107 mEq/L    CO2 23 22 - 29 mEq/L    Anion Gap 16 (H) 7 - 13 mEq/L    Glucose 112 (H) 74 - 109 mg/dL    BUN 32 (H)

## 2017-11-17 NOTE — FLOWSHEET NOTE
11/17/17 1115   Vital Signs   /79   Temp 97.5 °F (36.4 °C)   Pulse 86   Weight 130 lb 8.2 oz (59.2 kg)   Weight Method Bed scale   Percent Weight Change -2.47   Pain Assessment   Pain Level 0   Post-Hemodialysis Assessment   Post-Treatment Procedures Blood returned;Catheter capped, clamped and heparinized x 2 ports   Machine Disinfection Process Other (Comment)   Rinseback Volume (ml) 300 ml   Total Liters Processed (l/min) 60.1 l/min   Dialyzer Clearance Lightly streaked   Duration of Treatment (minutes) 210 minutes   Heparin amount administered during treatment (units) 1000 units   Hemodialysis Intake (ml) 600 ml   Hemodialysis Output (ml) 2100 ml   NET Removed (ml) 1500 ml   Patient Response to Treatment g   Bilateral Breath Sounds Clear   Edema None

## 2017-11-18 LAB
ALBUMIN SERPL-MCNC: 2.5 G/DL (ref 3.9–4.9)
ALP BLD-CCNC: 88 U/L (ref 40–130)
ALT SERPL-CCNC: 11 U/L (ref 0–33)
ANION GAP SERPL CALCULATED.3IONS-SCNC: 15 MEQ/L (ref 7–13)
AST SERPL-CCNC: 22 U/L (ref 0–35)
BASOPHILS ABSOLUTE: 0.1 K/UL (ref 0–0.2)
BASOPHILS RELATIVE PERCENT: 0.6 %
BILIRUB SERPL-MCNC: 0.2 MG/DL (ref 0–1.2)
BUN BLDV-MCNC: 18 MG/DL (ref 8–23)
C-REACTIVE PROTEIN, HIGH SENSITIVITY: 100 MG/L (ref 0–5)
CALCIUM SERPL-MCNC: 6.8 MG/DL (ref 8.6–10.2)
CHLORIDE BLD-SCNC: 101 MEQ/L (ref 98–107)
CO2: 22 MEQ/L (ref 22–29)
CREAT SERPL-MCNC: 4.5 MG/DL (ref 0.5–0.9)
EOSINOPHILS ABSOLUTE: 0 K/UL (ref 0–0.7)
EOSINOPHILS RELATIVE PERCENT: 0.2 %
GFR AFRICAN AMERICAN: 11.3
GFR NON-AFRICAN AMERICAN: 9.4
GLOBULIN: 2.8 G/DL (ref 2.3–3.5)
GLUCOSE BLD-MCNC: 118 MG/DL (ref 60–115)
GLUCOSE BLD-MCNC: 169 MG/DL (ref 74–109)
GLUCOSE BLD-MCNC: 170 MG/DL (ref 60–115)
GLUCOSE BLD-MCNC: 187 MG/DL (ref 60–115)
GLUCOSE BLD-MCNC: 97 MG/DL (ref 60–115)
HBA1C MFR BLD: 5.3 % (ref 4.8–5.9)
HCT VFR BLD CALC: 33 % (ref 37–47)
HEMOGLOBIN: 10.7 G/DL (ref 12–16)
LYMPHOCYTES ABSOLUTE: 0.7 K/UL (ref 1–4.8)
LYMPHOCYTES RELATIVE PERCENT: 6.6 %
MAGNESIUM: 1.9 MG/DL (ref 1.7–2.3)
MCH RBC QN AUTO: 28.9 PG (ref 27–31.3)
MCHC RBC AUTO-ENTMCNC: 32.3 % (ref 33–37)
MCV RBC AUTO: 89.4 FL (ref 82–100)
MONOCYTES ABSOLUTE: 1 K/UL (ref 0.2–0.8)
MONOCYTES RELATIVE PERCENT: 8.7 %
NEUTROPHILS ABSOLUTE: 9.4 K/UL (ref 1.4–6.5)
NEUTROPHILS RELATIVE PERCENT: 83.9 %
PDW BLD-RTO: 16.9 % (ref 11.5–14.5)
PERFORMED ON: ABNORMAL
PERFORMED ON: NORMAL
PLATELET # BLD: 60 K/UL (ref 130–400)
PLATELET SLIDE REVIEW: ABNORMAL
POTASSIUM SERPL-SCNC: 4.1 MEQ/L (ref 3.5–5.1)
POTASSIUM SERPL-SCNC: 4.4 MEQ/L (ref 3.5–5.1)
POTASSIUM SERPL-SCNC: 4.5 MEQ/L (ref 3.5–5.1)
RBC # BLD: 3.69 M/UL (ref 4.2–5.4)
SEDIMENTATION RATE, ERYTHROCYTE: 34 MM (ref 0–30)
SLIDE REVIEW: ABNORMAL
SODIUM BLD-SCNC: 138 MEQ/L (ref 132–144)
TOTAL PROTEIN: 5.3 G/DL (ref 6.4–8.1)
WBC # BLD: 11.2 K/UL (ref 4.8–10.8)

## 2017-11-18 PROCEDURE — 2060000000 HC ICU INTERMEDIATE R&B

## 2017-11-18 PROCEDURE — 83735 ASSAY OF MAGNESIUM: CPT

## 2017-11-18 PROCEDURE — 84132 ASSAY OF SERUM POTASSIUM: CPT

## 2017-11-18 PROCEDURE — 93005 ELECTROCARDIOGRAM TRACING: CPT

## 2017-11-18 PROCEDURE — 83036 HEMOGLOBIN GLYCOSYLATED A1C: CPT

## 2017-11-18 PROCEDURE — 2580000003 HC RX 258: Performed by: NURSE PRACTITIONER

## 2017-11-18 PROCEDURE — 6370000000 HC RX 637 (ALT 250 FOR IP): Performed by: SPECIALIST

## 2017-11-18 PROCEDURE — 36415 COLL VENOUS BLD VENIPUNCTURE: CPT

## 2017-11-18 PROCEDURE — 2700000000 HC OXYGEN THERAPY PER DAY

## 2017-11-18 PROCEDURE — 86141 C-REACTIVE PROTEIN HS: CPT

## 2017-11-18 PROCEDURE — 85652 RBC SED RATE AUTOMATED: CPT

## 2017-11-18 PROCEDURE — 80053 COMPREHEN METABOLIC PANEL: CPT

## 2017-11-18 PROCEDURE — 85025 COMPLETE CBC W/AUTO DIFF WBC: CPT

## 2017-11-18 PROCEDURE — 6370000000 HC RX 637 (ALT 250 FOR IP): Performed by: HOSPITALIST

## 2017-11-18 PROCEDURE — 6370000000 HC RX 637 (ALT 250 FOR IP): Performed by: INTERNAL MEDICINE

## 2017-11-18 PROCEDURE — 6370000000 HC RX 637 (ALT 250 FOR IP): Performed by: NURSE PRACTITIONER

## 2017-11-18 RX ADMIN — CALCIUM 500 MG: 500 TABLET ORAL at 09:11

## 2017-11-18 RX ADMIN — CARBIDOPA AND LEVODOPA 1 TABLET: 25; 100 TABLET ORAL at 20:41

## 2017-11-18 RX ADMIN — ROSUVASTATIN CALCIUM 40 MG: 40 TABLET, FILM COATED ORAL at 20:41

## 2017-11-18 RX ADMIN — Medication 125 MG: at 05:32

## 2017-11-18 RX ADMIN — INSULIN LISPRO 2 UNITS: 100 INJECTION, SOLUTION INTRAVENOUS; SUBCUTANEOUS at 12:54

## 2017-11-18 RX ADMIN — Medication 10 ML: at 20:41

## 2017-11-18 RX ADMIN — CARBIDOPA AND LEVODOPA 1 TABLET: 25; 100 TABLET ORAL at 09:12

## 2017-11-18 RX ADMIN — AMIODARONE HYDROCHLORIDE 200 MG: 200 TABLET ORAL at 09:12

## 2017-11-18 RX ADMIN — ACETAMINOPHEN 650 MG: 325 TABLET ORAL at 09:11

## 2017-11-18 RX ADMIN — DILTIAZEM HYDROCHLORIDE 30 MG: 30 TABLET, FILM COATED ORAL at 12:53

## 2017-11-18 RX ADMIN — INSULIN GLARGINE 14 UNITS: 100 INJECTION, SOLUTION SUBCUTANEOUS at 09:14

## 2017-11-18 RX ADMIN — VENLAFAXINE HYDROCHLORIDE 150 MG: 150 CAPSULE, EXTENDED RELEASE ORAL at 09:12

## 2017-11-18 RX ADMIN — AMIODARONE HYDROCHLORIDE 200 MG: 200 TABLET ORAL at 20:41

## 2017-11-18 RX ADMIN — PANTOPRAZOLE SODIUM 40 MG: 40 GRANULE, DELAYED RELEASE ORAL at 05:33

## 2017-11-18 RX ADMIN — Medication 125 MG: at 12:53

## 2017-11-18 RX ADMIN — ISOSORBIDE MONONITRATE 60 MG: 60 TABLET, EXTENDED RELEASE ORAL at 09:12

## 2017-11-18 RX ADMIN — Medication 10 ML: at 18:12

## 2017-11-18 RX ADMIN — LEVOTHYROXINE SODIUM 50 MCG: 50 TABLET ORAL at 05:33

## 2017-11-18 RX ADMIN — DILTIAZEM HYDROCHLORIDE 30 MG: 30 TABLET, FILM COATED ORAL at 05:33

## 2017-11-18 RX ADMIN — Medication 125 MG: at 18:12

## 2017-11-18 RX ADMIN — INSULIN LISPRO 2 UNITS: 100 INJECTION, SOLUTION INTRAVENOUS; SUBCUTANEOUS at 09:15

## 2017-11-18 RX ADMIN — Medication 10 ML: at 18:14

## 2017-11-18 RX ADMIN — DILTIAZEM HYDROCHLORIDE 30 MG: 30 TABLET, FILM COATED ORAL at 18:12

## 2017-11-18 ASSESSMENT — PAIN SCALES - GENERAL: PAINLEVEL_OUTOF10: 5

## 2017-11-18 NOTE — PROGRESS NOTES
pantoprazole sodium  40 mg Oral QAM AC    venlafaxine  150 mg Oral Daily with breakfast    insulin lispro  0-12 Units Subcutaneous TID WC    insulin lispro  0-6 Units Subcutaneous Nightly    rosuvastatin  40 mg Oral Nightly    sodium chloride  250 mL Intravenous Once    vancomycin  125 mg Oral 4 times per day    lidocaine  5 mL Intradermal Once    sodium chloride flush  10 mL Intravenous Q12H    sodium chloride  1,000 mL Intravenous Once    sodium chloride flush  10 mL Intravenous 2 times per day     Continuous Infusions:   dextrose      sodium chloride       PRN Meds:potassium chloride, metoprolol, fentanNYL, heparin (porcine), heparin (porcine), metoprolol, albumin human, ALPRAZolam, guaiFENesin-dextromethorphan, ipratropium-albuterol, glucose, dextrose, glucagon (rDNA), dextrose, heparin (porcine), sodium chloride flush, sodium chloride flush, sodium chloride flush, acetaminophen    PHYSICAL EXAM:    CURRENT VITALS: BP (!) 141/49   Pulse 81   Temp 99.7 °F (37.6 °C)   Resp 16   Ht 4' 11\" (1.499 m)   Wt 132 lb 15 oz (60.3 kg)   SpO2 98%   BMI 26.85 kg/m²     CONSTITUTIONAL:  awake, alert, cooperative, no apparent distress,   ENT:  Normocephalic, without obvious abnormality, atraumatic, sinuses nontender on palpation, external ears without lesions,  NECK:  Supple, symmetrical, trachea midline, no adenopathy, thyroid symmetric, not enlarged and no tenderness, skin normal  LUNGS:  No increased work of breathing, good air exchange, clear to auscultation bilaterally, no crackles or wheezing, PM left Chest.  CARDIOVASCULAR:  Normal apical impulse, regular rate and rhythm, normal S1 and S2,  and 2/6 systolic murmur noted  ABDOMEN:  Normal bowel sounds, soft, non-distended, non-tender, no masses palpated, no hepatosplenomegally  EXTREMITIES:  No edema, Pulses strong throughout. NEUROLOGIC:  Awake, alert, oriented to name, place and time.  Following all commands and moving all extremties  SKIN:  no bruising or bleeding, normal skin color, texture, turgor and no rashes     Data:        LABS:      Recent Results (from the past 24 hour(s))   POCT Glucose    Collection Time: 11/17/17  5:26 PM   Result Value Ref Range    POC Glucose 196 (H) 60 - 115 mg/dl    Performed on ACCU-CHEK    POCT Glucose    Collection Time: 11/17/17  8:50 PM   Result Value Ref Range    POC Glucose 174 (H) 60 - 115 mg/dl    Performed on ACCU-CHEK    Potassium    Collection Time: 11/17/17 11:30 PM   Result Value Ref Range    Potassium 4.1 3.5 - 5.1 mEq/L   POCT Glucose    Collection Time: 11/18/17  5:56 AM   Result Value Ref Range    POC Glucose 170 (H) 60 - 115 mg/dl    Performed on ACCU-CHEK    CBC Auto Differential    Collection Time: 11/18/17  7:12 AM   Result Value Ref Range    WBC 11.2 (H) 4.8 - 10.8 K/uL    RBC 3.69 (L) 4.20 - 5.40 M/uL    Hemoglobin 10.7 (L) 12.0 - 16.0 g/dL    Hematocrit 33.0 (L) 37.0 - 47.0 %    MCV 89.4 82.0 - 100.0 fL    MCH 28.9 27.0 - 31.3 pg    MCHC 32.3 (L) 33.0 - 37.0 %    RDW 16.9 (H) 11.5 - 14.5 %    Platelets 60 (L) 080 - 400 K/uL    PLATELET SLIDE REVIEW Decreased     SLIDE REVIEW see below     Neutrophils % 83.9 %    Lymphocytes % 6.6 %    Monocytes % 8.7 %    Eosinophils % 0.2 %    Basophils % 0.6 %    Neutrophils # 9.4 (H) 1.4 - 6.5 K/uL    Lymphocytes # 0.7 (L) 1.0 - 4.8 K/uL    Monocytes # 1.0 (H) 0.2 - 0.8 K/uL    Eosinophils # 0.0 0.0 - 0.7 K/uL    Basophils # 0.1 0.0 - 0.2 K/uL   Comprehensive Metabolic Panel    Collection Time: 11/18/17  7:12 AM   Result Value Ref Range    Sodium 138 132 - 144 mEq/L    Potassium 4.4 3.5 - 5.1 mEq/L    Chloride 101 98 - 107 mEq/L    CO2 22 22 - 29 mEq/L    Anion Gap 15 (H) 7 - 13 mEq/L    Glucose 169 (H) 74 - 109 mg/dL    BUN 18 8 - 23 mg/dL    CREATININE 4.50 (H) 0.50 - 0.90 mg/dL    GFR Non-African American 9.4 (L) >60    GFR  11.3 (L) >60    Calcium 6.8 (L) 8.6 - 10.2 mg/dL    Total Protein 5.3 (L) 6.4 - 8.1 g/dL    Alb 2.5 (L) 3.9 - 4.9 g/dL Total Bilirubin 0.2 0.0 - 1.2 mg/dL    Alkaline Phosphatase 88 40 - 130 U/L    ALT 11 0 - 33 U/L    AST 22 0 - 35 U/L    Globulin 2.8 2.3 - 3.5 g/dL   Hemoglobin A1c    Collection Time: 11/18/17  7:12 AM   Result Value Ref Range    Hemoglobin A1C 5.3 4.8 - 5.9 %   High sensitivity CRP    Collection Time: 11/18/17  7:12 AM   Result Value Ref Range    CRP High Sensitivity 100.0 (H) 0.0 - 5.0 mg/L   Magnesium    Collection Time: 11/18/17  7:12 AM   Result Value Ref Range    Magnesium 1.9 1.7 - 2.3 mg/dL   Potassium    Collection Time: 11/18/17  7:12 AM   Result Value Ref Range    Potassium 4.5 3.5 - 5.1 mEq/L   Sedimentation Rate    Collection Time: 11/18/17  7:12 AM   Result Value Ref Range    Sed Rate 34 (H) 0 - 30 mm   POCT Glucose    Collection Time: 11/18/17 11:55 AM   Result Value Ref Range    POC Glucose 187 (H) 60 - 115 mg/dl    Performed on ACCU-CHEK          VQ LUNGS:  Negative ( non diagnostic ) for PE     EKG:   SR    TELEMETRY:  AF , Asystolic > 1 min, > SR spontaneously, no recurrence. Echo: (10/17)  Normal LV function 30%  Mild Diastolic Dysfunction  No significant VHD  Moderate PHTN, RVSP 45.   PM noted        Mildred Kaminski MD ,[address] [place] [address] Cardiology

## 2017-11-18 NOTE — FLOWSHEET NOTE
Spoke with nursing supervisor Leatha Landeros. Per Dr. Ewelina Angel, pt PM insertion to be changed from Tuesday to Monday with Dr. Pamela Herrera. I notified Leatha Landeros of this and that cath lab changes are to be made. Leatha Landeros is aware and said he will take care of it.

## 2017-11-18 NOTE — FLOWSHEET NOTE
Pt has temp of 100.2, prn tylenol indicated for a temp above 100.5. Will continue to monitor and medicate if becomes indicated.  Electronically signed by Brea Tran RN on 11/18/2017 at 12:50 AM

## 2017-11-18 NOTE — PROGRESS NOTES
Paige Grover is a 81 y.o. female patient. Recently admitted with pancolitis due to C. difficile, had an episode of cardiac arrest in dialysis, this is not thought to be related to dialysis however.   Current Facility-Administered Medications   Medication Dose Route Frequency Provider Last Rate Last Dose    potassium chloride 10 mEq/100 mL IVPB (Peripheral Line)  10 mEq Intravenous PRN Alexandro Diaz MD        diltiazem (CARDIZEM) tablet 30 mg  30 mg Oral 4 times per day Danay Arnold MD   30 mg at 11/18/17 0533    metoprolol (LOPRESSOR) injection 2.5 mg  2.5 mg Intravenous Q2H PRN Danay Arnold MD        fentaNYL (SUBLIMAZE) injection 50 mcg  50 mcg Intravenous Q1H PRN King En DO        amiodarone (CORDARONE) tablet 200 mg  200 mg Oral BID Soledad Hansen MD   200 mg at 11/17/17 2356    heparin (porcine) injection 1,000 Units  1,000 Units Intravenous PRN Alexandro Diaz MD   1,000 Units at 11/15/17 0730    heparin (porcine) injection 1,000 Units  1,000 Units Intercatheter PRN Alexandro Diaz MD   1,000 Units at 11/15/17 0730    metoprolol (LOPRESSOR) injection 5 mg  5 mg Intravenous Q6H PRN Soledad Hansen MD   5 mg at 11/16/17 2344    0.9 % sodium chloride bolus  250 mL Intravenous Once Ann Marie Deras DO        albumin human 25 % solution 25 g  25 g Intravenous Daily PRN Alexandro Diaz MD        darbepoetin lizeth-polysorbate (ARANESP) injection 100 mcg  100 mcg Subcutaneous Weekly Alexandro Diaz MD   100 mcg at 11/10/17 2123    ALPRAZolam [place] Single) tablet 0.5 mg  0.5 mg Oral TID PRN Raji Payan MD   0.5 mg at 11/10/17 2111    buprenorphine (BUPRENEX) 5 MCG/HR 1 patch  1 patch Transdermal Weekly Raji Payan MD   Stopped at 11/10/17 1326    calcium elemental (OSCAL) tablet 500 mg  500 mg Oral Daily Raji Payan MD   500 mg at 11/17/17 1237    carbidopa-levodopa (SINEMET)  MG per tablet 1 tablet  1 tablet Oral Daily Raji Payan MD   1 tablet at 11/17/17 1237    fluticasone (FLONASE) 50 MCG/ACT nasal spray 2 spray  2 spray Nasal Daily Amber Guaman MD   2 spray at 11/16/17 0759    guaiFENesin-dextromethorphan (ROBITUSSIN DM) 100-10 MG/5ML syrup 5 mL  5 mL Oral Q4H PRN Amber Guaman MD        ipratropium-albuterol (DUONEB) nebulizer solution 3 mL  3 mL Inhalation Q4H PRN Amber Guaman MD        iron polysaccaride (FERREX 150 FORTE PLUS) capsule 1 capsule  1 capsule Oral Daily with breakfast Amber Guaman MD   1 capsule at 11/17/17 1237    isosorbide mononitrate (IMDUR) extended release tablet 60 mg  60 mg Oral Daily Amber Guaman MD   60 mg at 11/17/17 1238    insulin glargine (LANTUS) injection vial 14 Units  14 Units Subcutaneous QAM Amber Guaman MD   14 Units at 11/16/17 0804    levothyroxine (SYNTHROID) tablet 50 mcg  50 mcg Oral Daily Amber Guaman MD   50 mcg at 11/18/17 0533    pantoprazole sodium (PROTONIX) packet 40 mg  40 mg Oral QAM AC Amber Guaman MD   40 mg at 11/18/17 0533    venlafaxine (EFFEXOR XR) extended release capsule 150 mg  150 mg Oral Daily with breakfast Amber Guaman MD   150 mg at 11/17/17 1237    glucose (GLUTOSE) 40 % oral gel 15 g  15 g Oral PRN Amber Guaman MD        dextrose 50 % solution 12.5 g  12.5 g Intravenous PRN Amber Guaman MD        glucagon (rDNA) injection 1 mg  1 mg Intramuscular PRN Amber Guaman MD        dextrose 5 % solution  100 mL/hr Intravenous PRN Amber Guaman MD        insulin lispro (HUMALOG) injection vial 0-12 Units  0-12 Units Subcutaneous TID WC Amber Guaman MD   2 Units at 11/17/17 1846    insulin lispro (HUMALOG) injection vial 0-6 Units  0-6 Units Subcutaneous Nightly Amber Guaman MD   1 Units at 11/17/17 2224    rosuvastatin (CRESTOR) tablet 40 mg  40 mg Oral Nightly Amber Guaman MD   40 mg at 11/17/17 2224    heparin (porcine) injection 4,000 Units  4,000 Units Intercatheter PRN Cecilia Kruse MD   4,000 Units at 11/15/17 0800    0.9 % sodium chloride bolus  250 mL Intravenous Once Kristina Patel MD        vancomycin (VANCOCIN) oral solution 125 mg  125 mg Oral 4 times per day Suzanne Pham MD   125 mg at 11/18/17 0532    lidocaine 2 % injection 5 mL  5 mL Intradermal Once Adjelle Kelin Oneal, NP        sodium chloride flush 0.9 % injection 10 mL  10 mL Intravenous Q12H Adjondi Sherre Rudder, NP   10 mL at 11/17/17 2224    sodium chloride flush 0.9 % injection 10 mL  10 mL Intravenous PRN Adjond Pareshre Kimmy, NP        0.9 % sodium chloride infusion  250 mL Intravenous Once Adjellei Kelin Oneal, NP        sodium chloride flush 0.9 % injection 10 mL  10 mL [facility]venous [facility] Paresh[facility] Rudder, NP   10 mL at 11/11/17 2132    0.9 % sodium chloride bolus  1,000 mL Intravenous Once Adjond Kelin Oneal, NP        sodium chloride flush 0.9 % injection 10 mL  10 mL Intravenous 2 times per day Adjondi Shereyad Rivasder, NP   10 mL at 11/16/17 2100    sodium chloride flush 0.9 % injection 10 mL  10 mL Intravenous PRN [facility] Paresh[facility] Robder, NP   10 mL at 11/13/17 2321    acetaminophen (TYLENOL) tablet 650 mg  650 mg Oral Q4H PRN [facility] Kelin Rivasder, NP   650 mg at 11/13/17 2122     Allergies   Allergen Reactions    Arthrotec [Diclofenac-Misoprostol] Nausea Only    Depakote [Divalproex Sodium] Itching    Dilantin [Phenytoin]     Klonopin [Clonazepam]     Statins Other (See Comments)     achy     Principal Problem:    Pancolitis ([address]  Active Problems:    Paroxysmal atrial fibrillation ([facility])    Essential hypertension    Idiopathic Parkinson's disease ([facility])    C. difficile colitis    Anemia    ESRD (end stage renal disease) on dialysis ([facility])    Leukocytosis    SSS (sick sinus syndrome) ([facility])    Cardiac pacemaker    Blood pressure (!) 141/49, pulse 81, temperature 99.7 °F (37.6 °C), temperature source Oral, resp.  rate 16, height 4' 11\" (1.499 m), weight 132 lb 15 oz (60.3 kg), SpO2 98 %.    Subjective  Objective  Assessment:  (1). C. diff colitis, with pancolitis. Currently on oral vancomycin. Being followed by infectious disease  2). Recent episode of cardiac arrest.  Patient being followed by cardiology currently evaluated for PPM, awaiting clearance from infectious disease following which cardiology intends to proceed with this. Anticipated on Tuesday. 3). Diabetes mellitus moderately well-controlled. 4) Renal failure on hemodialysis. ).        Layne Hernandez MD  11/18/2017

## 2017-11-19 LAB
ALBUMIN SERPL-MCNC: 2.3 G/DL (ref 3.9–4.9)
ALP BLD-CCNC: 81 U/L (ref 40–130)
ALT SERPL-CCNC: <5 U/L (ref 0–33)
ANION GAP SERPL CALCULATED.3IONS-SCNC: 14 MEQ/L (ref 7–13)
AST SERPL-CCNC: 20 U/L (ref 0–35)
BASOPHILS ABSOLUTE: 0 K/UL (ref 0–0.2)
BASOPHILS RELATIVE PERCENT: 0.3 %
BILIRUB SERPL-MCNC: 0.2 MG/DL (ref 0–1.2)
BUN BLDV-MCNC: 27 MG/DL (ref 8–23)
CALCIUM SERPL-MCNC: 6.8 MG/DL (ref 8.6–10.2)
CHLORIDE BLD-SCNC: 101 MEQ/L (ref 98–107)
CO2: 22 MEQ/L (ref 22–29)
CREAT SERPL-MCNC: 5.54 MG/DL (ref 0.5–0.9)
EOSINOPHILS ABSOLUTE: 0 K/UL (ref 0–0.7)
EOSINOPHILS RELATIVE PERCENT: 0 %
GFR AFRICAN AMERICAN: 8.9
GFR NON-AFRICAN AMERICAN: 7.4
GLOBULIN: 2.9 G/DL (ref 2.3–3.5)
GLUCOSE BLD-MCNC: 115 MG/DL (ref 74–109)
GLUCOSE BLD-MCNC: 132 MG/DL (ref 60–115)
GLUCOSE BLD-MCNC: 137 MG/DL (ref 60–115)
GLUCOSE BLD-MCNC: 177 MG/DL (ref 60–115)
GLUCOSE BLD-MCNC: 182 MG/DL (ref 60–115)
HCT VFR BLD CALC: 33.6 % (ref 37–47)
HEMOGLOBIN: 11.2 G/DL (ref 12–16)
LYMPHOCYTES ABSOLUTE: 0.9 K/UL (ref 1–4.8)
LYMPHOCYTES RELATIVE PERCENT: 8.9 %
MAGNESIUM: 1.8 MG/DL (ref 1.7–2.3)
MCH RBC QN AUTO: 29.5 PG (ref 27–31.3)
MCHC RBC AUTO-ENTMCNC: 33.2 % (ref 33–37)
MCV RBC AUTO: 89 FL (ref 82–100)
MONOCYTES ABSOLUTE: 0.9 K/UL (ref 0.2–0.8)
MONOCYTES RELATIVE PERCENT: 9.2 %
NEUTROPHILS ABSOLUTE: 7.9 K/UL (ref 1.4–6.5)
NEUTROPHILS RELATIVE PERCENT: 81.6 %
PDW BLD-RTO: 17 % (ref 11.5–14.5)
PERFORMED ON: ABNORMAL
PLATELET # BLD: 72 K/UL (ref 130–400)
POTASSIUM SERPL-SCNC: 3.9 MEQ/L (ref 3.5–5.1)
RBC # BLD: 3.78 M/UL (ref 4.2–5.4)
SODIUM BLD-SCNC: 137 MEQ/L (ref 132–144)
TOTAL PROTEIN: 5.2 G/DL (ref 6.4–8.1)
WBC # BLD: 9.7 K/UL (ref 4.8–10.8)

## 2017-11-19 PROCEDURE — 6370000000 HC RX 637 (ALT 250 FOR IP): Performed by: HOSPITALIST

## 2017-11-19 PROCEDURE — 6360000002 HC RX W HCPCS: Performed by: INTERNAL MEDICINE

## 2017-11-19 PROCEDURE — 83735 ASSAY OF MAGNESIUM: CPT

## 2017-11-19 PROCEDURE — 2580000003 HC RX 258: Performed by: NURSE PRACTITIONER

## 2017-11-19 PROCEDURE — 85025 COMPLETE CBC W/AUTO DIFF WBC: CPT

## 2017-11-19 PROCEDURE — 6370000000 HC RX 637 (ALT 250 FOR IP): Performed by: INTERNAL MEDICINE

## 2017-11-19 PROCEDURE — 97162 PT EVAL MOD COMPLEX 30 MIN: CPT

## 2017-11-19 PROCEDURE — 36415 COLL VENOUS BLD VENIPUNCTURE: CPT

## 2017-11-19 PROCEDURE — 93005 ELECTROCARDIOGRAM TRACING: CPT

## 2017-11-19 PROCEDURE — 2060000000 HC ICU INTERMEDIATE R&B

## 2017-11-19 PROCEDURE — G8978 MOBILITY CURRENT STATUS: HCPCS

## 2017-11-19 PROCEDURE — G8979 MOBILITY GOAL STATUS: HCPCS

## 2017-11-19 PROCEDURE — 6370000000 HC RX 637 (ALT 250 FOR IP): Performed by: SPECIALIST

## 2017-11-19 PROCEDURE — 80053 COMPREHEN METABOLIC PANEL: CPT

## 2017-11-19 RX ORDER — MAGNESIUM SULFATE IN WATER 40 MG/ML
2 INJECTION, SOLUTION INTRAVENOUS ONCE
Status: COMPLETED | OUTPATIENT
Start: 2017-11-19 | End: 2017-11-19

## 2017-11-19 RX ADMIN — PANTOPRAZOLE SODIUM 40 MG: 40 GRANULE, DELAYED RELEASE ORAL at 06:07

## 2017-11-19 RX ADMIN — INSULIN LISPRO 2 UNITS: 100 INJECTION, SOLUTION INTRAVENOUS; SUBCUTANEOUS at 17:07

## 2017-11-19 RX ADMIN — ROSUVASTATIN CALCIUM 40 MG: 40 TABLET, FILM COATED ORAL at 22:07

## 2017-11-19 RX ADMIN — Medication 125 MG: at 06:06

## 2017-11-19 RX ADMIN — AMIODARONE HYDROCHLORIDE 200 MG: 200 TABLET ORAL at 08:48

## 2017-11-19 RX ADMIN — INSULIN LISPRO 2 UNITS: 100 INJECTION, SOLUTION INTRAVENOUS; SUBCUTANEOUS at 11:46

## 2017-11-19 RX ADMIN — DILTIAZEM HYDROCHLORIDE 30 MG: 30 TABLET, FILM COATED ORAL at 00:49

## 2017-11-19 RX ADMIN — LEVOTHYROXINE SODIUM 50 MCG: 50 TABLET ORAL at 06:06

## 2017-11-19 RX ADMIN — Medication 125 MG: at 11:41

## 2017-11-19 RX ADMIN — DILTIAZEM HYDROCHLORIDE 30 MG: 30 TABLET, FILM COATED ORAL at 11:41

## 2017-11-19 RX ADMIN — Medication 125 MG: at 00:49

## 2017-11-19 RX ADMIN — INSULIN GLARGINE 14 UNITS: 100 INJECTION, SOLUTION SUBCUTANEOUS at 08:49

## 2017-11-19 RX ADMIN — CARBIDOPA AND LEVODOPA 1 TABLET: 25; 100 TABLET ORAL at 22:07

## 2017-11-19 RX ADMIN — Medication 125 MG: at 23:38

## 2017-11-19 RX ADMIN — DILTIAZEM HYDROCHLORIDE 30 MG: 30 TABLET, FILM COATED ORAL at 17:06

## 2017-11-19 RX ADMIN — Medication 1 CAPSULE: at 08:49

## 2017-11-19 RX ADMIN — DILTIAZEM HYDROCHLORIDE 30 MG: 30 TABLET, FILM COATED ORAL at 06:06

## 2017-11-19 RX ADMIN — MAGNESIUM SULFATE IN WATER 2 G: 40 INJECTION, SOLUTION INTRAVENOUS at 13:18

## 2017-11-19 RX ADMIN — Medication 10 ML: at 08:47

## 2017-11-19 RX ADMIN — Medication 10 ML: at 04:00

## 2017-11-19 RX ADMIN — CALCIUM 500 MG: 500 TABLET ORAL at 08:48

## 2017-11-19 RX ADMIN — DILTIAZEM HYDROCHLORIDE 30 MG: 30 TABLET, FILM COATED ORAL at 23:35

## 2017-11-19 RX ADMIN — CARBIDOPA AND LEVODOPA 1 TABLET: 25; 100 TABLET ORAL at 08:48

## 2017-11-19 RX ADMIN — AMIODARONE HYDROCHLORIDE 200 MG: 200 TABLET ORAL at 22:08

## 2017-11-19 RX ADMIN — VENLAFAXINE HYDROCHLORIDE 150 MG: 150 CAPSULE, EXTENDED RELEASE ORAL at 08:48

## 2017-11-19 RX ADMIN — Medication 125 MG: at 17:06

## 2017-11-19 RX ADMIN — ISOSORBIDE MONONITRATE 60 MG: 60 TABLET, EXTENDED RELEASE ORAL at 08:47

## 2017-11-19 RX ADMIN — Medication 10 ML: at 22:09

## 2017-11-19 ASSESSMENT — PAIN SCALES - GENERAL
PAINLEVEL_OUTOF10: 0

## 2017-11-19 NOTE — PROGRESS NOTES
CARDIOLOGY [address] PROGRESS NOTE         11/19/2017      Aura Jones    [id number]  1935    Rounding MD: Brittany Cason MD ,[facility]    Primary Cardiologist:  Alba Ramos MD       SUBJECTIVE:     No further Complaints. Just extremely tired / weak. No CP, SOB or SXs. No recurrent Dysrhythmias noted. Cardiac and general ROS otherwise negative and unchanged.       ASSESSMENT:     1. Post Asystolic arrest during Dialysis, question etiology  2. Elevated Troponin, RO MI vs Demand rise  3. Hypokalemia  4. Renal Failure on HD  5. C Diff Colitis  6. Pneumonia of lower lobe due to infectious organism ([facility]  7. Elevated BNP, Can be seen in both CHF and PNA  8. Moderate PHTN  9. Diastolic Dysfunction, mild, possible HFpEF  10. PAFIB , now Back in SR  11. SSS s/p Pacemaker  12. CAD hx Prior PCI, LCX BMS 2010, negative repeat Cath  6/2014  13. Normal LV function 55% ( echo 10/17 )  14. COPD  13. IVON  16. CKD (chronic kidney disease), now on HD  17. Essential hypertension  18. Hyperlipidemia  19. Diabetes mellitus  20. PVD  21. Prior DVT  22. Hypothyroidism  23. CLBP  24. Former Smoker  22. Polycythemia Vera  26. Hyperkalemia  27. Anemia  28. GERD  29. Anxiety  30. Idiopathic Parkinson's disease ([facility]         PLAN:     CV supportive Care with contination of medications. Telemetry  Serial Labs. General, GI and Nephrology care with HD. Hold anticoagulation for now  Given Asystolic event, best to place PPM, ID has Cleared. Now tentatively planned for Monday PM as inpatient. ID consult appreciated. Cardiac maximal care as tolerated. See Orders.        Prior HPI Notes:    Katiana Sherman is a 81 y.o.  female patient who is being at the request of Dr. Chuy Clayton inpatient consultation of atrial fibrillation.  She was admitted on 10/24/2017. [facility] and [facility] records have been reviewed in detail. Jay Buckner is followed on a regular basis by Dr. Marshall Arriaga.     She has a history of paroxysmal atrial fibrillation for which she is maintained chronically on amiodarone.  She is chronically antiquated without warfarin. Nicky Edward has a history of mild coronary artery disease. She also has a history of moderate to severe calcific aortic stenosis.  She has significant hyperlipidemia but is been intolerant to statin therapy. Nicky Edward has stage V chronic kidney disease and follows regularly with Dr. Whit Salgado. iNcky Edward had an AV fistula placed however that has not completely matured.  Most recently saw Dr. Dante Dixon in early September and was noted on EKG to be in normal sinus rhythm.     She presented to the emergency department 5 days ago with complaints of worsening shortness of breath occurring over a period of 3 days.  She's had a productive cough and has been having some bloody sputum. Nicky Edward was diagnosed with pneumonia and is being treated with intravenous antibiotics. Nicky Edward notes that she is now feeling much better. Nicky Edward is also being treated for hyperkalemia.  She initially was noted to be normal sinus rhythm, however, yesterday she developed tachycardia and was noted to be in atrial fibrillation with a rapid ventricular response.  She denies any chest pain.  She denies any orthopnea, PND, or worsening peripheral edema.  She denies any palpitations, lightheadedness, near-syncope or syncope. Now readmitted with C Difficle infection.     OBJECTIVE:     MEDICATIONS:     Scheduled Meds:   carbidopa-levodopa  1 tablet Oral BID    diltiazem  30 mg Oral 4 times per day    amiodarone  200 mg Oral BID    sodium chloride  250 mL Intravenous Once    darbepoetin lizeth-polysorbate  100 mcg Subcutaneous Weekly    buprenorphine  1 patch Transdermal Weekly    calcium elemental  500 mg Oral Daily    fluticasone  2 spray Nasal Daily    iron polysaccaride  1 capsule Oral Daily with breakfast    isosorbide mononitrate  60 mg Oral Daily    insulin glargine  14 Units Subcutaneous QAM    levothyroxine  50 mcg Oral Daily    pantoprazole sodium  40 mg Oral QAM AC    venlafaxine  150 mg Oral Daily with breakfast    insulin lispro  0-12 Units Subcutaneous TID WC    insulin lispro  0-6 Units Subcutaneous Nightly    rosuvastatin  40 mg Oral Nightly    sodium chloride  250 mL Intravenous Once    vancomycin  125 mg Oral 4 times per day    lidocaine  5 mL Intradermal Once    sodium chloride flush  10 mL Intravenous Q12H    sodium chloride  1,000 mL Intravenous Once    sodium chloride flush  10 mL Intravenous 2 times per day     Continuous Infusions:   dextrose      sodium chloride       PRN Meds:potassium chloride, metoprolol, fentanNYL, heparin (porcine), heparin (porcine), metoprolol, albumin human, ALPRAZolam, guaiFENesin-dextromethorphan, ipratropium-albuterol, glucose, dextrose, glucagon (rDNA), dextrose, heparin (porcine), sodium chloride flush, sodium chloride flush, sodium chloride flush, acetaminophen    PHYSICAL EXAM:    CURRENT VITALS: /88   Pulse 131   Temp 97.2 °F (36.2 °C) (Oral)   Resp 16   Ht 4' 11\" (1.499 m)   Wt 132 lb 15 oz (60.3 kg)   SpO2 100%   BMI 26.85 kg/m²     CONSTITUTIONAL:  awake, alert, cooperative, no apparent distress,   ENT:  Normocephalic, without obvious abnormality, atraumatic, sinuses nontender on palpation, external ears without lesions,  NECK:  Supple, symmetrical, trachea midline, no adenopathy, thyroid symmetric, not enlarged and no tenderness, skin normal  LUNGS:  No increased work of breathing, good air exchange, clear to auscultation bilaterally, no crackles or wheezing, PM left Chest.  CARDIOVASCULAR:  Normal apical impulse, regular rate and rhythm, normal S1 and S2,  and 2/6 systolic murmur noted  ABDOMEN:  Normal bowel sounds, soft, non-distended, non-tender, no masses palpated, no hepatosplenomegally  EXTREMITIES:  No edema, Pulses strong throughout. NEUROLOGIC:  Awake, alert, oriented to name, place and time.  Following all commands and moving all extremties  SKIN:  no bruising or 6.8 (L) 8.6 - 10.2 mg/dL    Total Protein 5.2 (L) 6.4 - 8.1 g/dL    Alb 2.3 (L) 3.9 - 4.9 g/dL    Total Bilirubin 0.2 0.0 - 1.2 mg/dL    Alkaline Phosphatase 81 40 - 130 U/L    ALT <5 0 - 33 U/L    AST 20 0 - 35 U/L    Globulin 2.9 2.3 - 3.5 g/dL   Magnesium    Collection Time: 11/19/17  6:04 AM   Result Value Ref Range    Magnesium 1.8 1.7 - 2.3 mg/dL   POCT Glucose    Collection Time: 11/19/17  6:49 AM   Result Value Ref Range    POC Glucose 137 (H) 60 - 115 mg/dl    Performed on ACCU-CHEK          VQ LUNGS:  Negative ( non diagnostic ) for PE     EKG:   SR    TELEMETRY:  AF , Asystolic > 1 min, > SR spontaneously, no recurrence. Echo: (10/17)  Normal LV function 28%  Mild Diastolic Dysfunction  No significant VHD  Moderate PHTN, RVSP 45.   PM noted        Bianca Deshpande MD ,[address] [place] [address] Cardiology

## 2017-11-19 NOTE — PROGRESS NOTES
Physical Therapy Med Surg Initial Assessment  Facility/Department: [facility] TELEMETRY  [id number]       NAME: Henny Faith  : 1935 (82 y.o.)  MRN: [id number]  CODE STATUS: Full Code    Date of Service: 2017    Patient Diagnosis(es): Pneumonia [J18.9]  Pneumonia [J18.9]   Chief Complaint   Patient presents with    Abnormal Lab     pt sent from [facility] for WBC 26.5, HGB 7, currently being Tx for C diff     Patient Active Problem List    Diagnosis Date Noted    SSS (sick sinus syndrome) ([facility] [address] 2017     Priority: High    Cardiac pacemaker 2017     Priority: High    Pancolitis ([address] 11/10/2017    C. difficile colitis 11/10/2017    Anemia 11/10/2017    ESRD (end stage renal disease) on dialysis ([address] 11/10/2017    Leukocytosis 11/10/2017    Acute on chronic diastolic congestive heart failure ([address] 10/30/2017    Pulmonary edema with congestive heart failure (HCC)     Shortness of breath     Acute pulmonary edema (HCC)     Hyperkalemia 10/26/2017    Pneumonia of lower lobe due to infectious organism ([address] 10/25/2017    Diabetes mellitus due to underlying condition, controlled, with stage 5 chronic kidney disease not on chronic dialysis, with long-term current use of insulin ([address] 2017    Essential hypertension 2017    Anxiety 2017    Paroxysmal atrial fibrillation ([address] 10/27/2016    Acid reflux 2015    Idiopathic Parkinson's disease ([address] 2014    CKD (chronic kidney disease) 10/15/2012    Anemia 2011    Hypothyroidism 10/04/2011    Hypercholesteremia 10/04/2011        Past Medical History:   Diagnosis Date    Abnormal presence of protein in urine 2004    Dr. Matthew Dougherty Atrial fibrillation ([address]     Constipation, chronic     Diverticular disease of the colon     GERD (gastroesophageal reflux disease)     GERD, PUD, Hiatal Hernia    Granulomatous lung disease ([address]     History of endoscopy 12    Dr. Maude Solitario Hyperlipidemia     Hypothyroidism     Kidney stones 11/2001    Dr. Jude Esparza    Neuropathy in diabetes ([facility] [address]     legs    Osteoarthritis     Positive ORTEGA (antinuclear antibody)     1:80    Tachycardia     Thrombophlebitis leg superficial     Type II or unspecified type diabetes mellitus without mention of complication, not stated as uncontrolled 1998     Past Surgical History:   Procedure Laterality Date    APPENDECTOMY  2007    BLADDER SUSPENSION  2008 & 2009    [facility], vitaly at [address]  2/2006    COLONOSCOPY  4/2007    Dr. Howe Dy    [address] N/A 11/2/2017    CATHETER INSERTION HEMODIALYSIS performed by Leila Su MD at [address] [place] [address] Rd  [phone]    Bilateral    [address]  3/2003            Restrictions:  Restrictions/Precautions:  (enteric precautions)  Body mass index is 26.85 kg/m². SUBJECTIVE: Subjective: Pt agreeable to get OOB. No complaints. Pre Treatment Pain Screening  Pain at present: 0    Post Treatment Pain Screening:   Pain Screening  Patient Currently in Pain: No  Pain Assessment  Pain Level: 0    Prior Level of Function:  Social/Functional History  Lives With: Son  Type of Home: House  Home Layout: One level  Home Access: Level entry  Home Equipment:  (none)  ADL Assistance: Independent  Homemaking Assistance: Independent  Ambulation Assistance: Independent  Transfer Assistance: Independent    OBJECTIVE:   Vision/Hearing:  Vision: Within Functional Limits  Hearing: Exceptions to [facility]  Hearing Exceptions: Hard of hearing/hearing concerns    Cognition:  Overall Orientation Status: Within Normal Limits    Observation/Palpation  Observation: significant B UE and B LE tremors once sitting EOB, required several minutes to recover.  Pt reporting this is normal when sick d/t PD     ROM:  RLE AROM: WFL  LLE AROM : WFL    Strength: Status (): At least 40 percent but less than 60 percent impaired, limited or restricted  Mobility: Walking and Moving Around Goal Status ():  At least 1 percent but less than 20 percent impaired, limited or restricted    Goals:  Short term goals  Short term goal 1: Pt will be indep with HEP  Short term goal 2: Pt will complete transfers with Mod I  Short term goal 3: Pt will amb 48' with or without AD with mod I  Short term goal 4: Pt will complete standing activities with 0-1 UE support and Mod I    Therapy Time:   Individual   Time In 1145   Time Out 1205   Minutes 20   Timed Code Treatment Minutes: 0 Minutes       Varsha Alfaro PT, 11/19/17 at 12:34 PM

## 2017-11-19 NOTE — PROGRESS NOTES
Oral 4 times per day    lidocaine  5 mL Intradermal Once    sodium chloride flush  10 mL Intravenous Q12H    sodium chloride  1,000 mL Intravenous Once    sodium chloride flush  10 mL Intravenous 2 times per day     Continuous Infusions:   dextrose      sodium chloride         CBC:   Recent Labs      11/18/17   0712  11/19/17   0604   WBC  11.2*  9.7   HGB  10.7*  11.2*   PLT  60*  72*     CMP:  Recent Labs      11/17/17   0600  11/17/17   2330  11/18/17   0712  11/19/17   0604   NA  138   --   138  137   K  2.9*  4.1  4.4  4.5  3.9   CL  99   --   101  101   CO2  23   --   22  22   BUN  32*   --   18  27*   CREATININE  6.68*   --   4.50*  5.54*   GLUCOSE  112*   --   169*  115*   CALCIUM  5.9*   --   6.8*  6.8*   LABGLOM  5.9*   --   9.4*  7.4*     Troponin:   Recent Labs      11/17/17   0600   TROPONINI  0.071*     BNP: No results for input(s): BNP in the last 72 hours. INR: No results for input(s): INR in the last 72 hours. Lipids: No results for input(s): CHOL, LDLDIRECT, TRIG, HDL, AMYLASE, LIPASE in the last 72 hours. Liver: Recent Labs      11/19/17   0604   AST  20   ALT  <5   ALKPHOS  81   PROT  5.2*   LABALBU  2.3*   BILITOT  0.2     Iron:  No results for input(s): IRONS, FERRITIN in the last 72 hours. Invalid input(s): LABIRONS  Urinalysis: No results for input(s): UA in the last 72 hours.     Objective:   Vitals: /88   Pulse 131   Temp 97.2 °F (36.2 °C) (Oral)   Resp 16   Ht 4' 11\" (1.499 m)   Wt 132 lb 15 oz (60.3 kg)   SpO2 100%   BMI 26.85 kg/m²    Wt Readings from Last 3 Encounters:   11/18/17 132 lb 15 oz (60.3 kg)   11/03/17 124 lb 5.4 oz (56.4 kg)   09/28/17 135 lb (61.2 kg)      24HR INTAKE/OUTPUT:    Intake/Output Summary (Last 24 hours) at 11/19/17 1643  Last data filed at 11/19/17 1153   Gross per 24 hour   Intake              560 ml   Output                0 ml   Net              560 ml       Constitutional:  Alert, awake, no apparent distress   Skin:normal, no rash  HEENT:sclera anicteric.   Head atraumatic normocephalic  Neck:supple with no thyromegally  Cardiovascular:  S1, S2 without m/r/g   Respiratory:  CTA B without w/r/r   Abdomen: +bs, soft, nt  Ext: no LE edema  Musculoskeletal:Intact  Neuro:Alert and oriented with no deficit      Electronically signed by Nathalia Reed MD on 11/19/2017 at 4:43 PM

## 2017-11-19 NOTE — PROGRESS NOTES
11/19/17 0848    fluticasone (FLONASE) 50 MCG/ACT nasal spray 2 spray  2 spray Nasal Daily Radha White MD   2 spray at 11/16/17 0759    guaiFENesin-dextromethorphan (ROBITUSSIN DM) 100-10 MG/5ML syrup 5 mL  5 mL Oral Q4H PRN Radha White MD        ipratropium-albuterol (DUONEB) nebulizer solution 3 mL  3 mL Inhalation Q4H PRN Radha White MD        iron polysaccaride (FERREX 150 FORTE PLUS) capsule 1 capsule  1 capsule Oral Daily with breakfast Radha White MD   1 capsule at 11/19/17 0849    isosorbide mononitrate (IMDUR) extended release tablet 60 mg  60 mg Oral Daily Radha White MD   60 mg at 11/19/17 0847    insulin glargine (LANTUS) injection vial 14 Units  14 Units Subcutaneous QAM Radha White MD   14 Units at 11/19/17 0849    levothyroxine (SYNTHROID) tablet 50 mcg  50 mcg Oral Daily Radha White MD   50 mcg at 11/19/17 0606    pantoprazole sodium (PROTONIX) packet 40 mg  40 mg Oral QAM AC Radha White MD   40 mg at 11/19/17 0607    venlafaxine (EFFEXOR XR) extended release capsule 150 mg  150 mg Oral Daily with breakfast Radha White MD   150 mg at 11/19/17 0848    glucose (GLUTOSE) 40 % oral gel 15 g  15 g Oral PRN Radha White MD        dextrose 50 % solution 12.5 g  12.5 g Intravenous PRN Radha White MD        glucagon (rDNA) injection 1 mg  1 mg Intramuscular PRN Radha White MD        dextrose 5 % solution  100 mL/hr Intravenous PRN Radha White MD        insulin lispro (HUMALOG) injection vial 0-12 Units  0-12 Units Subcutaneous TID WC Radha White MD   Stopped at 11/18/17 1655    insulin lispro (HUMALOG) injection vial 0-6 Units  0-6 Units Subcutaneous Nightly Radha White MD   1 Units at 11/17/17 2224    rosuvastatin (CRESTOR) tablet 40 mg  40 mg Oral Nightly Radha White MD   40 mg at 11/18/17 2041    heparin (porcine) injection 4,000 Units  4,000 Units Intercatheter PRN Krystina Muñiz MD   4,000 Units at 11/15/17 0800    0.9 % %.    Subjective    Objective    Assessment:  (1). C. diff colitis, with pancolitis. Currently on oral vancomycin. Being followed by infectious disease  2). Parkinson's, increase her Sinemet to twice a day for once a day as needed and this seems to have improved her symptoms. 3). Recent episode of cardiac arrest.  Patient being followed by cardiology currently evaluated for PPM, awaiting clearance from infectious disease following which cardiology intends to proceed with this. Anticipated on Tuesday. 4). Diabetes mellitus moderately well-controlled. 5) Renal failure on hemodialysis. ).        Jose C Mortensen MD  11/19/2017

## 2017-11-19 NOTE — PROGRESS NOTES
[facility] Occupational Therapy      Date: 2017  Patient Name: Mont Goldberg        MRN: [id number]  Account: [de-identified]   : 1935  (81 y.o.)  [facility]    Referral received. Evaluation performed on 17. Please refer to the evaluation for current status and goals.     Electronically signed by PRIYA Burroughs on 2017 at 12:03 PM

## 2017-11-20 ENCOUNTER — APPOINTMENT (OUTPATIENT)
Dept: GENERAL RADIOLOGY | Age: 82
DRG: 371 | End: 2017-11-20
Payer: MEDICARE

## 2017-11-20 LAB
ALBUMIN SERPL-MCNC: 2.4 G/DL (ref 3.9–4.9)
ALP BLD-CCNC: 83 U/L (ref 40–130)
ALT SERPL-CCNC: <5 U/L (ref 0–33)
ANION GAP SERPL CALCULATED.3IONS-SCNC: 16 MEQ/L (ref 7–13)
AST SERPL-CCNC: 18 U/L (ref 0–35)
BASOPHILS ABSOLUTE: 0 K/UL (ref 0–0.2)
BASOPHILS RELATIVE PERCENT: 0.4 %
BILIRUB SERPL-MCNC: 0.2 MG/DL (ref 0–1.2)
BLOOD CULTURE, ROUTINE: NORMAL
BUN BLDV-MCNC: 36 MG/DL (ref 8–23)
CALCIUM SERPL-MCNC: 7.2 MG/DL (ref 8.6–10.2)
CHLORIDE BLD-SCNC: 102 MEQ/L (ref 98–107)
CO2: 19 MEQ/L (ref 22–29)
CREAT SERPL-MCNC: 6.2 MG/DL (ref 0.5–0.9)
CULTURE, BLOOD 2: NORMAL
EKG ATRIAL RATE: 79 BPM
EKG ATRIAL RATE: 79 BPM
EKG ATRIAL RATE: 86 BPM
EKG P AXIS: -3 DEGREES
EKG P AXIS: -6 DEGREES
EKG P AXIS: 10 DEGREES
EKG P-R INTERVAL: 202 MS
EKG P-R INTERVAL: 208 MS
EKG P-R INTERVAL: 220 MS
EKG Q-T INTERVAL: 408 MS
EKG Q-T INTERVAL: 438 MS
EKG Q-T INTERVAL: 450 MS
EKG QRS DURATION: 100 MS
EKG QRS DURATION: 102 MS
EKG QRS DURATION: 92 MS
EKG QTC CALCULATION (BAZETT): 488 MS
EKG QTC CALCULATION (BAZETT): 502 MS
EKG QTC CALCULATION (BAZETT): 516 MS
EKG R AXIS: -1 DEGREES
EKG R AXIS: 1 DEGREES
EKG R AXIS: 7 DEGREES
EKG T AXIS: 32 DEGREES
EKG T AXIS: 38 DEGREES
EKG T AXIS: 51 DEGREES
EKG VENTRICULAR RATE: 79 BPM
EKG VENTRICULAR RATE: 79 BPM
EKG VENTRICULAR RATE: 86 BPM
EOSINOPHILS ABSOLUTE: 0 K/UL (ref 0–0.7)
EOSINOPHILS RELATIVE PERCENT: 0 %
GFR AFRICAN AMERICAN: 7.8
GFR NON-AFRICAN AMERICAN: 6.5
GLOBULIN: 3 G/DL (ref 2.3–3.5)
GLUCOSE BLD-MCNC: 126 MG/DL (ref 74–109)
GLUCOSE BLD-MCNC: 182 MG/DL (ref 60–115)
HCT VFR BLD CALC: 31.9 % (ref 37–47)
HEMOGLOBIN: 10.5 G/DL (ref 12–16)
LYMPHOCYTES ABSOLUTE: 1.1 K/UL (ref 1–4.8)
LYMPHOCYTES RELATIVE PERCENT: 10.7 %
MAGNESIUM: 2.5 MG/DL (ref 1.7–2.3)
MCH RBC QN AUTO: 29.1 PG (ref 27–31.3)
MCHC RBC AUTO-ENTMCNC: 33 % (ref 33–37)
MCV RBC AUTO: 88 FL (ref 82–100)
MONOCYTES ABSOLUTE: 1.1 K/UL (ref 0.2–0.8)
MONOCYTES RELATIVE PERCENT: 10.3 %
NEUTROPHILS ABSOLUTE: 8 K/UL (ref 1.4–6.5)
NEUTROPHILS RELATIVE PERCENT: 78.6 %
PDW BLD-RTO: 16.7 % (ref 11.5–14.5)
PERFORMED ON: ABNORMAL
PLATELET # BLD: 97 K/UL (ref 130–400)
POTASSIUM SERPL-SCNC: 3.9 MEQ/L (ref 3.5–5.1)
RBC # BLD: 3.63 M/UL (ref 4.2–5.4)
SODIUM BLD-SCNC: 137 MEQ/L (ref 132–144)
TOTAL PROTEIN: 5.4 G/DL (ref 6.4–8.1)
WBC # BLD: 10.2 K/UL (ref 4.8–10.8)

## 2017-11-20 PROCEDURE — 6370000000 HC RX 637 (ALT 250 FOR IP): Performed by: SPECIALIST

## 2017-11-20 PROCEDURE — 36415 COLL VENOUS BLD VENIPUNCTURE: CPT

## 2017-11-20 PROCEDURE — 6370000000 HC RX 637 (ALT 250 FOR IP): Performed by: INTERNAL MEDICINE

## 2017-11-20 PROCEDURE — 6370000000 HC RX 637 (ALT 250 FOR IP): Performed by: HOSPITALIST

## 2017-11-20 PROCEDURE — 71010 XR CHEST PORTABLE: CPT

## 2017-11-20 PROCEDURE — C1779 LEAD, PMKR, TRANSVENOUS VDD: HCPCS

## 2017-11-20 PROCEDURE — 99232 SBSQ HOSP IP/OBS MODERATE 35: CPT | Performed by: INTERNAL MEDICINE

## 2017-11-20 PROCEDURE — 6360000002 HC RX W HCPCS

## 2017-11-20 PROCEDURE — 33208 INSRT HEART PM ATRIAL & VENT: CPT | Performed by: INTERNAL MEDICINE

## 2017-11-20 PROCEDURE — 2580000003 HC RX 258

## 2017-11-20 PROCEDURE — 85025 COMPLETE CBC W/AUTO DIFF WBC: CPT

## 2017-11-20 PROCEDURE — 80053 COMPREHEN METABOLIC PANEL: CPT

## 2017-11-20 PROCEDURE — 6370000000 HC RX 637 (ALT 250 FOR IP)

## 2017-11-20 PROCEDURE — 2060000000 HC ICU INTERMEDIATE R&B

## 2017-11-20 PROCEDURE — 6360000002 HC RX W HCPCS: Performed by: INTERNAL MEDICINE

## 2017-11-20 PROCEDURE — 2500000003 HC RX 250 WO HCPCS

## 2017-11-20 PROCEDURE — C1898 LEAD, PMKR, OTHER THAN TRANS: HCPCS

## 2017-11-20 PROCEDURE — 83735 ASSAY OF MAGNESIUM: CPT

## 2017-11-20 PROCEDURE — 2580000003 HC RX 258: Performed by: INTERNAL MEDICINE

## 2017-11-20 PROCEDURE — 93005 ELECTROCARDIOGRAM TRACING: CPT

## 2017-11-20 PROCEDURE — C1785 PMKR, DUAL, RATE-RESP: HCPCS

## 2017-11-20 PROCEDURE — 2500000003 HC RX 250 WO HCPCS: Performed by: INTERNAL MEDICINE

## 2017-11-20 RX ORDER — SODIUM CHLORIDE 0.9 % (FLUSH) 0.9 %
10 SYRINGE (ML) INJECTION PRN
Status: DISCONTINUED | OUTPATIENT
Start: 2017-11-20 | End: 2017-11-24 | Stop reason: HOSPADM

## 2017-11-20 RX ORDER — SODIUM CHLORIDE 0.9 % (FLUSH) 0.9 %
10 SYRINGE (ML) INJECTION EVERY 12 HOURS SCHEDULED
Status: DISCONTINUED | OUTPATIENT
Start: 2017-11-20 | End: 2017-11-24 | Stop reason: HOSPADM

## 2017-11-20 RX ORDER — ONDANSETRON 2 MG/ML
4 INJECTION INTRAMUSCULAR; INTRAVENOUS EVERY 6 HOURS PRN
Status: DISCONTINUED | OUTPATIENT
Start: 2017-11-20 | End: 2017-11-24 | Stop reason: HOSPADM

## 2017-11-20 RX ORDER — ACETAMINOPHEN 325 MG/1
650 TABLET ORAL EVERY 4 HOURS PRN
Status: DISCONTINUED | OUTPATIENT
Start: 2017-11-20 | End: 2017-11-20 | Stop reason: SDUPTHER

## 2017-11-20 RX ORDER — ONDANSETRON 2 MG/ML
4 INJECTION INTRAMUSCULAR; INTRAVENOUS EVERY 6 HOURS PRN
Status: DISCONTINUED | OUTPATIENT
Start: 2017-11-20 | End: 2017-11-20

## 2017-11-20 RX ORDER — SODIUM CHLORIDE 9 MG/ML
INJECTION, SOLUTION INTRAVENOUS CONTINUOUS
Status: DISCONTINUED | OUTPATIENT
Start: 2017-11-21 | End: 2017-11-20

## 2017-11-20 RX ORDER — SODIUM CHLORIDE 9 MG/ML
INJECTION, SOLUTION INTRAVENOUS
Status: DISPENSED
Start: 2017-11-20 | End: 2017-11-21

## 2017-11-20 RX ORDER — VANCOMYCIN HYDROCHLORIDE 1 G/200ML
1000 INJECTION, SOLUTION INTRAVENOUS ONCE
Status: COMPLETED | OUTPATIENT
Start: 2017-11-20 | End: 2017-11-20

## 2017-11-20 RX ORDER — SODIUM CHLORIDE 9 MG/ML
INJECTION, SOLUTION INTRAVENOUS
Status: DISPENSED
Start: 2017-11-20 | End: 2017-11-20

## 2017-11-20 RX ADMIN — Medication 125 MG: at 20:56

## 2017-11-20 RX ADMIN — DILTIAZEM HYDROCHLORIDE 30 MG: 30 TABLET, FILM COATED ORAL at 14:31

## 2017-11-20 RX ADMIN — INSULIN GLARGINE 14 UNITS: 100 INJECTION, SOLUTION SUBCUTANEOUS at 08:25

## 2017-11-20 RX ADMIN — AMIODARONE HYDROCHLORIDE 200 MG: 200 TABLET ORAL at 20:32

## 2017-11-20 RX ADMIN — VANCOMYCIN HYDROCHLORIDE 1000 MG: 1 INJECTION, SOLUTION INTRAVENOUS at 15:30

## 2017-11-20 RX ADMIN — Medication 10 ML: at 20:36

## 2017-11-20 RX ADMIN — VANCOMYCIN HYDROCHLORIDE 1000 MG: 1 INJECTION, POWDER, LYOPHILIZED, FOR SOLUTION INTRAVENOUS at 15:00

## 2017-11-20 RX ADMIN — METOPROLOL TARTRATE 2.5 MG: 5 INJECTION INTRAVENOUS at 18:47

## 2017-11-20 RX ADMIN — AMIODARONE HYDROCHLORIDE 200 MG: 200 TABLET ORAL at 05:21

## 2017-11-20 RX ADMIN — HEPARIN SODIUM 4000 UNITS: 1000 INJECTION, SOLUTION INTRAVENOUS; SUBCUTANEOUS at 13:59

## 2017-11-20 RX ADMIN — LEVOTHYROXINE SODIUM 50 MCG: 50 TABLET ORAL at 06:41

## 2017-11-20 RX ADMIN — Medication 125 MG: at 14:29

## 2017-11-20 RX ADMIN — DILTIAZEM HYDROCHLORIDE 60 MG: 30 TABLET, FILM COATED ORAL at 20:32

## 2017-11-20 RX ADMIN — Medication 125 MG: at 05:23

## 2017-11-20 RX ADMIN — DILTIAZEM HYDROCHLORIDE 30 MG: 30 TABLET, FILM COATED ORAL at 05:21

## 2017-11-20 RX ADMIN — CARBIDOPA AND LEVODOPA 1 TABLET: 25; 100 TABLET ORAL at 20:32

## 2017-11-20 RX ADMIN — ROSUVASTATIN CALCIUM 40 MG: 40 TABLET, FILM COATED ORAL at 20:32

## 2017-11-20 ASSESSMENT — PAIN SCALES - GENERAL
PAINLEVEL_OUTOF10: 0

## 2017-11-20 NOTE — CARE COORDINATION
Annie Melendrez from [facility] here and aware pt is to get a pacemaker today. Possible discharge for tomorrow. Discharge plan remains to return to [facility].   Electronically signed by ALIDA Reynoso on 11/20/17 at 12:13 PM

## 2017-11-20 NOTE — PROGRESS NOTES
mononitrate  60 mg Oral Daily    insulin glargine  14 Units Subcutaneous QAM    levothyroxine  50 mcg Oral Daily    pantoprazole sodium  40 mg Oral QAM AC    venlafaxine  150 mg Oral Daily with breakfast    insulin lispro  0-12 Units Subcutaneous TID WC    insulin lispro  0-6 Units Subcutaneous Nightly    rosuvastatin  40 mg Oral Nightly    sodium chloride  250 mL Intravenous Once    vancomycin  125 mg Oral 4 times per day    lidocaine  5 mL Intradermal Once    sodium chloride flush  10 mL Intravenous Q12H    sodium chloride  1,000 mL Intravenous Once    sodium chloride flush  10 mL Intravenous 2 times per day     Continuous Infusions:   [START ON 11/21/2017] sodium chloride      dextrose      sodium chloride       PRN Meds:sodium chloride flush, ondansetron, potassium chloride, metoprolol, fentanNYL, heparin (porcine), heparin (porcine), metoprolol, albumin human, ALPRAZolam, guaiFENesin-dextromethorphan, ipratropium-albuterol, glucose, dextrose, glucagon (rDNA), dextrose, heparin (porcine), sodium chloride flush, sodium chloride flush, sodium chloride flush, acetaminophen    PHYSICAL EXAM:    CURRENT VITALS: BP (!) 159/81   Pulse 73   Temp 98 °F (36.7 °C)   Resp 18   Ht 4' 11\" (1.499 m)   Wt 129 lb 13.6 oz (58.9 kg)   SpO2 99%   BMI 26.23 kg/m²     CONSTITUTIONAL:  awake, alert, cooperative, no apparent distress,   ENT:  Normocephalic, without obvious abnormality, atraumatic, sinuses nontender on palpation, external ears without lesions,  NECK:  Supple, symmetrical, trachea midline, no adenopathy, thyroid symmetric, not enlarged and no tenderness, skin normal  LUNGS:  No increased work of breathing, good air exchange, clear to auscultation bilaterally, no crackles or wheezing, PM left Chest.  CARDIOVASCULAR:  Normal apical impulse, regular rate and rhythm, normal S1 and S2,  and 2/6 systolic murmur noted  ABDOMEN:  Normal bowel sounds, soft, non-distended, non-tender, no masses palpated, no hepatosplenomegally  EXTREMITIES:  No edema, Pulses strong throughout. NEUROLOGIC:  Awake, alert, oriented to name, place and time.  Following all commands and moving all extremties  SKIN:  no bruising or bleeding, normal skin color, texture, turgor and no rashes     Data:        LABS:      Recent Results (from the past 24 hour(s))   POCT Glucose    Collection Time: 11/19/17 11:45 AM   Result Value Ref Range    POC Glucose 182 (H) 60 - 115 mg/dl    Performed on ACCU-CHEK    POCT Glucose    Collection Time: 11/19/17  4:51 PM   Result Value Ref Range    POC Glucose 177 (H) 60 - 115 mg/dl    Performed on ACCU-CHEK    POCT Glucose    Collection Time: 11/19/17  8:16 PM   Result Value Ref Range    POC Glucose 132 (H) 60 - 115 mg/dl    Performed on ACCU-CHEK    EKG 12 Lead    Collection Time: 11/20/17  2:26 AM   Result Value Ref Range    Ventricular Rate 79 BPM    Atrial Rate 79 BPM    P-R Interval 208 ms    QRS Duration 102 ms    Q-T Interval 438 ms    QTc Calculation (Bazett) 502 ms    P Axis -6 degrees    R Axis 7 degrees    T Axis 51 degrees   CBC Auto Differential    Collection Time: 11/20/17  5:47 AM   Result Value Ref Range    WBC 10.2 4.8 - 10.8 K/uL    RBC 3.63 (L) 4.20 - 5.40 M/uL    Hemoglobin 10.5 (L) 12.0 - 16.0 g/dL    Hematocrit 31.9 (L) 37.0 - 47.0 %    MCV 88.0 82.0 - 100.0 fL    MCH 29.1 27.0 - 31.3 pg    MCHC 33.0 33.0 - 37.0 %    RDW 16.7 (H) 11.5 - 14.5 %    Platelets 97 (L) 583 - 400 K/uL    Neutrophils % 78.6 %    Lymphocytes % 10.7 %    Monocytes % 10.3 %    Eosinophils % 0.0 %    Basophils % 0.4 %    Neutrophils # 8.0 (H) 1.4 - 6.5 K/uL    Lymphocytes # 1.1 1.0 - 4.8 K/uL    Monocytes # 1.1 (H) 0.2 - 0.8 K/uL    Eosinophils # 0.0 0.0 - 0.7 K/uL    Basophils # 0.0 0.0 - 0.2 K/uL   Comprehensive Metabolic Panel    Collection Time: 11/20/17  5:47 AM   Result Value Ref Range    Sodium 137 132 - 144 mEq/L    Potassium 3.9 3.5 - 5.1 mEq/L    Chloride 102 98 - 107 mEq/L    CO2 19 (L) 22 - 29 mEq/L Anion Gap 16 (H) 7 - 13 mEq/L    Glucose 126 (H) 74 - 109 mg/dL    BUN 36 (H) 8 - 23 mg/dL    CREATININE 6.20 (HH) 0.50 - 0.90 mg/dL    GFR Non-African American 6.5 (L) >60    GFR  7.8 (L) >60    Calcium 7.2 (L) 8.6 - 10.2 mg/dL    Total Protein 5.4 (L) 6.4 - 8.1 g/dL    Alb 2.4 (L) 3.9 - 4.9 g/dL    Total Bilirubin 0.2 0.0 - 1.2 mg/dL    Alkaline Phosphatase 83 40 - 130 U/L    ALT <5 0 - 33 U/L    AST 18 0 - 35 U/L    Globulin 3.0 2.3 - 3.5 g/dL   Magnesium    Collection Time: 11/20/17  5:47 AM   Result Value Ref Range    Magnesium 2.5 (H) 1.7 - 2.3 mg/dL         VQ LUNGS:  Negative ( non diagnostic ) for PE     EKG:   SR    TELEMETRY:  AF , Asystolic > 1 min, > SR spontaneously, no recurrence. Echo: (10/17)  Normal LV function 81%  Mild Diastolic Dysfunction  No significant VHD  Moderate PHTN, RVSP 45.   PM noted        Susana Dao MD ,[address] [place] [address] Cardiology

## 2017-11-20 NOTE — PROGRESS NOTES
Moderate    Nutrition Interventions:   Start oral diet (Advance to Carb Control 4 diet post procedure)  Continued Inpatient Monitoring, Education not appropriate at this time    Nutrition Evaluation:   · Evaluation: Goals set   · Goals: po > 75%, gluc < 180, wt maintained ~ 125#    · Monitoring: Meal Intake, Weight, Comparative Standards, Fluid Balance    See Adult Nutrition Doc Flowsheet for more detail.      Electronically signed by Phillip Hua RD, JIHAN on 11/20/17 at 3:51 PM

## 2017-11-20 NOTE — PROGRESS NOTES
INPATIENT PROGRESS NOTE    SERVICE DATE:  11/20/2017   SERVICE TIME:  10:43 am    ASSESSMENT AND PLAN   81-year-old female with past medical history of pneumonia, C. diff, atrial fib, end-stage renal disease on HD MWF, diabetes type 2. Sent from nursing facility due to elevated white blood cell count and anemia. Admitted for pancolitis, acute on chronic anemia, end-stage renal disease. Cardiac arrest: During HD s/p code blue call. CPR initiated and ROSC was achieved, pt now extubated and out of ICU.   - Cardio consulted. Per Dr. Levine Expose arrest during Dialysis, question etiology. Given Asystolic event, best to place PPM, ID has Cleared. Now tentatively planned for Monday PM as inpatient. Steph further recs. - Nephro consulted. Per Dr. Maxx Andrews, new esrd. cont HD as ordered. No heparin today as getting PPM. ok for d/c when ok with other docs. PPM. Steph further dispo recs  - Tele  - Continue to monitor varinder and post-op. Afib with RVR: HR to the 140's, now NSR off amio gtt   - Telemetry  - Cardio consulted with recs as above. Steph further dispo recs. - Continue to monitor      Pancolitis: secondary to C. Diff  - Was on PO Flagyl  - ID is consulted. Per Dr. Dillan Aden, C. diff diarrhea. Okay for pacemaker. Vancomycin for 2 weeks. Steph further recs  - Stools for c.diff, occult blood and culture done  - Contact precautions  - GI consult: Per Dr. Craig Mullins, Pancolitis probably, C diff colitis. Will put the patient on p.o. vancomycin. We will discontinue the Flagyl as this may have contributed to the high INR since the patient was on Coumadin. Coumadin and Flagyl have a drug interaction.     Acute on chronic anemia. Hgb was 6.2 , now 10.5 s/p 3 units PRBC's  - Blood transfusion done  - Holding anticoagulation for PPM today  - Daily PT and INR  - Continue PPI  - Consulted GI with recs as above  - Aranesp per nephrology  - Telemetry  - Continue to monitor       End-stage renal disease:  On HD  - Nephrology consulted with recs as above. Steph further dispo recs  - Continue to monitor     Elevated troponin  - serial troponins  - telemetry  - Consulted cardiology as above      DM II  -Insulin on sliding scale     Hypertensive urgency: Was hypotesive. BP's labile, now 150's/70's  - Steph cardio input  - Restart BP meds as appropriate  - Continue to monitor closely     Parkinson's disease     Dispo: Await consultant clearance prior to discharge, likely in next 24 to 48 hours. Advised F/U with PCP 1 - 2 days of discharge. SUBJECTIVE  CHIEF COMPLAINT: Abdominal pain    PRIMARY SERVICE: Medicine    INTERVAL HPI:  Patient states she feels tired but no other concerns or complaints at this time.      MEDICATIONS:    Current Facility-Administered Medications   Medication Dose Route Frequency Provider Last Rate Last Dose    [START ON 11/21/2017] 0.9 % sodium chloride infusion   Intravenous Continuous Areatha Sy Soliman MD        sodium chloride flush 0.9 % injection 10 mL  10 mL Intravenous PRN Rupinder Rojo MD        ondansetron (ZOFRAN) injection 4 mg  4 mg Intravenous Q6H PRN Rupinder Rojo MD        carbidopa-levodopa (SINEMET)  MG per tablet 1 tablet  1 tablet Oral BID Ryan Naranjo MD   1 tablet at 11/19/17 2207    potassium chloride 10 mEq/100 mL IVPB (Peripheral Line)  10 mEq Intravenous PRN Danielle Woodward MD        diltiazem (CARDIZEM) tablet 30 mg  30 mg Oral 4 times per day Mecca Modi MD   30 mg at 11/20/17 0521    metoprolol (LOPRESSOR) injection 2.5 mg  2.5 mg Intravenous Q2H PRN Mecca Modi MD        fentaNYL (SUBLIMAZE) injection 50 mcg  50 mcg Intravenous Q1H PRN Joanna Murillo DO        amiodarone (CORDARONE) tablet 200 mg  200 mg Oral BID Bucky Calzada MD   200 mg at 11/20/17 0521    heparin (porcine) injection 1,000 Units  1,000 Units Intravenous PRN Danielle Woodward MD   1,000 Units at 11/15/17 0730    heparin (porcine) injection 1,000 Units  1,000 Units Intercatheter PRN Pete Chand MD   1,000 Units at 11/15/17 0730    metoprolol (LOPRESSOR) injection 5 mg  5 mg Intravenous Q6H PRN Nhan Salazar MD   5 mg at 11/16/17 2344    0.9 % sodium chloride bolus  250 mL Intravenous Once Cecil Brand, DO        albumin human 25 % solution 25 g  25 g Intravenous Daily PRN Pete Chand MD        darbepoetin lizeth-polysorbate (ARANESP) injection 100 mcg  100 mcg Subcutaneous Weekly Pete Chand MD   100 mcg at 11/10/17 2123    ALPRAZolam Talisha Stager) tablet 0.5 mg  0.5 mg Oral TID PRN Faith Partida MD   0.5 mg at 11/10/17 2111    buprenorphine (BUPRENEX) 5 MCG/HR 1 patch  1 patch Transdermal Weekly Faith Partida MD   Stopped at 11/10/17 1326    calcium elemental (OSCAL) tablet 500 mg  500 mg Oral Daily Faith Partida MD   500 mg at 11/19/17 0848    fluticasone (FLONASE) 50 MCG/ACT nasal spray 2 spray  2 spray Nasal Daily Faith Partida MD   2 spray at 11/16/17 0759    guaiFENesin-dextromethorphan (ROBITUSSIN DM) 100-10 MG/5ML syrup 5 mL  5 mL Oral Q4H PRN Faith Partida MD        ipratropium-albuterol (DUONEB) nebulizer solution 3 mL  3 mL Inhalation Q4H PRN Faith Partida MD        iron polysaccaride (FERREX 150 FORTE PLUS) capsule 1 capsule  1 capsule Oral Daily with breakfast Faith Partida MD   1 capsule at 11/19/17 0849    isosorbide mononitrate (IMDUR) extended release tablet 60 mg  60 mg Oral Daily Faith Partida MD   60 mg at 11/19/17 0847    insulin glargine (LANTUS) injection vial 14 Units  14 Units Subcutaneous QAM Faith Partida MD   14 Units at 11/20/17 0825    levothyroxine (SYNTHROID) tablet 50 mcg  50 mcg Oral Daily Faith Partida MD   50 mcg at 11/20/17 0641    pantoprazole sodium (PROTONIX) packet 40 mg  40 mg Oral QAM AC Faith Partida MD   40 mg at 11/19/17 0607    venlafaxine (EFFEXOR XR) extended release capsule 150 mg  150 mg Oral Daily with breakfast Faith Partida MD   150 mg at 11/19/17 0848    glucose (GLUTOSE) 40 % oral gel 15 g  15 g Oral PRN Abhi Will MD        dextrose 50 % solution 12.5 g  12.5 g Intravenous PRN Abhi Will MD        glucagon (rDNA) injection 1 mg  1 mg Intramuscular PRN Abhi Will MD        dextrose 5 % solution  100 mL/hr Intravenous PRN Abhi Will MD        insulin lispro (HUMALOG) injection vial 0-12 Units  0-12 Units Subcutaneous TID WC Abhi Will MD   2 Units at 11/19/17 1707    insulin lispro (HUMALOG) injection vial 0-6 Units  0-6 Units Subcutaneous Nightly Abhi Will MD   1 Units at 11/17/17 2224    rosuvastatin (CRESTOR) tablet 40 mg  40 mg Oral Nightly Abhi Will MD   40 mg at 11/19/17 2207    heparin (porcine) injection 4,000 Units  4,000 Units Intercatheter PRN Jeremias Baltazar MD   4,000 Units at 11/15/17 0800    0.9 % sodium chloride bolus  250 mL Intravenous Once Harini Bazan MD        vancomycin (VANCOCIN) oral solution 125 mg  125 mg Oral 4 times per day Sedrick Wall MD   125 mg at 11/20/17 0523    lidocaine 2 % injection 5 mL  5 mL Intradermal Once Jose M Oliver NP        sodium chloride flush 0.9 % injection 10 mL  10 mL Intravenous Q12H Jose M Oliver NP   10 mL at 11/19/17 0400    sodium chloride flush 0.9 % injection 10 mL  10 mL Intravenous PRN Jose M Oliver NP        0.9 % sodium chloride infusion  250 mL Intravenous Once Jose M Oliver NP        sodium chloride flush 0.9 % injection 10 mL  10 mL Intravenous PRN Jose M Oliver NP   10 mL at 11/11/17 2132    0.9 % sodium chloride bolus  1,000 mL Intravenous Once Jose M Oliver NP        sodium chloride flush 0.9 % injection 10 mL  10 mL Intravenous 2 times per day Jose M Oliver NP   10 mL at 11/19/17 2209    sodium chloride flush 0.9 % injection 10 mL  10 mL Intravenous PRN Jose M Oliver NP   10 mL at 11/13/17 2321    acetaminophen (TYLENOL) tablet 650 mg  650 mg Oral Q4H PRN

## 2017-11-20 NOTE — PROCEDURES
Libby Warner is a 81 y.o. female patient. 1. Anemia in chronic kidney disease, on chronic dialysis (HCC)    2. Leukocytosis, unspecified type    3. Failure of outpatient treatment    4. Pneumonia of both lungs due to infectious organism, unspecified part of lung    5. Pancolitis [facility])      Past Medical History:   Diagnosis Date    Abnormal presence of protein in urine 6/2004    Dr. Margarito Reyes Atrial fibrillation ([facility]     Constipation, chronic     Diverticular disease of the colon     GERD (gastroesophageal reflux disease)     GERD, PUD, Hiatal Hernia    Granulomatous lung disease ([facility]     History of endoscopy 2-21-12    Dr. Travis Luciano    Hyperlipidemia     Hypothyroidism     Kidney stones 11/2001    Dr. 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 rate 18, height 4' 11\" (1.499 m), weight 125 lb 14.1 oz (57.1 kg), SpO2 99 %. Procedures see full dictation on 11/20/2017  Pt underwent dual chamber PPM under fluoroscopic guidance. Medtronic dual chamber MRI compatible ([employer]) PPM   Adequate pacing and sensing thresholds---quite a bit of oozing near the insertion site and bio-degradable suture placed with good result. No change in impedance. Plan:   Received IV vanco so further anti-biotics should be necessary  Look for complications  Check CXR   Continue to check the impedance of the leads  Would hold off on any anit-coagulation for a day or two given the propensity to re-bleed.   MD Drew Cope MD  11/20/2017

## 2017-11-20 NOTE — PLAN OF CARE
Problem: Falls - Risk of  Goal: Absence of falls  Outcome: Met This Shift      Problem: Bowel/Gastric:  Goal: Occurrences of diarrhea will decrease  Occurrences of diarrhea will decrease   Outcome: Ongoing      Problem: Fluid Volume:  Goal: Will show no signs and symptoms of electrolyte imbalance  Will show no signs and symptoms of electrolyte imbalance   Outcome: Ongoing      Problem: Physical Regulation:  Goal: Prevent transmision of infection  Prevent transmision of infection   Outcome: Met This Shift    Goal: Ability to avoid or minimize complications of infection will improve  Ability to avoid or minimize complications of infection will improve   Outcome: Ongoing    Goal: Signs and symptoms of infection will decrease  Signs and symptoms of infection will decrease   Outcome: Ongoing      Problem: Skin Integrity:  Goal: Risk for impaired skin integrity will decrease  Risk for impaired skin integrity will decrease   Outcome: Ongoing      Problem: Airway Clearance - Ineffective:  Goal: Clear lung sounds  Clear lung sounds   Outcome: Met This Shift    Goal: Ability to maintain a clear airway will improve  Ability to maintain a clear airway will improve   Outcome: Met This Shift      Problem: Fluid Volume - Deficit:  Goal: Achieves intake and output within specified parameters  Achieves intake and output within specified parameters   Outcome: Ongoing      Problem: Gas Exchange - Impaired:  Goal: Levels of oxygenation will improve  Levels of oxygenation will improve   Outcome: Met This Shift      Problem: Hyperthermia:  Goal: Ability to maintain a body temperature in the normal range will improve  Ability to maintain a body temperature in the normal range will improve   Outcome: Met This Shift      Problem: Risk for Impaired Skin Integrity  Goal: Tissue integrity - skin and mucous membranes  Structural intactness and normal physiological function of skin and  mucous membranes.    Outcome: Ongoing
Problem: Falls - Risk of  Goal: Absence of falls  Outcome: Ongoing      Problem:  Bowel/Gastric:  Goal: Occurrences of diarrhea will decrease  Occurrences of diarrhea will decrease   Outcome: Ongoing      Problem: Fluid Volume:  Goal: Will show no signs and symptoms of electrolyte imbalance  Will show no signs and symptoms of electrolyte imbalance   Outcome: Ongoing      Problem: Physical Regulation:  Goal: Prevent transmision of infection  Prevent transmision of infection   Outcome: Ongoing    Goal: Ability to avoid or minimize complications of infection will improve  Ability to avoid or minimize complications of infection will improve   Outcome: Ongoing    Goal: Signs and symptoms of infection will decrease  Signs and symptoms of infection will decrease   Outcome: Ongoing      Problem: Skin Integrity:  Goal: Risk for impaired skin integrity will decrease  Risk for impaired skin integrity will decrease   Outcome: Ongoing      Problem: Discharge Planning:  Goal: Discharged to appropriate level of care  Discharged to appropriate level of care   Outcome: Ongoing    Goal: Participates in care planning  Participates in care planning   Outcome: Ongoing      Problem: Airway Clearance - Ineffective:  Goal: Clear lung sounds  Clear lung sounds   Outcome: Ongoing    Goal: Ability to maintain a clear airway will improve  Ability to maintain a clear airway will improve   Outcome: Ongoing      Problem: Fluid Volume - Deficit:  Goal: Achieves intake and output within specified parameters  Achieves intake and output within specified parameters   Outcome: Ongoing      Problem: Gas Exchange - Impaired:  Goal: Levels of oxygenation will improve  Levels of oxygenation will improve   Outcome: Met This Shift      Problem: Hyperthermia:  Goal: Ability to maintain a body temperature in the normal range will improve  Ability to maintain a body temperature in the normal range will improve   Outcome: Ongoing      Problem: Risk for Impaired
Problem: Falls - Risk of  Goal: Absence of falls  Outcome: Ongoing      Problem:  Bowel/Gastric:  Goal: Occurrences of diarrhea will decrease  Occurrences of diarrhea will decrease   Outcome: Ongoing      Problem: Fluid Volume:  Goal: Will show no signs and symptoms of electrolyte imbalance  Will show no signs and symptoms of electrolyte imbalance   Outcome: Ongoing      Problem: Physical Regulation:  Goal: Prevent transmision of infection  Prevent transmision of infection   Outcome: Ongoing    Goal: Ability to avoid or minimize complications of infection will improve  Ability to avoid or minimize complications of infection will improve   Outcome: Ongoing    Goal: Signs and symptoms of infection will decrease  Signs and symptoms of infection will decrease   Outcome: Ongoing      Problem: Skin Integrity:  Goal: Risk for impaired skin integrity will decrease  Risk for impaired skin integrity will decrease   Outcome: Ongoing      Problem: Discharge Planning:  Goal: Discharged to appropriate level of care  Discharged to appropriate level of care   Outcome: Ongoing    Goal: Participates in care planning  Participates in care planning   Outcome: Ongoing      Problem: Airway Clearance - Ineffective:  Goal: Clear lung sounds  Clear lung sounds   Outcome: Ongoing    Goal: Ability to maintain a clear airway will improve  Ability to maintain a clear airway will improve   Outcome: Ongoing      Problem: Fluid Volume - Deficit:  Goal: Achieves intake and output within specified parameters  Achieves intake and output within specified parameters   Outcome: Ongoing      Problem: Gas Exchange - Impaired:  Goal: Levels of oxygenation will improve  Levels of oxygenation will improve   Outcome: Ongoing      Problem: Hyperthermia:  Goal: Ability to maintain a body temperature in the normal range will improve  Ability to maintain a body temperature in the normal range will improve   Outcome: Ongoing      Problem: Risk for Impaired Skin
Problem: Falls - Risk of  Goal: Absence of falls  Outcome: Ongoing      Problem:  Bowel/Gastric:  Goal: Occurrences of diarrhea will decrease  Occurrences of diarrhea will decrease   Outcome: Ongoing      Problem: Fluid Volume:  Goal: Will show no signs and symptoms of electrolyte imbalance  Will show no signs and symptoms of electrolyte imbalance   Outcome: Ongoing      Problem: Physical Regulation:  Goal: Prevent transmision of infection  Prevent transmision of infection   Outcome: Ongoing    Goal: Ability to avoid or minimize complications of infection will improve  Ability to avoid or minimize complications of infection will improve   Outcome: Ongoing    Goal: Signs and symptoms of infection will decrease  Signs and symptoms of infection will decrease   Outcome: Ongoing      Problem: Skin Integrity:  Goal: Risk for impaired skin integrity will decrease  Risk for impaired skin integrity will decrease   Outcome: Ongoing      Problem: Discharge Planning:  Goal: Discharged to appropriate level of care  Discharged to appropriate level of care   Outcome: Ongoing    Goal: Participates in care planning  Participates in care planning   Outcome: Ongoing      Problem: Airway Clearance - Ineffective:  Goal: Clear lung sounds  Clear lung sounds   Outcome: Ongoing    Goal: Ability to maintain a clear airway will improve  Ability to maintain a clear airway will improve   Outcome: Ongoing      Problem: Fluid Volume - Deficit:  Goal: Achieves intake and output within specified parameters  Achieves intake and output within specified parameters   Outcome: Ongoing      Problem: Mobility - Impaired:  Goal: Mobility will improve  Mobility will improve   Outcome: Ongoing
Problem: Falls - Risk of  Goal: Absence of falls  Outcome: Ongoing      Problem:  Bowel/Gastric:  Goal: Occurrences of diarrhea will decrease  Occurrences of diarrhea will decrease  Outcome: Ongoing      Problem: Fluid Volume:  Goal: Will show no signs and symptoms of electrolyte imbalance  Will show no signs and symptoms of electrolyte imbalance  Outcome: Ongoing      Problem: Physical Regulation:  Goal: Prevent transmision of infection  Prevent transmision of infection  Outcome: Ongoing    Goal: Ability to avoid or minimize complications of infection will improve  Ability to avoid or minimize complications of infection will improve  Outcome: Ongoing    Goal: Signs and symptoms of infection will decrease  Signs and symptoms of infection will decrease  Outcome: Ongoing      Problem: Skin Integrity:  Goal: Risk for impaired skin integrity will decrease  Risk for impaired skin integrity will decrease  Outcome: Ongoing      Problem: Discharge Planning:  Goal: Discharged to appropriate level of care  Discharged to appropriate level of care  Outcome: Ongoing    Goal: Participates in care planning  Participates in care planning  Outcome: Ongoing      Problem: Airway Clearance - Ineffective:  Goal: Clear lung sounds  Clear lung sounds  Outcome: Ongoing    Goal: Ability to maintain a clear airway will improve  Ability to maintain a clear airway will improve  Outcome: Ongoing      Problem: Fluid Volume - Deficit:  Goal: Achieves intake and output within specified parameters  Achieves intake and output within specified parameters  Outcome: Ongoing      Problem: Gas Exchange - Impaired:  Goal: Levels of oxygenation will improve  Levels of oxygenation will improve  Outcome: Ongoing      Problem: Hyperthermia:  Goal: Ability to maintain a body temperature in the normal range will improve  Ability to maintain a body temperature in the normal range will improve  Outcome: Ongoing
Problem: Falls - Risk of  Goal: Absence of falls  Outcome: Ongoing      Problem: Bowel/Gastric:  Goal: Occurrences of diarrhea will decrease  Occurrences of diarrhea will decrease   Outcome: Ongoing      Problem: Fluid Volume:  Goal: Will show no signs and symptoms of electrolyte imbalance  Will show no signs and symptoms of electrolyte imbalance   Outcome: Ongoing      Problem: Physical Regulation:  Goal: Prevent transmision of infection  Prevent transmision of infection   Outcome: Ongoing      Problem: Skin Integrity:  Goal: Risk for impaired skin integrity will decrease  Risk for impaired skin integrity will decrease   Outcome: Ongoing      Problem: Discharge Planning:  Goal: Discharged to appropriate level of care  Discharged to appropriate level of care   Outcome: Ongoing      Problem: Airway Clearance - Ineffective:  Goal: Clear lung sounds  Clear lung sounds   Outcome: Ongoing      Problem: Fluid Volume - Deficit:  Goal: Achieves intake and output within specified parameters  Achieves intake and output within specified parameters   Outcome: Ongoing      Problem: Gas Exchange - Impaired:  Goal: Levels of oxygenation will improve  Levels of oxygenation will improve   Outcome: Ongoing      Problem: Risk for Impaired Skin Integrity  Goal: Tissue integrity - skin and mucous membranes  Structural intactness and normal physiological function of skin and  mucous membranes.    Outcome: Ongoing
Problem: Falls - Risk of  Goal: Absence of falls  Outcome: Ongoing      Problem: Bowel/Gastric:  Goal: Occurrences of diarrhea will decrease  Occurrences of diarrhea will decrease   Outcome: Ongoing      Problem: Fluid Volume:  Goal: Will show no signs and symptoms of electrolyte imbalance  Will show no signs and symptoms of electrolyte imbalance   Outcome: Ongoing      Problem: Physical Regulation:  Goal: Prevent transmision of infection  Prevent transmision of infection   Outcome: Ongoing    Goal: Ability to avoid or minimize complications of infection will improve  Ability to avoid or minimize complications of infection will improve   Outcome: Ongoing    Goal: Signs and symptoms of infection will decrease  Signs and symptoms of infection will decrease   Outcome: Ongoing      Problem: Discharge Planning:  Goal: Discharged to appropriate level of care  Discharged to appropriate level of care   Outcome: Ongoing    Goal: Participates in care planning  Participates in care planning   Outcome: Ongoing      Problem: Airway Clearance - Ineffective:  Goal: Clear lung sounds  Clear lung sounds   Outcome: Ongoing    Goal: Ability to maintain a clear airway will improve  Ability to maintain a clear airway will improve   Outcome: Ongoing      Problem: Fluid Volume - Deficit:  Goal: Achieves intake and output within specified parameters  Achieves intake and output within specified parameters   Outcome: Ongoing      Problem: Gas Exchange - Impaired:  Goal: Levels of oxygenation will improve  Levels of oxygenation will improve   Outcome: Ongoing      Problem: Risk for Impaired Skin Integrity  Goal: Tissue integrity - skin and mucous membranes  Structural intactness and normal physiological function of skin and  mucous membranes.    Outcome: Ongoing
Problem: Nutrition  Goal: Optimal nutrition therapy  Outcome: Ongoing  Nutrition Problem: Unintended weight loss  Intervention: Food and/or Nutrient Delivery: Start oral diet (Advance to Carb Control 4 diet post procedure)  Nutritional Goals: po > 75%, gluc < 180, wt maintained ~ 125#
Integrity  Goal: Tissue integrity - skin and mucous membranes  Structural intactness and normal physiological function of skin and  mucous membranes.    Outcome: Ongoing      Problem: Mobility - Impaired:  Goal: Mobility will improve  Mobility will improve   Outcome: Ongoing      Problem: Pain:  Goal: Pain level will decrease  Pain level will decrease   Outcome: Ongoing    Goal: Control of acute pain  Control of acute pain   Outcome: Ongoing    Goal: Control of chronic pain  Control of chronic pain   Outcome: Ongoing

## 2017-11-21 ENCOUNTER — APPOINTMENT (OUTPATIENT)
Dept: GENERAL RADIOLOGY | Age: 82
DRG: 371 | End: 2017-11-21
Payer: MEDICARE

## 2017-11-21 LAB
ALBUMIN SERPL-MCNC: 2.6 G/DL (ref 3.9–4.9)
ALP BLD-CCNC: 92 U/L (ref 40–130)
ALT SERPL-CCNC: 6 U/L (ref 0–33)
ANION GAP SERPL CALCULATED.3IONS-SCNC: 15 MEQ/L (ref 7–13)
AST SERPL-CCNC: 34 U/L (ref 0–35)
BASOPHILS ABSOLUTE: 0 K/UL (ref 0–0.2)
BASOPHILS RELATIVE PERCENT: 0.4 %
BILIRUB SERPL-MCNC: 0.2 MG/DL (ref 0–1.2)
BUN BLDV-MCNC: 16 MG/DL (ref 8–23)
CALCIUM SERPL-MCNC: 7.1 MG/DL (ref 8.6–10.2)
CHLORIDE BLD-SCNC: 95 MEQ/L (ref 98–107)
CO2: 24 MEQ/L (ref 22–29)
CREAT SERPL-MCNC: 3.96 MG/DL (ref 0.5–0.9)
EKG ATRIAL RATE: 75 BPM
EKG ATRIAL RATE: 80 BPM
EKG P AXIS: -11 DEGREES
EKG P AXIS: 4 DEGREES
EKG P-R INTERVAL: 202 MS
EKG P-R INTERVAL: 204 MS
EKG Q-T INTERVAL: 398 MS
EKG Q-T INTERVAL: 430 MS
EKG QRS DURATION: 110 MS
EKG QRS DURATION: 96 MS
EKG QTC CALCULATION (BAZETT): 444 MS
EKG QTC CALCULATION (BAZETT): 495 MS
EKG R AXIS: 0 DEGREES
EKG R AXIS: 8 DEGREES
EKG T AXIS: 52 DEGREES
EKG T AXIS: 69 DEGREES
EKG VENTRICULAR RATE: 75 BPM
EKG VENTRICULAR RATE: 80 BPM
EOSINOPHILS ABSOLUTE: 0 K/UL (ref 0–0.7)
EOSINOPHILS RELATIVE PERCENT: 0 %
GFR AFRICAN AMERICAN: 13.1
GFR NON-AFRICAN AMERICAN: 10.9
GLOBULIN: 3.1 G/DL (ref 2.3–3.5)
GLUCOSE BLD-MCNC: 102 MG/DL (ref 60–115)
GLUCOSE BLD-MCNC: 130 MG/DL (ref 60–115)
GLUCOSE BLD-MCNC: 134 MG/DL (ref 60–115)
GLUCOSE BLD-MCNC: 87 MG/DL (ref 74–109)
GLUCOSE BLD-MCNC: 91 MG/DL (ref 60–115)
HCT VFR BLD CALC: 33.7 % (ref 37–47)
HEMOGLOBIN: 11.1 G/DL (ref 12–16)
LYMPHOCYTES ABSOLUTE: 1.1 K/UL (ref 1–4.8)
LYMPHOCYTES RELATIVE PERCENT: 13.7 %
MAGNESIUM: 2 MG/DL (ref 1.7–2.3)
MCH RBC QN AUTO: 29.1 PG (ref 27–31.3)
MCHC RBC AUTO-ENTMCNC: 32.8 % (ref 33–37)
MCV RBC AUTO: 88.6 FL (ref 82–100)
MONOCYTES ABSOLUTE: 1 K/UL (ref 0.2–0.8)
MONOCYTES RELATIVE PERCENT: 12.2 %
NEUTROPHILS ABSOLUTE: 6.2 K/UL (ref 1.4–6.5)
NEUTROPHILS RELATIVE PERCENT: 73.7 %
PDW BLD-RTO: 16.7 % (ref 11.5–14.5)
PERFORMED ON: ABNORMAL
PERFORMED ON: ABNORMAL
PERFORMED ON: NORMAL
PERFORMED ON: NORMAL
PLATELET # BLD: 107 K/UL (ref 130–400)
POTASSIUM SERPL-SCNC: 4.2 MEQ/L (ref 3.5–5.1)
RBC # BLD: 3.81 M/UL (ref 4.2–5.4)
SODIUM BLD-SCNC: 134 MEQ/L (ref 132–144)
TOTAL PROTEIN: 5.7 G/DL (ref 6.4–8.1)
WBC # BLD: 8.4 K/UL (ref 4.8–10.8)

## 2017-11-21 PROCEDURE — 6360000002 HC RX W HCPCS: Performed by: INTERNAL MEDICINE

## 2017-11-21 PROCEDURE — 6370000000 HC RX 637 (ALT 250 FOR IP): Performed by: HOSPITALIST

## 2017-11-21 PROCEDURE — 6370000000 HC RX 637 (ALT 250 FOR IP): Performed by: INTERNAL MEDICINE

## 2017-11-21 PROCEDURE — 93005 ELECTROCARDIOGRAM TRACING: CPT

## 2017-11-21 PROCEDURE — 85025 COMPLETE CBC W/AUTO DIFF WBC: CPT

## 2017-11-21 PROCEDURE — 6370000000 HC RX 637 (ALT 250 FOR IP): Performed by: NURSE PRACTITIONER

## 2017-11-21 PROCEDURE — 97535 SELF CARE MNGMENT TRAINING: CPT

## 2017-11-21 PROCEDURE — 97116 GAIT TRAINING THERAPY: CPT

## 2017-11-21 PROCEDURE — 2580000003 HC RX 258: Performed by: INTERNAL MEDICINE

## 2017-11-21 PROCEDURE — 83735 ASSAY OF MAGNESIUM: CPT

## 2017-11-21 PROCEDURE — 71020 XR CHEST STANDARD TWO VW: CPT

## 2017-11-21 PROCEDURE — 80053 COMPREHEN METABOLIC PANEL: CPT

## 2017-11-21 PROCEDURE — 93280 PM DEVICE PROGR EVAL DUAL: CPT

## 2017-11-21 PROCEDURE — 99232 SBSQ HOSP IP/OBS MODERATE 35: CPT | Performed by: INTERNAL MEDICINE

## 2017-11-21 PROCEDURE — 2060000000 HC ICU INTERMEDIATE R&B

## 2017-11-21 PROCEDURE — 6370000000 HC RX 637 (ALT 250 FOR IP): Performed by: SPECIALIST

## 2017-11-21 PROCEDURE — 36415 COLL VENOUS BLD VENIPUNCTURE: CPT

## 2017-11-21 RX ORDER — TRAMADOL HYDROCHLORIDE 50 MG/1
25 TABLET ORAL ONCE
Status: COMPLETED | OUTPATIENT
Start: 2017-11-21 | End: 2017-11-21

## 2017-11-21 RX ADMIN — ROSUVASTATIN CALCIUM 40 MG: 40 TABLET, FILM COATED ORAL at 21:12

## 2017-11-21 RX ADMIN — CALCIUM 500 MG: 500 TABLET ORAL at 08:34

## 2017-11-21 RX ADMIN — Medication 125 MG: at 08:45

## 2017-11-21 RX ADMIN — Medication 10 ML: at 08:34

## 2017-11-21 RX ADMIN — ISOSORBIDE MONONITRATE 60 MG: 60 TABLET, EXTENDED RELEASE ORAL at 08:34

## 2017-11-21 RX ADMIN — DILTIAZEM HYDROCHLORIDE 60 MG: 30 TABLET, FILM COATED ORAL at 08:34

## 2017-11-21 RX ADMIN — AMIODARONE HYDROCHLORIDE 150 MG: 50 INJECTION, SOLUTION INTRAVENOUS at 18:08

## 2017-11-21 RX ADMIN — AMIODARONE HYDROCHLORIDE 1 MG/MIN: 50 INJECTION, SOLUTION INTRAVENOUS at 20:53

## 2017-11-21 RX ADMIN — LEVOTHYROXINE SODIUM 50 MCG: 50 TABLET ORAL at 06:11

## 2017-11-21 RX ADMIN — CARBIDOPA AND LEVODOPA 1 TABLET: 25; 100 TABLET ORAL at 08:34

## 2017-11-21 RX ADMIN — CARBIDOPA AND LEVODOPA 1 TABLET: 25; 100 TABLET ORAL at 21:12

## 2017-11-21 RX ADMIN — TRAMADOL HYDROCHLORIDE 25 MG: 50 TABLET, FILM COATED ORAL at 00:51

## 2017-11-21 RX ADMIN — INSULIN GLARGINE 14 UNITS: 100 INJECTION, SOLUTION SUBCUTANEOUS at 08:45

## 2017-11-21 RX ADMIN — Medication 1 CAPSULE: at 08:36

## 2017-11-21 RX ADMIN — ACETAMINOPHEN 650 MG: 325 TABLET ORAL at 03:32

## 2017-11-21 RX ADMIN — PANTOPRAZOLE SODIUM 40 MG: 40 GRANULE, DELAYED RELEASE ORAL at 08:34

## 2017-11-21 RX ADMIN — Medication 10 ML: at 21:12

## 2017-11-21 RX ADMIN — AMIODARONE HYDROCHLORIDE 200 MG: 200 TABLET ORAL at 06:11

## 2017-11-21 RX ADMIN — Medication 125 MG: at 01:32

## 2017-11-21 RX ADMIN — VENLAFAXINE HYDROCHLORIDE 150 MG: 150 CAPSULE, EXTENDED RELEASE ORAL at 08:34

## 2017-11-21 RX ADMIN — Medication 400 MG: at 17:54

## 2017-11-21 RX ADMIN — DILTIAZEM HYDROCHLORIDE 60 MG: 30 TABLET, FILM COATED ORAL at 21:12

## 2017-11-21 RX ADMIN — Medication 125 MG: at 14:00

## 2017-11-21 ASSESSMENT — PAIN SCALES - GENERAL
PAINLEVEL_OUTOF10: 3
PAINLEVEL_OUTOF10: 0
PAINLEVEL_OUTOF10: 5

## 2017-11-21 NOTE — CARE COORDINATION
Message left for admissions at [facility] to call the LSW back. Pt is a possible discharge today per the RN. Electronically signed by ALIDA Newton on 11/21/17 at 12:24 PM    Dee Mayen, admissions at [facility], called back and aware pt is a possible discharge for today.  Electronically signed by ALIDA Newton on 11/21/17 at 12:26 PM

## 2017-11-21 NOTE — PROGRESS NOTES
above      DM II  -Insulin on sliding scale     Hypertensive urgency: Was hypotesive. BP's labile, now 150's/70's  - Steph cardio input  - Restart BP meds as appropriate  - Continue to monitor      Parkinson's disease     Dispo: Await consultant clearance prior to discharge to SNF, likely in next 24 hours. Advised F/U with PCP 1 - 2 days of discharge. SUBJECTIVE  CHIEF COMPLAINT: Abdominal pain    PRIMARY SERVICE: Medicine    INTERVAL HPI:  Patient states she she is feeling good today. No complaints.     MEDICATIONS:    Current Facility-Administered Medications   Medication Dose Route Frequency Provider Last Rate Last Dose    sodium chloride flush 0.9 % injection 10 mL  10 mL Intravenous PRN Rose Barney MD        sodium chloride flush 0.9 % injection 10 mL  10 mL Intravenous 2 times per day James Reed MD   10 mL at 11/21/17 0834    sodium chloride flush 0.9 % injection 10 mL  10 mL Intravenous PRN James Reed MD        magnesium hydroxide (MILK OF MAGNESIA) 400 MG/5ML suspension 30 mL  30 mL Oral Daily PRN James Reed MD        ondansetron [facility]) injection 4 mg  4 mg Intravenous Q6H PRN James Reed MD        diltiazem (CARDIZEM) tablet 60 mg  60 mg Oral BID James Reed MD   60 mg at 11/21/17 0834    carbidopa-levodopa (SINEMET)  MG per tablet 1 tablet  1 tablet Oral BID Bridgett Metz MD   1 tablet at 11/21/17 0834    potassium chloride 10 mEq/100 mL IVPB (Peripheral Line)  10 mEq Intravenous PRN Sruthi Gomes MD        metoprolol (LOPRESSOR) injection 2.5 mg  2.5 mg Intravenous Q2H PRN James Reed MD   2.5 mg at 11/20/17 1847    fentaNYL (SUBLIMAZE) injection 50 mcg  50 mcg Intravenous Q1H PRN Lawyer Lennox, DO        amiodarone (CORDARONE) tablet 200 mg  200 mg Oral BID Marily Noel MD   200 mg at 11/21/17 0494    heparin (porcine) injection 1,000 Units  1,000 Units Intravenous PRN Sruthi Gomes MD   1,000 Units at 11/15/17 0730    heparin (porcine) injection 1,000 Units  1,000 Units Intercatheter PRN Azam Saenz MD   1,000 Units at 11/15/17 0730    metoprolol (LOPRESSOR) injection 5 mg  5 mg Intravenous Q6H PRN Malik Freed MD   5 mg at 11/16/17 2344    albumin human 25 % solution 25 g  25 g Intravenous Daily PRN Azam Saenz MD        darbepoetin lizeth-polysorbate (ARANESP) injection 100 mcg  100 mcg Subcutaneous Weekly Azam Saenz MD   100 mcg at 11/10/17 2123    ALPRAZolam Renelle Ehrich) tablet 0.5 mg  0.5 mg Oral TID PRN Adilene Patino MD   0.5 mg at 11/10/17 2111    buprenorphine (BUPRENEX) 5 MCG/HR 1 patch  1 patch Transdermal Weekly Adilene Patino MD   Stopped at 11/10/17 1326    calcium elemental (OSCAL) tablet 500 mg  500 mg Oral Daily Adilene Patino MD   500 mg at 11/21/17 0834    fluticasone (FLONASE) 50 MCG/ACT nasal spray 2 spray  2 spray Nasal Daily Adilene Patino MD   2 spray at 11/16/17 0759    guaiFENesin-dextromethorphan (ROBITUSSIN DM) 100-10 MG/5ML syrup 5 mL  5 mL Oral Q4H PRN Adilene Patino MD        ipratropium-albuterol (DUONEB) nebulizer solution 3 mL  3 mL Inhalation Q4H PRN Adilene Patino MD        iron polysaccaride (FERREX 150 FORTE PLUS) capsule 1 capsule  1 capsule Oral Daily with breakfast Adilene Patino MD   1 capsule at 11/21/17 0836    isosorbide mononitrate (IMDUR) extended release tablet 60 mg  60 mg Oral Daily Adilene Patino MD   60 mg at 11/21/17 0834    insulin glargine (LANTUS) injection vial 14 Units  14 Units Subcutaneous QAM Adilene Patino MD   14 Units at 11/21/17 0845    levothyroxine (SYNTHROID) tablet 50 mcg  50 mcg Oral Daily Adilene Patino MD   50 mcg at 11/21/17 0611    pantoprazole sodium (PROTONIX) packet 40 mg  40 mg Oral QAM AC Adilene Patino MD   40 mg at 11/21/17 0834    venlafaxine (EFFEXOR XR) extended release capsule 150 mg  150 mg Oral Daily with breakfast Adilene Patino MD   150 mg at 11/21/17 0834    glucose (GLUTOSE) 40 %

## 2017-11-21 NOTE — PROGRESS NOTES
Renal Progress Note    Assessment and Plan:      80 yo with new esrd. Has anemia, hypotension, leukocytosis, c diff. blood cx clear now. Had cardiac arrest during hd last Wednesday. No problems since then. Had PPM     Plan/  1- ok for d/c.   2- HD tomorrow if still here. Patient Active Problem List:     Hypothyroidism     Hypercholesteremia     CKD (chronic kidney disease)     Afib (HCC)     Anemia     Acid reflux     Anxiety     Diabetes mellitus due to underlying condition, controlled, with stage 5 chronic kidney disease not on chronic dialysis, with long-term current use of insulin (HCC)     Essential hypertension     Idiopathic Parkinson's disease ([facility]     Pneumonia of lower lobe due to infectious organism ([facility]     Hyperkalemia     Acute on chronic diastolic congestive heart failure (HCC)     Pulmonary edema with congestive heart failure (HCC)     Shortness of breath     Acute pulmonary edema (HCC)     Pancolitis (HCC)     C. difficile colitis     Anemia     ESRD (end stage renal disease) on dialysis ([facility]     Leukocytosis      Subjective:   Admit Date: 11/9/2017    Interval History: doing ok. Has pain in shoulder. No cp. No sob.          Medications:   Scheduled Meds:   sodium chloride flush  10 mL Intravenous 2 times per day    diltiazem  60 mg Oral BID    carbidopa-levodopa  1 tablet Oral BID    amiodarone  200 mg Oral BID    darbepoetin lizeth-polysorbate  100 mcg Subcutaneous Weekly    buprenorphine  1 patch Transdermal Weekly    calcium elemental  500 mg Oral Daily    fluticasone  2 spray Nasal Daily    iron polysaccaride  1 capsule Oral Daily with breakfast    isosorbide mononitrate  60 mg Oral Daily    insulin glargine  14 Units Subcutaneous QAM    levothyroxine  50 mcg Oral Daily    pantoprazole sodium  40 mg Oral QAM AC    venlafaxine  150 mg Oral Daily with breakfast    insulin lispro  0-12 Units Subcutaneous TID WC    insulin lispro  0-6 Units Subcutaneous Nightly    rosuvastatin  40 mg Oral Nightly    vancomycin  125 mg Oral 4 times per day    lidocaine  5 mL Intradermal Once     Continuous Infusions:   dextrose         CBC:   Recent Labs      11/20/17   0547  11/21/17   0641   WBC  10.2  8.4   HGB  10.5*  11.1*   PLT  97*  107*     CMP:    Recent Labs      11/19/17   0604  11/20/17   0547  11/21/17   0641   NA  137  137  134   K  3.9  3.9  4.2   CL  101  102  95*   CO2  22  19*  24   BUN  27*  36*  16   CREATININE  5.54*  6.20*  3.96*   GLUCOSE  115*  126*  87   CALCIUM  6.8*  7.2*  7.1*   LABGLOM  7.4*  6.5*  10.9*     Troponin:   No results for input(s): TROPONINI in the last 72 hours. BNP: No results for input(s): BNP in the last 72 hours. INR:   No results for input(s): INR in the last 72 hours. Lipids: No results for input(s): CHOL, LDLDIRECT, TRIG, HDL, AMYLASE, LIPASE in the last 72 hours. Liver:   Recent Labs      11/21/17   0641   AST  34   ALT  6   ALKPHOS  92   PROT  5.7*   LABALBU  2.6*   BILITOT  0.2     Iron:  No results for input(s): IRONS, FERRITIN in the last 72 hours. Invalid input(s): LABIRONS  Urinalysis: No results for input(s): UA in the last 72 hours. Objective:   Vitals: BP (!) 158/56   Pulse 78   Temp 98.1 °F (36.7 °C) (Oral)   Resp 18   Ht 4' 11\" (1.499 m)   Wt 125 lb 14.1 oz (57.1 kg)   SpO2 100%   BMI 25.43 kg/m²    Wt Readings from Last 3 Encounters:   11/20/17 125 lb 14.1 oz (57.1 kg)   11/03/17 124 lb 5.4 oz (56.4 kg)   09/28/17 135 lb (61.2 kg)      24HR INTAKE/OUTPUT:      Intake/Output Summary (Last 24 hours) at 11/21/17 1350  Last data filed at 11/20/17 1938   Gross per 24 hour   Intake              240 ml   Output                0 ml   Net              240 ml       Constitutional:  Alert, awake, no apparent distress   Skin:normal, no rash  HEENT:sclera anicteric.   Head atraumatic normocephalic  Neck:supple with no thyromegally  Cardiovascular:  S1, S2 without m/r/g   Respiratory:  CTA B without w/r/r   Abdomen: +bs, soft,

## 2017-11-21 NOTE — PROGRESS NOTES
impedances. CXR shows appropriate lead positioning, no PTX. Does have ecchymosis and slight hematoma at generator site, aquacel intact. 2. Persistent AF, presently on amiodarone, rates remain >100. May consider further adjustment of meds now that PPM in place. Off anticoagulation due to severe anemia, serial HgB have remained stable. 3. Pancolitis secondary to C diff  4. ESRD on HD    Chief Complaint/Active Symptoms: minimal pain at generator site, generalized fatigue,otherwise no complaints    Objective:   BP (!) 158/56   Pulse 78   Temp 98.1 °F (36.7 °C) (Oral)   Resp 18   Ht 4' 11\" (1.499 m)   Wt 125 lb 14.1 oz (57.1 kg)   SpO2 100%   BMI 25.43 kg/m²    Wt Readings from Last 3 Encounters:   11/20/17 125 lb 14.1 oz (57.1 kg)   11/03/17 124 lb 5.4 oz (56.4 kg)   09/28/17 135 lb (61.2 kg)       TELEMETRY: atrial fibrillation - rapid                            Rhythm Strip: AF rates 100's      Physical Exam:  Physical Exam   Constitutional: She is oriented to person, place, and time. No distress. Frail   HENT:   Head: Normocephalic. Eyes: Pupils are equal, round, and reactive to light. Neck: Normal range of motion. No JVD present. Cardiovascular:   Irregular, tachycardic  1/6 systolic murmur   Pulmonary/Chest: Effort normal and breath sounds normal. No respiratory distress. Abdominal: Soft. Bowel sounds are normal. She exhibits no distension. Musculoskeletal: Normal range of motion. She exhibits no edema. Neurological: She is alert and oriented to person, place, and time. Skin: Skin is warm and dry. Left SC generator site aquacel intact, extensive ecchymosis   Psychiatric: She has a normal mood and affect.         Medications:    sodium chloride flush  10 mL Intravenous 2 times per day    diltiazem  60 mg Oral BID    carbidopa-levodopa  1 tablet Oral BID    amiodarone  200 mg Oral BID    darbepoetin lizeth-polysorbate  100 mcg Subcutaneous Weekly    buprenorphine  1 patch Transdermal Weekly    calcium elemental  500 mg Oral Daily    fluticasone  2 spray Nasal Daily    iron polysaccaride  1 capsule Oral Daily with breakfast    isosorbide mononitrate  60 mg Oral Daily    insulin glargine  14 Units Subcutaneous QAM    levothyroxine  50 mcg Oral Daily    pantoprazole sodium  40 mg Oral QAM AC    venlafaxine  150 mg Oral Daily with breakfast    insulin lispro  0-12 Units Subcutaneous TID WC    insulin lispro  0-6 Units Subcutaneous Nightly    rosuvastatin  40 mg Oral Nightly    vancomycin  125 mg Oral 4 times per day    lidocaine  5 mL Intradermal Once      dextrose         sodium chloride flush 10 mL PRN   sodium chloride flush 10 mL PRN   magnesium hydroxide 30 mL Daily PRN   ondansetron 4 mg Q6H PRN   potassium chloride 10 mEq PRN   metoprolol 2.5 mg Q2H PRN   fentanNYL 50 mcg Q1H PRN   heparin (porcine) 1,000 Units PRN   heparin (porcine) 1,000 Units PRN   metoprolol 5 mg Q6H PRN   albumin human 25 g Daily PRN   ALPRAZolam 0.5 mg TID PRN   guaiFENesin-dextromethorphan 5 mL Q4H PRN   ipratropium-albuterol 3 mL Q4H PRN   glucose 15 g PRN   dextrose 12.5 g PRN   glucagon (rDNA) 1 mg PRN   dextrose 100 mL/hr PRN   heparin (porcine) 4,000 Units PRN   sodium chloride flush 10 mL PRN   sodium chloride flush 10 mL PRN   sodium chloride flush 10 mL PRN   acetaminophen 650 mg Q4H PRN       Diagnostics:    Xr Chest Standard (2 Vw)    Result Date: 11/21/2017  EXAMINATION: CHEST X-RAY, AP AND LATERAL CLINICAL HISTORY: CARDIAC ARRHYTHMIA, STATUS POST CARDIAC PACEMAKER PLACEMENT COMPARISONS: 11/20/2017 FINDINGS: There is a cardiac pacemaker superimposed on the upper left hemithorax with electrodes in the right atrium and right ventricle. The heart is not enlarged. There is atherosclerotic calcification and tortuosity of the aorta. There is a right-sided hemodialysis catheter with the distal tip of the catheter at the caval/atrial junction. There is no evidence of a pneumothorax.  There are small 12/17/2011    HGB 11.1 11/21/2017    HCT 33.7 11/21/2017     11/21/2017       BMP: @(bmp:3)@    CMP:  Lab Results   Component Value Date     11/21/2017    K 4.2 11/21/2017    CL 95 11/21/2017    CO2 24 11/21/2017    BUN 16 11/21/2017    CREATININE 3.96 11/21/2017    GFRAA 13.1 11/21/2017    LABGLOM 10.9 11/21/2017    GLUCOSE 87 11/21/2017    GLUCOSE 197 02/17/2012    CALCIUM 7.1 11/21/2017       Hepatic Function Panel:  Lab Results   Component Value Date    ALKPHOS 92 11/21/2017    ALT 6 11/21/2017    AST 34 11/21/2017    PROT 5.7 11/21/2017    BILITOT 0.2 11/21/2017    BILIDIR 0.0 09/26/2017    IBILI 0.1 09/26/2017    LABALBU 2.6 11/21/2017    LABALBU 3.5 12/17/2011       Magnesium:    Lab Results   Component Value Date    MG 2.0 11/21/2017    MG 1.4 12/17/2011       PT/INR:    Lab Results   Component Value Date    PROTIME 20.2 11/15/2017    PROTIME 16.5 10/20/2017    PROTIME 25.7 09/29/2016    INR 1.9 11/15/2017          Electronically signed by Fiona Rodriguez NP on 11/21/2017 at 2:37 PM

## 2017-11-21 NOTE — PROGRESS NOTES
[facility]CC[facility]ATIONAL THERAP[facility] MED SURG TREATMENT NOTE     Date: 2017  Patient Name: Henny Faith        MRN: [id number]  Account: [de-identified]   : 1935  (81 y.o.)  Room: [place] [id number]    Chart Review:  Diagnosis:  The primary encounter diagnosis was Anemia in chronic kidney disease, on chronic dialysis ([facility] Diagnoses of Leukocytosis, unspecified type, Failure of outpatient treatment, Pneumonia of both lungs due to infectious organism, unspecified part of lung, and Pancolitis ([facility] were also pertinent to this visit. Restrictions:  LUE less than 90° shoulder flexion for 30 days, no shower for 1 week, contact precautions, fall   Activity Orders: Activity as tolerated     Subjective:  Patient states:  \"I was doing better the other day. \"  Pain: 0/10  Description: n/a   Location:  N/a     Objective:  ADL:  Feeding:  Positive hand to mouth   Grooming:  CGA   UE Self -Care: Anticipated Min A   LE Self-Care:  Min A to place undergarments over left foot  Toileting:  SBA with use of grab bars   Toilet transfers:  CGA     Treatment consisted of:   [x] ADL Training  [x] Strengthening   [x] Transfer Training    [] DME Education  [] HEP   [] Manual Therapy   [] Patient Education  [] Other:      Assessment: SBA supine to sit. Adjusted sling on LUE. Educated pt on surgical precautions. Pt verbalized understanding. SBA sit to stand. Pt with LOB Mod A to correct. Min A to correct balance ambulating to bathroom hand held with therapist. CGA stand to sit for toilet and use of hand rails. Pt SBA for toileting and set up to don brief. Pt emesis while sitting on toilet. Nurse notified. Min A to place undergarments over brief. CGA to sink and during grooming. Stood for less than 2 minutes. Min A ambulate sink to chair.                Plan:  [x] Continue OT per POC  [] D/C OT  [] Other:     Goals/Plan:   [x] Improve balance  [x] Improve Strength   [] Improve [place] with functional transfers   [x]  Improve [place] with ADLs    Time In: 9:55  Time Out[de-identified]  10:23     Electronically signed by:     Perry Rojas OT  11/21/2017, 9:55 AM

## 2017-11-21 NOTE — PROGRESS NOTES
Stand to Sit: - Stand by Assist   vc's for technique and safety   vc's for improved approach to chair    Gait/Ambulation:   Assistive Device: None  Assist Level: Stand by Assist  Distance: 5'  Surface: linoleum/hardwood  Gait Deviations: small reciprocal steps, 0 LOB, steady. Pt declined to amb in lucas d/t guests. Plan   Plan  Times per week: 3-6  Current Treatment Recommendations: Strengthening, Functional Mobility Training, Transfer Training, Gait Training, Endurance Training, Neuromuscular Re-education, Home Exercise Program, Safety Education & Training, Equipment Evaluation, Education, & procurement, Patient/Caregiver Education & Training    Goals  Short term goals  Short term goal 1: Pt will be indep with HEP  Short term goal 2: Pt will complete transfers with Mod I  Short term goal 3: Pt will amb 48' with or without AD with mod I  Short term goal 4: Pt will complete standing activities with 0-1 UE support and Mod I     Comments: Pt BTB After Tx, Call button within reach and bed alarm activated.       Therapy Time   Individual   Time In 1319   Time Out 1329   Minutes 10     Gait - 10 mins    Electronically signed by Nuria Marquez PTA on 11/21/2017 at 1:29 PM

## 2017-11-21 NOTE — PROGRESS NOTES
C. diff diarrhea       Review of Systems   Constitutional: Negative. HENT: Negative. Respiratory: Negative. Cardiovascular: Negative. Gastrointestinal: Diarrhea improving. Negative for abdominal pain.        BP (!) 158/56   Pulse 78   Temp 98.1 °F (36.7 °C) (Oral)   Resp 18   Ht 4' 11\" (1.499 m)   Wt 125 lb 14.1 oz (57.1 kg)   SpO2 100%   BMI 25.43 kg/m²     Neck: No JVD present. No tracheal deviation present. No thyromegaly present. Cardiovascular: Exam reveals no friction rub. No murmur heard. Pulmonary/Chest: Effort normal. No respiratory distress. She has no wheezes. She has no rales. She exhibits no tenderness. Abdominal: She exhibits no distension and no mass. There is no tenderness.  There is no rebound and no guarding.                  Patient Active Problem List   Diagnosis    Hypothyroidism    Hypercholesteremia    CKD (chronic kidney disease)    Paroxysmal atrial fibrillation (HCC)    Anemia    Acid reflux    Anxiety    Diabetes mellitus due to underlying condition, controlled, with stage 5 chronic kidney disease not on chronic dialysis, with long-term current use of insulin (HCC)    Essential hypertension    Idiopathic Parkinson's disease ([address]    Pneumonia of lower lobe due to infectious organism ([address]    Hyperkalemia    Acute on chronic diastolic congestive heart failure (HCC)    Pulmonary edema with congestive heart failure (HCC)    Shortness of breath    Acute pulmonary edema (HCC)    Pancolitis (HCC)    C. difficile colitis    Anemia    ESRD (end stage renal disease) on dialysis (HCC)    Leukocytosis    [facility]    Cardiac pacemaker            ASSESSMENT:PLAN:     C. diff diarrhea        Vancomycin for 2 weeks

## 2017-11-22 LAB
ANION GAP SERPL CALCULATED.3IONS-SCNC: 13 MEQ/L (ref 7–13)
BASOPHILS ABSOLUTE: 0 K/UL (ref 0–0.2)
BASOPHILS RELATIVE PERCENT: 0.5 %
BUN BLDV-MCNC: 26 MG/DL (ref 8–23)
CALCIUM SERPL-MCNC: 7.3 MG/DL (ref 8.6–10.2)
CHLORIDE BLD-SCNC: 96 MEQ/L (ref 98–107)
CO2: 24 MEQ/L (ref 22–29)
CREAT SERPL-MCNC: 5.09 MG/DL (ref 0.5–0.9)
EKG ATRIAL RATE: 76 BPM
EKG ATRIAL RATE: 80 BPM
EKG P AXIS: -19 DEGREES
EKG P AXIS: 0 DEGREES
EKG P-R INTERVAL: 214 MS
EKG P-R INTERVAL: 220 MS
EKG Q-T INTERVAL: 422 MS
EKG Q-T INTERVAL: 434 MS
EKG QRS DURATION: 100 MS
EKG QRS DURATION: 94 MS
EKG QTC CALCULATION (BAZETT): 486 MS
EKG QTC CALCULATION (BAZETT): 488 MS
EKG R AXIS: -6 DEGREES
EKG R AXIS: 2 DEGREES
EKG T AXIS: 21 DEGREES
EKG T AXIS: 42 DEGREES
EKG VENTRICULAR RATE: 76 BPM
EKG VENTRICULAR RATE: 80 BPM
EOSINOPHILS ABSOLUTE: 0 K/UL (ref 0–0.7)
EOSINOPHILS RELATIVE PERCENT: 0.3 %
GFR AFRICAN AMERICAN: 9.8
GFR NON-AFRICAN AMERICAN: 8.1
GLUCOSE BLD-MCNC: 111 MG/DL (ref 60–115)
GLUCOSE BLD-MCNC: 112 MG/DL (ref 74–109)
GLUCOSE BLD-MCNC: 117 MG/DL (ref 60–115)
GLUCOSE BLD-MCNC: 152 MG/DL (ref 60–115)
GLUCOSE BLD-MCNC: 199 MG/DL (ref 60–115)
HCT VFR BLD CALC: 29.3 % (ref 37–47)
HEMOGLOBIN: 9.6 G/DL (ref 12–16)
LYMPHOCYTES ABSOLUTE: 1.3 K/UL (ref 1–4.8)
LYMPHOCYTES RELATIVE PERCENT: 16.1 %
MAGNESIUM: 2.1 MG/DL (ref 1.7–2.3)
MCH RBC QN AUTO: 28.8 PG (ref 27–31.3)
MCHC RBC AUTO-ENTMCNC: 32.9 % (ref 33–37)
MCV RBC AUTO: 87.4 FL (ref 82–100)
MONOCYTES ABSOLUTE: 1.2 K/UL (ref 0.2–0.8)
MONOCYTES RELATIVE PERCENT: 14.6 %
NEUTROPHILS ABSOLUTE: 5.7 K/UL (ref 1.4–6.5)
NEUTROPHILS RELATIVE PERCENT: 68.5 %
PDW BLD-RTO: 16.3 % (ref 11.5–14.5)
PERFORMED ON: ABNORMAL
PERFORMED ON: NORMAL
PLATELET # BLD: 107 K/UL (ref 130–400)
POTASSIUM SERPL-SCNC: 4.1 MEQ/L (ref 3.5–5.1)
RBC # BLD: 3.35 M/UL (ref 4.2–5.4)
SODIUM BLD-SCNC: 133 MEQ/L (ref 132–144)
WBC # BLD: 8.3 K/UL (ref 4.8–10.8)

## 2017-11-22 PROCEDURE — 0JH606Z INSERTION OF PACEMAKER, DUAL CHAMBER INTO CHEST SUBCUTANEOUS TISSUE AND FASCIA, OPEN APPROACH: ICD-10-PCS | Performed by: INTERNAL MEDICINE

## 2017-11-22 PROCEDURE — 83735 ASSAY OF MAGNESIUM: CPT

## 2017-11-22 PROCEDURE — 02H63JZ INSERTION OF PACEMAKER LEAD INTO RIGHT ATRIUM, PERCUTANEOUS APPROACH: ICD-10-PCS | Performed by: INTERNAL MEDICINE

## 2017-11-22 PROCEDURE — 6370000000 HC RX 637 (ALT 250 FOR IP): Performed by: SPECIALIST

## 2017-11-22 PROCEDURE — 6370000000 HC RX 637 (ALT 250 FOR IP): Performed by: NURSE PRACTITIONER

## 2017-11-22 PROCEDURE — 6370000000 HC RX 637 (ALT 250 FOR IP): Performed by: HOSPITALIST

## 2017-11-22 PROCEDURE — 6370000000 HC RX 637 (ALT 250 FOR IP): Performed by: INTERNAL MEDICINE

## 2017-11-22 PROCEDURE — 2060000000 HC ICU INTERMEDIATE R&B

## 2017-11-22 PROCEDURE — 02HK3JZ INSERTION OF PACEMAKER LEAD INTO RIGHT VENTRICLE, PERCUTANEOUS APPROACH: ICD-10-PCS | Performed by: INTERNAL MEDICINE

## 2017-11-22 PROCEDURE — 99232 SBSQ HOSP IP/OBS MODERATE 35: CPT | Performed by: INTERNAL MEDICINE

## 2017-11-22 PROCEDURE — 85025 COMPLETE CBC W/AUTO DIFF WBC: CPT

## 2017-11-22 PROCEDURE — 97116 GAIT TRAINING THERAPY: CPT

## 2017-11-22 PROCEDURE — 80048 BASIC METABOLIC PNL TOTAL CA: CPT

## 2017-11-22 PROCEDURE — P9046 ALBUMIN (HUMAN), 25%, 20 ML: HCPCS | Performed by: INTERNAL MEDICINE

## 2017-11-22 PROCEDURE — 2580000003 HC RX 258: Performed by: INTERNAL MEDICINE

## 2017-11-22 PROCEDURE — 6360000002 HC RX W HCPCS: Performed by: INTERNAL MEDICINE

## 2017-11-22 PROCEDURE — 93005 ELECTROCARDIOGRAM TRACING: CPT

## 2017-11-22 PROCEDURE — 2580000003 HC RX 258

## 2017-11-22 PROCEDURE — 36415 COLL VENOUS BLD VENIPUNCTURE: CPT

## 2017-11-22 RX ORDER — SODIUM CHLORIDE 9 MG/ML
INJECTION, SOLUTION INTRAVENOUS
Status: COMPLETED
Start: 2017-11-22 | End: 2017-11-22

## 2017-11-22 RX ADMIN — PANTOPRAZOLE SODIUM 40 MG: 40 GRANULE, DELAYED RELEASE ORAL at 06:28

## 2017-11-22 RX ADMIN — CARBIDOPA AND LEVODOPA 1 TABLET: 25; 100 TABLET ORAL at 09:58

## 2017-11-22 RX ADMIN — Medication 125 MG: at 06:28

## 2017-11-22 RX ADMIN — Medication 1 CAPSULE: at 09:58

## 2017-11-22 RX ADMIN — Medication 10 ML: at 12:59

## 2017-11-22 RX ADMIN — LEVOTHYROXINE SODIUM 50 MCG: 50 TABLET ORAL at 06:28

## 2017-11-22 RX ADMIN — Medication 125 MG: at 17:39

## 2017-11-22 RX ADMIN — ISOSORBIDE MONONITRATE 60 MG: 60 TABLET, EXTENDED RELEASE ORAL at 09:57

## 2017-11-22 RX ADMIN — VENLAFAXINE HYDROCHLORIDE 150 MG: 150 CAPSULE, EXTENDED RELEASE ORAL at 09:57

## 2017-11-22 RX ADMIN — INSULIN LISPRO 2 UNITS: 100 INJECTION, SOLUTION INTRAVENOUS; SUBCUTANEOUS at 16:48

## 2017-11-22 RX ADMIN — ACETAMINOPHEN 650 MG: 325 TABLET ORAL at 00:48

## 2017-11-22 RX ADMIN — ACETAMINOPHEN 650 MG: 325 TABLET ORAL at 22:19

## 2017-11-22 RX ADMIN — Medication 400 MG: at 09:57

## 2017-11-22 RX ADMIN — DILTIAZEM HYDROCHLORIDE 60 MG: 30 TABLET, FILM COATED ORAL at 13:56

## 2017-11-22 RX ADMIN — ALPRAZOLAM 0.5 MG: 0.5 TABLET ORAL at 00:48

## 2017-11-22 RX ADMIN — CARBIDOPA AND LEVODOPA 1 TABLET: 25; 100 TABLET ORAL at 22:15

## 2017-11-22 RX ADMIN — SODIUM CHLORIDE 2000 ML: 9 INJECTION, SOLUTION INTRAVENOUS at 08:00

## 2017-11-22 RX ADMIN — HEPARIN SODIUM 1000 UNITS: 1000 INJECTION, SOLUTION INTRAVENOUS; SUBCUTANEOUS at 09:25

## 2017-11-22 RX ADMIN — ALBUMIN (HUMAN) 25 G: 0.25 INJECTION, SOLUTION INTRAVENOUS at 10:20

## 2017-11-22 RX ADMIN — HEPARIN SODIUM 4000 UNITS: 1000 INJECTION, SOLUTION INTRAVENOUS; SUBCUTANEOUS at 09:26

## 2017-11-22 RX ADMIN — CALCIUM 500 MG: 500 TABLET ORAL at 09:58

## 2017-11-22 RX ADMIN — FLUTICASONE PROPIONATE 2 SPRAY: 50 SPRAY, METERED NASAL at 12:57

## 2017-11-22 RX ADMIN — DILTIAZEM HYDROCHLORIDE 60 MG: 30 TABLET, FILM COATED ORAL at 17:39

## 2017-11-22 RX ADMIN — Medication 125 MG: at 13:56

## 2017-11-22 RX ADMIN — DILTIAZEM HYDROCHLORIDE 60 MG: 30 TABLET, FILM COATED ORAL at 09:57

## 2017-11-22 RX ADMIN — Medication 125 MG: at 00:09

## 2017-11-22 RX ADMIN — Medication 125 MG: at 23:59

## 2017-11-22 RX ADMIN — AMIODARONE HYDROCHLORIDE 0.5 MG/MIN: 50 INJECTION, SOLUTION INTRAVENOUS at 06:08

## 2017-11-22 RX ADMIN — ROSUVASTATIN CALCIUM 40 MG: 40 TABLET, FILM COATED ORAL at 22:15

## 2017-11-22 ASSESSMENT — PAIN SCALES - GENERAL
PAINLEVEL_OUTOF10: 5
PAINLEVEL_OUTOF10: 0
PAINLEVEL_OUTOF10: 0
PAINLEVEL_OUTOF10: 2
PAINLEVEL_OUTOF10: 6

## 2017-11-22 NOTE — PROGRESS NOTES
normocephalic  Neck:supple with no thyromegally  Cardiovascular:  S1, S2 without m/r/g   Respiratory:  CTA B without w/r/r   Abdomen: +bs, soft, nt  Ext: trace LE edema  Musculoskeletal:Intact  Neuro:Alert and oriented with no deficit      Electronically signed by Kali Galvan MD on 11/22/2017 at 3:20 PM

## 2017-11-22 NOTE — CARE COORDINATION
Shahrzad Khan, admissions at [facility], here at [address] and aware pt is not discharged today. Discharge plan is to return to [facility].  Electronically signed by ALIDA Dye on 11/22/17 at 3:22 PM

## 2017-11-22 NOTE — PROGRESS NOTES
INPATIENT PROGRESS NOTE    SERVICE DATE:  11/22/2017   SERVICE TIME:  10:09 am    ASSESSMENT AND PLAN   81-year-old female with past medical history of pneumonia, C. diff, atrial fib, end-stage renal disease on HD MWF, diabetes type 2. Sent from nursing facility due to elevated white blood cell count and anemia. Admitted for pancolitis, acute on chronic anemia, end-stage renal disease. Cardiac arrest: During HD s/p code blue call. CPR initiated and ROSC was achieved, pt now extubated and out of ICU.   - Cardio consulted. Per Dr. Neal Singer, S/p ppm for SSS and long asystole. Recurrent at fib---just 5-6 hours ago. High venous pressure in the left subclavian vein due to av fistula in left arm and unable to use right arm given the existing Vasc cath and IV access. Since the at fib is relatively of recent onset we will give IV amiodarone and increase the cardizem. Will wait a little bit to let the incision site heal as there was a fair amount of bleeding after the ppm through the lead insertion site. Steph further recs  - Nephro consulted. Per Dr. Vickey Ferreira, new esrd. cont HD as ordered. ok for d/c when ok with other docs. - Tele  - Continue to monitor varinder and post-op. - S/P PPM    Afib with RVR: HR to the 120's today  - Telemetry  - Cardio consulted with recs as above. Steph further dispo recs. - Continue to monitor      Pancolitis: secondary to C. Diff  - Was on PO Flagyl  - ID is consulted. Per Dr. Yoselyn Lucia, C. diff diarrhea. Okay for pacemaker. Vancomycin for 2 weeks. - Stools for c.diff, occult blood and culture done  - Contact precautions  - GI consult: Per Dr. Bjorn Shone, Pancolitis probably, C diff colitis. Will put the patient on p.o. vancomycin. We will discontinue the Flagyl as this may have contributed to the high INR since the patient was on Coumadin. Coumadin and Flagyl have a drug interaction.     Acute on chronic anemia.  Hgb was 6.2 , now 9.6 s/p 3 units PRBC's  - Blood transfusion done  - Daily PT and INR  - Continue PPI  - Consulted GI with recs as above  - Aranesp per nephrology  - Telemetry  - Continue to monitor       End-stage renal disease: On HD  - Nephrology consulted with recs as above. - Continue to monitor     Elevated troponin  - serial troponins  - telemetry  - Consulted cardiology as above      DM II  -Insulin on sliding scale     Hypertensive urgency: Was hypotesive. BP's labile, now 150's/70's  - Steph further cardio input  - Continue to monitor      Parkinson's disease     Dispo: Await consultant clearance prior to discharge to SNF, likely in next 24 hours. Advised F/U with PCP 1 - 2 days of discharge. SUBJECTIVE  CHIEF COMPLAINT: Abdominal pain    PRIMARY SERVICE: Medicine    INTERVAL HPI:  Patient states she she is feeling tired today. No other concerns.     MEDICATIONS:    Current Facility-Administered Medications   Medication Dose Route Frequency Provider Last Rate Last Dose    amiodarone (CORDARONE) 450 mg in dextrose 5 % 250 mL infusion  0.5 mg/min Intravenous Continuous Stephen Palma MD 16.7 mL/hr at 11/22/17 0608 0.5 mg/min at 11/22/17 7845    diltiazem (CARDIZEM) tablet 60 mg  60 mg Oral 4x Daily Stephen Palma MD   60 mg at 11/22/17 0957    magnesium oxide (MAG-OX) tablet 400 mg  400 mg Oral Daily Stephen Palma MD   400 mg at 11/22/17 0957    sodium chloride flush 0.9 % injection 10 mL  10 mL Intravenous PRN Froilan Valentino MD        sodium chloride flush 0.9 % injection 10 mL  10 mL Intravenous 2 times per day Stephen Palma MD   10 mL at 11/21/17 2112    sodium chloride flush 0.9 % injection 10 mL  10 mL Intravenous PRN Stephen Palma MD        magnesium hydroxide (MILK OF MAGNESIA) 400 MG/5ML suspension 30 mL  30 mL Oral Daily PRN Stephen Palma MD        ondansetron [facility]) injection 4 mg  4 mg Intravenous Q6H PRN Stephen Palma MD        carbidopa-levodopa (SINEMET)  MG per tablet 1 tablet  1 tablet Oral BID [place] 14 Units  14 Units Subcutaneous QAM Radha White MD   14 Units at 11/21/17 0845    levothyroxine (SYNTHROID) tablet 50 mcg  50 mcg Oral Daily Radha White MD   50 mcg at 11/22/17 5270    pantoprazole sodium (PROTONIX) packet 40 mg  40 mg Oral QAM AC Radha White MD   40 mg at 11/22/17 0628    venlafaxine (EFFEXOR XR) extended release capsule 150 mg  150 mg Oral Daily with breakfast Radha White MD   150 mg at 11/22/17 0957    glucose (GLUTOSE) 40 % oral gel 15 g  15 g Oral PRN Radha White MD        dextrose 50 % solution 12.5 g  12.5 g Intravenous PRN Radha White MD        glucagon (rDNA) injection 1 mg  1 mg Intramuscular PRN Radha White MD        dextrose 5 % solution  100 mL/hr Intravenous PRN Radha White MD        insulin lispro (HUMALOG) injection vial 0-12 Units  0-12 Units Subcutaneous TID WC Radha White MD   2 Units at 11/19/17 1707    insulin lispro (HUMALOG) injection vial 0-6 Units  0-6 Units Subcutaneous Nightly Radha White MD   1 Units at 11/20/17 2056    rosuvastatin (CRESTOR) tablet 40 mg  40 mg Oral Nightly Radha White MD   40 mg at 11/21/17 2112    heparin (porcine) injection 4,000 Units  4,000 Units Intercatheter PRN Krystina Muñiz MD   4,000 Units at 11/22/17 0926    vancomycin (VANCOCIN) oral solution 125 mg  125 mg Oral 4 times per day Liya Gutierrez MD   125 mg at 11/22/17 2587    lidocaine 2 % injection 5 mL  5 mL Intradermal Once Adjondi Governor Drape, NP        sodium chloride flush 0.9 % injection 10 mL  10 mL Intravenous PRN Adjondi Governor Drape, NP        sodium chloride flush 0.9 % injection 10 mL  10 mL Intravenous PRN Adjondi Governor Drape, NP   10 mL at 11/11/17 2132    sodium chloride flush 0.9 % injection 10 mL  10 mL Intravenous PRN Adjondi Governor Drape, NP   10 mL at 11/13/17 2321    acetaminophen (TYLENOL) tablet 650 mg  650 mg Oral Q4H PRN [facility] OSMANI Lynch   650 mg at 11/22/17 2503 OBJECTIVE  PHYSICAL EXAM:   /77   Pulse 128   Temp 98.1 °F (36.7 °C)   Resp 18   Ht 4' 11\" (1.499 m)   Wt 127 lb 13.9 oz (58 kg)   SpO2 98%   BMI 25.83 kg/m²   Body mass index is 25.83 kg/m². CONSTITUTIONAL:  awake, alert, cooperative, no apparent distress, and appears stated age  EYES:  Lids and lashes normal, pupils equal, round and reactive to light, extra ocular muscles intact, sclera clear, conjunctiva normal  LUNGS:  No increased work of breathing, good air exchange, clear to auscultation bilaterally, no crackles or wheezing  CARDIOVASCULAR:  Irreg Irreg rate is controlled  ABDOMEN:  No scars, normal bowel sounds, soft, non-distended, non-tender, no masses palpated, no hepatosplenomegally  EXT: No c/c/e    DATA:   Diagnostic tests reviewed for today's visit:    Most recent labs and imaging results reviewed.      SIGNATURE: Amber Guaman MD [facility]   DATE: November 22, 2017 MRN: [id number]   TIME: 10:09 AM PAGER: [phone]

## 2017-11-22 NOTE — PROGRESS NOTES
vc's for technique   Increased time and effort to complete   HOB slightly elevated    Transfers: @ [employer]  @No device  Sit to Stand: - Contact Guard Assist/SBA   Stand to Sit: - Contact Guard Assist   Attempted STS without device - unsteady, difficulty maintain balance   vc's for hand placement and safety with [employer]   Improved steadiness with [employer]    Gait/Ambulation:   Assistive Device: Rolling Walker  Assist Level: Contact Guard Assist  Distance: 15'  Surface: linoleum/hardwood  Gait Deviations: small reciprocal steps, lists to the R, difficulty with [employer] in close quarters; vc's for improved [employer] [facility]  Times per week: 3-6  Current Treatment Recommendations: Strengthening, Functional Mobility Training, Transfer Training, Gait Training, Endurance Training, Neuromuscular Re-education, Home Exercise Program, Safety Education & Training, Equipment Evaluation, Education, & procurement, Patient/Caregiver Education & Training    Goals  Short term goals  Short term goal 1: Pt will be indep with HEP  Short term goal 2: Pt will complete transfers with Mod I  Short term goal 3: Pt will amb 48' with or without AD with mod I  Short term goal 4: Pt will complete standing activities with 0-1 UE support and Mod I     Comments: pt declined exercises this date, otherwise good participation.   Pt up in chair with call button within reach and chair alarm activated      Therapy Time   Individual   Time In 1452   Time Out 1508   Minutes 16     Gait - 10 mins  Bm/trsf - 6 mins    Electronically signed by Braydon Corona PTA on 11/22/2017 at 3:08 PM

## 2017-11-23 ENCOUNTER — APPOINTMENT (OUTPATIENT)
Dept: GENERAL RADIOLOGY | Age: 82
DRG: 371 | End: 2017-11-23
Payer: MEDICARE

## 2017-11-23 LAB
ALBUMIN SERPL-MCNC: 2.9 G/DL (ref 3.9–4.9)
ALP BLD-CCNC: 93 U/L (ref 40–130)
ALT SERPL-CCNC: <5 U/L (ref 0–33)
ANION GAP SERPL CALCULATED.3IONS-SCNC: 14 MEQ/L (ref 7–13)
AST SERPL-CCNC: 19 U/L (ref 0–35)
BASOPHILS ABSOLUTE: 0 K/UL (ref 0–0.2)
BASOPHILS RELATIVE PERCENT: 0 %
BILIRUB SERPL-MCNC: 0.2 MG/DL (ref 0–1.2)
BUN BLDV-MCNC: 15 MG/DL (ref 8–23)
C-REACTIVE PROTEIN, HIGH SENSITIVITY: 92.5 MG/L (ref 0–5)
CALCIUM SERPL-MCNC: 7.8 MG/DL (ref 8.6–10.2)
CHLORIDE BLD-SCNC: 98 MEQ/L (ref 98–107)
CO2: 23 MEQ/L (ref 22–29)
CREAT SERPL-MCNC: 3.19 MG/DL (ref 0.5–0.9)
EKG ATRIAL RATE: 104 BPM
EKG ATRIAL RATE: 136 BPM
EKG ATRIAL RATE: 81 BPM
EKG ATRIAL RATE: 94 BPM
EKG Q-T INTERVAL: 350 MS
EKG Q-T INTERVAL: 392 MS
EKG Q-T INTERVAL: 398 MS
EKG Q-T INTERVAL: 420 MS
EKG QRS DURATION: 102 MS
EKG QRS DURATION: 104 MS
EKG QRS DURATION: 110 MS
EKG QRS DURATION: 98 MS
EKG QTC CALCULATION (BAZETT): 467 MS
EKG QTC CALCULATION (BAZETT): 469 MS
EKG QTC CALCULATION (BAZETT): 484 MS
EKG QTC CALCULATION (BAZETT): 505 MS
EKG R AXIS: 28 DEGREES
EKG R AXIS: 8 DEGREES
EKG R AXIS: 9 DEGREES
EKG R AXIS: 9 DEGREES
EKG T AXIS: 231 DEGREES
EKG T AXIS: 4 DEGREES
EKG T AXIS: 50 DEGREES
EKG T AXIS: 72 DEGREES
EKG VENTRICULAR RATE: 108 BPM
EKG VENTRICULAR RATE: 83 BPM
EKG VENTRICULAR RATE: 87 BPM
EKG VENTRICULAR RATE: 92 BPM
EOSINOPHILS ABSOLUTE: 0.3 K/UL (ref 0–0.7)
EOSINOPHILS RELATIVE PERCENT: 3.3 %
GFR AFRICAN AMERICAN: 16.9
GFR NON-AFRICAN AMERICAN: 13.9
GLOBULIN: 3 G/DL (ref 2.3–3.5)
GLUCOSE BLD-MCNC: 126 MG/DL (ref 60–115)
GLUCOSE BLD-MCNC: 128 MG/DL (ref 74–109)
GLUCOSE BLD-MCNC: 144 MG/DL (ref 60–115)
GLUCOSE BLD-MCNC: 226 MG/DL (ref 60–115)
HCT VFR BLD CALC: 29.4 % (ref 37–47)
HEMOGLOBIN: 9.7 G/DL (ref 12–16)
LYMPHOCYTES ABSOLUTE: 0.6 K/UL (ref 1–4.8)
LYMPHOCYTES RELATIVE PERCENT: 6.7 %
MAGNESIUM: 2.1 MG/DL (ref 1.7–2.3)
MCH RBC QN AUTO: 29 PG (ref 27–31.3)
MCHC RBC AUTO-ENTMCNC: 32.9 % (ref 33–37)
MCV RBC AUTO: 88 FL (ref 82–100)
MONOCYTES ABSOLUTE: 2.6 K/UL (ref 0.2–0.8)
MONOCYTES RELATIVE PERCENT: 30 %
NEUTROPHILS ABSOLUTE: 5.2 K/UL (ref 1.4–6.5)
NEUTROPHILS RELATIVE PERCENT: 60 %
PDW BLD-RTO: 16.2 % (ref 11.5–14.5)
PERFORMED ON: ABNORMAL
PLATELET # BLD: 83 K/UL (ref 130–400)
POTASSIUM SERPL-SCNC: 4.1 MEQ/L (ref 3.5–5.1)
RBC # BLD: 3.34 M/UL (ref 4.2–5.4)
SEDIMENTATION RATE, ERYTHROCYTE: 56 MM (ref 0–30)
SODIUM BLD-SCNC: 135 MEQ/L (ref 132–144)
TOTAL PROTEIN: 5.9 G/DL (ref 6.4–8.1)
TROPONIN: 0.05 NG/ML (ref 0–0.01)
WBC # BLD: 8.7 K/UL (ref 4.8–10.8)

## 2017-11-23 PROCEDURE — 2580000003 HC RX 258: Performed by: INTERNAL MEDICINE

## 2017-11-23 PROCEDURE — 6370000000 HC RX 637 (ALT 250 FOR IP): Performed by: INTERNAL MEDICINE

## 2017-11-23 PROCEDURE — 83735 ASSAY OF MAGNESIUM: CPT

## 2017-11-23 PROCEDURE — 6370000000 HC RX 637 (ALT 250 FOR IP): Performed by: SPECIALIST

## 2017-11-23 PROCEDURE — 85025 COMPLETE CBC W/AUTO DIFF WBC: CPT

## 2017-11-23 PROCEDURE — 86141 C-REACTIVE PROTEIN HS: CPT

## 2017-11-23 PROCEDURE — 36415 COLL VENOUS BLD VENIPUNCTURE: CPT

## 2017-11-23 PROCEDURE — 6370000000 HC RX 637 (ALT 250 FOR IP): Performed by: HOSPITALIST

## 2017-11-23 PROCEDURE — 85652 RBC SED RATE AUTOMATED: CPT

## 2017-11-23 PROCEDURE — 80053 COMPREHEN METABOLIC PANEL: CPT

## 2017-11-23 PROCEDURE — 71020 XR CHEST STANDARD TWO VW: CPT

## 2017-11-23 PROCEDURE — 84484 ASSAY OF TROPONIN QUANT: CPT

## 2017-11-23 PROCEDURE — 2060000000 HC ICU INTERMEDIATE R&B

## 2017-11-23 PROCEDURE — 6360000002 HC RX W HCPCS: Performed by: INTERNAL MEDICINE

## 2017-11-23 PROCEDURE — 93005 ELECTROCARDIOGRAM TRACING: CPT

## 2017-11-23 RX ORDER — DILTIAZEM HYDROCHLORIDE 60 MG/1
60 TABLET, FILM COATED ORAL 4 TIMES DAILY
Qty: 120 TABLET | Refills: 3 | Status: SHIPPED | OUTPATIENT
Start: 2017-11-23 | End: 2017-11-24 | Stop reason: HOSPADM

## 2017-11-23 RX ORDER — CHOLESTYRAMINE LIGHT 4 G/5.7G
2 POWDER, FOR SUSPENSION ORAL 3 TIMES DAILY PRN
Qty: 60 PACKET | Refills: 3 | Status: ON HOLD | OUTPATIENT
Start: 2017-11-23 | End: 2018-01-01

## 2017-11-23 RX ORDER — AMIODARONE HYDROCHLORIDE 200 MG/1
200 TABLET ORAL DAILY
Status: DISCONTINUED | OUTPATIENT
Start: 2017-11-23 | End: 2017-11-24 | Stop reason: HOSPADM

## 2017-11-23 RX ORDER — CHOLESTYRAMINE LIGHT 4 G/5.7G
2 POWDER, FOR SUSPENSION ORAL 3 TIMES DAILY PRN
Status: DISCONTINUED | OUTPATIENT
Start: 2017-11-23 | End: 2017-11-24 | Stop reason: HOSPADM

## 2017-11-23 RX ORDER — AMIODARONE HYDROCHLORIDE 200 MG/1
200 TABLET ORAL DAILY
Qty: 30 TABLET | Refills: 3 | Status: SHIPPED | OUTPATIENT
Start: 2017-11-24 | End: 2018-01-01 | Stop reason: SDUPTHER

## 2017-11-23 RX ADMIN — Medication 10 ML: at 20:35

## 2017-11-23 RX ADMIN — VENLAFAXINE HYDROCHLORIDE 150 MG: 150 CAPSULE, EXTENDED RELEASE ORAL at 09:18

## 2017-11-23 RX ADMIN — Medication 125 MG: at 10:49

## 2017-11-23 RX ADMIN — Medication 400 MG: at 09:18

## 2017-11-23 RX ADMIN — PANTOPRAZOLE SODIUM 40 MG: 40 GRANULE, DELAYED RELEASE ORAL at 06:08

## 2017-11-23 RX ADMIN — INSULIN LISPRO 4 UNITS: 100 INJECTION, SOLUTION INTRAVENOUS; SUBCUTANEOUS at 12:37

## 2017-11-23 RX ADMIN — Medication 1 CAPSULE: at 09:20

## 2017-11-23 RX ADMIN — DILTIAZEM HYDROCHLORIDE 60 MG: 30 TABLET, FILM COATED ORAL at 00:00

## 2017-11-23 RX ADMIN — AMIODARONE HYDROCHLORIDE 200 MG: 200 TABLET ORAL at 13:24

## 2017-11-23 RX ADMIN — DILTIAZEM HYDROCHLORIDE 60 MG: 30 TABLET, FILM COATED ORAL at 06:08

## 2017-11-23 RX ADMIN — CALCIUM 500 MG: 500 TABLET ORAL at 09:18

## 2017-11-23 RX ADMIN — ISOSORBIDE MONONITRATE 60 MG: 60 TABLET, EXTENDED RELEASE ORAL at 09:18

## 2017-11-23 RX ADMIN — CARBIDOPA AND LEVODOPA 1 TABLET: 25; 100 TABLET ORAL at 20:35

## 2017-11-23 RX ADMIN — ROSUVASTATIN CALCIUM 40 MG: 40 TABLET, FILM COATED ORAL at 20:35

## 2017-11-23 RX ADMIN — DILTIAZEM HYDROCHLORIDE 60 MG: 30 TABLET, FILM COATED ORAL at 17:54

## 2017-11-23 RX ADMIN — INSULIN LISPRO 2 UNITS: 100 INJECTION, SOLUTION INTRAVENOUS; SUBCUTANEOUS at 09:16

## 2017-11-23 RX ADMIN — INSULIN GLARGINE 14 UNITS: 100 INJECTION, SOLUTION SUBCUTANEOUS at 09:17

## 2017-11-23 RX ADMIN — Medication 10 ML: at 09:18

## 2017-11-23 RX ADMIN — AMIODARONE HYDROCHLORIDE 0.5 MG/MIN: 50 INJECTION, SOLUTION INTRAVENOUS at 01:09

## 2017-11-23 RX ADMIN — LEVOTHYROXINE SODIUM 50 MCG: 50 TABLET ORAL at 06:05

## 2017-11-23 RX ADMIN — DILTIAZEM HYDROCHLORIDE 60 MG: 30 TABLET, FILM COATED ORAL at 12:38

## 2017-11-23 RX ADMIN — Medication 125 MG: at 06:05

## 2017-11-23 RX ADMIN — FLUTICASONE PROPIONATE 2 SPRAY: 50 SPRAY, METERED NASAL at 09:16

## 2017-11-23 RX ADMIN — CARBIDOPA AND LEVODOPA 1 TABLET: 25; 100 TABLET ORAL at 09:18

## 2017-11-23 RX ADMIN — Medication 125 MG: at 17:55

## 2017-11-23 ASSESSMENT — PAIN SCALES - GENERAL
PAINLEVEL_OUTOF10: 0
PAINLEVEL_OUTOF10: 0

## 2017-11-23 NOTE — PROGRESS NOTES
CARDIOLOGY 14[address]  [address] PROGRESS NOTE         11/23/2017      Libby Warner    [id number]  1935    Rounding MD: Imelda Pardo MD ,[facility]    Primary Cardiologist:  Sylvia Cabezas MD       SUBJECTIVE:     No Complaints. States she is now feeling much better. She denies any chest pain or shortness of breath. Currently on IV amodarone for recurrent atrial fibrillation.        Prior HPI Notes:  Katinaa Sherman is a 81 y.o.  female patient who is being at the request of Dr. Neelima Carter inpatient consultation of atrial fibrillation. She was admitted on 10/24/2017. [facility] and [facility] records have been reviewed in detail.  She is followed on a regular basis by Dr. Melissa Monreal.     She has a history of paroxysmal atrial fibrillation for which she is maintained chronically on amiodarone.  She is chronically antiquated without warfarin. Yeni Carr has a history of mild coronary artery disease.  She also has a history of moderate to severe calcific aortic stenosis.  She has significant hyperlipidemia but is been intolerant to statin therapy. Yeni Carr has stage V chronic kidney disease and follows regularly with Dr. Edward Conde. Yeni Carr had an AV fistula placed however that has not completely matured.  Most recently saw Dr. Melissa Monreal in early September and was noted on EKG to be in normal sinus rhythm.     She presented to the emergency department 5 days ago with complaints of worsening shortness of breath occurring over a period of 3 days.  She's had a productive cough and has been having some bloody sputum. Yeni Carr was diagnosed with pneumonia and is being treated with intravenous antibiotics. Yeni Carr notes that she is now feeling much better. Yeni Carr is also being treated for hyperkalemia. Yeni Carr initially was noted to be normal sinus rhythm, however, yesterday she developed tachycardia and was noted to be in atrial fibrillation with a rapid ventricular response.  She denies any chest pain.  She denies any orthopnea, PND, or worsening peripheral edema.  She denies any palpitations, lightheadedness, near-syncope or syncope. Now readmitted with C Difficile infection. Treated. Post PPM implantation.   Recurrent AFIB post PM.      OBJECTIVE:     MEDICATIONS:     Scheduled Meds:   diltiazem  60 mg Oral 4x Daily    magnesium oxide  400 mg Oral Daily    sodium chloride flush  10 mL Intravenous 2 times per day    carbidopa-levodopa  1 tablet Oral BID    darbepoetin lizeth-polysorbate  100 mcg Subcutaneous Weekly    buprenorphine  1 patch Transdermal Weekly    calcium elemental  500 mg Oral Daily    fluticasone  2 spray Nasal Daily    iron polysaccaride  1 capsule Oral Daily with breakfast    isosorbide mononitrate  60 mg Oral Daily    insulin glargine  14 Units Subcutaneous QAM    levothyroxine  50 mcg Oral Daily    pantoprazole sodium  40 mg Oral QAM AC    venlafaxine  150 mg Oral Daily with breakfast    insulin lispro  0-12 Units Subcutaneous TID WC    insulin lispro  0-6 Units Subcutaneous Nightly    rosuvastatin  40 mg Oral Nightly    vancomycin  125 mg Oral 4 times per day    lidocaine  5 mL Intradermal Once     Continuous Infusions:   amiodarone 450mg/250ml D5W infusion 0.5 mg/min (11/23/17 0109)    dextrose       PRN Meds:cholestyramine light, sodium chloride flush, sodium chloride flush, magnesium hydroxide, ondansetron, potassium chloride, metoprolol, fentanNYL, heparin (porcine), heparin (porcine), metoprolol, albumin human, ALPRAZolam, guaiFENesin-dextromethorphan, ipratropium-albuterol, glucose, dextrose, glucagon (rDNA), dextrose, heparin (porcine), sodium chloride flush, sodium chloride flush, sodium chloride flush, acetaminophen    PHYSICAL EXAM:    CURRENT VITALS: BP (!) 158/58   Pulse 79   Temp 98.2 °F (36.8 °C) (Oral)   Resp 17   Ht 4' 11\" (1.499 m)   Wt 124 lb 9 oz (56.5 kg)   SpO2 99%   BMI 25.16 kg/m²       CONSTITUTIONAL:  awake, alert, cooperative, no apparent distress,   ENT:  Normocephalic, without obvious abnormality, atraumatic, sinuses nontender on palpation, external ears without lesions,  NECK:  Supple, symmetrical, trachea midline, no adenopathy, thyroid symmetric, not enlarged and no tenderness, skin normal  LUNGS:  No increased work of breathing, good air exchange, clear to auscultation bilaterally, no crackles or wheezing, PM left Chest.  CARDIOVASCULAR:  Normal apical impulse, regular rate and rhythm, normal S1 and S2,  and 2/6 systolic murmur noted  ABDOMEN:  Normal bowel sounds, soft, non-distended, non-tender, no masses palpated, no hepatosplenomegally  EXTREMITIES:  No edema, Pulses strong throughout. NEUROLOGIC:  Awake, alert, oriented to name, place and time.  Following all commands and moving all extremties  SKIN:  no bruising or bleeding, normal skin color, texture, turgor and no rashes         LABS:      Recent Results (from the past 24 hour(s))   POCT Glucose    Collection Time: 11/22/17 12:50 PM   Result Value Ref Range    POC Glucose 111 60 - 115 mg/dl    Performed on ACCU-CHEK    POCT Glucose    Collection Time: 11/22/17  3:59 PM   Result Value Ref Range    POC Glucose 152 (H) 60 - 115 mg/dl    Performed on ACCU-CHEK    POCT Glucose    Collection Time: 11/22/17  9:48 PM   Result Value Ref Range    POC Glucose 199 (H) 60 - 115 mg/dl    Performed on ACCU-CHEK    CBC Auto Differential    Collection Time: 11/23/17  5:29 AM   Result Value Ref Range    WBC 8.7 4.8 - 10.8 K/uL    RBC 3.34 (L) 4.20 - 5.40 M/uL    Hemoglobin 9.7 (L) 12.0 - 16.0 g/dL    Hematocrit 29.4 (L) 37.0 - 47.0 %    MCV 88.0 82.0 - 100.0 fL    MCH 29.0 27.0 - 31.3 pg    MCHC 32.9 (L) 33.0 - 37.0 %    RDW 16.2 (H) 11.5 - 14.5 %    Platelets 83 (L) 292 - 400 K/uL    Neutrophils % 60.0 %    Lymphocytes % 6.7 %    Monocytes % 30.0 %    Eosinophils % 3.3 %    Basophils % 0.0 %    Neutrophils # 5.2 1.4 - 6.5 K/uL    Lymphocytes # 0.6 (L) 1.0 - 4.8 K/uL    Monocytes # 2.6 (H) 0.2 - 0.8 K/uL    Eosinophils # 0.3 0.0 - 0.7

## 2017-11-23 NOTE — PROGRESS NOTES
Renal Progress Note    Assessment and Plan:      82 yo with new esrd. Has anemia, hypotension, leukocytosis, c diff. blood cx clear now. Had cardiac arrest during hd last Wednesday. No problems since then. Had PPM     Plan/  1- ok for d/c from renal standpoint. Main issue is anti arrhythmics  2- HD next tomorrow  3- aranesp for anemia    Patient Active Problem List:     Hypothyroidism     Hypercholesteremia     CKD (chronic kidney disease)     Afib (HCC)     Anemia     Acid reflux     Anxiety     Diabetes mellitus due to underlying condition, controlled, with stage 5 chronic kidney disease not on chronic dialysis, with long-term current use of insulin (HCC)     Essential hypertension     Idiopathic Parkinson's disease ([facility] [address]     Pneumonia of lower lobe due to infectious organism ([facility] [address]     Hyperkalemia     Acute on chronic diastolic congestive heart failure (HCC)     Pulmonary edema with congestive heart failure (HCC)     Shortness of breath     Acute pulmonary edema (HCC)     Pancolitis (HCC)     C. difficile colitis     Anemia     ESRD (end stage renal disease) on dialysis ([facility] [address]     Leukocytosis      Subjective:   Admit Date: 11/9/2017    Interval History: doing ok. Has pain in shoulder.   still On iv amio      Medications:   Scheduled Meds:   amiodarone  200 mg Oral Daily    diltiazem  60 mg Oral 4x Daily    magnesium oxide  400 mg Oral Daily    sodium chloride flush  10 mL Intravenous 2 times per day    carbidopa-levodopa  1 tablet Oral BID    darbepoetin lizeth-polysorbate  100 mcg Subcutaneous Weekly    buprenorphine  1 patch Transdermal Weekly    calcium elemental  500 mg Oral Daily    fluticasone  2 spray Nasal Daily    iron polysaccaride  1 capsule Oral Daily with breakfast    isosorbide mononitrate  60 mg Oral Daily    insulin glargine  14 Units Subcutaneous QAM    levothyroxine  50 mcg Oral Daily    pantoprazole sodium  40 mg Oral QAM AC    venlafaxine  150 mg Oral Daily with breakfast thyromegally  Cardiovascular:  S1, S2 without m/r/g   Respiratory:  CTA B without w/r/r   Abdomen: +bs, soft, nt  Ext: trace LE edema  Musculoskeletal:Intact  Neuro:Alert and oriented with no deficit      Electronically signed by Alexandro Diaz MD on 11/23/2017 at 1:22 PM

## 2017-11-23 NOTE — FLOWSHEET NOTE
PCA asked nurse to check on pt's midline IV as it was leaking. This nurse noted midline to be completely out of arm, stuck to tape. No bleeding to site noted, catheter tip intact. New #22 IV started immediately in right wrist area and Amiodarone gtt continued.

## 2017-11-23 NOTE — CARE COORDINATION
PATIENT TO BE DISCHARGED TO [facility] TODAY. PATIENT TO HAVE DIALYSIS TOMORROW AT [facility] ON [address] WHERE SHE HAS JUST STARTED DIALYSIS THERE ON 11/6. PATIENT WAS LAST DISCHARGED FROM [facility] ON 11/3 AND WAS SENT TO [id number] [facility]. PER D/C INSTRUCTIONS FROM 11/3 PATIENT/KOV WAS TO SET UP \"NEW\" DIALYSIS FOR THE PATIENT AND THE PATIENT WAS TO START ON 11/6. PATIENT WENT TO DIALYSIS ON 11/6. AND 11/8 WHILE AT [id number] [facility]. THE PATIENT WAS THEN ADMITTED TO HOSPITAL ON 11/9. PER DR. AKBAR, PATIENT COULD ONLY BE DISCHARGED TODAY IF DIALYSIS COULD BE ARRANGED FOR TOMORROW. CALL PLACED TO [phone] [facility] AND SPOKE WITH CHARGE NURSE AND SHE STATES THEY HAVE NO IDEA WHAT CENTER. .WHAT DAYS AND WHAT TIMES THE PATIENT EVEN GETS DIALYSIS, AND THAT THEY CANNOT TAKE PATIENT WITHOUT AN ORDER STATING THAT THE PATIENT COULD MISS DIALYSIS IF UNABLE TO BE ARRANGED FOR TOMORROW. DID SOME RESEARCH AND THE PATIENT GOES TO [facility] ON M-W-F BUT WAS UNABLE TO FIND OUT TIME PATIENT'S CHAIR TIME WAS. ASKED PATIENT RE TIME, ASKED THE FAMILY, CALLED DIALYSIS CENTER AND IT WAS CLOSED, CALLED DEMETRIS MCKEON, AND TANMAY HILLIARD OF [facility] AND LEFT  WITH NO RESPONSE. SPOKE WITH GIRISH MY SUPERVISOR AND NO OTHER OPTIONS BUT TO KEEP PATIENT OVERNIGHT TO GET DIALYSIS IN AM AND RETURN TO [facility] TOMORROW. PATIENT TO BE DISCHARGED TOMORROW AFTER DIALYSIS OR PRIOR TO DIALYSIS IF CAN BE ARRANGED. LSW/C3 TO FOLLOW.

## 2017-11-23 NOTE — PROGRESS NOTES
INPATIENT PROGRESS NOTES    PATIENT NAME: Milli Hair  MRN: [id number]  SERVICE DATE:  November 23, 2017   SERVICE TIME:  9:18 AM      PRIMARY SERVICE:Internal Medicine    INTERVAL HPI: Patient seen and examined at bedside, Interval Notes, orders reviewed. Nursing notes noted    SUBJECTIVE    CHIEF COMPLAINT: Only 2 loose stools in the last 24 hours according to the patient. Review of Systems  Please see HPI above. All bolded are positive. All un-bolded are negative. Gen: fever, chills, fatigue, weakness, diaphoresis, increase in thirst, unintentional weight change, loss of appetite  Head: headache, vision change, hearing loss  Chest: chest pain, chest heaviness, palpitations, orthopnea, PND, JAMES  Lungs: shortness of breath, wheezing, coughing  Abdomen: abdominal pain, nausea, vomiting, diarrhea, constipation, melena, hematochezia, hematemesis  Extremities: lower extremity edema, myalgias, arthralgias  Urinary: dysuria, hematuria, or increase in frequency  Neurologic: lightheadedness, dizziness, confusion, syncope  Psychiatric: depression, suicidal ideation, or anxiety    Denies any chest pain or shortness of breath. Denies any palpitations. OBJECTIVE    Body mass index is 25.16 kg/m². PHYSICAL EXAM:  Vitals:  BP (!) 158/58   Pulse 79   Temp 98.2 °F (36.8 °C) (Oral)   Resp 17   Ht 4' 11\" (1.499 m)   Wt 124 lb 9 oz (56.5 kg)   SpO2 99%   BMI 25.16 kg/m²   General: comfortable in bed, No distress. Head: Atraumatic , Normocephalic   Eyes: PERRL. No sclera icterus. No conjunctival injection. No discharge   ENT: No nasal  discharge. Pharynx clear. Neck:  Trachea midline. No thyromegaly, no JVD, No cervical adenopathy. Resp : Normal effort,  No accessory muscle use. No Rales. No wheezing. No rhonchi. No dullness on percussion. CV: Normal  rate. Regular rhythm. No mumur ,  Rub or gallop  ABD: Non-tender. Non-distended. No masses. No organmegaly. Normal bowel sounds. No hernia.   EXT: No Pitting, No Cyanosis No clubbing  Neuro: no focal weakness diffuse weakness noted. Skin: Warm and dry. No erythema rash on exposed extremities. DATA:   Recent Labs      11/22/17   0601  11/23/17   0529   WBC  8.3  8.7   HGB  9.6*  9.7*   HCT  29.3*  29.4*   MCV  87.4  88.0   PLT  107*  83*     Recent Labs      11/21/17   0641  11/22/17   0601  11/23/17   0529   NA  134  133  135   K  4.2  4.1  4.1   CL  95*  96*  98   CO2  24  24  23   BUN  16  26*  15   CREATININE  3.96*  5.09*  3.19*   GLUCOSE  87  112*  128*   CALCIUM  7.1*  7.3*  7.8*   PROT  5.7*   --   5.9*   LABALBU  2.6*   --   2.9*   BILITOT  0.2   --   0.2   ALKPHOS  92   --   93   AST  34   --   19   ALT  6   --   <5   LABGLOM  10.9*  8.1*  13.9*   GFRAA  13.1*  9.8*  16.9*   GLOB  3.1   --   3.0         No results for input(s): PHART, ZSM3OQI, PO2ART, PTM0NWF, BEART, B8FYNIRF in the last 72 hours.   O2 Device: None (Room air)  O2 Flow Rate (L/min): 3 L/min  Lab Results   Component Value Date    LACTA 2.0 11/09/2017        MEDICATIONS during current hospitalization:    Continuous Infusions:   amiodarone 450mg/250ml D5W infusion 0.5 mg/min (11/23/17 0109)    dextrose         Scheduled Meds:   diltiazem  60 mg Oral 4x Daily    magnesium oxide  400 mg Oral Daily    sodium chloride flush  10 mL Intravenous 2 times per day    carbidopa-levodopa  1 tablet Oral BID    darbepoetin lizeth-polysorbate  100 mcg Subcutaneous Weekly    buprenorphine  1 patch Transdermal Weekly    calcium elemental  500 mg Oral Daily    fluticasone  2 spray Nasal Daily    iron polysaccaride  1 capsule Oral Daily with breakfast    isosorbide mononitrate  60 mg Oral Daily    insulin glargine  14 Units Subcutaneous QAM    levothyroxine  50 mcg Oral Daily    pantoprazole sodium  40 mg Oral QAM AC    venlafaxine  150 mg Oral Daily with breakfast    insulin lispro  0-12 Units Subcutaneous TID WC    insulin lispro  0-6 Units Subcutaneous Nightly    rosuvastatin  40 mg Oral Nightly    vancomycin  125 mg Oral 4 times per day    lidocaine  5 mL Intradermal Once       PRN Meds:cholestyramine light, sodium chloride flush, sodium chloride flush, magnesium hydroxide, ondansetron, potassium chloride, metoprolol, fentanNYL, heparin (porcine), heparin (porcine), metoprolol, albumin human, ALPRAZolam, guaiFENesin-dextromethorphan, ipratropium-albuterol, glucose, dextrose, glucagon (rDNA), dextrose, heparin (porcine), sodium chloride flush, sodium chloride flush, sodium chloride flush, acetaminophen    Radiology  Xr Chest Standard (2 Vw)    Result Date: 11/23/2017  EXAMINATION: XR CHEST STANDARD TWO VIEWS CLINICAL HISTORY:  AFIB, POST PM. COMPARISONS: 11/21/2017 FINDINGS: Cardiac size is borderline. Pulmonary vascularity is normal. There are scattered calcifications from old granulomatous disease. There is faint groundglass opacity in the right lower lung consistent with infiltrate, minimal edema, or atelectasis. There are small bilateral pleural effusions. Trace right pneumothorax is suspected, not of a size to be of clinical significance. There is left-sided dual-chamber permanent pacemaker. There is right-sided central dialysis catheter with tip in the superior vena cava. There are scattered calcifications from old granulomatous disease. There are atherosclerotic changes of the thoracic aorta. There is moderate spondylosis. FAINT GROUNDGLASS OPACITY IN THE RIGHT LOWER LUNG CONSISTENT WITH INFILTRATE, MINIMAL EDEMA, OR ATELECTASIS. SMALL BILATERAL PLEURAL EFFUSIONS. SUSPECTED TRACE RIGHT PNEUMOTHORAX. ADDITIONAL FINDINGS AS ABOVE. Xr Chest Standard (2 Vw)    Result Date: 11/21/2017  EXAMINATION: CHEST X-RAY, AP AND LATERAL CLINICAL HISTORY: CARDIAC ARRHYTHMIA, STATUS POST CARDIAC PACEMAKER PLACEMENT COMPARISONS: 11/20/2017 FINDINGS: There is a cardiac pacemaker superimposed on the upper left hemithorax with electrodes in the right atrium and right ventricle. The heart is not enlarged. There is atherosclerotic calcification and tortuosity of the aorta. There is a right-sided hemodialysis catheter with the distal tip of the catheter at the caval/atrial junction. There is no evidence of a pneumothorax. There are small granulomas in the lungs compatible with remote granulomatous disease. No acute pulmonary infiltrates or pleural effusions. There is degenerative bone spurring in the spine. There is no significant interval change since yesterday's portable chest x-ray. 1. CARDIAC PACEMAKER SUPERIMPOSED ON THE UPPER LEFT HEMITHORAX WITH ELECTRODES IN THE RIGHT ATRIUM AND RIGHT VENTRICLE. NO PNEUMOTHORAX POST PACEMAKER PLACEMENT. 2. SATISFACTORY PLACEMENT OF THE RIGHT HEMODIALYSIS CATHETER WITH TIP OF CATHETER AT THE CAVAL/ATRIAL JUNCTION. 3. NO RADIOGRAPHIC EVIDENCE OF ACTIVE DISEASE IN THE CHEST AND NO SIGNIFICANT INTERVAL CHANGE SINCE YESTERDAY'S PORTABLE CHEST X-RAY. Xr Chest Standard (2 Vw)    Result Date: 11/17/2017  X-RAY: A CHEST, 2 VIEWS:   CLINICAL HISTORY:   Post Arrest.   Short of breath. Weakness. COMPARISONS: 11/11/2017 FINDINGS:  Normal cardiac silhouette. There are atherosclerotic calcifications in the aortic arch. The right central venous catheter extends to the SVC. There are stable calcified granulomas in both lungs. No definite acute infiltrate. Small right-sided pleural effusion, similar to last exam. There are moderate degenerative changes in spine. NO DEFINITE ACUTE INFILTRATE. SMALL RIGHT-SIDED PLEURAL EFFUSION. Xr Chest Standard (2 Vw)    Result Date: 11/11/2017  EXAMINATION: XR CHEST STANDARD TWO VW CLINICAL HISTORY: Infection, recent pneumonia COMPARISONS: 11/9/2017 FINDINGS: There is small pleural effusion in the right posterior costophrenic angle, not appreciably changed from prior imaging studies 2 days ago. There is trace left pleural effusion. Cardiac size is borderline. Pulmonary vascularity is normal. There is mild basilar atelectasis.  There are scattered The mediastinum is unremarkable. Pulmonary vascular is attenuated, lungs are hyperinflated and there is some widening of the AP diameter the chest and coarsening of the interstitium. Right sided trachea. There is worsening patchy multifocal to confluent areas of airspace disease within the parenchyma. There is bibasilar trace to small pleural effusions. .  Pneumothoraces. Degenerative changes of the left shoulder                                                                               IMPRESSION: PERSISTENT PATCHY MULTIFOCAL TO CONFLUENT AREAS OF AIRSPACE DISEASE WITHIN THE PARENCHYMA. TRACE TO SMALL BILATERAL PLEURAL EFFUSIONS RADIOGRAPHIC FINDINGS SUGGESTIVE OF COPD. CORRELATE CLINICALLY. Xr Chest Standard (2 Vw)    Result Date: 10/28/2017  EXAMINATION: CHEST TWO VIEWS  CLINICAL HISTORY: Pneumonia, follow-up COMPARISONS: October 26, 2017  FINDINGS: Two views of the chest are submitted. The cardiac silhouette is enlarged. Unchanged. The mediastinum is unremarkable. Pulmonary vascular is attenuated, lungs are hyperinflated and there is some widening of the AP diameter the chest and coarsening of the interstitium. Right sided trachea. There is worsening patchy multifocal to confluent areas of airspace disease within the parenchyma. There is bibasilar trace to small pleural effusions. .  No effusions. Pneumothoraces. WORSENING PATCHY MULTIFOCAL TO CONFLUENT AREAS OF AIRSPACE DISEASE WITHIN THE PARENCHYMA. TRACE TO SMALL BILATERAL PLEURAL EFFUSIONS RADIOGRAPHIC FINDINGS SUGGESTIVE OF COPD. CORRELATE CLINICALLY. Xr Chest Standard (2 Vw)    Result Date: 10/24/2017  Chest 2 views. HISTORY: Shortness of breath. FINDINGS: Comparison, February 9, 2017. Osseous structures intact. Cardiac silhouette normal. Aorta calcified and tortuous. Small bilateral calcified granulomas again identified.  Mild blunting right costophrenic angle IN THE SUPERIOR POLE OF THE RIGHT KIDNEY. All CT scans at this facility use dose modulation, iterative reconstruction, and/or weight based dosing when appropriate to reduce radiation dose to as low as reasonably achievable. Nm Lung Vent/perfusion (vq)    Result Date: 10/31/2017  VENTILATION-PERFUSION LUNG SCAN: CLINICAL DATA:  Short of breath for 2 weeks TECHNIQUE:  Ventilation and perfusion scan is performed following the uneventful inhalation of 1.0 mCi of Tc-99m DTPA and following the uneventful intravenous administration of  5.5 mCi of Tc-99m MAA. Multi-projectional ventilation and perfusion images are then performed. Corresponding chest x-ray and CT scan the chest from October 31, 2017 were also available FINDINGS: There are matched defects seen bilaterally. As well as corresponding findings most likely consistent with pleural effusions. In addition there is what is considered a triple matched defect within the left lower lobe. Overall constellation of findings are consistent for nondiagnostic V/Q study     FINDINGS CONSISTENT WITH NONDIAGNOSTIC V/Q STUDY. WOULD CONSIDER REPEAT CT SCAN STUDY WITH CONTRAST IN ASSOCIATION WITH DIALYSIS FOR THE PATIENT GIVEN HER RENAL STATUS. Ct Abdomen Pelvis W Iv Contrast Additional Contrast? Oral    Result Date: 11/9/2017  CT ABDOMEN AND PELVIS WITH CONTRAST: 11/9/2017 CLINICAL HISTORY:  Right lower quadrant pain . COMPARISON: 4/14/2016. TECHNIQUE: Spiral images were obtained of the abdomen and pelvis after the uneventful intravenous administration of approximately 100 mL of Isovue-370 contrast. All CT scans at this facility use dose modulation, iterative reconstruction, and/or weight based dosing when appropriate to reduce radiation dose to as low as reasonably achievable. FINDINGS: There is moderate circumferential wall thickening of the entire colon, which is essentially collapsed, without significant stool.  Most likely possibilities are infection or inflammatory bowel disease. Moderate to marked diverticulosis, predominantly of the sigmoid is again noted, without findings to suggest acute diverticulitis. There is no pneumatosis, ascites, abscess, or significant small bowel dilatation. A small layering right and trace left pleural effusions are present, with mild probable atelectasis posteriorly. The gallbladder has been removed. The liver, spleen, pancreas, adrenal glands, great vessels, urinary bladder, and additional images of pelvis appear within normal limits, with moderate atherosclerotic plaquing of a normal caliber abdominal aorta and branch vessels again noted. Moderate atrophy of both kidneys with small cysts appear unchanged. Degenerative changes of the lumbar spine appear unchanged, predominantly at the L4-L5 level. MODERATE UNCOMPLICATED PANCOLITIS, MOST LIKELY INFECTION OR INFLAMMATORY BOWEL DISEASE. SMALL RIGHT AND TRACE LEFT PLEURAL EFFUSIONS WITH PROBABLE MILD BIBASILAR ATELECTASIS. NO OTHER SIGNIFICANT CHANGE FROM 4/16/2016 IDENTIFIED. Xr Chest Portable    Result Date: 11/20/2017  EXAMINATION: PORTABLE CHEST X-RAY FROM 11/20/2017 AT 17:45 HOURS CLINICAL HISTORY: RENAL FAILURE ON HEMODIALYSIS, CARDIAC ARRHYTHMIA AND STATUS POST CARDIAC PACEMAKER PLACEMENT, EVALUATE FOR POSSIBLE POST-PACEMAKER PNEUMOTHORAX COMPARISONS: 11/17/2017 FINDINGS: There is a bipolar cardiac pacemaker superimposed on the upper left hemithorax with electrodes in the region of the right atrium and right ventricle. There is no pneumothorax post pacemaker placement. The heart size is at the upper limits of normal. There is atherosclerotic calcification and tortuosity of aorta. There is a hemodialysis catheter in the right side with the distal tip of the catheter at the caval/atrial junction. There are small granulomas in the lungs and hilar regions presumably from remote granulomatous disease. There are no acute pulmonary infiltrates or pleural effusions.  There is degenerative bone

## 2017-11-24 VITALS
BODY MASS INDEX: 24.58 KG/M2 | HEART RATE: 84 BPM | WEIGHT: 121.91 LBS | TEMPERATURE: 98.2 F | HEIGHT: 59 IN | RESPIRATION RATE: 18 BRPM | DIASTOLIC BLOOD PRESSURE: 65 MMHG | OXYGEN SATURATION: 99 % | SYSTOLIC BLOOD PRESSURE: 155 MMHG

## 2017-11-24 LAB
ANION GAP SERPL CALCULATED.3IONS-SCNC: 15 MEQ/L (ref 7–13)
ANISOCYTOSIS: ABNORMAL
ATYPICAL LYMPHOCYTE RELATIVE PERCENT: 3 %
BANDED NEUTROPHILS RELATIVE PERCENT: 8 % (ref 5–11)
BASOPHILS ABSOLUTE: 0.1 K/UL (ref 0–0.2)
BASOPHILS RELATIVE PERCENT: 1 %
BUN BLDV-MCNC: 23 MG/DL (ref 8–23)
CALCIUM SERPL-MCNC: 8 MG/DL (ref 8.6–10.2)
CHLORIDE BLD-SCNC: 98 MEQ/L (ref 98–107)
CO2: 23 MEQ/L (ref 22–29)
CREAT SERPL-MCNC: 4.64 MG/DL (ref 0.5–0.9)
EOSINOPHILS ABSOLUTE: 0.1 K/UL (ref 0–0.7)
EOSINOPHILS RELATIVE PERCENT: 1 %
GFR AFRICAN AMERICAN: 11
GFR NON-AFRICAN AMERICAN: 9.1
GLUCOSE BLD-MCNC: 215 MG/DL (ref 60–115)
GLUCOSE BLD-MCNC: 91 MG/DL (ref 74–109)
GLUCOSE BLD-MCNC: 95 MG/DL (ref 60–115)
HCT VFR BLD CALC: 30.1 % (ref 37–47)
HEMOGLOBIN: 9.8 G/DL (ref 12–16)
HYPOCHROMIA: ABNORMAL
LYMPHOCYTES ABSOLUTE: 1.9 K/UL (ref 1–4.8)
LYMPHOCYTES RELATIVE PERCENT: 19 %
MAGNESIUM: 2.1 MG/DL (ref 1.7–2.3)
MCH RBC QN AUTO: 28.5 PG (ref 27–31.3)
MCHC RBC AUTO-ENTMCNC: 32.4 % (ref 33–37)
MCV RBC AUTO: 88.1 FL (ref 82–100)
MONOCYTES ABSOLUTE: 0.4 K/UL (ref 0.2–0.8)
MONOCYTES RELATIVE PERCENT: 4 %
MYELOCYTE PERCENT: 1 %
NEUTROPHILS ABSOLUTE: 6.3 K/UL (ref 1.4–6.5)
NEUTROPHILS RELATIVE PERCENT: 63 %
PDW BLD-RTO: 16 % (ref 11.5–14.5)
PERFORMED ON: ABNORMAL
PERFORMED ON: NORMAL
PLATELET # BLD: 108 K/UL (ref 130–400)
PLATELET SLIDE REVIEW: ABNORMAL
POTASSIUM SERPL-SCNC: 4.4 MEQ/L (ref 3.5–5.1)
RBC # BLD: 3.42 M/UL (ref 4.2–5.4)
SODIUM BLD-SCNC: 136 MEQ/L (ref 132–144)
WBC # BLD: 8.8 K/UL (ref 4.8–10.8)

## 2017-11-24 PROCEDURE — 6370000000 HC RX 637 (ALT 250 FOR IP): Performed by: SPECIALIST

## 2017-11-24 PROCEDURE — 85025 COMPLETE CBC W/AUTO DIFF WBC: CPT

## 2017-11-24 PROCEDURE — 80048 BASIC METABOLIC PNL TOTAL CA: CPT

## 2017-11-24 PROCEDURE — 6360000002 HC RX W HCPCS: Performed by: INTERNAL MEDICINE

## 2017-11-24 PROCEDURE — 99231 SBSQ HOSP IP/OBS SF/LOW 25: CPT | Performed by: INTERNAL MEDICINE

## 2017-11-24 PROCEDURE — 36415 COLL VENOUS BLD VENIPUNCTURE: CPT

## 2017-11-24 PROCEDURE — 6370000000 HC RX 637 (ALT 250 FOR IP): Performed by: INTERNAL MEDICINE

## 2017-11-24 PROCEDURE — 6370000000 HC RX 637 (ALT 250 FOR IP): Performed by: HOSPITALIST

## 2017-11-24 PROCEDURE — 83735 ASSAY OF MAGNESIUM: CPT

## 2017-11-24 RX ORDER — DILTIAZEM HYDROCHLORIDE 180 MG/1
180 CAPSULE, COATED, EXTENDED RELEASE ORAL 2 TIMES DAILY
Qty: 30 CAPSULE | Refills: 3 | Status: ON HOLD | OUTPATIENT
Start: 2017-11-24 | End: 2018-01-01 | Stop reason: HOSPADM

## 2017-11-24 RX ORDER — DILTIAZEM HYDROCHLORIDE 180 MG/1
180 CAPSULE, COATED, EXTENDED RELEASE ORAL 2 TIMES DAILY
Status: DISCONTINUED | OUTPATIENT
Start: 2017-11-24 | End: 2017-11-24 | Stop reason: HOSPADM

## 2017-11-24 RX ADMIN — CALCIUM 500 MG: 500 TABLET ORAL at 15:24

## 2017-11-24 RX ADMIN — AMIODARONE HYDROCHLORIDE 200 MG: 200 TABLET ORAL at 15:24

## 2017-11-24 RX ADMIN — Medication 125 MG: at 00:31

## 2017-11-24 RX ADMIN — Medication 400 MG: at 15:25

## 2017-11-24 RX ADMIN — DARBEPOETIN ALFA 100 MCG: 100 SOLUTION INTRAVENOUS; SUBCUTANEOUS at 15:25

## 2017-11-24 RX ADMIN — LEVOTHYROXINE SODIUM 50 MCG: 50 TABLET ORAL at 06:14

## 2017-11-24 RX ADMIN — Medication 125 MG: at 17:39

## 2017-11-24 RX ADMIN — CARBIDOPA AND LEVODOPA 1 TABLET: 25; 100 TABLET ORAL at 15:24

## 2017-11-24 RX ADMIN — VENLAFAXINE HYDROCHLORIDE 150 MG: 150 CAPSULE, EXTENDED RELEASE ORAL at 15:25

## 2017-11-24 RX ADMIN — DILTIAZEM HYDROCHLORIDE 60 MG: 30 TABLET, FILM COATED ORAL at 00:30

## 2017-11-24 RX ADMIN — PANTOPRAZOLE SODIUM 40 MG: 40 GRANULE, DELAYED RELEASE ORAL at 06:14

## 2017-11-24 RX ADMIN — FLUTICASONE PROPIONATE 2 SPRAY: 50 SPRAY, METERED NASAL at 15:25

## 2017-11-24 RX ADMIN — Medication 125 MG: at 06:15

## 2017-11-24 RX ADMIN — DILTIAZEM HYDROCHLORIDE 180 MG: 180 CAPSULE, EXTENDED RELEASE ORAL at 17:39

## 2017-11-24 RX ADMIN — ISOSORBIDE MONONITRATE 60 MG: 60 TABLET, EXTENDED RELEASE ORAL at 15:24

## 2017-11-24 RX ADMIN — DILTIAZEM HYDROCHLORIDE 60 MG: 30 TABLET, FILM COATED ORAL at 06:14

## 2017-11-24 ASSESSMENT — PAIN SCALES - GENERAL
PAINLEVEL_OUTOF10: 0

## 2017-11-24 NOTE — PROGRESS NOTES
fibrillation. She was admitted on 10/24/2017. Rosa Martinez and [facility] records have been reviewed in detail.  She is followed on a regular basis by Dr. Laci Brown.     She has a history of paroxysmal atrial fibrillation for which she is maintained chronically on amiodarone.  She is chronically antiquated without warfarin. Sunil Dyson has a history of mild coronary artery disease. She also has a history of moderate to severe calcific aortic stenosis.  She has significant hyperlipidemia but is been intolerant to statin therapy. Sunil Dyson has stage V chronic kidney disease and follows regularly with Dr. Ofelia Gudino. Sunil Dyson had an AV fistula placed however that has not completely matured.  Most recently saw Dr. Laci Brown in early September and was noted on EKG to be in normal sinus rhythm.     She presented to the emergency department 5 days ago with complaints of worsening shortness of breath occurring over a period of 3 days.  She's had a productive cough and has been having some bloody sputum. Sunil Dyson was diagnosed with pneumonia and is being treated with intravenous antibiotics. Snuil Dyson notes that she is now feeling much better. Sunil Dyson is also being treated for hyperkalemia.  She initially was noted to be normal sinus rhythm, however, yesterday she developed tachycardia and was noted to be in atrial fibrillation with a rapid ventricular response.  She denies any chest pain.  She denies any orthopnea, PND, or worsening peripheral edema.  She denies any palpitations, lightheadedness, near-syncope or syncope. Now readmitted with C Difficle infection. Treated. Post PPM implantation. Recurrent AFIB post PM.    Medications adjusted.   Recurrent AFIB on Dialysis days as Medications held ( need to Give prior to dialysis to avoid ASFIB recurrences )        OBJECTIVE:     MEDICATIONS:     Scheduled Meds:   amiodarone  200 mg Oral Daily    diltiazem  60 mg Oral 4x Daily    magnesium oxide  400 mg Oral Daily    sodium chloride flush  10 mL extremties  SKIN:  no bruising or bleeding, normal skin color, texture, turgor and no rashes     Data:        LABS:      Recent Results (from the past 24 hour(s))   POCT Glucose    Collection Time: 11/23/17 10:38 PM   Result Value Ref Range    POC Glucose 126 (H) 60 - 115 mg/dl    Performed on ACCU-CHEK    POCT Glucose    Collection Time: 11/24/17  6:27 AM   Result Value Ref Range    POC Glucose 95 60 - 115 mg/dl    Performed on ACCU-CHEK    CBC Auto Differential    Collection Time: 11/24/17  6:29 AM   Result Value Ref Range    WBC 8.8 4.8 - 10.8 K/uL    RBC 3.42 (L) 4.20 - 5.40 M/uL    Hemoglobin 9.8 (L) 12.0 - 16.0 g/dL    Hematocrit 30.1 (L) 37.0 - 47.0 %    MCV 88.1 82.0 - 100.0 fL    MCH 28.5 27.0 - 31.3 pg    MCHC 32.4 (L) 33.0 - 37.0 %    RDW 16.0 (H) 11.5 - 14.5 %    Platelets 920 (L) 903 - 400 K/uL    PLATELET SLIDE REVIEW Decreased     Neutrophils % 63.0 %    Lymphocytes % 19.0 %    Monocytes % 4.0 %    Eosinophils % 1.0 %    Basophils % 1.0 %    Neutrophils # 6.3 1.4 - 6.5 K/uL    Lymphocytes # 1.9 1.0 - 4.8 K/uL    Monocytes # 0.4 0.2 - 0.8 K/uL    Eosinophils # 0.1 0.0 - 0.7 K/uL    Basophils # 0.1 0.0 - 0.2 K/uL    Bands Relative 8 5 - 11 %    Atypical Lymphocytes Relative 3 %    Myelocytes Relative 1 %    Anisocytosis 1+     Hypochromia 1+    Magnesium    Collection Time: 11/24/17  6:29 AM   Result Value Ref Range    Magnesium 2.1 1.7 - 2.3 mg/dL   Basic Metabolic Panel    Collection Time: 11/24/17  6:29 AM   Result Value Ref Range    Sodium 136 132 - 144 mEq/L    Potassium 4.4 3.5 - 5.1 mEq/L    Chloride 98 98 - 107 mEq/L    CO2 23 22 - 29 mEq/L    Anion Gap 15 (H) 7 - 13 mEq/L    Glucose 91 74 - 109 mg/dL    BUN 23 8 - 23 mg/dL    CREATININE 4.64 (H) 0.50 - 0.90 mg/dL    GFR Non-African American 9.1 (L) >60    GFR  11.0 (L) >60    Calcium 8.0 (L) 8.6 - 10.2 mg/dL   POCT Glucose    Collection Time: 11/24/17  4:04 PM   Result Value Ref Range    POC Glucose 215 (H) 60 - 115 mg/dl

## 2017-11-24 NOTE — PROGRESS NOTES
Renal Progress Note    Assessment and Plan:      80 yo with new esrd. Has anemia, hypotension, leukocytosis, c diff. blood cx clear now. Had cardiac arrest during hd last Wednesday. No problems since then. Had PPM     Plan/  1- ok for d/c from renal standpoint. Patient Active Problem List:     Hypothyroidism     Hypercholesteremia     CKD (chronic kidney disease)     Afib (HCC)     Anemia     Acid reflux     Anxiety     Diabetes mellitus due to underlying condition, controlled, with stage 5 chronic kidney disease not on chronic dialysis, with long-term current use of insulin (HCC)     Essential hypertension     Idiopathic Parkinson's disease ([facility] [address]     Pneumonia of lower lobe due to infectious organism ([facility] [address]     Hyperkalemia     Acute on chronic diastolic congestive heart failure (HCC)     Pulmonary edema with congestive heart failure (HCC)     Shortness of breath     Acute pulmonary edema (HCC)     Pancolitis (HCC)     C. difficile colitis     Anemia     ESRD (end stage renal disease) on dialysis ([facility] [address]     Leukocytosis      Subjective:   Admit Date: 11/9/2017    Interval History: pt seen on hd. On po amio. 4k bath. No hd related complications. Tolerating 2 liters uf.   Bf 400      Medications:   Scheduled Meds:   amiodarone  200 mg Oral Daily    diltiazem  60 mg Oral 4x Daily    magnesium oxide  400 mg Oral Daily    sodium chloride flush  10 mL Intravenous 2 times per day    carbidopa-levodopa  1 tablet Oral BID    darbepoetin lizeth-polysorbate  100 mcg Subcutaneous Weekly    buprenorphine  1 patch Transdermal Weekly    calcium elemental  500 mg Oral Daily    fluticasone  2 spray Nasal Daily    iron polysaccaride  1 capsule Oral Daily with breakfast    isosorbide mononitrate  60 mg Oral Daily    insulin glargine  14 Units Subcutaneous QAM    levothyroxine  50 mcg Oral Daily    pantoprazole sodium  40 mg Oral QAM AC    venlafaxine  150 mg Oral Daily with breakfast    insulin lispro  0-12 Units Subcutaneous TID WC    insulin lispro  0-6 Units Subcutaneous Nightly    rosuvastatin  40 mg Oral Nightly    vancomycin  125 mg Oral 4 times per day    lidocaine  5 mL Intradermal Once     Continuous Infusions:   dextrose         CBC:   Recent Labs      11/23/17   0529 11/24/17 0629   WBC  8.7  8.8   HGB  9.7*  9.8*   PLT  83*  108*     CMP:    Recent Labs      11/22/17   0601  11/23/17   0529 11/24/17 0629   NA  133  135  136   K  4.1  4.1  4.4   CL  96*  98  98   CO2  24  23  23   BUN  26*  15  23   CREATININE  5.09*  3.19*  4.64*   GLUCOSE  112*  128*  91   CALCIUM  7.3*  7.8*  8.0*   LABGLOM  8.1*  13.9*  9.1*     Troponin:   Recent Labs      11/23/17 0529   TROPONINI  0.054*     BNP: No results for input(s): BNP in the last 72 hours. INR:   No results for input(s): INR in the last 72 hours. Lipids: No results for input(s): CHOL, LDLDIRECT, TRIG, HDL, AMYLASE, LIPASE in the last 72 hours. Liver:   Recent Labs      11/23/17 0529   AST  19   ALT  <5   ALKPHOS  93   PROT  5.9*   LABALBU  2.9*   BILITOT  0.2     Iron:  No results for input(s): IRONS, FERRITIN in the last 72 hours. Invalid input(s): LABIRONS  Urinalysis: No results for input(s): UA in the last 72 hours. Objective:   Vitals: /82   Pulse 121   Temp 98 °F (36.7 °C) (Oral)   Resp 18   Ht 4' 11\" (1.499 m)   Wt 124 lb 12.5 oz (56.6 kg)   SpO2 100%   BMI 25.20 kg/m²    Wt Readings from Last 3 Encounters:   11/24/17 124 lb 12.5 oz (56.6 kg)   11/03/17 124 lb 5.4 oz (56.4 kg)   09/28/17 135 lb (61.2 kg)      24HR INTAKE/OUTPUT:      Intake/Output Summary (Last 24 hours) at 11/24/17 1030  Last data filed at 11/24/17 0538   Gross per 24 hour   Intake              600 ml   Output              300 ml   Net              300 ml       Constitutional:  Alert, awake, no apparent distress   Skin:normal, no rash  HEENT:sclera anicteric.   Head atraumatic normocephalic  Neck:supple with no thyromegally  Cardiovascular:  S1, S2 without m/r/g   Respiratory:  CTA B without w/r/r   Abdomen: +bs, soft, nt  Ext: trace LE edema  Musculoskeletal:Intact  Neuro:Alert and oriented with no deficit      Electronically signed by Kali Galvan MD on 11/24/2017 at 10:30 AM

## 2017-11-24 NOTE — DISCHARGE INSTR - ACTIVITY
Continue activity as tolerated.  Physical and Occupational therapy at [facility] AT [facility]

## 2017-11-27 ENCOUNTER — TELEPHONE (OUTPATIENT)
Dept: PHARMACY | Age: 82
End: 2017-11-27

## 2017-11-27 LAB
ANION GAP SERPL CALCULATED.3IONS-SCNC: 17 MEQ/L (ref 7–13)
BUN BLDV-MCNC: 38 MG/DL (ref 8–23)
CALCIUM SERPL-MCNC: 8 MG/DL (ref 8.6–10.2)
CHLORIDE BLD-SCNC: 97 MEQ/L (ref 98–107)
CO2: 24 MEQ/L (ref 22–29)
CREAT SERPL-MCNC: 6.04 MG/DL (ref 0.5–0.9)
EKG ATRIAL RATE: 73 BPM
EKG P AXIS: 8 DEGREES
EKG P-R INTERVAL: 226 MS
EKG Q-T INTERVAL: 450 MS
EKG QRS DURATION: 100 MS
EKG QTC CALCULATION (BAZETT): 495 MS
EKG R AXIS: 3 DEGREES
EKG T AXIS: 49 DEGREES
EKG VENTRICULAR RATE: 73 BPM
GFR AFRICAN AMERICAN: 8.1
GFR NON-AFRICAN AMERICAN: 6.7
GLUCOSE BLD-MCNC: 194 MG/DL (ref 74–109)
HCT VFR BLD CALC: 29.1 % (ref 37–47)
HEMOGLOBIN: 9.6 G/DL (ref 12–16)
MAGNESIUM: 2.2 MG/DL (ref 1.7–2.3)
MCH RBC QN AUTO: 29.6 PG (ref 27–31.3)
MCHC RBC AUTO-ENTMCNC: 32.9 % (ref 33–37)
MCV RBC AUTO: 89.8 FL (ref 82–100)
PDW BLD-RTO: 17.4 % (ref 11.5–14.5)
PLATELET # BLD: 152 K/UL (ref 130–400)
POTASSIUM SERPL-SCNC: 4.7 MEQ/L (ref 3.5–5.1)
RBC # BLD: 3.24 M/UL (ref 4.2–5.4)
SODIUM BLD-SCNC: 138 MEQ/L (ref 132–144)
WBC # BLD: 8.6 K/UL (ref 4.8–10.8)

## 2017-11-30 ASSESSMENT — ENCOUNTER SYMPTOMS
CHEST TIGHTNESS: 0
VOMITING: 0
COLOR CHANGE: 0
SINUS PRESSURE: 0
BACK PAIN: 0
COUGH: 0
ABDOMINAL DISTENTION: 0
SINUS PAIN: 0
TROUBLE SWALLOWING: 0
CONSTIPATION: 0
SORE THROAT: 0
NAUSEA: 0
BLOOD IN STOOL: 0
DIARRHEA: 1
ABDOMINAL PAIN: 0
SHORTNESS OF BREATH: 0

## 2017-12-04 NOTE — DISCHARGE SUMMARY
Physician Discharge Summary     Patient ID:  Joy Healdey  [id number]  53 y.o.  1935    Admit date: 11/9/2017    Discharge date and time: 11/24/2017  5:42 PM     Admitting Physician: Faith Partida MD     Discharge Physician: Dr. Mcdonald Leader    Admission Diagnoses: Pneumonia [J18.9]  Pneumonia [J18.9]    Discharge Diagnoses: Afib with RVR, cardiac arrest, pancolitis, acute on chronic anemia    Admission Condition: fair    Discharged Condition: good    Indication for Admission: Pancolitis    Hospital Course: 60-year-old female with past medical history of pneumonia, C. diff, atrial fib, end-stage renal disease on HD MWF, diabetes type 2.  Sent from nursing facility due to elevated white blood cell count and anemia.  Admitted for pancolitis, acute on chronic anemia, end-stage renal disease.     Cardiac arrest: During HD s/p code blue call. CPR initiated and ROSC was achieved, pt extubated and out of ICU.   - Cardio consulted. Per Dr. Skylar Vázquez, S/p ppm for SSS and long asystole. Recurrent at fib---just 5-6 hours ago. High venous pressure in the left subclavian vein due to av fistula in left arm and unable to use right arm given the existing Vasc cath and IV access. Since the at fib is relatively of recent onset we will give IV amiodarone and increase the cardizem. Will wait a little bit to let the incision site heal as there was a fair amount of bleeding after the ppm through the lead insertion site.   - Nephro consulted. Per Dr. Suman Bui, new esrd. cont HD as ordered. ok for d/c when ok with other docs. - Tele  - Continued to monitor varinder and post-op. - S/P PPM     Afib with RVR: HR to the 120's   - Telemetry  - Cardio consulted with recs as above.      Pancolitis: secondary to C. Diff  - Was on PO Flagyl  - ID consulted. Per Dr. Nidhi Millard, C. diff diarrhea. Okay for pacemaker. Vancomycin for 2 weeks.   - Stools for c.diff, occult blood and culture done  - Contact precautions placed  - GI consult: Per Dr. Jina Bullock, Pancolitis probably, C diff colitis. Will put the patient on p.o. vancomycin. Discontinue the Flagyl as this may have contributed to the high INR since the patient was on Coumadin. Coumadin and Flagyl have a drug interaction.     Acute on chronic anemia. Hgb was 6.2 , now 9.6 s/p 3 units PRBC's  - Blood transfusion done  - Daily PT and INR  - Continue PPI  - Consulted GI with recs as above  - Aranesp per nephrology  - Telemetry  - Continued to monitor       End-stage renal disease: On HD  - Nephrology consulted with recs as above. - Continued to monitor     Elevated troponin  - serial troponins  - telemetry  - Consulted cardiology as above      DM II  -Insulin on sliding scale     Hypertensive urgency: Was hypotesive. BP's labile, latest 150's/70's  - Continued to monitor, cardiology followed     Parkinson's disease     Dispo: Discharged to SNF. Advised F/U with PCP 1 - 2 days of discharge. Consults: cardiology, GI and nephrology    Disposition: SNF    Patient Instructions:   [unfilled]  Activity: activity as tolerated  Diet: cardiac diet  Wound Care: none needed    Follow-up with PCP in 2 days.     Vivi Shankar  12/3/2017  8:08 PM

## 2017-12-13 LAB
ANION GAP SERPL CALCULATED.3IONS-SCNC: 17 MEQ/L (ref 9–17)
BUN BLDV-MCNC: 34 MG/DL (ref 8–23)
CALCIUM SERPL-MCNC: 7.9 MG/DL (ref 8.6–10.2)
CHLORIDE BLD-SCNC: 99 MEQ/L (ref 97–107)
CO2: 26 MEQ/L (ref 17–24)
CREAT SERPL-MCNC: 4.67 MG/DL (ref 0.5–0.9)
GFR AFRICAN AMERICAN: 10.9
GFR NON-AFRICAN AMERICAN: 9
GLUCOSE BLD-MCNC: 139 MG/DL (ref 74–109)
HCT VFR BLD CALC: 29.5 % (ref 37–47)
HEMOGLOBIN: 9.5 G/DL (ref 12–16)
MCH RBC QN AUTO: 30.1 PG (ref 27–31.3)
MCHC RBC AUTO-ENTMCNC: 32.1 % (ref 33–37)
MCV RBC AUTO: 93.7 FL (ref 82–100)
PDW BLD-RTO: 19 % (ref 11.5–14.5)
PLATELET # BLD: 118 K/UL (ref 130–400)
POTASSIUM SERPL-SCNC: 4.6 MEQ/L (ref 3.2–4.4)
RBC # BLD: 3.15 M/UL (ref 4.2–5.4)
SODIUM BLD-SCNC: 142 MEQ/L (ref 132–138)
WBC # BLD: 8.2 K/UL (ref 4.8–10.8)

## 2017-12-15 ENCOUNTER — TELEPHONE (OUTPATIENT)
Dept: PHARMACY | Age: 82
End: 2017-12-15

## 2017-12-15 NOTE — TELEPHONE ENCOUNTER
Called and left message for pt to call and reschedule missed appt. Unable to leave message. I will send a letter of missing appt today.     Asim Boswell [facility]  Staff Pharmacist  12/15/2017  9:58 AM

## 2017-12-19 LAB
BASOPHILS ABSOLUTE: 0 K/UL (ref 0–0.2)
BASOPHILS RELATIVE PERCENT: 0.4 %
EOSINOPHILS ABSOLUTE: 0.1 K/UL (ref 0–0.7)
EOSINOPHILS RELATIVE PERCENT: 0.8 %
HCT VFR BLD CALC: 27.5 % (ref 37–47)
HEMOGLOBIN: 8.8 G/DL (ref 12–16)
LYMPHOCYTES ABSOLUTE: 1.5 K/UL (ref 1–4.8)
LYMPHOCYTES RELATIVE PERCENT: 15.3 %
MCH RBC QN AUTO: 30.9 PG (ref 27–31.3)
MCHC RBC AUTO-ENTMCNC: 32 % (ref 33–37)
MCV RBC AUTO: 96.6 FL (ref 82–100)
MONOCYTES ABSOLUTE: 1.3 K/UL (ref 0.2–0.8)
MONOCYTES RELATIVE PERCENT: 13.3 %
NEUTROPHILS ABSOLUTE: 6.8 K/UL (ref 1.4–6.5)
NEUTROPHILS RELATIVE PERCENT: 70.2 %
PDW BLD-RTO: 18.8 % (ref 11.5–14.5)
PLATELET # BLD: 134 K/UL (ref 130–400)
RBC # BLD: 2.84 M/UL (ref 4.2–5.4)
WBC # BLD: 9.6 K/UL (ref 4.8–10.8)

## 2017-12-27 NOTE — PROGRESS NOTES
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 difficile colitis    Anemia    ESRD (end stage renal disease) on dialysis ([facility])    Leukocytosis    SSS (sick sinus syndrome) [facility]    Cardiac pacemaker         Current Outpatient Prescriptions:     apixaban (ELIQUIS) 2.5 MG TABS tablet, Take 1 tablet by mouth 2 times daily, Disp: 60 tablet, Rfl: 3    diltiazem (CARDIZEM CD) 180 MG extended release capsule, Take 1 capsule by mouth 2 times daily, Disp: 30 capsule, Rfl: 3    amiodarone (CORDARONE) 200 MG tablet, Take 1 tablet by mouth daily, Disp: 30 tablet, Rfl: 3    cholestyramine light 4 g packet, Take 0.5 packets by mouth 3 times daily as needed (DIARRHEA), Disp: 60 packet, Rfl: 3    magnesium oxide (MAG-OX) 400 (241.3 Mg) MG TABS tablet, Take 1 tablet by mouth daily, Disp: 30 tablet, Rfl: 0    vancomycin (VANCOCIN) 50 mg/mL oral solution, Take 2.5 mLs by mouth every 8 hours, Disp: , Rfl:     buprenorphine (BUPRENEX) 5 MCG/HR PTWK, Place 1 patch onto the skin once a week, Disp: 1 patch, Rfl: 0    ALPRAZolam (XANAX) 0.5 MG tablet, Take 1 tablet by mouth 3 times daily as needed for Anxiety, Disp: 10 tablet, Rfl: 0    ipratropium-albuterol (DUONEB) 0.5-2.5 (3) MG/3ML SOLN nebulizer solution, Inhale 3 mLs into the lungs every 4 hours as needed for Shortness of Breath, Disp: 360 mL, Rfl:     hydrALAZINE (APRESOLINE) 100 MG tablet, Take 1 tablet by mouth 3 times daily, Disp: 90 tablet, Rfl: 3    sodium bicarbonate 325 MG tablet, Take 325 mg by mouth 3 times daily , Disp: , Rfl:     venlafaxine (EFFEXOR XR) 75 MG extended release capsule, 150 mg , Disp: , Rfl:     levothyroxine (SYNTHROID) 50 MCG tablet, TAKE 1 TABLET DAILY ALTERNATING WITH THE OTHER DOSE EVERY OTHER DAY, Disp: 50 tablet, Rfl: 3    levothyroxine (SYNTHROID) 25 MCG tablet, TAKE 1 TABLET DAILY ALTERNATING WITH 50 MCG DOSE EVERY OTHER DAY, Disp: 50 tablet, Rfl: 2    IRON POLYSACCH [id number] PO, Take 1 tablet by mouth daily, Disp: , Rfl:     insulin lispro (HUMALOG KWIKPEN) 100 UNIT/ML pen, Inject 2 Units into the skin 3 times daily (before meals) (Patient taking differently: Inject into the skin 3 times daily (before meals) ), Disp: 15 mL, Rfl: 3    Lomitapide Mesylate (JUXTAPID) 40 MG CAPS, Take by mouth daily, Disp: , Rfl:     pantoprazole sodium (PROTONIX) 40 MG PACK packet, Take 40 mg by mouth every morning (before breakfast) , Disp: , Rfl:     CRESTOR 40 MG tablet,   , Disp: , Rfl:     chlorthalidone (HYGROTON) 25 MG tablet, Take 25 mg by mouth daily, Disp: , Rfl:     isosorbide mononitrate (IMDUR) 60 MG CR tablet,   , Disp: , Rfl:     carbidopa-levodopa (SINEMET)  MG per tablet, Take 1 tablet by mouth daily , Disp: , Rfl:     glucose blood VI test strips (TRUETEST TEST) strip, As needed. , Disp: 200 strip, Rfl: 11    aspirin 81 MG EC tablet, Take 81 mg by mouth daily , Disp: , Rfl:     fluticasone (FLONASE) 50 MCG/ACT nasal spray, 2 sprays by Nasal route daily. , Disp: , Rfl:     calcium carbonate (OSCAL) 500 MG TABS tablet, Take 500 mg by mouth daily. , Disp: , Rfl:     LANTUS SOLOSTAR 100 UNIT/ML injection pen, INJECT 14 UNITS IN THE MORNING, Disp: 75 mL, Rfl: 2        Review of Systems   Constitutional: Positive for fatigue. Eyes: Positive for blurred vision. Neurological: Positive for speech difficulty and headaches. Psychiatric/Behavioral: Positive for confusion. The patient is nervous/anxious. All other systems reviewed and are negative.     Vitals:    12/21/17 1534   BP: (!) 169/67   Site: Right Arm   Position: Sitting   Cuff Size: Medium Adult   Pulse: 72 Height: 4' 11\" (1.499 m)       Objective:   Physical Exam   Constitutional: She is oriented to person, place, and time. She appears well-developed and well-nourished. HENT:   Head: Normocephalic and atraumatic. Eyes: Conjunctivae are normal.   Neck: Neck supple. Cardiovascular: Normal rate and normal heart sounds. Pulmonary/Chest: Effort normal and breath sounds normal.   Musculoskeletal: Normal range of motion. Neurological: She is alert and oriented to person, place, and time. Skin: Skin is warm. Psychiatric: She has a normal mood and affect. Lab Results   Component Value Date     (H) 12/13/2017    K 4.6 (H) 12/13/2017    CL 99 12/13/2017    CO2 26 (H) 12/13/2017    BUN 34 (H) 12/13/2017    CREATININE 4.67 (H) 12/13/2017    GLUCOSE 199 12/21/2017    CALCIUM 7.9 (L) 12/13/2017    PROT 5.9 (L) 11/23/2017    LABALBU 2.9 (L) 11/23/2017    BILITOT 0.2 11/23/2017    ALKPHOS 93 11/23/2017    AST 19 11/23/2017    ALT <5 11/23/2017    LABGLOM 9.0 (L) 12/13/2017    GFRAA 10.9 (L) 12/13/2017    GLOB 3.0 11/23/2017           Assessment:      1. Diabetes mellitus due to underlying condition, controlled, with stage 5 chronic kidney disease not on chronic dialysis, with long-term current use of insulin ([facility])  POCT Glucose    Basic Metabolic Panel    Hemoglobin A1C   2. Hypothyroidism, unspecified type  T4, Free    TSH without Reflex   3.  Type 2 diabetes mellitus without complication, with long-term current use of insulin ([facility])  DISCONTINUED: insulin glargine (LANTUS SOLOSTAR) 100 UNIT/ML injection pen         Plan:        Orders Placed This Encounter   Procedures    Basic Metabolic Panel     Standing Status:   Future     Standing Expiration Date:   12/21/2018    T4, Free     Standing Status:   Future     Standing Expiration Date:   12/21/2018    TSH without Reflex     Standing Status:   Future     Standing Expiration Date:   12/21/2018    Hemoglobin A1C     Standing Status:   Future     Standing

## 2018-01-01 ENCOUNTER — APPOINTMENT (OUTPATIENT)
Dept: GENERAL RADIOLOGY | Age: 83
DRG: 871 | End: 2018-01-01
Payer: MEDICARE

## 2018-01-01 ENCOUNTER — TELEPHONE (OUTPATIENT)
Dept: OTHER | Facility: CLINIC | Age: 83
End: 2018-01-01

## 2018-01-01 ENCOUNTER — APPOINTMENT (OUTPATIENT)
Dept: GENERAL RADIOLOGY | Age: 83
End: 2018-01-01
Payer: MEDICARE

## 2018-01-01 ENCOUNTER — APPOINTMENT (OUTPATIENT)
Dept: GENERAL RADIOLOGY | Age: 83
DRG: 163 | End: 2018-01-01
Payer: MEDICARE

## 2018-01-01 ENCOUNTER — HOSPITAL ENCOUNTER (INPATIENT)
Age: 83
LOS: 5 days | Discharge: HOME | DRG: 085 | End: 2018-06-21
Attending: STUDENT IN AN ORGANIZED HEALTH CARE EDUCATION/TRAINING PROGRAM | Admitting: INTERNAL MEDICINE
Payer: MEDICARE

## 2018-01-01 ENCOUNTER — TELEPHONE (OUTPATIENT)
Dept: PHARMACY | Age: 83
End: 2018-01-01

## 2018-01-01 ENCOUNTER — OFFICE VISIT (OUTPATIENT)
Dept: GERIATRIC MEDICINE | Age: 83
End: 2018-01-01
Payer: MEDICARE

## 2018-01-01 ENCOUNTER — APPOINTMENT (OUTPATIENT)
Dept: GENERAL RADIOLOGY | Age: 83
DRG: 085 | End: 2018-01-01
Payer: MEDICARE

## 2018-01-01 ENCOUNTER — ANESTHESIA EVENT (OUTPATIENT)
Dept: OPERATING ROOM | Age: 83
DRG: 193 | End: 2018-01-01
Payer: MEDICARE

## 2018-01-01 ENCOUNTER — APPOINTMENT (OUTPATIENT)
Dept: CT IMAGING | Age: 83
DRG: 871 | End: 2018-01-01
Payer: MEDICARE

## 2018-01-01 ENCOUNTER — APPOINTMENT (OUTPATIENT)
Dept: MRI IMAGING | Age: 83
DRG: 515 | End: 2018-01-01
Payer: MEDICARE

## 2018-01-01 ENCOUNTER — APPOINTMENT (OUTPATIENT)
Dept: NUCLEAR MEDICINE | Age: 83
DRG: 186 | End: 2018-01-01
Payer: MEDICARE

## 2018-01-01 ENCOUNTER — TELEPHONE (OUTPATIENT)
Dept: PALLATIVE CARE | Age: 83
End: 2018-01-01

## 2018-01-01 ENCOUNTER — APPOINTMENT (OUTPATIENT)
Dept: CT IMAGING | Age: 83
DRG: 515 | End: 2018-01-01
Payer: MEDICARE

## 2018-01-01 ENCOUNTER — APPOINTMENT (OUTPATIENT)
Dept: CT IMAGING | Age: 83
End: 2018-01-01
Payer: MEDICARE

## 2018-01-01 ENCOUNTER — APPOINTMENT (OUTPATIENT)
Dept: GENERAL RADIOLOGY | Age: 83
DRG: 056 | End: 2018-01-01
Payer: MEDICARE

## 2018-01-01 ENCOUNTER — ANESTHESIA (OUTPATIENT)
Dept: ICU | Age: 83
DRG: 163 | End: 2018-01-01
Payer: MEDICARE

## 2018-01-01 ENCOUNTER — OFFICE VISIT (OUTPATIENT)
Dept: PALLATIVE CARE | Age: 83
End: 2018-01-01
Payer: MEDICARE

## 2018-01-01 ENCOUNTER — APPOINTMENT (OUTPATIENT)
Dept: CT IMAGING | Age: 83
DRG: 150 | End: 2018-01-01
Payer: MEDICARE

## 2018-01-01 ENCOUNTER — HOSPITAL ENCOUNTER (OUTPATIENT)
Dept: CARDIOLOGY | Age: 83
Discharge: HOME | End: 2018-10-01
Payer: MEDICARE

## 2018-01-01 ENCOUNTER — APPOINTMENT (OUTPATIENT)
Dept: INTERVENTIONAL RADIOLOGY/VASCULAR | Age: 83
DRG: 163 | End: 2018-01-01
Payer: MEDICARE

## 2018-01-01 ENCOUNTER — APPOINTMENT (OUTPATIENT)
Dept: GENERAL RADIOLOGY | Age: 83
DRG: 515 | End: 2018-01-01
Payer: MEDICARE

## 2018-01-01 ENCOUNTER — HOSPITAL ENCOUNTER (INPATIENT)
Age: 83
LOS: 2 days | Discharge: ANOTHER HOSPITAL | DRG: 193 | End: 2018-07-17
Attending: FAMILY MEDICINE | Admitting: INTERNAL MEDICINE
Payer: MEDICARE

## 2018-01-01 ENCOUNTER — TELEPHONE (OUTPATIENT)
Dept: INTERNAL MEDICINE CLINIC | Age: 83
End: 2018-01-01

## 2018-01-01 ENCOUNTER — ANTI-COAG VISIT (OUTPATIENT)
Dept: PHARMACY | Age: 83
End: 2018-01-01

## 2018-01-01 ENCOUNTER — ANESTHESIA (OUTPATIENT)
Dept: OPERATING ROOM | Age: 83
DRG: 193 | End: 2018-01-01
Payer: MEDICARE

## 2018-01-01 ENCOUNTER — APPOINTMENT (OUTPATIENT)
Dept: GENERAL RADIOLOGY | Age: 83
DRG: 193 | End: 2018-01-01
Payer: MEDICARE

## 2018-01-01 ENCOUNTER — APPOINTMENT (OUTPATIENT)
Dept: GENERAL RADIOLOGY | Age: 83
DRG: 291 | End: 2018-01-01
Payer: MEDICARE

## 2018-01-01 ENCOUNTER — APPOINTMENT (OUTPATIENT)
Dept: GENERAL RADIOLOGY | Age: 83
DRG: 150 | End: 2018-01-01
Payer: MEDICARE

## 2018-01-01 ENCOUNTER — APPOINTMENT (OUTPATIENT)
Dept: CT IMAGING | Age: 83
DRG: 186 | End: 2018-01-01
Payer: MEDICARE

## 2018-01-01 ENCOUNTER — HOSPITAL ENCOUNTER (INPATIENT)
Age: 83
LOS: 8 days | Discharge: NURSING, REHAB OR LONG-TERM CARE | DRG: 871 | End: 2018-03-14
Attending: EMERGENCY MEDICINE | Admitting: HOSPITALIST
Payer: MEDICARE

## 2018-01-01 ENCOUNTER — APPOINTMENT (OUTPATIENT)
Dept: NUCLEAR MEDICINE | Age: 83
DRG: 291 | End: 2018-01-01
Payer: MEDICARE

## 2018-01-01 ENCOUNTER — HOSPITAL ENCOUNTER (INPATIENT)
Age: 83
LOS: 1 days | Discharge: HOSPICE | DRG: 871 | End: 2018-12-18
Attending: STUDENT IN AN ORGANIZED HEALTH CARE EDUCATION/TRAINING PROGRAM | Admitting: INTERNAL MEDICINE
Payer: MEDICARE

## 2018-01-01 ENCOUNTER — HOSPITAL ENCOUNTER (INPATIENT)
Age: 83
LOS: 5 days | Discharge: NURSING, REHAB OR LONG-TERM CARE | DRG: 291 | End: 2018-07-03
Attending: STUDENT IN AN ORGANIZED HEALTH CARE EDUCATION/TRAINING PROGRAM | Admitting: INTERNAL MEDICINE
Payer: MEDICARE

## 2018-01-01 ENCOUNTER — HOSPITAL ENCOUNTER (EMERGENCY)
Age: 83
Discharge: NURSING, REHAB OR LONG-TERM CARE | End: 2018-08-25
Payer: MEDICARE

## 2018-01-01 ENCOUNTER — HOSPITAL ENCOUNTER (INPATIENT)
Age: 83
LOS: 1 days | Discharge: HOME | DRG: 186 | End: 2018-10-27
Attending: EMERGENCY MEDICINE | Admitting: INTERNAL MEDICINE
Payer: MEDICARE

## 2018-01-01 ENCOUNTER — HOSPITAL ENCOUNTER (EMERGENCY)
Age: 83
Discharge: HOME | End: 2018-01-14
Payer: MEDICARE

## 2018-01-01 ENCOUNTER — APPOINTMENT (OUTPATIENT)
Dept: ULTRASOUND IMAGING | Age: 83
DRG: 186 | End: 2018-01-01
Payer: MEDICARE

## 2018-01-01 ENCOUNTER — HOSPITAL ENCOUNTER (INPATIENT)
Age: 83
LOS: 7 days | Discharge: HOME HEALTH | DRG: 193 | End: 2018-02-02
Attending: INTERNAL MEDICINE | Admitting: INTERNAL MEDICINE
Payer: MEDICARE

## 2018-01-01 ENCOUNTER — APPOINTMENT (OUTPATIENT)
Dept: CT IMAGING | Age: 83
DRG: 085 | End: 2018-01-01
Payer: MEDICARE

## 2018-01-01 ENCOUNTER — HOSPITAL ENCOUNTER (EMERGENCY)
Age: 83
Discharge: HOME | End: 2018-02-14
Payer: MEDICARE

## 2018-01-01 ENCOUNTER — APPOINTMENT (OUTPATIENT)
Dept: CT IMAGING | Age: 83
DRG: 163 | End: 2018-01-01
Payer: MEDICARE

## 2018-01-01 ENCOUNTER — HOSPITAL ENCOUNTER (INPATIENT)
Age: 83
LOS: 17 days | Discharge: ANOTHER HOSPITAL | DRG: 163 | End: 2018-04-09
Attending: STUDENT IN AN ORGANIZED HEALTH CARE EDUCATION/TRAINING PROGRAM | Admitting: INTERNAL MEDICINE
Payer: MEDICARE

## 2018-01-01 ENCOUNTER — ANESTHESIA EVENT (OUTPATIENT)
Dept: OPERATING ROOM | Age: 83
DRG: 163 | End: 2018-01-01
Payer: MEDICARE

## 2018-01-01 ENCOUNTER — ANESTHESIA EVENT (OUTPATIENT)
Dept: ICU | Age: 83
DRG: 163 | End: 2018-01-01
Payer: MEDICARE

## 2018-01-01 ENCOUNTER — HOSPITAL ENCOUNTER (EMERGENCY)
Age: 83
Discharge: HOME | End: 2018-01-02
Payer: MEDICARE

## 2018-01-01 ENCOUNTER — APPOINTMENT (OUTPATIENT)
Dept: CT IMAGING | Age: 83
DRG: 056 | End: 2018-01-01
Payer: MEDICARE

## 2018-01-01 ENCOUNTER — APPOINTMENT (OUTPATIENT)
Dept: GENERAL RADIOLOGY | Age: 83
DRG: 186 | End: 2018-01-01
Payer: MEDICARE

## 2018-01-01 ENCOUNTER — HOSPITAL ENCOUNTER (OUTPATIENT)
Dept: CARDIOLOGY | Age: 83
Discharge: HOME | End: 2018-01-17
Payer: MEDICARE

## 2018-01-01 ENCOUNTER — APPOINTMENT (OUTPATIENT)
Dept: INTERVENTIONAL RADIOLOGY/VASCULAR | Age: 83
DRG: 056 | End: 2018-01-01
Payer: MEDICARE

## 2018-01-01 ENCOUNTER — INITIAL CONSULT (OUTPATIENT)
Dept: INTERVENTIONAL RADIOLOGY/VASCULAR | Age: 83
End: 2018-01-01
Payer: MEDICARE

## 2018-01-01 ENCOUNTER — ANESTHESIA EVENT (OUTPATIENT)
Dept: OPERATING ROOM | Age: 83
DRG: 515 | End: 2018-01-01
Payer: MEDICARE

## 2018-01-01 ENCOUNTER — HOSPITAL ENCOUNTER (INPATIENT)
Age: 83
LOS: 1 days | Discharge: HOME | DRG: 150 | End: 2018-10-23
Attending: FAMILY MEDICINE | Admitting: INTERNAL MEDICINE
Payer: MEDICARE

## 2018-01-01 ENCOUNTER — HOSPITAL ENCOUNTER (EMERGENCY)
Age: 83
Discharge: HOME | End: 2018-07-30
Payer: MEDICARE

## 2018-01-01 ENCOUNTER — ANESTHESIA (OUTPATIENT)
Dept: OPERATING ROOM | Age: 83
DRG: 515 | End: 2018-01-01
Payer: MEDICARE

## 2018-01-01 ENCOUNTER — ANESTHESIA (OUTPATIENT)
Dept: OPERATING ROOM | Age: 83
DRG: 163 | End: 2018-01-01
Payer: MEDICARE

## 2018-01-01 ENCOUNTER — HOSPITAL ENCOUNTER (EMERGENCY)
Age: 83
Discharge: HOME | End: 2018-10-31
Payer: MEDICARE

## 2018-01-01 ENCOUNTER — HOSPITAL ENCOUNTER (INPATIENT)
Age: 83
LOS: 5 days | Discharge: HOME | DRG: 515 | End: 2018-11-17
Attending: INTERNAL MEDICINE | Admitting: INTERNAL MEDICINE
Payer: MEDICARE

## 2018-01-01 ENCOUNTER — HOSPITAL ENCOUNTER (INPATIENT)
Age: 83
LOS: 6 days | Discharge: NURSING, REHAB OR LONG-TERM CARE | DRG: 056 | End: 2018-08-17
Attending: EMERGENCY MEDICINE | Admitting: INTERNAL MEDICINE
Payer: MEDICARE

## 2018-01-01 ENCOUNTER — HOSPITAL ENCOUNTER (EMERGENCY)
Age: 83
Discharge: HOME | DRG: 193 | End: 2018-07-12
Payer: MEDICARE

## 2018-01-01 ENCOUNTER — CLINICAL DOCUMENTATION (OUTPATIENT)
Dept: PALLATIVE CARE | Age: 83
End: 2018-01-01

## 2018-01-01 VITALS
WEIGHT: 99.21 LBS | SYSTOLIC BLOOD PRESSURE: 119 MMHG | DIASTOLIC BLOOD PRESSURE: 53 MMHG | OXYGEN SATURATION: 86 % | RESPIRATION RATE: 15 BRPM | BODY MASS INDEX: 20.82 KG/M2 | HEART RATE: 76 BPM | HEIGHT: 58 IN | TEMPERATURE: 97.2 F

## 2018-01-01 VITALS — DIASTOLIC BLOOD PRESSURE: 72 MMHG | TEMPERATURE: 97.7 F | SYSTOLIC BLOOD PRESSURE: 166 MMHG | OXYGEN SATURATION: 81 %

## 2018-01-01 VITALS
OXYGEN SATURATION: 100 % | WEIGHT: 101.41 LBS | HEART RATE: 87 BPM | SYSTOLIC BLOOD PRESSURE: 137 MMHG | RESPIRATION RATE: 14 BRPM | HEIGHT: 59 IN | TEMPERATURE: 97.6 F | DIASTOLIC BLOOD PRESSURE: 67 MMHG | BODY MASS INDEX: 20.44 KG/M2

## 2018-01-01 VITALS
WEIGHT: 104.28 LBS | RESPIRATION RATE: 17 BRPM | BODY MASS INDEX: 19.69 KG/M2 | HEART RATE: 75 BPM | HEIGHT: 61 IN | SYSTOLIC BLOOD PRESSURE: 156 MMHG | DIASTOLIC BLOOD PRESSURE: 60 MMHG | TEMPERATURE: 98.1 F | OXYGEN SATURATION: 95 %

## 2018-01-01 VITALS
BODY MASS INDEX: 21.97 KG/M2 | HEIGHT: 59 IN | WEIGHT: 109 LBS | SYSTOLIC BLOOD PRESSURE: 109 MMHG | OXYGEN SATURATION: 100 % | TEMPERATURE: 97.5 F | RESPIRATION RATE: 18 BRPM | DIASTOLIC BLOOD PRESSURE: 58 MMHG | HEART RATE: 97 BPM

## 2018-01-01 VITALS
BODY MASS INDEX: 18.76 KG/M2 | SYSTOLIC BLOOD PRESSURE: 195 MMHG | OXYGEN SATURATION: 100 % | TEMPERATURE: 98.2 F | HEIGHT: 59 IN | WEIGHT: 93.03 LBS | HEART RATE: 72 BPM | RESPIRATION RATE: 16 BRPM | DIASTOLIC BLOOD PRESSURE: 80 MMHG

## 2018-01-01 VITALS — DIASTOLIC BLOOD PRESSURE: 49 MMHG | SYSTOLIC BLOOD PRESSURE: 92 MMHG | OXYGEN SATURATION: 100 %

## 2018-01-01 VITALS
TEMPERATURE: 97.9 F | HEIGHT: 59 IN | OXYGEN SATURATION: 95 % | HEART RATE: 60 BPM | RESPIRATION RATE: 16 BRPM | WEIGHT: 106 LBS | SYSTOLIC BLOOD PRESSURE: 129 MMHG | BODY MASS INDEX: 21.37 KG/M2 | DIASTOLIC BLOOD PRESSURE: 67 MMHG

## 2018-01-01 VITALS
HEIGHT: 61 IN | OXYGEN SATURATION: 100 % | DIASTOLIC BLOOD PRESSURE: 58 MMHG | RESPIRATION RATE: 16 BRPM | HEART RATE: 61 BPM | WEIGHT: 135.14 LBS | TEMPERATURE: 97.9 F | BODY MASS INDEX: 25.52 KG/M2 | SYSTOLIC BLOOD PRESSURE: 181 MMHG

## 2018-01-01 VITALS
WEIGHT: 82.67 LBS | TEMPERATURE: 97.9 F | BODY MASS INDEX: 17.35 KG/M2 | HEIGHT: 58 IN | OXYGEN SATURATION: 100 % | HEART RATE: 60 BPM | SYSTOLIC BLOOD PRESSURE: 146 MMHG | RESPIRATION RATE: 16 BRPM | DIASTOLIC BLOOD PRESSURE: 52 MMHG

## 2018-01-01 VITALS
DIASTOLIC BLOOD PRESSURE: 58 MMHG | HEART RATE: 70 BPM | SYSTOLIC BLOOD PRESSURE: 100 MMHG | RESPIRATION RATE: 16 BRPM | OXYGEN SATURATION: 99 %

## 2018-01-01 VITALS
BODY MASS INDEX: 20.16 KG/M2 | SYSTOLIC BLOOD PRESSURE: 173 MMHG | OXYGEN SATURATION: 100 % | RESPIRATION RATE: 16 BRPM | DIASTOLIC BLOOD PRESSURE: 64 MMHG | WEIGHT: 100 LBS | TEMPERATURE: 98.6 F | HEIGHT: 59 IN | HEART RATE: 80 BPM

## 2018-01-01 VITALS — SYSTOLIC BLOOD PRESSURE: 113 MMHG | HEART RATE: 88 BPM | TEMPERATURE: 96.1 F | DIASTOLIC BLOOD PRESSURE: 60 MMHG

## 2018-01-01 VITALS — DIASTOLIC BLOOD PRESSURE: 78 MMHG | OXYGEN SATURATION: 100 % | TEMPERATURE: 96.6 F | SYSTOLIC BLOOD PRESSURE: 176 MMHG

## 2018-01-01 VITALS
WEIGHT: 106 LBS | OXYGEN SATURATION: 95 % | RESPIRATION RATE: 18 BRPM | SYSTOLIC BLOOD PRESSURE: 136 MMHG | TEMPERATURE: 98.1 F | BODY MASS INDEX: 21.37 KG/M2 | DIASTOLIC BLOOD PRESSURE: 70 MMHG | HEIGHT: 59 IN | HEART RATE: 71 BPM

## 2018-01-01 VITALS
RESPIRATION RATE: 16 BRPM | WEIGHT: 100 LBS | DIASTOLIC BLOOD PRESSURE: 42 MMHG | OXYGEN SATURATION: 98 % | TEMPERATURE: 97.8 F | SYSTOLIC BLOOD PRESSURE: 128 MMHG | HEIGHT: 59 IN | BODY MASS INDEX: 20.16 KG/M2 | HEART RATE: 60 BPM

## 2018-01-01 VITALS
TEMPERATURE: 97.8 F | WEIGHT: 100 LBS | OXYGEN SATURATION: 97 % | HEART RATE: 86 BPM | HEIGHT: 59 IN | SYSTOLIC BLOOD PRESSURE: 146 MMHG | BODY MASS INDEX: 20.16 KG/M2 | RESPIRATION RATE: 18 BRPM | DIASTOLIC BLOOD PRESSURE: 95 MMHG

## 2018-01-01 VITALS
SYSTOLIC BLOOD PRESSURE: 100 MMHG | DIASTOLIC BLOOD PRESSURE: 60 MMHG | HEART RATE: 66 BPM | RESPIRATION RATE: 14 BRPM | OXYGEN SATURATION: 100 %

## 2018-01-01 VITALS
OXYGEN SATURATION: 95 % | SYSTOLIC BLOOD PRESSURE: 98 MMHG | RESPIRATION RATE: 16 BRPM | HEART RATE: 60 BPM | DIASTOLIC BLOOD PRESSURE: 52 MMHG

## 2018-01-01 VITALS
SYSTOLIC BLOOD PRESSURE: 163 MMHG | BODY MASS INDEX: 25.2 KG/M2 | RESPIRATION RATE: 16 BRPM | OXYGEN SATURATION: 97 % | TEMPERATURE: 98.3 F | HEART RATE: 102 BPM | HEIGHT: 59 IN | WEIGHT: 125 LBS | DIASTOLIC BLOOD PRESSURE: 90 MMHG

## 2018-01-01 VITALS
HEIGHT: 59 IN | OXYGEN SATURATION: 98 % | HEART RATE: 85 BPM | TEMPERATURE: 97.7 F | BODY MASS INDEX: 20.44 KG/M2 | RESPIRATION RATE: 18 BRPM | DIASTOLIC BLOOD PRESSURE: 57 MMHG | WEIGHT: 101.41 LBS | SYSTOLIC BLOOD PRESSURE: 140 MMHG

## 2018-01-01 VITALS
HEART RATE: 59 BPM | OXYGEN SATURATION: 96 % | TEMPERATURE: 98 F | DIASTOLIC BLOOD PRESSURE: 58 MMHG | SYSTOLIC BLOOD PRESSURE: 108 MMHG | RESPIRATION RATE: 18 BRPM | WEIGHT: 118 LBS | HEIGHT: 59 IN | BODY MASS INDEX: 23.79 KG/M2

## 2018-01-01 VITALS
SYSTOLIC BLOOD PRESSURE: 174 MMHG | RESPIRATION RATE: 18 BRPM | BODY MASS INDEX: 21.6 KG/M2 | TEMPERATURE: 98.2 F | OXYGEN SATURATION: 99 % | HEIGHT: 59 IN | WEIGHT: 107.14 LBS | DIASTOLIC BLOOD PRESSURE: 57 MMHG | HEART RATE: 60 BPM

## 2018-01-01 VITALS
WEIGHT: 93.92 LBS | TEMPERATURE: 98.1 F | HEART RATE: 65 BPM | DIASTOLIC BLOOD PRESSURE: 64 MMHG | BODY MASS INDEX: 18.93 KG/M2 | SYSTOLIC BLOOD PRESSURE: 140 MMHG | RESPIRATION RATE: 18 BRPM | OXYGEN SATURATION: 99 % | HEIGHT: 59 IN

## 2018-01-01 VITALS
DIASTOLIC BLOOD PRESSURE: 59 MMHG | BODY MASS INDEX: 23.63 KG/M2 | WEIGHT: 117 LBS | OXYGEN SATURATION: 98 % | SYSTOLIC BLOOD PRESSURE: 124 MMHG | HEART RATE: 60 BPM | RESPIRATION RATE: 18 BRPM | TEMPERATURE: 97.9 F

## 2018-01-01 VITALS
WEIGHT: 87.52 LBS | OXYGEN SATURATION: 97 % | SYSTOLIC BLOOD PRESSURE: 133 MMHG | RESPIRATION RATE: 16 BRPM | TEMPERATURE: 98.1 F | HEART RATE: 66 BPM | DIASTOLIC BLOOD PRESSURE: 50 MMHG | HEIGHT: 59 IN | BODY MASS INDEX: 17.64 KG/M2

## 2018-01-01 VITALS
HEART RATE: 78 BPM | TEMPERATURE: 98 F | DIASTOLIC BLOOD PRESSURE: 75 MMHG | BODY MASS INDEX: 22.18 KG/M2 | RESPIRATION RATE: 20 BRPM | OXYGEN SATURATION: 100 % | HEIGHT: 59 IN | WEIGHT: 110 LBS | SYSTOLIC BLOOD PRESSURE: 162 MMHG

## 2018-01-01 VITALS
TEMPERATURE: 98.2 F | OXYGEN SATURATION: 96 % | BODY MASS INDEX: 19.33 KG/M2 | HEART RATE: 82 BPM | RESPIRATION RATE: 16 BRPM | WEIGHT: 95.9 LBS | DIASTOLIC BLOOD PRESSURE: 46 MMHG | HEIGHT: 59 IN | SYSTOLIC BLOOD PRESSURE: 134 MMHG

## 2018-01-01 VITALS — SYSTOLIC BLOOD PRESSURE: 149 MMHG | HEART RATE: 83 BPM | TEMPERATURE: 97.6 F | DIASTOLIC BLOOD PRESSURE: 53 MMHG

## 2018-01-01 VITALS
OXYGEN SATURATION: 100 % | BODY MASS INDEX: 24.8 KG/M2 | TEMPERATURE: 98.4 F | HEART RATE: 75 BPM | RESPIRATION RATE: 18 BRPM | HEIGHT: 59 IN | SYSTOLIC BLOOD PRESSURE: 182 MMHG | WEIGHT: 123 LBS | DIASTOLIC BLOOD PRESSURE: 77 MMHG

## 2018-01-01 VITALS
WEIGHT: 97 LBS | SYSTOLIC BLOOD PRESSURE: 130 MMHG | DIASTOLIC BLOOD PRESSURE: 66 MMHG | HEIGHT: 59 IN | RESPIRATION RATE: 18 BRPM | OXYGEN SATURATION: 97 % | HEART RATE: 78 BPM | BODY MASS INDEX: 19.56 KG/M2

## 2018-01-01 VITALS
BODY MASS INDEX: 20.76 KG/M2 | DIASTOLIC BLOOD PRESSURE: 57 MMHG | SYSTOLIC BLOOD PRESSURE: 146 MMHG | WEIGHT: 103 LBS | TEMPERATURE: 97.6 F | RESPIRATION RATE: 16 BRPM | HEART RATE: 65 BPM | HEIGHT: 59 IN | OXYGEN SATURATION: 97 %

## 2018-01-01 VITALS
RESPIRATION RATE: 10 BRPM | DIASTOLIC BLOOD PRESSURE: 55 MMHG | OXYGEN SATURATION: 99 % | SYSTOLIC BLOOD PRESSURE: 89 MMHG

## 2018-01-01 VITALS — SYSTOLIC BLOOD PRESSURE: 113 MMHG | DIASTOLIC BLOOD PRESSURE: 30 MMHG | HEART RATE: 80 BPM | TEMPERATURE: 97.2 F

## 2018-01-01 VITALS
TEMPERATURE: 97.8 F | DIASTOLIC BLOOD PRESSURE: 84 MMHG | HEART RATE: 75 BPM | OXYGEN SATURATION: 99 % | RESPIRATION RATE: 18 BRPM | SYSTOLIC BLOOD PRESSURE: 155 MMHG

## 2018-01-01 VITALS — SYSTOLIC BLOOD PRESSURE: 113 MMHG | HEART RATE: 80 BPM | DIASTOLIC BLOOD PRESSURE: 66 MMHG | TEMPERATURE: 97.2 F

## 2018-01-01 DIAGNOSIS — N18.6 ESRD (END STAGE RENAL DISEASE) (HCC): ICD-10-CM

## 2018-01-01 DIAGNOSIS — J90 PLEURAL EFFUSION: Primary | ICD-10-CM

## 2018-01-01 DIAGNOSIS — A41.9 SEPTICEMIA (HCC): ICD-10-CM
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
## 2018-01-01 DIAGNOSIS — R41.0 DISORIENTATION: ICD-10-CM

## 2018-01-01 DIAGNOSIS — S93.601A SPRAIN OF RIGHT FOOT, INITIAL ENCOUNTER: ICD-10-CM

## 2018-01-01 DIAGNOSIS — M54.5 CHRONIC LOW BACK PAIN, UNSPECIFIED BACK PAIN LATERALITY, WITH SCIATICA PRESENCE UNSPECIFIED: ICD-10-CM

## 2018-01-01 DIAGNOSIS — R23.8 SKIN BREAKDOWN: ICD-10-CM

## 2018-01-01 DIAGNOSIS — S09.90XA INJURY OF HEAD, INITIAL ENCOUNTER: Primary | ICD-10-CM

## 2018-01-01 DIAGNOSIS — J40 BRONCHITIS: Primary | ICD-10-CM

## 2018-01-01 DIAGNOSIS — J18.9 PNEUMONIA DUE TO ORGANISM: ICD-10-CM

## 2018-01-01 DIAGNOSIS — Z99.2 VASCULAR DIALYSIS CATHETER IN PLACE (HCC): ICD-10-CM

## 2018-01-01 DIAGNOSIS — N18.9 CHRONIC KIDNEY DISEASE, UNSPECIFIED CKD STAGE: ICD-10-CM

## 2018-01-01 DIAGNOSIS — S31.000D WOUND OF SACRAL REGION, SUBSEQUENT ENCOUNTER: ICD-10-CM

## 2018-01-01 DIAGNOSIS — I10 ESSENTIAL HYPERTENSION: ICD-10-CM

## 2018-01-01 DIAGNOSIS — E43 SEVERE PROTEIN-CALORIE MALNUTRITION (GOMEZ: LESS THAN 60% OF STANDARD WEIGHT) (HCC): ICD-10-CM

## 2018-01-01 DIAGNOSIS — N18.9 CHRONIC RENAL FAILURE, UNSPECIFIED CKD STAGE: ICD-10-CM

## 2018-01-01 DIAGNOSIS — R06.02 SHORTNESS OF BREATH: Primary | ICD-10-CM

## 2018-01-01 DIAGNOSIS — R11.11 VOMITING WITHOUT NAUSEA, INTRACTABILITY OF VOMITING NOT SPECIFIED, UNSPECIFIED VOMITING TYPE: ICD-10-CM

## 2018-01-01 DIAGNOSIS — E03.9 HYPOTHYROIDISM, UNSPECIFIED TYPE: ICD-10-CM

## 2018-01-01 DIAGNOSIS — I50.9 CONGESTIVE HEART FAILURE, UNSPECIFIED CONGESTIVE HEART FAILURE CHRONICITY, UNSPECIFIED CONGESTIVE HEART FAILURE TYPE: ICD-10-CM

## 2018-01-01 DIAGNOSIS — G89.29 OTHER CHRONIC PAIN: Primary | ICD-10-CM

## 2018-01-01 DIAGNOSIS — S09.90XA CLOSED HEAD INJURY, INITIAL ENCOUNTER: Primary | ICD-10-CM

## 2018-01-01 DIAGNOSIS — S09.90XA CLOSED HEAD INJURY, INITIAL ENCOUNTER: ICD-10-CM

## 2018-01-01 DIAGNOSIS — R53.1 WEAKNESS: ICD-10-CM

## 2018-01-01 DIAGNOSIS — R04.0 EPISTAXIS: Primary | ICD-10-CM

## 2018-01-01 DIAGNOSIS — I48.0 PAROXYSMAL ATRIAL FIBRILLATION (HCC): ICD-10-CM

## 2018-01-01 DIAGNOSIS — A04.72 C. DIFFICILE COLITIS: ICD-10-CM

## 2018-01-01 DIAGNOSIS — Z99.2: ICD-10-CM

## 2018-01-01 DIAGNOSIS — M54.9 INTRACTABLE BACK PAIN: Primary | ICD-10-CM
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
## 2018-01-01 DIAGNOSIS — Z99.2 ESRD (END STAGE RENAL DISEASE) ON DIALYSIS (HCC): ICD-10-CM

## 2018-01-01 DIAGNOSIS — G20 PARKINSON DISEASE (HCC): ICD-10-CM

## 2018-01-01 DIAGNOSIS — R53.1 GENERAL WEAKNESS: ICD-10-CM

## 2018-01-01 DIAGNOSIS — E08.22 DIABETES MELLITUS DUE TO UNDERLYING CONDITION WITH CHRONIC KIDNEY DISEASE ON CHRONIC DIALYSIS, WITH LONG-TERM CURRENT USE OF INSULIN (HCC): ICD-10-CM

## 2018-01-01 DIAGNOSIS — M53.3 SACRAL PAIN: ICD-10-CM

## 2018-01-01 DIAGNOSIS — R06.00 DYSPNEA, UNSPECIFIED TYPE: ICD-10-CM

## 2018-01-01 DIAGNOSIS — E03.8 OTHER SPECIFIED HYPOTHYROIDISM: Primary | ICD-10-CM

## 2018-01-01 DIAGNOSIS — I48.20 ATRIAL FIBRILLATION, CHRONIC (HCC): ICD-10-CM

## 2018-01-01 DIAGNOSIS — N18.6 ESRD (END STAGE RENAL DISEASE) ON DIALYSIS (HCC): ICD-10-CM

## 2018-01-01 DIAGNOSIS — N18.5 CHRONIC RENAL FAILURE IN PEDIATRIC PATIENT, STAGE 5 (HCC): ICD-10-CM

## 2018-01-01 DIAGNOSIS — R41.0 DISORIENTATION: Primary | ICD-10-CM

## 2018-01-01 DIAGNOSIS — M17.10 ARTHRITIS OF KNEE: Primary | ICD-10-CM

## 2018-01-01 DIAGNOSIS — L98.429 SKIN ULCER OF SACRUM, UNSPECIFIED ULCER STAGE (HCC): ICD-10-CM

## 2018-01-01 DIAGNOSIS — Z51.5 PALLIATIVE CARE ENCOUNTER: ICD-10-CM

## 2018-01-01 DIAGNOSIS — R70.0 ELEVATED ERYTHROCYTE SEDIMENTATION RATE: ICD-10-CM

## 2018-01-01 DIAGNOSIS — A04.72 C. DIFFICILE DIARRHEA: Primary | ICD-10-CM

## 2018-01-01 DIAGNOSIS — R09.02 HYPOXIA: Primary | ICD-10-CM

## 2018-01-01 DIAGNOSIS — Z79.4 DIABETES MELLITUS DUE TO UNDERLYING CONDITION WITH CHRONIC KIDNEY DISEASE ON CHRONIC DIALYSIS, WITH LONG-TERM CURRENT USE OF INSULIN (HCC): ICD-10-CM

## 2018-01-01 DIAGNOSIS — D64.9 ANEMIA, UNSPECIFIED TYPE: ICD-10-CM

## 2018-01-01 DIAGNOSIS — G89.29 CHRONIC LOW BACK PAIN, UNSPECIFIED BACK PAIN LATERALITY, WITH SCIATICA PRESENCE UNSPECIFIED: ICD-10-CM

## 2018-01-01 DIAGNOSIS — R53.81 DEBILITY: ICD-10-CM

## 2018-01-01 DIAGNOSIS — I50.23 ACUTE ON CHRONIC SYSTOLIC HEART FAILURE (HCC): ICD-10-CM

## 2018-01-01 DIAGNOSIS — R79.1 SUBTHERAPEUTIC INTERNATIONAL NORMALIZED RATIO (INR): ICD-10-CM

## 2018-01-01 DIAGNOSIS — L89.152: ICD-10-CM

## 2018-01-01 DIAGNOSIS — J18.9 PNEUMONIA OF BOTH LUNGS DUE TO INFECTIOUS ORGANISM, UNSPECIFIED PART OF LUNG: Primary | ICD-10-CM

## 2018-01-01 DIAGNOSIS — D53.9 MACROCYTIC ANEMIA: ICD-10-CM

## 2018-01-01 DIAGNOSIS — S00.03XA HEMATOMA OF SCALP, INITIAL ENCOUNTER: ICD-10-CM

## 2018-01-01 DIAGNOSIS — S81.811A NONINFECTED SKIN TEAR OF RIGHT LOWER EXTREMITY, INITIAL ENCOUNTER: ICD-10-CM

## 2018-01-01 DIAGNOSIS — R79.82 ELEVATED C-REACTIVE PROTEIN (CRP): ICD-10-CM

## 2018-01-01 DIAGNOSIS — R53.83 FATIGUE, UNSPECIFIED TYPE: ICD-10-CM

## 2018-01-01 DIAGNOSIS — S93.401A SPRAIN OF RIGHT ANKLE, UNSPECIFIED LIGAMENT, INITIAL ENCOUNTER: Primary | ICD-10-CM

## 2018-01-01 DIAGNOSIS — Z79.4 TYPE 2 DIABETES MELLITUS WITHOUT COMPLICATION, WITH LONG-TERM CURRENT USE OF INSULIN (HCC): ICD-10-CM

## 2018-01-01 DIAGNOSIS — K21.9 GASTROESOPHAGEAL REFLUX DISEASE WITHOUT ESOPHAGITIS: ICD-10-CM

## 2018-01-01 DIAGNOSIS — R53.1 GENERALIZED WEAKNESS: ICD-10-CM

## 2018-01-01 DIAGNOSIS — K92.2 GASTROINTESTINAL HEMORRHAGE, UNSPECIFIED GASTROINTESTINAL HEMORRHAGE TYPE: ICD-10-CM

## 2018-01-01 DIAGNOSIS — N18.6 DIABETES MELLITUS DUE TO UNDERLYING CONDITION WITH CHRONIC KIDNEY DISEASE ON CHRONIC DIALYSIS, WITH LONG-TERM CURRENT USE OF INSULIN (HCC): ICD-10-CM

## 2018-01-01 DIAGNOSIS — R77.8 ELEVATED TROPONIN: Primary | ICD-10-CM

## 2018-01-01 DIAGNOSIS — Z79.01 CURRENT USE OF ANTICOAGULANT THERAPY: ICD-10-CM

## 2018-01-01 DIAGNOSIS — I80.9 THROMBOPHLEBITIS: Primary | ICD-10-CM

## 2018-01-01 DIAGNOSIS — R06.02 SHORTNESS OF BREATH: ICD-10-CM

## 2018-01-01 DIAGNOSIS — M53.3 SACRAL PAIN: Primary | ICD-10-CM

## 2018-01-01 DIAGNOSIS — R77.8 ELEVATED TROPONIN: ICD-10-CM

## 2018-01-01 DIAGNOSIS — F41.9 ANXIETY: ICD-10-CM

## 2018-01-01 DIAGNOSIS — I50.23 ACUTE ON CHRONIC SYSTOLIC HEART FAILURE (HCC): Primary | ICD-10-CM

## 2018-01-01 DIAGNOSIS — L89.153 SACRAL DECUBITUS ULCER, STAGE III (HCC): ICD-10-CM

## 2018-01-01 DIAGNOSIS — G89.29 OTHER CHRONIC PAIN: ICD-10-CM

## 2018-01-01 DIAGNOSIS — J90 PLEURAL EFFUSION ON RIGHT: ICD-10-CM

## 2018-01-01 DIAGNOSIS — N18.6 ANEMIA DUE TO CHRONIC KIDNEY DISEASE, ON CHRONIC DIALYSIS (HCC): Primary | ICD-10-CM

## 2018-01-01 DIAGNOSIS — I48.91 ATRIAL FIBRILLATION, UNSPECIFIED TYPE (HCC): ICD-10-CM

## 2018-01-01 DIAGNOSIS — R56.9 SEIZURE (HCC): ICD-10-CM

## 2018-01-01 DIAGNOSIS — S00.83XA FACIAL HEMATOMA, INITIAL ENCOUNTER: Primary | ICD-10-CM

## 2018-01-01 DIAGNOSIS — N30.00 ACUTE CYSTITIS WITHOUT HEMATURIA: ICD-10-CM

## 2018-01-01 DIAGNOSIS — E87.5 HYPERKALEMIA: ICD-10-CM

## 2018-01-01 DIAGNOSIS — M54.6 ACUTE MIDLINE THORACIC BACK PAIN: ICD-10-CM

## 2018-01-01 DIAGNOSIS — Z51.5 PALLIATIVE CARE PATIENT: ICD-10-CM

## 2018-01-01 DIAGNOSIS — A41.9 SEPSIS, DUE TO UNSPECIFIED ORGANISM: ICD-10-CM

## 2018-01-01 DIAGNOSIS — R04.0 EPISTAXIS: ICD-10-CM
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
## 2018-01-01 DIAGNOSIS — R29.898 WEAKNESS OF BOTH LOWER EXTREMITIES: ICD-10-CM

## 2018-01-01 DIAGNOSIS — G40.909 SEIZURE DISORDER (HCC): ICD-10-CM

## 2018-01-01 DIAGNOSIS — R79.1 SUPRATHERAPEUTIC INR: ICD-10-CM

## 2018-01-01 DIAGNOSIS — J18.9 PNEUMONIA OF BOTH LOWER LOBES DUE TO INFECTIOUS ORGANISM: Primary | ICD-10-CM

## 2018-01-01 DIAGNOSIS — Z99.2 DIABETES MELLITUS DUE TO UNDERLYING CONDITION WITH CHRONIC KIDNEY DISEASE ON CHRONIC DIALYSIS, WITH LONG-TERM CURRENT USE OF INSULIN (HCC): ICD-10-CM

## 2018-01-01 DIAGNOSIS — E11.9 TYPE 2 DIABETES MELLITUS WITHOUT COMPLICATION, UNSPECIFIED LONG TERM INSULIN USE STATUS: Primary | ICD-10-CM

## 2018-01-01 DIAGNOSIS — R53.1 WEAKNESS: Primary | ICD-10-CM

## 2018-01-01 DIAGNOSIS — K59.01 SLOW TRANSIT CONSTIPATION: ICD-10-CM

## 2018-01-01 DIAGNOSIS — Z99.2 ANEMIA DUE TO CHRONIC KIDNEY DISEASE, ON CHRONIC DIALYSIS (HCC): Primary | ICD-10-CM

## 2018-01-01 DIAGNOSIS — R63.4 WEIGHT LOSS: ICD-10-CM

## 2018-01-01 DIAGNOSIS — K59.03 DRUG-INDUCED CONSTIPATION: Primary | ICD-10-CM

## 2018-01-01 DIAGNOSIS — I48.20 CHRONIC ATRIAL FIBRILLATION (HCC): ICD-10-CM

## 2018-01-01 DIAGNOSIS — R79.1 SUBTHERAPEUTIC INTERNATIONAL NORMALIZED RATIO (INR): Primary | ICD-10-CM

## 2018-01-01 DIAGNOSIS — E78.00 HYPERCHOLESTEREMIA: ICD-10-CM

## 2018-01-01 DIAGNOSIS — A04.72 C. DIFFICILE COLITIS: Primary | ICD-10-CM

## 2018-01-01 DIAGNOSIS — R11.2 NON-INTRACTABLE VOMITING WITH NAUSEA, UNSPECIFIED VOMITING TYPE: Primary | ICD-10-CM

## 2018-01-01 DIAGNOSIS — R09.89 PULMONARY CONGESTION: ICD-10-CM

## 2018-01-01 DIAGNOSIS — Z71.89 COUNSELING AND COORDINATION OF CARE: ICD-10-CM

## 2018-01-01 DIAGNOSIS — D63.1 ANEMIA DUE TO CHRONIC KIDNEY DISEASE, ON CHRONIC DIALYSIS (HCC): Primary | ICD-10-CM

## 2018-01-01 DIAGNOSIS — R09.89 PULMONARY VASCULAR CONGESTION: ICD-10-CM

## 2018-01-01 DIAGNOSIS — N18.6 ESRD (END STAGE RENAL DISEASE) (HCC): Primary | ICD-10-CM

## 2018-01-01 DIAGNOSIS — N18.4 STAGE 4 CHRONIC KIDNEY DISEASE (HCC): Primary | ICD-10-CM
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
## 2018-01-01 DIAGNOSIS — Z79.01 CHRONIC ANTICOAGULATION: ICD-10-CM

## 2018-01-01 DIAGNOSIS — J15.9 PNEUMONIA, BACTERIAL: ICD-10-CM

## 2018-01-01 DIAGNOSIS — E11.9 TYPE 2 DIABETES MELLITUS WITHOUT COMPLICATION (HCC): ICD-10-CM

## 2018-01-01 DIAGNOSIS — I95.9 HYPOTENSION, UNSPECIFIED HYPOTENSION TYPE: ICD-10-CM

## 2018-01-01 DIAGNOSIS — D72.829 LEUKOCYTOSIS, UNSPECIFIED TYPE: ICD-10-CM

## 2018-01-01 DIAGNOSIS — E46 PROTEIN MALNUTRITION (HCC): ICD-10-CM

## 2018-01-01 DIAGNOSIS — R13.10 DYSPHAGIA, UNSPECIFIED TYPE: ICD-10-CM

## 2018-01-01 DIAGNOSIS — N18.5 CHRONIC RENAL FAILURE, STAGE 5 (HCC): ICD-10-CM

## 2018-01-01 DIAGNOSIS — S51.011A SKIN TEAR OF RIGHT ELBOW WITHOUT COMPLICATION, INITIAL ENCOUNTER: ICD-10-CM

## 2018-01-01 DIAGNOSIS — E11.9 TYPE 2 DIABETES MELLITUS WITHOUT COMPLICATION, WITH LONG-TERM CURRENT USE OF INSULIN (HCC): ICD-10-CM

## 2018-01-01 LAB
ABO/RH: NORMAL
ACANTHOCYTES: 0
AFB STAIN: NORMAL
ALBUMIN SERPL-MCNC: 2 G/DL (ref 3.9–4.9)
ALBUMIN SERPL-MCNC: 2 G/DL (ref 3.9–4.9)
ALBUMIN SERPL-MCNC: 2.2 G/DL (ref 3.9–4.9)
ALBUMIN SERPL-MCNC: 2.3 G/DL (ref 3.9–4.9)
ALBUMIN SERPL-MCNC: 2.4 G/DL (ref 3.9–4.9)
ALBUMIN SERPL-MCNC: 2.5 G/DL (ref 3.9–4.9)
ALBUMIN SERPL-MCNC: 2.6 G/DL (ref 3.9–4.9)
ALBUMIN SERPL-MCNC: 2.7 G/DL (ref 3.9–4.9)
ALBUMIN SERPL-MCNC: 2.8 G/DL (ref 3.9–4.9)
ALBUMIN SERPL-MCNC: 2.8 G/DL (ref 3.9–4.9)
ALBUMIN SERPL-MCNC: 2.9 G/DL (ref 3.9–4.9)
ALBUMIN SERPL-MCNC: 3 G/DL (ref 3.9–4.9)
ALBUMIN SERPL-MCNC: 3.1 G/DL (ref 3.9–4.9)
ALBUMIN SERPL-MCNC: 3.1 G/DL (ref 3.9–4.9)
ALBUMIN SERPL-MCNC: 3.2 G/DL (ref 3.9–4.9)
ALBUMIN SERPL-MCNC: 3.3 G/DL (ref 3.9–4.9)
ALBUMIN SERPL-MCNC: 3.6 G/DL (ref 3.9–4.9)
ALBUMIN SERPL-MCNC: 3.8 G/DL (ref 3.9–4.9)
ALBUMIN SERPL-MCNC: 3.9 G/DL (ref 3.9–4.9)
ALP BLD-CCNC: 100 U/L (ref 40–130)
ALP BLD-CCNC: 102 U/L (ref 40–130)
ALP BLD-CCNC: 103 U/L (ref 40–130)
ALP BLD-CCNC: 104 U/L (ref 40–130)
ALP BLD-CCNC: 109 U/L (ref 40–130)
ALP BLD-CCNC: 110 U/L (ref 40–130)
ALP BLD-CCNC: 111 U/L (ref 40–130)
ALP BLD-CCNC: 113 U/L (ref 40–130)
ALP BLD-CCNC: 115 U/L (ref 40–130)
ALP BLD-CCNC: 117 U/L (ref 40–130)
ALP BLD-CCNC: 119 U/L (ref 40–130)
ALP BLD-CCNC: 121 U/L (ref 40–130)
ALP BLD-CCNC: 124 U/L (ref 40–130)
ALP BLD-CCNC: 124 U/L (ref 40–130)
ALP BLD-CCNC: 129 U/L (ref 40–130)
ALP BLD-CCNC: 131 U/L (ref 40–130)
ALP BLD-CCNC: 136 U/L (ref 40–130)
ALP BLD-CCNC: 137 U/L (ref 40–130)
ALP BLD-CCNC: 144 U/L (ref 40–130)
ALP BLD-CCNC: 155 U/L (ref 40–130)
ALP BLD-CCNC: 155 U/L (ref 40–130)
ALP BLD-CCNC: 174 U/L (ref 40–130)
ALP BLD-CCNC: 176 U/L (ref 40–130)
ALP BLD-CCNC: 188 U/L (ref 40–130)
ALP BLD-CCNC: 71 U/L (ref 40–130)
ALP BLD-CCNC: 72 U/L (ref 40–130)
ALP BLD-CCNC: 79 U/L (ref 40–130)
ALP BLD-CCNC: 81 U/L (ref 40–130)
ALP BLD-CCNC: 87 U/L (ref 40–130)
ALP BLD-CCNC: 89 U/L (ref 40–130)
ALP BLD-CCNC: 92 U/L (ref 40–130)
ALP BLD-CCNC: 94 U/L (ref 40–130)
ALP BLD-CCNC: 95 U/L (ref 40–130)
ALP BLD-CCNC: 99 U/L (ref 40–130)
ALT SERPL-CCNC: 11 U/L (ref 0–33)
ALT SERPL-CCNC: 12 U/L (ref 0–33)
ALT SERPL-CCNC: 14 U/L (ref 0–33)
ALT SERPL-CCNC: 15 U/L (ref 0–33)
ALT SERPL-CCNC: 15 U/L (ref 0–33)
ALT SERPL-CCNC: 16 U/L (ref 0–33)
ALT SERPL-CCNC: 17 U/L (ref 0–33)
ALT SERPL-CCNC: 18 U/L (ref 0–33)
ALT SERPL-CCNC: 19 U/L (ref 0–33)
ALT SERPL-CCNC: 20 U/L (ref 0–33)
ALT SERPL-CCNC: 20 U/L (ref 0–33)
ALT SERPL-CCNC: 22 U/L (ref 0–33)
ALT SERPL-CCNC: 24 U/L (ref 0–33)
ALT SERPL-CCNC: 25 U/L (ref 0–33)
ALT SERPL-CCNC: 32 U/L (ref 0–33)
ALT SERPL-CCNC: 32 U/L (ref 0–33)
ALT SERPL-CCNC: 6 U/L (ref 0–33)
ALT SERPL-CCNC: 8 U/L (ref 0–33)
ALT SERPL-CCNC: 82 U/L (ref 0–33)
ALT SERPL-CCNC: 9 U/L (ref 0–33)
ALT SERPL-CCNC: 9 U/L (ref 0–33)
ALT SERPL-CCNC: <5 U/L (ref 0–33)
AMMONIA: 29 UMOL/L (ref 11–51)
AMORPHOUS: ABNORMAL
AMORPHOUS: ABNORMAL
ANION GAP SERPL CALCULATED.3IONS-SCNC: 10 MEQ/L (ref 7–13)
ANION GAP SERPL CALCULATED.3IONS-SCNC: 11 MEQ/L (ref 7–13)
ANION GAP SERPL CALCULATED.3IONS-SCNC: 12 MEQ/L (ref 7–13)
ANION GAP SERPL CALCULATED.3IONS-SCNC: 13 MEQ/L (ref 7–13)
ANION GAP SERPL CALCULATED.3IONS-SCNC: 14 MEQ/L (ref 7–13)
ANION GAP SERPL CALCULATED.3IONS-SCNC: 15 MEQ/L (ref 7–13)
ANION GAP SERPL CALCULATED.3IONS-SCNC: 16 MEQ/L (ref 7–13)
ANION GAP SERPL CALCULATED.3IONS-SCNC: 17 MEQ/L (ref 7–13)
ANION GAP SERPL CALCULATED.3IONS-SCNC: 18 MEQ/L (ref 7–13)
ANION GAP SERPL CALCULATED.3IONS-SCNC: 19 MEQ/L (ref 7–13)
ANION GAP SERPL CALCULATED.3IONS-SCNC: 19 MEQ/L (ref 7–13)
ANION GAP SERPL CALCULATED.3IONS-SCNC: 20 MEQ/L (ref 7–13)
ANION GAP SERPL CALCULATED.3IONS-SCNC: 21 MEQ/L (ref 7–13)
ANION GAP SERPL CALCULATED.3IONS-SCNC: 22 MEQ/L (ref 7–13)
ANION GAP SERPL CALCULATED.3IONS-SCNC: 22 MEQ/L (ref 7–13)
ANION GAP SERPL CALCULATED.3IONS-SCNC: 8 MEQ/L (ref 7–13)
ANION GAP SERPL CALCULATED.3IONS-SCNC: 9 MEQ/L (ref 7–13)
ANISOCYTOSIS: 0
ANISOCYTOSIS: ABNORMAL
ANTIBODY SCREEN: NORMAL
APPEARANCE FLUID: NORMAL
APTT: 30.1 SEC (ref 21.6–35.4)
APTT: 30.2 SEC (ref 21.6–35.4)
APTT: 30.8 SEC (ref 21.6–35.4)
APTT: 32 SEC (ref 21.6–35.4)
APTT: 32.1 SEC (ref 21.6–35.4)
APTT: 34 SEC (ref 21.6–35.4)
APTT: 37.9 SEC (ref 21.6–35.4)
APTT: 42.7 SEC (ref 21.6–35.4)
APTT: 42.9 SEC (ref 21.6–35.4)
AST SERPL-CCNC: 128 U/L (ref 0–35)
AST SERPL-CCNC: 145 U/L (ref 0–35)
AST SERPL-CCNC: 150 U/L (ref 0–35)
AST SERPL-CCNC: 150 U/L (ref 0–35)
AST SERPL-CCNC: 167 U/L (ref 0–35)
AST SERPL-CCNC: 17 U/L (ref 0–35)
AST SERPL-CCNC: 173 U/L (ref 0–35)
AST SERPL-CCNC: 181 U/L (ref 0–35)
AST SERPL-CCNC: 23 U/L (ref 0–35)
AST SERPL-CCNC: 24 U/L (ref 0–35)
AST SERPL-CCNC: 241 U/L (ref 0–35)
AST SERPL-CCNC: 26 U/L (ref 0–35)
AST SERPL-CCNC: 263 U/L (ref 0–35)
AST SERPL-CCNC: 27 U/L (ref 0–35)
AST SERPL-CCNC: 27 U/L (ref 0–35)
AST SERPL-CCNC: 30 U/L (ref 0–35)
AST SERPL-CCNC: 30 U/L (ref 0–35)
AST SERPL-CCNC: 31 U/L (ref 0–35)
AST SERPL-CCNC: 34 U/L (ref 0–35)
AST SERPL-CCNC: 38 U/L (ref 0–35)
AST SERPL-CCNC: 39 U/L (ref 0–35)
AST SERPL-CCNC: 41 U/L (ref 0–35)
AST SERPL-CCNC: 45 U/L (ref 0–35)
AST SERPL-CCNC: 47 U/L (ref 0–35)
AST SERPL-CCNC: 555 U/L (ref 0–35)
AST SERPL-CCNC: 57 U/L (ref 0–35)
AST SERPL-CCNC: 57 U/L (ref 0–35)
AST SERPL-CCNC: 65 U/L (ref 0–35)
AST SERPL-CCNC: 65 U/L (ref 0–35)
AST SERPL-CCNC: 74 U/L (ref 0–35)
AST SERPL-CCNC: 75 U/L (ref 0–35)
AST SERPL-CCNC: 78 U/L (ref 0–35)
AST SERPL-CCNC: 86 U/L (ref 0–35)
AST SERPL-CCNC: 95 U/L (ref 0–35)
ATYPICAL LYMPHOCYTE RELATIVE PERCENT: 1 %
AUER RODS: 0
AVERAGE GLUCOSE: 117
AVERAGE GLUCOSE: 77
BACTERIA: ABNORMAL /HPF
BANDED NEUTROPHILS RELATIVE PERCENT: 1 % (ref 5–11)
BANDED NEUTROPHILS RELATIVE PERCENT: 3 % (ref 5–11)
BANDED NEUTROPHILS RELATIVE PERCENT: 6 % (ref 5–11)
BASE EXCESS ARTERIAL: -6 (ref -3–3)
BASE EXCESS ARTERIAL: -7 (ref -3–3)
BASE EXCESS ARTERIAL: 3 (ref -3–3)
BASE EXCESS ARTERIAL: 3 (ref -3–3)
BASE EXCESS ARTERIAL: 4 (ref -3–3)
BASE EXCESS ARTERIAL: 4 (ref -3–3)
BASE EXCESS ARTERIAL: 8 (ref -3–3)
BASE EXCESS VENOUS: 7 (ref -3–3)
BASOPHILIC STIPPLING: 0
BASOPHILS ABSOLUTE: 0 K/UL (ref 0–0.2)
BASOPHILS ABSOLUTE: 0.1 K/UL (ref 0–0.2)
BASOPHILS ABSOLUTE: ABNORMAL /ΜL
BASOPHILS RELATIVE PERCENT: 0 %
BASOPHILS RELATIVE PERCENT: 0.1 %
BASOPHILS RELATIVE PERCENT: 0.2 %
BASOPHILS RELATIVE PERCENT: 0.3 %
BASOPHILS RELATIVE PERCENT: 0.4 %
BASOPHILS RELATIVE PERCENT: 0.5 %
BASOPHILS RELATIVE PERCENT: 0.6 %
BASOPHILS RELATIVE PERCENT: 0.7 %
BASOPHILS RELATIVE PERCENT: 0.8 %
BASOPHILS RELATIVE PERCENT: 0.9 %
BASOPHILS RELATIVE PERCENT: 0.9 %
BASOPHILS RELATIVE PERCENT: 1 %
BASOPHILS RELATIVE PERCENT: 1 %
BASOPHILS RELATIVE PERCENT: ABNORMAL %
BASOPHILS RELATIVE PERCENT: ABNORMAL %
BILIRUB SERPL-MCNC: 0.1 MG/DL (ref 0–1.2)
BILIRUB SERPL-MCNC: 0.2 MG/DL (ref 0–1.2)
BILIRUB SERPL-MCNC: 0.3 MG/DL (ref 0–1.2)
BILIRUB SERPL-MCNC: 0.4 MG/DL (ref 0–1.2)
BILIRUB SERPL-MCNC: 0.5 MG/DL (ref 0–1.2)
BILIRUB SERPL-MCNC: 0.6 MG/DL (ref 0–1.2)
BILIRUB SERPL-MCNC: <0.2 MG/DL (ref 0–1.2)
BILIRUBIN URINE: ABNORMAL
BILIRUBIN URINE: NEGATIVE
BILIRUBIN, URINE: NEGATIVE
BLOOD BANK DISPENSE STATUS: NORMAL
BLOOD BANK PRODUCT CODE: NORMAL
BLOOD CULTURE, ROUTINE: ABNORMAL
BLOOD CULTURE, ROUTINE: NORMAL
BLOOD, URINE: ABNORMAL
BLOOD, URINE: NEGATIVE
BLOOD, URINE: POSITIVE
BODY FLUID CULTURE, STERILE: NORMAL
BPU ID: NORMAL
BUN BLDV-MCNC: 11 MG/DL (ref 8–23)
BUN BLDV-MCNC: 12 MG/DL (ref 8–23)
BUN BLDV-MCNC: 13 MG/DL (ref 8–23)
BUN BLDV-MCNC: 14 MG/DL (ref 8–23)
BUN BLDV-MCNC: 15 MG/DL (ref 8–23)
BUN BLDV-MCNC: 16 MG/DL (ref 8–23)
BUN BLDV-MCNC: 16 MG/DL (ref 8–23)
BUN BLDV-MCNC: 17 MG/DL
BUN BLDV-MCNC: 17 MG/DL (ref 8–23)
BUN BLDV-MCNC: 18 MG/DL (ref 8–23)
BUN BLDV-MCNC: 18 MG/DL (ref 8–23)
BUN BLDV-MCNC: 19 MG/DL (ref 8–23)
BUN BLDV-MCNC: 20 MG/DL (ref 8–23)
BUN BLDV-MCNC: 21 MG/DL
BUN BLDV-MCNC: 21 MG/DL (ref 8–23)
BUN BLDV-MCNC: 22 MG/DL (ref 8–23)
BUN BLDV-MCNC: 23 MG/DL (ref 8–23)
BUN BLDV-MCNC: 24 MG/DL (ref 8–23)
BUN BLDV-MCNC: 24 MG/DL (ref 8–23)
BUN BLDV-MCNC: 25 MG/DL (ref 8–23)
BUN BLDV-MCNC: 25 MG/DL (ref 8–23)
BUN BLDV-MCNC: 26 MG/DL (ref 8–23)
BUN BLDV-MCNC: 27 MG/DL (ref 8–23)
BUN BLDV-MCNC: 28 MG/DL (ref 8–23)
BUN BLDV-MCNC: 29 MG/DL (ref 8–23)
BUN BLDV-MCNC: 31 MG/DL (ref 8–23)
BUN BLDV-MCNC: 31 MG/DL (ref 8–23)
BUN BLDV-MCNC: 33 MG/DL (ref 8–23)
BUN BLDV-MCNC: 34 MG/DL (ref 8–23)
BUN BLDV-MCNC: 34 MG/DL (ref 8–23)
BUN BLDV-MCNC: 35 MG/DL (ref 8–23)
BUN BLDV-MCNC: 36 MG/DL (ref 8–23)
BUN BLDV-MCNC: 39 MG/DL (ref 8–23)
BUN BLDV-MCNC: 40 MG/DL (ref 8–23)
BUN BLDV-MCNC: 41 MG/DL (ref 8–23)
BUN BLDV-MCNC: 44 MG/DL (ref 8–23)
BUN BLDV-MCNC: 44 MG/DL (ref 8–23)
BUN BLDV-MCNC: 45 MG/DL (ref 8–23)
BUN BLDV-MCNC: 47 MG/DL (ref 8–23)
BUN BLDV-MCNC: 48 MG/DL (ref 8–23)
BUN BLDV-MCNC: 51 MG/DL (ref 8–23)
BUN BLDV-MCNC: 52 MG/DL (ref 8–23)
BUN BLDV-MCNC: 53 MG/DL (ref 8–23)
BUN BLDV-MCNC: 54 MG/DL (ref 8–23)
BUN BLDV-MCNC: 54 MG/DL (ref 8–23)
BUN BLDV-MCNC: 56 MG/DL (ref 8–23)
BUN BLDV-MCNC: 62 MG/DL (ref 8–23)
BUN BLDV-MCNC: 66 MG/DL (ref 8–23)
BUN BLDV-MCNC: 76 MG/DL (ref 8–23)
BUN BLDV-MCNC: 9 MG/DL (ref 8–23)
BURR CELLS: 0
C-REACTIVE PROTEIN, HIGH SENSITIVITY: 104.9 MG/L (ref 0–5)
C-REACTIVE PROTEIN, HIGH SENSITIVITY: 177.8 MG/L (ref 0–5)
C-REACTIVE PROTEIN, HIGH SENSITIVITY: 178.3 MG/L (ref 0–5)
C-REACTIVE PROTEIN, HIGH SENSITIVITY: 185.3 MG/L (ref 0–5)
C-REACTIVE PROTEIN, HIGH SENSITIVITY: 194.9 MG/L (ref 0–5)
C-REACTIVE PROTEIN, HIGH SENSITIVITY: 28.9 MG/L (ref 0–5)
C-REACTIVE PROTEIN, HIGH SENSITIVITY: 34.8 MG/L (ref 0–5)
C-REACTIVE PROTEIN, HIGH SENSITIVITY: >300 MG/L (ref 0–5)
C-REACTIVE PROTEIN: 89.3 MG/L (ref 0–5)
CABOT RINGS: 0
CALCIUM IONIZED, CALC AT PH 7.4: 1.08 MMOL/L (ref 1.11–1.3)
CALCIUM IONIZED: 1 MMOL/L (ref 1.12–1.32)
CALCIUM IONIZED: 1.02 MMOL/L (ref 1.12–1.32)
CALCIUM IONIZED: 1.03 MMOL/L (ref 1.12–1.32)
CALCIUM IONIZED: 1.07 MMOL/L (ref 1.12–1.32)
CALCIUM IONIZED: 1.12 MMOL/L (ref 1.12–1.32)
CALCIUM IONIZED: 1.18 MMOL/L (ref 1.12–1.32)
CALCIUM IONIZED: 1.2 MMOL/L (ref 1.11–1.3)
CALCIUM SERPL-MCNC: 7 MG/DL (ref 8.6–10.2)
CALCIUM SERPL-MCNC: 7.1 MG/DL (ref 8.6–10.2)
CALCIUM SERPL-MCNC: 7.2 MG/DL (ref 8.6–10.2)
CALCIUM SERPL-MCNC: 7.3 MG/DL (ref 8.6–10.2)
CALCIUM SERPL-MCNC: 7.4 MG/DL (ref 8.6–10.2)
CALCIUM SERPL-MCNC: 7.5 MG/DL (ref 8.6–10.2)
CALCIUM SERPL-MCNC: 7.6 MG/DL (ref 8.6–10.2)
CALCIUM SERPL-MCNC: 7.7 MG/DL (ref 8.6–10.2)
CALCIUM SERPL-MCNC: 7.8 MG/DL (ref 8.6–10.2)
CALCIUM SERPL-MCNC: 7.8 MG/DL (ref 8.6–10.2)
CALCIUM SERPL-MCNC: 7.9 MG/DL (ref 8.6–10.2)
CALCIUM SERPL-MCNC: 8.1 MG/DL (ref 8.6–10.2)
CALCIUM SERPL-MCNC: 8.2 MG/DL (ref 8.6–10.2)
CALCIUM SERPL-MCNC: 8.3 MG/DL (ref 8.6–10.2)
CALCIUM SERPL-MCNC: 8.4 MG/DL
CALCIUM SERPL-MCNC: 8.4 MG/DL (ref 8.6–10.2)
CALCIUM SERPL-MCNC: 8.5 MG/DL (ref 8.6–10.2)
CALCIUM SERPL-MCNC: 8.6 MG/DL
CALCIUM SERPL-MCNC: 8.6 MG/DL (ref 8.6–10.2)
CALCIUM SERPL-MCNC: 8.7 MG/DL (ref 8.6–10.2)
CALCIUM SERPL-MCNC: 8.8 MG/DL (ref 8.6–10.2)
CALCIUM SERPL-MCNC: 8.9 MG/DL (ref 8.6–10.2)
CALCIUM SERPL-MCNC: 8.9 MG/DL (ref 8.6–10.2)
CALCIUM SERPL-MCNC: 9.1 MG/DL (ref 8.6–10.2)
CALCIUM SERPL-MCNC: 9.2 MG/DL (ref 8.6–10.2)
CALCIUM SERPL-MCNC: 9.3 MG/DL (ref 8.6–10.2)
CELL COUNT FLUID TYPE: NORMAL
CHLORIDE BLD-SCNC: 100 MEQ/L (ref 98–107)
CHLORIDE BLD-SCNC: 101 MEQ/L (ref 98–107)
CHLORIDE BLD-SCNC: 87 MEQ/L (ref 98–107)
CHLORIDE BLD-SCNC: 88 MEQ/L (ref 98–107)
CHLORIDE BLD-SCNC: 89 MEQ/L (ref 98–107)
CHLORIDE BLD-SCNC: 90 MEQ/L (ref 98–107)
CHLORIDE BLD-SCNC: 91 MEQ/L (ref 98–107)
CHLORIDE BLD-SCNC: 92 MEQ/L (ref 98–107)
CHLORIDE BLD-SCNC: 93 MEQ/L (ref 98–107)
CHLORIDE BLD-SCNC: 94 MEQ/L (ref 98–107)
CHLORIDE BLD-SCNC: 95 MEQ/L (ref 98–107)
CHLORIDE BLD-SCNC: 96 MEQ/L (ref 98–107)
CHLORIDE BLD-SCNC: 97 MEQ/L (ref 98–107)
CHLORIDE BLD-SCNC: 98 MEQ/L (ref 98–107)
CHLORIDE BLD-SCNC: 98 MEQ/L (ref 98–107)
CHLORIDE BLD-SCNC: 98 MMOL/L
CHLORIDE BLD-SCNC: 99 MEQ/L (ref 98–107)
CHLORIDE BLD-SCNC: 99 MMOL/L
CHOLESTEROL, TOTAL: 93 MG/DL (ref 0–199)
CHOLESTEROL, TOTAL: 99 MG/DL
CHOLESTEROL/HDL RATIO: 2.2
CHP ED QC CHECK: NORMAL
CK MB: 1.2 NG/ML (ref 0–3.8)
CK MB: 1.4 NG/ML (ref 0–3.8)
CK MB: 1.7 NG/ML (ref 0–3.8)
CK MB: 1.7 NG/ML (ref 0–3.8)
CK MB: 2.6 NG/ML (ref 0–3.8)
CK MB: 2.7 NG/ML (ref 0–3.8)
CLARITY: ABNORMAL
CLARITY: CLEAR
CLOSTRIDIUM DIFFICILE DNA AMPLIFICATION: ABNORMAL
CLOSTRIDIUM DIFFICILE DNA AMPLIFICATION: ABNORMAL
CLOSTRIDIUM DIFFICILE DNA AMPLIFICATION: NORMAL
CLOT EVALUATION: NORMAL
CO2: 20 MEQ/L (ref 22–29)
CO2: 21 MEQ/L (ref 22–29)
CO2: 22 MEQ/L (ref 22–29)
CO2: 23 MEQ/L (ref 22–29)
CO2: 24 MEQ/L (ref 22–29)
CO2: 24 MMOL/L
CO2: 25 MEQ/L (ref 22–29)
CO2: 26 MEQ/L (ref 22–29)
CO2: 27 MEQ/L (ref 22–29)
CO2: 28 MEQ/L (ref 22–29)
CO2: 29 MEQ/L (ref 22–29)
CO2: 30 MEQ/L (ref 22–29)
CO2: 30 MEQ/L (ref 22–29)
CO2: 31 MEQ/L (ref 22–29)
CO2: 31 MEQ/L (ref 22–29)
CO2: 31 MMOL/L
COLOR FLUID: NORMAL
COLOR: ABNORMAL
COLOR: YELLOW
CREAT SERPL-MCNC: 1.32 MG/DL (ref 0.5–0.9)
CREAT SERPL-MCNC: 1.36 MG/DL (ref 0.5–0.9)
CREAT SERPL-MCNC: 1.53 MG/DL (ref 0.5–0.9)
CREAT SERPL-MCNC: 1.56 MG/DL (ref 0.5–0.9)
CREAT SERPL-MCNC: 1.61 MG/DL (ref 0.5–0.9)
CREAT SERPL-MCNC: 1.62 MG/DL (ref 0.5–0.9)
CREAT SERPL-MCNC: 1.74 MG/DL (ref 0.5–0.9)
CREAT SERPL-MCNC: 1.78 MG/DL (ref 0.5–0.9)
CREAT SERPL-MCNC: 1.79 MG/DL (ref 0.5–0.9)
CREAT SERPL-MCNC: 1.81 MG/DL (ref 0.5–0.9)
CREAT SERPL-MCNC: 1.83 MG/DL (ref 0.5–0.9)
CREAT SERPL-MCNC: 1.84 MG/DL (ref 0.5–0.9)
CREAT SERPL-MCNC: 1.86 MG/DL (ref 0.5–0.9)
CREAT SERPL-MCNC: 1.87 MG/DL (ref 0.5–0.9)
CREAT SERPL-MCNC: 1.89 MG/DL (ref 0.5–0.9)
CREAT SERPL-MCNC: 1.99 MG/DL (ref 0.5–0.9)
CREAT SERPL-MCNC: 2 MG/DL
CREAT SERPL-MCNC: 2 MG/DL (ref 0.5–0.9)
CREAT SERPL-MCNC: 2 MG/DL (ref 0.5–0.9)
CREAT SERPL-MCNC: 2.08 MG/DL (ref 0.5–0.9)
CREAT SERPL-MCNC: 2.1 MG/DL (ref 0.5–0.9)
CREAT SERPL-MCNC: 2.15 MG/DL (ref 0.5–0.9)
CREAT SERPL-MCNC: 2.23 MG/DL (ref 0.5–0.9)
CREAT SERPL-MCNC: 2.25 MG/DL (ref 0.5–0.9)
CREAT SERPL-MCNC: 2.28 MG/DL (ref 0.5–0.9)
CREAT SERPL-MCNC: 2.34 MG/DL (ref 0.5–0.9)
CREAT SERPL-MCNC: 2.46 MG/DL (ref 0.5–0.9)
CREAT SERPL-MCNC: 2.48 MG/DL (ref 0.5–0.9)
CREAT SERPL-MCNC: 2.52 MG/DL (ref 0.5–0.9)
CREAT SERPL-MCNC: 2.54 MG/DL (ref 0.5–0.9)
CREAT SERPL-MCNC: 2.56 MG/DL (ref 0.5–0.9)
CREAT SERPL-MCNC: 2.57 MG/DL (ref 0.5–0.9)
CREAT SERPL-MCNC: 2.6 MG/DL (ref 0.5–0.9)
CREAT SERPL-MCNC: 2.61 MG/DL (ref 0.5–0.9)
CREAT SERPL-MCNC: 2.63 MG/DL (ref 0.5–0.9)
CREAT SERPL-MCNC: 2.63 MG/DL (ref 0.5–0.9)
CREAT SERPL-MCNC: 2.68 MG/DL (ref 0.5–0.9)
CREAT SERPL-MCNC: 2.84 MG/DL (ref 0.5–0.9)
CREAT SERPL-MCNC: 2.85 MG/DL (ref 0.5–0.9)
CREAT SERPL-MCNC: 2.9 MG/DL (ref 0.5–0.9)
CREAT SERPL-MCNC: 2.9 MG/DL (ref 0.5–0.9)
CREAT SERPL-MCNC: 2.93 MG/DL (ref 0.5–0.9)
CREAT SERPL-MCNC: 2.96 MG/DL (ref 0.5–0.9)
CREAT SERPL-MCNC: 3 MG/DL (ref 0.5–0.9)
CREAT SERPL-MCNC: 3.08 MG/DL (ref 0.5–0.9)
CREAT SERPL-MCNC: 3.08 MG/DL (ref 0.5–0.9)
CREAT SERPL-MCNC: 3.14 MG/DL (ref 0.5–0.9)
CREAT SERPL-MCNC: 3.18 MG/DL (ref 0.5–0.9)
CREAT SERPL-MCNC: 3.19 MG/DL (ref 0.5–0.9)
CREAT SERPL-MCNC: 3.3 MG/DL
CREAT SERPL-MCNC: 3.36 MG/DL (ref 0.5–0.9)
CREAT SERPL-MCNC: 3.43 MG/DL (ref 0.5–0.9)
CREAT SERPL-MCNC: 3.52 MG/DL (ref 0.5–0.9)
CREAT SERPL-MCNC: 3.6 MG/DL (ref 0.5–0.9)
CREAT SERPL-MCNC: 3.77 MG/DL (ref 0.5–0.9)
CREAT SERPL-MCNC: 3.79 MG/DL (ref 0.5–0.9)
CREAT SERPL-MCNC: 3.8 MG/DL (ref 0.5–0.9)
CREAT SERPL-MCNC: 3.81 MG/DL (ref 0.5–0.9)
CREAT SERPL-MCNC: 3.82 MG/DL (ref 0.5–0.9)
CREAT SERPL-MCNC: 3.85 MG/DL (ref 0.5–0.9)
CREAT SERPL-MCNC: 3.93 MG/DL (ref 0.5–0.9)
CREAT SERPL-MCNC: 4 MG/DL (ref 0.5–0.9)
CREAT SERPL-MCNC: 4.06 MG/DL (ref 0.5–0.9)
CREAT SERPL-MCNC: 4.1 MG/DL (ref 0.5–0.9)
CREAT SERPL-MCNC: 4.23 MG/DL (ref 0.5–0.9)
CREAT SERPL-MCNC: 4.33 MG/DL (ref 0.5–0.9)
CREAT SERPL-MCNC: 4.38 MG/DL (ref 0.5–0.9)
CREAT SERPL-MCNC: 4.4 MG/DL (ref 0.5–0.9)
CREAT SERPL-MCNC: 4.42 MG/DL (ref 0.5–0.9)
CREAT SERPL-MCNC: 4.53 MG/DL (ref 0.5–0.9)
CREAT SERPL-MCNC: 4.72 MG/DL (ref 0.5–0.9)
CREAT SERPL-MCNC: 4.78 MG/DL (ref 0.5–0.9)
CREAT SERPL-MCNC: 5.19 MG/DL (ref 0.5–0.9)
CREAT SERPL-MCNC: 5.38 MG/DL (ref 0.5–0.9)
CREAT SERPL-MCNC: 6.07 MG/DL (ref 0.5–0.9)
CREAT SERPL-MCNC: 6.8 MG/DL (ref 0.5–0.9)
CREATINE KINASE-MB INDEX: 10 % (ref 0–3.5)
CREATINE KINASE-MB INDEX: 10.8 % (ref 0–3.5)
CREATINE KINASE-MB INDEX: 11.3 % (ref 0–3.5)
CREATINE KINASE-MB INDEX: 6.7 % (ref 0–3.5)
CREATINE KINASE-MB INDEX: 7.8 % (ref 0–3.5)
CREATINE KINASE-MB INDEX: 8.1 % (ref 0–3.5)
CULTURE, BLOOD 2: NORMAL
CULTURE, RESPIRATORY: NORMAL
D DIMER: 2.08 MG/L FEU (ref 0–0.5)
D DIMER: 2.89 MG/L FEU (ref 0–0.5)
DESCRIPTION BLOOD BANK: NORMAL
DIGOXIN LEVEL: 3 NG/ML (ref 0.8–2)
DOHLE BODIES: 0
EKG ATRIAL RATE: 108 BPM
EKG ATRIAL RATE: 113 BPM
EKG ATRIAL RATE: 234 BPM
EKG ATRIAL RATE: 60 BPM
EKG ATRIAL RATE: 61 BPM
EKG ATRIAL RATE: 61 BPM
EKG ATRIAL RATE: 63 BPM
EKG ATRIAL RATE: 64 BPM
EKG ATRIAL RATE: 70 BPM
EKG ATRIAL RATE: 71 BPM
EKG ATRIAL RATE: 71 BPM
EKG ATRIAL RATE: 73 BPM
EKG ATRIAL RATE: 73 BPM
EKG ATRIAL RATE: 74 BPM
EKG ATRIAL RATE: 74 BPM
EKG ATRIAL RATE: 77 BPM
EKG ATRIAL RATE: 78 BPM
EKG ATRIAL RATE: 79 BPM
EKG ATRIAL RATE: 80 BPM
EKG ATRIAL RATE: 80 BPM
EKG P AXIS: -8 DEGREES
EKG P AXIS: 17 DEGREES
EKG P AXIS: 18 DEGREES
EKG P AXIS: 42 DEGREES
EKG P AXIS: 46 DEGREES
EKG P AXIS: 48 DEGREES
EKG P AXIS: 49 DEGREES
EKG P AXIS: 51 DEGREES
EKG P AXIS: 53 DEGREES
EKG P AXIS: 53 DEGREES
EKG P AXIS: 61 DEGREES
EKG P AXIS: 63 DEGREES
EKG P AXIS: 67 DEGREES
EKG P AXIS: 7 DEGREES
EKG P AXIS: 73 DEGREES
EKG P AXIS: 73 DEGREES
EKG P AXIS: 78 DEGREES
EKG P AXIS: 79 DEGREES
EKG P AXIS: 8 DEGREES
EKG P AXIS: 83 DEGREES
EKG P AXIS: 90 DEGREES
EKG P AXIS: 91 DEGREES
EKG P-R INTERVAL: 166 MS
EKG P-R INTERVAL: 168 MS
EKG P-R INTERVAL: 170 MS
EKG P-R INTERVAL: 178 MS
EKG P-R INTERVAL: 182 MS
EKG P-R INTERVAL: 186 MS
EKG P-R INTERVAL: 200 MS
EKG P-R INTERVAL: 202 MS
EKG P-R INTERVAL: 214 MS
EKG P-R INTERVAL: 218 MS
EKG P-R INTERVAL: 222 MS
EKG P-R INTERVAL: 224 MS
EKG P-R INTERVAL: 228 MS
EKG P-R INTERVAL: 230 MS
EKG P-R INTERVAL: 230 MS
EKG P-R INTERVAL: 242 MS
EKG P-R INTERVAL: 250 MS
EKG P-R INTERVAL: 260 MS
EKG P-R INTERVAL: 262 MS
EKG P-R INTERVAL: 264 MS
EKG P-R INTERVAL: 278 MS
EKG P-R INTERVAL: 280 MS
EKG P-R INTERVAL: 286 MS
EKG P-R INTERVAL: 298 MS
EKG P-R INTERVAL: 298 MS
EKG P-R INTERVAL: 328 MS
EKG Q-T INTERVAL: 298 MS
EKG Q-T INTERVAL: 356 MS
EKG Q-T INTERVAL: 374 MS
EKG Q-T INTERVAL: 376 MS
EKG Q-T INTERVAL: 418 MS
EKG Q-T INTERVAL: 424 MS
EKG Q-T INTERVAL: 428 MS
EKG Q-T INTERVAL: 428 MS
EKG Q-T INTERVAL: 430 MS
EKG Q-T INTERVAL: 432 MS
EKG Q-T INTERVAL: 440 MS
EKG Q-T INTERVAL: 440 MS
EKG Q-T INTERVAL: 444 MS
EKG Q-T INTERVAL: 444 MS
EKG Q-T INTERVAL: 450 MS
EKG Q-T INTERVAL: 456 MS
EKG Q-T INTERVAL: 462 MS
EKG Q-T INTERVAL: 482 MS
EKG Q-T INTERVAL: 502 MS
EKG Q-T INTERVAL: 502 MS
EKG Q-T INTERVAL: 504 MS
EKG Q-T INTERVAL: 514 MS
EKG Q-T INTERVAL: 534 MS
EKG Q-T INTERVAL: 538 MS
EKG Q-T INTERVAL: 540 MS
EKG Q-T INTERVAL: 540 MS
EKG Q-T INTERVAL: 548 MS
EKG QRS DURATION: 100 MS
EKG QRS DURATION: 102 MS
EKG QRS DURATION: 104 MS
EKG QRS DURATION: 104 MS
EKG QRS DURATION: 106 MS
EKG QRS DURATION: 108 MS
EKG QRS DURATION: 114 MS
EKG QRS DURATION: 118 MS
EKG QRS DURATION: 120 MS
EKG QRS DURATION: 120 MS
EKG QRS DURATION: 130 MS
EKG QRS DURATION: 158 MS
EKG QRS DURATION: 162 MS
EKG QRS DURATION: 92 MS
EKG QRS DURATION: 96 MS
EKG QRS DURATION: 98 MS
EKG QRS DURATION: 98 MS
EKG QTC CALCULATION (BAZETT): 328 MS
EKG QTC CALCULATION (BAZETT): 455 MS
EKG QTC CALCULATION (BAZETT): 462 MS
EKG QTC CALCULATION (BAZETT): 476 MS
EKG QTC CALCULATION (BAZETT): 479 MS
EKG QTC CALCULATION (BAZETT): 482 MS
EKG QTC CALCULATION (BAZETT): 486 MS
EKG QTC CALCULATION (BAZETT): 487 MS
EKG QTC CALCULATION (BAZETT): 488 MS
EKG QTC CALCULATION (BAZETT): 495 MS
EKG QTC CALCULATION (BAZETT): 495 MS
EKG QTC CALCULATION (BAZETT): 497 MS
EKG QTC CALCULATION (BAZETT): 501 MS
EKG QTC CALCULATION (BAZETT): 502 MS
EKG QTC CALCULATION (BAZETT): 503 MS
EKG QTC CALCULATION (BAZETT): 504 MS
EKG QTC CALCULATION (BAZETT): 506 MS
EKG QTC CALCULATION (BAZETT): 512 MS
EKG QTC CALCULATION (BAZETT): 514 MS
EKG QTC CALCULATION (BAZETT): 516 MS
EKG QTC CALCULATION (BAZETT): 534 MS
EKG QTC CALCULATION (BAZETT): 543 MS
EKG QTC CALCULATION (BAZETT): 543 MS
EKG QTC CALCULATION (BAZETT): 548 MS
EKG QTC CALCULATION (BAZETT): 550 MS
EKG R AXIS: -66 DEGREES
EKG R AXIS: 13 DEGREES
EKG R AXIS: 18 DEGREES
EKG R AXIS: 21 DEGREES
EKG R AXIS: 23 DEGREES
EKG R AXIS: 23 DEGREES
EKG R AXIS: 24 DEGREES
EKG R AXIS: 24 DEGREES
EKG R AXIS: 25 DEGREES
EKG R AXIS: 30 DEGREES
EKG R AXIS: 37 DEGREES
EKG R AXIS: 38 DEGREES
EKG R AXIS: 40 DEGREES
EKG R AXIS: 41 DEGREES
EKG R AXIS: 41 DEGREES
EKG R AXIS: 44 DEGREES
EKG R AXIS: 45 DEGREES
EKG R AXIS: 46 DEGREES
EKG R AXIS: 50 DEGREES
EKG R AXIS: 53 DEGREES
EKG R AXIS: 53 DEGREES
EKG R AXIS: 54 DEGREES
EKG R AXIS: 56 DEGREES
EKG R AXIS: 59 DEGREES
EKG R AXIS: 62 DEGREES
EKG R AXIS: 70 DEGREES
EKG R AXIS: 86 DEGREES
EKG R AXIS: 86 DEGREES
EKG R AXIS: 96 DEGREES
EKG T AXIS: -14 DEGREES
EKG T AXIS: -3 DEGREES
EKG T AXIS: -47 DEGREES
EKG T AXIS: 101 DEGREES
EKG T AXIS: 117 DEGREES
EKG T AXIS: 22 DEGREES
EKG T AXIS: 27 DEGREES
EKG T AXIS: 34 DEGREES
EKG T AXIS: 35 DEGREES
EKG T AXIS: 38 DEGREES
EKG T AXIS: 39 DEGREES
EKG T AXIS: 40 DEGREES
EKG T AXIS: 41 DEGREES
EKG T AXIS: 44 DEGREES
EKG T AXIS: 47 DEGREES
EKG T AXIS: 48 DEGREES
EKG T AXIS: 48 DEGREES
EKG T AXIS: 51 DEGREES
EKG T AXIS: 52 DEGREES
EKG T AXIS: 53 DEGREES
EKG T AXIS: 54 DEGREES
EKG T AXIS: 56 DEGREES
EKG T AXIS: 64 DEGREES
EKG T AXIS: 66 DEGREES
EKG T AXIS: 67 DEGREES
EKG T AXIS: 68 DEGREES
EKG T AXIS: 71 DEGREES
EKG T AXIS: 76 DEGREES
EKG T AXIS: 87 DEGREES
EKG VENTRICULAR RATE: 109 BPM
EKG VENTRICULAR RATE: 120 BPM
EKG VENTRICULAR RATE: 60 BPM
EKG VENTRICULAR RATE: 61 BPM
EKG VENTRICULAR RATE: 61 BPM
EKG VENTRICULAR RATE: 63 BPM
EKG VENTRICULAR RATE: 64 BPM
EKG VENTRICULAR RATE: 70 BPM
EKG VENTRICULAR RATE: 71 BPM
EKG VENTRICULAR RATE: 71 BPM
EKG VENTRICULAR RATE: 73 BPM
EKG VENTRICULAR RATE: 73 BPM
EKG VENTRICULAR RATE: 74 BPM
EKG VENTRICULAR RATE: 74 BPM
EKG VENTRICULAR RATE: 77 BPM
EKG VENTRICULAR RATE: 78 BPM
EKG VENTRICULAR RATE: 79 BPM
EKG VENTRICULAR RATE: 80 BPM
EKG VENTRICULAR RATE: 80 BPM
EKG VENTRICULAR RATE: 89 BPM
EOSINOPHILS ABSOLUTE: 0 K/UL (ref 0–0.7)
EOSINOPHILS ABSOLUTE: 0.1 /ΜL
EOSINOPHILS ABSOLUTE: 0.1 K/UL (ref 0–0.7)
EOSINOPHILS ABSOLUTE: 0.2 K/UL (ref 0–0.7)
EOSINOPHILS ABSOLUTE: ABNORMAL /ΜL
EOSINOPHILS ABSOLUTE: ABNORMAL /ΜL
EOSINOPHILS RELATIVE PERCENT: 0 %
EOSINOPHILS RELATIVE PERCENT: 0.1 %
EOSINOPHILS RELATIVE PERCENT: 0.2 %
EOSINOPHILS RELATIVE PERCENT: 0.3 %
EOSINOPHILS RELATIVE PERCENT: 0.3 %
EOSINOPHILS RELATIVE PERCENT: 0.4 %
EOSINOPHILS RELATIVE PERCENT: 0.6 %
EOSINOPHILS RELATIVE PERCENT: 0.6 %
EOSINOPHILS RELATIVE PERCENT: 0.8 %
EOSINOPHILS RELATIVE PERCENT: 0.9 %
EOSINOPHILS RELATIVE PERCENT: 1 %
EOSINOPHILS RELATIVE PERCENT: 1.1 %
EOSINOPHILS RELATIVE PERCENT: ABNORMAL %
EOSINOPHILS RELATIVE PERCENT: ABNORMAL %
EPITHELIAL CELLS, UA: ABNORMAL /HPF
FERRITIN: 1262 NG/ML (ref 13–150)
FINAL REPORT: NORMAL
FINAL REPORT: NORMAL
FLUID PATH CONSULT: YES
FLUID TYPE: NORMAL
FOLATE: >20 NG/ML (ref 7.3–26.1)
GFR AFRICAN AMERICAN: 10.6
GFR AFRICAN AMERICAN: 10.7
GFR AFRICAN AMERICAN: 11.2
GFR AFRICAN AMERICAN: 11.6
GFR AFRICAN AMERICAN: 11.6
GFR AFRICAN AMERICAN: 11.7
GFR AFRICAN AMERICAN: 11.9
GFR AFRICAN AMERICAN: 12
GFR AFRICAN AMERICAN: 12.2
GFR AFRICAN AMERICAN: 12.6
GFR AFRICAN AMERICAN: 12.8
GFR AFRICAN AMERICAN: 13
GFR AFRICAN AMERICAN: 13.3
GFR AFRICAN AMERICAN: 13.6
GFR AFRICAN AMERICAN: 13.7
GFR AFRICAN AMERICAN: 13.7
GFR AFRICAN AMERICAN: 13.8
GFR AFRICAN AMERICAN: 13.8
GFR AFRICAN AMERICAN: 13.9
GFR AFRICAN AMERICAN: 14.7
GFR AFRICAN AMERICAN: 15.1
GFR AFRICAN AMERICAN: 15.5
GFR AFRICAN AMERICAN: 15.9
GFR AFRICAN AMERICAN: 16.8
GFR AFRICAN AMERICAN: 16.9
GFR AFRICAN AMERICAN: 17
GFR AFRICAN AMERICAN: 17.2
GFR AFRICAN AMERICAN: 17.5
GFR AFRICAN AMERICAN: 17.6
GFR AFRICAN AMERICAN: 18.1
GFR AFRICAN AMERICAN: 18.4
GFR AFRICAN AMERICAN: 18.6
GFR AFRICAN AMERICAN: 18.8
GFR AFRICAN AMERICAN: 18.8
GFR AFRICAN AMERICAN: 19.2
GFR AFRICAN AMERICAN: 19.2
GFR AFRICAN AMERICAN: 20.6
GFR AFRICAN AMERICAN: 21
GFR AFRICAN AMERICAN: 21.1
GFR AFRICAN AMERICAN: 21.3
GFR AFRICAN AMERICAN: 21.3
GFR AFRICAN AMERICAN: 21.6
GFR AFRICAN AMERICAN: 21.7
GFR AFRICAN AMERICAN: 21.9
GFR AFRICAN AMERICAN: 22.1
GFR AFRICAN AMERICAN: 22.5
GFR AFRICAN AMERICAN: 22.8
GFR AFRICAN AMERICAN: 23
GFR AFRICAN AMERICAN: 24.1
GFR AFRICAN AMERICAN: 24.8
GFR AFRICAN AMERICAN: 25.2
GFR AFRICAN AMERICAN: 25.5
GFR AFRICAN AMERICAN: 26.6
GFR AFRICAN AMERICAN: 27
GFR AFRICAN AMERICAN: 27.3
GFR AFRICAN AMERICAN: 27.6
GFR AFRICAN AMERICAN: 28.8
GFR AFRICAN AMERICAN: 28.9
GFR AFRICAN AMERICAN: 29
GFR AFRICAN AMERICAN: 30.8
GFR AFRICAN AMERICAN: 31.2
GFR AFRICAN AMERICAN: 31.4
GFR AFRICAN AMERICAN: 31.8
GFR AFRICAN AMERICAN: 32
GFR AFRICAN AMERICAN: 32.4
GFR AFRICAN AMERICAN: 32.8
GFR AFRICAN AMERICAN: 33.1
GFR AFRICAN AMERICAN: 33.9
GFR AFRICAN AMERICAN: 36.8
GFR AFRICAN AMERICAN: 37.1
GFR AFRICAN AMERICAN: 38.5
GFR AFRICAN AMERICAN: 39.3
GFR AFRICAN AMERICAN: 45.1
GFR AFRICAN AMERICAN: 46.6
GFR AFRICAN AMERICAN: 7
GFR AFRICAN AMERICAN: 8
GFR AFRICAN AMERICAN: 9.2
GFR AFRICAN AMERICAN: 9.6
GFR CALCULATED: NORMAL
GFR CALCULATED: NORMAL
GFR NON-AFRICAN AMERICAN: 10
GFR NON-AFRICAN AMERICAN: 10.1
GFR NON-AFRICAN AMERICAN: 10.4
GFR NON-AFRICAN AMERICAN: 10.6
GFR NON-AFRICAN AMERICAN: 10.7
GFR NON-AFRICAN AMERICAN: 11
GFR NON-AFRICAN AMERICAN: 11.2
GFR NON-AFRICAN AMERICAN: 11.3
GFR NON-AFRICAN AMERICAN: 11.4
GFR NON-AFRICAN AMERICAN: 11.5
GFR NON-AFRICAN AMERICAN: 12.1
GFR NON-AFRICAN AMERICAN: 12.4
GFR NON-AFRICAN AMERICAN: 12.8
GFR NON-AFRICAN AMERICAN: 13.1
GFR NON-AFRICAN AMERICAN: 13.9
GFR NON-AFRICAN AMERICAN: 14
GFR NON-AFRICAN AMERICAN: 14
GFR NON-AFRICAN AMERICAN: 14.2
GFR NON-AFRICAN AMERICAN: 14.5
GFR NON-AFRICAN AMERICAN: 14.5
GFR NON-AFRICAN AMERICAN: 15
GFR NON-AFRICAN AMERICAN: 15.2
GFR NON-AFRICAN AMERICAN: 15.3
GFR NON-AFRICAN AMERICAN: 15.6
GFR NON-AFRICAN AMERICAN: 15.6
GFR NON-AFRICAN AMERICAN: 15.9
GFR NON-AFRICAN AMERICAN: 15.9
GFR NON-AFRICAN AMERICAN: 17
GFR NON-AFRICAN AMERICAN: 17.4
GFR NON-AFRICAN AMERICAN: 17.4
GFR NON-AFRICAN AMERICAN: 17.6
GFR NON-AFRICAN AMERICAN: 17.6
GFR NON-AFRICAN AMERICAN: 17.8
GFR NON-AFRICAN AMERICAN: 18
GFR NON-AFRICAN AMERICAN: 18.1
GFR NON-AFRICAN AMERICAN: 18.3
GFR NON-AFRICAN AMERICAN: 18.6
GFR NON-AFRICAN AMERICAN: 18.8
GFR NON-AFRICAN AMERICAN: 19
GFR NON-AFRICAN AMERICAN: 19.9
GFR NON-AFRICAN AMERICAN: 20.5
GFR NON-AFRICAN AMERICAN: 20.8
GFR NON-AFRICAN AMERICAN: 21.1
GFR NON-AFRICAN AMERICAN: 22
GFR NON-AFRICAN AMERICAN: 22.6
GFR NON-AFRICAN AMERICAN: 22.8
GFR NON-AFRICAN AMERICAN: 23
GFR NON-AFRICAN AMERICAN: 23.8
GFR NON-AFRICAN AMERICAN: 23.9
GFR NON-AFRICAN AMERICAN: 24
GFR NON-AFRICAN AMERICAN: 25.4
GFR NON-AFRICAN AMERICAN: 25.8
GFR NON-AFRICAN AMERICAN: 25.9
GFR NON-AFRICAN AMERICAN: 26.3
GFR NON-AFRICAN AMERICAN: 26.4
GFR NON-AFRICAN AMERICAN: 26.8
GFR NON-AFRICAN AMERICAN: 27.1
GFR NON-AFRICAN AMERICAN: 27.3
GFR NON-AFRICAN AMERICAN: 28
GFR NON-AFRICAN AMERICAN: 30.4
GFR NON-AFRICAN AMERICAN: 30.7
GFR NON-AFRICAN AMERICAN: 31.8
GFR NON-AFRICAN AMERICAN: 32.5
GFR NON-AFRICAN AMERICAN: 37.2
GFR NON-AFRICAN AMERICAN: 38.5
GFR NON-AFRICAN AMERICAN: 5.8
GFR NON-AFRICAN AMERICAN: 6.6
GFR NON-AFRICAN AMERICAN: 7.6
GFR NON-AFRICAN AMERICAN: 7.9
GFR NON-AFRICAN AMERICAN: 8.7
GFR NON-AFRICAN AMERICAN: 8.9
GFR NON-AFRICAN AMERICAN: 9.3
GFR NON-AFRICAN AMERICAN: 9.6
GFR NON-AFRICAN AMERICAN: 9.6
GFR NON-AFRICAN AMERICAN: 9.7
GFR NON-AFRICAN AMERICAN: 9.8
GLOBULIN: 2.5 G/DL (ref 2.3–3.5)
GLOBULIN: 2.5 G/DL (ref 2.3–3.5)
GLOBULIN: 2.6 G/DL (ref 2.3–3.5)
GLOBULIN: 2.7 G/DL (ref 2.3–3.5)
GLOBULIN: 2.7 G/DL (ref 2.3–3.5)
GLOBULIN: 2.8 G/DL (ref 2.3–3.5)
GLOBULIN: 2.8 G/DL (ref 2.3–3.5)
GLOBULIN: 2.9 G/DL (ref 2.3–3.5)
GLOBULIN: 2.9 G/DL (ref 2.3–3.5)
GLOBULIN: 3 G/DL (ref 2.3–3.5)
GLOBULIN: 3.1 G/DL (ref 2.3–3.5)
GLOBULIN: 3.2 G/DL (ref 2.3–3.5)
GLOBULIN: 3.3 G/DL (ref 2.3–3.5)
GLOBULIN: 4 G/DL (ref 2.3–3.5)
GLOBULIN: 4.2 G/DL (ref 2.3–3.5)
GLOBULIN: 4.3 G/DL (ref 2.3–3.5)
GLOBULIN: 4.4 G/DL (ref 2.3–3.5)
GLOBULIN: 4.5 G/DL (ref 2.3–3.5)
GLOBULIN: 4.7 G/DL (ref 2.3–3.5)
GLOBULIN: 4.8 G/DL (ref 2.3–3.5)
GLOBULIN: 4.9 G/DL (ref 2.3–3.5)
GLUCOSE BLD-MCNC: 100 MG/DL (ref 60–115)
GLUCOSE BLD-MCNC: 100 MG/DL (ref 60–115)
GLUCOSE BLD-MCNC: 101 MG/DL (ref 60–115)
GLUCOSE BLD-MCNC: 102 MG/DL (ref 60–115)
GLUCOSE BLD-MCNC: 103 MG/DL (ref 60–115)
GLUCOSE BLD-MCNC: 103 MG/DL (ref 60–115)
GLUCOSE BLD-MCNC: 104 MG/DL (ref 60–115)
GLUCOSE BLD-MCNC: 104 MG/DL (ref 60–115)
GLUCOSE BLD-MCNC: 105 MG/DL (ref 74–109)
GLUCOSE BLD-MCNC: 106 MG/DL (ref 60–115)
GLUCOSE BLD-MCNC: 108 MG/DL (ref 60–115)
GLUCOSE BLD-MCNC: 108 MG/DL (ref 60–115)
GLUCOSE BLD-MCNC: 108 MG/DL (ref 74–109)
GLUCOSE BLD-MCNC: 108 MG/DL (ref 74–109)
GLUCOSE BLD-MCNC: 109 MG/DL
GLUCOSE BLD-MCNC: 109 MG/DL (ref 60–115)
GLUCOSE BLD-MCNC: 110 MG/DL (ref 60–115)
GLUCOSE BLD-MCNC: 110 MG/DL (ref 74–109)
GLUCOSE BLD-MCNC: 110 MG/DL (ref 74–109)
GLUCOSE BLD-MCNC: 111 MG/DL
GLUCOSE BLD-MCNC: 111 MG/DL (ref 60–115)
GLUCOSE BLD-MCNC: 111 MG/DL (ref 74–109)
GLUCOSE BLD-MCNC: 112 MG/DL (ref 74–109)
GLUCOSE BLD-MCNC: 113 MG/DL (ref 60–115)
GLUCOSE BLD-MCNC: 113 MG/DL (ref 74–109)
GLUCOSE BLD-MCNC: 114 MG/DL (ref 60–115)
GLUCOSE BLD-MCNC: 114 MG/DL (ref 60–115)
GLUCOSE BLD-MCNC: 114 MG/DL (ref 74–109)
GLUCOSE BLD-MCNC: 114 MG/DL (ref 74–109)
GLUCOSE BLD-MCNC: 115 MG/DL (ref 60–115)
GLUCOSE BLD-MCNC: 115 MG/DL (ref 60–115)
GLUCOSE BLD-MCNC: 116 MG/DL (ref 60–115)
GLUCOSE BLD-MCNC: 116 MG/DL (ref 74–109)
GLUCOSE BLD-MCNC: 117 MG/DL (ref 60–115)
GLUCOSE BLD-MCNC: 118 MG/DL (ref 60–115)
GLUCOSE BLD-MCNC: 118 MG/DL (ref 60–115)
GLUCOSE BLD-MCNC: 119 MG/DL (ref 60–115)
GLUCOSE BLD-MCNC: 121 MG/DL (ref 60–115)
GLUCOSE BLD-MCNC: 121 MG/DL (ref 60–115)
GLUCOSE BLD-MCNC: 122 MG/DL (ref 60–115)
GLUCOSE BLD-MCNC: 122 MG/DL (ref 74–109)
GLUCOSE BLD-MCNC: 123 MG/DL (ref 74–109)
GLUCOSE BLD-MCNC: 123 MG/DL (ref 74–109)
GLUCOSE BLD-MCNC: 124 MG/DL (ref 60–115)
GLUCOSE BLD-MCNC: 124 MG/DL (ref 60–115)
GLUCOSE BLD-MCNC: 125 MG/DL (ref 60–115)
GLUCOSE BLD-MCNC: 126 MG/DL (ref 60–115)
GLUCOSE BLD-MCNC: 126 MG/DL (ref 74–109)
GLUCOSE BLD-MCNC: 129 MG/DL (ref 60–115)
GLUCOSE BLD-MCNC: 130 MG/DL (ref 74–109)
GLUCOSE BLD-MCNC: 130 MG/DL (ref 74–109)
GLUCOSE BLD-MCNC: 131 MG/DL (ref 60–115)
GLUCOSE BLD-MCNC: 131 MG/DL (ref 60–115)
GLUCOSE BLD-MCNC: 131 MG/DL (ref 74–109)
GLUCOSE BLD-MCNC: 132 MG/DL (ref 60–115)
GLUCOSE BLD-MCNC: 132 MG/DL (ref 74–109)
GLUCOSE BLD-MCNC: 132 MG/DL (ref 74–109)
GLUCOSE BLD-MCNC: 133 MG/DL (ref 60–115)
GLUCOSE BLD-MCNC: 134 MG/DL (ref 60–115)
GLUCOSE BLD-MCNC: 137 MG/DL (ref 60–115)
GLUCOSE BLD-MCNC: 138 MG/DL (ref 60–115)
GLUCOSE BLD-MCNC: 138 MG/DL (ref 60–115)
GLUCOSE BLD-MCNC: 138 MG/DL (ref 74–109)
GLUCOSE BLD-MCNC: 138 MG/DL (ref 74–109)
GLUCOSE BLD-MCNC: 139 MG/DL (ref 60–115)
GLUCOSE BLD-MCNC: 139 MG/DL (ref 60–115)
GLUCOSE BLD-MCNC: 139 MG/DL (ref 74–109)
GLUCOSE BLD-MCNC: 141 MG/DL (ref 60–115)
GLUCOSE BLD-MCNC: 142 MG/DL (ref 60–115)
GLUCOSE BLD-MCNC: 143 MG/DL (ref 60–115)
GLUCOSE BLD-MCNC: 143 MG/DL (ref 60–115)
GLUCOSE BLD-MCNC: 143 MG/DL (ref 74–109)
GLUCOSE BLD-MCNC: 145 MG/DL (ref 60–115)
GLUCOSE BLD-MCNC: 146 MG/DL (ref 60–115)
GLUCOSE BLD-MCNC: 146 MG/DL (ref 60–115)
GLUCOSE BLD-MCNC: 147 MG/DL (ref 60–115)
GLUCOSE BLD-MCNC: 147 MG/DL (ref 60–115)
GLUCOSE BLD-MCNC: 147 MG/DL (ref 74–109)
GLUCOSE BLD-MCNC: 148 MG/DL (ref 60–115)
GLUCOSE BLD-MCNC: 148 MG/DL (ref 74–109)
GLUCOSE BLD-MCNC: 149 MG/DL (ref 60–115)
GLUCOSE BLD-MCNC: 151 MG/DL (ref 60–115)
GLUCOSE BLD-MCNC: 151 MG/DL (ref 74–109)
GLUCOSE BLD-MCNC: 153 MG/DL (ref 60–115)
GLUCOSE BLD-MCNC: 155 MG/DL (ref 60–115)
GLUCOSE BLD-MCNC: 155 MG/DL (ref 74–109)
GLUCOSE BLD-MCNC: 156 MG/DL (ref 60–115)
GLUCOSE BLD-MCNC: 156 MG/DL (ref 74–109)
GLUCOSE BLD-MCNC: 157 MG/DL (ref 60–115)
GLUCOSE BLD-MCNC: 157 MG/DL (ref 74–109)
GLUCOSE BLD-MCNC: 158 MG/DL (ref 60–115)
GLUCOSE BLD-MCNC: 159 MG/DL (ref 60–115)
GLUCOSE BLD-MCNC: 160 MG/DL (ref 60–115)
GLUCOSE BLD-MCNC: 162 MG/DL (ref 60–115)
GLUCOSE BLD-MCNC: 163 MG/DL (ref 60–115)
GLUCOSE BLD-MCNC: 165 MG/DL (ref 60–115)
GLUCOSE BLD-MCNC: 166 MG/DL (ref 74–109)
GLUCOSE BLD-MCNC: 167 MG/DL (ref 60–115)
GLUCOSE BLD-MCNC: 168 MG/DL (ref 60–115)
GLUCOSE BLD-MCNC: 168 MG/DL (ref 60–115)
GLUCOSE BLD-MCNC: 169 MG/DL (ref 60–115)
GLUCOSE BLD-MCNC: 169 MG/DL (ref 60–115)
GLUCOSE BLD-MCNC: 170 MG/DL (ref 60–115)
GLUCOSE BLD-MCNC: 170 MG/DL (ref 60–115)
GLUCOSE BLD-MCNC: 170 MG/DL (ref 74–109)
GLUCOSE BLD-MCNC: 171 MG/DL (ref 60–115)
GLUCOSE BLD-MCNC: 172 MG/DL (ref 60–115)
GLUCOSE BLD-MCNC: 174 MG/DL (ref 60–115)
GLUCOSE BLD-MCNC: 176 MG/DL (ref 60–115)
GLUCOSE BLD-MCNC: 178 MG/DL (ref 60–115)
GLUCOSE BLD-MCNC: 181 MG/DL (ref 60–115)
GLUCOSE BLD-MCNC: 182 MG/DL (ref 60–115)
GLUCOSE BLD-MCNC: 184 MG/DL (ref 60–115)
GLUCOSE BLD-MCNC: 184 MG/DL (ref 60–115)
GLUCOSE BLD-MCNC: 184 MG/DL (ref 74–109)
GLUCOSE BLD-MCNC: 186 MG/DL (ref 60–115)
GLUCOSE BLD-MCNC: 187 MG/DL (ref 60–115)
GLUCOSE BLD-MCNC: 187 MG/DL (ref 74–109)
GLUCOSE BLD-MCNC: 188 MG/DL (ref 60–115)
GLUCOSE BLD-MCNC: 189 MG/DL (ref 60–115)
GLUCOSE BLD-MCNC: 189 MG/DL (ref 74–109)
GLUCOSE BLD-MCNC: 189 MG/DL (ref 74–109)
GLUCOSE BLD-MCNC: 190 MG/DL (ref 60–115)
GLUCOSE BLD-MCNC: 190 MG/DL (ref 74–109)
GLUCOSE BLD-MCNC: 192 MG/DL (ref 60–115)
GLUCOSE BLD-MCNC: 192 MG/DL (ref 60–115)
GLUCOSE BLD-MCNC: 193 MG/DL (ref 60–115)
GLUCOSE BLD-MCNC: 195 MG/DL (ref 60–115)
GLUCOSE BLD-MCNC: 195 MG/DL (ref 60–115)
GLUCOSE BLD-MCNC: 196 MG/DL (ref 60–115)
GLUCOSE BLD-MCNC: 196 MG/DL (ref 60–115)
GLUCOSE BLD-MCNC: 197 MG/DL (ref 60–115)
GLUCOSE BLD-MCNC: 197 MG/DL (ref 60–115)
GLUCOSE BLD-MCNC: 198 MG/DL (ref 74–109)
GLUCOSE BLD-MCNC: 199 MG/DL (ref 60–115)
GLUCOSE BLD-MCNC: 200 MG/DL (ref 60–115)
GLUCOSE BLD-MCNC: 201 MG/DL (ref 60–115)
GLUCOSE BLD-MCNC: 202 MG/DL (ref 60–115)
GLUCOSE BLD-MCNC: 203 MG/DL (ref 60–115)
GLUCOSE BLD-MCNC: 203 MG/DL (ref 60–115)
GLUCOSE BLD-MCNC: 203 MG/DL (ref 74–109)
GLUCOSE BLD-MCNC: 204 MG/DL (ref 74–109)
GLUCOSE BLD-MCNC: 205 MG/DL (ref 60–115)
GLUCOSE BLD-MCNC: 207 MG/DL (ref 60–115)
GLUCOSE BLD-MCNC: 207 MG/DL (ref 60–115)
GLUCOSE BLD-MCNC: 209 MG/DL (ref 60–115)
GLUCOSE BLD-MCNC: 209 MG/DL (ref 74–109)
GLUCOSE BLD-MCNC: 211 MG/DL (ref 74–109)
GLUCOSE BLD-MCNC: 216 MG/DL (ref 60–115)
GLUCOSE BLD-MCNC: 219 MG/DL (ref 60–115)
GLUCOSE BLD-MCNC: 220 MG/DL (ref 60–115)
GLUCOSE BLD-MCNC: 220 MG/DL (ref 60–115)
GLUCOSE BLD-MCNC: 220 MG/DL (ref 74–109)
GLUCOSE BLD-MCNC: 221 MG/DL (ref 60–115)
GLUCOSE BLD-MCNC: 221 MG/DL (ref 74–109)
GLUCOSE BLD-MCNC: 222 MG/DL (ref 60–115)
GLUCOSE BLD-MCNC: 223 MG/DL (ref 60–115)
GLUCOSE BLD-MCNC: 225 MG/DL (ref 74–109)
GLUCOSE BLD-MCNC: 227 MG/DL (ref 60–115)
GLUCOSE BLD-MCNC: 229 MG/DL (ref 60–115)
GLUCOSE BLD-MCNC: 229 MG/DL (ref 60–115)
GLUCOSE BLD-MCNC: 229 MG/DL (ref 74–109)
GLUCOSE BLD-MCNC: 230 MG/DL (ref 60–115)
GLUCOSE BLD-MCNC: 231 MG/DL (ref 60–115)
GLUCOSE BLD-MCNC: 233 MG/DL (ref 74–109)
GLUCOSE BLD-MCNC: 235 MG/DL (ref 60–115)
GLUCOSE BLD-MCNC: 237 MG/DL (ref 60–115)
GLUCOSE BLD-MCNC: 237 MG/DL (ref 60–115)
GLUCOSE BLD-MCNC: 238 MG/DL (ref 60–115)
GLUCOSE BLD-MCNC: 239 MG/DL (ref 60–115)
GLUCOSE BLD-MCNC: 24 MG/DL (ref 74–109)
GLUCOSE BLD-MCNC: 246 MG/DL (ref 60–115)
GLUCOSE BLD-MCNC: 248 MG/DL (ref 60–115)
GLUCOSE BLD-MCNC: 249 MG/DL (ref 60–115)
GLUCOSE BLD-MCNC: 250 MG/DL (ref 60–115)
GLUCOSE BLD-MCNC: 252 MG/DL (ref 60–115)
GLUCOSE BLD-MCNC: 252 MG/DL (ref 60–115)
GLUCOSE BLD-MCNC: 255 MG/DL (ref 60–115)
GLUCOSE BLD-MCNC: 257 MG/DL (ref 60–115)
GLUCOSE BLD-MCNC: 258 MG/DL (ref 60–115)
GLUCOSE BLD-MCNC: 259 MG/DL (ref 60–115)
GLUCOSE BLD-MCNC: 259 MG/DL (ref 60–115)
GLUCOSE BLD-MCNC: 260 MG/DL (ref 60–115)
GLUCOSE BLD-MCNC: 261 MG/DL (ref 60–115)
GLUCOSE BLD-MCNC: 262 MG/DL (ref 60–115)
GLUCOSE BLD-MCNC: 264 MG/DL (ref 74–109)
GLUCOSE BLD-MCNC: 265 MG/DL (ref 60–115)
GLUCOSE BLD-MCNC: 265 MG/DL (ref 74–109)
GLUCOSE BLD-MCNC: 269 MG/DL (ref 60–115)
GLUCOSE BLD-MCNC: 271 MG/DL (ref 60–115)
GLUCOSE BLD-MCNC: 275 MG/DL (ref 60–115)
GLUCOSE BLD-MCNC: 285 MG/DL (ref 60–115)
GLUCOSE BLD-MCNC: 286 MG/DL (ref 60–115)
GLUCOSE BLD-MCNC: 291 MG/DL (ref 60–115)
GLUCOSE BLD-MCNC: 30 MG/DL (ref 60–115)
GLUCOSE BLD-MCNC: 30 MG/DL (ref 60–115)
GLUCOSE BLD-MCNC: 302 MG/DL (ref 60–115)
GLUCOSE BLD-MCNC: 32 MG/DL (ref 60–115)
GLUCOSE BLD-MCNC: 322 MG/DL (ref 60–115)
GLUCOSE BLD-MCNC: 324 MG/DL (ref 60–115)
GLUCOSE BLD-MCNC: 329 MG/DL (ref 60–115)
GLUCOSE BLD-MCNC: 330 MG/DL (ref 60–115)
GLUCOSE BLD-MCNC: 344 MG/DL (ref 60–115)
GLUCOSE BLD-MCNC: 355 MG/DL (ref 74–109)
GLUCOSE BLD-MCNC: 380 MG/DL (ref 60–115)
GLUCOSE BLD-MCNC: 404 MG/DL (ref 60–115)
GLUCOSE BLD-MCNC: 408 MG/DL (ref 60–115)
GLUCOSE BLD-MCNC: 412 MG/DL (ref 60–115)
GLUCOSE BLD-MCNC: 46 MG/DL (ref 74–109)
GLUCOSE BLD-MCNC: 51 MG/DL (ref 74–109)
GLUCOSE BLD-MCNC: 52 MG/DL (ref 60–115)
GLUCOSE BLD-MCNC: 60 MG/DL
GLUCOSE BLD-MCNC: 60 MG/DL (ref 74–109)
GLUCOSE BLD-MCNC: 61 MG/DL (ref 60–115)
GLUCOSE BLD-MCNC: 62 MG/DL (ref 60–115)
GLUCOSE BLD-MCNC: 62 MG/DL (ref 60–115)
GLUCOSE BLD-MCNC: 62 MG/DL (ref 74–109)
GLUCOSE BLD-MCNC: 63 MG/DL (ref 60–115)
GLUCOSE BLD-MCNC: 65 MG/DL (ref 74–109)
GLUCOSE BLD-MCNC: 65 MG/DL (ref 74–109)
GLUCOSE BLD-MCNC: 70 MG/DL (ref 60–115)
GLUCOSE BLD-MCNC: 73 MG/DL (ref 60–115)
GLUCOSE BLD-MCNC: 74 MG/DL (ref 74–109)
GLUCOSE BLD-MCNC: 75 MG/DL (ref 74–109)
GLUCOSE BLD-MCNC: 76 MG/DL (ref 60–115)
GLUCOSE BLD-MCNC: 77 MG/DL (ref 74–109)
GLUCOSE BLD-MCNC: 78 MG/DL (ref 60–115)
GLUCOSE BLD-MCNC: 78 MG/DL (ref 74–109)
GLUCOSE BLD-MCNC: 79 MG/DL (ref 74–109)
GLUCOSE BLD-MCNC: 80 MG/DL (ref 60–115)
GLUCOSE BLD-MCNC: 80 MG/DL (ref 74–109)
GLUCOSE BLD-MCNC: 81 MG/DL (ref 60–115)
GLUCOSE BLD-MCNC: 82 MG/DL (ref 60–115)
GLUCOSE BLD-MCNC: 82 MG/DL (ref 74–109)
GLUCOSE BLD-MCNC: 83 MG/DL (ref 60–115)
GLUCOSE BLD-MCNC: 84 MG/DL (ref 60–115)
GLUCOSE BLD-MCNC: 85 MG/DL (ref 60–115)
GLUCOSE BLD-MCNC: 86 MG/DL (ref 60–115)
GLUCOSE BLD-MCNC: 87 MG/DL (ref 60–115)
GLUCOSE BLD-MCNC: 88 MG/DL (ref 60–115)
GLUCOSE BLD-MCNC: 88 MG/DL (ref 60–115)
GLUCOSE BLD-MCNC: 89 MG/DL (ref 60–115)
GLUCOSE BLD-MCNC: 89 MG/DL (ref 60–115)
GLUCOSE BLD-MCNC: 89 MG/DL (ref 74–109)
GLUCOSE BLD-MCNC: 89 MG/DL (ref 74–109)
GLUCOSE BLD-MCNC: 91 MG/DL (ref 60–115)
GLUCOSE BLD-MCNC: 92 MG/DL (ref 60–115)
GLUCOSE BLD-MCNC: 93 MG/DL (ref 60–115)
GLUCOSE BLD-MCNC: 94 MG/DL (ref 60–115)
GLUCOSE BLD-MCNC: 94 MG/DL (ref 74–109)
GLUCOSE BLD-MCNC: 95 MG/DL (ref 60–115)
GLUCOSE BLD-MCNC: 95 MG/DL (ref 60–115)
GLUCOSE BLD-MCNC: 95 MG/DL (ref 74–109)
GLUCOSE BLD-MCNC: 96 MG/DL (ref 60–115)
GLUCOSE BLD-MCNC: 96 MG/DL (ref 74–109)
GLUCOSE BLD-MCNC: 96 MG/DL (ref 74–109)
GLUCOSE BLD-MCNC: 97 MG/DL (ref 60–115)
GLUCOSE BLD-MCNC: 97 MG/DL (ref 74–109)
GLUCOSE BLD-MCNC: 98 MG/DL (ref 60–115)
GLUCOSE BLD-MCNC: 98 MG/DL (ref 60–115)
GLUCOSE BLD-MCNC: 98 MG/DL (ref 74–109)
GLUCOSE BLD-MCNC: 99 MG/DL (ref 60–115)
GLUCOSE BLD-MCNC: 99 MG/DL (ref 74–109)
GLUCOSE BLD-MCNC: 99 MG/DL (ref 74–109)
GLUCOSE URINE: 100 MG/DL
GLUCOSE URINE: 100 MG/DL
GLUCOSE URINE: ABNORMAL
GLUCOSE URINE: NEGATIVE MG/DL
GLUCOSE, FLUID: 169.3 MG/DL
GRAM STAIN RESULT: ABNORMAL
GRAM STAIN RESULT: NORMAL
GRAM STAIN RESULT: NORMAL
HAIRY CELLS: 0
HAV IGM SER IA-ACNC: ABNORMAL
HAV IGM SER IA-ACNC: NORMAL
HBA1C MFR BLD: 4.2 % (ref 4.8–5.9)
HBA1C MFR BLD: 4.3 %
HBA1C MFR BLD: 4.6 % (ref 4.8–5.9)
HBA1C MFR BLD: 4.6 % (ref 4.8–5.9)
HBA1C MFR BLD: 4.7 % (ref 4.8–5.9)
HBA1C MFR BLD: 5.3 % (ref 4.8–5.9)
HBA1C MFR BLD: 5.5 % (ref 4.8–5.9)
HBA1C MFR BLD: 5.6 % (ref 4.8–5.9)
HBA1C MFR BLD: 5.7 %
HBV SURFACE AB TITR SER: NORMAL MIU/ML
HBV SURFACE AB TITR SER: NORMAL MIU/ML
HCO3 ARTERIAL: 20.7 MMOL/L (ref 21–29)
HCO3 ARTERIAL: 22.1 MMOL/L (ref 21–29)
HCO3 ARTERIAL: 27 MMOL/L (ref 21–29)
HCO3 ARTERIAL: 28 MMOL/L (ref 21–29)
HCO3 ARTERIAL: 28 MMOL/L (ref 21–29)
HCO3 ARTERIAL: 28.7 MMOL/L (ref 21–29)
HCO3 ARTERIAL: 32.3 MMOL/L (ref 21–29)
HCO3 VENOUS: 29.9 MMOL/L (ref 23–29)
HCT VFR BLD CALC: 18.2 % (ref 37–47)
HCT VFR BLD CALC: 19.3 % (ref 37–47)
HCT VFR BLD CALC: 20.1 % (ref 37–47)
HCT VFR BLD CALC: 20.6 % (ref 37–47)
HCT VFR BLD CALC: 21.5 % (ref 37–47)
HCT VFR BLD CALC: 22.2 % (ref 37–47)
HCT VFR BLD CALC: 22.4 % (ref 37–47)
HCT VFR BLD CALC: 22.6 % (ref 37–47)
HCT VFR BLD CALC: 22.8 % (ref 37–47)
HCT VFR BLD CALC: 22.9 % (ref 37–47)
HCT VFR BLD CALC: 23 % (ref 37–47)
HCT VFR BLD CALC: 23.3 % (ref 37–47)
HCT VFR BLD CALC: 23.4 % (ref 37–47)
HCT VFR BLD CALC: 23.6 % (ref 36–46)
HCT VFR BLD CALC: 23.7 % (ref 36–46)
HCT VFR BLD CALC: 23.7 % (ref 37–47)
HCT VFR BLD CALC: 24.3 % (ref 37–47)
HCT VFR BLD CALC: 24.7 % (ref 37–47)
HCT VFR BLD CALC: 25 % (ref 37–47)
HCT VFR BLD CALC: 25.1 % (ref 37–47)
HCT VFR BLD CALC: 25.2 % (ref 37–47)
HCT VFR BLD CALC: 25.5 % (ref 37–47)
HCT VFR BLD CALC: 25.6 % (ref 37–47)
HCT VFR BLD CALC: 25.6 % (ref 37–47)
HCT VFR BLD CALC: 25.7 % (ref 37–47)
HCT VFR BLD CALC: 25.9 % (ref 37–47)
HCT VFR BLD CALC: 26.2 % (ref 37–47)
HCT VFR BLD CALC: 26.4 % (ref 37–47)
HCT VFR BLD CALC: 26.7 % (ref 37–47)
HCT VFR BLD CALC: 26.8 % (ref 37–47)
HCT VFR BLD CALC: 27 % (ref 37–47)
HCT VFR BLD CALC: 27 % (ref 37–47)
HCT VFR BLD CALC: 27.1 % (ref 37–47)
HCT VFR BLD CALC: 27.4 % (ref 37–47)
HCT VFR BLD CALC: 27.9 % (ref 37–47)
HCT VFR BLD CALC: 28 % (ref 37–47)
HCT VFR BLD CALC: 28.2 % (ref 37–47)
HCT VFR BLD CALC: 28.3 % (ref 37–47)
HCT VFR BLD CALC: 28.9 % (ref 37–47)
HCT VFR BLD CALC: 28.9 % (ref 37–47)
HCT VFR BLD CALC: 29.2 % (ref 37–47)
HCT VFR BLD CALC: 29.3 % (ref 37–47)
HCT VFR BLD CALC: 29.4 % (ref 37–47)
HCT VFR BLD CALC: 29.5 % (ref 37–47)
HCT VFR BLD CALC: 29.5 % (ref 37–47)
HCT VFR BLD CALC: 29.6 % (ref 37–47)
HCT VFR BLD CALC: 29.7 % (ref 37–47)
HCT VFR BLD CALC: 29.8 % (ref 37–47)
HCT VFR BLD CALC: 30 % (ref 37–47)
HCT VFR BLD CALC: 30.1 % (ref 37–47)
HCT VFR BLD CALC: 30.1 % (ref 37–47)
HCT VFR BLD CALC: 30.5 % (ref 37–47)
HCT VFR BLD CALC: 30.7 % (ref 37–47)
HCT VFR BLD CALC: 30.7 % (ref 37–47)
HCT VFR BLD CALC: 30.8 % (ref 37–47)
HCT VFR BLD CALC: 30.8 % (ref 37–47)
HCT VFR BLD CALC: 30.9 % (ref 37–47)
HCT VFR BLD CALC: 30.9 % (ref 37–47)
HCT VFR BLD CALC: 31 % (ref 37–47)
HCT VFR BLD CALC: 31.2 % (ref 37–47)
HCT VFR BLD CALC: 31.3 % (ref 37–47)
HCT VFR BLD CALC: 31.4 % (ref 37–47)
HCT VFR BLD CALC: 31.5 % (ref 37–47)
HCT VFR BLD CALC: 31.8 % (ref 37–47)
HCT VFR BLD CALC: 31.9 % (ref 37–47)
HCT VFR BLD CALC: 31.9 % (ref 37–47)
HCT VFR BLD CALC: 32.2 % (ref 37–47)
HCT VFR BLD CALC: 32.2 % (ref 37–47)
HCT VFR BLD CALC: 32.3 % (ref 37–47)
HCT VFR BLD CALC: 32.4 % (ref 37–47)
HCT VFR BLD CALC: 32.5 % (ref 36–46)
HCT VFR BLD CALC: 32.5 % (ref 37–47)
HCT VFR BLD CALC: 32.7 % (ref 37–47)
HCT VFR BLD CALC: 32.9 % (ref 37–47)
HCT VFR BLD CALC: 33.1 % (ref 37–47)
HCT VFR BLD CALC: 33.5 % (ref 37–47)
HCT VFR BLD CALC: 34.2 % (ref 37–47)
HCT VFR BLD CALC: 34.3 % (ref 37–47)
HCT VFR BLD CALC: 34.5 % (ref 37–47)
HCT VFR BLD CALC: 34.6 % (ref 37–47)
HCT VFR BLD CALC: 34.7 % (ref 37–47)
HCT VFR BLD CALC: 35.1 % (ref 37–47)
HCT VFR BLD CALC: 35.5 % (ref 37–47)
HCT VFR BLD CALC: 36.2 % (ref 37–47)
HCT VFR BLD CALC: 36.7 % (ref 37–47)
HCT VFR BLD CALC: 37.7 % (ref 37–47)
HDLC SERPL-MCNC: 17 MG/DL (ref 40–59)
HDLC SERPL-MCNC: 25 MG/DL (ref 35–70)
HEMOGLOBIN: 10 G/DL (ref 12–16)
HEMOGLOBIN: 10.1 G/DL (ref 12–16)
HEMOGLOBIN: 10.2 G/DL (ref 12–16)
HEMOGLOBIN: 10.3 G/DL (ref 12–16)
HEMOGLOBIN: 10.3 G/DL (ref 12–16)
HEMOGLOBIN: 10.4 G/DL (ref 12–16)
HEMOGLOBIN: 10.5 G/DL (ref 12–16)
HEMOGLOBIN: 10.5 GM/DL (ref 12–16)
HEMOGLOBIN: 10.6 G/DL (ref 12–16)
HEMOGLOBIN: 10.7 G/DL (ref 12–16)
HEMOGLOBIN: 10.7 G/DL (ref 12–16)
HEMOGLOBIN: 10.8 G/DL (ref 12–16)
HEMOGLOBIN: 10.9 G/DL (ref 12–16)
HEMOGLOBIN: 11 G/DL (ref 12–16)
HEMOGLOBIN: 11.1 G/DL (ref 12–16)
HEMOGLOBIN: 11.1 G/DL (ref 12–16)
HEMOGLOBIN: 11.2 G/DL (ref 12–16)
HEMOGLOBIN: 11.3 G/DL (ref 12–16)
HEMOGLOBIN: 11.4 G/DL (ref 12–16)
HEMOGLOBIN: 11.8 G/DL (ref 12–16)
HEMOGLOBIN: 12.2 GM/DL (ref 12–16)
HEMOGLOBIN: 12.3 G/DL (ref 12–16)
HEMOGLOBIN: 12.3 G/DL (ref 12–16)
HEMOGLOBIN: 6.1 G/DL (ref 12–16)
HEMOGLOBIN: 6.5 G/DL (ref 12–16)
HEMOGLOBIN: 7 G/DL (ref 12–16)
HEMOGLOBIN: 7.1 G/DL (ref 12–16)
HEMOGLOBIN: 7.2 G/DL (ref 12–16)
HEMOGLOBIN: 7.4 G/DL (ref 12–16)
HEMOGLOBIN: 7.5 G/DL (ref 12–16)
HEMOGLOBIN: 7.6 G/DL (ref 12–16)
HEMOGLOBIN: 7.7 G/DL (ref 12–16)
HEMOGLOBIN: 7.8 G/DL (ref 12–16)
HEMOGLOBIN: 7.9 G/DL (ref 12–16)
HEMOGLOBIN: 7.9 G/DL (ref 12–16)
HEMOGLOBIN: 8 G/DL (ref 12–16)
HEMOGLOBIN: 8.1 G/DL (ref 12–16)
HEMOGLOBIN: 8.2 G/DL (ref 12–16)
HEMOGLOBIN: 8.3 GM/DL (ref 12–16)
HEMOGLOBIN: 8.4 G/DL (ref 12–16)
HEMOGLOBIN: 8.4 G/DL (ref 12–16)
HEMOGLOBIN: 8.5 G/DL (ref 12–16)
HEMOGLOBIN: 8.6 G/DL (ref 12–16)
HEMOGLOBIN: 8.7 G/DL (ref 12–16)
HEMOGLOBIN: 8.7 G/DL (ref 12–16)
HEMOGLOBIN: 8.9 G/DL (ref 12–16)
HEMOGLOBIN: 8.9 G/DL (ref 12–16)
HEMOGLOBIN: 9 G/DL (ref 12–16)
HEMOGLOBIN: 9.3 G/DL (ref 12–16)
HEMOGLOBIN: 9.3 G/DL (ref 12–16)
HEMOGLOBIN: 9.4 G/DL (ref 12–16)
HEMOGLOBIN: 9.4 G/DL (ref 12–16)
HEMOGLOBIN: 9.5 G/DL (ref 12–16)
HEMOGLOBIN: 9.6 G/DL (ref 12–16)
HEMOGLOBIN: 9.6 G/DL (ref 12–16)
HEMOGLOBIN: 9.7 G/DL (ref 12–16)
HEMOGLOBIN: 9.8 G/DL (ref 12–16)
HEMOGLOBIN: 9.9 G/DL (ref 12–16)
HEMOGLOBIN: 9.9 GM/DL (ref 12–16)
HEPATITIS B CORE IGM ANTIBODY: ABNORMAL
HEPATITIS B CORE IGM ANTIBODY: NORMAL
HEPATITIS B SURFACE ANTIGEN CONFIRMATION: NORMAL
HEPATITIS B SURFACE ANTIGEN INTERPRETATION: NORMAL
HEPATITIS B SURFACE ANTIGEN INTERPRETATION: NORMAL
HEPATITIS B SURFACE ANTIGEN INTERPRETATION: REACTIVE
HEPATITIS BE ANTIGEN: NEGATIVE
HEPATITIS C ANTIBODY INTERPRETATION: ABNORMAL
HEPATITIS C ANTIBODY INTERPRETATION: NORMAL
HEPATITIS INTERPRETATION:: ABNORMAL
HEPATITIS INTERPRETATION:: NORMAL
HOWELL-JOLLY BODIES: 0
HYPERSEGMENTED NEUTROPHILS: 0
HYPOCHROMIA: 0
HYPOCHROMIA: ABNORMAL
INR BLD: 1.1
INR BLD: 1.2
INR BLD: 1.3
INR BLD: 1.4
INR BLD: 1.5
INR BLD: 1.6
INR BLD: 1.6
INR BLD: 1.7
INR BLD: 2
INR BLD: 2.3
INR BLD: 2.3
INR BLD: 2.4
INR BLD: 2.5
INR BLD: 2.6
INR BLD: 2.7
INR BLD: 2.9
INR BLD: 3
INR BLD: 3.1
INR BLD: 3.1
INR BLD: 3.2
INR BLD: 4.8
INR BLD: 5.5
IRON SATURATION: 16 % (ref 11–46)
IRON: 19 UG/DL (ref 37–145)
KETONES, URINE: ABNORMAL MG/DL
KETONES, URINE: NEGATIVE
KETONES, URINE: NEGATIVE MG/DL
LACTATE: 0.41 MMOL/L (ref 0.4–2)
LACTATE: 0.42 MMOL/L (ref 0.4–2)
LACTATE: 0.95 MMOL/L (ref 0.4–2)
LACTATE: 1 MMOL/L (ref 0.4–2)
LACTATE: 1.02 MMOL/L (ref 0.4–2)
LACTATE: 1.37 MMOL/L (ref 0.4–2)
LACTATE: 1.98 MMOL/L (ref 0.4–2)
LACTIC ACID, SEPSIS: 1.1 MMOL/L (ref 0.5–1.9)
LACTIC ACID, SEPSIS: 1.1 MMOL/L (ref 0.5–1.9)
LACTIC ACID: 0.5 MMOL/L (ref 0.5–2.2)
LACTIC ACID: 0.6 MMOL/L (ref 0.5–2.2)
LACTIC ACID: 0.6 MMOL/L (ref 0.5–2.2)
LACTIC ACID: 0.7 MMOL/L (ref 0.5–2.2)
LACTIC ACID: 0.8 MMOL/L (ref 0.5–2.2)
LACTIC ACID: 0.9 MMOL/L (ref 0.5–2.2)
LACTIC ACID: 1 MMOL/L (ref 0.5–2.2)
LACTIC ACID: 1.1 MMOL/L (ref 0.5–2.2)
LACTIC ACID: 1.4 MMOL/L (ref 0.5–2.2)
LACTIC ACID: 1.5 MMOL/L (ref 0.5–2.2)
LACTIC ACID: 2.9 MMOL/L (ref 0.5–2.2)
LD, FLUID: 182 U/L
LDL CHOLESTEROL CALCULATED: 42 MG/DL (ref 0–129)
LDL CHOLESTEROL CALCULATED: 55 MG/DL (ref 0–160)
LEUKOCYTE ESTERASE, URINE: ABNORMAL
LV EF: 60 %
LVEF MODALITY: NORMAL
LYMPHOCYTES ABSOLUTE: 0.3 K/UL (ref 1–4.8)
LYMPHOCYTES ABSOLUTE: 0.4 K/UL (ref 1–4.8)
LYMPHOCYTES ABSOLUTE: 0.5 K/UL (ref 1–4.8)
LYMPHOCYTES ABSOLUTE: 0.5 K/UL (ref 1–4.8)
LYMPHOCYTES ABSOLUTE: 0.6 K/UL (ref 1–4.8)
LYMPHOCYTES ABSOLUTE: 0.7 K/UL (ref 1–4.8)
LYMPHOCYTES ABSOLUTE: 0.8 /ΜL
LYMPHOCYTES ABSOLUTE: 0.8 K/UL (ref 1–4.8)
LYMPHOCYTES ABSOLUTE: 0.9 K/UL (ref 1–4.8)
LYMPHOCYTES ABSOLUTE: 1 K/UL (ref 1–4.8)
LYMPHOCYTES ABSOLUTE: 1 K/UL (ref 1–4.8)
LYMPHOCYTES ABSOLUTE: 1.1 K/UL (ref 1–4.8)
LYMPHOCYTES ABSOLUTE: 1.2 K/UL (ref 1–4.8)
LYMPHOCYTES ABSOLUTE: 1.3 K/UL (ref 1–4.8)
LYMPHOCYTES ABSOLUTE: 1.4 K/UL (ref 1–4.8)
LYMPHOCYTES ABSOLUTE: 1.4 K/UL (ref 1–4.8)
LYMPHOCYTES ABSOLUTE: 1.5 K/UL (ref 1–4.8)
LYMPHOCYTES ABSOLUTE: 1.7 K/UL (ref 1–4.8)
LYMPHOCYTES ABSOLUTE: ABNORMAL /ΜL
LYMPHOCYTES ABSOLUTE: ABNORMAL /ΜL
LYMPHOCYTES RELATIVE PERCENT: 1 %
LYMPHOCYTES RELATIVE PERCENT: 10.2 %
LYMPHOCYTES RELATIVE PERCENT: 10.5 %
LYMPHOCYTES RELATIVE PERCENT: 10.6 %
LYMPHOCYTES RELATIVE PERCENT: 10.9 %
LYMPHOCYTES RELATIVE PERCENT: 11 %
LYMPHOCYTES RELATIVE PERCENT: 11.5 %
LYMPHOCYTES RELATIVE PERCENT: 11.6 %
LYMPHOCYTES RELATIVE PERCENT: 11.8 %
LYMPHOCYTES RELATIVE PERCENT: 12 %
LYMPHOCYTES RELATIVE PERCENT: 13.6 %
LYMPHOCYTES RELATIVE PERCENT: 14.1 %
LYMPHOCYTES RELATIVE PERCENT: 14.4 %
LYMPHOCYTES RELATIVE PERCENT: 14.7 %
LYMPHOCYTES RELATIVE PERCENT: 15.2 %
LYMPHOCYTES RELATIVE PERCENT: 15.6 %
LYMPHOCYTES RELATIVE PERCENT: 16.5 %
LYMPHOCYTES RELATIVE PERCENT: 17.1 %
LYMPHOCYTES RELATIVE PERCENT: 18 %
LYMPHOCYTES RELATIVE PERCENT: 2 %
LYMPHOCYTES RELATIVE PERCENT: 2.4 %
LYMPHOCYTES RELATIVE PERCENT: 3 %
LYMPHOCYTES RELATIVE PERCENT: 3.4 %
LYMPHOCYTES RELATIVE PERCENT: 3.9 %
LYMPHOCYTES RELATIVE PERCENT: 4 %
LYMPHOCYTES RELATIVE PERCENT: 4.5 %
LYMPHOCYTES RELATIVE PERCENT: 5.6 %
LYMPHOCYTES RELATIVE PERCENT: 5.7 %
LYMPHOCYTES RELATIVE PERCENT: 6.4 %
LYMPHOCYTES RELATIVE PERCENT: 7 %
LYMPHOCYTES RELATIVE PERCENT: 7 %
LYMPHOCYTES RELATIVE PERCENT: 7.6 %
LYMPHOCYTES RELATIVE PERCENT: 8.4 %
LYMPHOCYTES RELATIVE PERCENT: 8.6 %
LYMPHOCYTES RELATIVE PERCENT: 8.7 %
LYMPHOCYTES RELATIVE PERCENT: 8.8 %
LYMPHOCYTES RELATIVE PERCENT: 8.9 %
LYMPHOCYTES RELATIVE PERCENT: 9.4 %
LYMPHOCYTES RELATIVE PERCENT: 9.4 %
LYMPHOCYTES RELATIVE PERCENT: 9.8 %
LYMPHOCYTES RELATIVE PERCENT: ABNORMAL %
LYMPHOCYTES RELATIVE PERCENT: ABNORMAL %
LYMPHOCYTES, BODY FLUID: 10 %
MACROCYTES: 0
MACROCYTES: 0
MACROCYTES: ABNORMAL
MAGNESIUM: 1.7 MG/DL (ref 1.7–2.3)
MAGNESIUM: 2 MG/DL (ref 1.7–2.3)
MAGNESIUM: 2.2 MG/DL (ref 1.7–2.3)
MAGNESIUM: 2.3 MG/DL (ref 1.7–2.3)
MAGNESIUM: 2.4 MG/DL (ref 1.7–2.3)
MAGNESIUM: 2.4 MG/DL (ref 1.7–2.3)
MAGNESIUM: 2.5 MG/DL (ref 1.7–2.3)
MAGNESIUM: 2.5 MG/DL (ref 1.7–2.3)
MAGNESIUM: 2.7 MG/DL (ref 1.7–2.3)
MAGNESIUM: 3.1 MG/DL (ref 1.7–2.3)
MCH RBC QN AUTO: 25 PG (ref 27–31.3)
MCH RBC QN AUTO: 28.5 PG (ref 27–31.3)
MCH RBC QN AUTO: 29.4 PG (ref 27–31.3)
MCH RBC QN AUTO: 29.8 PG
MCH RBC QN AUTO: 30 PG (ref 27–31.3)
MCH RBC QN AUTO: 30.1 PG (ref 27–31.3)
MCH RBC QN AUTO: 30.2 PG (ref 27–31.3)
MCH RBC QN AUTO: 30.3 PG (ref 27–31.3)
MCH RBC QN AUTO: 30.4 PG (ref 27–31.3)
MCH RBC QN AUTO: 30.5 PG
MCH RBC QN AUTO: 30.5 PG (ref 27–31.3)
MCH RBC QN AUTO: 30.6 PG (ref 27–31.3)
MCH RBC QN AUTO: 30.7 PG (ref 27–31.3)
MCH RBC QN AUTO: 30.8 PG
MCH RBC QN AUTO: 30.8 PG (ref 27–31.3)
MCH RBC QN AUTO: 30.8 PG (ref 27–31.3)
MCH RBC QN AUTO: 30.9 PG (ref 27–31.3)
MCH RBC QN AUTO: 31 PG (ref 27–31.3)
MCH RBC QN AUTO: 31 PG (ref 27–31.3)
MCH RBC QN AUTO: 31.1 PG (ref 27–31.3)
MCH RBC QN AUTO: 31.2 PG (ref 27–31.3)
MCH RBC QN AUTO: 31.2 PG (ref 27–31.3)
MCH RBC QN AUTO: 31.3 PG (ref 27–31.3)
MCH RBC QN AUTO: 31.4 PG (ref 27–31.3)
MCH RBC QN AUTO: 31.6 PG (ref 27–31.3)
MCH RBC QN AUTO: 31.7 PG (ref 27–31.3)
MCH RBC QN AUTO: 31.9 PG (ref 27–31.3)
MCH RBC QN AUTO: 32 PG (ref 27–31.3)
MCH RBC QN AUTO: 32.3 PG (ref 27–31.3)
MCH RBC QN AUTO: 32.3 PG (ref 27–31.3)
MCH RBC QN AUTO: 34 PG (ref 27–31.3)
MCH RBC QN AUTO: 34.2 PG (ref 27–31.3)
MCH RBC QN AUTO: 34.4 PG (ref 27–31.3)
MCH RBC QN AUTO: 34.5 PG (ref 27–31.3)
MCH RBC QN AUTO: 34.6 PG (ref 27–31.3)
MCH RBC QN AUTO: 34.7 PG (ref 27–31.3)
MCH RBC QN AUTO: 34.8 PG (ref 27–31.3)
MCH RBC QN AUTO: 35 PG (ref 27–31.3)
MCH RBC QN AUTO: 35.3 PG (ref 27–31.3)
MCH RBC QN AUTO: 35.6 PG (ref 27–31.3)
MCHC RBC AUTO-ENTMCNC: 26.2 % (ref 33–37)
MCHC RBC AUTO-ENTMCNC: 29.5 % (ref 33–37)
MCHC RBC AUTO-ENTMCNC: 31.2 % (ref 33–37)
MCHC RBC AUTO-ENTMCNC: 31.4 G/DL
MCHC RBC AUTO-ENTMCNC: 31.4 G/DL
MCHC RBC AUTO-ENTMCNC: 31.5 % (ref 33–37)
MCHC RBC AUTO-ENTMCNC: 31.8 % (ref 33–37)
MCHC RBC AUTO-ENTMCNC: 31.9 % (ref 33–37)
MCHC RBC AUTO-ENTMCNC: 32 % (ref 33–37)
MCHC RBC AUTO-ENTMCNC: 32.1 % (ref 33–37)
MCHC RBC AUTO-ENTMCNC: 32.2 % (ref 33–37)
MCHC RBC AUTO-ENTMCNC: 32.2 G/DL
MCHC RBC AUTO-ENTMCNC: 32.3 % (ref 33–37)
MCHC RBC AUTO-ENTMCNC: 32.4 % (ref 33–37)
MCHC RBC AUTO-ENTMCNC: 32.5 % (ref 33–37)
MCHC RBC AUTO-ENTMCNC: 32.6 % (ref 33–37)
MCHC RBC AUTO-ENTMCNC: 32.6 % (ref 33–37)
MCHC RBC AUTO-ENTMCNC: 32.7 % (ref 33–37)
MCHC RBC AUTO-ENTMCNC: 32.8 % (ref 33–37)
MCHC RBC AUTO-ENTMCNC: 32.9 % (ref 33–37)
MCHC RBC AUTO-ENTMCNC: 33.1 % (ref 33–37)
MCHC RBC AUTO-ENTMCNC: 33.3 % (ref 33–37)
MCHC RBC AUTO-ENTMCNC: 33.4 % (ref 33–37)
MCHC RBC AUTO-ENTMCNC: 33.4 % (ref 33–37)
MCHC RBC AUTO-ENTMCNC: 33.5 % (ref 33–37)
MCHC RBC AUTO-ENTMCNC: 33.6 % (ref 33–37)
MCHC RBC AUTO-ENTMCNC: 33.7 % (ref 33–37)
MCHC RBC AUTO-ENTMCNC: 33.8 % (ref 33–37)
MCHC RBC AUTO-ENTMCNC: 33.8 % (ref 33–37)
MCHC RBC AUTO-ENTMCNC: 33.9 % (ref 33–37)
MCHC RBC AUTO-ENTMCNC: 34 % (ref 33–37)
MCHC RBC AUTO-ENTMCNC: 34.1 % (ref 33–37)
MCHC RBC AUTO-ENTMCNC: 34.2 % (ref 33–37)
MCHC RBC AUTO-ENTMCNC: 34.2 % (ref 33–37)
MCHC RBC AUTO-ENTMCNC: 34.3 % (ref 33–37)
MCHC RBC AUTO-ENTMCNC: 34.4 % (ref 33–37)
MCHC RBC AUTO-ENTMCNC: 34.4 % (ref 33–37)
MCHC RBC AUTO-ENTMCNC: 34.6 % (ref 33–37)
MCHC RBC AUTO-ENTMCNC: 34.8 % (ref 33–37)
MCHC RBC AUTO-ENTMCNC: 34.8 % (ref 33–37)
MCHC RBC AUTO-ENTMCNC: 34.9 % (ref 33–37)
MCHC RBC AUTO-ENTMCNC: 35.4 % (ref 33–37)
MCHC RBC AUTO-ENTMCNC: 35.4 % (ref 33–37)
MCV RBC AUTO: 101.1 FL (ref 82–100)
MCV RBC AUTO: 101.9 FL (ref 82–100)
MCV RBC AUTO: 102.1 FL (ref 82–100)
MCV RBC AUTO: 102.2 FL (ref 82–100)
MCV RBC AUTO: 103.4 FL (ref 82–100)
MCV RBC AUTO: 103.7 FL (ref 82–100)
MCV RBC AUTO: 103.9 FL (ref 82–100)
MCV RBC AUTO: 104.1 FL (ref 82–100)
MCV RBC AUTO: 105.8 FL (ref 82–100)
MCV RBC AUTO: 107.4 FL (ref 82–100)
MCV RBC AUTO: 86.3 FL (ref 82–100)
MCV RBC AUTO: 87 FL (ref 82–100)
MCV RBC AUTO: 87.5 FL (ref 82–100)
MCV RBC AUTO: 87.8 FL (ref 82–100)
MCV RBC AUTO: 88.1 FL (ref 82–100)
MCV RBC AUTO: 88.4 FL (ref 82–100)
MCV RBC AUTO: 88.4 FL (ref 82–100)
MCV RBC AUTO: 88.5 FL (ref 82–100)
MCV RBC AUTO: 88.6 FL (ref 82–100)
MCV RBC AUTO: 88.7 FL (ref 82–100)
MCV RBC AUTO: 88.7 FL (ref 82–100)
MCV RBC AUTO: 88.8 FL (ref 82–100)
MCV RBC AUTO: 88.9 FL (ref 82–100)
MCV RBC AUTO: 89.1 FL (ref 82–100)
MCV RBC AUTO: 89.6 FL (ref 82–100)
MCV RBC AUTO: 89.7 FL (ref 82–100)
MCV RBC AUTO: 90.2 FL (ref 82–100)
MCV RBC AUTO: 90.2 FL (ref 82–100)
MCV RBC AUTO: 90.4 FL (ref 82–100)
MCV RBC AUTO: 90.8 FL (ref 82–100)
MCV RBC AUTO: 91.4 FL (ref 82–100)
MCV RBC AUTO: 91.5 FL (ref 82–100)
MCV RBC AUTO: 91.5 FL (ref 82–100)
MCV RBC AUTO: 91.8 FL (ref 82–100)
MCV RBC AUTO: 92.2 FL (ref 82–100)
MCV RBC AUTO: 92.2 FL (ref 82–100)
MCV RBC AUTO: 92.3 FL (ref 82–100)
MCV RBC AUTO: 92.3 FL (ref 82–100)
MCV RBC AUTO: 92.4 FL (ref 82–100)
MCV RBC AUTO: 92.5 FL (ref 82–100)
MCV RBC AUTO: 92.6 FL (ref 82–100)
MCV RBC AUTO: 92.7 FL (ref 82–100)
MCV RBC AUTO: 92.8 FL (ref 82–100)
MCV RBC AUTO: 93.6 FL (ref 82–100)
MCV RBC AUTO: 93.9 FL (ref 82–100)
MCV RBC AUTO: 94 FL (ref 82–100)
MCV RBC AUTO: 94.2 FL (ref 82–100)
MCV RBC AUTO: 94.3 FL (ref 82–100)
MCV RBC AUTO: 94.4 FL (ref 82–100)
MCV RBC AUTO: 94.6 FL (ref 82–100)
MCV RBC AUTO: 94.6 FL (ref 82–100)
MCV RBC AUTO: 94.7 FL (ref 82–100)
MCV RBC AUTO: 94.7 FL (ref 82–100)
MCV RBC AUTO: 94.8 FL (ref 82–100)
MCV RBC AUTO: 95 FL
MCV RBC AUTO: 95.2 FL (ref 82–100)
MCV RBC AUTO: 95.4 FL (ref 82–100)
MCV RBC AUTO: 95.5 FL (ref 82–100)
MCV RBC AUTO: 95.6 FL
MCV RBC AUTO: 95.6 FL (ref 82–100)
MCV RBC AUTO: 95.8 FL (ref 82–100)
MCV RBC AUTO: 95.9 FL (ref 82–100)
MCV RBC AUTO: 96 FL (ref 82–100)
MCV RBC AUTO: 96.4 FL (ref 82–100)
MCV RBC AUTO: 96.5 FL (ref 82–100)
MCV RBC AUTO: 96.6 FL (ref 82–100)
MCV RBC AUTO: 96.6 FL (ref 82–100)
MCV RBC AUTO: 96.8 FL (ref 82–100)
MCV RBC AUTO: 97 FL
MCV RBC AUTO: 97 FL (ref 82–100)
MCV RBC AUTO: 97.2 FL (ref 82–100)
MCV RBC AUTO: 97.3 FL (ref 82–100)
MCV RBC AUTO: 97.3 FL (ref 82–100)
MCV RBC AUTO: 97.4 FL (ref 82–100)
MCV RBC AUTO: 97.4 FL (ref 82–100)
MCV RBC AUTO: 97.5 FL (ref 82–100)
MCV RBC AUTO: 97.7 FL (ref 82–100)
METAMYELOCYTES RELATIVE PERCENT: 1 %
METAMYELOCYTES RELATIVE PERCENT: 1 %
MICROCYTES: 0
MICROCYTES: ABNORMAL
MONOCYTE, FLUID: 13 %
MONOCYTES ABSOLUTE: 0.2 K/UL (ref 0.2–0.8)
MONOCYTES ABSOLUTE: 0.3 K/UL (ref 0.2–0.8)
MONOCYTES ABSOLUTE: 0.6 K/UL (ref 0.2–0.8)
MONOCYTES ABSOLUTE: 0.7 K/UL (ref 0.2–0.8)
MONOCYTES ABSOLUTE: 0.8 K/UL (ref 0.2–0.8)
MONOCYTES ABSOLUTE: 0.9 /ΜL
MONOCYTES ABSOLUTE: 0.9 K/UL (ref 0.2–0.8)
MONOCYTES ABSOLUTE: 0.9 K/UL (ref 0.2–0.8)
MONOCYTES ABSOLUTE: 1 K/UL (ref 0.2–0.8)
MONOCYTES ABSOLUTE: 1.1 K/UL (ref 0.2–0.8)
MONOCYTES ABSOLUTE: 1.1 K/UL (ref 0.2–0.8)
MONOCYTES ABSOLUTE: 1.2 K/UL (ref 0.2–0.8)
MONOCYTES ABSOLUTE: 1.3 K/UL (ref 0.2–0.8)
MONOCYTES ABSOLUTE: 1.4 K/UL (ref 0.2–0.8)
MONOCYTES ABSOLUTE: 1.5 K/UL (ref 0.2–0.8)
MONOCYTES ABSOLUTE: 1.6 K/UL (ref 0.2–0.8)
MONOCYTES ABSOLUTE: 1.7 K/UL (ref 0.2–0.8)
MONOCYTES ABSOLUTE: 1.7 K/UL (ref 0.2–0.8)
MONOCYTES ABSOLUTE: 2.3 K/UL (ref 0.2–0.8)
MONOCYTES ABSOLUTE: 2.3 K/UL (ref 0.2–0.8)
MONOCYTES ABSOLUTE: ABNORMAL /ΜL
MONOCYTES ABSOLUTE: ABNORMAL /ΜL
MONOCYTES RELATIVE PERCENT: 1 %
MONOCYTES RELATIVE PERCENT: 10.2 %
MONOCYTES RELATIVE PERCENT: 10.4 %
MONOCYTES RELATIVE PERCENT: 10.8 %
MONOCYTES RELATIVE PERCENT: 10.9 %
MONOCYTES RELATIVE PERCENT: 11 %
MONOCYTES RELATIVE PERCENT: 11 %
MONOCYTES RELATIVE PERCENT: 11.2 %
MONOCYTES RELATIVE PERCENT: 11.4 %
MONOCYTES RELATIVE PERCENT: 11.5 %
MONOCYTES RELATIVE PERCENT: 12.1 %
MONOCYTES RELATIVE PERCENT: 12.5 %
MONOCYTES RELATIVE PERCENT: 12.6 %
MONOCYTES RELATIVE PERCENT: 12.6 %
MONOCYTES RELATIVE PERCENT: 12.8 %
MONOCYTES RELATIVE PERCENT: 13 %
MONOCYTES RELATIVE PERCENT: 13.1 %
MONOCYTES RELATIVE PERCENT: 13.2 %
MONOCYTES RELATIVE PERCENT: 13.3 %
MONOCYTES RELATIVE PERCENT: 13.3 %
MONOCYTES RELATIVE PERCENT: 13.4 %
MONOCYTES RELATIVE PERCENT: 13.4 %
MONOCYTES RELATIVE PERCENT: 13.5 %
MONOCYTES RELATIVE PERCENT: 13.6 %
MONOCYTES RELATIVE PERCENT: 13.7 %
MONOCYTES RELATIVE PERCENT: 13.9 %
MONOCYTES RELATIVE PERCENT: 14.2 %
MONOCYTES RELATIVE PERCENT: 15.1 %
MONOCYTES RELATIVE PERCENT: 15.6 %
MONOCYTES RELATIVE PERCENT: 2.9 %
MONOCYTES RELATIVE PERCENT: 2.9 %
MONOCYTES RELATIVE PERCENT: 3.7 %
MONOCYTES RELATIVE PERCENT: 4.8 %
MONOCYTES RELATIVE PERCENT: 6.7 %
MONOCYTES RELATIVE PERCENT: 7.2 %
MONOCYTES RELATIVE PERCENT: 7.8 %
MONOCYTES RELATIVE PERCENT: 7.9 %
MONOCYTES RELATIVE PERCENT: 9 %
MONOCYTES RELATIVE PERCENT: 9.3 %
MONOCYTES RELATIVE PERCENT: 9.5 %
MONOCYTES RELATIVE PERCENT: 9.5 %
MONOCYTES RELATIVE PERCENT: 9.7 %
MONOCYTES RELATIVE PERCENT: ABNORMAL %
MONOCYTES RELATIVE PERCENT: ABNORMAL %
MUCUS: PRESENT
MUCUS: PRESENT
MYELOCYTE PERCENT: 1 %
NEUTROPHIL, FLUID: 77 %
NEUTROPHILS ABSOLUTE: 10.2 K/UL (ref 1.4–6.5)
NEUTROPHILS ABSOLUTE: 10.2 K/UL (ref 1.4–6.5)
NEUTROPHILS ABSOLUTE: 10.7 K/UL (ref 1.4–6.5)
NEUTROPHILS ABSOLUTE: 12.3 K/UL (ref 1.4–6.5)
NEUTROPHILS ABSOLUTE: 13.1 K/UL (ref 1.4–6.5)
NEUTROPHILS ABSOLUTE: 13.2 K/UL (ref 1.4–6.5)
NEUTROPHILS ABSOLUTE: 13.6 K/UL (ref 1.4–6.5)
NEUTROPHILS ABSOLUTE: 13.8 K/UL (ref 1.4–6.5)
NEUTROPHILS ABSOLUTE: 14.2 K/UL (ref 1.4–6.5)
NEUTROPHILS ABSOLUTE: 15.5 K/UL (ref 1.4–6.5)
NEUTROPHILS ABSOLUTE: 27.9 K/UL (ref 1.4–6.5)
NEUTROPHILS ABSOLUTE: 3.3 K/UL (ref 1.4–6.5)
NEUTROPHILS ABSOLUTE: 3.7 K/UL (ref 1.4–6.5)
NEUTROPHILS ABSOLUTE: 32.4 K/UL (ref 1.4–6.5)
NEUTROPHILS ABSOLUTE: 35.2 K/UL (ref 1.4–6.5)
NEUTROPHILS ABSOLUTE: 4 K/UL (ref 1.4–6.5)
NEUTROPHILS ABSOLUTE: 4.1 K/UL (ref 1.4–6.5)
NEUTROPHILS ABSOLUTE: 4.1 K/UL (ref 1.4–6.5)
NEUTROPHILS ABSOLUTE: 4.4 K/UL (ref 1.4–6.5)
NEUTROPHILS ABSOLUTE: 4.5 K/UL (ref 1.4–6.5)
NEUTROPHILS ABSOLUTE: 4.5 K/UL (ref 1.4–6.5)
NEUTROPHILS ABSOLUTE: 4.8 K/UL (ref 1.4–6.5)
NEUTROPHILS ABSOLUTE: 4.8 K/UL (ref 1.4–6.5)
NEUTROPHILS ABSOLUTE: 4.9 /ΜL
NEUTROPHILS ABSOLUTE: 4.9 K/UL (ref 1.4–6.5)
NEUTROPHILS ABSOLUTE: 5.1 K/UL (ref 1.4–6.5)
NEUTROPHILS ABSOLUTE: 5.1 K/UL (ref 1.4–6.5)
NEUTROPHILS ABSOLUTE: 5.2 K/UL (ref 1.4–6.5)
NEUTROPHILS ABSOLUTE: 5.3 K/UL (ref 1.4–6.5)
NEUTROPHILS ABSOLUTE: 5.4 K/UL (ref 1.4–6.5)
NEUTROPHILS ABSOLUTE: 5.7 K/UL (ref 1.4–6.5)
NEUTROPHILS ABSOLUTE: 5.7 K/UL (ref 1.4–6.5)
NEUTROPHILS ABSOLUTE: 5.9 K/UL (ref 1.4–6.5)
NEUTROPHILS ABSOLUTE: 6 K/UL (ref 1.4–6.5)
NEUTROPHILS ABSOLUTE: 6 K/UL (ref 1.4–6.5)
NEUTROPHILS ABSOLUTE: 6.2 K/UL (ref 1.4–6.5)
NEUTROPHILS ABSOLUTE: 6.3 K/UL (ref 1.4–6.5)
NEUTROPHILS ABSOLUTE: 6.5 K/UL (ref 1.4–6.5)
NEUTROPHILS ABSOLUTE: 6.6 K/UL (ref 1.4–6.5)
NEUTROPHILS ABSOLUTE: 6.6 K/UL (ref 1.4–6.5)
NEUTROPHILS ABSOLUTE: 6.8 K/UL (ref 1.4–6.5)
NEUTROPHILS ABSOLUTE: 6.8 K/UL (ref 1.4–6.5)
NEUTROPHILS ABSOLUTE: 6.9 K/UL (ref 1.4–6.5)
NEUTROPHILS ABSOLUTE: 7.1 K/UL (ref 1.4–6.5)
NEUTROPHILS ABSOLUTE: 7.3 K/UL (ref 1.4–6.5)
NEUTROPHILS ABSOLUTE: 7.8 K/UL (ref 1.4–6.5)
NEUTROPHILS ABSOLUTE: ABNORMAL /ΜL
NEUTROPHILS ABSOLUTE: ABNORMAL /ΜL
NEUTROPHILS RELATIVE PERCENT: 65.5 %
NEUTROPHILS RELATIVE PERCENT: 68 %
NEUTROPHILS RELATIVE PERCENT: 69.2 %
NEUTROPHILS RELATIVE PERCENT: 70.3 %
NEUTROPHILS RELATIVE PERCENT: 70.8 %
NEUTROPHILS RELATIVE PERCENT: 70.8 %
NEUTROPHILS RELATIVE PERCENT: 71.9 %
NEUTROPHILS RELATIVE PERCENT: 72 %
NEUTROPHILS RELATIVE PERCENT: 72.8 %
NEUTROPHILS RELATIVE PERCENT: 73.3 %
NEUTROPHILS RELATIVE PERCENT: 73.8 %
NEUTROPHILS RELATIVE PERCENT: 74.7 %
NEUTROPHILS RELATIVE PERCENT: 76.3 %
NEUTROPHILS RELATIVE PERCENT: 76.4 %
NEUTROPHILS RELATIVE PERCENT: 76.4 %
NEUTROPHILS RELATIVE PERCENT: 76.9 %
NEUTROPHILS RELATIVE PERCENT: 77.3 %
NEUTROPHILS RELATIVE PERCENT: 77.3 %
NEUTROPHILS RELATIVE PERCENT: 77.4 %
NEUTROPHILS RELATIVE PERCENT: 77.6 %
NEUTROPHILS RELATIVE PERCENT: 77.7 %
NEUTROPHILS RELATIVE PERCENT: 78.5 %
NEUTROPHILS RELATIVE PERCENT: 78.9 %
NEUTROPHILS RELATIVE PERCENT: 79 %
NEUTROPHILS RELATIVE PERCENT: 79.2 %
NEUTROPHILS RELATIVE PERCENT: 79.3 %
NEUTROPHILS RELATIVE PERCENT: 79.9 %
NEUTROPHILS RELATIVE PERCENT: 80.2 %
NEUTROPHILS RELATIVE PERCENT: 80.3 %
NEUTROPHILS RELATIVE PERCENT: 80.6 %
NEUTROPHILS RELATIVE PERCENT: 80.7 %
NEUTROPHILS RELATIVE PERCENT: 80.7 %
NEUTROPHILS RELATIVE PERCENT: 81.2 %
NEUTROPHILS RELATIVE PERCENT: 82.2 %
NEUTROPHILS RELATIVE PERCENT: 82.6 %
NEUTROPHILS RELATIVE PERCENT: 83.9 %
NEUTROPHILS RELATIVE PERCENT: 84 %
NEUTROPHILS RELATIVE PERCENT: 86.1 %
NEUTROPHILS RELATIVE PERCENT: 88 %
NEUTROPHILS RELATIVE PERCENT: 88.2 %
NEUTROPHILS RELATIVE PERCENT: 89 %
NEUTROPHILS RELATIVE PERCENT: 89 %
NEUTROPHILS RELATIVE PERCENT: 89.1 %
NEUTROPHILS RELATIVE PERCENT: 91 %
NEUTROPHILS RELATIVE PERCENT: 91 %
NEUTROPHILS RELATIVE PERCENT: 92 %
NEUTROPHILS RELATIVE PERCENT: ABNORMAL %
NEUTROPHILS RELATIVE PERCENT: ABNORMAL %
NITRITE, URINE: NEGATIVE
NITRITE, URINE: POSITIVE
NUCLEATED CELLS FLUID: 1750 /CUMM
NUMBER OF CELLS COUNTED FLUID: 100
O2 SAT, ARTERIAL: 100 % (ref 93–100)
O2 SAT, ARTERIAL: 89 % (ref 93–100)
O2 SAT, ARTERIAL: 98 % (ref 93–100)
O2 SAT, ARTERIAL: 98 % (ref 93–100)
O2 SAT, ARTERIAL: 99 % (ref 93–100)
O2 SAT, VEN: 99 %
ORGANISM: ABNORMAL
OVALOCYTES: 0
OVALOCYTES: ABNORMAL
PAPPENHEIMER BODIES: 0
PARATHYROID HORMONE INTACT: 23.8 PG/ML (ref 15–65)
PATH CONSULT FLUID: NORMAL
PCO2 ARTERIAL: 41 MM HG (ref 35–45)
PCO2 ARTERIAL: 42 MM HG (ref 35–45)
PCO2 ARTERIAL: 42 MM HG (ref 35–45)
PCO2 ARTERIAL: 46 MM HG (ref 35–45)
PCO2 ARTERIAL: 50 MM HG (ref 35–45)
PCO2 ARTERIAL: 53 MM HG (ref 35–45)
PCO2 ARTERIAL: 62 MM HG (ref 35–45)
PCO2, VEN: 35.7 MM HG (ref 40–50)
PDW BLD-RTO: 14.6 % (ref 11.5–14.5)
PDW BLD-RTO: 14.7 % (ref 11.5–14.5)
PDW BLD-RTO: 15 % (ref 11.5–14.5)
PDW BLD-RTO: 15 % (ref 11.5–14.5)
PDW BLD-RTO: 15.2 % (ref 11.5–14.5)
PDW BLD-RTO: 15.2 % (ref 11.5–14.5)
PDW BLD-RTO: 15.3 % (ref 11.5–14.5)
PDW BLD-RTO: 15.4 % (ref 11.5–14.5)
PDW BLD-RTO: 15.5 % (ref 11.5–14.5)
PDW BLD-RTO: 15.5 % (ref 11.5–14.5)
PDW BLD-RTO: 15.6 % (ref 11.5–14.5)
PDW BLD-RTO: 15.6 % (ref 11.5–14.5)
PDW BLD-RTO: 15.7 % (ref 11.5–14.5)
PDW BLD-RTO: 15.8 % (ref 11.5–14.5)
PDW BLD-RTO: 15.9 % (ref 11.5–14.5)
PDW BLD-RTO: 16 % (ref 11.5–14.5)
PDW BLD-RTO: 16.2 % (ref 11.5–14.5)
PDW BLD-RTO: 16.2 % (ref 11.5–14.5)
PDW BLD-RTO: 16.3 % (ref 11.5–14.5)
PDW BLD-RTO: 16.3 % (ref 11.5–14.5)
PDW BLD-RTO: 16.4 % (ref 11.5–14.5)
PDW BLD-RTO: 16.4 % (ref 11.5–14.5)
PDW BLD-RTO: 16.5 % (ref 11.5–14.5)
PDW BLD-RTO: 16.5 % (ref 11.5–14.5)
PDW BLD-RTO: 16.8 % (ref 11.5–14.5)
PDW BLD-RTO: 16.8 % (ref 11.5–14.5)
PDW BLD-RTO: 16.9 % (ref 11.5–14.5)
PDW BLD-RTO: 16.9 % (ref 11.5–14.5)
PDW BLD-RTO: 17 % (ref 11.5–14.5)
PDW BLD-RTO: 17 % (ref 11.5–14.5)
PDW BLD-RTO: 17.1 % (ref 11.5–14.5)
PDW BLD-RTO: 17.1 % (ref 11.5–14.5)
PDW BLD-RTO: 17.2 % (ref 11.5–14.5)
PDW BLD-RTO: 17.4 % (ref 11.5–14.5)
PDW BLD-RTO: 17.5 % (ref 11.5–14.5)
PDW BLD-RTO: 17.6 % (ref 11.5–14.5)
PDW BLD-RTO: 17.8 % (ref 11.5–14.5)
PDW BLD-RTO: 18 % (ref 11.5–14.5)
PDW BLD-RTO: 18.1 % (ref 11.5–14.5)
PDW BLD-RTO: 18.3 % (ref 11.5–14.5)
PDW BLD-RTO: 18.6 % (ref 11.5–14.5)
PDW BLD-RTO: 18.6 % (ref 11.5–14.5)
PDW BLD-RTO: 18.7 % (ref 11.5–14.5)
PDW BLD-RTO: 18.7 % (ref 11.5–14.5)
PDW BLD-RTO: 18.8 % (ref 11.5–14.5)
PDW BLD-RTO: 18.8 % (ref 11.5–14.5)
PDW BLD-RTO: 18.9 % (ref 11.5–14.5)
PDW BLD-RTO: 19 % (ref 11.5–14.5)
PDW BLD-RTO: 19.1 % (ref 11.5–14.5)
PDW BLD-RTO: 19.1 % (ref 11.5–14.5)
PDW BLD-RTO: 19.4 % (ref 11.5–14.5)
PDW BLD-RTO: 19.5 % (ref 11.5–14.5)
PDW BLD-RTO: 19.7 % (ref 11.5–14.5)
PDW BLD-RTO: 19.9 % (ref 11.5–14.5)
PDW BLD-RTO: 20 % (ref 11.5–14.5)
PDW BLD-RTO: 20 % (ref 11.5–14.5)
PDW BLD-RTO: 20.1 % (ref 11.5–14.5)
PDW BLD-RTO: 20.5 % (ref 11.5–14.5)
PDW BLD-RTO: 20.7 % (ref 11.5–14.5)
PDW BLD-RTO: 20.9 % (ref 11.5–14.5)
PDW BLD-RTO: 20.9 % (ref 11.5–14.5)
PDW BLD-RTO: 21 % (ref 11.5–14.5)
PDW BLD-RTO: 21.3 % (ref 11.5–14.5)
PDW BLD-RTO: 22.4 % (ref 11.5–14.5)
PELGER HUET CELLS: 0 %
PERFORMED ON: ABNORMAL
PERFORMED ON: NORMAL
PH ARTERIAL: 7.16 (ref 7.35–7.45)
PH ARTERIAL: 7.26 (ref 7.35–7.45)
PH ARTERIAL: 7.34 (ref 7.35–7.45)
PH ARTERIAL: 7.42 (ref 7.35–7.45)
PH ARTERIAL: 7.43 (ref 7.35–7.45)
PH ARTERIAL: 7.43 (ref 7.35–7.45)
PH ARTERIAL: 7.44 (ref 7.35–7.45)
PH UA: 5.5 (ref 5–9)
PH UA: 6 (ref 4.5–8)
PH UA: 7 (ref 5–9)
PH UA: 7 (ref 5–9)
PH UA: 7.5 (ref 5–9)
PH UA: 7.5 (ref 5–9)
PH UA: 8.5 (ref 5–9)
PH VENOUS: 7.53 (ref 7.35–7.45)
PHOSPHORUS: 1 MG/DL (ref 2.5–4.5)
PHOSPHORUS: 1.5 MG/DL (ref 2.5–4.5)
PHOSPHORUS: 2.4 MG/DL (ref 2.5–4.5)
PHOSPHORUS: 2.4 MG/DL (ref 2.5–4.5)
PHOSPHORUS: 2.8 MG/DL (ref 2.5–4.5)
PHOSPHORUS: 3.1 MG/DL (ref 2.5–4.5)
PHOSPHORUS: 3.3 MG/DL (ref 2.5–4.5)
PHOSPHORUS: 3.3 MG/DL (ref 2.5–4.5)
PHOSPHORUS: 3.4 MG/DL (ref 2.5–4.5)
PHOSPHORUS: 3.5 MG/DL (ref 2.5–4.5)
PHOSPHORUS: 3.5 MG/DL (ref 2.5–4.5)
PHOSPHORUS: 3.9 MG/DL (ref 2.5–4.5)
PHOSPHORUS: 4.4 MG/DL (ref 2.5–4.5)
PHOSPHORUS: 4.4 MG/DL (ref 2.5–4.5)
PLATELET # BLD: 105 K/UL (ref 130–400)
PLATELET # BLD: 106 K/UL (ref 130–400)
PLATELET # BLD: 107 K/UL (ref 130–400)
PLATELET # BLD: 108 K/UL (ref 130–400)
PLATELET # BLD: 109 K/UL (ref 130–400)
PLATELET # BLD: 111 K/UL (ref 130–400)
PLATELET # BLD: 112 K/UL (ref 130–400)
PLATELET # BLD: 113 K/UL (ref 130–400)
PLATELET # BLD: 115 K/UL (ref 130–400)
PLATELET # BLD: 115 K/UL (ref 130–400)
PLATELET # BLD: 116 K/UL (ref 130–400)
PLATELET # BLD: 116 K/UL (ref 130–400)
PLATELET # BLD: 117 K/UL (ref 130–400)
PLATELET # BLD: 117 K/UL (ref 130–400)
PLATELET # BLD: 118 K/UL (ref 130–400)
PLATELET # BLD: 120 K/UL (ref 130–400)
PLATELET # BLD: 121 K/UL (ref 130–400)
PLATELET # BLD: 122 K/UL (ref 130–400)
PLATELET # BLD: 123 K/UL (ref 130–400)
PLATELET # BLD: 125 K/UL (ref 130–400)
PLATELET # BLD: 126 K/UL (ref 130–400)
PLATELET # BLD: 127 K/UL (ref 130–400)
PLATELET # BLD: 128 K/UL (ref 130–400)
PLATELET # BLD: 129 K/UL (ref 130–400)
PLATELET # BLD: 133 K/UL (ref 130–400)
PLATELET # BLD: 134 K/UL (ref 130–400)
PLATELET # BLD: 134 K/UL (ref 130–400)
PLATELET # BLD: 136 K/UL (ref 130–400)
PLATELET # BLD: 136 K/ΜL
PLATELET # BLD: 138 K/UL (ref 130–400)
PLATELET # BLD: 138 K/UL (ref 130–400)
PLATELET # BLD: 139 K/UL (ref 130–400)
PLATELET # BLD: 139 K/UL (ref 130–400)
PLATELET # BLD: 141 K/UL (ref 130–400)
PLATELET # BLD: 144 K/UL (ref 130–400)
PLATELET # BLD: 147 K/UL (ref 130–400)
PLATELET # BLD: 149 K/UL (ref 130–400)
PLATELET # BLD: 149 K/ΜL
PLATELET # BLD: 150 K/UL (ref 130–400)
PLATELET # BLD: 152 K/UL (ref 130–400)
PLATELET # BLD: 153 K/UL (ref 130–400)
PLATELET # BLD: 153 K/UL (ref 130–400)
PLATELET # BLD: 155 K/UL (ref 130–400)
PLATELET # BLD: 157 K/UL (ref 130–400)
PLATELET # BLD: 157 K/UL (ref 130–400)
PLATELET # BLD: 159 K/UL (ref 130–400)
PLATELET # BLD: 159 K/UL (ref 130–400)
PLATELET # BLD: 160 K/UL (ref 130–400)
PLATELET # BLD: 162 K/UL (ref 130–400)
PLATELET # BLD: 163 K/UL (ref 130–400)
PLATELET # BLD: 164 K/ΜL
PLATELET # BLD: 169 K/UL (ref 130–400)
PLATELET # BLD: 170 K/UL (ref 130–400)
PLATELET # BLD: 170 K/UL (ref 130–400)
PLATELET # BLD: 171 K/UL (ref 130–400)
PLATELET # BLD: 171 K/UL (ref 130–400)
PLATELET # BLD: 179 K/UL (ref 130–400)
PLATELET # BLD: 183 K/UL (ref 130–400)
PLATELET # BLD: 190 K/UL (ref 130–400)
PLATELET # BLD: 195 K/UL (ref 130–400)
PLATELET # BLD: 197 K/UL (ref 130–400)
PLATELET # BLD: 199 K/UL (ref 130–400)
PLATELET # BLD: 202 K/UL (ref 130–400)
PLATELET # BLD: 207 K/UL (ref 130–400)
PLATELET # BLD: 212 K/UL (ref 130–400)
PLATELET # BLD: 213 K/UL (ref 130–400)
PLATELET # BLD: 246 K/UL (ref 130–400)
PLATELET # BLD: 248 K/UL (ref 130–400)
PLATELET # BLD: 249 K/UL (ref 130–400)
PLATELET # BLD: 253 K/UL (ref 130–400)
PLATELET # BLD: 254 K/UL (ref 130–400)
PLATELET # BLD: 259 K/UL (ref 130–400)
PLATELET # BLD: 83 K/UL (ref 130–400)
PLATELET # BLD: 92 K/UL (ref 130–400)
PLATELET SLIDE REVIEW: ABNORMAL
PLATELET SLIDE REVIEW: ADEQUATE
PLATELET SLIDE REVIEW: NORMAL
PMV BLD AUTO: 10.3 FL
PMV BLD AUTO: 106 FL
PMV BLD AUTO: 149 FL
PO2 ARTERIAL: 103 MM HG (ref 75–108)
PO2 ARTERIAL: 108 MM HG (ref 75–108)
PO2 ARTERIAL: 118 MM HG (ref 75–108)
PO2 ARTERIAL: 262 MM HG (ref 75–108)
PO2 ARTERIAL: 458 MM HG (ref 75–108)
PO2 ARTERIAL: 493 MM HG (ref 75–108)
PO2 ARTERIAL: 72 MM HG (ref 75–108)
PO2, VEN: 102 MM HG
POC CHLORIDE: 104 MEQ/L (ref 99–110)
POC CHLORIDE: 94 MEQ/L (ref 99–110)
POC CHLORIDE: 95 MEQ/L (ref 99–110)
POC CHLORIDE: 98 MEQ/L (ref 99–110)
POC CREATININE: 2.1 MG/DL (ref 0.6–1.2)
POC CREATININE: 2.4 MG/DL (ref 0.6–1.2)
POC CREATININE: 3.2 MG/DL (ref 0.6–1.2)
POC CREATININE: 4.2 MG/DL (ref 0.6–1.2)
POC FIO2: 100
POC FIO2: 2
POC FIO2: 3
POC FIO2: 40
POC FIO2: 40
POC FIO2: 60
POC HEMATOCRIT: 24 % (ref 36–48)
POC HEMATOCRIT: 29 % (ref 36–48)
POC HEMATOCRIT: 31 % (ref 36–48)
POC HEMATOCRIT: 36 % (ref 36–48)
POC POTASSIUM: 3.5 MEQ/L (ref 3.5–5.1)
POC POTASSIUM: 3.9 MEQ/L (ref 3.5–5.1)
POC POTASSIUM: 4.3 MEQ/L (ref 3.5–5.1)
POC POTASSIUM: 4.6 MEQ/L (ref 3.5–5.1)
POC SAMPLE TYPE: ABNORMAL
POC SODIUM: 132 MEQ/L (ref 136–145)
POC SODIUM: 133 MEQ/L (ref 136–145)
POC SODIUM: 135 MEQ/L (ref 136–145)
POC SODIUM: 139 MEQ/L (ref 136–145)
POIKILOCYTES: 0
POIKILOCYTES: 0
POIKILOCYTES: ABNORMAL
POLYCHROMASIA: 0
POLYCHROMASIA: 0
POLYCHROMASIA: ABNORMAL
POTASSIUM REFLEX MAGNESIUM: 3.9 MEQ/L (ref 3.5–5.1)
POTASSIUM REFLEX MAGNESIUM: 4.1 MEQ/L (ref 3.5–5.1)
POTASSIUM REFLEX MAGNESIUM: 4.2 MEQ/L (ref 3.5–5.1)
POTASSIUM REFLEX MAGNESIUM: 4.3 MEQ/L (ref 3.5–5.1)
POTASSIUM REFLEX MAGNESIUM: 4.3 MEQ/L (ref 3.5–5.1)
POTASSIUM REFLEX MAGNESIUM: 4.4 MEQ/L (ref 3.5–5.1)
POTASSIUM REFLEX MAGNESIUM: 4.5 MEQ/L (ref 3.5–5.1)
POTASSIUM REFLEX MAGNESIUM: 4.5 MEQ/L (ref 3.5–5.1)
POTASSIUM REFLEX MAGNESIUM: 4.6 MEQ/L (ref 3.5–5.1)
POTASSIUM REFLEX MAGNESIUM: 4.9 MEQ/L (ref 3.5–5.1)
POTASSIUM REFLEX MAGNESIUM: 4.9 MEQ/L (ref 3.5–5.1)
POTASSIUM REFLEX MAGNESIUM: 6.1 MEQ/L (ref 3.5–5.1)
POTASSIUM SERPL-SCNC: 3 MEQ/L (ref 3.5–5.1)
POTASSIUM SERPL-SCNC: 3.3 MEQ/L (ref 3.5–5.1)
POTASSIUM SERPL-SCNC: 3.3 MEQ/L (ref 3.5–5.1)
POTASSIUM SERPL-SCNC: 3.4 MEQ/L (ref 3.5–5.1)
POTASSIUM SERPL-SCNC: 3.4 MEQ/L (ref 3.5–5.1)
POTASSIUM SERPL-SCNC: 3.5 MEQ/L (ref 3.5–5.1)
POTASSIUM SERPL-SCNC: 3.6 MEQ/L (ref 3.5–5.1)
POTASSIUM SERPL-SCNC: 3.7 MEQ/L (ref 3.5–5.1)
POTASSIUM SERPL-SCNC: 3.8 MEQ/L (ref 3.5–5.1)
POTASSIUM SERPL-SCNC: 3.9 MEQ/L (ref 3.5–5.1)
POTASSIUM SERPL-SCNC: 4 MEQ/L (ref 3.5–5.1)
POTASSIUM SERPL-SCNC: 4.1 MEQ/L (ref 3.5–5.1)
POTASSIUM SERPL-SCNC: 4.2 MEQ/L (ref 3.5–5.1)
POTASSIUM SERPL-SCNC: 4.3 MEQ/L (ref 3.5–5.1)
POTASSIUM SERPL-SCNC: 4.3 MMOL/L
POTASSIUM SERPL-SCNC: 4.4 MEQ/L (ref 3.5–5.1)
POTASSIUM SERPL-SCNC: 4.4 MMOL/L
POTASSIUM SERPL-SCNC: 4.5 MEQ/L (ref 3.5–5.1)
POTASSIUM SERPL-SCNC: 4.5 MEQ/L (ref 3.5–5.1)
POTASSIUM SERPL-SCNC: 4.6 MEQ/L (ref 3.5–5.1)
POTASSIUM SERPL-SCNC: 4.7 MEQ/L (ref 3.5–5.1)
POTASSIUM SERPL-SCNC: 4.9 MEQ/L (ref 3.5–5.1)
POTASSIUM SERPL-SCNC: 5 MEQ/L (ref 3.5–5.1)
POTASSIUM SERPL-SCNC: 5 MEQ/L (ref 3.5–5.1)
POTASSIUM SERPL-SCNC: 5.1 MEQ/L (ref 3.5–5.1)
POTASSIUM SERPL-SCNC: 5.1 MEQ/L (ref 3.5–5.1)
POTASSIUM SERPL-SCNC: 5.2 MEQ/L (ref 3.5–5.1)
POTASSIUM SERPL-SCNC: 5.4 MEQ/L (ref 3.5–5.1)
POTASSIUM SERPL-SCNC: 5.5 MEQ/L (ref 3.5–5.1)
POTASSIUM SERPL-SCNC: 5.5 MEQ/L (ref 3.5–5.1)
POTASSIUM SERPL-SCNC: 5.6 MEQ/L (ref 3.5–5.1)
POTASSIUM SERPL-SCNC: 5.7 MEQ/L (ref 3.5–5.1)
POTASSIUM SERPL-SCNC: 5.8 MEQ/L (ref 3.5–5.1)
POTASSIUM SERPL-SCNC: 5.8 MEQ/L (ref 3.5–5.1)
POTASSIUM SERPL-SCNC: 6.1 MEQ/L (ref 3.5–5.1)
POTASSIUM SERPL-SCNC: 6.3 MEQ/L (ref 3.5–5.1)
POTASSIUM SERPL-SCNC: 6.5 MEQ/L (ref 3.5–5.1)
POTASSIUM SERPL-SCNC: 6.9 MEQ/L (ref 3.5–5.1)
POTASSIUM SERPL-SCNC: 7 MEQ/L (ref 3.5–5.1)
PRELIMINARY: NORMAL
PRELIMINARY: NORMAL
PRO-BNP: NORMAL PG/ML
PROCALCITONIN: 0.53 NG/ML (ref 0–0.15)
PROTEIN FLUID: 3.4 G/DL
PROTEIN UA: 100 MG/DL
PROTEIN UA: >=300 MG/DL
PROTEIN UA: ABNORMAL
PROTHROMBIN TIME: 11.1 SEC (ref 9.6–12.3)
PROTHROMBIN TIME: 11.6 SEC (ref 9.6–12.3)
PROTHROMBIN TIME: 11.6 SEC (ref 9.6–12.3)
PROTHROMBIN TIME: 11.6 SEC (ref 9–11.5)
PROTHROMBIN TIME: 11.7 SEC (ref 9.6–12.3)
PROTHROMBIN TIME: 11.8 SEC (ref 9.6–12.3)
PROTHROMBIN TIME: 11.8 SEC (ref 9.6–12.3)
PROTHROMBIN TIME: 12.3 SEC (ref 9.6–12.3)
PROTHROMBIN TIME: 12.4 SEC (ref 9.6–12.3)
PROTHROMBIN TIME: 12.5 SEC (ref 8.1–13.7)
PROTHROMBIN TIME: 13.2 SEC (ref 9–11.5)
PROTHROMBIN TIME: 15.2 SEC (ref 9.6–12.3)
PROTHROMBIN TIME: 15.6 SEC (ref 9.6–12.3)
PROTHROMBIN TIME: 15.8 SEC (ref 9.6–12.3)
PROTHROMBIN TIME: 15.9 SEC (ref 9.6–12.3)
PROTHROMBIN TIME: 16.5 SEC (ref 9.6–12.3)
PROTHROMBIN TIME: 17.4 SEC (ref 9.6–12.3)
PROTHROMBIN TIME: 17.8 SEC (ref 9.6–12.3)
PROTHROMBIN TIME: 20.6 SEC (ref 9.6–12.3)
PROTHROMBIN TIME: 20.7 SEC (ref 9.6–12.3)
PROTHROMBIN TIME: 24.3 SEC (ref 9.6–12.3)
PROTHROMBIN TIME: 26.8 SEC (ref 9.6–12.3)
PROTHROMBIN TIME: 27.2 SEC (ref 9.6–12.3)
PROTHROMBIN TIME: 29.1 SEC (ref 9.6–12.3)
PROTHROMBIN TIME: 32.2 SEC (ref 9.6–12.3)
PROTHROMBIN TIME: 32.9 SEC (ref 9.6–12.3)
PROTHROMBIN TIME: 33.2 SEC (ref 9.6–12.3)
PROTIME: 13.2 SECONDS
PROTIME: 14.6 SECONDS
PROTIME: 15.1 SECONDS
PROTIME: 16.4 SECONDS
PROTIME: 17.7 SECONDS
PROTIME: 18.6 SECONDS
PROTIME: 20.7 SECONDS
PROTIME: 21.5 SECONDS
PROTIME: 25.6 SECONDS
PROTIME: 26.5 SECONDS
PROTIME: 32.5 SECONDS
PROTIME: 36 SECONDS
PROTIME: 54.9 SECONDS
PROTIME: 63.6 SECONDS
RAPID INFLUENZA  B AGN: NEGATIVE
RAPID INFLUENZA  B AGN: NEGATIVE
RAPID INFLUENZA A AGN: NEGATIVE
RAPID INFLUENZA A AGN: NEGATIVE
RBC # BLD: 2.01 M/UL (ref 4.2–5.4)
RBC # BLD: 2.1 M/UL (ref 4.2–5.4)
RBC # BLD: 2.15 M/UL (ref 4.2–5.4)
RBC # BLD: 2.29 M/UL (ref 4.2–5.4)
RBC # BLD: 2.31 M/UL (ref 4.2–5.4)
RBC # BLD: 2.35 M/UL (ref 4.2–5.4)
RBC # BLD: 2.35 M/UL (ref 4.2–5.4)
RBC # BLD: 2.36 M/UL (ref 4.2–5.4)
RBC # BLD: 2.43 10^6/ΜL
RBC # BLD: 2.46 M/UL (ref 4.2–5.4)
RBC # BLD: 2.47 M/UL (ref 4.2–5.4)
RBC # BLD: 2.49 10^6/ΜL
RBC # BLD: 2.51 M/UL (ref 4.2–5.4)
RBC # BLD: 2.51 M/UL (ref 4.2–5.4)
RBC # BLD: 2.57 M/UL (ref 4.2–5.4)
RBC # BLD: 2.59 M/UL (ref 4.2–5.4)
RBC # BLD: 2.61 M/UL (ref 4.2–5.4)
RBC # BLD: 2.62 M/UL (ref 4.2–5.4)
RBC # BLD: 2.64 M/UL (ref 4.2–5.4)
RBC # BLD: 2.65 M/UL (ref 4.2–5.4)
RBC # BLD: 2.66 M/UL (ref 4.2–5.4)
RBC # BLD: 2.69 M/UL (ref 4.2–5.4)
RBC # BLD: 2.75 M/UL (ref 4.2–5.4)
RBC # BLD: 2.77 M/UL (ref 4.2–5.4)
RBC # BLD: 2.85 M/UL (ref 4.2–5.4)
RBC # BLD: 2.88 M/UL (ref 4.2–5.4)
RBC # BLD: 2.92 M/UL (ref 4.2–5.4)
RBC # BLD: 2.94 M/UL (ref 4.2–5.4)
RBC # BLD: 2.96 M/UL (ref 4.2–5.4)
RBC # BLD: 3.01 M/UL (ref 4.2–5.4)
RBC # BLD: 3.01 M/UL (ref 4.2–5.4)
RBC # BLD: 3.08 M/UL (ref 4.2–5.4)
RBC # BLD: 3.09 M/UL (ref 4.2–5.4)
RBC # BLD: 3.09 M/UL (ref 4.2–5.4)
RBC # BLD: 3.14 M/UL (ref 4.2–5.4)
RBC # BLD: 3.16 M/UL (ref 4.2–5.4)
RBC # BLD: 3.17 M/UL (ref 4.2–5.4)
RBC # BLD: 3.17 M/UL (ref 4.2–5.4)
RBC # BLD: 3.18 M/UL (ref 4.2–5.4)
RBC # BLD: 3.18 M/UL (ref 4.2–5.4)
RBC # BLD: 3.2 M/UL (ref 4.2–5.4)
RBC # BLD: 3.21 M/UL (ref 4.2–5.4)
RBC # BLD: 3.22 M/UL (ref 4.2–5.4)
RBC # BLD: 3.23 M/UL (ref 4.2–5.4)
RBC # BLD: 3.25 M/UL (ref 4.2–5.4)
RBC # BLD: 3.29 M/UL (ref 4.2–5.4)
RBC # BLD: 3.3 M/UL (ref 4.2–5.4)
RBC # BLD: 3.3 M/UL (ref 4.2–5.4)
RBC # BLD: 3.31 M/UL (ref 4.2–5.4)
RBC # BLD: 3.32 M/UL (ref 4.2–5.4)
RBC # BLD: 3.32 M/UL (ref 4.2–5.4)
RBC # BLD: 3.33 M/UL (ref 4.2–5.4)
RBC # BLD: 3.34 M/UL (ref 4.2–5.4)
RBC # BLD: 3.35 M/UL (ref 4.2–5.4)
RBC # BLD: 3.36 M/UL (ref 4.2–5.4)
RBC # BLD: 3.36 M/UL (ref 4.2–5.4)
RBC # BLD: 3.38 M/UL (ref 4.2–5.4)
RBC # BLD: 3.39 M/UL (ref 4.2–5.4)
RBC # BLD: 3.4 10^6/ΜL
RBC # BLD: 3.42 M/UL (ref 4.2–5.4)
RBC # BLD: 3.46 M/UL (ref 4.2–5.4)
RBC # BLD: 3.47 M/UL (ref 4.2–5.4)
RBC # BLD: 3.47 M/UL (ref 4.2–5.4)
RBC # BLD: 3.51 M/UL (ref 4.2–5.4)
RBC # BLD: 3.53 M/UL (ref 4.2–5.4)
RBC # BLD: 3.54 M/UL (ref 4.2–5.4)
RBC # BLD: 3.56 M/UL (ref 4.2–5.4)
RBC # BLD: 3.58 M/UL (ref 4.2–5.4)
RBC # BLD: 3.6 M/UL (ref 4.2–5.4)
RBC # BLD: 3.61 M/UL (ref 4.2–5.4)
RBC # BLD: 3.66 M/UL (ref 4.2–5.4)
RBC # BLD: 3.71 M/UL (ref 4.2–5.4)
RBC # BLD: 3.71 M/UL (ref 4.2–5.4)
RBC # BLD: 3.76 M/UL (ref 4.2–5.4)
RBC # BLD: 3.9 M/UL (ref 4.2–5.4)
RBC # BLD: 4.09 M/UL (ref 4.2–5.4)
RBC # BLD: NORMAL 10*6/UL
RBC FLUID: NORMAL /CUMM
RBC UA: >100 /HPF (ref 0–2)
RBC UA: ABNORMAL /HPF (ref 0–2)
REJECTED TEST: NORMAL
RENAL EPITHELIAL, UA: ABNORMAL /HPF
SCHISTOCYTES: 0
SEDIMENTATION RATE, ERYTHROCYTE: 113 MM (ref 0–30)
SEDIMENTATION RATE, ERYTHROCYTE: 25 MM (ref 0–30)
SEDIMENTATION RATE, ERYTHROCYTE: 30 MM (ref 0–30)
SEDIMENTATION RATE, ERYTHROCYTE: 78 MM (ref 0–30)
SEDIMENTATION RATE, ERYTHROCYTE: 92 MM (ref 0–30)
SICKLE CELLS: 0
SLIDE REVIEW: ABNORMAL
SMUDGE CELLS: 0.9
SODIUM BLD-SCNC: 129 MEQ/L (ref 132–144)
SODIUM BLD-SCNC: 130 MEQ/L (ref 132–144)
SODIUM BLD-SCNC: 131 MEQ/L (ref 132–144)
SODIUM BLD-SCNC: 131 MEQ/L (ref 132–144)
SODIUM BLD-SCNC: 132 MEQ/L (ref 132–144)
SODIUM BLD-SCNC: 133 MEQ/L (ref 132–144)
SODIUM BLD-SCNC: 134 MEQ/L (ref 132–144)
SODIUM BLD-SCNC: 134 MMOL/L
SODIUM BLD-SCNC: 135 MEQ/L (ref 132–144)
SODIUM BLD-SCNC: 136 MEQ/L (ref 132–144)
SODIUM BLD-SCNC: 137 MEQ/L (ref 132–144)
SODIUM BLD-SCNC: 138 MEQ/L (ref 132–144)
SODIUM BLD-SCNC: 139 MEQ/L (ref 132–144)
SODIUM BLD-SCNC: 139 MEQ/L (ref 132–144)
SODIUM BLD-SCNC: 139 MMOL/L
SODIUM BLD-SCNC: 140 MEQ/L (ref 132–144)
SODIUM BLD-SCNC: 140 MEQ/L (ref 132–144)
SPECIFIC GRAVITY UA: 1.01 (ref 1–1.03)
SPECIFIC GRAVITY UA: 1.01 (ref 1–1.03)
SPECIFIC GRAVITY UA: 1.02 (ref 1–1.03)
SPECIFIC GRAVITY, URINE: 1.02
SPHEROCYTES: 0
SPHEROCYTES: ABNORMAL
STOMATOCYTES: 0
TARGET CELLS: 0
TARGET CELLS: ABNORMAL
TCO2 ARTERIAL: 22 (ref 22–29)
TCO2 ARTERIAL: 24 (ref 22–29)
TCO2 ARTERIAL: 28 (ref 22–29)
TCO2 ARTERIAL: 29 (ref 22–29)
TCO2 ARTERIAL: 29 (ref 22–29)
TCO2 ARTERIAL: 30 (ref 22–29)
TCO2 ARTERIAL: 34 (ref 22–29)
TCO2 CALC VENOUS: 31 MMOL/L
TEAR DROP CELLS: 0
TOTAL CK: 17 U/L (ref 0–170)
TOTAL CK: 18 U/L (ref 0–170)
TOTAL CK: 18 U/L (ref 0–170)
TOTAL CK: 21 U/L (ref 0–170)
TOTAL CK: 22 U/L (ref 0–170)
TOTAL CK: 22 U/L (ref 0–170)
TOTAL CK: 23 U/L (ref 0–170)
TOTAL CK: 24 U/L (ref 0–170)
TOTAL CK: 25 U/L (ref 0–170)
TOTAL CK: 28 U/L (ref 0–170)
TOTAL CK: 30 U/L (ref 0–170)
TOTAL CK: 46 U/L (ref 0–170)
TOTAL IRON BINDING CAPACITY: 117 UG/DL (ref 178–450)
TOTAL PROTEIN: 4.9 G/DL (ref 6.4–8.1)
TOTAL PROTEIN: 4.9 G/DL (ref 6.4–8.1)
TOTAL PROTEIN: 5.1 G/DL (ref 6.4–8.1)
TOTAL PROTEIN: 5.2 G/DL (ref 6.4–8.1)
TOTAL PROTEIN: 5.3 G/DL (ref 6.4–8.1)
TOTAL PROTEIN: 5.3 G/DL (ref 6.4–8.1)
TOTAL PROTEIN: 5.4 G/DL (ref 6.4–8.1)
TOTAL PROTEIN: 5.6 G/DL (ref 6.4–8.1)
TOTAL PROTEIN: 5.7 G/DL (ref 6.4–8.1)
TOTAL PROTEIN: 5.8 G/DL (ref 6.4–8.1)
TOTAL PROTEIN: 5.9 G/DL (ref 6.4–8.1)
TOTAL PROTEIN: 5.9 G/DL (ref 6.4–8.1)
TOTAL PROTEIN: 6.1 G/DL (ref 6.4–8.1)
TOTAL PROTEIN: 6.1 G/DL (ref 6.4–8.1)
TOTAL PROTEIN: 6.2 G/DL (ref 6.4–8.1)
TOTAL PROTEIN: 6.3 G/DL (ref 6.4–8.1)
TOTAL PROTEIN: 6.4 G/DL (ref 6.4–8.1)
TOTAL PROTEIN: 6.5 G/DL (ref 6.4–8.1)
TOTAL PROTEIN: 6.6 G/DL (ref 6.4–8.1)
TOTAL PROTEIN: 6.7 G/DL (ref 6.4–8.1)
TOTAL PROTEIN: 6.7 G/DL (ref 6.4–8.1)
TOTAL PROTEIN: 6.8 G/DL (ref 6.4–8.1)
TOTAL PROTEIN: 6.9 G/DL (ref 6.4–8.1)
TOTAL PROTEIN: 7.1 G/DL (ref 6.4–8.1)
TOTAL PROTEIN: 7.1 G/DL (ref 6.4–8.1)
TOTAL PROTEIN: 7.3 G/DL (ref 6.4–8.1)
TOTAL PROTEIN: 7.4 G/DL (ref 6.4–8.1)
TOTAL PROTEIN: 7.5 G/DL (ref 6.4–8.1)
TOTAL PROTEIN: 8 G/DL (ref 6.4–8.1)
TOXIC GRANULATION: 0
TOXIC GRANULATION: 0
TOXIC GRANULATION: ABNORMAL
TRIGL SERPL-MCNC: 172 MG/DL (ref 0–200)
TRIGL SERPL-MCNC: 97 MG/DL
TROPONIN: 0.06 NG/ML (ref 0–0.01)
TROPONIN: 0.07 NG/ML (ref 0–0.01)
TROPONIN: 0.07 NG/ML (ref 0–0.01)
TROPONIN: 0.08 NG/ML (ref 0–0.01)
TROPONIN: 0.09 NG/ML (ref 0–0.01)
TROPONIN: 0.13 NG/ML (ref 0–0.01)
TROPONIN: 0.14 NG/ML (ref 0–0.01)
TROPONIN: 0.14 NG/ML (ref 0–0.01)
TROPONIN: 0.15 NG/ML (ref 0–0.01)
TROPONIN: 0.15 NG/ML (ref 0–0.01)
TROPONIN: 0.16 NG/ML (ref 0–0.01)
TROPONIN: 0.17 NG/ML (ref 0–0.01)
TROPONIN: 0.17 NG/ML (ref 0–0.01)
TROPONIN: 0.19 NG/ML (ref 0–0.01)
TROPONIN: 0.19 NG/ML (ref 0–0.01)
TROPONIN: 0.2 NG/ML (ref 0–0.01)
TROPONIN: 0.21 NG/ML (ref 0–0.01)
TSH REFLEX: 1.57 UIU/ML (ref 0.27–4.2)
TSH SERPL DL<=0.05 MIU/L-ACNC: 2.98 UIU/ML (ref 0.27–4.2)
TSH SERPL DL<=0.05 MIU/L-ACNC: 3.37 UIU/ML
TSH SERPL DL<=0.05 MIU/L-ACNC: 3.38 UIU/ML (ref 0.27–4.2)
TSH SERPL DL<=0.05 MIU/L-ACNC: 3.39 UIU/ML (ref 0.27–4.2)
URINE CULTURE, ROUTINE: ABNORMAL
URINE CULTURE, ROUTINE: NORMAL
URINE CULTURE, ROUTINE: NORMAL
URINE REFLEX TO CULTURE: YES
UROBILINOGEN, URINE: 0.2 E.U./DL
UROBILINOGEN, URINE: NORMAL
VACUOLATED NEUTROPHILS: 0
VACUOLATED NEUTROPHILS: ABNORMAL
VITAMIN B-12: >2000
VITAMIN B-12: >2000 PG/ML (ref 232–1245)
VITAMIN D 25-HYDROXY: 28
VITAMIN D2, 25 HYDROXY: NORMAL
VITAMIN D3,25 HYDROXY: NORMAL
VLDLC SERPL CALC-MCNC: 19 MG/DL
WBC # BLD: 0.7 10^3/ML
WBC # BLD: 10 K/UL (ref 4.8–10.8)
WBC # BLD: 10.2 K/UL (ref 4.8–10.8)
WBC # BLD: 10.3 K/UL (ref 4.8–10.8)
WBC # BLD: 10.6 K/UL (ref 4.8–10.8)
WBC # BLD: 11 K/UL (ref 4.8–10.8)
WBC # BLD: 11.2 K/UL (ref 4.8–10.8)
WBC # BLD: 11.4 K/UL (ref 4.8–10.8)
WBC # BLD: 11.9 K/UL (ref 4.8–10.8)
WBC # BLD: 11.9 K/UL (ref 4.8–10.8)
WBC # BLD: 12.1 K/UL (ref 4.8–10.8)
WBC # BLD: 12.2 K/UL (ref 4.8–10.8)
WBC # BLD: 12.3 K/UL (ref 4.8–10.8)
WBC # BLD: 12.5 K/UL (ref 4.8–10.8)
WBC # BLD: 12.5 K/UL (ref 4.8–10.8)
WBC # BLD: 12.7 K/UL (ref 4.8–10.8)
WBC # BLD: 13.8 K/UL (ref 4.8–10.8)
WBC # BLD: 13.9 K/UL (ref 4.8–10.8)
WBC # BLD: 14 K/UL (ref 4.8–10.8)
WBC # BLD: 14.1 K/UL (ref 4.8–10.8)
WBC # BLD: 14.2 K/UL (ref 4.8–10.8)
WBC # BLD: 14.3 K/UL (ref 4.8–10.8)
WBC # BLD: 15.4 K/UL (ref 4.8–10.8)
WBC # BLD: 15.4 K/UL (ref 4.8–10.8)
WBC # BLD: 15.5 K/UL (ref 4.8–10.8)
WBC # BLD: 15.8 K/UL (ref 4.8–10.8)
WBC # BLD: 16.4 K/UL (ref 4.8–10.8)
WBC # BLD: 16.6 K/UL (ref 4.8–10.8)
WBC # BLD: 17 K/UL (ref 4.8–10.8)
WBC # BLD: 19.1 K/UL (ref 4.8–10.8)
WBC # BLD: 21.9 K/UL (ref 4.8–10.8)
WBC # BLD: 31.3 K/UL (ref 4.8–10.8)
WBC # BLD: 33.8 K/UL (ref 4.8–10.8)
WBC # BLD: 37.9 K/UL (ref 4.8–10.8)
WBC # BLD: 4.9 K/UL (ref 4.8–10.8)
WBC # BLD: 5.2 K/UL (ref 4.8–10.8)
WBC # BLD: 5.7 K/UL (ref 4.8–10.8)
WBC # BLD: 5.8 K/UL (ref 4.8–10.8)
WBC # BLD: 5.9 K/UL (ref 4.8–10.8)
WBC # BLD: 5.9 K/UL (ref 4.8–10.8)
WBC # BLD: 6.1 K/UL (ref 4.8–10.8)
WBC # BLD: 6.3 K/UL (ref 4.8–10.8)
WBC # BLD: 6.3 K/UL (ref 4.8–10.8)
WBC # BLD: 6.4 K/UL (ref 4.8–10.8)
WBC # BLD: 6.4 K/UL (ref 4.8–10.8)
WBC # BLD: 6.7 K/UL (ref 4.8–10.8)
WBC # BLD: 6.7 K/UL (ref 4.8–10.8)
WBC # BLD: 7.1 K/UL (ref 4.8–10.8)
WBC # BLD: 7.2 K/UL (ref 4.8–10.8)
WBC # BLD: 7.3 K/UL (ref 4.8–10.8)
WBC # BLD: 7.5 K/UL (ref 4.8–10.8)
WBC # BLD: 7.5 K/UL (ref 4.8–10.8)
WBC # BLD: 7.6 K/UL (ref 4.8–10.8)
WBC # BLD: 7.8 K/UL (ref 4.8–10.8)
WBC # BLD: 7.8 K/UL (ref 4.8–10.8)
WBC # BLD: 8 K/UL (ref 4.8–10.8)
WBC # BLD: 8 K/UL (ref 4.8–10.8)
WBC # BLD: 8.2 K/UL (ref 4.8–10.8)
WBC # BLD: 8.2 K/UL (ref 4.8–10.8)
WBC # BLD: 8.3 K/UL (ref 4.8–10.8)
WBC # BLD: 8.4 K/UL (ref 4.8–10.8)
WBC # BLD: 8.4 K/UL (ref 4.8–10.8)
WBC # BLD: 8.6 10^3/ML
WBC # BLD: 8.7 K/UL (ref 4.8–10.8)
WBC # BLD: 8.8 K/UL (ref 4.8–10.8)
WBC # BLD: 8.9 K/UL (ref 4.8–10.8)
WBC # BLD: 8.9 K/UL (ref 4.8–10.8)
WBC # BLD: 9.2 K/UL (ref 4.8–10.8)
WBC # BLD: 9.4 K/UL (ref 4.8–10.8)
WBC # BLD: 9.5 K/UL (ref 4.8–10.8)
WBC # BLD: 9.6 10^3/ML
WBC # BLD: 9.7 K/UL (ref 4.8–10.8)
WBC # BLD: 9.7 K/UL (ref 4.8–10.8)
WBC # BLD: 9.8 K/UL (ref 4.8–10.8)
WBC UA: >100 /HPF (ref 0–5)
WBC UA: >100 /HPF (ref 0–5)
WBC UA: ABNORMAL /HPF (ref 0–5)
WOUND/ABSCESS: ABNORMAL
WOUND/ABSCESS: ABNORMAL
YEAST: PRESENT

## 2018-01-01 PROCEDURE — 2500000003 HC RX 250 WO HCPCS: Performed by: INTERNAL MEDICINE

## 2018-01-01 PROCEDURE — 99285 EMERGENCY DEPT VISIT HI MDM: CPT

## 2018-01-01 PROCEDURE — 36415 COLL VENOUS BLD VENIPUNCTURE: CPT

## 2018-01-01 PROCEDURE — 94664 DEMO&/EVAL PT USE INHALER: CPT

## 2018-01-01 PROCEDURE — 6360000002 HC RX W HCPCS: Performed by: NURSE ANESTHETIST, CERTIFIED REGISTERED

## 2018-01-01 PROCEDURE — 2700000000 HC OXYGEN THERAPY PER DAY

## 2018-01-01 PROCEDURE — 86141 C-REACTIVE PROTEIN HS: CPT

## 2018-01-01 PROCEDURE — 6370000000 HC RX 637 (ALT 250 FOR IP): Performed by: INTERNAL MEDICINE

## 2018-01-01 PROCEDURE — 6360000002 HC RX W HCPCS: Performed by: INTERNAL MEDICINE

## 2018-01-01 PROCEDURE — 3430000000 HC RX DIAGNOSTIC RADIOPHARMACEUTICAL: Performed by: EMERGENCY MEDICINE

## 2018-01-01 PROCEDURE — 36592 COLLECT BLOOD FROM PICC: CPT

## 2018-01-01 PROCEDURE — 85610 PROTHROMBIN TIME: CPT

## 2018-01-01 PROCEDURE — 94640 AIRWAY INHALATION TREATMENT: CPT

## 2018-01-01 PROCEDURE — 85027 COMPLETE CBC AUTOMATED: CPT

## 2018-01-01 PROCEDURE — 99212 OFFICE O/P EST SF 10 MIN: CPT

## 2018-01-01 PROCEDURE — G8987 SELF CARE CURRENT STATUS: HCPCS

## 2018-01-01 PROCEDURE — 2580000003 HC RX 258: Performed by: INTERNAL MEDICINE

## 2018-01-01 PROCEDURE — 80053 COMPREHEN METABOLIC PANEL: CPT

## 2018-01-01 PROCEDURE — 6370000000 HC RX 637 (ALT 250 FOR IP): Performed by: PHYSICIAN ASSISTANT

## 2018-01-01 PROCEDURE — 6370000000 HC RX 637 (ALT 250 FOR IP): Performed by: NEUROLOGICAL SURGERY

## 2018-01-01 PROCEDURE — 2580000003 HC RX 258: Performed by: NURSE ANESTHETIST, CERTIFIED REGISTERED

## 2018-01-01 PROCEDURE — 3700000000 HC ANESTHESIA ATTENDED CARE: Performed by: OTOLARYNGOLOGY

## 2018-01-01 PROCEDURE — 2500000003 HC RX 250 WO HCPCS

## 2018-01-01 PROCEDURE — 83605 ASSAY OF LACTIC ACID: CPT

## 2018-01-01 PROCEDURE — 71046 X-RAY EXAM CHEST 2 VIEWS: CPT

## 2018-01-01 PROCEDURE — 99291 CRITICAL CARE FIRST HOUR: CPT | Performed by: INTERNAL MEDICINE

## 2018-01-01 PROCEDURE — 2500000003 HC RX 250 WO HCPCS: Performed by: NURSE ANESTHETIST, CERTIFIED REGISTERED

## 2018-01-01 PROCEDURE — 71250 CT THORAX DX C-: CPT

## 2018-01-01 PROCEDURE — 82330 ASSAY OF CALCIUM: CPT

## 2018-01-01 PROCEDURE — 70450 CT HEAD/BRAIN W/O DYE: CPT

## 2018-01-01 PROCEDURE — 85730 THROMBOPLASTIN TIME PARTIAL: CPT

## 2018-01-01 PROCEDURE — 82948 REAGENT STRIP/BLOOD GLUCOSE: CPT

## 2018-01-01 PROCEDURE — 97162 PT EVAL MOD COMPLEX 30 MIN: CPT

## 2018-01-01 PROCEDURE — 99213 OFFICE O/P EST LOW 20 MIN: CPT

## 2018-01-01 PROCEDURE — 80048 BASIC METABOLIC PNL TOTAL CA: CPT

## 2018-01-01 PROCEDURE — 82565 ASSAY OF CREATININE: CPT

## 2018-01-01 PROCEDURE — 71045 X-RAY EXAM CHEST 1 VIEW: CPT

## 2018-01-01 PROCEDURE — 6370000000 HC RX 637 (ALT 250 FOR IP): Performed by: PERSONAL EMERGENCY RESPONSE ATTENDANT

## 2018-01-01 PROCEDURE — 85025 COMPLETE CBC W/AUTO DIFF WBC: CPT

## 2018-01-01 PROCEDURE — 6370000000 HC RX 637 (ALT 250 FOR IP): Performed by: HOSPITALIST

## 2018-01-01 PROCEDURE — 2580000003 HC RX 258

## 2018-01-01 PROCEDURE — P9017 PLASMA 1 DONOR FRZ W/IN 8 HR: HCPCS

## 2018-01-01 PROCEDURE — 93010 ELECTROCARDIOGRAM REPORT: CPT | Performed by: INTERNAL MEDICINE

## 2018-01-01 PROCEDURE — 99231 SBSQ HOSP IP/OBS SF/LOW 25: CPT | Performed by: INTERNAL MEDICINE

## 2018-01-01 PROCEDURE — 94003 VENT MGMT INPAT SUBQ DAY: CPT

## 2018-01-01 PROCEDURE — 99232 SBSQ HOSP IP/OBS MODERATE 35: CPT | Performed by: INTERNAL MEDICINE

## 2018-01-01 PROCEDURE — 6360000002 HC RX W HCPCS: Performed by: STUDENT IN AN ORGANIZED HEALTH CARE EDUCATION/TRAINING PROGRAM

## 2018-01-01 PROCEDURE — 36430 TRANSFUSION BLD/BLD COMPNT: CPT

## 2018-01-01 PROCEDURE — 2Y51X5Z REMOVAL OF NASAL PACKING MATERIAL: ICD-10-PCS | Performed by: OTOLARYNGOLOGY

## 2018-01-01 PROCEDURE — 97167 OT EVAL HIGH COMPLEX 60 MIN: CPT

## 2018-01-01 PROCEDURE — 2060000000 HC ICU INTERMEDIATE R&B

## 2018-01-01 PROCEDURE — 87040 BLOOD CULTURE FOR BACTERIA: CPT

## 2018-01-01 PROCEDURE — 82435 ASSAY OF BLOOD CHLORIDE: CPT

## 2018-01-01 PROCEDURE — 2580000003 HC RX 258: Performed by: PHYSICIAN ASSISTANT

## 2018-01-01 PROCEDURE — 85652 RBC SED RATE AUTOMATED: CPT

## 2018-01-01 PROCEDURE — G0378 HOSPITAL OBSERVATION PER HR: HCPCS

## 2018-01-01 PROCEDURE — 51798 US URINE CAPACITY MEASURE: CPT

## 2018-01-01 PROCEDURE — 6360000002 HC RX W HCPCS: Performed by: ANESTHESIOLOGY

## 2018-01-01 PROCEDURE — 93005 ELECTROCARDIOGRAM TRACING: CPT

## 2018-01-01 PROCEDURE — 72125 CT NECK SPINE W/O DYE: CPT

## 2018-01-01 PROCEDURE — 90937 HEMODIALYSIS REPEATED EVAL: CPT

## 2018-01-01 PROCEDURE — APPNB15 APP NON BILLABLE TIME 0-15 MINS: Performed by: PHYSICIAN ASSISTANT

## 2018-01-01 PROCEDURE — P9612 CATHETERIZE FOR URINE SPEC: HCPCS

## 2018-01-01 PROCEDURE — 31500 INSERT EMERGENCY AIRWAY: CPT

## 2018-01-01 PROCEDURE — 92526 ORAL FUNCTION THERAPY: CPT

## 2018-01-01 PROCEDURE — G0257 UNSCHED DIALYSIS ESRD PT HOS: HCPCS

## 2018-01-01 PROCEDURE — 3609017100 HC EGD: Performed by: SPECIALIST

## 2018-01-01 PROCEDURE — 3600000004 HC SURGERY LEVEL 4 BASE: Performed by: OTOLARYNGOLOGY

## 2018-01-01 PROCEDURE — 2720000010 HC SURG SUPPLY STERILE: Performed by: NEUROLOGICAL SURGERY

## 2018-01-01 PROCEDURE — 99309 SBSQ NF CARE MODERATE MDM 30: CPT | Performed by: NURSE PRACTITIONER

## 2018-01-01 PROCEDURE — 83735 ASSAY OF MAGNESIUM: CPT

## 2018-01-01 PROCEDURE — 82550 ASSAY OF CK (CPK): CPT

## 2018-01-01 PROCEDURE — 73060 X-RAY EXAM OF HUMERUS: CPT

## 2018-01-01 PROCEDURE — 87493 C DIFF AMPLIFIED PROBE: CPT

## 2018-01-01 PROCEDURE — 93306 TTE W/DOPPLER COMPLETE: CPT

## 2018-01-01 PROCEDURE — 5A1D70Z PERFORMANCE OF URINARY FILTRATION, INTERMITTENT, LESS THAN 6 HOURS PER DAY: ICD-10-PCS | Performed by: INTERNAL MEDICINE

## 2018-01-01 PROCEDURE — 80069 RENAL FUNCTION PANEL: CPT

## 2018-01-01 PROCEDURE — 99222 1ST HOSP IP/OBS MODERATE 55: CPT | Performed by: INTERNAL MEDICINE

## 2018-01-01 PROCEDURE — 84484 ASSAY OF TROPONIN QUANT: CPT

## 2018-01-01 PROCEDURE — 86403 PARTICLE AGGLUT ANTBDY SCRN: CPT

## 2018-01-01 PROCEDURE — 86901 BLOOD TYPING SEROLOGIC RH(D): CPT

## 2018-01-01 PROCEDURE — 1210000000 HC MED SURG R&B

## 2018-01-01 PROCEDURE — P9046 ALBUMIN (HUMAN), 25%, 20 ML: HCPCS | Performed by: INTERNAL MEDICINE

## 2018-01-01 PROCEDURE — 87077 CULTURE AEROBIC IDENTIFY: CPT

## 2018-01-01 PROCEDURE — 6360000002 HC RX W HCPCS: Performed by: HOSPITALIST

## 2018-01-01 PROCEDURE — 99211 OFF/OP EST MAY X REQ PHY/QHP: CPT

## 2018-01-01 PROCEDURE — 84132 ASSAY OF SERUM POTASSIUM: CPT

## 2018-01-01 PROCEDURE — 6370000000 HC RX 637 (ALT 250 FOR IP): Performed by: SPECIALIST

## 2018-01-01 PROCEDURE — 94760 N-INVAS EAR/PLS OXIMETRY 1: CPT

## 2018-01-01 PROCEDURE — 84100 ASSAY OF PHOSPHORUS: CPT

## 2018-01-01 PROCEDURE — 97161 PT EVAL LOW COMPLEX 20 MIN: CPT

## 2018-01-01 PROCEDURE — 2000000000 HC ICU R&B

## 2018-01-01 PROCEDURE — 3700000001 HC ADD 15 MINUTES (ANESTHESIA)

## 2018-01-01 PROCEDURE — 94150 VITAL CAPACITY TEST: CPT

## 2018-01-01 PROCEDURE — 83880 ASSAY OF NATRIURETIC PEPTIDE: CPT

## 2018-01-01 PROCEDURE — 86923 COMPATIBILITY TEST ELECTRIC: CPT

## 2018-01-01 PROCEDURE — 83036 HEMOGLOBIN GLYCOSYLATED A1C: CPT

## 2018-01-01 PROCEDURE — 89051 BODY FLUID CELL COUNT: CPT

## 2018-01-01 PROCEDURE — 3700000000 HC ANESTHESIA ATTENDED CARE

## 2018-01-01 PROCEDURE — 36600 WITHDRAWAL OF ARTERIAL BLOOD: CPT

## 2018-01-01 PROCEDURE — 3600000014 HC SURGERY LEVEL 4 ADDTL 15MIN: Performed by: NEUROLOGICAL SURGERY

## 2018-01-01 PROCEDURE — 87205 SMEAR GRAM STAIN: CPT

## 2018-01-01 PROCEDURE — G8988 SELF CARE GOAL STATUS: HCPCS

## 2018-01-01 PROCEDURE — 86900 BLOOD TYPING SEROLOGIC ABO: CPT

## 2018-01-01 PROCEDURE — 99221 1ST HOSP IP/OBS SF/LOW 40: CPT | Performed by: INTERNAL MEDICINE

## 2018-01-01 PROCEDURE — 85014 HEMATOCRIT: CPT

## 2018-01-01 PROCEDURE — 96365 THER/PROPH/DIAG IV INF INIT: CPT

## 2018-01-01 PROCEDURE — 94761 N-INVAS EAR/PLS OXIMETRY MLT: CPT

## 2018-01-01 PROCEDURE — C9113 INJ PANTOPRAZOLE SODIUM, VIA: HCPCS | Performed by: INTERNAL MEDICINE

## 2018-01-01 PROCEDURE — 3700000000 HC ANESTHESIA ATTENDED CARE: Performed by: SPECIALIST

## 2018-01-01 PROCEDURE — 2580000003 HC RX 258: Performed by: NEUROLOGICAL SURGERY

## 2018-01-01 PROCEDURE — 85018 HEMOGLOBIN: CPT

## 2018-01-01 PROCEDURE — 2580000003 HC RX 258: Performed by: SPECIALIST

## 2018-01-01 PROCEDURE — 3700000001 HC ADD 15 MINUTES (ANESTHESIA): Performed by: SPECIALIST

## 2018-01-01 PROCEDURE — G8996 SWALLOW CURRENT STATUS: HCPCS

## 2018-01-01 PROCEDURE — 6360000002 HC RX W HCPCS

## 2018-01-01 PROCEDURE — 3609027000 HC COLONOSCOPY: Performed by: SPECIALIST

## 2018-01-01 PROCEDURE — 2580000003 HC RX 258: Performed by: EMERGENCY MEDICINE

## 2018-01-01 PROCEDURE — G8978 MOBILITY CURRENT STATUS: HCPCS

## 2018-01-01 PROCEDURE — 87086 URINE CULTURE/COLONY COUNT: CPT

## 2018-01-01 PROCEDURE — 99233 SBSQ HOSP IP/OBS HIGH 50: CPT | Performed by: INTERNAL MEDICINE

## 2018-01-01 PROCEDURE — 7100000001 HC PACU RECOVERY - ADDTL 15 MIN: Performed by: SPECIALIST

## 2018-01-01 PROCEDURE — 99231 SBSQ HOSP IP/OBS SF/LOW 25: CPT | Performed by: PHYSICIAN ASSISTANT

## 2018-01-01 PROCEDURE — G8979 MOBILITY GOAL STATUS: HCPCS

## 2018-01-01 PROCEDURE — A9540 TC99M MAA: HCPCS | Performed by: PHYSICIAN ASSISTANT

## 2018-01-01 PROCEDURE — 99305 1ST NF CARE MODERATE MDM 35: CPT | Performed by: INTERNAL MEDICINE

## 2018-01-01 PROCEDURE — 84157 ASSAY OF PROTEIN OTHER: CPT

## 2018-01-01 PROCEDURE — 96375 TX/PRO/DX INJ NEW DRUG ADDON: CPT

## 2018-01-01 PROCEDURE — 93970 EXTREMITY STUDY: CPT

## 2018-01-01 PROCEDURE — 3700000001 HC ADD 15 MINUTES (ANESTHESIA): Performed by: OTOLARYNGOLOGY

## 2018-01-01 PROCEDURE — 2580000003 HC RX 258: Performed by: NURSE PRACTITIONER

## 2018-01-01 PROCEDURE — 99348 HOME/RES VST EST LOW MDM 30: CPT | Performed by: NURSE PRACTITIONER

## 2018-01-01 PROCEDURE — 87804 INFLUENZA ASSAY W/OPTIC: CPT

## 2018-01-01 PROCEDURE — 87102 FUNGUS ISOLATION CULTURE: CPT

## 2018-01-01 PROCEDURE — 84443 ASSAY THYROID STIM HORMONE: CPT

## 2018-01-01 PROCEDURE — 87116 MYCOBACTERIA CULTURE: CPT

## 2018-01-01 PROCEDURE — 86850 RBC ANTIBODY SCREEN: CPT

## 2018-01-01 PROCEDURE — 6360000002 HC RX W HCPCS: Performed by: NURSE PRACTITIONER

## 2018-01-01 PROCEDURE — 74176 CT ABD & PELVIS W/O CONTRAST: CPT

## 2018-01-01 PROCEDURE — 87186 SC STD MICRODIL/AGAR DIL: CPT

## 2018-01-01 PROCEDURE — 5A1955Z RESPIRATORY VENTILATION, GREATER THAN 96 CONSECUTIVE HOURS: ICD-10-PCS | Performed by: INTERNAL MEDICINE

## 2018-01-01 PROCEDURE — 80074 ACUTE HEPATITIS PANEL: CPT

## 2018-01-01 PROCEDURE — 97165 OT EVAL LOW COMPLEX 30 MIN: CPT

## 2018-01-01 PROCEDURE — 6370000000 HC RX 637 (ALT 250 FOR IP): Performed by: SURGERY

## 2018-01-01 PROCEDURE — 96372 THER/PROPH/DIAG INJ SC/IM: CPT

## 2018-01-01 PROCEDURE — 87070 CULTURE OTHR SPECIMN AEROBIC: CPT

## 2018-01-01 PROCEDURE — 99306 1ST NF CARE HIGH MDM 50: CPT | Performed by: INTERNAL MEDICINE

## 2018-01-01 PROCEDURE — 93296 REM INTERROG EVL PM/IDS: CPT

## 2018-01-01 PROCEDURE — 96366 THER/PROPH/DIAG IV INF ADDON: CPT

## 2018-01-01 PROCEDURE — 82945 GLUCOSE OTHER FLUID: CPT

## 2018-01-01 PROCEDURE — 82803 BLOOD GASES ANY COMBINATION: CPT

## 2018-01-01 PROCEDURE — 82553 CREATINE MB FRACTION: CPT

## 2018-01-01 PROCEDURE — 88305 TISSUE EXAM BY PATHOLOGIST: CPT

## 2018-01-01 PROCEDURE — G8997 SWALLOW GOAL STATUS: HCPCS

## 2018-01-01 PROCEDURE — 2709999900 HC NON-CHARGEABLE SUPPLY: Performed by: NEUROLOGICAL SURGERY

## 2018-01-01 PROCEDURE — 99204 OFFICE O/P NEW MOD 45 MIN: CPT | Performed by: RADIOLOGY

## 2018-01-01 PROCEDURE — 5A1D70Z PERFORMANCE OF URINARY FILTRATION, INTERMITTENT, LESS THAN 6 HOURS PER DAY: ICD-10-PCS | Performed by: STUDENT IN AN ORGANIZED HEALTH CARE EDUCATION/TRAINING PROGRAM

## 2018-01-01 PROCEDURE — 99310 SBSQ NF CARE HIGH MDM 45: CPT | Performed by: NURSE PRACTITIONER

## 2018-01-01 PROCEDURE — 94799 UNLISTED PULMONARY SVC/PX: CPT

## 2018-01-01 PROCEDURE — 97535 SELF CARE MNGMENT TRAINING: CPT

## 2018-01-01 PROCEDURE — 78582 LUNG VENTILAT&PERFUS IMAGING: CPT

## 2018-01-01 PROCEDURE — 95816 EEG AWAKE AND DROWSY: CPT

## 2018-01-01 PROCEDURE — 2580000003 HC RX 258: Performed by: OTOLARYNGOLOGY

## 2018-01-01 PROCEDURE — 86706 HEP B SURFACE ANTIBODY: CPT

## 2018-01-01 PROCEDURE — G8982 BODY POS GOAL STATUS: HCPCS

## 2018-01-01 PROCEDURE — 0W3Q0ZZ CONTROL BLEEDING IN RESPIRATORY TRACT, OPEN APPROACH: ICD-10-PCS | Performed by: OTOLARYNGOLOGY

## 2018-01-01 PROCEDURE — 80162 ASSAY OF DIGOXIN TOTAL: CPT

## 2018-01-01 PROCEDURE — 96368 THER/DIAG CONCURRENT INF: CPT

## 2018-01-01 PROCEDURE — 92610 EVALUATE SWALLOWING FUNCTION: CPT

## 2018-01-01 PROCEDURE — 81001 URINALYSIS AUTO W/SCOPE: CPT

## 2018-01-01 PROCEDURE — 36556 INSERT NON-TUNNEL CV CATH: CPT

## 2018-01-01 PROCEDURE — 6370000000 HC RX 637 (ALT 250 FOR IP): Performed by: NURSE PRACTITIONER

## 2018-01-01 PROCEDURE — C9254 INJECTION, LACOSAMIDE: HCPCS | Performed by: INTERNAL MEDICINE

## 2018-01-01 PROCEDURE — 87149 DNA/RNA DIRECT PROBE: CPT

## 2018-01-01 PROCEDURE — 2580000003 HC RX 258: Performed by: HOSPITALIST

## 2018-01-01 PROCEDURE — 3600000003 HC SURGERY LEVEL 3 BASE: Performed by: OTOLARYNGOLOGY

## 2018-01-01 PROCEDURE — 09JK8ZZ INSPECTION OF NASAL MUCOSA AND SOFT TISSUE, VIA NATURAL OR ARTIFICIAL OPENING ENDOSCOPIC: ICD-10-PCS | Performed by: OTOLARYNGOLOGY

## 2018-01-01 PROCEDURE — 96374 THER/PROPH/DIAG INJ IV PUSH: CPT

## 2018-01-01 PROCEDURE — 92611 MOTION FLUOROSCOPY/SWALLOW: CPT

## 2018-01-01 PROCEDURE — 7100000001 HC PACU RECOVERY - ADDTL 15 MIN: Performed by: NEUROLOGICAL SURGERY

## 2018-01-01 PROCEDURE — 88342 IMHCHEM/IMCYTCHM 1ST ANTB: CPT

## 2018-01-01 PROCEDURE — 86140 C-REACTIVE PROTEIN: CPT

## 2018-01-01 PROCEDURE — G8981 BODY POS CURRENT STATUS: HCPCS

## 2018-01-01 PROCEDURE — 97112 NEUROMUSCULAR REEDUCATION: CPT

## 2018-01-01 PROCEDURE — 99308 SBSQ NF CARE LOW MDM 20: CPT | Performed by: NURSE PRACTITIONER

## 2018-01-01 PROCEDURE — 85379 FIBRIN DEGRADATION QUANT: CPT

## 2018-01-01 PROCEDURE — 32554 ASPIRATE PLEURA W/O IMAGING: CPT

## 2018-01-01 PROCEDURE — 0BH18EZ INSERTION OF ENDOTRACHEAL AIRWAY INTO TRACHEA, VIA NATURAL OR ARTIFICIAL OPENING ENDOSCOPIC: ICD-10-PCS | Performed by: INTERNAL MEDICINE

## 2018-01-01 PROCEDURE — 2580000003 HC RX 258: Performed by: PERSONAL EMERGENCY RESPONSE ATTENDANT

## 2018-01-01 PROCEDURE — 6360000002 HC RX W HCPCS: Performed by: PERSONAL EMERGENCY RESPONSE ATTENDANT

## 2018-01-01 PROCEDURE — 92950 HEART/LUNG RESUSCITATION CPR: CPT

## 2018-01-01 PROCEDURE — 6360000002 HC RX W HCPCS: Performed by: NEUROLOGICAL SURGERY

## 2018-01-01 PROCEDURE — 99283 EMERGENCY DEPT VISIT LOW MDM: CPT

## 2018-01-01 PROCEDURE — 72131 CT LUMBAR SPINE W/O DYE: CPT

## 2018-01-01 PROCEDURE — 97530 THERAPEUTIC ACTIVITIES: CPT

## 2018-01-01 PROCEDURE — 32551 INSERTION OF CHEST TUBE: CPT

## 2018-01-01 PROCEDURE — 88112 CYTOPATH CELL ENHANCE TECH: CPT

## 2018-01-01 PROCEDURE — 97116 GAIT TRAINING THERAPY: CPT

## 2018-01-01 PROCEDURE — 76937 US GUIDE VASCULAR ACCESS: CPT | Performed by: RADIOLOGY

## 2018-01-01 PROCEDURE — 6360000002 HC RX W HCPCS: Performed by: EMERGENCY MEDICINE

## 2018-01-01 PROCEDURE — 6360000002 HC RX W HCPCS: Performed by: FAMILY MEDICINE

## 2018-01-01 PROCEDURE — 0DJ08ZZ INSPECTION OF UPPER INTESTINAL TRACT, VIA NATURAL OR ARTIFICIAL OPENING ENDOSCOPIC: ICD-10-PCS | Performed by: SPECIALIST

## 2018-01-01 PROCEDURE — 30901 CONTROL OF NOSEBLEED: CPT

## 2018-01-01 PROCEDURE — 2580000003 HC RX 258: Performed by: STUDENT IN AN ORGANIZED HEALTH CARE EDUCATION/TRAINING PROGRAM

## 2018-01-01 PROCEDURE — 82607 VITAMIN B-12: CPT

## 2018-01-01 PROCEDURE — 32562 LYSE CHEST FIBRIN SUBQ DAY: CPT | Performed by: INTERNAL MEDICINE

## 2018-01-01 PROCEDURE — 94002 VENT MGMT INPAT INIT DAY: CPT

## 2018-01-01 PROCEDURE — 84295 ASSAY OF SERUM SODIUM: CPT

## 2018-01-01 PROCEDURE — 02HV33Z INSERTION OF INFUSION DEVICE INTO SUPERIOR VENA CAVA, PERCUTANEOUS APPROACH: ICD-10-PCS | Performed by: INTERNAL MEDICINE

## 2018-01-01 PROCEDURE — 2580000003 HC RX 258: Performed by: FAMILY MEDICINE

## 2018-01-01 PROCEDURE — 0W9B3ZZ DRAINAGE OF LEFT PLEURAL CAVITY, PERCUTANEOUS APPROACH: ICD-10-PCS | Performed by: INTERNAL MEDICINE

## 2018-01-01 PROCEDURE — 83615 LACTATE (LD) (LDH) ENZYME: CPT

## 2018-01-01 PROCEDURE — 3700000000 HC ANESTHESIA ATTENDED CARE: Performed by: NEUROLOGICAL SURGERY

## 2018-01-01 PROCEDURE — P9016 RBC LEUKOCYTES REDUCED: HCPCS

## 2018-01-01 PROCEDURE — 6370000000 HC RX 637 (ALT 250 FOR IP): Performed by: PSYCHIATRY & NEUROLOGY

## 2018-01-01 PROCEDURE — 7100000000 HC PACU RECOVERY - FIRST 15 MIN: Performed by: NEUROLOGICAL SURGERY

## 2018-01-01 PROCEDURE — 01NB0ZZ RELEASE LUMBAR NERVE, OPEN APPROACH: ICD-10-PCS | Performed by: NEUROLOGICAL SURGERY

## 2018-01-01 PROCEDURE — 83550 IRON BINDING TEST: CPT

## 2018-01-01 PROCEDURE — 2500000003 HC RX 250 WO HCPCS: Performed by: NEUROLOGICAL SURGERY

## 2018-01-01 PROCEDURE — 6370000000 HC RX 637 (ALT 250 FOR IP): Performed by: OTOLARYNGOLOGY

## 2018-01-01 PROCEDURE — 99308 SBSQ NF CARE LOW MDM 20: CPT | Performed by: INTERNAL MEDICINE

## 2018-01-01 PROCEDURE — 36569 INSJ PICC 5 YR+ W/O IMAGING: CPT | Performed by: RADIOLOGY

## 2018-01-01 PROCEDURE — 96376 TX/PRO/DX INJ SAME DRUG ADON: CPT

## 2018-01-01 PROCEDURE — 73090 X-RAY EXAM OF FOREARM: CPT

## 2018-01-01 PROCEDURE — 6370000000 HC RX 637 (ALT 250 FOR IP): Performed by: FAMILY MEDICINE

## 2018-01-01 PROCEDURE — 02HV33Z INSERTION OF INFUSION DEVICE INTO SUPERIOR VENA CAVA, PERCUTANEOUS APPROACH: ICD-10-PCS | Performed by: RADIOLOGY

## 2018-01-01 PROCEDURE — 99221 1ST HOSP IP/OBS SF/LOW 40: CPT | Performed by: PHYSICIAN ASSISTANT

## 2018-01-01 PROCEDURE — 0DB68ZX EXCISION OF STOMACH, VIA NATURAL OR ARTIFICIAL OPENING ENDOSCOPIC, DIAGNOSTIC: ICD-10-PCS | Performed by: SPECIALIST

## 2018-01-01 PROCEDURE — 0W9930Z DRAINAGE OF RIGHT PLEURAL CAVITY WITH DRAINAGE DEVICE, PERCUTANEOUS APPROACH: ICD-10-PCS | Performed by: INTERNAL MEDICINE

## 2018-01-01 PROCEDURE — 97163 PT EVAL HIGH COMPLEX 45 MIN: CPT

## 2018-01-01 PROCEDURE — 80051 ELECTROLYTE PANEL: CPT

## 2018-01-01 PROCEDURE — 73610 X-RAY EXAM OF ANKLE: CPT

## 2018-01-01 PROCEDURE — 0CJS8ZZ INSPECTION OF LARYNX, VIA NATURAL OR ARTIFICIAL OPENING ENDOSCOPIC: ICD-10-PCS | Performed by: OTOLARYNGOLOGY

## 2018-01-01 PROCEDURE — 0DBN8ZX EXCISION OF SIGMOID COLON, VIA NATURAL OR ARTIFICIAL OPENING ENDOSCOPIC, DIAGNOSTIC: ICD-10-PCS | Performed by: SPECIALIST

## 2018-01-01 PROCEDURE — 3600000013 HC SURGERY LEVEL 3 ADDTL 15MIN: Performed by: OTOLARYNGOLOGY

## 2018-01-01 PROCEDURE — 2709999900 HC NON-CHARGEABLE SUPPLY: Performed by: OTOLARYNGOLOGY

## 2018-01-01 PROCEDURE — 6360000002 HC RX W HCPCS: Performed by: PHYSICIAN ASSISTANT

## 2018-01-01 PROCEDURE — 7100000000 HC PACU RECOVERY - FIRST 15 MIN: Performed by: SPECIALIST

## 2018-01-01 PROCEDURE — 8010000000 HC HEMODIALYSIS ACUTE INPT

## 2018-01-01 PROCEDURE — 84145 PROCALCITONIN (PCT): CPT

## 2018-01-01 PROCEDURE — 84520 ASSAY OF UREA NITROGEN: CPT

## 2018-01-01 PROCEDURE — 87350 HEPATITIS BE AG IA: CPT

## 2018-01-01 PROCEDURE — 99223 1ST HOSP IP/OBS HIGH 75: CPT | Performed by: INTERNAL MEDICINE

## 2018-01-01 PROCEDURE — 72072 X-RAY EXAM THORAC SPINE 3VWS: CPT

## 2018-01-01 PROCEDURE — 83540 ASSAY OF IRON: CPT

## 2018-01-01 PROCEDURE — 99284 EMERGENCY DEPT VISIT MOD MDM: CPT

## 2018-01-01 PROCEDURE — 3209999900 FLUORO FOR SURGICAL PROCEDURES

## 2018-01-01 PROCEDURE — 80061 LIPID PANEL: CPT

## 2018-01-01 PROCEDURE — 77001 FLUOROGUIDE FOR VEIN DEVICE: CPT | Performed by: RADIOLOGY

## 2018-01-01 PROCEDURE — 3600000004 HC SURGERY LEVEL 4 BASE: Performed by: NEUROLOGICAL SURGERY

## 2018-01-01 PROCEDURE — 73503 X-RAY EXAM HIP UNI 4/> VIEWS: CPT

## 2018-01-01 PROCEDURE — 70486 CT MAXILLOFACIAL W/O DYE: CPT

## 2018-01-01 PROCEDURE — 99282 EMERGENCY DEPT VISIT SF MDM: CPT

## 2018-01-01 PROCEDURE — 83970 ASSAY OF PARATHORMONE: CPT

## 2018-01-01 PROCEDURE — 0CJY8ZZ INSPECTION OF MOUTH AND THROAT, VIA NATURAL OR ARTIFICIAL OPENING ENDOSCOPIC: ICD-10-PCS | Performed by: OTOLARYNGOLOGY

## 2018-01-01 PROCEDURE — C1769 GUIDE WIRE: HCPCS

## 2018-01-01 PROCEDURE — 5A1945Z RESPIRATORY VENTILATION, 24-96 CONSECUTIVE HOURS: ICD-10-PCS | Performed by: INTERNAL MEDICINE

## 2018-01-01 PROCEDURE — A9539 TC99M PENTETATE: HCPCS | Performed by: PHYSICIAN ASSISTANT

## 2018-01-01 PROCEDURE — 2580000003 HC RX 258: Performed by: ANESTHESIOLOGY

## 2018-01-01 PROCEDURE — A9540 TC99M MAA: HCPCS | Performed by: EMERGENCY MEDICINE

## 2018-01-01 PROCEDURE — 72148 MRI LUMBAR SPINE W/O DYE: CPT

## 2018-01-01 PROCEDURE — 82140 ASSAY OF AMMONIA: CPT

## 2018-01-01 PROCEDURE — A9539 TC99M PENTETATE: HCPCS | Performed by: EMERGENCY MEDICINE

## 2018-01-01 PROCEDURE — 96367 TX/PROPH/DG ADDL SEQ IV INF: CPT

## 2018-01-01 PROCEDURE — 3600000014 HC SURGERY LEVEL 4 ADDTL 15MIN: Performed by: OTOLARYNGOLOGY

## 2018-01-01 PROCEDURE — 97110 THERAPEUTIC EXERCISES: CPT

## 2018-01-01 PROCEDURE — 85347 COAGULATION TIME ACTIVATED: CPT

## 2018-01-01 PROCEDURE — 3700000001 HC ADD 15 MINUTES (ANESTHESIA): Performed by: NEUROLOGICAL SURGERY

## 2018-01-01 PROCEDURE — 74230 X-RAY XM SWLNG FUNCJ C+: CPT

## 2018-01-01 PROCEDURE — 29515 APPLICATION SHORT LEG SPLINT: CPT

## 2018-01-01 PROCEDURE — 73630 X-RAY EXAM OF FOOT: CPT

## 2018-01-01 PROCEDURE — 82746 ASSAY OF FOLIC ACID SERUM: CPT

## 2018-01-01 PROCEDURE — 36591 DRAW BLOOD OFF VENOUS DEVICE: CPT

## 2018-01-01 PROCEDURE — 87340 HEPATITIS B SURFACE AG IA: CPT

## 2018-01-01 PROCEDURE — 82728 ASSAY OF FERRITIN: CPT

## 2018-01-01 PROCEDURE — 2Y41X5Z PACKING OF NASAL REGION USING PACKING MATERIAL: ICD-10-PCS | Performed by: OTOLARYNGOLOGY

## 2018-01-01 PROCEDURE — 2Y41X5Z PACKING OF NASAL REGION USING PACKING MATERIAL: ICD-10-PCS | Performed by: FAMILY MEDICINE

## 2018-01-01 PROCEDURE — 93280 PM DEVICE PROGR EVAL DUAL: CPT

## 2018-01-01 PROCEDURE — 3430000000 HC RX DIAGNOSTIC RADIOPHARMACEUTICAL: Performed by: PHYSICIAN ASSISTANT

## 2018-01-01 RX ORDER — ATORVASTATIN CALCIUM 80 MG/1
80 TABLET, FILM COATED ORAL NIGHTLY
Qty: 30 TABLET | Refills: 3 | Status: ON HOLD | OUTPATIENT
Start: 2018-01-01 | End: 2018-01-01 | Stop reason: HOSPADM

## 2018-01-01 RX ORDER — SODIUM CHLORIDE 9 MG/ML
INJECTION, SOLUTION INTRAVENOUS
Status: DISPENSED
Start: 2018-01-01 | End: 2018-01-01

## 2018-01-01 RX ORDER — DIGOXIN 0.25 MG/ML
250 INJECTION INTRAMUSCULAR; INTRAVENOUS EVERY 6 HOURS
Status: COMPLETED | OUTPATIENT
Start: 2018-01-01 | End: 2018-01-01

## 2018-01-01 RX ORDER — ALBUTEROL SULFATE 2.5 MG/3ML
2.5 SOLUTION RESPIRATORY (INHALATION)
Status: DISCONTINUED | OUTPATIENT
Start: 2018-01-01 | End: 2018-01-01

## 2018-01-01 RX ORDER — METOCLOPRAMIDE HYDROCHLORIDE 5 MG/ML
10 INJECTION INTRAMUSCULAR; INTRAVENOUS
Status: DISCONTINUED | OUTPATIENT
Start: 2018-01-01 | End: 2018-01-01

## 2018-01-01 RX ORDER — IRON POLYSACCHARIDE COMPLEX 150 MG
150 CAPSULE ORAL DAILY
Status: DISCONTINUED | OUTPATIENT
Start: 2018-01-01 | End: 2018-01-01

## 2018-01-01 RX ORDER — METOPROLOL TARTRATE 5 MG/5ML
5 INJECTION INTRAVENOUS EVERY 6 HOURS
Status: DISCONTINUED | OUTPATIENT
Start: 2018-01-01 | End: 2018-01-01

## 2018-01-01 RX ORDER — CHOLESTYRAMINE LIGHT 4 G/5.7G
2 POWDER, FOR SUSPENSION ORAL 3 TIMES DAILY PRN
Status: DISCONTINUED | OUTPATIENT
Start: 2018-01-01 | End: 2018-01-01 | Stop reason: HOSPADM

## 2018-01-01 RX ORDER — IPRATROPIUM BROMIDE AND ALBUTEROL SULFATE 2.5; .5 MG/3ML; MG/3ML
3 SOLUTION RESPIRATORY (INHALATION) EVERY 4 HOURS PRN
Status: DISCONTINUED | OUTPATIENT
Start: 2018-01-01 | End: 2018-01-01

## 2018-01-01 RX ORDER — DOCUSATE SODIUM 100 MG/1
100 CAPSULE, LIQUID FILLED ORAL DAILY
Status: DISCONTINUED | OUTPATIENT
Start: 2018-01-01 | End: 2018-01-01 | Stop reason: HOSPADM

## 2018-01-01 RX ORDER — HEPARIN SODIUM 5000 [USP'U]/ML
5000 INJECTION, SOLUTION INTRAVENOUS; SUBCUTANEOUS EVERY 12 HOURS SCHEDULED
Status: DISCONTINUED | OUTPATIENT
Start: 2018-01-01 | End: 2018-01-01 | Stop reason: HOSPADM

## 2018-01-01 RX ORDER — SODIUM CHLORIDE 0.9 % (FLUSH) 0.9 %
10 SYRINGE (ML) INJECTION EVERY 12 HOURS SCHEDULED
Status: DISCONTINUED | OUTPATIENT
Start: 2018-01-01 | End: 2018-01-01 | Stop reason: HOSPADM

## 2018-01-01 RX ORDER — OXYCODONE HYDROCHLORIDE AND ACETAMINOPHEN 5; 325 MG/1; MG/1
1 TABLET ORAL EVERY 4 HOURS PRN
Status: DISCONTINUED | OUTPATIENT
Start: 2018-01-01 | End: 2018-01-01 | Stop reason: HOSPADM

## 2018-01-01 RX ORDER — WARFARIN SODIUM 3 MG/1
1.5 TABLET ORAL DAILY
Status: DISCONTINUED | OUTPATIENT
Start: 2018-01-01 | End: 2018-01-01

## 2018-01-01 RX ORDER — AMIODARONE HYDROCHLORIDE 200 MG/1
200 TABLET ORAL DAILY
Status: DISCONTINUED | OUTPATIENT
Start: 2018-01-01 | End: 2018-01-01 | Stop reason: HOSPADM

## 2018-01-01 RX ORDER — CLONIDINE HYDROCHLORIDE 0.1 MG/1
0.1 TABLET ORAL 3 TIMES DAILY
Status: DISCONTINUED | OUTPATIENT
Start: 2018-01-01 | End: 2018-01-01 | Stop reason: HOSPADM

## 2018-01-01 RX ORDER — HYDROXYZINE HYDROCHLORIDE 10 MG/1
10 TABLET, FILM COATED ORAL 3 TIMES DAILY PRN
Status: DISCONTINUED | OUTPATIENT
Start: 2018-01-01 | End: 2018-01-01 | Stop reason: HOSPADM

## 2018-01-01 RX ORDER — CYCLOBENZAPRINE HCL 10 MG
10 TABLET ORAL 3 TIMES DAILY PRN
Qty: 30 TABLET | Refills: 0 | Status: SHIPPED | OUTPATIENT
Start: 2018-01-01 | End: 2018-01-01 | Stop reason: SDUPTHER

## 2018-01-01 RX ORDER — SODIUM CHLORIDE 9 MG/ML
INJECTION, SOLUTION INTRAVENOUS
Status: COMPLETED
Start: 2018-01-01 | End: 2018-01-01

## 2018-01-01 RX ORDER — LOSARTAN POTASSIUM 50 MG/1
50 TABLET ORAL DAILY
Status: DISCONTINUED | OUTPATIENT
Start: 2018-01-01 | End: 2018-01-01

## 2018-01-01 RX ORDER — DILTIAZEM HYDROCHLORIDE 120 MG/1
240 CAPSULE, EXTENDED RELEASE ORAL 2 TIMES DAILY
Status: DISCONTINUED | OUTPATIENT
Start: 2018-01-01 | End: 2018-01-01 | Stop reason: HOSPADM

## 2018-01-01 RX ORDER — CLONIDINE HYDROCHLORIDE 0.1 MG/1
0.1 TABLET ORAL 2 TIMES DAILY
Status: ON HOLD | DISCHARGE
Start: 2018-01-01 | End: 2018-01-01

## 2018-01-01 RX ORDER — MIRTAZAPINE 15 MG/1
15 TABLET, FILM COATED ORAL NIGHTLY
Status: DISCONTINUED | OUTPATIENT
Start: 2018-01-01 | End: 2018-01-01 | Stop reason: HOSPADM

## 2018-01-01 RX ORDER — PROPOFOL 10 MG/ML
INJECTION, EMULSION INTRAVENOUS
Status: COMPLETED
Start: 2018-01-01 | End: 2018-01-01

## 2018-01-01 RX ORDER — FAMOTIDINE 20 MG/1
20 TABLET, FILM COATED ORAL DAILY
Status: DISCONTINUED | OUTPATIENT
Start: 2018-01-01 | End: 2018-01-01

## 2018-01-01 RX ORDER — SODIUM CHLORIDE 9 MG/ML
INJECTION, SOLUTION INTRAVENOUS CONTINUOUS PRN
Status: DISCONTINUED | OUTPATIENT
Start: 2018-01-01 | End: 2018-01-01 | Stop reason: SDUPTHER

## 2018-01-01 RX ORDER — FOLIC ACID 1 MG/1
1 TABLET ORAL DAILY
Status: DISCONTINUED | OUTPATIENT
Start: 2018-01-01 | End: 2018-01-01

## 2018-01-01 RX ORDER — DIPHENHYDRAMINE HYDROCHLORIDE, ZINC ACETATE 2; .1 G/100G; G/100G
CREAM TOPICAL 3 TIMES DAILY PRN
Status: DISCONTINUED | OUTPATIENT
Start: 2018-01-01 | End: 2018-01-01 | Stop reason: HOSPADM

## 2018-01-01 RX ORDER — SODIUM CHLORIDE 9 MG/ML
INJECTION, SOLUTION INTRAVENOUS ONCE
Status: COMPLETED | OUTPATIENT
Start: 2018-01-01 | End: 2018-01-01

## 2018-01-01 RX ORDER — HEPARIN SODIUM 1000 [USP'U]/ML
1000 INJECTION, SOLUTION INTRAVENOUS; SUBCUTANEOUS PRN
Status: DISCONTINUED | OUTPATIENT
Start: 2018-01-01 | End: 2018-01-01 | Stop reason: HOSPADM

## 2018-01-01 RX ORDER — ACETAMINOPHEN 325 MG/1
650 TABLET ORAL EVERY 4 HOURS PRN
Status: DISCONTINUED | OUTPATIENT
Start: 2018-01-01 | End: 2018-01-01 | Stop reason: HOSPADM

## 2018-01-01 RX ORDER — AMIODARONE HYDROCHLORIDE 200 MG/1
400 TABLET ORAL 2 TIMES DAILY
Status: DISCONTINUED | OUTPATIENT
Start: 2018-01-01 | End: 2018-01-01

## 2018-01-01 RX ORDER — SCOLOPAMINE TRANSDERMAL SYSTEM 1 MG/1
1 PATCH, EXTENDED RELEASE TRANSDERMAL
Status: CANCELLED | OUTPATIENT
Start: 2018-01-01

## 2018-01-01 RX ORDER — CHOLECALCIFEROL (VITAMIN D3) 10 MCG
1 TABLET ORAL DAILY
Status: DISCONTINUED | OUTPATIENT
Start: 2018-01-01 | End: 2018-01-01 | Stop reason: HOSPADM

## 2018-01-01 RX ORDER — SODIUM CHLORIDE 0.9 % (FLUSH) 0.9 %
10 SYRINGE (ML) INJECTION PRN
Status: DISCONTINUED | OUTPATIENT
Start: 2018-01-01 | End: 2018-01-01 | Stop reason: HOSPADM

## 2018-01-01 RX ORDER — ASCORBIC ACID, THIAMINE MONONITRATE,RIBOFLAVIN, NIACINAMIDE, PYRIDOXINE HYDROCHLORIDE, FOLIC ACID, CYANOCOBALAMIN, BIOTIN, CALCIUM PANTOTHENATE, 100; 1.5; 1.7; 20; 10; 1; 6000; 150000; 5 MG/1; MG/1; MG/1; MG/1; MG/1; MG/1; UG/1; UG/1; MG/1
1 CAPSULE, LIQUID FILLED ORAL DAILY
Status: ON HOLD | COMMUNITY
End: 2018-01-01 | Stop reason: HOSPADM

## 2018-01-01 RX ORDER — DILTIAZEM HYDROCHLORIDE 180 MG/1
180 CAPSULE, COATED, EXTENDED RELEASE ORAL 2 TIMES DAILY
Status: DISCONTINUED | OUTPATIENT
Start: 2018-01-01 | End: 2018-01-01

## 2018-01-01 RX ORDER — 0.9 % SODIUM CHLORIDE 0.9 %
250 INTRAVENOUS SOLUTION INTRAVENOUS ONCE
Status: COMPLETED | OUTPATIENT
Start: 2018-01-01 | End: 2018-01-01

## 2018-01-01 RX ORDER — FLUTICASONE PROPIONATE 50 MCG
2 SPRAY, SUSPENSION (ML) NASAL DAILY
Status: DISCONTINUED | OUTPATIENT
Start: 2018-01-01 | End: 2018-01-01 | Stop reason: HOSPADM

## 2018-01-01 RX ORDER — ALBUMIN (HUMAN) 12.5 G/50ML
25 SOLUTION INTRAVENOUS DAILY PRN
Status: DISCONTINUED | OUTPATIENT
Start: 2018-01-01 | End: 2018-01-01 | Stop reason: HOSPADM

## 2018-01-01 RX ORDER — FENTANYL CITRATE 50 UG/ML
INJECTION, SOLUTION INTRAMUSCULAR; INTRAVENOUS PRN
Status: DISCONTINUED | OUTPATIENT
Start: 2018-01-01 | End: 2018-01-01 | Stop reason: SDUPTHER

## 2018-01-01 RX ORDER — NICOTINE POLACRILEX 4 MG
15 LOZENGE BUCCAL PRN
Status: DISCONTINUED | OUTPATIENT
Start: 2018-01-01 | End: 2018-01-01 | Stop reason: HOSPADM

## 2018-01-01 RX ORDER — PROPOFOL 10 MG/ML
10 INJECTION, EMULSION INTRAVENOUS
Status: DISCONTINUED | OUTPATIENT
Start: 2018-01-01 | End: 2018-01-01 | Stop reason: ALTCHOICE

## 2018-01-01 RX ORDER — PEG-3350, SODIUM SULFATE, SODIUM CHLORIDE, POTASSIUM CHLORIDE, SODIUM ASCORBATE AND ASCORBIC ACID 7.5-2.691G
100 KIT ORAL ONCE
Status: COMPLETED | OUTPATIENT
Start: 2018-01-01 | End: 2018-01-01

## 2018-01-01 RX ORDER — VENLAFAXINE HYDROCHLORIDE 75 MG/1
150 CAPSULE, EXTENDED RELEASE ORAL
Status: DISCONTINUED | OUTPATIENT
Start: 2018-01-01 | End: 2018-01-01 | Stop reason: HOSPADM

## 2018-01-01 RX ORDER — POTASSIUM CHLORIDE 20 MEQ/1
40 TABLET, EXTENDED RELEASE ORAL 2 TIMES DAILY WITH MEALS
Status: COMPLETED | OUTPATIENT
Start: 2018-01-01 | End: 2018-01-01

## 2018-01-01 RX ORDER — LEVETIRACETAM 500 MG/1
1000 TABLET ORAL 2 TIMES DAILY
Status: DISCONTINUED | OUTPATIENT
Start: 2018-01-01 | End: 2018-01-01 | Stop reason: HOSPADM

## 2018-01-01 RX ORDER — LEVOTHYROXINE SODIUM 0.03 MG/1
TABLET ORAL
Qty: 45 TABLET | Refills: 1 | Status: SHIPPED | OUTPATIENT
Start: 2018-01-01 | End: 2018-01-01

## 2018-01-01 RX ORDER — DEXTROSE MONOHYDRATE 25 G/50ML
12.5 INJECTION, SOLUTION INTRAVENOUS PRN
Status: DISCONTINUED | OUTPATIENT
Start: 2018-01-01 | End: 2018-01-01 | Stop reason: HOSPADM

## 2018-01-01 RX ORDER — WARFARIN SODIUM 1 MG/1
1.5 TABLET ORAL DAILY
Qty: 45 TABLET | Refills: 0 | Status: ON HOLD | OUTPATIENT
Start: 2018-01-01 | End: 2018-01-01 | Stop reason: CLARIF

## 2018-01-01 RX ORDER — HEPARIN SODIUM 1000 [USP'U]/ML
2000 INJECTION, SOLUTION INTRAVENOUS; SUBCUTANEOUS ONCE
Status: DISCONTINUED | OUTPATIENT
Start: 2018-01-01 | End: 2018-01-01

## 2018-01-01 RX ORDER — AMINO ACIDS/PROTEIN HYDROLYS 15G-100/30
30 LIQUID (ML) ORAL 2 TIMES DAILY
Status: DISCONTINUED | OUTPATIENT
Start: 2018-01-01 | End: 2018-01-01 | Stop reason: RX

## 2018-01-01 RX ORDER — ONDANSETRON 2 MG/ML
4 INJECTION INTRAMUSCULAR; INTRAVENOUS EVERY 6 HOURS PRN
Status: DISCONTINUED | OUTPATIENT
Start: 2018-01-01 | End: 2018-01-01 | Stop reason: HOSPADM

## 2018-01-01 RX ORDER — HYDROCODONE BITARTRATE AND ACETAMINOPHEN 5; 325 MG/1; MG/1
1 TABLET ORAL EVERY 6 HOURS PRN
Qty: 90 TABLET | Refills: 0 | Status: SHIPPED | OUTPATIENT
Start: 2018-01-01 | End: 2018-01-01 | Stop reason: SDUPTHER

## 2018-01-01 RX ORDER — PANTOPRAZOLE SODIUM 20 MG/1
20 TABLET, DELAYED RELEASE ORAL
Status: DISCONTINUED | OUTPATIENT
Start: 2018-01-01 | End: 2018-01-01 | Stop reason: HOSPADM

## 2018-01-01 RX ORDER — CALCIUM CARBONATE 500(1250)
500 TABLET ORAL DAILY
Status: DISCONTINUED | OUTPATIENT
Start: 2018-01-01 | End: 2018-01-01 | Stop reason: HOSPADM

## 2018-01-01 RX ORDER — 0.9 % SODIUM CHLORIDE 0.9 %
10 VIAL (ML) INJECTION DAILY
Status: DISCONTINUED | OUTPATIENT
Start: 2018-01-01 | End: 2018-01-01 | Stop reason: ALTCHOICE

## 2018-01-01 RX ORDER — ALBUMIN (HUMAN) 12.5 G/50ML
50 SOLUTION INTRAVENOUS ONCE
Status: COMPLETED | OUTPATIENT
Start: 2018-01-01 | End: 2018-01-01

## 2018-01-01 RX ORDER — CLONIDINE HYDROCHLORIDE 0.1 MG/1
0.3 TABLET ORAL ONCE
Status: COMPLETED | OUTPATIENT
Start: 2018-01-01 | End: 2018-01-01

## 2018-01-01 RX ORDER — LEVOTHYROXINE SODIUM 0.05 MG/1
50 TABLET ORAL EVERY OTHER DAY
Status: DISCONTINUED | OUTPATIENT
Start: 2018-01-01 | End: 2018-01-01

## 2018-01-01 RX ORDER — VANCOMYCIN HYDROCHLORIDE 500 MG/100ML
15 INJECTION, SOLUTION INTRAVENOUS EVERY 12 HOURS
Status: DISCONTINUED | OUTPATIENT
Start: 2018-01-01 | End: 2018-01-01

## 2018-01-01 RX ORDER — HYDROCODONE BITARTRATE AND ACETAMINOPHEN 5; 325 MG/1; MG/1
1 TABLET ORAL PRN
Status: DISCONTINUED | OUTPATIENT
Start: 2018-01-01 | End: 2018-01-01 | Stop reason: HOSPADM

## 2018-01-01 RX ORDER — METOPROLOL TARTRATE 5 MG/5ML
5 INJECTION INTRAVENOUS EVERY 4 HOURS
Status: DISCONTINUED | OUTPATIENT
Start: 2018-01-01 | End: 2018-01-01

## 2018-01-01 RX ORDER — LEVOTHYROXINE SODIUM 0.03 MG/1
25 TABLET ORAL DAILY
Status: DISCONTINUED | OUTPATIENT
Start: 2018-01-01 | End: 2018-01-01

## 2018-01-01 RX ORDER — IPRATROPIUM BROMIDE AND ALBUTEROL SULFATE 2.5; .5 MG/3ML; MG/3ML
3 SOLUTION RESPIRATORY (INHALATION) 3 TIMES DAILY
Status: DISCONTINUED | OUTPATIENT
Start: 2018-01-01 | End: 2018-01-01 | Stop reason: HOSPADM

## 2018-01-01 RX ORDER — ACETAMINOPHEN 325 MG/1
650 TABLET ORAL EVERY 6 HOURS PRN
Status: DISCONTINUED | OUTPATIENT
Start: 2018-01-01 | End: 2018-01-01 | Stop reason: HOSPADM

## 2018-01-01 RX ORDER — PRIMIDONE 50 MG/1
50 TABLET ORAL EVERY 8 HOURS SCHEDULED
Status: DISCONTINUED | OUTPATIENT
Start: 2018-01-01 | End: 2018-01-01 | Stop reason: HOSPADM

## 2018-01-01 RX ORDER — HYDRALAZINE HYDROCHLORIDE 50 MG/1
50 TABLET, FILM COATED ORAL EVERY 8 HOURS SCHEDULED
Status: DISCONTINUED | OUTPATIENT
Start: 2018-01-01 | End: 2018-01-01

## 2018-01-01 RX ORDER — LABETALOL HYDROCHLORIDE 5 MG/ML
10 INJECTION, SOLUTION INTRAVENOUS EVERY 6 HOURS PRN
Status: DISCONTINUED | OUTPATIENT
Start: 2018-01-01 | End: 2018-01-01 | Stop reason: HOSPADM

## 2018-01-01 RX ORDER — METOPROLOL TARTRATE 5 MG/5ML
2.5 INJECTION INTRAVENOUS
Status: DISCONTINUED | OUTPATIENT
Start: 2018-01-01 | End: 2018-01-01

## 2018-01-01 RX ORDER — AMINO ACIDS/PROTEIN HYDROLYS 15G-100/30
LIQUID (ML) ORAL 2 TIMES DAILY
Status: DISCONTINUED | OUTPATIENT
Start: 2018-01-01 | End: 2018-01-01 | Stop reason: CLARIF

## 2018-01-01 RX ORDER — LOSARTAN POTASSIUM 50 MG/1
50 TABLET ORAL DAILY
Status: DISCONTINUED | OUTPATIENT
Start: 2018-01-01 | End: 2018-01-01 | Stop reason: HOSPADM

## 2018-01-01 RX ORDER — ACETAMINOPHEN 160 MG/5ML
650 SOLUTION ORAL ONCE
Status: DISCONTINUED | OUTPATIENT
Start: 2018-01-01 | End: 2018-01-01

## 2018-01-01 RX ORDER — LEVOTHYROXINE SODIUM 0.03 MG/1
25 TABLET ORAL EVERY OTHER DAY
Status: DISCONTINUED | OUTPATIENT
Start: 2018-01-01 | End: 2018-01-01

## 2018-01-01 RX ORDER — LOSARTAN POTASSIUM 50 MG/1
100 TABLET ORAL DAILY
Status: DISCONTINUED | OUTPATIENT
Start: 2018-01-01 | End: 2018-01-01

## 2018-01-01 RX ORDER — CEFUROXIME AXETIL 500 MG/1
250 TABLET ORAL 2 TIMES DAILY
Status: DISCONTINUED | OUTPATIENT
Start: 2018-01-01 | End: 2018-01-01 | Stop reason: HOSPADM

## 2018-01-01 RX ORDER — DILTIAZEM HYDROCHLORIDE 240 MG/1
240 CAPSULE, COATED, EXTENDED RELEASE ORAL 2 TIMES DAILY
Status: DISCONTINUED | OUTPATIENT
Start: 2018-01-01 | End: 2018-01-01 | Stop reason: HOSPADM

## 2018-01-01 RX ORDER — ALBUMIN (HUMAN) 12.5 G/50ML
25 SOLUTION INTRAVENOUS EVERY 8 HOURS
Status: DISCONTINUED | OUTPATIENT
Start: 2018-01-01 | End: 2018-01-01 | Stop reason: HOSPADM

## 2018-01-01 RX ORDER — AMINO ACIDS/PROTEIN HYDROLYS 15G-100/30
30 LIQUID (ML) ORAL 2 TIMES DAILY
Status: DISCONTINUED | OUTPATIENT
Start: 2018-01-01 | End: 2018-01-01 | Stop reason: CLARIF

## 2018-01-01 RX ORDER — PANTOPRAZOLE SODIUM 40 MG/1
40 TABLET, DELAYED RELEASE ORAL
Status: DISCONTINUED | OUTPATIENT
Start: 2018-01-01 | End: 2018-01-01 | Stop reason: HOSPADM

## 2018-01-01 RX ORDER — LORAZEPAM 2 MG/ML
2 INJECTION INTRAMUSCULAR ONCE
Status: COMPLETED | OUTPATIENT
Start: 2018-01-01 | End: 2018-01-01

## 2018-01-01 RX ORDER — HYDROCODONE BITARTRATE AND ACETAMINOPHEN 5; 325 MG/1; MG/1
2 TABLET ORAL PRN
Status: DISCONTINUED | OUTPATIENT
Start: 2018-01-01 | End: 2018-01-01 | Stop reason: HOSPADM

## 2018-01-01 RX ORDER — METOPROLOL TARTRATE 5 MG/5ML
5 INJECTION INTRAVENOUS
Status: DISCONTINUED | OUTPATIENT
Start: 2018-01-01 | End: 2018-01-01

## 2018-01-01 RX ORDER — 0.9 % SODIUM CHLORIDE 0.9 %
250 INTRAVENOUS SOLUTION INTRAVENOUS ONCE
Status: DISCONTINUED | OUTPATIENT
Start: 2018-01-01 | End: 2018-01-01 | Stop reason: HOSPADM

## 2018-01-01 RX ORDER — DOXYCYCLINE HYCLATE 100 MG/1
100 CAPSULE ORAL EVERY 12 HOURS SCHEDULED
Qty: 20 CAPSULE | Refills: 0 | Status: SHIPPED | OUTPATIENT
Start: 2018-01-01 | End: 2018-01-01

## 2018-01-01 RX ORDER — DILTIAZEM HYDROCHLORIDE 5 MG/ML
10 INJECTION INTRAVENOUS 2 TIMES DAILY
Status: DISCONTINUED | OUTPATIENT
Start: 2018-01-01 | End: 2018-01-01

## 2018-01-01 RX ORDER — DEXTROSE MONOHYDRATE 50 MG/ML
100 INJECTION, SOLUTION INTRAVENOUS PRN
Status: DISCONTINUED | OUTPATIENT
Start: 2018-01-01 | End: 2018-01-01 | Stop reason: HOSPADM

## 2018-01-01 RX ORDER — HEPARIN SODIUM 5000 [USP'U]/ML
5000 INJECTION, SOLUTION INTRAVENOUS; SUBCUTANEOUS EVERY 8 HOURS SCHEDULED
Status: DISCONTINUED | OUTPATIENT
Start: 2018-01-01 | End: 2018-01-01

## 2018-01-01 RX ORDER — SODIUM CHLORIDE 9 MG/ML
250 INJECTION, SOLUTION INTRAVENOUS PRN
Status: ACTIVE | OUTPATIENT
Start: 2018-01-01 | End: 2018-01-01

## 2018-01-01 RX ORDER — PREDNISONE 10 MG/1
10 TABLET ORAL 2 TIMES DAILY
Status: DISCONTINUED | OUTPATIENT
Start: 2018-01-01 | End: 2018-01-01 | Stop reason: HOSPADM

## 2018-01-01 RX ORDER — LIDOCAINE HYDROCHLORIDE 20 MG/ML
INJECTION, SOLUTION INFILTRATION; PERINEURAL PRN
Status: DISCONTINUED | OUTPATIENT
Start: 2018-01-01 | End: 2018-01-01 | Stop reason: SDUPTHER

## 2018-01-01 RX ORDER — LEVOTHYROXINE SODIUM 0.03 MG/1
25 TABLET ORAL DAILY
Status: ON HOLD | COMMUNITY
End: 2018-01-01 | Stop reason: SDUPTHER

## 2018-01-01 RX ORDER — GLYCOPYRROLATE 1 MG/5 ML
SYRINGE (ML) INTRAVENOUS PRN
Status: DISCONTINUED | OUTPATIENT
Start: 2018-01-01 | End: 2018-01-01 | Stop reason: SDUPTHER

## 2018-01-01 RX ORDER — LOSARTAN POTASSIUM 50 MG/1
50 TABLET ORAL DAILY
Status: ON HOLD | COMMUNITY
End: 2018-01-01 | Stop reason: HOSPADM

## 2018-01-01 RX ORDER — WARFARIN SODIUM 3 MG/1
1.5 TABLET ORAL
Status: COMPLETED | OUTPATIENT
Start: 2018-01-01 | End: 2018-01-01

## 2018-01-01 RX ORDER — OXYMETAZOLINE HYDROCHLORIDE 0.05 G/100ML
3 SPRAY NASAL ONCE
Status: DISCONTINUED | OUTPATIENT
Start: 2018-01-01 | End: 2018-01-01 | Stop reason: HOSPADM

## 2018-01-01 RX ORDER — SODIUM CHLORIDE 9 MG/ML
INJECTION, SOLUTION INTRAVENOUS CONTINUOUS
Status: DISCONTINUED | OUTPATIENT
Start: 2018-01-01 | End: 2018-01-01 | Stop reason: ALTCHOICE

## 2018-01-01 RX ORDER — MAGNESIUM HYDROXIDE 1200 MG/15ML
LIQUID ORAL CONTINUOUS PRN
Status: COMPLETED | OUTPATIENT
Start: 2018-01-01 | End: 2018-01-01

## 2018-01-01 RX ORDER — DILTIAZEM HYDROCHLORIDE 5 MG/ML
20 INJECTION INTRAVENOUS EVERY 6 HOURS
Status: DISCONTINUED | OUTPATIENT
Start: 2018-01-01 | End: 2018-01-01 | Stop reason: ALTCHOICE

## 2018-01-01 RX ORDER — HYDRALAZINE HYDROCHLORIDE 100 MG/1
100 TABLET, FILM COATED ORAL 3 TIMES DAILY
Qty: 90 TABLET | Refills: 3 | Status: ON HOLD | OUTPATIENT
Start: 2018-01-01 | End: 2018-01-01 | Stop reason: HOSPADM

## 2018-01-01 RX ORDER — DILTIAZEM HYDROCHLORIDE 120 MG/1
240 CAPSULE, EXTENDED RELEASE ORAL DAILY
Status: DISCONTINUED | OUTPATIENT
Start: 2018-01-01 | End: 2018-01-01 | Stop reason: HOSPADM

## 2018-01-01 RX ORDER — AMIODARONE HYDROCHLORIDE 200 MG/1
200 TABLET ORAL DAILY
Status: DISCONTINUED | OUTPATIENT
Start: 2018-01-01 | End: 2018-01-01

## 2018-01-01 RX ORDER — ACETAMINOPHEN 325 MG/1
650 TABLET ORAL EVERY 4 HOURS PRN
Status: DISCONTINUED | OUTPATIENT
Start: 2018-01-01 | End: 2018-01-01 | Stop reason: SDUPTHER

## 2018-01-01 RX ORDER — VANCOMYCIN HYDROCHLORIDE 500 MG/100ML
15 INJECTION, SOLUTION INTRAVENOUS SEE ADMIN INSTRUCTIONS
Status: DISCONTINUED | OUTPATIENT
Start: 2018-01-01 | End: 2018-01-01 | Stop reason: HOSPADM

## 2018-01-01 RX ORDER — ALBUMIN (HUMAN) 12.5 G/50ML
25 SOLUTION INTRAVENOUS
Status: COMPLETED | OUTPATIENT
Start: 2018-01-01 | End: 2018-01-01

## 2018-01-01 RX ORDER — WARFARIN SODIUM 3 MG/1
1.5 TABLET ORAL ONCE
Status: COMPLETED | OUTPATIENT
Start: 2018-01-01 | End: 2018-01-01

## 2018-01-01 RX ORDER — CHLORTHALIDONE 25 MG/1
25 TABLET ORAL DAILY
Status: DISCONTINUED | OUTPATIENT
Start: 2018-01-01 | End: 2018-01-01 | Stop reason: HOSPADM

## 2018-01-01 RX ORDER — 0.9 % SODIUM CHLORIDE 0.9 %
250 INTRAVENOUS SOLUTION INTRAVENOUS PRN
Status: ACTIVE | OUTPATIENT
Start: 2018-01-01 | End: 2018-01-01

## 2018-01-01 RX ORDER — DILTIAZEM HYDROCHLORIDE 240 MG/1
240 CAPSULE, COATED, EXTENDED RELEASE ORAL 2 TIMES DAILY
Qty: 60 CAPSULE | Refills: 3 | Status: SHIPPED | OUTPATIENT
Start: 2018-01-01 | End: 2018-01-01 | Stop reason: SDUPTHER

## 2018-01-01 RX ORDER — ISOSORBIDE MONONITRATE 60 MG/1
60 TABLET, EXTENDED RELEASE ORAL DAILY
Status: DISCONTINUED | OUTPATIENT
Start: 2018-01-01 | End: 2018-01-01 | Stop reason: HOSPADM

## 2018-01-01 RX ORDER — HYDRALAZINE HYDROCHLORIDE 20 MG/ML
5 INJECTION INTRAMUSCULAR; INTRAVENOUS
Status: DISCONTINUED | OUTPATIENT
Start: 2018-01-01 | End: 2018-01-01

## 2018-01-01 RX ORDER — HEPARIN SODIUM 1000 [USP'U]/ML
7000 INJECTION, SOLUTION INTRAVENOUS; SUBCUTANEOUS PRN
Status: DISCONTINUED | OUTPATIENT
Start: 2018-01-01 | End: 2018-01-01 | Stop reason: HOSPADM

## 2018-01-01 RX ORDER — HEPARIN SODIUM 1000 [USP'U]/ML
1000 INJECTION, SOLUTION INTRAVENOUS; SUBCUTANEOUS PRN
Status: DISCONTINUED | OUTPATIENT
Start: 2018-01-01 | End: 2018-01-01

## 2018-01-01 RX ORDER — DIPHENHYDRAMINE HYDROCHLORIDE 50 MG/ML
12.5 INJECTION INTRAMUSCULAR; INTRAVENOUS
Status: DISCONTINUED | OUTPATIENT
Start: 2018-01-01 | End: 2018-01-01

## 2018-01-01 RX ORDER — CHLORHEXIDINE GLUCONATE 0.12 MG/ML
15 RINSE ORAL 2 TIMES DAILY
Status: DISCONTINUED | OUTPATIENT
Start: 2018-01-01 | End: 2018-01-01 | Stop reason: ALTCHOICE

## 2018-01-01 RX ORDER — PROPOFOL 10 MG/ML
INJECTION, EMULSION INTRAVENOUS PRN
Status: DISCONTINUED | OUTPATIENT
Start: 2018-01-01 | End: 2018-01-01 | Stop reason: SDUPTHER

## 2018-01-01 RX ORDER — IPRATROPIUM BROMIDE AND ALBUTEROL SULFATE 2.5; .5 MG/3ML; MG/3ML
3 SOLUTION RESPIRATORY (INHALATION) EVERY 6 HOURS PRN
Status: DISCONTINUED | OUTPATIENT
Start: 2018-01-01 | End: 2018-01-01 | Stop reason: HOSPADM

## 2018-01-01 RX ORDER — MORPHINE SULFATE 4 MG/ML
4 INJECTION, SOLUTION INTRAMUSCULAR; INTRAVENOUS
Status: DISCONTINUED | OUTPATIENT
Start: 2018-01-01 | End: 2018-01-01 | Stop reason: HOSPADM

## 2018-01-01 RX ORDER — 0.9 % SODIUM CHLORIDE 0.9 %
1000 INTRAVENOUS SOLUTION INTRAVENOUS ONCE
Status: COMPLETED | OUTPATIENT
Start: 2018-01-01 | End: 2018-01-01

## 2018-01-01 RX ORDER — METHYLPREDNISOLONE SODIUM SUCCINATE 125 MG/2ML
125 INJECTION, POWDER, LYOPHILIZED, FOR SOLUTION INTRAMUSCULAR; INTRAVENOUS ONCE
Status: COMPLETED | OUTPATIENT
Start: 2018-01-01 | End: 2018-01-01

## 2018-01-01 RX ORDER — WARFARIN SODIUM 3 MG/1
3 TABLET ORAL
Status: DISCONTINUED | OUTPATIENT
Start: 2018-01-01 | End: 2018-01-01

## 2018-01-01 RX ORDER — FUROSEMIDE 10 MG/ML
40 INJECTION INTRAMUSCULAR; INTRAVENOUS 2 TIMES DAILY
Status: DISCONTINUED | OUTPATIENT
Start: 2018-01-01 | End: 2018-01-01

## 2018-01-01 RX ORDER — SUCCINYLCHOLINE/SOD CL,ISO/PF 100 MG/5ML
80 SYRINGE (ML) INTRAVENOUS ONCE
Status: COMPLETED | OUTPATIENT
Start: 2018-01-01 | End: 2018-01-01

## 2018-01-01 RX ORDER — HYDRALAZINE HYDROCHLORIDE 100 MG/1
100 TABLET, FILM COATED ORAL EVERY 8 HOURS SCHEDULED
Status: DISCONTINUED | OUTPATIENT
Start: 2018-01-01 | End: 2018-01-01 | Stop reason: HOSPADM

## 2018-01-01 RX ORDER — DILTIAZEM HYDROCHLORIDE 240 MG/1
240 CAPSULE, EXTENDED RELEASE ORAL 2 TIMES DAILY
Status: ON HOLD | COMMUNITY
End: 2018-01-01 | Stop reason: HOSPADM

## 2018-01-01 RX ORDER — INSULIN GLARGINE 100 [IU]/ML
14 INJECTION, SOLUTION SUBCUTANEOUS NIGHTLY
Status: DISCONTINUED | OUTPATIENT
Start: 2018-01-01 | End: 2018-01-01

## 2018-01-01 RX ORDER — ASPIRIN 300 MG/1
300 SUPPOSITORY RECTAL DAILY
Status: DISCONTINUED | OUTPATIENT
Start: 2018-01-01 | End: 2018-01-01 | Stop reason: ALTCHOICE

## 2018-01-01 RX ORDER — FENTANYL CITRATE 50 UG/ML
25 INJECTION, SOLUTION INTRAMUSCULAR; INTRAVENOUS
Status: DISCONTINUED | OUTPATIENT
Start: 2018-01-01 | End: 2018-01-01 | Stop reason: HOSPADM

## 2018-01-01 RX ORDER — DILTIAZEM HYDROCHLORIDE 240 MG/1
CAPSULE, COATED, EXTENDED RELEASE ORAL
Status: ON HOLD | COMMUNITY
Start: 2018-01-01 | End: 2018-01-01 | Stop reason: HOSPADM

## 2018-01-01 RX ORDER — ISOSORBIDE MONONITRATE 60 MG/1
60 TABLET, EXTENDED RELEASE ORAL DAILY
Status: DISCONTINUED | OUTPATIENT
Start: 2018-01-01 | End: 2018-01-01

## 2018-01-01 RX ORDER — SODIUM CHLORIDE 9 MG/ML
INJECTION, SOLUTION INTRAVENOUS CONTINUOUS
Status: DISCONTINUED | OUTPATIENT
Start: 2018-01-01 | End: 2018-01-01

## 2018-01-01 RX ORDER — LIDOCAINE 4 G/G
1 PATCH TOPICAL DAILY
Status: DISCONTINUED | OUTPATIENT
Start: 2018-01-01 | End: 2018-01-01

## 2018-01-01 RX ORDER — LEVOTHYROXINE SODIUM 0.05 MG/1
50 TABLET ORAL EVERY OTHER DAY
Status: DISCONTINUED | OUTPATIENT
Start: 2018-01-01 | End: 2018-01-01 | Stop reason: HOSPADM

## 2018-01-01 RX ORDER — NEOSTIGMINE METHYLSULFATE 1 MG/ML
INJECTION, SOLUTION INTRAVENOUS PRN
Status: DISCONTINUED | OUTPATIENT
Start: 2018-01-01 | End: 2018-01-01 | Stop reason: SDUPTHER

## 2018-01-01 RX ORDER — DIPHENHYDRAMINE HYDROCHLORIDE 50 MG/ML
12.5 INJECTION INTRAMUSCULAR; INTRAVENOUS
Status: DISCONTINUED | OUTPATIENT
Start: 2018-01-01 | End: 2018-01-01 | Stop reason: HOSPADM

## 2018-01-01 RX ORDER — IRON ASPGLY,PS/C/SUCCINIC ACID 150-50-50
1 CAPSULE ORAL DAILY
Status: DISCONTINUED | OUTPATIENT
Start: 2018-01-01 | End: 2018-01-01 | Stop reason: SDUPTHER

## 2018-01-01 RX ORDER — LIDOCAINE HYDROCHLORIDE 10 MG/ML
1 INJECTION, SOLUTION EPIDURAL; INFILTRATION; INTRACAUDAL; PERINEURAL
Status: DISCONTINUED | OUTPATIENT
Start: 2018-01-01 | End: 2018-01-01

## 2018-01-01 RX ORDER — MIRTAZAPINE 15 MG/1
15 TABLET, FILM COATED ORAL NIGHTLY
Qty: 30 TABLET | Refills: 3 | Status: ON HOLD | DISCHARGE
Start: 2018-01-01 | End: 2018-01-01

## 2018-01-01 RX ORDER — METRONIDAZOLE 250 MG/1
250 TABLET ORAL EVERY 8 HOURS SCHEDULED
Status: DISCONTINUED | OUTPATIENT
Start: 2018-01-01 | End: 2018-01-01 | Stop reason: HOSPADM

## 2018-01-01 RX ORDER — METOPROLOL TARTRATE 5 MG/5ML
10 INJECTION INTRAVENOUS
Status: DISCONTINUED | OUTPATIENT
Start: 2018-01-01 | End: 2018-01-01 | Stop reason: HOSPADM

## 2018-01-01 RX ORDER — ONDANSETRON 2 MG/ML
4 INJECTION INTRAMUSCULAR; INTRAVENOUS
Status: DISCONTINUED | OUTPATIENT
Start: 2018-01-01 | End: 2018-01-01 | Stop reason: HOSPADM

## 2018-01-01 RX ORDER — ASPIRIN 81 MG/1
81 TABLET ORAL DAILY
Status: DISCONTINUED | OUTPATIENT
Start: 2018-01-01 | End: 2018-01-01

## 2018-01-01 RX ORDER — AMIODARONE HYDROCHLORIDE 200 MG/1
200 TABLET ORAL 2 TIMES DAILY
Status: DISCONTINUED | OUTPATIENT
Start: 2018-01-01 | End: 2018-01-01

## 2018-01-01 RX ORDER — MIRTAZAPINE 15 MG/1
15 TABLET, FILM COATED ORAL NIGHTLY
Status: DISCONTINUED | OUTPATIENT
Start: 2018-01-01 | End: 2018-01-01

## 2018-01-01 RX ORDER — ATORVASTATIN CALCIUM 20 MG/1
20 TABLET, FILM COATED ORAL NIGHTLY
Status: DISCONTINUED | OUTPATIENT
Start: 2018-01-01 | End: 2018-01-01 | Stop reason: HOSPADM

## 2018-01-01 RX ORDER — DILTIAZEM HYDROCHLORIDE 120 MG/1
120 CAPSULE, COATED, EXTENDED RELEASE ORAL DAILY
Status: DISCONTINUED | OUTPATIENT
Start: 2018-01-01 | End: 2018-01-01

## 2018-01-01 RX ORDER — CHOLECALCIFEROL (VITAMIN D3) 10 MCG
1 TABLET ORAL DAILY
Status: ON HOLD | COMMUNITY
End: 2018-01-01 | Stop reason: HOSPADM

## 2018-01-01 RX ORDER — ROCURONIUM BROMIDE 10 MG/ML
INJECTION, SOLUTION INTRAVENOUS PRN
Status: DISCONTINUED | OUTPATIENT
Start: 2018-01-01 | End: 2018-01-01 | Stop reason: SDUPTHER

## 2018-01-01 RX ORDER — FLUCONAZOLE 2 MG/ML
200 INJECTION, SOLUTION INTRAVENOUS ONCE
Status: COMPLETED | OUTPATIENT
Start: 2018-01-01 | End: 2018-01-01

## 2018-01-01 RX ORDER — MINOXIDIL 2.5 MG/1
2.5 TABLET ORAL DAILY
Status: DISCONTINUED | OUTPATIENT
Start: 2018-01-01 | End: 2018-01-01 | Stop reason: HOSPADM

## 2018-01-01 RX ORDER — HYDRALAZINE HYDROCHLORIDE 100 MG/1
100 TABLET, FILM COATED ORAL 3 TIMES DAILY
Status: DISCONTINUED | OUTPATIENT
Start: 2018-01-01 | End: 2018-01-01 | Stop reason: HOSPADM

## 2018-01-01 RX ORDER — SODIUM CHLORIDE 0.9 % (FLUSH) 0.9 %
10 SYRINGE (ML) INJECTION PRN
Status: DISCONTINUED | OUTPATIENT
Start: 2018-01-01 | End: 2018-01-01 | Stop reason: ALTCHOICE

## 2018-01-01 RX ORDER — LEVOTHYROXINE SODIUM 0.03 MG/1
TABLET ORAL
Qty: 50 TABLET | Refills: 2 | Status: ON HOLD | OUTPATIENT
Start: 2018-01-01 | End: 2018-01-01 | Stop reason: HOSPADM

## 2018-01-01 RX ORDER — MINOXIDIL 2.5 MG/1
2.5 TABLET ORAL
Status: ON HOLD | COMMUNITY
Start: 2018-01-01 | End: 2018-01-01 | Stop reason: HOSPADM

## 2018-01-01 RX ORDER — LOSARTAN POTASSIUM 50 MG/1
100 TABLET ORAL DAILY
Status: DISCONTINUED | OUTPATIENT
Start: 2018-01-01 | End: 2018-01-01 | Stop reason: HOSPADM

## 2018-01-01 RX ORDER — SODIUM CHLORIDE 0.9 % (FLUSH) 0.9 %
10 SYRINGE (ML) INJECTION EVERY 12 HOURS SCHEDULED
Status: DISCONTINUED | OUTPATIENT
Start: 2018-01-01 | End: 2018-01-01

## 2018-01-01 RX ORDER — SODIUM CHLORIDE 0.9 % (FLUSH) 0.9 %
10 SYRINGE (ML) INJECTION EVERY 12 HOURS SCHEDULED
Status: DISCONTINUED | OUTPATIENT
Start: 2018-01-01 | End: 2018-01-01 | Stop reason: ALTCHOICE

## 2018-01-01 RX ORDER — IPRATROPIUM BROMIDE AND ALBUTEROL SULFATE 2.5; .5 MG/3ML; MG/3ML
3 SOLUTION RESPIRATORY (INHALATION) 3 TIMES DAILY
Status: DISCONTINUED | OUTPATIENT
Start: 2018-01-01 | End: 2018-01-01

## 2018-01-01 RX ORDER — DOCUSATE SODIUM 100 MG/1
100 CAPSULE, LIQUID FILLED ORAL DAILY
Qty: 30 CAPSULE | Refills: 1 | Status: ON HOLD | OUTPATIENT
Start: 2018-01-01 | End: 2018-01-01 | Stop reason: HOSPADM

## 2018-01-01 RX ORDER — FENTANYL CITRATE 50 UG/ML
50 INJECTION, SOLUTION INTRAMUSCULAR; INTRAVENOUS EVERY 10 MIN PRN
Status: DISCONTINUED | OUTPATIENT
Start: 2018-01-01 | End: 2018-01-01

## 2018-01-01 RX ORDER — INSULIN GLARGINE 100 [IU]/ML
INJECTION, SOLUTION SUBCUTANEOUS
Qty: 75 ML | Refills: 0 | Status: SHIPPED | OUTPATIENT
Start: 2018-01-01 | End: 2018-01-01 | Stop reason: HOSPADM

## 2018-01-01 RX ORDER — ISOSORBIDE DINITRATE 20 MG/1
60 TABLET ORAL 3 TIMES DAILY
Status: DISCONTINUED | OUTPATIENT
Start: 2018-01-01 | End: 2018-01-01

## 2018-01-01 RX ORDER — FLUTICASONE PROPIONATE 50 MCG
2 SPRAY, SUSPENSION (ML) NASAL DAILY
Status: DISCONTINUED | OUTPATIENT
Start: 2018-01-01 | End: 2018-01-01

## 2018-01-01 RX ORDER — ONDANSETRON 4 MG/1
4 TABLET, FILM COATED ORAL EVERY 8 HOURS PRN
Qty: 30 TABLET | Refills: 0 | Status: ON HOLD
Start: 2018-01-01 | End: 2018-01-01 | Stop reason: CLARIF

## 2018-01-01 RX ORDER — HYDROCODONE BITARTRATE AND ACETAMINOPHEN 5; 325 MG/1; MG/1
1 TABLET ORAL EVERY 6 HOURS PRN
COMMUNITY
End: 2018-01-01 | Stop reason: SDUPTHER

## 2018-01-01 RX ORDER — ONDANSETRON 2 MG/ML
4 INJECTION INTRAMUSCULAR; INTRAVENOUS
Status: DISCONTINUED | OUTPATIENT
Start: 2018-01-01 | End: 2018-01-01

## 2018-01-01 RX ORDER — FOLIC ACID 1 MG/1
1 TABLET ORAL DAILY
Status: DISCONTINUED | OUTPATIENT
Start: 2018-01-01 | End: 2018-01-01 | Stop reason: HOSPADM

## 2018-01-01 RX ORDER — DOCUSATE SODIUM 100 MG/1
100 CAPSULE, LIQUID FILLED ORAL 2 TIMES DAILY
Status: DISCONTINUED | OUTPATIENT
Start: 2018-01-01 | End: 2018-01-01 | Stop reason: DRUGHIGH

## 2018-01-01 RX ORDER — VENLAFAXINE HYDROCHLORIDE 150 MG/1
150 CAPSULE, EXTENDED RELEASE ORAL
Status: DISCONTINUED | OUTPATIENT
Start: 2018-01-01 | End: 2018-01-01

## 2018-01-01 RX ORDER — MORPHINE SULFATE 2 MG/ML
2 INJECTION, SOLUTION INTRAMUSCULAR; INTRAVENOUS
Status: DISCONTINUED | OUTPATIENT
Start: 2018-01-01 | End: 2018-01-01 | Stop reason: HOSPADM

## 2018-01-01 RX ORDER — SODIUM CHLORIDE 9 MG/ML
INJECTION, SOLUTION INTRAVENOUS
Status: DISCONTINUED
Start: 2018-01-01 | End: 2018-01-01 | Stop reason: HOSPADM

## 2018-01-01 RX ORDER — BENZONATATE 100 MG/1
100 CAPSULE ORAL 3 TIMES DAILY PRN
Status: DISCONTINUED | OUTPATIENT
Start: 2018-01-01 | End: 2018-01-01 | Stop reason: HOSPADM

## 2018-01-01 RX ORDER — ETOMIDATE 2 MG/ML
INJECTION INTRAVENOUS
Status: COMPLETED | OUTPATIENT
Start: 2018-01-01 | End: 2018-01-01

## 2018-01-01 RX ORDER — ATORVASTATIN CALCIUM 80 MG/1
80 TABLET, FILM COATED ORAL NIGHTLY
Status: DISCONTINUED | OUTPATIENT
Start: 2018-01-01 | End: 2018-01-01 | Stop reason: HOSPADM

## 2018-01-01 RX ORDER — AZITHROMYCIN 250 MG/1
250 TABLET, FILM COATED ORAL DAILY
Qty: 4 TABLET | Refills: 0 | Status: SHIPPED | OUTPATIENT
Start: 2018-01-01 | End: 2018-01-01 | Stop reason: CLARIF

## 2018-01-01 RX ORDER — POTASSIUM CHLORIDE 7.45 MG/ML
20 INJECTION INTRAVENOUS ONCE
Status: DISCONTINUED | OUTPATIENT
Start: 2018-01-01 | End: 2018-01-01 | Stop reason: CLARIF

## 2018-01-01 RX ORDER — ACETAMINOPHEN 80 MG
TABLET,CHEWABLE ORAL ONCE
Status: DISCONTINUED | OUTPATIENT
Start: 2018-01-01 | End: 2018-01-01 | Stop reason: HOSPADM

## 2018-01-01 RX ORDER — VENLAFAXINE HYDROCHLORIDE 150 MG/1
150 CAPSULE, EXTENDED RELEASE ORAL
Status: DISCONTINUED | OUTPATIENT
Start: 2018-01-01 | End: 2018-01-01 | Stop reason: HOSPADM

## 2018-01-01 RX ORDER — SODIUM CHLORIDE 0.9 % (FLUSH) 0.9 %
10 SYRINGE (ML) INJECTION PRN
Status: DISCONTINUED | OUTPATIENT
Start: 2018-01-01 | End: 2018-01-01

## 2018-01-01 RX ORDER — SODIUM BICARBONATE 650 MG/1
325 TABLET ORAL 3 TIMES DAILY
Status: DISCONTINUED | OUTPATIENT
Start: 2018-01-01 | End: 2018-01-01

## 2018-01-01 RX ORDER — INSULIN GLARGINE 100 [IU]/ML
18 INJECTION, SOLUTION SUBCUTANEOUS EVERY MORNING
Status: DISCONTINUED | OUTPATIENT
Start: 2018-01-01 | End: 2018-01-01

## 2018-01-01 RX ORDER — 0.9 % SODIUM CHLORIDE 0.9 %
10 VIAL (ML) INJECTION PRN
Status: DISCONTINUED | OUTPATIENT
Start: 2018-01-01 | End: 2018-01-01 | Stop reason: HOSPADM

## 2018-01-01 RX ORDER — WARFARIN SODIUM 2 MG/1
2 TABLET ORAL DAILY
Status: DISCONTINUED | OUTPATIENT
Start: 2018-01-01 | End: 2018-01-01

## 2018-01-01 RX ORDER — BENZONATATE 100 MG/1
100 CAPSULE ORAL 3 TIMES DAILY PRN
Qty: 20 CAPSULE | Refills: 0 | Status: ON HOLD | OUTPATIENT
Start: 2018-01-01 | End: 2018-01-01

## 2018-01-01 RX ORDER — LEVOTHYROXINE SODIUM 0.03 MG/1
25 TABLET ORAL EVERY OTHER DAY
Status: DISCONTINUED | OUTPATIENT
Start: 2018-01-01 | End: 2018-01-01 | Stop reason: HOSPADM

## 2018-01-01 RX ORDER — PRIMIDONE 50 MG/1
50 TABLET ORAL 3 TIMES DAILY
Status: DISCONTINUED | OUTPATIENT
Start: 2018-01-01 | End: 2018-01-01 | Stop reason: HOSPADM

## 2018-01-01 RX ORDER — ACETAMINOPHEN 325 MG/1
650 TABLET ORAL ONCE
Status: COMPLETED | OUTPATIENT
Start: 2018-01-01 | End: 2018-01-01

## 2018-01-01 RX ORDER — WARFARIN SODIUM 3 MG/1
3 TABLET ORAL
Status: COMPLETED | OUTPATIENT
Start: 2018-01-01 | End: 2018-01-01

## 2018-01-01 RX ORDER — CALCIUM CARBONATE 500(1250)
500 TABLET ORAL DAILY
Status: DISCONTINUED | OUTPATIENT
Start: 2018-01-01 | End: 2018-01-01

## 2018-01-01 RX ORDER — DILTIAZEM HYDROCHLORIDE 60 MG/1
120 TABLET, FILM COATED ORAL EVERY 6 HOURS SCHEDULED
Status: DISCONTINUED | OUTPATIENT
Start: 2018-01-01 | End: 2018-01-01 | Stop reason: ALTCHOICE

## 2018-01-01 RX ORDER — IPRATROPIUM BROMIDE AND ALBUTEROL SULFATE 2.5; .5 MG/3ML; MG/3ML
3 SOLUTION RESPIRATORY (INHALATION) EVERY 4 HOURS PRN
Status: DISCONTINUED | OUTPATIENT
Start: 2018-01-01 | End: 2018-01-01 | Stop reason: HOSPADM

## 2018-01-01 RX ORDER — ASPIRIN 81 MG/1
81 TABLET ORAL DAILY
Status: DISCONTINUED | OUTPATIENT
Start: 2018-01-01 | End: 2018-01-01 | Stop reason: HOSPADM

## 2018-01-01 RX ORDER — CAPSAICIN 0.07 G/100G
CREAM TOPICAL EVERY 12 HOURS PRN
Status: ON HOLD | COMMUNITY
End: 2018-01-01

## 2018-01-01 RX ORDER — SODIUM CHLORIDE 9 MG/ML
250 INJECTION, SOLUTION INTRAVENOUS ONCE
Status: COMPLETED | OUTPATIENT
Start: 2018-01-01 | End: 2018-01-01

## 2018-01-01 RX ORDER — LEVOTHYROXINE SODIUM 0.05 MG/1
50 TABLET ORAL DAILY
Status: ON HOLD | COMMUNITY
End: 2018-01-01 | Stop reason: HOSPADM

## 2018-01-01 RX ORDER — VANCOMYCIN HYDROCHLORIDE 125 MG/1
125 CAPSULE ORAL 3 TIMES DAILY
Qty: 40 CAPSULE | Refills: 0 | DISCHARGE
Start: 2018-01-01 | End: 2018-01-01

## 2018-01-01 RX ORDER — GABAPENTIN 100 MG/1
100 CAPSULE ORAL NIGHTLY
Status: DISCONTINUED | OUTPATIENT
Start: 2018-01-01 | End: 2018-01-01 | Stop reason: HOSPADM

## 2018-01-01 RX ORDER — SUCCINYLCHOLINE/SOD CL,ISO/PF 100 MG/5ML
SYRINGE (ML) INTRAVENOUS
Status: DISPENSED
Start: 2018-01-01 | End: 2018-01-01

## 2018-01-01 RX ORDER — SENNA AND DOCUSATE SODIUM 50; 8.6 MG/1; MG/1
1 TABLET, FILM COATED ORAL 2 TIMES DAILY
Status: DISCONTINUED | OUTPATIENT
Start: 2018-01-01 | End: 2018-01-01 | Stop reason: HOSPADM

## 2018-01-01 RX ORDER — DILTIAZEM HYDROCHLORIDE 120 MG/1
120 CAPSULE, EXTENDED RELEASE ORAL 2 TIMES DAILY
Status: DISCONTINUED | OUTPATIENT
Start: 2018-01-01 | End: 2018-01-01

## 2018-01-01 RX ORDER — GUAIFENESIN 100 MG/5ML
10 SOLUTION ORAL EVERY 4 HOURS PRN
Status: ON HOLD | COMMUNITY
End: 2018-01-01

## 2018-01-01 RX ORDER — FENTANYL CITRATE 50 UG/ML
50 INJECTION, SOLUTION INTRAMUSCULAR; INTRAVENOUS ONCE
Status: COMPLETED | OUTPATIENT
Start: 2018-01-01 | End: 2018-01-01

## 2018-01-01 RX ORDER — INSULIN GLARGINE 100 [IU]/ML
12 INJECTION, SOLUTION SUBCUTANEOUS EVERY MORNING
Status: DISCONTINUED | OUTPATIENT
Start: 2018-01-01 | End: 2018-01-01 | Stop reason: HOSPADM

## 2018-01-01 RX ORDER — DILTIAZEM HYDROCHLORIDE 120 MG/1
120 CAPSULE, COATED, EXTENDED RELEASE ORAL DAILY
Status: COMPLETED | OUTPATIENT
Start: 2018-01-01 | End: 2018-01-01

## 2018-01-01 RX ORDER — IPRATROPIUM BROMIDE AND ALBUTEROL SULFATE 2.5; .5 MG/3ML; MG/3ML
1 SOLUTION RESPIRATORY (INHALATION) CONTINUOUS PRN
Status: DISCONTINUED | OUTPATIENT
Start: 2018-01-01 | End: 2018-01-01

## 2018-01-01 RX ORDER — METOCLOPRAMIDE HYDROCHLORIDE 5 MG/ML
10 INJECTION INTRAMUSCULAR; INTRAVENOUS
Status: DISCONTINUED | OUTPATIENT
Start: 2018-01-01 | End: 2018-01-01 | Stop reason: HOSPADM

## 2018-01-01 RX ORDER — INSULIN GLARGINE 100 [IU]/ML
14 INJECTION, SOLUTION SUBCUTANEOUS NIGHTLY
Status: DISCONTINUED | OUTPATIENT
Start: 2018-01-01 | End: 2018-01-01 | Stop reason: HOSPADM

## 2018-01-01 RX ORDER — IPRATROPIUM BROMIDE AND ALBUTEROL SULFATE 2.5; .5 MG/3ML; MG/3ML
3 SOLUTION RESPIRATORY (INHALATION) EVERY 6 HOURS PRN
Status: DISCONTINUED | OUTPATIENT
Start: 2018-01-01 | End: 2018-01-01

## 2018-01-01 RX ORDER — PRIMIDONE 50 MG/1
50 TABLET ORAL
Status: ON HOLD | COMMUNITY
End: 2018-01-01 | Stop reason: HOSPADM

## 2018-01-01 RX ORDER — LORAZEPAM 2 MG/ML
INJECTION INTRAMUSCULAR
Status: COMPLETED
Start: 2018-01-01 | End: 2018-01-01

## 2018-01-01 RX ORDER — ALPRAZOLAM 0.5 MG/1
0.5 TABLET ORAL 3 TIMES DAILY PRN
Status: DISCONTINUED | OUTPATIENT
Start: 2018-01-01 | End: 2018-01-01 | Stop reason: HOSPADM

## 2018-01-01 RX ORDER — CALCIUM CARBONATE 500(1250)
500 TABLET ORAL 2 TIMES DAILY
Status: DISCONTINUED | OUTPATIENT
Start: 2018-01-01 | End: 2018-01-01

## 2018-01-01 RX ORDER — LIDOCAINE HYDROCHLORIDE 20 MG/ML
5 INJECTION, SOLUTION INFILTRATION; PERINEURAL ONCE
Status: DISCONTINUED | OUTPATIENT
Start: 2018-01-01 | End: 2018-01-01 | Stop reason: DRUGHIGH

## 2018-01-01 RX ORDER — SODIUM CHLORIDE FOR INHALATION 0.9 %
4 VIAL, NEBULIZER (ML) INHALATION 2 TIMES DAILY
Status: DISCONTINUED | OUTPATIENT
Start: 2018-01-01 | End: 2018-01-01 | Stop reason: HOSPADM

## 2018-01-01 RX ORDER — GUAIFENESIN/DEXTROMETHORPHAN 100-10MG/5
5 SYRUP ORAL EVERY 4 HOURS PRN
Status: DISCONTINUED | OUTPATIENT
Start: 2018-01-01 | End: 2018-01-01 | Stop reason: HOSPADM

## 2018-01-01 RX ORDER — CLONIDINE HYDROCHLORIDE 0.1 MG/1
0.1 TABLET ORAL 2 TIMES DAILY
Status: ON HOLD | COMMUNITY
End: 2018-01-01 | Stop reason: HOSPADM

## 2018-01-01 RX ORDER — BACITRACIN, NEOMYCIN, POLYMYXIN B 400; 3.5; 5 [USP'U]/G; MG/G; [USP'U]/G
OINTMENT TOPICAL 2 TIMES DAILY
Status: DISCONTINUED | OUTPATIENT
Start: 2018-01-01 | End: 2018-01-01 | Stop reason: HOSPADM

## 2018-01-01 RX ORDER — KIT FOR THE PREPARATION OF TECHNETIUM TC 99M PENTETATE 20 MG/1
1 INJECTION, POWDER, LYOPHILIZED, FOR SOLUTION INTRAVENOUS; RESPIRATORY (INHALATION)
Status: COMPLETED | OUTPATIENT
Start: 2018-01-01 | End: 2018-01-01

## 2018-01-01 RX ORDER — GUAIFENESIN 600 MG/1
600 TABLET, EXTENDED RELEASE ORAL 2 TIMES DAILY
Status: DISCONTINUED | OUTPATIENT
Start: 2018-01-01 | End: 2018-01-01 | Stop reason: HOSPADM

## 2018-01-01 RX ORDER — HYDROCODONE BITARTRATE AND ACETAMINOPHEN 5; 325 MG/1; MG/1
1 TABLET ORAL EVERY 6 HOURS PRN
Status: DISCONTINUED | OUTPATIENT
Start: 2018-01-01 | End: 2018-01-01 | Stop reason: HOSPADM

## 2018-01-01 RX ORDER — METHYLPREDNISOLONE SODIUM SUCCINATE 40 MG/ML
20 INJECTION, POWDER, LYOPHILIZED, FOR SOLUTION INTRAMUSCULAR; INTRAVENOUS EVERY 12 HOURS
Status: DISCONTINUED | OUTPATIENT
Start: 2018-01-01 | End: 2018-01-01

## 2018-01-01 RX ORDER — HEPARIN SODIUM 1000 [USP'U]/ML
2000 INJECTION, SOLUTION INTRAVENOUS; SUBCUTANEOUS ONCE
Status: COMPLETED | OUTPATIENT
Start: 2018-01-01 | End: 2018-01-01

## 2018-01-01 RX ORDER — DILTIAZEM HYDROCHLORIDE 240 MG/1
240 CAPSULE, COATED, EXTENDED RELEASE ORAL 2 TIMES DAILY
Qty: 60 CAPSULE | Refills: 3 | Status: ON HOLD | OUTPATIENT
Start: 2018-01-01 | End: 2018-01-01

## 2018-01-01 RX ORDER — WARFARIN SODIUM 2.5 MG/1
2.5 TABLET ORAL
Status: COMPLETED | OUTPATIENT
Start: 2018-01-01 | End: 2018-01-01

## 2018-01-01 RX ORDER — CEPHALEXIN 250 MG/1
250 CAPSULE ORAL 2 TIMES DAILY
Status: ON HOLD | COMMUNITY
End: 2018-01-01

## 2018-01-01 RX ORDER — PRIMIDONE 50 MG/1
50 TABLET ORAL 3 TIMES DAILY
Status: DISCONTINUED | OUTPATIENT
Start: 2018-01-01 | End: 2018-01-01

## 2018-01-01 RX ORDER — ESTRADIOL 0.1 MG/G
2 CREAM VAGINAL NIGHTLY
Status: DISCONTINUED | OUTPATIENT
Start: 2018-01-01 | End: 2018-01-01 | Stop reason: HOSPADM

## 2018-01-01 RX ORDER — SODIUM CHLORIDE 9 MG/ML
250 INJECTION, SOLUTION INTRAVENOUS ONCE
Status: DISCONTINUED | OUTPATIENT
Start: 2018-01-01 | End: 2018-01-01 | Stop reason: ALTCHOICE

## 2018-01-01 RX ORDER — SODIUM CHLORIDE 450 MG/100ML
INJECTION, SOLUTION INTRAVENOUS
Status: DISPENSED
Start: 2018-01-01 | End: 2018-01-01

## 2018-01-01 RX ORDER — HYDROCODONE BITARTRATE AND ACETAMINOPHEN 5; 325 MG/1; MG/1
1 TABLET ORAL EVERY 8 HOURS PRN
Qty: 90 TABLET | Refills: 0 | Status: ON HOLD | OUTPATIENT
Start: 2018-01-01 | End: 2018-01-01 | Stop reason: HOSPADM

## 2018-01-01 RX ORDER — ETOMIDATE 2 MG/ML
INJECTION INTRAVENOUS PRN
Status: DISCONTINUED | OUTPATIENT
Start: 2018-01-01 | End: 2018-01-01 | Stop reason: SDUPTHER

## 2018-01-01 RX ORDER — ETOMIDATE 2 MG/ML
INJECTION INTRAVENOUS
Status: DISPENSED
Start: 2018-01-01 | End: 2018-01-01

## 2018-01-01 RX ORDER — SODIUM CHLORIDE 9 MG/ML
INJECTION, SOLUTION INTRAVENOUS CONTINUOUS
Status: CANCELLED | OUTPATIENT
Start: 2018-01-01 | End: 2018-01-01

## 2018-01-01 RX ORDER — PROPOFOL 10 MG/ML
10 INJECTION, EMULSION INTRAVENOUS
Status: DISCONTINUED | OUTPATIENT
Start: 2018-01-01 | End: 2018-01-01

## 2018-01-01 RX ORDER — METOPROLOL TARTRATE 5 MG/5ML
2.5 INJECTION INTRAVENOUS EVERY 6 HOURS
Status: DISCONTINUED | OUTPATIENT
Start: 2018-01-01 | End: 2018-01-01 | Stop reason: ALTCHOICE

## 2018-01-01 RX ORDER — PANTOPRAZOLE SODIUM 40 MG/10ML
80 INJECTION, POWDER, LYOPHILIZED, FOR SOLUTION INTRAVENOUS ONCE
Status: COMPLETED | OUTPATIENT
Start: 2018-01-01 | End: 2018-01-01

## 2018-01-01 RX ORDER — HEPARIN SODIUM 1000 [USP'U]/ML
2000 INJECTION, SOLUTION INTRAVENOUS; SUBCUTANEOUS PRN
Status: DISCONTINUED | OUTPATIENT
Start: 2018-01-01 | End: 2018-01-01 | Stop reason: HOSPADM

## 2018-01-01 RX ORDER — AMIODARONE HYDROCHLORIDE 200 MG/1
200 TABLET ORAL DAILY
Qty: 30 TABLET | Refills: 3 | Status: ON HOLD | OUTPATIENT
Start: 2018-01-01 | End: 2018-01-01 | Stop reason: HOSPADM

## 2018-01-01 RX ORDER — PREDNISONE 10 MG/1
10 TABLET ORAL 2 TIMES DAILY
Qty: 10 TABLET | Refills: 0 | Status: SHIPPED | OUTPATIENT
Start: 2018-01-01 | End: 2018-01-01

## 2018-01-01 RX ORDER — SODIUM CHLORIDE 0.9 % (FLUSH) 0.9 %
10 SYRINGE (ML) INJECTION EVERY 12 HOURS
Status: DISCONTINUED | OUTPATIENT
Start: 2018-01-01 | End: 2018-01-01 | Stop reason: ALTCHOICE

## 2018-01-01 RX ORDER — PANTOPRAZOLE SODIUM 40 MG/10ML
40 INJECTION, POWDER, LYOPHILIZED, FOR SOLUTION INTRAVENOUS DAILY
Status: DISCONTINUED | OUTPATIENT
Start: 2018-01-01 | End: 2018-01-01 | Stop reason: HOSPADM

## 2018-01-01 RX ORDER — ISOSORBIDE MONONITRATE 60 MG/1
60 TABLET, EXTENDED RELEASE ORAL DAILY
Qty: 30 TABLET | Refills: 1 | Status: ON HOLD | OUTPATIENT
Start: 2018-01-01 | End: 2018-01-01 | Stop reason: HOSPADM

## 2018-01-01 RX ORDER — WARFARIN SODIUM 2 MG/1
2 TABLET ORAL
Status: DISCONTINUED | OUTPATIENT
Start: 2018-01-01 | End: 2018-01-01 | Stop reason: HOSPADM

## 2018-01-01 RX ORDER — L. ACIDOPHILUS/L.BULGARICUS 1MM CELL
4 TABLET ORAL 3 TIMES DAILY
Status: DISCONTINUED | OUTPATIENT
Start: 2018-01-01 | End: 2018-01-01 | Stop reason: HOSPADM

## 2018-01-01 RX ORDER — DEXTROSE AND SODIUM CHLORIDE 5; .9 G/100ML; G/100ML
INJECTION, SOLUTION INTRAVENOUS CONTINUOUS
Status: DISCONTINUED | OUTPATIENT
Start: 2018-01-01 | End: 2018-01-01 | Stop reason: HOSPADM

## 2018-01-01 RX ORDER — ETOMIDATE 2 MG/ML
16 INJECTION INTRAVENOUS ONCE
Status: COMPLETED | OUTPATIENT
Start: 2018-01-01 | End: 2018-01-01

## 2018-01-01 RX ORDER — PROPOFOL 10 MG/ML
INJECTION, EMULSION INTRAVENOUS
Status: DISPENSED
Start: 2018-01-01 | End: 2018-01-01

## 2018-01-01 RX ORDER — FENTANYL CITRATE 50 UG/ML
50 INJECTION, SOLUTION INTRAMUSCULAR; INTRAVENOUS EVERY 10 MIN PRN
Status: DISCONTINUED | OUTPATIENT
Start: 2018-01-01 | End: 2018-01-01 | Stop reason: HOSPADM

## 2018-01-01 RX ORDER — SODIUM POLYSTYRENE SULFONATE 15 G/60ML
15 SUSPENSION ORAL; RECTAL ONCE
Status: COMPLETED | OUTPATIENT
Start: 2018-01-01 | End: 2018-01-01

## 2018-01-01 RX ORDER — HYDRALAZINE HYDROCHLORIDE 20 MG/ML
10 INJECTION INTRAMUSCULAR; INTRAVENOUS EVERY 6 HOURS PRN
Status: DISCONTINUED | OUTPATIENT
Start: 2018-01-01 | End: 2018-01-01 | Stop reason: HOSPADM

## 2018-01-01 RX ORDER — PANTOPRAZOLE SODIUM 40 MG/1
40 GRANULE, DELAYED RELEASE ORAL
Status: DISCONTINUED | OUTPATIENT
Start: 2018-01-01 | End: 2018-01-01

## 2018-01-01 RX ORDER — VANCOMYCIN HYDROCHLORIDE 1 G/200ML
1000 INJECTION, SOLUTION INTRAVENOUS ONCE
Status: COMPLETED | OUTPATIENT
Start: 2018-01-01 | End: 2018-01-01

## 2018-01-01 RX ORDER — WARFARIN SODIUM 2 MG/1
2 TABLET ORAL
Status: ACTIVE | OUTPATIENT
Start: 2018-01-01 | End: 2018-01-01

## 2018-01-01 RX ORDER — HEPARIN SODIUM 1000 [USP'U]/ML
2000 INJECTION, SOLUTION INTRAVENOUS; SUBCUTANEOUS ONCE
Status: DISCONTINUED | OUTPATIENT
Start: 2018-01-01 | End: 2018-01-01 | Stop reason: HOSPADM

## 2018-01-01 RX ORDER — LORAZEPAM 2 MG/ML
1 INJECTION INTRAMUSCULAR EVERY 4 HOURS PRN
Status: DISCONTINUED | OUTPATIENT
Start: 2018-01-01 | End: 2018-01-01 | Stop reason: HOSPADM

## 2018-01-01 RX ORDER — ALBUMIN (HUMAN) 12.5 G/50ML
25 SOLUTION INTRAVENOUS
Status: ACTIVE | OUTPATIENT
Start: 2018-01-01 | End: 2018-01-01

## 2018-01-01 RX ORDER — VENLAFAXINE HYDROCHLORIDE 150 MG/1
150 CAPSULE, EXTENDED RELEASE ORAL
Status: DISCONTINUED | OUTPATIENT
Start: 2018-01-01 | End: 2018-01-01 | Stop reason: ALTCHOICE

## 2018-01-01 RX ORDER — OXYMETAZOLINE HYDROCHLORIDE 0.05 G/100ML
2 SPRAY NASAL ONCE
Status: COMPLETED | OUTPATIENT
Start: 2018-01-01 | End: 2018-01-01

## 2018-01-01 RX ORDER — ONDANSETRON 4 MG/1
4 TABLET, FILM COATED ORAL EVERY 8 HOURS PRN
Status: DISCONTINUED | OUTPATIENT
Start: 2018-01-01 | End: 2018-01-01 | Stop reason: HOSPADM

## 2018-01-01 RX ORDER — DILTIAZEM HYDROCHLORIDE 240 MG/1
240 CAPSULE, COATED, EXTENDED RELEASE ORAL DAILY
Status: DISCONTINUED | OUTPATIENT
Start: 2018-01-01 | End: 2018-01-01

## 2018-01-01 RX ORDER — LOSARTAN POTASSIUM 100 MG/1
100 TABLET ORAL DAILY
Qty: 30 TABLET | Refills: 3 | DISCHARGE
Start: 2018-01-01 | End: 2018-01-01 | Stop reason: SDUPTHER

## 2018-01-01 RX ORDER — L. ACIDOPHILUS/L.BULGARICUS 1MM CELL
4 TABLET ORAL 3 TIMES DAILY
Qty: 180 TABLET | Refills: 0 | Status: SHIPPED | OUTPATIENT
Start: 2018-01-01 | End: 2018-01-01

## 2018-01-01 RX ORDER — HYDROCODONE BITARTRATE AND ACETAMINOPHEN 5; 325 MG/1; MG/1
1 TABLET ORAL EVERY 6 HOURS PRN
Qty: 120 TABLET | Refills: 0 | Status: ON HOLD | OUTPATIENT
Start: 2018-01-01 | End: 2018-01-01

## 2018-01-01 RX ORDER — LEVOTHYROXINE SODIUM ANHYDROUS 100 UG/5ML
12.5 INJECTION, POWDER, LYOPHILIZED, FOR SOLUTION INTRAVENOUS
Status: DISCONTINUED | OUTPATIENT
Start: 2018-01-01 | End: 2018-01-01 | Stop reason: ALTCHOICE

## 2018-01-01 RX ORDER — HYDROCODONE BITARTRATE AND ACETAMINOPHEN 5; 325 MG/1; MG/1
1 TABLET ORAL EVERY 6 HOURS PRN
Qty: 90 TABLET | Refills: 0 | Status: SHIPPED | OUTPATIENT
Start: 2018-01-01 | End: 2018-01-01

## 2018-01-01 RX ORDER — LOSARTAN POTASSIUM 100 MG/1
100 TABLET ORAL DAILY
Qty: 30 TABLET | Refills: 3 | Status: ON HOLD | OUTPATIENT
Start: 2018-01-01 | End: 2018-01-01 | Stop reason: HOSPADM

## 2018-01-01 RX ORDER — SODIUM POLYSTYRENE SULFONATE 15 G/60ML
30 SUSPENSION ORAL; RECTAL ONCE
Status: COMPLETED | OUTPATIENT
Start: 2018-01-01 | End: 2018-01-01

## 2018-01-01 RX ORDER — LEVOTHYROXINE SODIUM 0.03 MG/1
25 TABLET ORAL
Status: DISCONTINUED | OUTPATIENT
Start: 2018-01-01 | End: 2018-01-01 | Stop reason: HOSPADM

## 2018-01-01 RX ORDER — LORAZEPAM 2 MG/ML
1 INJECTION INTRAMUSCULAR ONCE
Status: COMPLETED | OUTPATIENT
Start: 2018-01-01 | End: 2018-01-01

## 2018-01-01 RX ORDER — OXYCODONE HYDROCHLORIDE 5 MG/1
2.5 TABLET ORAL EVERY 6 HOURS PRN
Qty: 14 TABLET | Refills: 0 | Status: SHIPPED | OUTPATIENT
Start: 2018-01-01 | End: 2018-01-01 | Stop reason: SDUPTHER

## 2018-01-01 RX ORDER — OXYCODONE HYDROCHLORIDE 5 MG/1
2.5 TABLET ORAL EVERY 6 HOURS PRN
Qty: 14 TABLET | Refills: 0 | Status: SHIPPED | OUTPATIENT
Start: 2018-01-01 | End: 2018-01-01 | Stop reason: DRUGHIGH

## 2018-01-01 RX ORDER — PRIMIDONE 50 MG/1
50 TABLET ORAL NIGHTLY
Status: DISCONTINUED | OUTPATIENT
Start: 2018-01-01 | End: 2018-01-01 | Stop reason: HOSPADM

## 2018-01-01 RX ORDER — ALBUMIN (HUMAN) 12.5 G/50ML
50 SOLUTION INTRAVENOUS DAILY PRN
Status: DISCONTINUED | OUTPATIENT
Start: 2018-01-01 | End: 2018-01-01 | Stop reason: HOSPADM

## 2018-01-01 RX ORDER — WARFARIN SODIUM 2 MG/1
2 TABLET ORAL
Status: COMPLETED | OUTPATIENT
Start: 2018-01-01 | End: 2018-01-01

## 2018-01-01 RX ORDER — CALCIUM CARBONATE 500(1250)
500 TABLET ORAL 2 TIMES DAILY
Status: DISCONTINUED | OUTPATIENT
Start: 2018-01-01 | End: 2018-01-01 | Stop reason: HOSPADM

## 2018-01-01 RX ORDER — L. ACIDOPHILUS/L.BULGARICUS 1MM CELL
1 TABLET ORAL 3 TIMES DAILY
Status: DISCONTINUED | OUTPATIENT
Start: 2018-01-01 | End: 2018-01-01 | Stop reason: HOSPADM

## 2018-01-01 RX ORDER — PROPOFOL 10 MG/ML
10 INJECTION, EMULSION INTRAVENOUS
Status: DISCONTINUED | OUTPATIENT
Start: 2018-01-01 | End: 2018-01-01 | Stop reason: HOSPADM

## 2018-01-01 RX ORDER — HYDROCODONE BITARTRATE AND ACETAMINOPHEN 5; 325 MG/1; MG/1
2 TABLET ORAL PRN
Status: DISCONTINUED | OUTPATIENT
Start: 2018-01-01 | End: 2018-01-01

## 2018-01-01 RX ORDER — DILTIAZEM HYDROCHLORIDE 240 MG/1
240 CAPSULE, COATED, EXTENDED RELEASE ORAL 2 TIMES DAILY
Status: DISCONTINUED | OUTPATIENT
Start: 2018-01-01 | End: 2018-01-01

## 2018-01-01 RX ORDER — CIPROFLOXACIN 500 MG/1
250 TABLET, FILM COATED ORAL 2 TIMES DAILY
Qty: 12 TABLET | Refills: 0 | Status: SHIPPED | OUTPATIENT
Start: 2018-01-01 | End: 2018-01-01

## 2018-01-01 RX ORDER — ACETAMINOPHEN 325 MG/1
650 TABLET ORAL EVERY 6 HOURS PRN
Status: ON HOLD | COMMUNITY
End: 2018-01-01 | Stop reason: HOSPADM

## 2018-01-01 RX ORDER — GUAIFENESIN 100 MG/5ML
200 SOLUTION ORAL EVERY 4 HOURS
Status: DISCONTINUED | OUTPATIENT
Start: 2018-01-01 | End: 2018-01-01

## 2018-01-01 RX ORDER — FAMOTIDINE 20 MG/1
20 TABLET, FILM COATED ORAL 2 TIMES DAILY
Status: DISCONTINUED | OUTPATIENT
Start: 2018-01-01 | End: 2018-01-01 | Stop reason: HOSPADM

## 2018-01-01 RX ORDER — ISOSORBIDE MONONITRATE 120 MG/1
120 TABLET, EXTENDED RELEASE ORAL DAILY
Status: DISCONTINUED | OUTPATIENT
Start: 2018-01-01 | End: 2018-01-01 | Stop reason: HOSPADM

## 2018-01-01 RX ORDER — ATORVASTATIN CALCIUM 80 MG/1
80 TABLET, FILM COATED ORAL NIGHTLY
Status: DISCONTINUED | OUTPATIENT
Start: 2018-01-01 | End: 2018-01-01

## 2018-01-01 RX ORDER — DILTIAZEM HYDROCHLORIDE 60 MG/1
120 TABLET, FILM COATED ORAL EVERY 6 HOURS SCHEDULED
Status: DISCONTINUED | OUTPATIENT
Start: 2018-01-01 | End: 2018-01-01 | Stop reason: HOSPADM

## 2018-01-01 RX ORDER — DOCUSATE SODIUM 100 MG/1
100 CAPSULE, LIQUID FILLED ORAL 2 TIMES DAILY
Status: DISCONTINUED | OUTPATIENT
Start: 2018-01-01 | End: 2018-01-01 | Stop reason: HOSPADM

## 2018-01-01 RX ORDER — VANCOMYCIN HYDROCHLORIDE 500 MG/100ML
15 INJECTION, SOLUTION INTRAVENOUS
Status: DISCONTINUED | OUTPATIENT
Start: 2018-01-01 | End: 2018-01-01

## 2018-01-01 RX ORDER — SODIUM CHLORIDE 9 MG/ML
INJECTION, SOLUTION INTRAVENOUS CONTINUOUS PRN
Status: COMPLETED | OUTPATIENT
Start: 2018-01-01 | End: 2018-01-01

## 2018-01-01 RX ORDER — LOSARTAN POTASSIUM 50 MG/1
50 TABLET ORAL 2 TIMES DAILY
Status: DISCONTINUED | OUTPATIENT
Start: 2018-01-01 | End: 2018-01-01 | Stop reason: HOSPADM

## 2018-01-01 RX ORDER — POTASSIUM CHLORIDE 1.5 G/1.77G
40 POWDER, FOR SOLUTION ORAL ONCE
Status: COMPLETED | OUTPATIENT
Start: 2018-01-01 | End: 2018-01-01

## 2018-01-01 RX ORDER — ATORVASTATIN CALCIUM 80 MG/1
80 TABLET, FILM COATED ORAL NIGHTLY
Qty: 30 TABLET | Refills: 3 | Status: SHIPPED | OUTPATIENT
Start: 2018-01-01 | End: 2018-01-01 | Stop reason: SDUPTHER

## 2018-01-01 RX ORDER — 0.9 % SODIUM CHLORIDE 0.9 %
250 INTRAVENOUS SOLUTION INTRAVENOUS ONCE
Status: DISCONTINUED | OUTPATIENT
Start: 2018-01-01 | End: 2018-01-01

## 2018-01-01 RX ORDER — FUROSEMIDE 40 MG/1
40 TABLET ORAL DAILY
Status: DISCONTINUED | OUTPATIENT
Start: 2018-01-01 | End: 2018-01-01 | Stop reason: HOSPADM

## 2018-01-01 RX ORDER — SUCCINYLCHOLINE/SOD CL,ISO/PF 100 MG/5ML
SYRINGE (ML) INTRAVENOUS
Status: COMPLETED
Start: 2018-01-01 | End: 2018-01-01

## 2018-01-01 RX ORDER — HYDROCODONE BITARTRATE AND ACETAMINOPHEN 5; 325 MG/1; MG/1
1 TABLET ORAL EVERY 6 HOURS PRN
Qty: 28 TABLET | Refills: 0 | Status: SHIPPED | OUTPATIENT
Start: 2018-01-01 | End: 2018-01-01

## 2018-01-01 RX ORDER — ALPRAZOLAM 0.5 MG/1
0.5 TABLET ORAL 3 TIMES DAILY PRN
Qty: 21 TABLET | Refills: 0 | Status: SHIPPED | OUTPATIENT
Start: 2018-01-01 | End: 2018-01-01

## 2018-01-01 RX ORDER — CYCLOBENZAPRINE HCL 10 MG
10 TABLET ORAL 3 TIMES DAILY PRN
Status: DISCONTINUED | OUTPATIENT
Start: 2018-01-01 | End: 2018-01-01 | Stop reason: HOSPADM

## 2018-01-01 RX ORDER — DOXYCYCLINE HYCLATE 100 MG/1
100 CAPSULE ORAL EVERY 12 HOURS SCHEDULED
Status: DISCONTINUED | OUTPATIENT
Start: 2018-01-01 | End: 2018-01-01 | Stop reason: HOSPADM

## 2018-01-01 RX ORDER — MAGNESIUM HYDROXIDE 1200 MG/15ML
LIQUID ORAL PRN
Status: DISCONTINUED | OUTPATIENT
Start: 2018-01-01 | End: 2018-01-01 | Stop reason: HOSPADM

## 2018-01-01 RX ORDER — AMIODARONE HYDROCHLORIDE 200 MG/1
400 TABLET ORAL 2 TIMES DAILY
Status: COMPLETED | OUTPATIENT
Start: 2018-01-01 | End: 2018-01-01

## 2018-01-01 RX ORDER — LEVOTHYROXINE SODIUM 0.03 MG/1
25 TABLET ORAL EVERY OTHER DAY
COMMUNITY
End: 2018-01-01

## 2018-01-01 RX ORDER — CALCIUM CARBONATE 500(1250)
500 TABLET ORAL 2 TIMES DAILY
Qty: 30 TABLET | Refills: 1 | Status: ON HOLD | OUTPATIENT
Start: 2018-01-01 | End: 2018-01-01 | Stop reason: HOSPADM

## 2018-01-01 RX ORDER — MAGNESIUM HYDROXIDE 1200 MG/15ML
LIQUID ORAL CONTINUOUS PRN
Status: DISCONTINUED | OUTPATIENT
Start: 2018-01-01 | End: 2018-01-01 | Stop reason: HOSPADM

## 2018-01-01 RX ORDER — LIDOCAINE HYDROCHLORIDE 10 MG/ML
5 INJECTION, SOLUTION INFILTRATION; PERINEURAL ONCE
Status: DISCONTINUED | OUTPATIENT
Start: 2018-01-01 | End: 2018-01-01 | Stop reason: ALTCHOICE

## 2018-01-01 RX ORDER — ETOMIDATE 2 MG/ML
INJECTION INTRAVENOUS
Status: COMPLETED
Start: 2018-01-01 | End: 2018-01-01

## 2018-01-01 RX ORDER — WARFARIN SODIUM 5 MG/1
5 TABLET ORAL
Status: COMPLETED | OUTPATIENT
Start: 2018-01-01 | End: 2018-01-01

## 2018-01-01 RX ORDER — OXYCODONE HYDROCHLORIDE 5 MG/1
2.5-5 TABLET ORAL EVERY 6 HOURS PRN
Qty: 28 TABLET | Refills: 0 | Status: SHIPPED | OUTPATIENT
Start: 2018-01-01 | End: 2018-01-01 | Stop reason: SDUPTHER

## 2018-01-01 RX ORDER — LEVOFLOXACIN 5 MG/ML
500 INJECTION, SOLUTION INTRAVENOUS ONCE
Status: COMPLETED | OUTPATIENT
Start: 2018-01-01 | End: 2018-01-01

## 2018-01-01 RX ORDER — SODIUM CHLORIDE 9 MG/ML
INJECTION, SOLUTION INTRAVENOUS CONTINUOUS
Status: DISCONTINUED | OUTPATIENT
Start: 2018-01-01 | End: 2018-01-01 | Stop reason: HOSPADM

## 2018-01-01 RX ORDER — ONDANSETRON 2 MG/ML
4 INJECTION INTRAMUSCULAR; INTRAVENOUS
Status: DISCONTINUED | OUTPATIENT
Start: 2018-01-01 | End: 2018-01-01 | Stop reason: ALTCHOICE

## 2018-01-01 RX ORDER — ALBUMIN (HUMAN) 12.5 G/50ML
25 SOLUTION INTRAVENOUS
Status: DISCONTINUED | OUTPATIENT
Start: 2018-01-01 | End: 2018-01-01 | Stop reason: HOSPADM

## 2018-01-01 RX ORDER — CEFUROXIME AXETIL 250 MG/1
250 TABLET ORAL 2 TIMES DAILY
Qty: 10 TABLET | Refills: 0 | Status: SHIPPED | OUTPATIENT
Start: 2018-01-01 | End: 2018-01-01

## 2018-01-01 RX ORDER — INSULIN GLARGINE 100 [IU]/ML
14 INJECTION, SOLUTION SUBCUTANEOUS EVERY MORNING
Status: DISCONTINUED | OUTPATIENT
Start: 2018-01-01 | End: 2018-01-01

## 2018-01-01 RX ORDER — LEVOTHYROXINE SODIUM 0.05 MG/1
TABLET ORAL
Qty: 50 TABLET | Refills: 3 | Status: SHIPPED | OUTPATIENT
Start: 2018-01-01 | End: 2018-01-01

## 2018-01-01 RX ORDER — LEVOTHYROXINE SODIUM 0.05 MG/1
50 TABLET ORAL DAILY
Status: DISCONTINUED | OUTPATIENT
Start: 2018-01-01 | End: 2018-01-01

## 2018-01-01 RX ORDER — MORPHINE SULFATE 2 MG/ML
2 INJECTION, SOLUTION INTRAMUSCULAR; INTRAVENOUS EVERY 4 HOURS PRN
Status: DISCONTINUED | OUTPATIENT
Start: 2018-01-01 | End: 2018-01-01 | Stop reason: HOSPADM

## 2018-01-01 RX ORDER — WARFARIN SODIUM 1 MG/1
1.5 TABLET ORAL DAILY
Qty: 45 TABLET | Refills: 0
Start: 2018-01-01 | End: 2018-01-01 | Stop reason: SDUPTHER

## 2018-01-01 RX ORDER — HEPARIN SODIUM 1000 [USP'U]/ML
7000 INJECTION, SOLUTION INTRAVENOUS; SUBCUTANEOUS PRN
Status: COMPLETED | OUTPATIENT
Start: 2018-01-01 | End: 2018-01-01

## 2018-01-01 RX ORDER — MIDAZOLAM HYDROCHLORIDE 1 MG/ML
INJECTION INTRAMUSCULAR; INTRAVENOUS
Status: DISPENSED
Start: 2018-01-01 | End: 2018-01-01

## 2018-01-01 RX ORDER — FUROSEMIDE 10 MG/ML
40 INJECTION INTRAMUSCULAR; INTRAVENOUS ONCE
Status: COMPLETED | OUTPATIENT
Start: 2018-01-01 | End: 2018-01-01

## 2018-01-01 RX ORDER — DILTIAZEM HYDROCHLORIDE 240 MG/1
240 CAPSULE, COATED, EXTENDED RELEASE ORAL 2 TIMES DAILY
Status: DISCONTINUED | OUTPATIENT
Start: 2018-01-01 | End: 2018-01-01 | Stop reason: ALTCHOICE

## 2018-01-01 RX ORDER — 0.9 % SODIUM CHLORIDE 0.9 %
10 VIAL (ML) INJECTION EVERY 12 HOURS SCHEDULED
Status: DISCONTINUED | OUTPATIENT
Start: 2018-01-01 | End: 2018-01-01 | Stop reason: HOSPADM

## 2018-01-01 RX ORDER — LORAZEPAM 2 MG/ML
1 INJECTION INTRAMUSCULAR
Status: ACTIVE | OUTPATIENT
Start: 2018-01-01 | End: 2018-01-01

## 2018-01-01 RX ORDER — HYDROCODONE BITARTRATE AND ACETAMINOPHEN 5; 325 MG/1; MG/1
1 TABLET ORAL PRN
Status: DISCONTINUED | OUTPATIENT
Start: 2018-01-01 | End: 2018-01-01

## 2018-01-01 RX ORDER — CLONIDINE HYDROCHLORIDE 0.1 MG/1
0.1 TABLET ORAL 2 TIMES DAILY
Status: DISCONTINUED | OUTPATIENT
Start: 2018-01-01 | End: 2018-01-01 | Stop reason: HOSPADM

## 2018-01-01 RX ORDER — SODIUM CHLORIDE 0.9 % (FLUSH) 0.9 %
3 SYRINGE (ML) INJECTION EVERY 8 HOURS
Status: DISCONTINUED | OUTPATIENT
Start: 2018-01-01 | End: 2018-01-01 | Stop reason: HOSPADM

## 2018-01-01 RX ORDER — DESMOPRESSIN ACETATE 4 UG/ML
12 INJECTION, SOLUTION INTRAVENOUS; SUBCUTANEOUS ONCE
Status: DISCONTINUED | OUTPATIENT
Start: 2018-01-01 | End: 2018-01-01

## 2018-01-01 RX ORDER — SUCCINYLCHOLINE/SOD CL,ISO/PF 100 MG/5ML
SYRINGE (ML) INTRAVENOUS
Status: COMPLETED | OUTPATIENT
Start: 2018-01-01 | End: 2018-01-01

## 2018-01-01 RX ORDER — LEVOFLOXACIN 5 MG/ML
250 INJECTION, SOLUTION INTRAVENOUS EVERY 24 HOURS
Status: DISCONTINUED | OUTPATIENT
Start: 2018-01-01 | End: 2018-01-01 | Stop reason: HOSPADM

## 2018-01-01 RX ORDER — FOLIC ACID 1 MG/1
1 TABLET ORAL DAILY
Status: CANCELLED | OUTPATIENT
Start: 2018-01-01

## 2018-01-01 RX ORDER — LIDOCAINE 4 G/G
1 PATCH TOPICAL DAILY
Status: DISCONTINUED | OUTPATIENT
Start: 2018-01-01 | End: 2018-01-01 | Stop reason: HOSPADM

## 2018-01-01 RX ORDER — ALBUTEROL SULFATE 2.5 MG/3ML
2.5 SOLUTION RESPIRATORY (INHALATION)
Status: DISCONTINUED | OUTPATIENT
Start: 2018-01-01 | End: 2018-01-01 | Stop reason: HOSPADM

## 2018-01-01 RX ORDER — DOCUSATE SODIUM 100 MG/1
100 CAPSULE, LIQUID FILLED ORAL DAILY
Status: DISCONTINUED | OUTPATIENT
Start: 2018-01-01 | End: 2018-01-01 | Stop reason: ALTCHOICE

## 2018-01-01 RX ORDER — PANTOPRAZOLE SODIUM 20 MG/1
20 TABLET, DELAYED RELEASE ORAL
Status: DISCONTINUED | OUTPATIENT
Start: 2018-01-01 | End: 2018-01-01

## 2018-01-01 RX ORDER — DEXTROSE AND SODIUM CHLORIDE 5; .9 G/100ML; G/100ML
INJECTION, SOLUTION INTRAVENOUS CONTINUOUS
Status: DISPENSED | OUTPATIENT
Start: 2018-01-01 | End: 2018-01-01

## 2018-01-01 RX ORDER — LIDOCAINE HYDROCHLORIDE 20 MG/ML
5 INJECTION, SOLUTION INFILTRATION; PERINEURAL ONCE
Status: COMPLETED | OUTPATIENT
Start: 2018-01-01 | End: 2018-01-01

## 2018-01-01 RX ORDER — IPRATROPIUM BROMIDE AND ALBUTEROL SULFATE 2.5; .5 MG/3ML; MG/3ML
1 SOLUTION RESPIRATORY (INHALATION) EVERY 4 HOURS PRN
Status: DISCONTINUED | OUTPATIENT
Start: 2018-01-01 | End: 2018-01-01 | Stop reason: HOSPADM

## 2018-01-01 RX ORDER — CLONIDINE HYDROCHLORIDE 0.1 MG/1
0.1 TABLET ORAL 2 TIMES DAILY
Status: DISCONTINUED | OUTPATIENT
Start: 2018-01-01 | End: 2018-01-01

## 2018-01-01 RX ORDER — ONDANSETRON 2 MG/ML
4 INJECTION INTRAMUSCULAR; INTRAVENOUS EVERY 6 HOURS PRN
Status: DISCONTINUED | OUTPATIENT
Start: 2018-01-01 | End: 2018-01-01 | Stop reason: SDUPTHER

## 2018-01-01 RX ORDER — SODIUM CHLORIDE 9 MG/ML
INJECTION, SOLUTION INTRAVENOUS
Status: DISCONTINUED
Start: 2018-01-01 | End: 2018-01-01

## 2018-01-01 RX ORDER — HEPARIN SODIUM 5000 [USP'U]/ML
5000 INJECTION, SOLUTION INTRAVENOUS; SUBCUTANEOUS EVERY 8 HOURS SCHEDULED
Status: DISCONTINUED | OUTPATIENT
Start: 2018-01-01 | End: 2018-01-01 | Stop reason: HOSPADM

## 2018-01-01 RX ORDER — CHLORTHALIDONE 25 MG/1
25 TABLET ORAL DAILY
Status: DISCONTINUED | OUTPATIENT
Start: 2018-01-01 | End: 2018-01-01 | Stop reason: ALTCHOICE

## 2018-01-01 RX ORDER — LEVETIRACETAM 500 MG/1
1000 TABLET ORAL 2 TIMES DAILY
Status: DISCONTINUED | OUTPATIENT
Start: 2018-01-01 | End: 2018-01-01

## 2018-01-01 RX ORDER — 0.9 % SODIUM CHLORIDE 0.9 %
250 INTRAVENOUS SOLUTION INTRAVENOUS PRN
Status: DISPENSED | OUTPATIENT
Start: 2018-01-01 | End: 2018-01-01

## 2018-01-01 RX ORDER — LEVOTHYROXINE SODIUM 0.03 MG/1
25 TABLET ORAL DAILY
Status: DISCONTINUED | OUTPATIENT
Start: 2018-01-01 | End: 2018-01-01 | Stop reason: HOSPADM

## 2018-01-01 RX ORDER — ATORVASTATIN CALCIUM 40 MG/1
80 TABLET, FILM COATED ORAL NIGHTLY
Status: DISCONTINUED | OUTPATIENT
Start: 2018-01-01 | End: 2018-01-01 | Stop reason: HOSPADM

## 2018-01-01 RX ORDER — PANTOPRAZOLE SODIUM 40 MG/1
40 GRANULE, DELAYED RELEASE ORAL
Status: DISCONTINUED | OUTPATIENT
Start: 2018-01-01 | End: 2018-01-01 | Stop reason: SDUPTHER

## 2018-01-01 RX ORDER — IPRATROPIUM BROMIDE AND ALBUTEROL SULFATE 2.5; .5 MG/3ML; MG/3ML
1 SOLUTION RESPIRATORY (INHALATION)
Status: DISCONTINUED | OUTPATIENT
Start: 2018-01-01 | End: 2018-01-01

## 2018-01-01 RX ORDER — MEPERIDINE HYDROCHLORIDE 25 MG/ML
12.5 INJECTION INTRAMUSCULAR; INTRAVENOUS; SUBCUTANEOUS EVERY 5 MIN PRN
Status: DISCONTINUED | OUTPATIENT
Start: 2018-01-01 | End: 2018-01-01 | Stop reason: HOSPADM

## 2018-01-01 RX ORDER — DIPHENHYDRAMINE HCL 25 MG
25 TABLET ORAL ONCE
Status: COMPLETED | OUTPATIENT
Start: 2018-01-01 | End: 2018-01-01

## 2018-01-01 RX ORDER — DILTIAZEM HYDROCHLORIDE 120 MG/1
120 CAPSULE, EXTENDED RELEASE ORAL 2 TIMES DAILY
Status: ON HOLD | COMMUNITY
End: 2018-01-01 | Stop reason: HOSPADM

## 2018-01-01 RX ORDER — MEPERIDINE HYDROCHLORIDE 25 MG/ML
12.5 INJECTION INTRAMUSCULAR; INTRAVENOUS; SUBCUTANEOUS EVERY 5 MIN PRN
Status: DISCONTINUED | OUTPATIENT
Start: 2018-01-01 | End: 2018-01-01

## 2018-01-01 RX ORDER — SUCCINYLCHOLINE/SOD CL,ISO/PF 100 MG/5ML
SYRINGE (ML) INTRAVENOUS PRN
Status: DISCONTINUED | OUTPATIENT
Start: 2018-01-01 | End: 2018-01-01 | Stop reason: SDUPTHER

## 2018-01-01 RX ORDER — OXYCODONE HYDROCHLORIDE AND ACETAMINOPHEN 5; 325 MG/1; MG/1
2 TABLET ORAL EVERY 4 HOURS PRN
Status: DISCONTINUED | OUTPATIENT
Start: 2018-01-01 | End: 2018-01-01 | Stop reason: HOSPADM

## 2018-01-01 RX ORDER — CHOLECALCIFEROL (VITAMIN D3) 10 MCG
1 TABLET ORAL DAILY
Status: DISCONTINUED | OUTPATIENT
Start: 2018-01-01 | End: 2018-01-01

## 2018-01-01 RX ORDER — DILTIAZEM HYDROCHLORIDE 120 MG/1
240 CAPSULE, EXTENDED RELEASE ORAL 2 TIMES DAILY
Refills: 0 | DISCHARGE
Start: 2018-01-01 | End: 2018-01-01 | Stop reason: ALTCHOICE

## 2018-01-01 RX ORDER — CHLORHEXIDINE GLUCONATE 0.12 MG/ML
15 RINSE ORAL 2 TIMES DAILY
Status: DISCONTINUED | OUTPATIENT
Start: 2018-01-01 | End: 2018-01-01 | Stop reason: HOSPADM

## 2018-01-01 RX ORDER — MINOXIDIL 2.5 MG/1
2.5 TABLET ORAL DAILY
Qty: 30 TABLET | Refills: 3
Start: 2018-01-01 | End: 2018-01-01 | Stop reason: SDUPTHER

## 2018-01-01 RX ORDER — WARFARIN SODIUM 3 MG/1
3 TABLET ORAL DAILY
Status: DISCONTINUED | OUTPATIENT
Start: 2018-01-01 | End: 2018-01-01

## 2018-01-01 RX ORDER — DOCUSATE SODIUM 100 MG/1
100 CAPSULE, LIQUID FILLED ORAL DAILY
COMMUNITY
End: 2018-01-01 | Stop reason: SDUPTHER

## 2018-01-01 RX ORDER — METRONIDAZOLE 250 MG/1
250 TABLET ORAL EVERY 8 HOURS SCHEDULED
Qty: 30 TABLET | Refills: 0 | Status: SHIPPED | OUTPATIENT
Start: 2018-01-01 | End: 2018-01-01

## 2018-01-01 RX ORDER — DEXTROSE AND SODIUM CHLORIDE 5; .9 G/100ML; G/100ML
INJECTION, SOLUTION INTRAVENOUS CONTINUOUS
Status: DISCONTINUED | OUTPATIENT
Start: 2018-01-01 | End: 2018-01-01

## 2018-01-01 RX ORDER — 0.9 % SODIUM CHLORIDE 0.9 %
10 VIAL (ML) INJECTION DAILY
Status: DISCONTINUED | OUTPATIENT
Start: 2018-01-01 | End: 2018-01-01 | Stop reason: HOSPADM

## 2018-01-01 RX ORDER — VENLAFAXINE 75 MG/1
75 TABLET ORAL 2 TIMES DAILY WITH MEALS
Status: DISCONTINUED | OUTPATIENT
Start: 2018-01-01 | End: 2018-01-01 | Stop reason: HOSPADM

## 2018-01-01 RX ORDER — SODIUM CHLORIDE FOR INHALATION 7 %
4 VIAL, NEBULIZER (ML) INHALATION 2 TIMES DAILY
Status: DISCONTINUED | OUTPATIENT
Start: 2018-01-01 | End: 2018-01-01

## 2018-01-01 RX ORDER — DOCUSATE SODIUM 100 MG/1
100 CAPSULE, LIQUID FILLED ORAL DAILY
Status: DISCONTINUED | OUTPATIENT
Start: 2018-01-01 | End: 2018-01-01 | Stop reason: SDUPTHER

## 2018-01-01 RX ORDER — FAMOTIDINE 20 MG/1
20 TABLET, FILM COATED ORAL 2 TIMES DAILY
Status: DISCONTINUED | OUTPATIENT
Start: 2018-01-01 | End: 2018-01-01 | Stop reason: DRUGHIGH

## 2018-01-01 RX ORDER — AMINO ACIDS/PROTEIN HYDROLYS 15G-100/30
30 LIQUID (ML) ORAL 2 TIMES DAILY
COMMUNITY
End: 2018-01-01

## 2018-01-01 RX ORDER — DOCUSATE SODIUM 100 MG/1
100 CAPSULE, LIQUID FILLED ORAL DAILY
Status: DISCONTINUED | OUTPATIENT
Start: 2018-01-01 | End: 2018-01-01

## 2018-01-01 RX ORDER — 0.9 % SODIUM CHLORIDE 0.9 %
500 INTRAVENOUS SOLUTION INTRAVENOUS ONCE
Status: COMPLETED | OUTPATIENT
Start: 2018-01-01 | End: 2018-01-01

## 2018-01-01 RX ORDER — PANTOPRAZOLE SODIUM 40 MG/1
40 GRANULE, DELAYED RELEASE ORAL
Status: DISCONTINUED | OUTPATIENT
Start: 2018-01-01 | End: 2018-01-01 | Stop reason: HOSPADM

## 2018-01-01 RX ORDER — AMOXICILLIN AND CLAVULANATE POTASSIUM 400; 57 MG/5ML; MG/5ML
400 POWDER, FOR SUSPENSION ORAL EVERY 12 HOURS SCHEDULED
Status: COMPLETED | OUTPATIENT
Start: 2018-01-01 | End: 2018-01-01

## 2018-01-01 RX ORDER — POLYETHYLENE GLYCOL 3350 17 G/17G
17 POWDER, FOR SOLUTION ORAL DAILY
Qty: 510 G | Refills: 0
Start: 2018-01-01 | End: 2018-01-01

## 2018-01-01 RX ORDER — PANTOPRAZOLE SODIUM 40 MG/1
20 GRANULE, DELAYED RELEASE ORAL
Status: DISCONTINUED | OUTPATIENT
Start: 2018-01-01 | End: 2018-01-01 | Stop reason: HOSPADM

## 2018-01-01 RX ORDER — ACETAMINOPHEN 325 MG/1
650 TABLET ORAL EVERY 4 HOURS PRN
Status: DISCONTINUED | OUTPATIENT
Start: 2018-01-01 | End: 2018-01-01 | Stop reason: ALTCHOICE

## 2018-01-01 RX ORDER — HYDRALAZINE HYDROCHLORIDE 20 MG/ML
10 INJECTION INTRAMUSCULAR; INTRAVENOUS EVERY 4 HOURS PRN
Status: DISCONTINUED | OUTPATIENT
Start: 2018-01-01 | End: 2018-01-01 | Stop reason: HOSPADM

## 2018-01-01 RX ORDER — DEXTROSE MONOHYDRATE 50 MG/ML
INJECTION, SOLUTION INTRAVENOUS CONTINUOUS
Status: DISCONTINUED | OUTPATIENT
Start: 2018-01-01 | End: 2018-01-01

## 2018-01-01 RX ORDER — HYDROCODONE BITARTRATE AND ACETAMINOPHEN 5; 325 MG/1; MG/1
1 TABLET ORAL EVERY 8 HOURS PRN
Qty: 10 TABLET | Refills: 0 | Status: ON HOLD | OUTPATIENT
Start: 2018-01-01 | End: 2018-01-01 | Stop reason: HOSPADM

## 2018-01-01 RX ORDER — ACETAMINOPHEN 650 MG/1
650 SUPPOSITORY RECTAL EVERY 4 HOURS PRN
Status: CANCELLED | OUTPATIENT
Start: 2018-01-01

## 2018-01-01 RX ORDER — CYCLOBENZAPRINE HCL 10 MG
1 TABLET ORAL
COMMUNITY
Start: 2018-01-01 | End: 2018-01-01 | Stop reason: ALTCHOICE

## 2018-01-01 RX ORDER — HYDRALAZINE HYDROCHLORIDE 20 MG/ML
20 INJECTION INTRAMUSCULAR; INTRAVENOUS EVERY 4 HOURS
Status: DISCONTINUED | OUTPATIENT
Start: 2018-01-01 | End: 2018-01-01 | Stop reason: HOSPADM

## 2018-01-01 RX ORDER — GUAIFENESIN 100 MG/5ML
10 SOLUTION ORAL EVERY 4 HOURS PRN
Status: DISCONTINUED | OUTPATIENT
Start: 2018-01-01 | End: 2018-01-01 | Stop reason: HOSPADM

## 2018-01-01 RX ORDER — ZOLPIDEM TARTRATE 5 MG/1
5 TABLET ORAL NIGHTLY PRN
Status: DISCONTINUED | OUTPATIENT
Start: 2018-01-01 | End: 2018-01-01 | Stop reason: HOSPADM

## 2018-01-01 RX ORDER — HYDROCODONE BITARTRATE AND ACETAMINOPHEN 5; 325 MG/1; MG/1
1 TABLET ORAL EVERY 4 HOURS PRN
Status: DISCONTINUED | OUTPATIENT
Start: 2018-01-01 | End: 2018-01-01 | Stop reason: HOSPADM

## 2018-01-01 RX ORDER — SODIUM CHLORIDE, SODIUM LACTATE, POTASSIUM CHLORIDE, CALCIUM CHLORIDE 600; 310; 30; 20 MG/100ML; MG/100ML; MG/100ML; MG/100ML
INJECTION, SOLUTION INTRAVENOUS CONTINUOUS PRN
Status: DISCONTINUED | OUTPATIENT
Start: 2018-01-01 | End: 2018-01-01 | Stop reason: SDUPTHER

## 2018-01-01 RX ORDER — ACETAMINOPHEN 650 MG/1
650 SUPPOSITORY RECTAL EVERY 4 HOURS PRN
Status: ON HOLD | COMMUNITY
End: 2018-01-01 | Stop reason: HOSPADM

## 2018-01-01 RX ORDER — PRIMIDONE 50 MG/1
50 TABLET ORAL NIGHTLY
Status: ON HOLD | COMMUNITY
End: 2018-01-01 | Stop reason: HOSPADM

## 2018-01-01 RX ORDER — CHLORTHALIDONE 25 MG/1
25 TABLET ORAL DAILY
Status: DISCONTINUED | OUTPATIENT
Start: 2018-01-01 | End: 2018-01-01

## 2018-01-01 RX ORDER — POTASSIUM CHLORIDE 20MEQ/15ML
20 LIQUID (ML) ORAL DAILY
Status: DISCONTINUED | OUTPATIENT
Start: 2018-01-01 | End: 2018-01-01

## 2018-01-01 RX ORDER — HYDROCODONE BITARTRATE AND ACETAMINOPHEN 5; 325 MG/1; MG/1
1 TABLET ORAL EVERY 6 HOURS PRN
Qty: 60 TABLET | Refills: 0
Start: 2018-01-01 | End: 2018-01-01

## 2018-01-01 RX ORDER — LEVOTHYROXINE SODIUM 0.03 MG/1
25 TABLET ORAL EVERY OTHER DAY
Qty: 30 TABLET | Refills: 3 | Status: SHIPPED | OUTPATIENT
Start: 2018-01-01 | End: 2018-01-01 | Stop reason: SDUPTHER

## 2018-01-01 RX ORDER — HYDRALAZINE HYDROCHLORIDE 100 MG/1
100 TABLET, FILM COATED ORAL 3 TIMES DAILY
Status: DISCONTINUED | OUTPATIENT
Start: 2018-01-01 | End: 2018-01-01

## 2018-01-01 RX ORDER — LIDOCAINE HYDROCHLORIDE 40 MG/ML
SOLUTION TOPICAL PRN
Status: DISCONTINUED | OUTPATIENT
Start: 2018-01-01 | End: 2018-01-01 | Stop reason: HOSPADM

## 2018-01-01 RX ORDER — IPRATROPIUM BROMIDE AND ALBUTEROL SULFATE 2.5; .5 MG/3ML; MG/3ML
1 SOLUTION RESPIRATORY (INHALATION)
Status: DISCONTINUED | OUTPATIENT
Start: 2018-01-01 | End: 2018-01-01 | Stop reason: HOSPADM

## 2018-01-01 RX ORDER — HEPARIN SODIUM 1000 [USP'U]/ML
1000 INJECTION, SOLUTION INTRAVENOUS; SUBCUTANEOUS PRN
Status: DISCONTINUED | OUTPATIENT
Start: 2018-01-01 | End: 2018-01-01 | Stop reason: SDUPTHER

## 2018-01-01 RX ORDER — VENLAFAXINE HYDROCHLORIDE 150 MG/1
150 TABLET, EXTENDED RELEASE ORAL
Status: ON HOLD | COMMUNITY
End: 2018-01-01 | Stop reason: HOSPADM

## 2018-01-01 RX ORDER — INSULIN GLARGINE 100 [IU]/ML
5 INJECTION, SOLUTION SUBCUTANEOUS NIGHTLY
Status: DISCONTINUED | OUTPATIENT
Start: 2018-01-01 | End: 2018-01-01

## 2018-01-01 RX ORDER — CALCIUM CARBONATE 500(1250)
500 TABLET ORAL DAILY
Status: CANCELLED | OUTPATIENT
Start: 2018-01-01

## 2018-01-01 RX ORDER — LEVOTHYROXINE SODIUM 0.05 MG/1
50 TABLET ORAL EVERY OTHER DAY
Qty: 30 TABLET | Refills: 3 | DISCHARGE
Start: 2018-01-01 | End: 2018-01-01 | Stop reason: HOSPADM

## 2018-01-01 RX ORDER — OXYCODONE HYDROCHLORIDE 5 MG/1
2.5-5 TABLET ORAL EVERY 6 HOURS PRN
Qty: 28 TABLET | Refills: 0 | Status: SHIPPED | OUTPATIENT
Start: 2018-01-01 | End: 2018-01-01

## 2018-01-01 RX ORDER — HEPARIN SODIUM 1000 [USP'U]/ML
4000 INJECTION, SOLUTION INTRAVENOUS; SUBCUTANEOUS PRN
Status: DISCONTINUED | OUTPATIENT
Start: 2018-01-01 | End: 2018-01-01 | Stop reason: HOSPADM

## 2018-01-01 RX ORDER — FENTANYL CITRATE 50 UG/ML
INJECTION, SOLUTION INTRAMUSCULAR; INTRAVENOUS
Status: COMPLETED
Start: 2018-01-01 | End: 2018-01-01

## 2018-01-01 RX ORDER — METOPROLOL TARTRATE 5 MG/5ML
2.5 INJECTION INTRAVENOUS EVERY 6 HOURS
Status: DISCONTINUED | OUTPATIENT
Start: 2018-01-01 | End: 2018-01-01

## 2018-01-01 RX ORDER — SENNA PLUS 8.6 MG/1
1 TABLET ORAL DAILY
Qty: 30 TABLET | Refills: 3 | Status: ON HOLD | OUTPATIENT
Start: 2018-01-01 | End: 2018-01-01 | Stop reason: HOSPADM

## 2018-01-01 RX ORDER — CISATRACURIUM BESYLATE 2 MG/ML
INJECTION, SOLUTION INTRAVENOUS PRN
Status: DISCONTINUED | OUTPATIENT
Start: 2018-01-01 | End: 2018-01-01 | Stop reason: SDUPTHER

## 2018-01-01 RX ORDER — METOPROLOL TARTRATE 5 MG/5ML
5 INJECTION INTRAVENOUS EVERY 6 HOURS PRN
Status: DISCONTINUED | OUTPATIENT
Start: 2018-01-01 | End: 2018-01-01 | Stop reason: HOSPADM

## 2018-01-01 RX ORDER — ONDANSETRON 4 MG/1
4 TABLET, FILM COATED ORAL EVERY 8 HOURS PRN
COMMUNITY
End: 2018-01-01 | Stop reason: SDUPTHER

## 2018-01-01 RX ORDER — PANTOPRAZOLE SODIUM 40 MG/10ML
40 INJECTION, POWDER, LYOPHILIZED, FOR SOLUTION INTRAVENOUS DAILY
Status: DISCONTINUED | OUTPATIENT
Start: 2018-01-01 | End: 2018-01-01 | Stop reason: ALTCHOICE

## 2018-01-01 RX ORDER — METOPROLOL TARTRATE 5 MG/5ML
2.5 INJECTION INTRAVENOUS EVERY 6 HOURS PRN
Status: DISCONTINUED | OUTPATIENT
Start: 2018-01-01 | End: 2018-01-01

## 2018-01-01 RX ORDER — THIAMINE HYDROCHLORIDE 100 MG/ML
500 INJECTION, SOLUTION INTRAMUSCULAR; INTRAVENOUS ONCE
Status: DISCONTINUED | OUTPATIENT
Start: 2018-01-01 | End: 2018-01-01

## 2018-01-01 RX ORDER — GABAPENTIN 100 MG/1
100 CAPSULE ORAL DAILY
Status: ON HOLD | COMMUNITY
End: 2018-01-01

## 2018-01-01 RX ORDER — L. ACIDOPHILUS/L.BULGARICUS 1MM CELL
1 TABLET ORAL 3 TIMES DAILY
Qty: 21 TABLET | Refills: 0 | Status: SHIPPED | OUTPATIENT
Start: 2018-01-01 | End: 2018-01-01

## 2018-01-01 RX ADMIN — PANTOPRAZOLE SODIUM 40 MG: 40 GRANULE, DELAYED RELEASE ORAL at 05:38

## 2018-01-01 RX ADMIN — CARBIDOPA AND LEVODOPA 1 TABLET: 25; 100 TABLET ORAL at 09:45

## 2018-01-01 RX ADMIN — AMIODARONE HYDROCHLORIDE 0.5 MG/MIN: 50 INJECTION, SOLUTION INTRAVENOUS at 23:00

## 2018-01-01 RX ADMIN — CARBIDOPA AND LEVODOPA 1 TABLET: 25; 100 TABLET ORAL at 09:56

## 2018-01-01 RX ADMIN — IPRATROPIUM BROMIDE AND ALBUTEROL SULFATE 3 ML: .5; 3 SOLUTION RESPIRATORY (INHALATION) at 20:05

## 2018-01-01 RX ADMIN — PRIMIDONE 50 MG: 50 TABLET ORAL at 15:01

## 2018-01-01 RX ADMIN — WARFARIN SODIUM 3 MG: 3 TABLET ORAL at 22:06

## 2018-01-01 RX ADMIN — LEVOTHYROXINE SODIUM 25 MCG: 25 TABLET ORAL at 06:11

## 2018-01-01 RX ADMIN — DOCUSATE SODIUM 100 MG: 100 CAPSULE, LIQUID FILLED ORAL at 21:40

## 2018-01-01 RX ADMIN — Medication 15 ML: at 09:33

## 2018-01-01 RX ADMIN — CALCIUM ACETATE 667 MG: 667 CAPSULE ORAL at 19:03

## 2018-01-01 RX ADMIN — COLLAGENASE SANTYL: 250 OINTMENT TOPICAL at 09:45

## 2018-01-01 RX ADMIN — LEVOTHYROXINE SODIUM 25 MCG: 50 TABLET ORAL at 09:35

## 2018-01-01 RX ADMIN — CLONIDINE HYDROCHLORIDE 0.1 MG: 0.1 TABLET ORAL at 22:42

## 2018-01-01 RX ADMIN — Medication 10 ML: at 04:42

## 2018-01-01 RX ADMIN — Medication 125 MG: at 22:54

## 2018-01-01 RX ADMIN — GUAIFENESIN 600 MG: 600 TABLET, EXTENDED RELEASE ORAL at 09:26

## 2018-01-01 RX ADMIN — PANTOPRAZOLE SODIUM 40 MG: 40 INJECTION, POWDER, FOR SOLUTION INTRAVENOUS at 10:28

## 2018-01-01 RX ADMIN — CALCIUM ACETATE 667 MG: 667 CAPSULE ORAL at 13:24

## 2018-01-01 RX ADMIN — PROPOFOL 10 MG: 10 INJECTION, EMULSION INTRAVENOUS at 14:43

## 2018-01-01 RX ADMIN — LOSARTAN POTASSIUM 100 MG: 50 TABLET ORAL at 09:04

## 2018-01-01 RX ADMIN — DILTIAZEM HYDROCHLORIDE 120 MG: 60 TABLET, FILM COATED ORAL at 00:10

## 2018-01-01 RX ADMIN — POTASSIUM CHLORIDE 40 MEQ: 1.5 POWDER, FOR SOLUTION ORAL at 11:10

## 2018-01-01 RX ADMIN — HEPARIN SODIUM 7000 UNITS: 1000 INJECTION, SOLUTION INTRAVENOUS; SUBCUTANEOUS at 11:54

## 2018-01-01 RX ADMIN — COLLAGENASE SANTYL: 250 OINTMENT TOPICAL at 18:22

## 2018-01-01 RX ADMIN — LEVOTHYROXINE SODIUM 50 MCG: 50 TABLET ORAL at 08:04

## 2018-01-01 RX ADMIN — INSULIN GLARGINE 14 UNITS: 100 INJECTION, SOLUTION SUBCUTANEOUS at 22:55

## 2018-01-01 RX ADMIN — METOPROLOL TARTRATE 25 MG: 25 TABLET ORAL at 20:41

## 2018-01-01 RX ADMIN — CARBIDOPA AND LEVODOPA 1 TABLET: 25; 100 TABLET ORAL at 09:25

## 2018-01-01 RX ADMIN — GUAIFENESIN 600 MG: 600 TABLET, EXTENDED RELEASE ORAL at 08:32

## 2018-01-01 RX ADMIN — CHLORTHALIDONE 25 MG: 25 TABLET ORAL at 08:32

## 2018-01-01 RX ADMIN — SODIUM CHLORIDE: 9 INJECTION, SOLUTION INTRAVENOUS at 22:13

## 2018-01-01 RX ADMIN — INSULIN LISPRO 4 UNITS: 100 INJECTION, SOLUTION INTRAVENOUS; SUBCUTANEOUS at 12:41

## 2018-01-01 RX ADMIN — ALPRAZOLAM 0.5 MG: 0.5 TABLET ORAL at 00:01

## 2018-01-01 RX ADMIN — ACETAMINOPHEN 650 MG: 325 TABLET ORAL at 21:38

## 2018-01-01 RX ADMIN — Medication 1 MG: at 10:12

## 2018-01-01 RX ADMIN — VENLAFAXINE HYDROCHLORIDE 150 MG: 75 CAPSULE, EXTENDED RELEASE ORAL at 09:53

## 2018-01-01 RX ADMIN — ASCORBIC ACID, VITAMIN A PALMITATE, CHOLECALCIFEROL, THIAMINE HYDROCHLORIDE, RIBOFLAVIN-5 PHOSPHATE SODIUM, PYRIDOXINE HYDROCHLORIDE, NIACINAMIDE, DEXPANTHENOL, ALPHA-TOCOPHEROL ACETATE, VITAMIN K1, FOLIC ACID, BIOTIN, CYANOCOBALAMIN: 200; 3300; 200; 6; 3.6; 6; 40; 15; 10; 150; 600; 60; 5 INJECTION, SOLUTION INTRAVENOUS at 22:48

## 2018-01-01 RX ADMIN — MICONAZOLE NITRATE: 20 CREAM TOPICAL at 08:36

## 2018-01-01 RX ADMIN — DEXTROSE MONOHYDRATE 12.5 G: 500 INJECTION PARENTERAL at 00:28

## 2018-01-01 RX ADMIN — HYDRALAZINE HYDROCHLORIDE 100 MG: 100 TABLET, FILM COATED ORAL at 15:34

## 2018-01-01 RX ADMIN — HYDRALAZINE HYDROCHLORIDE 100 MG: 100 TABLET, FILM COATED ORAL at 13:36

## 2018-01-01 RX ADMIN — HYDRALAZINE HYDROCHLORIDE 100 MG: 100 TABLET, FILM COATED ORAL at 20:55

## 2018-01-01 RX ADMIN — PROPOFOL 7.24 MCG/KG/MIN: 10 INJECTION, EMULSION INTRAVENOUS at 08:00

## 2018-01-01 RX ADMIN — ASPIRIN 81 MG: 81 TABLET, COATED ORAL at 10:24

## 2018-01-01 RX ADMIN — ASPIRIN 81 MG: 81 TABLET, COATED ORAL at 09:26

## 2018-01-01 RX ADMIN — DOCUSATE SODIUM 100 MG: 50 LIQUID ORAL at 08:44

## 2018-01-01 RX ADMIN — IPRATROPIUM BROMIDE AND ALBUTEROL SULFATE 3 ML: .5; 3 SOLUTION RESPIRATORY (INHALATION) at 20:07

## 2018-01-01 RX ADMIN — HYDRALAZINE HYDROCHLORIDE 100 MG: 100 TABLET, FILM COATED ORAL at 21:25

## 2018-01-01 RX ADMIN — DILTIAZEM HYDROCHLORIDE 120 MG: 60 TABLET, FILM COATED ORAL at 05:32

## 2018-01-01 RX ADMIN — Medication 10 ML: at 20:35

## 2018-01-01 RX ADMIN — LEVOTHYROXINE SODIUM 25 MCG: 25 TABLET ORAL at 06:31

## 2018-01-01 RX ADMIN — ATORVASTATIN CALCIUM 80 MG: 80 TABLET, FILM COATED ORAL at 22:04

## 2018-01-01 RX ADMIN — ISOSORBIDE MONONITRATE 60 MG: 60 TABLET, EXTENDED RELEASE ORAL at 09:04

## 2018-01-01 RX ADMIN — Medication 15 ML: at 10:58

## 2018-01-01 RX ADMIN — HEPARIN SODIUM 5000 UNITS: 5000 INJECTION, SOLUTION INTRAVENOUS; SUBCUTANEOUS at 22:50

## 2018-01-01 RX ADMIN — Medication 10 ML: at 09:02

## 2018-01-01 RX ADMIN — OXYMETAZOLINE HYDROCHLORIDE 2 SPRAY: 5 SPRAY NASAL at 13:43

## 2018-01-01 RX ADMIN — SODIUM BICARBONATE 25 MEQ: 84 INJECTION, SOLUTION INTRAVENOUS at 10:47

## 2018-01-01 RX ADMIN — DOCUSATE SODIUM 100 MG: 100 CAPSULE, LIQUID FILLED ORAL at 22:59

## 2018-01-01 RX ADMIN — DILTIAZEM HYDROCHLORIDE 120 MG: 60 TABLET, FILM COATED ORAL at 12:05

## 2018-01-01 RX ADMIN — Medication 500 MG: at 09:46

## 2018-01-01 RX ADMIN — Medication 15 ML: at 20:12

## 2018-01-01 RX ADMIN — HYDROCODONE BITARTRATE AND ACETAMINOPHEN 1 TABLET: 5; 325 TABLET ORAL at 14:34

## 2018-01-01 RX ADMIN — CARBIDOPA AND LEVODOPA 1 TABLET: 25; 100 TABLET ORAL at 14:42

## 2018-01-01 RX ADMIN — PROPOFOL 20 MG: 10 INJECTION, EMULSION INTRAVENOUS at 14:41

## 2018-01-01 RX ADMIN — AMIODARONE HYDROCHLORIDE 200 MG: 200 TABLET ORAL at 13:36

## 2018-01-01 RX ADMIN — MAGNESIUM OXIDE TAB 400 MG (241.3 MG ELEMENTAL MG) 400 MG: 400 (241.3 MG) TAB at 09:59

## 2018-01-01 RX ADMIN — CALCIUM ACETATE 667 MG: 667 CAPSULE ORAL at 17:18

## 2018-01-01 RX ADMIN — PANTOPRAZOLE SODIUM 20 MG: 20 TABLET, DELAYED RELEASE ORAL at 05:54

## 2018-01-01 RX ADMIN — Medication 10 ML: at 08:59

## 2018-01-01 RX ADMIN — LOSARTAN POTASSIUM 100 MG: 50 TABLET ORAL at 09:43

## 2018-01-01 RX ADMIN — LACTOBACILLUS TAB 1 TABLET: TAB at 15:13

## 2018-01-01 RX ADMIN — Medication 10 ML: at 21:54

## 2018-01-01 RX ADMIN — MAGNESIUM OXIDE TAB 400 MG (241.3 MG ELEMENTAL MG) 400 MG: 400 (241.3 MG) TAB at 09:17

## 2018-01-01 RX ADMIN — PHENYLEPHRINE HYDROCHLORIDE 2 SPRAY: 0.5 SPRAY NASAL at 02:45

## 2018-01-01 RX ADMIN — IPRATROPIUM BROMIDE AND ALBUTEROL SULFATE 3 ML: .5; 3 SOLUTION RESPIRATORY (INHALATION) at 13:37

## 2018-01-01 RX ADMIN — ACETAMINOPHEN 650 MG: 325 TABLET ORAL at 02:04

## 2018-01-01 RX ADMIN — LORAZEPAM 1 MG: 2 INJECTION INTRAMUSCULAR; INTRAVENOUS at 13:28

## 2018-01-01 RX ADMIN — ISOSORBIDE MONONITRATE 60 MG: 60 TABLET, EXTENDED RELEASE ORAL at 09:14

## 2018-01-01 RX ADMIN — Medication 400 MG: at 14:42

## 2018-01-01 RX ADMIN — FUROSEMIDE 40 MG: 40 TABLET ORAL at 10:30

## 2018-01-01 RX ADMIN — VENLAFAXINE 75 MG: 75 TABLET ORAL at 10:36

## 2018-01-01 RX ADMIN — CALCIUM ACETATE 667 MG: 667 CAPSULE ORAL at 17:46

## 2018-01-01 RX ADMIN — MAGNESIUM OXIDE TAB 400 MG (241.3 MG ELEMENTAL MG) 400 MG: 400 (241.3 MG) TAB at 09:44

## 2018-01-01 RX ADMIN — Medication 10 ML: at 21:00

## 2018-01-01 RX ADMIN — ALPRAZOLAM 0.5 MG: 0.5 TABLET ORAL at 20:05

## 2018-01-01 RX ADMIN — MINOXIDIL 2.5 MG: 2.5 TABLET ORAL at 15:04

## 2018-01-01 RX ADMIN — CALCIUM 500 MG: 500 TABLET ORAL at 08:08

## 2018-01-01 RX ADMIN — METOROPROLOL TARTRATE 2.5 MG: 5 INJECTION, SOLUTION INTRAVENOUS at 23:04

## 2018-01-01 RX ADMIN — SODIUM CHLORIDE 3000 ML: 9 INJECTION, SOLUTION INTRAVENOUS at 11:27

## 2018-01-01 RX ADMIN — ISOSORBIDE MONONITRATE 60 MG: 60 TABLET, EXTENDED RELEASE ORAL at 14:41

## 2018-01-01 RX ADMIN — FENTANYL CITRATE 25 MCG: 50 INJECTION, SOLUTION INTRAMUSCULAR; INTRAVENOUS at 11:01

## 2018-01-01 RX ADMIN — Medication 10 ML: at 10:14

## 2018-01-01 RX ADMIN — AMIODARONE HYDROCHLORIDE 200 MG: 200 TABLET ORAL at 14:25

## 2018-01-01 RX ADMIN — Medication 125 MG: at 00:57

## 2018-01-01 RX ADMIN — WARFARIN SODIUM 3 MG: 3 TABLET ORAL at 18:26

## 2018-01-01 RX ADMIN — Medication 125 MG: at 17:35

## 2018-01-01 RX ADMIN — IPRATROPIUM BROMIDE AND ALBUTEROL SULFATE 3 ML: 2.5; .5 SOLUTION RESPIRATORY (INHALATION) at 07:25

## 2018-01-01 RX ADMIN — HYDRALAZINE HYDROCHLORIDE 10 MG: 20 INJECTION INTRAMUSCULAR; INTRAVENOUS at 08:35

## 2018-01-01 RX ADMIN — HYDRALAZINE HYDROCHLORIDE 100 MG: 100 TABLET, FILM COATED ORAL at 23:07

## 2018-01-01 RX ADMIN — LOSARTAN POTASSIUM 100 MG: 50 TABLET, FILM COATED ORAL at 10:12

## 2018-01-01 RX ADMIN — CLONIDINE HYDROCHLORIDE 0.1 MG: 0.1 TABLET ORAL at 10:44

## 2018-01-01 RX ADMIN — CEFTRIAXONE SODIUM 1 G: 1 INJECTION, POWDER, FOR SOLUTION INTRAMUSCULAR; INTRAVENOUS at 15:50

## 2018-01-01 RX ADMIN — POTASSIUM CHLORIDE 20 MEQ: 20 SOLUTION ORAL at 09:30

## 2018-01-01 RX ADMIN — ASPIRIN 81 MG: 81 TABLET, COATED ORAL at 08:08

## 2018-01-01 RX ADMIN — APIXABAN 2.5 MG: 2.5 TABLET, FILM COATED ORAL at 09:46

## 2018-01-01 RX ADMIN — AMIODARONE HYDROCHLORIDE: 50 INJECTION, SOLUTION INTRAVENOUS at 18:23

## 2018-01-01 RX ADMIN — PANTOPRAZOLE SODIUM 20 MG: 40 GRANULE, DELAYED RELEASE ORAL at 06:15

## 2018-01-01 RX ADMIN — LEVOTHYROXINE SODIUM 50 MCG: 50 TABLET ORAL at 09:57

## 2018-01-01 RX ADMIN — Medication 400 MG: at 09:15

## 2018-01-01 RX ADMIN — VENLAFAXINE 75 MG: 75 TABLET ORAL at 18:35

## 2018-01-01 RX ADMIN — ACETAMINOPHEN 650 MG: 325 TABLET, FILM COATED ORAL at 05:07

## 2018-01-01 RX ADMIN — CHLORTHALIDONE 25 MG: 25 TABLET ORAL at 09:54

## 2018-01-01 RX ADMIN — DESMOPRESSIN ACETATE 16.12 MCG: 4 SOLUTION INTRAVENOUS at 15:29

## 2018-01-01 RX ADMIN — METOPROLOL TARTRATE 25 MG: 25 TABLET ORAL at 21:00

## 2018-01-01 RX ADMIN — ASPIRIN 81 MG: 81 TABLET, COATED ORAL at 13:36

## 2018-01-01 RX ADMIN — Medication 5.2 MILLICURIE: at 17:45

## 2018-01-01 RX ADMIN — FLUTICASONE PROPIONATE 2 SPRAY: 50 SPRAY, METERED NASAL at 10:18

## 2018-01-01 RX ADMIN — METOPROLOL TARTRATE 25 MG: 25 TABLET ORAL at 10:31

## 2018-01-01 RX ADMIN — APIXABAN 2.5 MG: 2.5 TABLET, FILM COATED ORAL at 14:25

## 2018-01-01 RX ADMIN — HYDRALAZINE HYDROCHLORIDE 100 MG: 100 TABLET, FILM COATED ORAL at 09:38

## 2018-01-01 RX ADMIN — Medication 10 ML: at 10:46

## 2018-01-01 RX ADMIN — CEFEPIME HYDROCHLORIDE 2 G: 2 INJECTION, POWDER, FOR SOLUTION INTRAVENOUS at 14:37

## 2018-01-01 RX ADMIN — METOPROLOL TARTRATE 25 MG: 25 TABLET ORAL at 23:00

## 2018-01-01 RX ADMIN — SODIUM CHLORIDE 250 ML: 9 INJECTION, SOLUTION INTRAVENOUS at 09:52

## 2018-01-01 RX ADMIN — HYDRALAZINE HYDROCHLORIDE 100 MG: 100 TABLET, FILM COATED ORAL at 14:50

## 2018-01-01 RX ADMIN — FLUTICASONE PROPIONATE 2 SPRAY: 50 SPRAY, METERED NASAL at 12:08

## 2018-01-01 RX ADMIN — DILTIAZEM HYDROCHLORIDE 240 MG: 60 TABLET, FILM COATED ORAL at 09:45

## 2018-01-01 RX ADMIN — PANTOPRAZOLE SODIUM 20 MG: 40 GRANULE, DELAYED RELEASE ORAL at 06:27

## 2018-01-01 RX ADMIN — DOCUSATE SODIUM 100 MG: 100 CAPSULE, LIQUID FILLED ORAL at 09:46

## 2018-01-01 RX ADMIN — PROPOFOL 20 MG: 10 INJECTION, EMULSION INTRAVENOUS at 14:23

## 2018-01-01 RX ADMIN — BACITRACIN, NEOMYCIN, POLYMYXIN B 3.5 G: 400; 3.5; 5 OINTMENT TOPICAL at 20:54

## 2018-01-01 RX ADMIN — Medication 10 ML: at 21:26

## 2018-01-01 RX ADMIN — VENLAFAXINE 75 MG: 75 TABLET ORAL at 17:39

## 2018-01-01 RX ADMIN — METOROPROLOL TARTRATE 5 MG: 5 INJECTION, SOLUTION INTRAVENOUS at 00:26

## 2018-01-01 RX ADMIN — LOSARTAN POTASSIUM 100 MG: 50 TABLET ORAL at 15:32

## 2018-01-01 RX ADMIN — CEFUROXIME AXETIL 250 MG: 500 TABLET ORAL at 08:08

## 2018-01-01 RX ADMIN — ALBUMIN (HUMAN) 50 G: 0.25 INJECTION, SOLUTION INTRAVENOUS at 12:26

## 2018-01-01 RX ADMIN — ISOSORBIDE MONONITRATE 60 MG: 60 TABLET, EXTENDED RELEASE ORAL at 09:30

## 2018-01-01 RX ADMIN — SODIUM BICARBONATE 325 MG: 650 TABLET ORAL at 14:03

## 2018-01-01 RX ADMIN — METOPROLOL TARTRATE 25 MG: 25 TABLET ORAL at 14:42

## 2018-01-01 RX ADMIN — Medication 10 ML: at 11:36

## 2018-01-01 RX ADMIN — Medication 0.5 MG: at 10:42

## 2018-01-01 RX ADMIN — Medication 400 MG: at 20:30

## 2018-01-01 RX ADMIN — LACTOBACILLUS TAB 4 TABLET: TAB at 17:37

## 2018-01-01 RX ADMIN — PANTOPRAZOLE SODIUM 20 MG: 20 TABLET, DELAYED RELEASE ORAL at 05:53

## 2018-01-01 RX ADMIN — IPRATROPIUM BROMIDE AND ALBUTEROL SULFATE 3 ML: 2.5; .5 SOLUTION RESPIRATORY (INHALATION) at 09:05

## 2018-01-01 RX ADMIN — TAZOBACTAM SODIUM AND PIPERACILLIN SODIUM 3.38 G: 375; 3 INJECTION, SOLUTION INTRAVENOUS at 15:58

## 2018-01-01 RX ADMIN — ENOXAPARIN SODIUM 30 MG: 100 INJECTION SUBCUTANEOUS at 08:40

## 2018-01-01 RX ADMIN — INSULIN GLARGINE 14 UNITS: 100 INJECTION, SOLUTION SUBCUTANEOUS at 21:37

## 2018-01-01 RX ADMIN — NEPHROCAP 1 MG: 1 CAP ORAL at 15:32

## 2018-01-01 RX ADMIN — PRIMIDONE 50 MG: 50 TABLET ORAL at 14:03

## 2018-01-01 RX ADMIN — LOSARTAN POTASSIUM 100 MG: 50 TABLET, FILM COATED ORAL at 10:36

## 2018-01-01 RX ADMIN — LACTOBACILLUS TAB 1 TABLET: TAB at 22:00

## 2018-01-01 RX ADMIN — HYDRALAZINE HYDROCHLORIDE 20 MG: 20 INJECTION INTRAMUSCULAR; INTRAVENOUS at 03:39

## 2018-01-01 RX ADMIN — CALCIUM 500 MG: 500 TABLET ORAL at 10:36

## 2018-01-01 RX ADMIN — SODIUM CHLORIDE: 9 INJECTION, SOLUTION INTRAVENOUS at 01:44

## 2018-01-01 RX ADMIN — CARBIDOPA AND LEVODOPA 1 TABLET: 25; 100 TABLET ORAL at 09:59

## 2018-01-01 RX ADMIN — Medication 15 ML: at 20:00

## 2018-01-01 RX ADMIN — HYDRALAZINE HYDROCHLORIDE 100 MG: 100 TABLET, FILM COATED ORAL at 21:34

## 2018-01-01 RX ADMIN — METOROPROLOL TARTRATE 5 MG: 5 INJECTION, SOLUTION INTRAVENOUS at 07:06

## 2018-01-01 RX ADMIN — ACETAMINOPHEN 650 MG: 325 TABLET ORAL at 18:10

## 2018-01-01 RX ADMIN — PROPOFOL 35 MCG/KG/MIN: 10 INJECTION, EMULSION INTRAVENOUS at 04:30

## 2018-01-01 RX ADMIN — CHLORTHALIDONE 25 MG: 25 TABLET ORAL at 17:15

## 2018-01-01 RX ADMIN — PANTOPRAZOLE SODIUM 20 MG: 20 TABLET, DELAYED RELEASE ORAL at 10:33

## 2018-01-01 RX ADMIN — CALCIUM 500 MG: 500 TABLET ORAL at 17:08

## 2018-01-01 RX ADMIN — PRIMIDONE 50 MG: 50 TABLET ORAL at 20:36

## 2018-01-01 RX ADMIN — CHLORTHALIDONE 25 MG: 25 TABLET ORAL at 07:48

## 2018-01-01 RX ADMIN — ASPIRIN 81 MG: 81 TABLET, COATED ORAL at 08:32

## 2018-01-01 RX ADMIN — PHENYLEPHRINE HYDROCHLORIDE 100 MCG: 10 INJECTION INTRAVENOUS at 09:40

## 2018-01-01 RX ADMIN — Medication 10 ML: at 21:14

## 2018-01-01 RX ADMIN — AMIODARONE HYDROCHLORIDE 400 MG: 200 TABLET ORAL at 22:00

## 2018-01-01 RX ADMIN — NEPHROCAP 1 MG: 1 CAP ORAL at 09:55

## 2018-01-01 RX ADMIN — DILTIAZEM HYDROCHLORIDE 240 MG: 240 CAPSULE, COATED, EXTENDED RELEASE ORAL at 08:32

## 2018-01-01 RX ADMIN — Medication 10 ML: at 10:40

## 2018-01-01 RX ADMIN — HEPARIN SODIUM 3000 UNITS: 1000 INJECTION, SOLUTION INTRAVENOUS; SUBCUTANEOUS at 10:35

## 2018-01-01 RX ADMIN — LOSARTAN POTASSIUM 50 MG: 50 TABLET ORAL at 09:16

## 2018-01-01 RX ADMIN — ASPIRIN 81 MG: 81 TABLET ORAL at 10:44

## 2018-01-01 RX ADMIN — IPRATROPIUM BROMIDE AND ALBUTEROL SULFATE 3 ML: 2.5; .5 SOLUTION RESPIRATORY (INHALATION) at 07:01

## 2018-01-01 RX ADMIN — ATORVASTATIN CALCIUM 20 MG: 20 TABLET, FILM COATED ORAL at 22:24

## 2018-01-01 RX ADMIN — IPRATROPIUM BROMIDE AND ALBUTEROL SULFATE 1 AMPULE: .5; 3 SOLUTION RESPIRATORY (INHALATION) at 04:23

## 2018-01-01 RX ADMIN — HYDRALAZINE HYDROCHLORIDE 5 MG: 20 INJECTION INTRAMUSCULAR; INTRAVENOUS at 03:06

## 2018-01-01 RX ADMIN — CALCIUM ACETATE 667 MG: 667 CAPSULE ORAL at 17:44

## 2018-01-01 RX ADMIN — CARBIDOPA AND LEVODOPA 1 TABLET: 25; 100 TABLET ORAL at 10:36

## 2018-01-01 RX ADMIN — LEVETIRACETAM 1000 MG: 100 INJECTION, SOLUTION INTRAVENOUS at 09:29

## 2018-01-01 RX ADMIN — FOLIC ACID 1 MG: 1 TABLET ORAL at 12:32

## 2018-01-01 RX ADMIN — HYDRALAZINE HYDROCHLORIDE 100 MG: 100 TABLET, FILM COATED ORAL at 05:32

## 2018-01-01 RX ADMIN — LACTOBACILLUS TAB 1 TABLET: TAB at 08:32

## 2018-01-01 RX ADMIN — DILTIAZEM HYDROCHLORIDE 20 MG: 5 INJECTION INTRAVENOUS at 19:33

## 2018-01-01 RX ADMIN — HYDRALAZINE HYDROCHLORIDE 100 MG: 100 TABLET, FILM COATED ORAL at 13:48

## 2018-01-01 RX ADMIN — METOPROLOL TARTRATE 25 MG: 25 TABLET ORAL at 09:44

## 2018-01-01 RX ADMIN — DILTIAZEM HYDROCHLORIDE 120 MG: 60 TABLET, FILM COATED ORAL at 05:56

## 2018-01-01 RX ADMIN — INSULIN GLARGINE 14 UNITS: 100 INJECTION, SOLUTION SUBCUTANEOUS at 21:29

## 2018-01-01 RX ADMIN — CALCIUM 500 MG: 500 TABLET ORAL at 14:41

## 2018-01-01 RX ADMIN — APIXABAN 2.5 MG: 2.5 TABLET, FILM COATED ORAL at 09:59

## 2018-01-01 RX ADMIN — SODIUM CHLORIDE: 9 INJECTION, SOLUTION INTRAVENOUS at 20:56

## 2018-01-01 RX ADMIN — ATORVASTATIN CALCIUM 20 MG: 20 TABLET, FILM COATED ORAL at 20:16

## 2018-01-01 RX ADMIN — ISOSORBIDE DINITRATE 60 MG: 20 TABLET ORAL at 00:43

## 2018-01-01 RX ADMIN — HYDRALAZINE HYDROCHLORIDE 10 MG: 20 INJECTION INTRAMUSCULAR; INTRAVENOUS at 19:05

## 2018-01-01 RX ADMIN — PROPOFOL 40 MCG/KG/MIN: 10 INJECTION, EMULSION INTRAVENOUS at 05:12

## 2018-01-01 RX ADMIN — HYDRALAZINE HYDROCHLORIDE 20 MG: 20 INJECTION INTRAMUSCULAR; INTRAVENOUS at 10:44

## 2018-01-01 RX ADMIN — FENTANYL CITRATE 25 MCG/HR: 50 INJECTION, SOLUTION INTRAMUSCULAR; INTRAVENOUS at 11:19

## 2018-01-01 RX ADMIN — LACTOBACILLUS TAB 4 TABLET: TAB at 14:50

## 2018-01-01 RX ADMIN — LEVOTHYROXINE SODIUM 50 MCG: 50 TABLET ORAL at 05:39

## 2018-01-01 RX ADMIN — Medication 15 ML: at 21:49

## 2018-01-01 RX ADMIN — PREDNISONE 10 MG: 10 TABLET ORAL at 08:09

## 2018-01-01 RX ADMIN — OXYCODONE AND ACETAMINOPHEN 1 TABLET: 5; 325 TABLET ORAL at 13:27

## 2018-01-01 RX ADMIN — PRIMIDONE 50 MG: 50 TABLET ORAL at 09:59

## 2018-01-01 RX ADMIN — INSULIN LISPRO 3 UNITS: 100 INJECTION, SOLUTION INTRAVENOUS; SUBCUTANEOUS at 09:25

## 2018-01-01 RX ADMIN — CARBIDOPA AND LEVODOPA 1 TABLET: 25; 100 TABLET ORAL at 09:54

## 2018-01-01 RX ADMIN — PRIMIDONE 50 MG: 50 TABLET ORAL at 21:58

## 2018-01-01 RX ADMIN — Medication 10 ML: at 22:03

## 2018-01-01 RX ADMIN — Medication 15 ML: at 07:56

## 2018-01-01 RX ADMIN — HYDRALAZINE HYDROCHLORIDE 5 MG: 20 INJECTION INTRAMUSCULAR; INTRAVENOUS at 11:17

## 2018-01-01 RX ADMIN — VENLAFAXINE HYDROCHLORIDE 150 MG: 150 CAPSULE, EXTENDED RELEASE ORAL at 09:43

## 2018-01-01 RX ADMIN — PRIMIDONE 50 MG: 50 TABLET ORAL at 14:39

## 2018-01-01 RX ADMIN — CALCIUM ACETATE 667 MG: 667 CAPSULE ORAL at 13:27

## 2018-01-01 RX ADMIN — Medication 15 ML: at 08:45

## 2018-01-01 RX ADMIN — SODIUM CHLORIDE 250 ML: 9 INJECTION, SOLUTION INTRAVENOUS at 11:52

## 2018-01-01 RX ADMIN — Medication 10 ML: at 20:19

## 2018-01-01 RX ADMIN — METOROPROLOL TARTRATE 5 MG: 5 INJECTION, SOLUTION INTRAVENOUS at 17:18

## 2018-01-01 RX ADMIN — INSULIN LISPRO 1 UNITS: 100 INJECTION, SOLUTION INTRAVENOUS; SUBCUTANEOUS at 18:12

## 2018-01-01 RX ADMIN — DILTIAZEM HYDROCHLORIDE 240 MG: 240 CAPSULE, COATED, EXTENDED RELEASE ORAL at 18:07

## 2018-01-01 RX ADMIN — HEPARIN SODIUM 5000 UNITS: 5000 INJECTION, SOLUTION INTRAVENOUS; SUBCUTANEOUS at 06:39

## 2018-01-01 RX ADMIN — AMIODARONE HYDROCHLORIDE 200 MG: 200 TABLET ORAL at 09:37

## 2018-01-01 RX ADMIN — Medication 10 ML: at 10:00

## 2018-01-01 RX ADMIN — DEXTROSE MONOHYDRATE: 50 INJECTION, SOLUTION INTRAVENOUS at 10:21

## 2018-01-01 RX ADMIN — ISOSORBIDE DINITRATE 60 MG: 20 TABLET ORAL at 09:04

## 2018-01-01 RX ADMIN — CLONIDINE HYDROCHLORIDE 0.1 MG: 0.1 TABLET ORAL at 13:26

## 2018-01-01 RX ADMIN — IPRATROPIUM BROMIDE AND ALBUTEROL SULFATE 3 ML: 2.5; .5 SOLUTION RESPIRATORY (INHALATION) at 08:16

## 2018-01-01 RX ADMIN — PANTOPRAZOLE SODIUM 40 MG: 40 INJECTION, POWDER, FOR SOLUTION INTRAVENOUS at 09:03

## 2018-01-01 RX ADMIN — ISOSORBIDE MONONITRATE 60 MG: 60 TABLET, EXTENDED RELEASE ORAL at 15:38

## 2018-01-01 RX ADMIN — FOLIC ACID 1 MG: 1 TABLET ORAL at 10:35

## 2018-01-01 RX ADMIN — INSULIN LISPRO 2 UNITS: 100 INJECTION, SOLUTION INTRAVENOUS; SUBCUTANEOUS at 17:14

## 2018-01-01 RX ADMIN — MINOXIDIL 2.5 MG: 2.5 TABLET ORAL at 18:58

## 2018-01-01 RX ADMIN — VENLAFAXINE HYDROCHLORIDE 150 MG: 75 CAPSULE, EXTENDED RELEASE ORAL at 10:12

## 2018-01-01 RX ADMIN — ROCURONIUM BROMIDE 20 MG: 10 INJECTION INTRAVENOUS at 08:27

## 2018-01-01 RX ADMIN — PRIMIDONE 50 MG: 50 TABLET ORAL at 21:34

## 2018-01-01 RX ADMIN — HYDRALAZINE HYDROCHLORIDE 50 MG: 50 TABLET, FILM COATED ORAL at 10:36

## 2018-01-01 RX ADMIN — ALBUMIN (HUMAN) 25 G: 0.25 INJECTION, SOLUTION INTRAVENOUS at 12:17

## 2018-01-01 RX ADMIN — MAGNESIUM OXIDE TAB 400 MG (241.3 MG ELEMENTAL MG) 400 MG: 400 (241.3 MG) TAB at 09:35

## 2018-01-01 RX ADMIN — IPRATROPIUM BROMIDE AND ALBUTEROL SULFATE 3 ML: 2.5; .5 SOLUTION RESPIRATORY (INHALATION) at 13:49

## 2018-01-01 RX ADMIN — FENTANYL CITRATE 75 MCG/HR: 50 INJECTION, SOLUTION INTRAMUSCULAR; INTRAVENOUS at 03:53

## 2018-01-01 RX ADMIN — AMIODARONE HYDROCHLORIDE 200 MG: 200 TABLET ORAL at 15:33

## 2018-01-01 RX ADMIN — DILTIAZEM HYDROCHLORIDE 120 MG: 60 TABLET, FILM COATED ORAL at 13:19

## 2018-01-01 RX ADMIN — HYDRALAZINE HYDROCHLORIDE 100 MG: 100 TABLET, FILM COATED ORAL at 02:32

## 2018-01-01 RX ADMIN — HYDRALAZINE HYDROCHLORIDE 100 MG: 100 TABLET, FILM COATED ORAL at 22:04

## 2018-01-01 RX ADMIN — DIPHENHYDRAMINE HYDROCHLORIDE, ZINC ACETATE: 2; .1 CREAM TOPICAL at 01:28

## 2018-01-01 RX ADMIN — CEFTRIAXONE 1 G: 1 INJECTION, POWDER, FOR SOLUTION INTRAMUSCULAR; INTRAVENOUS at 01:12

## 2018-01-01 RX ADMIN — CALCIUM ACETATE 667 MG: 667 CAPSULE ORAL at 18:23

## 2018-01-01 RX ADMIN — Medication 125 MG: at 13:44

## 2018-01-01 RX ADMIN — HYDRALAZINE HYDROCHLORIDE 100 MG: 100 TABLET, FILM COATED ORAL at 13:27

## 2018-01-01 RX ADMIN — DILTIAZEM HYDROCHLORIDE 240 MG: 240 CAPSULE, COATED, EXTENDED RELEASE ORAL at 22:03

## 2018-01-01 RX ADMIN — APIXABAN 2.5 MG: 2.5 TABLET, FILM COATED ORAL at 21:25

## 2018-01-01 RX ADMIN — VENLAFAXINE HYDROCHLORIDE 150 MG: 150 CAPSULE, EXTENDED RELEASE ORAL at 10:17

## 2018-01-01 RX ADMIN — VENLAFAXINE HYDROCHLORIDE 150 MG: 150 CAPSULE, EXTENDED RELEASE ORAL at 10:44

## 2018-01-01 RX ADMIN — CHLORTHALIDONE 25 MG: 25 TABLET ORAL at 10:25

## 2018-01-01 RX ADMIN — CHLORTHALIDONE 25 MG: 25 TABLET ORAL at 09:59

## 2018-01-01 RX ADMIN — PRIMIDONE 50 MG: 50 TABLET ORAL at 13:59

## 2018-01-01 RX ADMIN — ACETAMINOPHEN 650 MG: 325 TABLET ORAL at 09:28

## 2018-01-01 RX ADMIN — LEVETIRACETAM 1000 MG: 500 TABLET, FILM COATED ORAL at 09:56

## 2018-01-01 RX ADMIN — Medication 10 ML: at 09:45

## 2018-01-01 RX ADMIN — AMIODARONE HYDROCHLORIDE 400 MG: 200 TABLET ORAL at 09:30

## 2018-01-01 RX ADMIN — VENLAFAXINE HYDROCHLORIDE 150 MG: 150 CAPSULE, EXTENDED RELEASE ORAL at 14:42

## 2018-01-01 RX ADMIN — Medication 10 ML: at 10:29

## 2018-01-01 RX ADMIN — LACTOBACILLUS TAB 1 TABLET: TAB at 14:53

## 2018-01-01 RX ADMIN — LEVETIRACETAM 1000 MG: 500 TABLET, FILM COATED ORAL at 09:58

## 2018-01-01 RX ADMIN — LACTOBACILLUS TAB 1 TABLET: TAB at 09:26

## 2018-01-01 RX ADMIN — ALBUMIN (HUMAN) 25 G: 0.25 INJECTION, SOLUTION INTRAVENOUS at 11:39

## 2018-01-01 RX ADMIN — DILTIAZEM HYDROCHLORIDE 240 MG: 240 CAPSULE, COATED, EXTENDED RELEASE ORAL at 08:50

## 2018-01-01 RX ADMIN — HYDRALAZINE HYDROCHLORIDE 20 MG: 20 INJECTION INTRAMUSCULAR; INTRAVENOUS at 09:03

## 2018-01-01 RX ADMIN — COLLAGENASE SANTYL: 250 OINTMENT TOPICAL at 10:43

## 2018-01-01 RX ADMIN — ASPIRIN 81 MG: 81 TABLET, COATED ORAL at 13:19

## 2018-01-01 RX ADMIN — Medication 10 ML: at 22:27

## 2018-01-01 RX ADMIN — CARBIDOPA AND LEVODOPA 1 TABLET: 25; 100 TABLET ORAL at 09:16

## 2018-01-01 RX ADMIN — TAZOBACTAM SODIUM AND PIPERACILLIN SODIUM 3.38 G: 375; 3 INJECTION, SOLUTION INTRAVENOUS at 11:29

## 2018-01-01 RX ADMIN — VENLAFAXINE 75 MG: 75 TABLET ORAL at 08:57

## 2018-01-01 RX ADMIN — CALCIUM 500 MG: 500 TABLET ORAL at 10:34

## 2018-01-01 RX ADMIN — INSULIN LISPRO 1 UNITS: 100 INJECTION, SOLUTION INTRAVENOUS; SUBCUTANEOUS at 17:42

## 2018-01-01 RX ADMIN — METOROPROLOL TARTRATE 5 MG: 5 INJECTION, SOLUTION INTRAVENOUS at 17:57

## 2018-01-01 RX ADMIN — INSULIN LISPRO 2 UNITS: 100 INJECTION, SOLUTION INTRAVENOUS; SUBCUTANEOUS at 11:46

## 2018-01-01 RX ADMIN — Medication 10 ML: at 11:42

## 2018-01-01 RX ADMIN — PROPOFOL 80 MG: 10 INJECTION, EMULSION INTRAVENOUS at 09:41

## 2018-01-01 RX ADMIN — PANTOPRAZOLE SODIUM 40 MG: 40 INJECTION, POWDER, FOR SOLUTION INTRAVENOUS at 09:33

## 2018-01-01 RX ADMIN — AMIODARONE HYDROCHLORIDE 200 MG: 200 TABLET ORAL at 09:58

## 2018-01-01 RX ADMIN — SODIUM BICARBONATE 325 MG: 650 TABLET ORAL at 07:48

## 2018-01-01 RX ADMIN — LOSARTAN POTASSIUM 50 MG: 50 TABLET ORAL at 09:31

## 2018-01-01 RX ADMIN — DEXTROSE MONOHYDRATE 12.5 G: 25 INJECTION, SOLUTION INTRAVENOUS at 07:36

## 2018-01-01 RX ADMIN — APIXABAN 2.5 MG: 2.5 TABLET, FILM COATED ORAL at 10:25

## 2018-01-01 RX ADMIN — SODIUM CHLORIDE 2000 ML: 9 INJECTION, SOLUTION INTRAVENOUS at 12:15

## 2018-01-01 RX ADMIN — HYDRALAZINE HYDROCHLORIDE 20 MG: 20 INJECTION INTRAMUSCULAR; INTRAVENOUS at 20:13

## 2018-01-01 RX ADMIN — Medication 15 ML: at 09:58

## 2018-01-01 RX ADMIN — HYDRALAZINE HYDROCHLORIDE 100 MG: 100 TABLET, FILM COATED ORAL at 05:52

## 2018-01-01 RX ADMIN — METOROPROLOL TARTRATE 5 MG: 5 INJECTION, SOLUTION INTRAVENOUS at 19:59

## 2018-01-01 RX ADMIN — ASPIRIN 81 MG: 81 TABLET, COATED ORAL at 08:49

## 2018-01-01 RX ADMIN — Medication 150 MG: at 09:00

## 2018-01-01 RX ADMIN — DILTIAZEM HYDROCHLORIDE 120 MG: 60 TABLET, FILM COATED ORAL at 05:03

## 2018-01-01 RX ADMIN — OXYCODONE AND ACETAMINOPHEN 2 TABLET: 5; 325 TABLET ORAL at 17:37

## 2018-01-01 RX ADMIN — Medication 10 ML: at 10:05

## 2018-01-01 RX ADMIN — CARBIDOPA AND LEVODOPA 1 TABLET: 25; 100 TABLET ORAL at 11:37

## 2018-01-01 RX ADMIN — DAPTOMYCIN 180 MG: 500 INJECTION, POWDER, LYOPHILIZED, FOR SOLUTION INTRAVENOUS at 14:27

## 2018-01-01 RX ADMIN — METOROPROLOL TARTRATE 5 MG: 5 INJECTION, SOLUTION INTRAVENOUS at 10:37

## 2018-01-01 RX ADMIN — NEPHROCAP 1 MG: 1 CAP ORAL at 13:36

## 2018-01-01 RX ADMIN — HYDRALAZINE HYDROCHLORIDE 10 MG: 20 INJECTION INTRAMUSCULAR; INTRAVENOUS at 18:15

## 2018-01-01 RX ADMIN — Medication 10 ML: at 22:06

## 2018-01-01 RX ADMIN — PIPERACILLIN SODIUM,TAZOBACTAM SODIUM 3.38 G: 3; .375 INJECTION, POWDER, FOR SOLUTION INTRAVENOUS at 23:53

## 2018-01-01 RX ADMIN — HEPARIN SODIUM 7000 UNITS: 1000 INJECTION, SOLUTION INTRAVENOUS; SUBCUTANEOUS at 13:16

## 2018-01-01 RX ADMIN — CARBIDOPA AND LEVODOPA 1 TABLET: 25; 100 TABLET ORAL at 08:09

## 2018-01-01 RX ADMIN — PROPOFOL 20 MG: 10 INJECTION, EMULSION INTRAVENOUS at 14:31

## 2018-01-01 RX ADMIN — TAZOBACTAM SODIUM AND PIPERACILLIN SODIUM 2.25 G: 250; 2 INJECTION, SOLUTION INTRAVENOUS at 14:39

## 2018-01-01 RX ADMIN — PANTOPRAZOLE SODIUM 20 MG: 20 TABLET, DELAYED RELEASE ORAL at 10:12

## 2018-01-01 RX ADMIN — METOPROLOL TARTRATE 25 MG: 25 TABLET ORAL at 20:55

## 2018-01-01 RX ADMIN — AMIODARONE HYDROCHLORIDE 200 MG: 200 TABLET ORAL at 10:45

## 2018-01-01 RX ADMIN — DILTIAZEM HYDROCHLORIDE 240 MG: 240 CAPSULE, COATED, EXTENDED RELEASE ORAL at 17:13

## 2018-01-01 RX ADMIN — CALCIUM 500 MG: 500 TABLET ORAL at 22:02

## 2018-01-01 RX ADMIN — FENTANYL CITRATE 25 MCG: 50 INJECTION, SOLUTION INTRAMUSCULAR; INTRAVENOUS at 20:12

## 2018-01-01 RX ADMIN — BACITRACIN, NEOMYCIN, POLYMYXIN B 3.5 G: 400; 3.5; 5 OINTMENT TOPICAL at 09:45

## 2018-01-01 RX ADMIN — ISOSORBIDE MONONITRATE 60 MG: 60 TABLET, EXTENDED RELEASE ORAL at 10:25

## 2018-01-01 RX ADMIN — HYDRALAZINE HYDROCHLORIDE 10 MG: 20 INJECTION INTRAMUSCULAR; INTRAVENOUS at 17:14

## 2018-01-01 RX ADMIN — PANTOPRAZOLE SODIUM 40 MG: 40 GRANULE, DELAYED RELEASE ORAL at 06:10

## 2018-01-01 RX ADMIN — HYDRALAZINE HYDROCHLORIDE 100 MG: 100 TABLET, FILM COATED ORAL at 05:35

## 2018-01-01 RX ADMIN — VENLAFAXINE 75 MG: 75 TABLET ORAL at 17:56

## 2018-01-01 RX ADMIN — CALCIUM 500 MG: 500 TABLET ORAL at 10:31

## 2018-01-01 RX ADMIN — GUAIFENESIN 600 MG: 600 TABLET, EXTENDED RELEASE ORAL at 08:59

## 2018-01-01 RX ADMIN — ACETAMINOPHEN 650 MG: 325 TABLET, FILM COATED ORAL at 21:17

## 2018-01-01 RX ADMIN — PIPERACILLIN SODIUM,TAZOBACTAM SODIUM 3.38 G: 3; .375 INJECTION, POWDER, FOR SOLUTION INTRAVENOUS at 23:00

## 2018-01-01 RX ADMIN — CEFUROXIME AXETIL 250 MG: 500 TABLET ORAL at 09:29

## 2018-01-01 RX ADMIN — LACTOBACILLUS TAB 1 TABLET: TAB at 14:40

## 2018-01-01 RX ADMIN — METOPROLOL TARTRATE 10 MG: 5 INJECTION, SOLUTION INTRAVENOUS at 17:52

## 2018-01-01 RX ADMIN — SODIUM CHLORIDE: 9 INJECTION, SOLUTION INTRAVENOUS at 09:10

## 2018-01-01 RX ADMIN — ISOSORBIDE MONONITRATE 60 MG: 60 TABLET, EXTENDED RELEASE ORAL at 09:46

## 2018-01-01 RX ADMIN — SODIUM CHLORIDE 250 ML: 9 INJECTION, SOLUTION INTRAVENOUS at 01:02

## 2018-01-01 RX ADMIN — DILTIAZEM HYDROCHLORIDE 120 MG: 60 TABLET, FILM COATED ORAL at 00:11

## 2018-01-01 RX ADMIN — INSULIN GLARGINE 12 UNITS: 100 INJECTION, SOLUTION SUBCUTANEOUS at 22:01

## 2018-01-01 RX ADMIN — APIXABAN 2.5 MG: 2.5 TABLET, FILM COATED ORAL at 08:32

## 2018-01-01 RX ADMIN — DILTIAZEM HYDROCHLORIDE 240 MG: 240 CAPSULE, COATED, EXTENDED RELEASE ORAL at 21:00

## 2018-01-01 RX ADMIN — HYDRALAZINE HYDROCHLORIDE 100 MG: 100 TABLET, FILM COATED ORAL at 22:06

## 2018-01-01 RX ADMIN — ATORVASTATIN CALCIUM 80 MG: 80 TABLET, FILM COATED ORAL at 23:00

## 2018-01-01 RX ADMIN — DOCUSATE SODIUM 100 MG: 100 CAPSULE, LIQUID FILLED ORAL at 09:44

## 2018-01-01 RX ADMIN — DILTIAZEM HYDROCHLORIDE 240 MG: 240 CAPSULE, COATED, EXTENDED RELEASE ORAL at 09:29

## 2018-01-01 RX ADMIN — HYDRALAZINE HYDROCHLORIDE 100 MG: 100 TABLET, FILM COATED ORAL at 14:26

## 2018-01-01 RX ADMIN — LIDOCAINE HYDROCHLORIDE 50 MG: 20 INJECTION, SOLUTION INFILTRATION; PERINEURAL at 09:41

## 2018-01-01 RX ADMIN — GUAIFENESIN 600 MG: 600 TABLET, EXTENDED RELEASE ORAL at 22:25

## 2018-01-01 RX ADMIN — MAGNESIUM OXIDE TAB 400 MG (241.3 MG ELEMENTAL MG) 400 MG: 400 (241.3 MG) TAB at 09:23

## 2018-01-01 RX ADMIN — Medication 10 ML: at 11:27

## 2018-01-01 RX ADMIN — Medication 10 ML: at 21:36

## 2018-01-01 RX ADMIN — FLUTICASONE PROPIONATE 2 SPRAY: 50 SPRAY, METERED NASAL at 08:35

## 2018-01-01 RX ADMIN — METOPROLOL TARTRATE 10 MG: 5 INJECTION, SOLUTION INTRAVENOUS at 16:14

## 2018-01-01 RX ADMIN — Medication 10 ML: at 21:20

## 2018-01-01 RX ADMIN — FUROSEMIDE 40 MG: 40 TABLET ORAL at 17:07

## 2018-01-01 RX ADMIN — DILTIAZEM HYDROCHLORIDE 240 MG: 120 CAPSULE, EXTENDED RELEASE ORAL at 09:34

## 2018-01-01 RX ADMIN — CALCIUM ACETATE 667 MG: 667 CAPSULE ORAL at 12:39

## 2018-01-01 RX ADMIN — Medication 125 MG: at 17:56

## 2018-01-01 RX ADMIN — LEVOFLOXACIN 250 MG: 5 INJECTION, SOLUTION INTRAVENOUS at 20:34

## 2018-01-01 RX ADMIN — HYDRALAZINE HYDROCHLORIDE 100 MG: 100 TABLET, FILM COATED ORAL at 21:38

## 2018-01-01 RX ADMIN — CHOLESTYRAMINE 2 G: 4 POWDER, FOR SUSPENSION ORAL at 21:25

## 2018-01-01 RX ADMIN — LOSARTAN POTASSIUM 50 MG: 50 TABLET ORAL at 08:32

## 2018-01-01 RX ADMIN — ISOSORBIDE MONONITRATE 60 MG: 60 TABLET, EXTENDED RELEASE ORAL at 09:26

## 2018-01-01 RX ADMIN — Medication 1 MG: at 14:40

## 2018-01-01 RX ADMIN — LEVOTHYROXINE SODIUM 25 MCG: 50 TABLET ORAL at 10:13

## 2018-01-01 RX ADMIN — Medication 10 ML: at 14:25

## 2018-01-01 RX ADMIN — APIXABAN 2.5 MG: 2.5 TABLET, FILM COATED ORAL at 09:53

## 2018-01-01 RX ADMIN — DILTIAZEM HYDROCHLORIDE 240 MG: 240 CAPSULE, COATED, EXTENDED RELEASE ORAL at 16:48

## 2018-01-01 RX ADMIN — HEPARIN SODIUM 7000 UNITS: 1000 INJECTION, SOLUTION INTRAVENOUS; SUBCUTANEOUS at 09:59

## 2018-01-01 RX ADMIN — Medication 400 MG: at 09:45

## 2018-01-01 RX ADMIN — DOCUSATE SODIUM 100 MG: 100 CAPSULE, LIQUID FILLED ORAL at 13:23

## 2018-01-01 RX ADMIN — NEPHROCAP 1 MG: 1 CAP ORAL at 10:00

## 2018-01-01 RX ADMIN — GUAIFENESIN 600 MG: 600 TABLET, EXTENDED RELEASE ORAL at 21:10

## 2018-01-01 RX ADMIN — DILTIAZEM HYDROCHLORIDE 240 MG: 60 TABLET, FILM COATED ORAL at 21:00

## 2018-01-01 RX ADMIN — HYDRALAZINE HYDROCHLORIDE 100 MG: 100 TABLET, FILM COATED ORAL at 14:08

## 2018-01-01 RX ADMIN — POTASSIUM CHLORIDE 20 MEQ: 20 SOLUTION ORAL at 10:37

## 2018-01-01 RX ADMIN — CALCIUM ACETATE 667 MG: 667 CAPSULE ORAL at 18:01

## 2018-01-01 RX ADMIN — HYDRALAZINE HYDROCHLORIDE 100 MG: 100 TABLET, FILM COATED ORAL at 20:58

## 2018-01-01 RX ADMIN — SODIUM CHLORIDE 2000 ML: 9 INJECTION, SOLUTION INTRAVENOUS at 10:36

## 2018-01-01 RX ADMIN — HYDROCODONE BITARTRATE AND ACETAMINOPHEN 1 TABLET: 5; 325 TABLET ORAL at 15:50

## 2018-01-01 RX ADMIN — Medication 400 MG: at 10:36

## 2018-01-01 RX ADMIN — HYDRALAZINE HYDROCHLORIDE 100 MG: 100 TABLET, FILM COATED ORAL at 17:38

## 2018-01-01 RX ADMIN — Medication 1 MG: at 09:16

## 2018-01-01 RX ADMIN — Medication 0.1 MG: at 10:18

## 2018-01-01 RX ADMIN — CARBIDOPA AND LEVODOPA 1 TABLET: 25; 100 TABLET ORAL at 08:57

## 2018-01-01 RX ADMIN — PROPOFOL 50 MCG/KG/MIN: 10 INJECTION, EMULSION INTRAVENOUS at 10:28

## 2018-01-01 RX ADMIN — CALCIUM ACETATE 667 MG: 667 CAPSULE ORAL at 17:20

## 2018-01-01 RX ADMIN — DILTIAZEM HYDROCHLORIDE 120 MG: 60 TABLET, FILM COATED ORAL at 16:25

## 2018-01-01 RX ADMIN — LOSARTAN POTASSIUM 50 MG: 50 TABLET ORAL at 09:45

## 2018-01-01 RX ADMIN — Medication 15 ML: at 10:22

## 2018-01-01 RX ADMIN — ACETAMINOPHEN 650 MG: 325 TABLET ORAL at 23:53

## 2018-01-01 RX ADMIN — HYDRALAZINE HYDROCHLORIDE 100 MG: 100 TABLET, FILM COATED ORAL at 21:19

## 2018-01-01 RX ADMIN — Medication 10 ML: at 21:07

## 2018-01-01 RX ADMIN — FOLIC ACID 1 MG: 1 TABLET ORAL at 10:31

## 2018-01-01 RX ADMIN — BACITRACIN, NEOMYCIN, POLYMYXIN B 3.5 G: 400; 3.5; 5 OINTMENT TOPICAL at 08:35

## 2018-01-01 RX ADMIN — LEVETIRACETAM 1000 MG: 100 INJECTION, SOLUTION INTRAVENOUS at 10:28

## 2018-01-01 RX ADMIN — FOLIC ACID 1 MG: 1 TABLET ORAL at 10:45

## 2018-01-01 RX ADMIN — HEPARIN SODIUM 4000 UNITS: 1000 INJECTION, SOLUTION INTRAVENOUS; SUBCUTANEOUS at 15:10

## 2018-01-01 RX ADMIN — SODIUM CHLORIDE 8 MG/HR: 0.9 INJECTION INTRAVENOUS at 00:03

## 2018-01-01 RX ADMIN — VENLAFAXINE HYDROCHLORIDE 150 MG: 150 CAPSULE, EXTENDED RELEASE ORAL at 09:39

## 2018-01-01 RX ADMIN — FUROSEMIDE 40 MG: 40 TABLET ORAL at 10:35

## 2018-01-01 RX ADMIN — METHYLPREDNISOLONE SODIUM SUCCINATE 20 MG: 40 INJECTION, POWDER, FOR SOLUTION INTRAMUSCULAR; INTRAVENOUS at 14:49

## 2018-01-01 RX ADMIN — INSULIN LISPRO 2 UNITS: 100 INJECTION, SOLUTION INTRAVENOUS; SUBCUTANEOUS at 10:32

## 2018-01-01 RX ADMIN — CEFUROXIME AXETIL 250 MG: 500 TABLET ORAL at 08:32

## 2018-01-01 RX ADMIN — LEVOTHYROXINE SODIUM 25 MCG: 25 TABLET ORAL at 08:44

## 2018-01-01 RX ADMIN — Medication 400 MG: at 09:25

## 2018-01-01 RX ADMIN — FAMOTIDINE 20 MG: 20 TABLET, FILM COATED ORAL at 09:03

## 2018-01-01 RX ADMIN — DILTIAZEM HYDROCHLORIDE 240 MG: 240 CAPSULE, COATED, EXTENDED RELEASE ORAL at 10:36

## 2018-01-01 RX ADMIN — HYDROCODONE BITARTRATE AND ACETAMINOPHEN 1 TABLET: 5; 325 TABLET ORAL at 14:12

## 2018-01-01 RX ADMIN — Medication 10 ML: at 08:44

## 2018-01-01 RX ADMIN — METOPROLOL TARTRATE 2.5 MG: 5 INJECTION, SOLUTION INTRAVENOUS at 06:44

## 2018-01-01 RX ADMIN — NEOSTIGMINE METHYLSULFATE 3 MG: 1 INJECTION, SOLUTION INTRAVENOUS at 10:42

## 2018-01-01 RX ADMIN — SODIUM CHLORIDE 1000 ML: 9 INJECTION, SOLUTION INTRAVENOUS at 20:13

## 2018-01-01 RX ADMIN — LEVOTHYROXINE SODIUM 25 MCG: 25 TABLET ORAL at 06:33

## 2018-01-01 RX ADMIN — Medication 1 MG: at 09:34

## 2018-01-01 RX ADMIN — AMIODARONE HYDROCHLORIDE 200 MG: 200 TABLET ORAL at 10:25

## 2018-01-01 RX ADMIN — SODIUM BICARBONATE 325 MG: 650 TABLET ORAL at 21:09

## 2018-01-01 RX ADMIN — VENLAFAXINE HYDROCHLORIDE 150 MG: 150 CAPSULE, EXTENDED RELEASE ORAL at 09:04

## 2018-01-01 RX ADMIN — DILTIAZEM HYDROCHLORIDE 240 MG: 240 CAPSULE, COATED, EXTENDED RELEASE ORAL at 17:38

## 2018-01-01 RX ADMIN — HYDRALAZINE HYDROCHLORIDE 10 MG: 20 INJECTION INTRAMUSCULAR; INTRAVENOUS at 07:54

## 2018-01-01 RX ADMIN — FLUTICASONE PROPIONATE 2 SPRAY: 50 SPRAY, METERED NASAL at 13:20

## 2018-01-01 RX ADMIN — Medication 125 MG: at 12:27

## 2018-01-01 RX ADMIN — DIPHENHYDRAMINE HYDROCHLORIDE, ZINC ACETATE: 2; .1 CREAM TOPICAL at 10:20

## 2018-01-01 RX ADMIN — CLONIDINE HYDROCHLORIDE 0.1 MG: 0.1 TABLET ORAL at 17:39

## 2018-01-01 RX ADMIN — Medication 1 MG: at 18:23

## 2018-01-01 RX ADMIN — DILTIAZEM HYDROCHLORIDE 120 MG: 60 TABLET, FILM COATED ORAL at 05:50

## 2018-01-01 RX ADMIN — APIXABAN 2.5 MG: 2.5 TABLET, FILM COATED ORAL at 08:50

## 2018-01-01 RX ADMIN — ALBUMIN (HUMAN) 50 G: 0.25 INJECTION, SOLUTION INTRAVENOUS at 15:58

## 2018-01-01 RX ADMIN — VENLAFAXINE 75 MG: 75 TABLET ORAL at 18:06

## 2018-01-01 RX ADMIN — Medication 125 MG: at 17:20

## 2018-01-01 RX ADMIN — SODIUM CHLORIDE: 9 INJECTION, SOLUTION INTRAVENOUS at 07:00

## 2018-01-01 RX ADMIN — INSULIN LISPRO 6 UNITS: 100 INJECTION, SOLUTION INTRAVENOUS; SUBCUTANEOUS at 13:11

## 2018-01-01 RX ADMIN — LACTOBACILLUS TAB 4 TABLET: TAB at 22:40

## 2018-01-01 RX ADMIN — CALCIUM 500 MG: 500 TABLET ORAL at 09:04

## 2018-01-01 RX ADMIN — Medication 1 MG: at 08:31

## 2018-01-01 RX ADMIN — Medication 10 ML: at 09:01

## 2018-01-01 RX ADMIN — CALCIUM ACETATE 667 MG: 667 CAPSULE ORAL at 08:50

## 2018-01-01 RX ADMIN — Medication 500 MG: at 09:53

## 2018-01-01 RX ADMIN — COLLAGENASE SANTYL: 250 OINTMENT TOPICAL at 09:55

## 2018-01-01 RX ADMIN — HYDRALAZINE HYDROCHLORIDE 100 MG: 100 TABLET, FILM COATED ORAL at 05:50

## 2018-01-01 RX ADMIN — Medication 10 ML: at 22:47

## 2018-01-01 RX ADMIN — FOLIC ACID 1 MG: 1 TABLET ORAL at 09:57

## 2018-01-01 RX ADMIN — LEVOTHYROXINE SODIUM 50 MCG: 25 TABLET ORAL at 09:59

## 2018-01-01 RX ADMIN — ASPIRIN 81 MG: 81 TABLET, COATED ORAL at 09:59

## 2018-01-01 RX ADMIN — METRONIDAZOLE 500 MG: 500 INJECTION, SOLUTION INTRAVENOUS at 00:38

## 2018-01-01 RX ADMIN — METOPROLOL TARTRATE 2.5 MG: 5 INJECTION, SOLUTION INTRAVENOUS at 10:39

## 2018-01-01 RX ADMIN — ASPIRIN 300 MG: 300 SUPPOSITORY RECTAL at 18:23

## 2018-01-01 RX ADMIN — METHYLPREDNISOLONE SODIUM SUCCINATE 20 MG: 40 INJECTION, POWDER, FOR SOLUTION INTRAMUSCULAR; INTRAVENOUS at 15:13

## 2018-01-01 RX ADMIN — PRIMIDONE 50 MG: 50 TABLET ORAL at 20:18

## 2018-01-01 RX ADMIN — DILTIAZEM HYDROCHLORIDE 120 MG: 60 TABLET, FILM COATED ORAL at 15:18

## 2018-01-01 RX ADMIN — LEVOTHYROXINE SODIUM 50 MCG: 50 TABLET ORAL at 08:52

## 2018-01-01 RX ADMIN — MAGNESIUM OXIDE TAB 400 MG (241.3 MG ELEMENTAL MG) 400 MG: 400 (241.3 MG) TAB at 14:25

## 2018-01-01 RX ADMIN — Medication 125 MG: at 06:41

## 2018-01-01 RX ADMIN — PRIMIDONE 50 MG: 50 TABLET ORAL at 23:41

## 2018-01-01 RX ADMIN — CEFUROXIME AXETIL 250 MG: 500 TABLET ORAL at 23:34

## 2018-01-01 RX ADMIN — METOROPROLOL TARTRATE 5 MG: 5 INJECTION, SOLUTION INTRAVENOUS at 16:19

## 2018-01-01 RX ADMIN — INSULIN LISPRO 2 UNITS: 100 INJECTION, SOLUTION INTRAVENOUS; SUBCUTANEOUS at 17:45

## 2018-01-01 RX ADMIN — Medication 125 MG: at 05:40

## 2018-01-01 RX ADMIN — HYDRALAZINE HYDROCHLORIDE 20 MG: 20 INJECTION INTRAMUSCULAR; INTRAVENOUS at 15:42

## 2018-01-01 RX ADMIN — Medication 50 MG: at 07:28

## 2018-01-01 RX ADMIN — DILTIAZEM HYDROCHLORIDE 120 MG: 60 TABLET, FILM COATED ORAL at 14:08

## 2018-01-01 RX ADMIN — Medication 400 MG: at 09:36

## 2018-01-01 RX ADMIN — PROPOFOL 20 MG: 10 INJECTION, EMULSION INTRAVENOUS at 14:29

## 2018-01-01 RX ADMIN — METOROPROLOL TARTRATE 2.5 MG: 5 INJECTION, SOLUTION INTRAVENOUS at 15:50

## 2018-01-01 RX ADMIN — HYDRALAZINE HYDROCHLORIDE 100 MG: 100 TABLET, FILM COATED ORAL at 22:03

## 2018-01-01 RX ADMIN — CALCIUM 500 MG: 500 TABLET ORAL at 21:35

## 2018-01-01 RX ADMIN — DOCUSATE SODIUM 100 MG: 100 CAPSULE, LIQUID FILLED ORAL at 21:19

## 2018-01-01 RX ADMIN — VENLAFAXINE HYDROCHLORIDE 150 MG: 150 CAPSULE, EXTENDED RELEASE ORAL at 09:26

## 2018-01-01 RX ADMIN — DILTIAZEM HYDROCHLORIDE 120 MG: 60 TABLET, FILM COATED ORAL at 00:36

## 2018-01-01 RX ADMIN — NITROGLYCERIN 0.5 INCH: 20 OINTMENT TOPICAL at 14:20

## 2018-01-01 RX ADMIN — Medication 400 MG: at 09:02

## 2018-01-01 RX ADMIN — PRIMIDONE 50 MG: 50 TABLET ORAL at 21:39

## 2018-01-01 RX ADMIN — APIXABAN 2.5 MG: 2.5 TABLET, FILM COATED ORAL at 20:58

## 2018-01-01 RX ADMIN — MAGNESIUM OXIDE TAB 400 MG (241.3 MG ELEMENTAL MG) 400 MG: 400 (241.3 MG) TAB at 09:54

## 2018-01-01 RX ADMIN — Medication 1 MG: at 09:22

## 2018-01-01 RX ADMIN — BACITRACIN, NEOMYCIN, POLYMYXIN B 3.5 G: 400; 3.5; 5 OINTMENT TOPICAL at 18:06

## 2018-01-01 RX ADMIN — LEVOTHYROXINE SODIUM ANHYDROUS 12.5 MCG: 100 INJECTION, POWDER, LYOPHILIZED, FOR SOLUTION INTRAVENOUS at 09:47

## 2018-01-01 RX ADMIN — ASPIRIN 81 MG: 81 TABLET, COATED ORAL at 09:37

## 2018-01-01 RX ADMIN — CARBIDOPA AND LEVODOPA 1 TABLET: 25; 100 TABLET ORAL at 10:31

## 2018-01-01 RX ADMIN — LEVOTHYROXINE SODIUM 25 MCG: 25 TABLET ORAL at 06:18

## 2018-01-01 RX ADMIN — HYDRALAZINE HYDROCHLORIDE 100 MG: 100 TABLET, FILM COATED ORAL at 08:08

## 2018-01-01 RX ADMIN — LOSARTAN POTASSIUM 50 MG: 50 TABLET ORAL at 14:08

## 2018-01-01 RX ADMIN — IPRATROPIUM BROMIDE AND ALBUTEROL SULFATE 3 ML: 2.5; .5 SOLUTION RESPIRATORY (INHALATION) at 19:02

## 2018-01-01 RX ADMIN — DILTIAZEM HYDROCHLORIDE 120 MG: 120 CAPSULE, COATED, EXTENDED RELEASE ORAL at 10:30

## 2018-01-01 RX ADMIN — Medication 10 ML: at 21:32

## 2018-01-01 RX ADMIN — ATORVASTATIN CALCIUM 80 MG: 80 TABLET, FILM COATED ORAL at 23:40

## 2018-01-01 RX ADMIN — DILTIAZEM HYDROCHLORIDE 120 MG: 120 CAPSULE, EXTENDED RELEASE ORAL at 20:25

## 2018-01-01 RX ADMIN — Medication 500 MG: at 10:02

## 2018-01-01 RX ADMIN — ISOSORBIDE MONONITRATE 60 MG: 60 TABLET, EXTENDED RELEASE ORAL at 08:31

## 2018-01-01 RX ADMIN — LEVOTHYROXINE SODIUM 50 MCG: 50 TABLET ORAL at 05:04

## 2018-01-01 RX ADMIN — SODIUM CHLORIDE: 9 INJECTION, SOLUTION INTRAVENOUS at 02:40

## 2018-01-01 RX ADMIN — IPRATROPIUM BROMIDE AND ALBUTEROL SULFATE 3 ML: .5; 3 SOLUTION RESPIRATORY (INHALATION) at 07:28

## 2018-01-01 RX ADMIN — Medication 10 ML: at 08:08

## 2018-01-01 RX ADMIN — CALCIUM ACETATE 667 MG: 667 CAPSULE ORAL at 17:49

## 2018-01-01 RX ADMIN — CALCIUM ACETATE 667 MG: 667 CAPSULE ORAL at 12:26

## 2018-01-01 RX ADMIN — Medication 1 MG: at 08:49

## 2018-01-01 RX ADMIN — IPRATROPIUM BROMIDE AND ALBUTEROL SULFATE 3 ML: 2.5; .5 SOLUTION RESPIRATORY (INHALATION) at 14:50

## 2018-01-01 RX ADMIN — OXYCODONE AND ACETAMINOPHEN 2 TABLET: 5; 325 TABLET ORAL at 06:20

## 2018-01-01 RX ADMIN — MINOXIDIL 2.5 MG: 2.5 TABLET ORAL at 10:45

## 2018-01-01 RX ADMIN — METOROPROLOL TARTRATE 5 MG: 5 INJECTION, SOLUTION INTRAVENOUS at 04:12

## 2018-01-01 RX ADMIN — IPRATROPIUM BROMIDE AND ALBUTEROL SULFATE 3 ML: 2.5; .5 SOLUTION RESPIRATORY (INHALATION) at 05:47

## 2018-01-01 RX ADMIN — MAGNESIUM OXIDE TAB 400 MG (241.3 MG ELEMENTAL MG) 400 MG: 400 (241.3 MG) TAB at 10:25

## 2018-01-01 RX ADMIN — ACETAMINOPHEN 650 MG: 325 TABLET ORAL at 07:48

## 2018-01-01 RX ADMIN — ONDANSETRON 4 MG: 2 INJECTION INTRAMUSCULAR; INTRAVENOUS at 12:50

## 2018-01-01 RX ADMIN — POTASSIUM CHLORIDE 40 MEQ: 1500 TABLET, EXTENDED RELEASE ORAL at 11:54

## 2018-01-01 RX ADMIN — HYDRALAZINE HYDROCHLORIDE 100 MG: 100 TABLET, FILM COATED ORAL at 10:33

## 2018-01-01 RX ADMIN — PRIMIDONE 50 MG: 50 TABLET ORAL at 06:22

## 2018-01-01 RX ADMIN — LEVOTHYROXINE SODIUM 50 MCG: 50 TABLET ORAL at 09:45

## 2018-01-01 RX ADMIN — CALCIUM ACETATE 667 MG: 667 CAPSULE ORAL at 12:08

## 2018-01-01 RX ADMIN — HEPARIN SODIUM 5000 UNITS: 5000 INJECTION, SOLUTION INTRAVENOUS; SUBCUTANEOUS at 13:15

## 2018-01-01 RX ADMIN — ATORVASTATIN CALCIUM 80 MG: 80 TABLET, FILM COATED ORAL at 22:22

## 2018-01-01 RX ADMIN — HYDRALAZINE HYDROCHLORIDE 100 MG: 100 TABLET, FILM COATED ORAL at 17:35

## 2018-01-01 RX ADMIN — DAPTOMYCIN 180 MG: 500 INJECTION, POWDER, LYOPHILIZED, FOR SOLUTION INTRAVENOUS at 23:43

## 2018-01-01 RX ADMIN — INSULIN GLARGINE 14 UNITS: 100 INJECTION, SOLUTION SUBCUTANEOUS at 20:59

## 2018-01-01 RX ADMIN — FLUTICASONE PROPIONATE 2 SPRAY: 50 SPRAY, METERED NASAL at 11:37

## 2018-01-01 RX ADMIN — HEPARIN SODIUM 4000 UNITS: 1000 INJECTION, SOLUTION INTRAVENOUS; SUBCUTANEOUS at 17:20

## 2018-01-01 RX ADMIN — Medication 125 MG: at 17:45

## 2018-01-01 RX ADMIN — HYDRALAZINE HYDROCHLORIDE 100 MG: 100 TABLET, FILM COATED ORAL at 05:04

## 2018-01-01 RX ADMIN — CALCIUM ACETATE 667 MG: 667 CAPSULE ORAL at 18:30

## 2018-01-01 RX ADMIN — COLLAGENASE SANTYL: 250 OINTMENT TOPICAL at 10:32

## 2018-01-01 RX ADMIN — SODIUM BICARBONATE 325 MG: 650 TABLET ORAL at 15:01

## 2018-01-01 RX ADMIN — Medication 30 MCG/MIN: at 03:00

## 2018-01-01 RX ADMIN — ALBUMIN (HUMAN) 25 G: 0.25 INJECTION, SOLUTION INTRAVENOUS at 19:29

## 2018-01-01 RX ADMIN — DILTIAZEM HYDROCHLORIDE 240 MG: 240 CAPSULE, COATED, EXTENDED RELEASE ORAL at 19:03

## 2018-01-01 RX ADMIN — METOROPROLOL TARTRATE 5 MG: 5 INJECTION, SOLUTION INTRAVENOUS at 21:32

## 2018-01-01 RX ADMIN — FENTANYL CITRATE 25 MCG: 50 INJECTION, SOLUTION INTRAMUSCULAR; INTRAVENOUS at 09:41

## 2018-01-01 RX ADMIN — DOCUSATE SODIUM 100 MG: 100 CAPSULE, LIQUID FILLED ORAL at 08:31

## 2018-01-01 RX ADMIN — PRIMIDONE 50 MG: 50 TABLET ORAL at 22:22

## 2018-01-01 RX ADMIN — Medication 10 ML: at 11:17

## 2018-01-01 RX ADMIN — Medication 125 MG: at 17:38

## 2018-01-01 RX ADMIN — TAZOBACTAM SODIUM AND PIPERACILLIN SODIUM 2.25 G: 250; 2 INJECTION, SOLUTION INTRAVENOUS at 18:51

## 2018-01-01 RX ADMIN — SODIUM CHLORIDE 250 ML: 9 INJECTION, SOLUTION INTRAVENOUS at 03:52

## 2018-01-01 RX ADMIN — METOPROLOL TARTRATE 25 MG: 25 TABLET ORAL at 22:02

## 2018-01-01 RX ADMIN — HEPARIN SODIUM 6000 UNITS: 1000 INJECTION, SOLUTION INTRAVENOUS; SUBCUTANEOUS at 20:00

## 2018-01-01 RX ADMIN — Medication 10 ML: at 13:17

## 2018-01-01 RX ADMIN — DILTIAZEM HYDROCHLORIDE 120 MG: 60 TABLET, FILM COATED ORAL at 17:06

## 2018-01-01 RX ADMIN — Medication 10 ML: at 20:58

## 2018-01-01 RX ADMIN — CALCIUM GLUCONATE 1 G: 98 INJECTION, SOLUTION INTRAVENOUS at 11:16

## 2018-01-01 RX ADMIN — HYDRALAZINE HYDROCHLORIDE 100 MG: 100 TABLET, FILM COATED ORAL at 21:10

## 2018-01-01 RX ADMIN — Medication 125 MG: at 12:18

## 2018-01-01 RX ADMIN — HYDRALAZINE HYDROCHLORIDE 100 MG: 100 TABLET, FILM COATED ORAL at 08:31

## 2018-01-01 RX ADMIN — FLUTICASONE PROPIONATE 2 SPRAY: 50 SPRAY, METERED NASAL at 07:49

## 2018-01-01 RX ADMIN — DILTIAZEM HYDROCHLORIDE 180 MG: 180 CAPSULE, COATED, EXTENDED RELEASE ORAL at 08:57

## 2018-01-01 RX ADMIN — BACITRACIN, NEOMYCIN, POLYMYXIN B 3.5 G: 400; 3.5; 5 OINTMENT TOPICAL at 09:51

## 2018-01-01 RX ADMIN — SODIUM CHLORIDE 2000 ML: 9 INJECTION, SOLUTION INTRAVENOUS at 11:53

## 2018-01-01 RX ADMIN — CISATRACURIUM BESYLATE 2 MG: 2 INJECTION INTRAVENOUS at 10:02

## 2018-01-01 RX ADMIN — ISOSORBIDE MONONITRATE 60 MG: 60 TABLET, EXTENDED RELEASE ORAL at 08:57

## 2018-01-01 RX ADMIN — COLLAGENASE SANTYL: 250 OINTMENT TOPICAL at 19:00

## 2018-01-01 RX ADMIN — GABAPENTIN 100 MG: 100 CAPSULE ORAL at 21:59

## 2018-01-01 RX ADMIN — Medication 10 ML: at 09:59

## 2018-01-01 RX ADMIN — DOCUSATE SODIUM 100 MG: 50 LIQUID ORAL at 20:30

## 2018-01-01 RX ADMIN — ISOSORBIDE MONONITRATE 60 MG: 60 TABLET, EXTENDED RELEASE ORAL at 09:59

## 2018-01-01 RX ADMIN — FLUTICASONE PROPIONATE 2 SPRAY: 50 SPRAY, METERED NASAL at 09:21

## 2018-01-01 RX ADMIN — METHYLPREDNISOLONE SODIUM SUCCINATE 20 MG: 40 INJECTION, POWDER, FOR SOLUTION INTRAMUSCULAR; INTRAVENOUS at 02:15

## 2018-01-01 RX ADMIN — MAGNESIUM OXIDE TAB 400 MG (241.3 MG ELEMENTAL MG) 400 MG: 400 (241.3 MG) TAB at 11:18

## 2018-01-01 RX ADMIN — HYDRALAZINE HYDROCHLORIDE 100 MG: 100 TABLET, FILM COATED ORAL at 05:34

## 2018-01-01 RX ADMIN — Medication 125 MG: at 06:10

## 2018-01-01 RX ADMIN — DOCUSATE SODIUM 100 MG: 100 CAPSULE, LIQUID FILLED ORAL at 22:24

## 2018-01-01 RX ADMIN — PROPOFOL 25 MCG/KG/MIN: 10 INJECTION, EMULSION INTRAVENOUS at 16:31

## 2018-01-01 RX ADMIN — WARFARIN SODIUM 1.5 MG: 3 TABLET ORAL at 22:03

## 2018-01-01 RX ADMIN — LACTOBACILLUS TAB 1 TABLET: TAB at 22:06

## 2018-01-01 RX ADMIN — DILTIAZEM HYDROCHLORIDE 240 MG: 240 CAPSULE, COATED, EXTENDED RELEASE ORAL at 23:40

## 2018-01-01 RX ADMIN — COLLAGENASE SANTYL: 250 OINTMENT TOPICAL at 14:25

## 2018-01-01 RX ADMIN — CALCIUM ACETATE 667 MG: 667 CAPSULE ORAL at 17:38

## 2018-01-01 RX ADMIN — CEFUROXIME AXETIL 250 MG: 500 TABLET ORAL at 14:41

## 2018-01-01 RX ADMIN — METOPROLOL TARTRATE 10 MG: 5 INJECTION, SOLUTION INTRAVENOUS at 01:11

## 2018-01-01 RX ADMIN — ETOMIDATE: 2 INJECTION INTRAVENOUS at 03:00

## 2018-01-01 RX ADMIN — VENLAFAXINE 75 MG: 75 TABLET ORAL at 17:05

## 2018-01-01 RX ADMIN — AMIODARONE HYDROCHLORIDE 150 MG: 50 INJECTION, SOLUTION INTRAVENOUS at 14:06

## 2018-01-01 RX ADMIN — FOLIC ACID 1 MG: 1 TABLET ORAL at 17:08

## 2018-01-01 RX ADMIN — Medication 10 ML: at 09:29

## 2018-01-01 RX ADMIN — AMIODARONE HYDROCHLORIDE 0.5 MG/MIN: 50 INJECTION, SOLUTION INTRAVENOUS at 00:22

## 2018-01-01 RX ADMIN — SODIUM CHLORIDE 1000 ML: 9 INJECTION, SOLUTION INTRAVENOUS at 10:30

## 2018-01-01 RX ADMIN — DOCUSATE SODIUM 100 MG: 100 CAPSULE, LIQUID FILLED ORAL at 10:31

## 2018-01-01 RX ADMIN — DOCUSATE SODIUM 100 MG: 50 LIQUID ORAL at 21:06

## 2018-01-01 RX ADMIN — Medication 10 ML: at 09:06

## 2018-01-01 RX ADMIN — APIXABAN 2.5 MG: 2.5 TABLET, FILM COATED ORAL at 21:12

## 2018-01-01 RX ADMIN — METOROPROLOL TARTRATE 5 MG: 5 INJECTION, SOLUTION INTRAVENOUS at 09:51

## 2018-01-01 RX ADMIN — ATORVASTATIN CALCIUM 80 MG: 80 TABLET, FILM COATED ORAL at 21:39

## 2018-01-01 RX ADMIN — ISOSORBIDE MONONITRATE 60 MG: 60 TABLET, EXTENDED RELEASE ORAL at 18:23

## 2018-01-01 RX ADMIN — INSULIN LISPRO 2 UNITS: 100 INJECTION, SOLUTION INTRAVENOUS; SUBCUTANEOUS at 17:25

## 2018-01-01 RX ADMIN — HEPARIN SODIUM 1000 UNITS: 1000 INJECTION, SOLUTION INTRAVENOUS; SUBCUTANEOUS at 10:34

## 2018-01-01 RX ADMIN — SODIUM CHLORIDE, PRESERVATIVE FREE 10 ML: 5 INJECTION INTRAVENOUS at 13:25

## 2018-01-01 RX ADMIN — LOSARTAN POTASSIUM 50 MG: 50 TABLET ORAL at 07:49

## 2018-01-01 RX ADMIN — CALCIUM ACETATE 667 MG: 667 CAPSULE ORAL at 13:55

## 2018-01-01 RX ADMIN — CHLORTHALIDONE 25 MG: 25 TABLET ORAL at 13:18

## 2018-01-01 RX ADMIN — HYDRALAZINE HYDROCHLORIDE 100 MG: 100 TABLET, FILM COATED ORAL at 20:18

## 2018-01-01 RX ADMIN — Medication 15 ML: at 11:35

## 2018-01-01 RX ADMIN — Medication 10 ML: at 09:52

## 2018-01-01 RX ADMIN — SODIUM POLYSTYRENE SULFONATE 15 G: 15 SUSPENSION ORAL; RECTAL at 15:10

## 2018-01-01 RX ADMIN — PROPOFOL 10 MG: 10 INJECTION, EMULSION INTRAVENOUS at 14:45

## 2018-01-01 RX ADMIN — Medication 10 ML: at 19:06

## 2018-01-01 RX ADMIN — CARBIDOPA AND LEVODOPA 1 TABLET: 25; 100 TABLET ORAL at 07:48

## 2018-01-01 RX ADMIN — LEVOTHYROXINE SODIUM 25 MCG: 25 TABLET ORAL at 15:16

## 2018-01-01 RX ADMIN — OXYCODONE AND ACETAMINOPHEN 2 TABLET: 5; 325 TABLET ORAL at 14:50

## 2018-01-01 RX ADMIN — DIPHENHYDRAMINE HYDROCHLORIDE, ZINC ACETATE: 2; .1 CREAM TOPICAL at 13:48

## 2018-01-01 RX ADMIN — BACITRACIN, NEOMYCIN, POLYMYXIN B 3.5 G: 400; 3.5; 5 OINTMENT TOPICAL at 10:37

## 2018-01-01 RX ADMIN — HYDRALAZINE HYDROCHLORIDE 100 MG: 100 TABLET, FILM COATED ORAL at 22:59

## 2018-01-01 RX ADMIN — METOROPROLOL TARTRATE 5 MG: 5 INJECTION, SOLUTION INTRAVENOUS at 17:24

## 2018-01-01 RX ADMIN — LEVETIRACETAM 1000 MG: 500 TABLET, FILM COATED ORAL at 21:59

## 2018-01-01 RX ADMIN — DILTIAZEM HYDROCHLORIDE 180 MG: 180 CAPSULE, COATED, EXTENDED RELEASE ORAL at 23:08

## 2018-01-01 RX ADMIN — TAZOBACTAM SODIUM AND PIPERACILLIN SODIUM 3.38 G: 375; 3 INJECTION, SOLUTION INTRAVENOUS at 04:00

## 2018-01-01 RX ADMIN — LACTOBACILLUS TAB 1 TABLET: TAB at 20:16

## 2018-01-01 RX ADMIN — Medication 15 ML: at 20:25

## 2018-01-01 RX ADMIN — PRIMIDONE 50 MG: 50 TABLET ORAL at 09:43

## 2018-01-01 RX ADMIN — FLUTICASONE PROPIONATE 2 SPRAY: 50 SPRAY, METERED NASAL at 09:48

## 2018-01-01 RX ADMIN — PROPOFOL 40 MCG/KG/MIN: 10 INJECTION, EMULSION INTRAVENOUS at 20:55

## 2018-01-01 RX ADMIN — DOCUSATE SODIUM 100 MG: 100 CAPSULE, LIQUID FILLED ORAL at 14:40

## 2018-01-01 RX ADMIN — METOPROLOL TARTRATE 10 MG: 5 INJECTION, SOLUTION INTRAVENOUS at 11:34

## 2018-01-01 RX ADMIN — PANTOPRAZOLE SODIUM 40 MG: 40 INJECTION, POWDER, FOR SOLUTION INTRAVENOUS at 11:34

## 2018-01-01 RX ADMIN — VENLAFAXINE 75 MG: 75 TABLET ORAL at 09:04

## 2018-01-01 RX ADMIN — KIT FOR THE PREPARATION OF TECHNETIUM TC 99M PENTETATE 1 MILLICURIE: 20 INJECTION, POWDER, LYOPHILIZED, FOR SOLUTION INTRAVENOUS; RESPIRATORY (INHALATION) at 17:45

## 2018-01-01 RX ADMIN — Medication 0.1 MG: at 10:14

## 2018-01-01 RX ADMIN — METOPROLOL TARTRATE 25 MG: 25 TABLET ORAL at 23:07

## 2018-01-01 RX ADMIN — METOROPROLOL TARTRATE 5 MG: 5 INJECTION, SOLUTION INTRAVENOUS at 13:12

## 2018-01-01 RX ADMIN — INSULIN GLARGINE 18 UNITS: 100 INJECTION, SOLUTION SUBCUTANEOUS at 23:25

## 2018-01-01 RX ADMIN — LACTOBACILLUS TAB 4 TABLET: TAB at 13:24

## 2018-01-01 RX ADMIN — METRONIDAZOLE 500 MG: 500 INJECTION, SOLUTION INTRAVENOUS at 16:59

## 2018-01-01 RX ADMIN — FENTANYL CITRATE 50 MCG/ML: 50 INJECTION, SOLUTION INTRAMUSCULAR; INTRAVENOUS at 02:58

## 2018-01-01 RX ADMIN — CLONIDINE HYDROCHLORIDE 0.1 MG: 0.1 TABLET ORAL at 09:58

## 2018-01-01 RX ADMIN — PRIMIDONE 50 MG: 50 TABLET ORAL at 09:38

## 2018-01-01 RX ADMIN — METHYLPREDNISOLONE SODIUM SUCCINATE 125 MG: 125 INJECTION, POWDER, FOR SOLUTION INTRAMUSCULAR; INTRAVENOUS at 01:11

## 2018-01-01 RX ADMIN — LOSARTAN POTASSIUM 100 MG: 50 TABLET ORAL at 10:30

## 2018-01-01 RX ADMIN — CALCIUM 500 MG: 500 TABLET ORAL at 21:39

## 2018-01-01 RX ADMIN — CALCIUM ACETATE 667 MG: 667 CAPSULE ORAL at 12:32

## 2018-01-01 RX ADMIN — HYDRALAZINE HYDROCHLORIDE 100 MG: 100 TABLET, FILM COATED ORAL at 21:27

## 2018-01-01 RX ADMIN — Medication 10 ML: at 20:37

## 2018-01-01 RX ADMIN — LEVOTHYROXINE SODIUM 50 MCG: 50 TABLET ORAL at 09:31

## 2018-01-01 RX ADMIN — HYDRALAZINE HYDROCHLORIDE 100 MG: 100 TABLET, FILM COATED ORAL at 10:45

## 2018-01-01 RX ADMIN — ATORVASTATIN CALCIUM 20 MG: 20 TABLET, FILM COATED ORAL at 21:40

## 2018-01-01 RX ADMIN — ISOSORBIDE MONONITRATE 60 MG: 60 TABLET, EXTENDED RELEASE ORAL at 18:10

## 2018-01-01 RX ADMIN — ASPIRIN 300 MG: 300 SUPPOSITORY RECTAL at 15:29

## 2018-01-01 RX ADMIN — Medication 10 ML: at 22:30

## 2018-01-01 RX ADMIN — MICONAZOLE NITRATE: 20 CREAM TOPICAL at 21:39

## 2018-01-01 RX ADMIN — Medication 400 MG: at 10:44

## 2018-01-01 RX ADMIN — PANTOPRAZOLE SODIUM 20 MG: 20 TABLET, DELAYED RELEASE ORAL at 05:52

## 2018-01-01 RX ADMIN — METOROPROLOL TARTRATE 5 MG: 5 INJECTION, SOLUTION INTRAVENOUS at 18:13

## 2018-01-01 RX ADMIN — ACETAMINOPHEN 650 MG: 325 TABLET ORAL at 17:07

## 2018-01-01 RX ADMIN — HYDRALAZINE HYDROCHLORIDE 100 MG: 100 TABLET, FILM COATED ORAL at 09:54

## 2018-01-01 RX ADMIN — HYDRALAZINE HYDROCHLORIDE 50 MG: 50 TABLET, FILM COATED ORAL at 23:40

## 2018-01-01 RX ADMIN — Medication 400 MG: at 09:44

## 2018-01-01 RX ADMIN — ISOSORBIDE MONONITRATE 60 MG: 60 TABLET, EXTENDED RELEASE ORAL at 07:49

## 2018-01-01 RX ADMIN — Medication 500 MG: at 09:17

## 2018-01-01 RX ADMIN — PRIMIDONE 50 MG: 50 TABLET ORAL at 14:38

## 2018-01-01 RX ADMIN — Medication 10 ML: at 22:12

## 2018-01-01 RX ADMIN — CALCIUM ACETATE 667 MG: 667 CAPSULE ORAL at 08:08

## 2018-01-01 RX ADMIN — DIPHENHYDRAMINE HCL 25 MG: 25 TABLET ORAL at 18:40

## 2018-01-01 RX ADMIN — DARBEPOETIN ALFA 40 MCG: 40 SOLUTION INTRAVENOUS; SUBCUTANEOUS at 14:45

## 2018-01-01 RX ADMIN — HYDRALAZINE HYDROCHLORIDE 100 MG: 100 TABLET, FILM COATED ORAL at 15:23

## 2018-01-01 RX ADMIN — CALCIUM ACETATE 667 MG: 667 CAPSULE ORAL at 13:59

## 2018-01-01 RX ADMIN — DILTIAZEM HYDROCHLORIDE 240 MG: 240 CAPSULE, COATED, EXTENDED RELEASE ORAL at 17:26

## 2018-01-01 RX ADMIN — VENLAFAXINE HYDROCHLORIDE 150 MG: 75 CAPSULE, EXTENDED RELEASE ORAL at 08:49

## 2018-01-01 RX ADMIN — POTASSIUM CHLORIDE 20 MEQ: 20 SOLUTION ORAL at 09:51

## 2018-01-01 RX ADMIN — DARBEPOETIN ALFA 40 MCG: 40 SOLUTION INTRAVENOUS; SUBCUTANEOUS at 10:37

## 2018-01-01 RX ADMIN — Medication 15 ML: at 09:51

## 2018-01-01 RX ADMIN — LOSARTAN POTASSIUM 50 MG: 50 TABLET ORAL at 08:45

## 2018-01-01 RX ADMIN — METOROPROLOL TARTRATE 5 MG: 5 INJECTION, SOLUTION INTRAVENOUS at 19:34

## 2018-01-01 RX ADMIN — DILTIAZEM HYDROCHLORIDE 120 MG: 60 TABLET, FILM COATED ORAL at 19:33

## 2018-01-01 RX ADMIN — LOSARTAN POTASSIUM 50 MG: 50 TABLET ORAL at 00:43

## 2018-01-01 RX ADMIN — PRIMIDONE 50 MG: 50 TABLET ORAL at 21:00

## 2018-01-01 RX ADMIN — CALCIUM ACETATE 667 MG: 667 CAPSULE ORAL at 08:59

## 2018-01-01 RX ADMIN — PRIMIDONE 50 MG: 50 TABLET ORAL at 16:49

## 2018-01-01 RX ADMIN — Medication 10 ML: at 09:58

## 2018-01-01 RX ADMIN — ASPIRIN 81 MG: 81 TABLET, COATED ORAL at 09:16

## 2018-01-01 RX ADMIN — Medication 125 MG: at 00:38

## 2018-01-01 RX ADMIN — ISOSORBIDE MONONITRATE 60 MG: 60 TABLET, EXTENDED RELEASE ORAL at 10:16

## 2018-01-01 RX ADMIN — Medication 1 MG: at 09:59

## 2018-01-01 RX ADMIN — DILTIAZEM HYDROCHLORIDE 240 MG: 240 CAPSULE, COATED, EXTENDED RELEASE ORAL at 09:44

## 2018-01-01 RX ADMIN — DILTIAZEM HYDROCHLORIDE 240 MG: 240 CAPSULE, COATED, EXTENDED RELEASE ORAL at 18:10

## 2018-01-01 RX ADMIN — Medication 10 ML: at 21:38

## 2018-01-01 RX ADMIN — ACETAMINOPHEN 650 MG: 325 TABLET ORAL at 02:22

## 2018-01-01 RX ADMIN — ISOSORBIDE MONONITRATE 60 MG: 60 TABLET, EXTENDED RELEASE ORAL at 10:36

## 2018-01-01 RX ADMIN — Medication 15 ML: at 20:36

## 2018-01-01 RX ADMIN — COLLAGENASE SANTYL: 250 OINTMENT TOPICAL at 12:00

## 2018-01-01 RX ADMIN — LEVOTHYROXINE SODIUM 50 MCG: 50 TABLET ORAL at 08:34

## 2018-01-01 RX ADMIN — CALCIUM ACETATE 667 MG: 667 CAPSULE ORAL at 12:40

## 2018-01-01 RX ADMIN — CARBIDOPA AND LEVODOPA 1 TABLET: 25; 100 TABLET ORAL at 08:08

## 2018-01-01 RX ADMIN — DILTIAZEM HYDROCHLORIDE 240 MG: 120 CAPSULE, EXTENDED RELEASE ORAL at 10:12

## 2018-01-01 RX ADMIN — DIGOXIN 250 MCG: 250 INJECTION, SOLUTION INTRAMUSCULAR; INTRAVENOUS; PARENTERAL at 13:18

## 2018-01-01 RX ADMIN — CALCIUM ACETATE 667 MG: 667 CAPSULE ORAL at 09:45

## 2018-01-01 RX ADMIN — PANTOPRAZOLE SODIUM 40 MG: 40 GRANULE, DELAYED RELEASE ORAL at 06:16

## 2018-01-01 RX ADMIN — ACETAMINOPHEN 650 MG: 325 TABLET ORAL at 21:46

## 2018-01-01 RX ADMIN — IPRATROPIUM BROMIDE AND ALBUTEROL SULFATE 3 ML: 2.5; .5 SOLUTION RESPIRATORY (INHALATION) at 19:25

## 2018-01-01 RX ADMIN — INSULIN LISPRO 2 UNITS: 100 INJECTION, SOLUTION INTRAVENOUS; SUBCUTANEOUS at 08:23

## 2018-01-01 RX ADMIN — INSULIN GLARGINE 12 UNITS: 100 INJECTION, SOLUTION SUBCUTANEOUS at 22:04

## 2018-01-01 RX ADMIN — FOLIC ACID 1 MG: 1 TABLET ORAL at 07:49

## 2018-01-01 RX ADMIN — AZITHROMYCIN MONOHYDRATE 500 MG: 500 INJECTION, POWDER, LYOPHILIZED, FOR SOLUTION INTRAVENOUS at 15:42

## 2018-01-01 RX ADMIN — METOPROLOL TARTRATE 25 MG: 25 TABLET ORAL at 21:35

## 2018-01-01 RX ADMIN — LOSARTAN POTASSIUM 50 MG: 50 TABLET ORAL at 10:00

## 2018-01-01 RX ADMIN — CALCIUM ACETATE 667 MG: 667 CAPSULE ORAL at 12:01

## 2018-01-01 RX ADMIN — ATORVASTATIN CALCIUM 80 MG: 80 TABLET, FILM COATED ORAL at 22:41

## 2018-01-01 RX ADMIN — FUROSEMIDE 40 MG: 10 INJECTION, SOLUTION INTRAVENOUS at 18:44

## 2018-01-01 RX ADMIN — DILTIAZEM HYDROCHLORIDE 240 MG: 240 CAPSULE, COATED, EXTENDED RELEASE ORAL at 17:41

## 2018-01-01 RX ADMIN — CEFTRIAXONE 1 G: 1 INJECTION, POWDER, FOR SOLUTION INTRAMUSCULAR; INTRAVENOUS at 11:37

## 2018-01-01 RX ADMIN — CALCIUM ACETATE 667 MG: 667 CAPSULE ORAL at 09:53

## 2018-01-01 RX ADMIN — LIDOCAINE HYDROCHLORIDE 20 MG: 20 INJECTION, SOLUTION INFILTRATION; PERINEURAL at 14:23

## 2018-01-01 RX ADMIN — AMIODARONE HYDROCHLORIDE 200 MG: 200 TABLET ORAL at 09:35

## 2018-01-01 RX ADMIN — SODIUM CHLORIDE 2000 ML: 9 INJECTION, SOLUTION INTRAVENOUS at 14:31

## 2018-01-01 RX ADMIN — NEPHROCAP 1 MG: 1 CAP ORAL at 09:36

## 2018-01-01 RX ADMIN — WARFARIN SODIUM 2 MG: 2 TABLET ORAL at 16:46

## 2018-01-01 RX ADMIN — PIPERACILLIN SODIUM,TAZOBACTAM SODIUM 3.38 G: 3; .375 INJECTION, POWDER, FOR SOLUTION INTRAVENOUS at 23:55

## 2018-01-01 RX ADMIN — MICONAZOLE NITRATE: 20 CREAM TOPICAL at 22:07

## 2018-01-01 RX ADMIN — PANTOPRAZOLE SODIUM 40 MG: 40 INJECTION, POWDER, FOR SOLUTION INTRAVENOUS at 09:58

## 2018-01-01 RX ADMIN — CEFTRIAXONE 1 G: 1 INJECTION, POWDER, FOR SOLUTION INTRAMUSCULAR; INTRAVENOUS at 10:02

## 2018-01-01 RX ADMIN — HYDRALAZINE HYDROCHLORIDE 100 MG: 100 TABLET, FILM COATED ORAL at 20:37

## 2018-01-01 RX ADMIN — LOSARTAN POTASSIUM 100 MG: 50 TABLET ORAL at 10:45

## 2018-01-01 RX ADMIN — CALCIUM 500 MG: 500 TABLET ORAL at 08:31

## 2018-01-01 RX ADMIN — CLONIDINE HYDROCHLORIDE 0.1 MG: 0.1 TABLET ORAL at 20:15

## 2018-01-01 RX ADMIN — AMIODARONE HYDROCHLORIDE 0.5 MG/MIN: 50 INJECTION, SOLUTION INTRAVENOUS at 21:48

## 2018-01-01 RX ADMIN — LOSARTAN POTASSIUM 100 MG: 50 TABLET ORAL at 18:41

## 2018-01-01 RX ADMIN — SODIUM CHLORIDE: 9 INJECTION, SOLUTION INTRAVENOUS at 16:13

## 2018-01-01 RX ADMIN — VENLAFAXINE HYDROCHLORIDE 150 MG: 150 CAPSULE, EXTENDED RELEASE ORAL at 14:41

## 2018-01-01 RX ADMIN — MAGNESIUM OXIDE TAB 400 MG (241.3 MG ELEMENTAL MG) 400 MG: 400 (241.3 MG) TAB at 10:32

## 2018-01-01 RX ADMIN — DILTIAZEM HYDROCHLORIDE 240 MG: 240 CAPSULE, COATED, EXTENDED RELEASE ORAL at 13:19

## 2018-01-01 RX ADMIN — SODIUM BICARBONATE 325 MG: 650 TABLET ORAL at 10:15

## 2018-01-01 RX ADMIN — Medication 400 MG: at 11:42

## 2018-01-01 RX ADMIN — DOCUSATE SODIUM 100 MG: 50 LIQUID ORAL at 12:01

## 2018-01-01 RX ADMIN — METOROPROLOL TARTRATE 5 MG: 5 INJECTION, SOLUTION INTRAVENOUS at 08:45

## 2018-01-01 RX ADMIN — TAZOBACTAM SODIUM AND PIPERACILLIN SODIUM 3.38 G: 375; 3 INJECTION, SOLUTION INTRAVENOUS at 10:28

## 2018-01-01 RX ADMIN — DEXTROSE AND SODIUM CHLORIDE: 5; 900 INJECTION, SOLUTION INTRAVENOUS at 11:36

## 2018-01-01 RX ADMIN — CLONIDINE HYDROCHLORIDE 0.1 MG: 0.1 TABLET ORAL at 21:46

## 2018-01-01 RX ADMIN — LEVOTHYROXINE SODIUM 25 MCG: 50 TABLET ORAL at 09:55

## 2018-01-01 RX ADMIN — DILTIAZEM HYDROCHLORIDE 120 MG: 60 TABLET, FILM COATED ORAL at 06:09

## 2018-01-01 RX ADMIN — VENLAFAXINE HYDROCHLORIDE 150 MG: 150 CAPSULE, EXTENDED RELEASE ORAL at 09:45

## 2018-01-01 RX ADMIN — DOCUSATE SODIUM 100 MG: 50 LIQUID ORAL at 08:57

## 2018-01-01 RX ADMIN — DAPTOMYCIN 245 MG: 500 INJECTION, POWDER, LYOPHILIZED, FOR SOLUTION INTRAVENOUS at 21:17

## 2018-01-01 RX ADMIN — LACTOBACILLUS TAB 4 TABLET: TAB at 09:34

## 2018-01-01 RX ADMIN — INSULIN LISPRO 1 UNITS: 100 INJECTION, SOLUTION INTRAVENOUS; SUBCUTANEOUS at 16:49

## 2018-01-01 RX ADMIN — ATORVASTATIN CALCIUM 80 MG: 80 TABLET, FILM COATED ORAL at 22:02

## 2018-01-01 RX ADMIN — SODIUM CHLORIDE 1000 ML: 9 INJECTION, SOLUTION INTRAVENOUS at 02:55

## 2018-01-01 RX ADMIN — PANTOPRAZOLE SODIUM 80 MG: 40 INJECTION, POWDER, FOR SOLUTION INTRAVENOUS at 09:53

## 2018-01-01 RX ADMIN — ATORVASTATIN CALCIUM 80 MG: 80 TABLET, FILM COATED ORAL at 21:00

## 2018-01-01 RX ADMIN — SODIUM CHLORIDE 250 ML: 900 IRRIGANT IRRIGATION at 07:04

## 2018-01-01 RX ADMIN — INSULIN LISPRO 1 UNITS: 100 INJECTION, SOLUTION INTRAVENOUS; SUBCUTANEOUS at 13:41

## 2018-01-01 RX ADMIN — HEPARIN SODIUM 5000 UNITS: 5000 INJECTION, SOLUTION INTRAVENOUS; SUBCUTANEOUS at 20:12

## 2018-01-01 RX ADMIN — SODIUM CHLORIDE: 9 INJECTION, SOLUTION INTRAVENOUS at 03:18

## 2018-01-01 RX ADMIN — NEPHROCAP 1 MG: 1 CAP ORAL at 11:18

## 2018-01-01 RX ADMIN — LEVOTHYROXINE SODIUM 50 MCG: 50 TABLET ORAL at 08:59

## 2018-01-01 RX ADMIN — DILTIAZEM HYDROCHLORIDE 120 MG: 60 TABLET, FILM COATED ORAL at 00:24

## 2018-01-01 RX ADMIN — ALBUMIN (HUMAN) 50 G: 0.25 INJECTION, SOLUTION INTRAVENOUS at 09:00

## 2018-01-01 RX ADMIN — LEVOTHYROXINE SODIUM 50 MCG: 50 TABLET ORAL at 09:54

## 2018-01-01 RX ADMIN — CALCIUM ACETATE 667 MG: 667 CAPSULE ORAL at 18:07

## 2018-01-01 RX ADMIN — HEPARIN SODIUM 3200 UNITS: 1000 INJECTION, SOLUTION INTRAVENOUS; SUBCUTANEOUS at 13:35

## 2018-01-01 RX ADMIN — PRIMIDONE 50 MG: 50 TABLET ORAL at 22:03

## 2018-01-01 RX ADMIN — CLONIDINE HYDROCHLORIDE 0.1 MG: 0.1 TABLET ORAL at 20:55

## 2018-01-01 RX ADMIN — LEVOFLOXACIN 500 MG: 5 INJECTION, SOLUTION INTRAVENOUS at 15:10

## 2018-01-01 RX ADMIN — MAGNESIUM OXIDE TAB 400 MG (241.3 MG ELEMENTAL MG) 400 MG: 400 (241.3 MG) TAB at 10:20

## 2018-01-01 RX ADMIN — Medication 10 ML: at 20:25

## 2018-01-01 RX ADMIN — ATORVASTATIN CALCIUM 80 MG: 80 TABLET, FILM COATED ORAL at 20:37

## 2018-01-01 RX ADMIN — CALCIUM ACETATE 667 MG: 667 CAPSULE ORAL at 17:13

## 2018-01-01 RX ADMIN — DILTIAZEM HYDROCHLORIDE 240 MG: 240 CAPSULE, COATED, EXTENDED RELEASE ORAL at 18:23

## 2018-01-01 RX ADMIN — LOSARTAN POTASSIUM 100 MG: 50 TABLET, FILM COATED ORAL at 09:35

## 2018-01-01 RX ADMIN — CISATRACURIUM BESYLATE 6 MG: 2 INJECTION INTRAVENOUS at 09:41

## 2018-01-01 RX ADMIN — CALCIUM ACETATE 667 MG: 667 CAPSULE ORAL at 09:34

## 2018-01-01 RX ADMIN — Medication 1 MG: at 14:26

## 2018-01-01 RX ADMIN — INSULIN LISPRO 2 UNITS: 100 INJECTION, SOLUTION INTRAVENOUS; SUBCUTANEOUS at 09:06

## 2018-01-01 RX ADMIN — CARBIDOPA AND LEVODOPA 1 TABLET: 25; 100 TABLET ORAL at 10:37

## 2018-01-01 RX ADMIN — Medication 125 MG: at 01:02

## 2018-01-01 RX ADMIN — PRIMIDONE 50 MG: 50 TABLET ORAL at 22:04

## 2018-01-01 RX ADMIN — DOCUSATE SODIUM 100 MG: 100 CAPSULE, LIQUID FILLED ORAL at 22:06

## 2018-01-01 RX ADMIN — IPRATROPIUM BROMIDE AND ALBUTEROL SULFATE 3 ML: 2.5; .5 SOLUTION RESPIRATORY (INHALATION) at 19:42

## 2018-01-01 RX ADMIN — Medication 5.9 MILLICURIE: at 18:02

## 2018-01-01 RX ADMIN — AMIODARONE HYDROCHLORIDE 200 MG: 200 TABLET ORAL at 10:17

## 2018-01-01 RX ADMIN — ACETAMINOPHEN 650 MG: 325 TABLET ORAL at 06:03

## 2018-01-01 RX ADMIN — Medication 10 ML: at 22:04

## 2018-01-01 RX ADMIN — GUAIFENESIN 600 MG: 600 TABLET, EXTENDED RELEASE ORAL at 10:15

## 2018-01-01 RX ADMIN — Medication 100 MG: at 07:00

## 2018-01-01 RX ADMIN — IPRATROPIUM BROMIDE AND ALBUTEROL SULFATE 3 ML: .5; 3 SOLUTION RESPIRATORY (INHALATION) at 20:39

## 2018-01-01 RX ADMIN — METOROPROLOL TARTRATE 5 MG: 5 INJECTION, SOLUTION INTRAVENOUS at 21:48

## 2018-01-01 RX ADMIN — BARIUM SULFATE 80 ML: 400 SUSPENSION ORAL at 13:45

## 2018-01-01 RX ADMIN — PROPOFOL 7.24 MCG/KG/MIN: 10 INJECTION, EMULSION INTRAVENOUS at 08:30

## 2018-01-01 RX ADMIN — HYDROCODONE BITARTRATE AND ACETAMINOPHEN 1 TABLET: 5; 325 TABLET ORAL at 08:31

## 2018-01-01 RX ADMIN — ALPRAZOLAM 0.5 MG: 0.5 TABLET ORAL at 22:59

## 2018-01-01 RX ADMIN — METOPROLOL TARTRATE 25 MG: 25 TABLET ORAL at 23:42

## 2018-01-01 RX ADMIN — METOPROLOL TARTRATE 25 MG: 25 TABLET ORAL at 09:59

## 2018-01-01 RX ADMIN — DOCUSATE SODIUM 100 MG: 50 LIQUID ORAL at 09:30

## 2018-01-01 RX ADMIN — Medication 125 MG: at 13:24

## 2018-01-01 RX ADMIN — THIAMINE HYDROCHLORIDE: 100 INJECTION, SOLUTION INTRAMUSCULAR; INTRAVENOUS at 19:05

## 2018-01-01 RX ADMIN — AMIODARONE HYDROCHLORIDE 400 MG: 200 TABLET ORAL at 22:53

## 2018-01-01 RX ADMIN — CALCIUM ACETATE 667 MG: 667 CAPSULE ORAL at 09:44

## 2018-01-01 RX ADMIN — PANTOPRAZOLE SODIUM 40 MG: 40 GRANULE, DELAYED RELEASE ORAL at 06:15

## 2018-01-01 RX ADMIN — CHLORTHALIDONE 25 MG: 25 TABLET ORAL at 10:00

## 2018-01-01 RX ADMIN — VENLAFAXINE HYDROCHLORIDE 150 MG: 150 CAPSULE, EXTENDED RELEASE ORAL at 08:31

## 2018-01-01 RX ADMIN — METOPROLOL TARTRATE 25 MG: 25 TABLET ORAL at 21:34

## 2018-01-01 RX ADMIN — PANTOPRAZOLE SODIUM 40 MG: 40 GRANULE, DELAYED RELEASE ORAL at 08:04

## 2018-01-01 RX ADMIN — PANTOPRAZOLE SODIUM 40 MG: 40 GRANULE, DELAYED RELEASE ORAL at 06:08

## 2018-01-01 RX ADMIN — CEFTRIAXONE 1 G: 1 INJECTION, POWDER, FOR SOLUTION INTRAMUSCULAR; INTRAVENOUS at 09:18

## 2018-01-01 RX ADMIN — PANTOPRAZOLE SODIUM 20 MG: 20 TABLET, DELAYED RELEASE ORAL at 05:39

## 2018-01-01 RX ADMIN — LEVOTHYROXINE SODIUM 50 MCG: 25 TABLET ORAL at 07:49

## 2018-01-01 RX ADMIN — CALCIUM ACETATE 667 MG: 667 CAPSULE ORAL at 15:59

## 2018-01-01 RX ADMIN — LEVOTHYROXINE SODIUM 50 MCG: 50 TABLET ORAL at 05:43

## 2018-01-01 RX ADMIN — Medication 10 ML: at 09:36

## 2018-01-01 RX ADMIN — Medication 0.1 MG: at 10:22

## 2018-01-01 RX ADMIN — Medication 10 ML: at 23:18

## 2018-01-01 RX ADMIN — INSULIN LISPRO 1 UNITS: 100 INJECTION, SOLUTION INTRAVENOUS; SUBCUTANEOUS at 08:39

## 2018-01-01 RX ADMIN — HYDRALAZINE HYDROCHLORIDE 100 MG: 100 TABLET, FILM COATED ORAL at 21:40

## 2018-01-01 RX ADMIN — WARFARIN SODIUM 2.5 MG: 2.5 TABLET ORAL at 18:26

## 2018-01-01 RX ADMIN — METOPROLOL TARTRATE 10 MG: 5 INJECTION, SOLUTION INTRAVENOUS at 13:18

## 2018-01-01 RX ADMIN — LACTOBACILLUS TAB 1 TABLET: TAB at 22:59

## 2018-01-01 RX ADMIN — AMIODARONE HYDROCHLORIDE 200 MG: 200 TABLET ORAL at 14:09

## 2018-01-01 RX ADMIN — CARBIDOPA AND LEVODOPA 1 TABLET: 25; 100 TABLET ORAL at 14:08

## 2018-01-01 RX ADMIN — METOROPROLOL TARTRATE 5 MG: 5 INJECTION, SOLUTION INTRAVENOUS at 20:25

## 2018-01-01 RX ADMIN — AMIODARONE HYDROCHLORIDE 200 MG: 200 TABLET ORAL at 09:17

## 2018-01-01 RX ADMIN — FENTANYL CITRATE 25 MCG/HR: 50 INJECTION, SOLUTION INTRAMUSCULAR; INTRAVENOUS at 02:51

## 2018-01-01 RX ADMIN — SODIUM CHLORIDE 4 ML: 7 NEBU SOLN,3 % NEBU at 09:05

## 2018-01-01 RX ADMIN — POLYETHYLENE GLYCOL 3350, SODIUM SULFATE, SODIUM CHLORIDE, POTASSIUM CHLORIDE, ASCORBIC ACID, SODIUM ASCORBATE 100 G: KIT at 06:40

## 2018-01-01 RX ADMIN — INSULIN LISPRO 8 UNITS: 100 INJECTION, SOLUTION INTRAVENOUS; SUBCUTANEOUS at 13:23

## 2018-01-01 RX ADMIN — CALCIUM ACETATE 667 MG: 667 CAPSULE ORAL at 17:56

## 2018-01-01 RX ADMIN — ASCORBIC ACID, VITAMIN A PALMITATE, CHOLECALCIFEROL, THIAMINE HYDROCHLORIDE, RIBOFLAVIN-5 PHOSPHATE SODIUM, PYRIDOXINE HYDROCHLORIDE, NIACINAMIDE, DEXPANTHENOL, ALPHA-TOCOPHEROL ACETATE, VITAMIN K1, FOLIC ACID, BIOTIN, CYANOCOBALAMIN: 200; 3300; 200; 6; 3.6; 6; 40; 15; 10; 150; 600; 60; 5 INJECTION, SOLUTION INTRAVENOUS at 23:17

## 2018-01-01 RX ADMIN — DILTIAZEM HYDROCHLORIDE 180 MG: 180 CAPSULE, COATED, EXTENDED RELEASE ORAL at 18:37

## 2018-01-01 RX ADMIN — Medication 10 ML: at 20:45

## 2018-01-01 RX ADMIN — PROPOFOL 30 MCG/KG/MIN: 10 INJECTION, EMULSION INTRAVENOUS at 18:55

## 2018-01-01 RX ADMIN — HEPARIN SODIUM 4000 UNITS: 1000 INJECTION, SOLUTION INTRAVENOUS; SUBCUTANEOUS at 22:09

## 2018-01-01 RX ADMIN — METOPROLOL TARTRATE 25 MG: 25 TABLET ORAL at 22:04

## 2018-01-01 RX ADMIN — CARBIDOPA AND LEVODOPA 1 TABLET: 25; 100 TABLET ORAL at 13:18

## 2018-01-01 RX ADMIN — COLLAGENASE SANTYL: 250 OINTMENT TOPICAL at 09:27

## 2018-01-01 RX ADMIN — PRIMIDONE 50 MG: 50 TABLET ORAL at 22:01

## 2018-01-01 RX ADMIN — CARBIDOPA AND LEVODOPA 1 TABLET: 25; 100 TABLET ORAL at 22:21

## 2018-01-01 RX ADMIN — ISOSORBIDE MONONITRATE 60 MG: 60 TABLET, EXTENDED RELEASE ORAL at 10:57

## 2018-01-01 RX ADMIN — GUAIFENESIN 600 MG: 600 TABLET, EXTENDED RELEASE ORAL at 22:59

## 2018-01-01 RX ADMIN — CEFTRIAXONE 1 G: 1 INJECTION, POWDER, FOR SOLUTION INTRAMUSCULAR; INTRAVENOUS at 10:25

## 2018-01-01 RX ADMIN — ALPRAZOLAM 0.5 MG: 0.5 TABLET ORAL at 05:39

## 2018-01-01 RX ADMIN — HEPARIN SODIUM 2000 UNITS: 1000 INJECTION, SOLUTION INTRAVENOUS; SUBCUTANEOUS at 15:26

## 2018-01-01 RX ADMIN — DILTIAZEM HYDROCHLORIDE 240 MG: 240 CAPSULE, COATED, EXTENDED RELEASE ORAL at 09:37

## 2018-01-01 RX ADMIN — HEPARIN SODIUM 4000 UNITS: 1000 INJECTION, SOLUTION INTRAVENOUS; SUBCUTANEOUS at 16:31

## 2018-01-01 RX ADMIN — ISOSORBIDE MONONITRATE 60 MG: 60 TABLET, EXTENDED RELEASE ORAL at 13:15

## 2018-01-01 RX ADMIN — METOROPROLOL TARTRATE 5 MG: 5 INJECTION, SOLUTION INTRAVENOUS at 08:56

## 2018-01-01 RX ADMIN — DILTIAZEM HYDROCHLORIDE 240 MG: 240 CAPSULE, COATED, EXTENDED RELEASE ORAL at 21:19

## 2018-01-01 RX ADMIN — NEPHROCAP 1 MG: 1 CAP ORAL at 09:57

## 2018-01-01 RX ADMIN — ASPIRIN 81 MG: 81 TABLET ORAL at 13:26

## 2018-01-01 RX ADMIN — DEXTROSE MONOHYDRATE 100 ML/HR: 50 INJECTION, SOLUTION INTRAVENOUS at 07:51

## 2018-01-01 RX ADMIN — HYDRALAZINE HYDROCHLORIDE 10 MG: 20 INJECTION INTRAMUSCULAR; INTRAVENOUS at 04:24

## 2018-01-01 RX ADMIN — CEFTRIAXONE SODIUM 1 G: 10 INJECTION, POWDER, FOR SOLUTION INTRAVENOUS at 01:08

## 2018-01-01 RX ADMIN — DOCUSATE SODIUM 100 MG: 100 CAPSULE, LIQUID FILLED ORAL at 15:34

## 2018-01-01 RX ADMIN — ALBUMIN (HUMAN) 25 G: 0.25 INJECTION, SOLUTION INTRAVENOUS at 19:13

## 2018-01-01 RX ADMIN — DILTIAZEM HYDROCHLORIDE 180 MG: 180 CAPSULE, COATED, EXTENDED RELEASE ORAL at 09:26

## 2018-01-01 RX ADMIN — MAGNESIUM OXIDE TAB 400 MG (241.3 MG ELEMENTAL MG) 400 MG: 400 (241.3 MG) TAB at 09:02

## 2018-01-01 RX ADMIN — Medication 125 MG: at 05:43

## 2018-01-01 RX ADMIN — DILTIAZEM HYDROCHLORIDE 180 MG: 180 CAPSULE, COATED, EXTENDED RELEASE ORAL at 21:10

## 2018-01-01 RX ADMIN — ISOSORBIDE MONONITRATE 120 MG: 120 TABLET ORAL at 09:35

## 2018-01-01 RX ADMIN — METOPROLOL TARTRATE 10 MG: 5 INJECTION, SOLUTION INTRAVENOUS at 09:58

## 2018-01-01 RX ADMIN — DILTIAZEM HYDROCHLORIDE 120 MG: 60 TABLET, FILM COATED ORAL at 17:56

## 2018-01-01 RX ADMIN — PANTOPRAZOLE SODIUM 20 MG: 20 TABLET, DELAYED RELEASE ORAL at 06:06

## 2018-01-01 RX ADMIN — POTASSIUM CHLORIDE 20 MEQ: 20 SOLUTION ORAL at 08:58

## 2018-01-01 RX ADMIN — Medication 400 MG: at 08:09

## 2018-01-01 RX ADMIN — Medication 10 ML: at 09:05

## 2018-01-01 RX ADMIN — METOPROLOL TARTRATE 25 MG: 25 TABLET ORAL at 22:06

## 2018-01-01 RX ADMIN — ISOSORBIDE MONONITRATE 60 MG: 60 TABLET, EXTENDED RELEASE ORAL at 10:20

## 2018-01-01 RX ADMIN — LEVOTHYROXINE SODIUM 25 MCG: 25 TABLET ORAL at 09:16

## 2018-01-01 RX ADMIN — Medication 400 MG: at 15:33

## 2018-01-01 RX ADMIN — HYDRALAZINE HYDROCHLORIDE 20 MG: 20 INJECTION INTRAMUSCULAR; INTRAVENOUS at 16:15

## 2018-01-01 RX ADMIN — CARBIDOPA AND LEVODOPA 1 TABLET: 25; 100 TABLET ORAL at 09:35

## 2018-01-01 RX ADMIN — CARBIDOPA AND LEVODOPA 1 TABLET: 25; 100 TABLET ORAL at 21:00

## 2018-01-01 RX ADMIN — AMIODARONE HYDROCHLORIDE 200 MG: 200 TABLET ORAL at 10:14

## 2018-01-01 RX ADMIN — LEVOTHYROXINE SODIUM 50 MCG: 50 TABLET ORAL at 09:22

## 2018-01-01 RX ADMIN — SODIUM BICARBONATE 325 MG: 650 TABLET ORAL at 20:37

## 2018-01-01 RX ADMIN — DILTIAZEM HYDROCHLORIDE 240 MG: 240 CAPSULE, COATED, EXTENDED RELEASE ORAL at 22:17

## 2018-01-01 RX ADMIN — DILTIAZEM HYDROCHLORIDE 240 MG: 240 CAPSULE, COATED, EXTENDED RELEASE ORAL at 08:09

## 2018-01-01 RX ADMIN — DILTIAZEM HYDROCHLORIDE 120 MG: 60 TABLET, FILM COATED ORAL at 17:39

## 2018-01-01 RX ADMIN — NEPHROCAP 1 MG: 1 CAP ORAL at 08:52

## 2018-01-01 RX ADMIN — DILTIAZEM HYDROCHLORIDE 240 MG: 120 CAPSULE, EXTENDED RELEASE ORAL at 21:59

## 2018-01-01 RX ADMIN — HYDRALAZINE HYDROCHLORIDE 100 MG: 100 TABLET, FILM COATED ORAL at 21:30

## 2018-01-01 RX ADMIN — IPRATROPIUM BROMIDE AND ALBUTEROL SULFATE 3 ML: .5; 3 SOLUTION RESPIRATORY (INHALATION) at 07:02

## 2018-01-01 RX ADMIN — SODIUM BICARBONATE 325 MG: 650 TABLET ORAL at 20:29

## 2018-01-01 RX ADMIN — DILTIAZEM HYDROCHLORIDE 120 MG: 60 TABLET, FILM COATED ORAL at 17:24

## 2018-01-01 RX ADMIN — DILTIAZEM HYDROCHLORIDE 240 MG: 120 CAPSULE, EXTENDED RELEASE ORAL at 10:36

## 2018-01-01 RX ADMIN — PANTOPRAZOLE SODIUM 40 MG: 40 INJECTION, POWDER, FOR SOLUTION INTRAVENOUS at 12:08

## 2018-01-01 RX ADMIN — CALCIUM ACETATE 667 MG: 667 CAPSULE ORAL at 12:18

## 2018-01-01 RX ADMIN — MINERAL OIL, PETROLATUM: 425; 568 OINTMENT OPHTHALMIC at 06:15

## 2018-01-01 RX ADMIN — HYDRALAZINE HYDROCHLORIDE 50 MG: 50 TABLET, FILM COATED ORAL at 13:59

## 2018-01-01 RX ADMIN — ACETAMINOPHEN 650 MG: 325 TABLET, FILM COATED ORAL at 12:20

## 2018-01-01 RX ADMIN — HYDRALAZINE HYDROCHLORIDE 100 MG: 100 TABLET, FILM COATED ORAL at 14:51

## 2018-01-01 RX ADMIN — PANTOPRAZOLE SODIUM 20 MG: 40 GRANULE, DELAYED RELEASE ORAL at 06:09

## 2018-01-01 RX ADMIN — HYDRALAZINE HYDROCHLORIDE 100 MG: 100 TABLET, FILM COATED ORAL at 17:41

## 2018-01-01 RX ADMIN — LEVOTHYROXINE SODIUM 50 MCG: 50 TABLET ORAL at 06:15

## 2018-01-01 RX ADMIN — PANTOPRAZOLE SODIUM 20 MG: 20 TABLET, DELAYED RELEASE ORAL at 13:27

## 2018-01-01 RX ADMIN — Medication 10 ML: at 08:45

## 2018-01-01 RX ADMIN — INSULIN LISPRO 6 UNITS: 100 INJECTION, SOLUTION INTRAVENOUS; SUBCUTANEOUS at 12:25

## 2018-01-01 RX ADMIN — METRONIDAZOLE 500 MG: 500 INJECTION, SOLUTION INTRAVENOUS at 10:00

## 2018-01-01 RX ADMIN — ATORVASTATIN CALCIUM 80 MG: 80 TABLET, FILM COATED ORAL at 20:55

## 2018-01-01 RX ADMIN — DILTIAZEM HYDROCHLORIDE 120 MG: 60 TABLET, FILM COATED ORAL at 00:19

## 2018-01-01 RX ADMIN — DILTIAZEM HYDROCHLORIDE 180 MG: 180 CAPSULE, COATED, EXTENDED RELEASE ORAL at 10:17

## 2018-01-01 RX ADMIN — MINOXIDIL 2.5 MG: 2.5 TABLET ORAL at 09:35

## 2018-01-01 RX ADMIN — Medication 10 ML: at 23:02

## 2018-01-01 RX ADMIN — HYDRALAZINE HYDROCHLORIDE 20 MG: 20 INJECTION INTRAMUSCULAR; INTRAVENOUS at 16:25

## 2018-01-01 RX ADMIN — Medication 1 MG: at 09:45

## 2018-01-01 RX ADMIN — HYDRALAZINE HYDROCHLORIDE 10 MG: 20 INJECTION INTRAMUSCULAR; INTRAVENOUS at 02:48

## 2018-01-01 RX ADMIN — CHLORTHALIDONE 25 MG: 25 TABLET ORAL at 09:00

## 2018-01-01 RX ADMIN — FENTANYL CITRATE 50 MCG: 50 INJECTION, SOLUTION INTRAMUSCULAR; INTRAVENOUS at 10:52

## 2018-01-01 RX ADMIN — ATORVASTATIN CALCIUM 80 MG: 80 TABLET, FILM COATED ORAL at 23:07

## 2018-01-01 RX ADMIN — HYDROXYZINE HYDROCHLORIDE 10 MG: 10 TABLET, FILM COATED ORAL at 22:59

## 2018-01-01 RX ADMIN — LEVOTHYROXINE SODIUM 50 MCG: 25 TABLET ORAL at 05:30

## 2018-01-01 RX ADMIN — SODIUM CHLORIDE 2000 ML: 9 INJECTION, SOLUTION INTRAVENOUS at 17:16

## 2018-01-01 RX ADMIN — DESMOPRESSIN ACETATE 13.04 MCG: 4 SOLUTION INTRAVENOUS at 11:18

## 2018-01-01 RX ADMIN — CALCIUM ACETATE 667 MG: 667 CAPSULE ORAL at 16:48

## 2018-01-01 RX ADMIN — ATORVASTATIN CALCIUM 80 MG: 40 TABLET, FILM COATED ORAL at 22:19

## 2018-01-01 RX ADMIN — METHYLPREDNISOLONE SODIUM SUCCINATE 20 MG: 40 INJECTION, POWDER, FOR SOLUTION INTRAMUSCULAR; INTRAVENOUS at 14:46

## 2018-01-01 RX ADMIN — ALPRAZOLAM 0.5 MG: 0.5 TABLET ORAL at 00:44

## 2018-01-01 RX ADMIN — LOSARTAN POTASSIUM 50 MG: 50 TABLET ORAL at 10:57

## 2018-01-01 RX ADMIN — IPRATROPIUM BROMIDE AND ALBUTEROL SULFATE 3 ML: 2.5; .5 SOLUTION RESPIRATORY (INHALATION) at 07:37

## 2018-01-01 RX ADMIN — LOSARTAN POTASSIUM 100 MG: 50 TABLET, FILM COATED ORAL at 09:22

## 2018-01-01 RX ADMIN — SODIUM POLYSTYRENE SULFONATE 30 G: 15 SUSPENSION ORAL; RECTAL at 09:18

## 2018-01-01 RX ADMIN — Medication 10 ML: at 09:32

## 2018-01-01 RX ADMIN — Medication 1 MG: at 09:25

## 2018-01-01 RX ADMIN — IPRATROPIUM BROMIDE AND ALBUTEROL SULFATE 3 ML: .5; 3 SOLUTION RESPIRATORY (INHALATION) at 14:40

## 2018-01-01 RX ADMIN — HYDRALAZINE HYDROCHLORIDE 100 MG: 100 TABLET, FILM COATED ORAL at 09:35

## 2018-01-01 RX ADMIN — ASPIRIN 81 MG: 81 TABLET, COATED ORAL at 09:44

## 2018-01-01 RX ADMIN — SODIUM CHLORIDE: 9 INJECTION, SOLUTION INTRAVENOUS at 21:42

## 2018-01-01 RX ADMIN — DILTIAZEM HYDROCHLORIDE 120 MG: 60 TABLET, FILM COATED ORAL at 05:52

## 2018-01-01 RX ADMIN — CALCIUM ACETATE 667 MG: 667 CAPSULE ORAL at 10:12

## 2018-01-01 RX ADMIN — Medication 1 MG: at 10:16

## 2018-01-01 RX ADMIN — IPRATROPIUM BROMIDE AND ALBUTEROL SULFATE 3 ML: 2.5; .5 SOLUTION RESPIRATORY (INHALATION) at 20:35

## 2018-01-01 RX ADMIN — Medication 10 ML: at 11:34

## 2018-01-01 RX ADMIN — CALCIUM ACETATE 667 MG: 667 CAPSULE ORAL at 09:04

## 2018-01-01 RX ADMIN — Medication 0.1 MG: at 10:27

## 2018-01-01 RX ADMIN — CALCIUM 500 MG: 500 TABLET ORAL at 09:25

## 2018-01-01 RX ADMIN — Medication 10 ML: at 08:09

## 2018-01-01 RX ADMIN — PANTOPRAZOLE SODIUM 20 MG: 40 GRANULE, DELAYED RELEASE ORAL at 15:38

## 2018-01-01 RX ADMIN — HYDRALAZINE HYDROCHLORIDE 100 MG: 100 TABLET, FILM COATED ORAL at 08:50

## 2018-01-01 RX ADMIN — PRIMIDONE 50 MG: 50 TABLET ORAL at 13:35

## 2018-01-01 RX ADMIN — Medication 125 MG: at 01:57

## 2018-01-01 RX ADMIN — TAZOBACTAM SODIUM AND PIPERACILLIN SODIUM 3.38 G: 375; 3 INJECTION, SOLUTION INTRAVENOUS at 15:51

## 2018-01-01 RX ADMIN — LEVOTHYROXINE SODIUM 25 MCG: 25 TABLET ORAL at 06:15

## 2018-01-01 RX ADMIN — AMIODARONE HYDROCHLORIDE 200 MG: 200 TABLET ORAL at 17:08

## 2018-01-01 RX ADMIN — METOPROLOL TARTRATE 25 MG: 25 TABLET ORAL at 10:45

## 2018-01-01 RX ADMIN — SODIUM CHLORIDE 8 MG/HR: 0.9 INJECTION INTRAVENOUS at 09:57

## 2018-01-01 RX ADMIN — METOPROLOL TARTRATE 25 MG: 25 TABLET ORAL at 08:31

## 2018-01-01 RX ADMIN — LOSARTAN POTASSIUM 100 MG: 50 TABLET ORAL at 08:31

## 2018-01-01 RX ADMIN — NEPHROCAP 1 MG: 1 CAP ORAL at 08:32

## 2018-01-01 RX ADMIN — LOSARTAN POTASSIUM 50 MG: 50 TABLET ORAL at 08:49

## 2018-01-01 RX ADMIN — METOROPROLOL TARTRATE 5 MG: 5 INJECTION, SOLUTION INTRAVENOUS at 23:09

## 2018-01-01 RX ADMIN — PANTOPRAZOLE SODIUM 20 MG: 40 GRANULE, DELAYED RELEASE ORAL at 06:36

## 2018-01-01 RX ADMIN — CALCIUM ACETATE 667 MG: 667 CAPSULE ORAL at 09:17

## 2018-01-01 RX ADMIN — TAZOBACTAM SODIUM AND PIPERACILLIN SODIUM 3.38 G: 375; 3 INJECTION, SOLUTION INTRAVENOUS at 19:05

## 2018-01-01 RX ADMIN — METOROPROLOL TARTRATE 5 MG: 5 INJECTION, SOLUTION INTRAVENOUS at 09:30

## 2018-01-01 RX ADMIN — METOROPROLOL TARTRATE 5 MG: 5 INJECTION, SOLUTION INTRAVENOUS at 22:00

## 2018-01-01 RX ADMIN — LEVOTHYROXINE SODIUM 25 MCG: 25 TABLET ORAL at 05:54

## 2018-01-01 RX ADMIN — ASPIRIN 81 MG: 81 TABLET ORAL at 09:34

## 2018-01-01 RX ADMIN — SENNOSIDES AND DOCUSATE SODIUM 1 TABLET: 8.6; 5 TABLET ORAL at 09:35

## 2018-01-01 RX ADMIN — METOROPROLOL TARTRATE 5 MG: 5 INJECTION, SOLUTION INTRAVENOUS at 04:02

## 2018-01-01 RX ADMIN — LEVOTHYROXINE SODIUM 25 MCG: 25 TABLET ORAL at 09:55

## 2018-01-01 RX ADMIN — METOROPROLOL TARTRATE 5 MG: 5 INJECTION, SOLUTION INTRAVENOUS at 04:50

## 2018-01-01 RX ADMIN — CALCIUM ACETATE 667 MG: 667 CAPSULE ORAL at 17:30

## 2018-01-01 RX ADMIN — Medication 1 MG: at 08:09

## 2018-01-01 RX ADMIN — CARBIDOPA AND LEVODOPA 1 TABLET: 25; 100 TABLET ORAL at 09:30

## 2018-01-01 RX ADMIN — DOCUSATE SODIUM 100 MG: 100 CAPSULE, LIQUID FILLED ORAL at 09:00

## 2018-01-01 RX ADMIN — HYDRALAZINE HYDROCHLORIDE 100 MG: 100 TABLET, FILM COATED ORAL at 20:54

## 2018-01-01 RX ADMIN — FUROSEMIDE 40 MG: 40 TABLET ORAL at 07:50

## 2018-01-01 RX ADMIN — HYDRALAZINE HYDROCHLORIDE 100 MG: 100 TABLET, FILM COATED ORAL at 09:44

## 2018-01-01 RX ADMIN — WARFARIN SODIUM 1.5 MG: 3 TABLET ORAL at 17:44

## 2018-01-01 RX ADMIN — MINOXIDIL 2.5 MG: 2.5 TABLET ORAL at 09:22

## 2018-01-01 RX ADMIN — PANTOPRAZOLE SODIUM 40 MG: 40 INJECTION, POWDER, FOR SOLUTION INTRAVENOUS at 10:37

## 2018-01-01 RX ADMIN — CARBIDOPA AND LEVODOPA 1 TABLET: 25; 100 TABLET ORAL at 09:31

## 2018-01-01 RX ADMIN — CARBIDOPA AND LEVODOPA 1 TABLET: 25; 100 TABLET ORAL at 17:07

## 2018-01-01 RX ADMIN — Medication 400 MG: at 10:17

## 2018-01-01 RX ADMIN — METOPROLOL TARTRATE 25 MG: 25 TABLET ORAL at 20:37

## 2018-01-01 RX ADMIN — VENLAFAXINE HYDROCHLORIDE 150 MG: 150 CAPSULE, EXTENDED RELEASE ORAL at 09:57

## 2018-01-01 RX ADMIN — PHYTONADIONE 10 MG: 10 INJECTION, EMULSION INTRAMUSCULAR; INTRAVENOUS; SUBCUTANEOUS at 07:40

## 2018-01-01 RX ADMIN — CALCIUM ACETATE 667 MG: 667 CAPSULE ORAL at 10:35

## 2018-01-01 RX ADMIN — Medication 10 ML: at 09:14

## 2018-01-01 RX ADMIN — DILTIAZEM HYDROCHLORIDE 120 MG: 60 TABLET, FILM COATED ORAL at 23:33

## 2018-01-01 RX ADMIN — IPRATROPIUM BROMIDE AND ALBUTEROL SULFATE 1 AMPULE: .5; 3 SOLUTION RESPIRATORY (INHALATION) at 00:14

## 2018-01-01 RX ADMIN — Medication 10 ML: at 19:58

## 2018-01-01 RX ADMIN — ALPRAZOLAM 0.5 MG: 0.5 TABLET ORAL at 22:06

## 2018-01-01 RX ADMIN — VENLAFAXINE 75 MG: 75 TABLET ORAL at 09:33

## 2018-01-01 RX ADMIN — PRIMIDONE 50 MG: 50 TABLET ORAL at 17:30

## 2018-01-01 RX ADMIN — HYDRALAZINE HYDROCHLORIDE 100 MG: 100 TABLET, FILM COATED ORAL at 21:06

## 2018-01-01 RX ADMIN — COLLAGENASE SANTYL: 250 OINTMENT TOPICAL at 20:14

## 2018-01-01 RX ADMIN — Medication 15 ML: at 20:59

## 2018-01-01 RX ADMIN — Medication 1 MG: at 13:25

## 2018-01-01 RX ADMIN — VENLAFAXINE HYDROCHLORIDE 150 MG: 150 CAPSULE, EXTENDED RELEASE ORAL at 10:35

## 2018-01-01 RX ADMIN — VENLAFAXINE 75 MG: 75 TABLET ORAL at 08:34

## 2018-01-01 RX ADMIN — LOSARTAN POTASSIUM 100 MG: 50 TABLET ORAL at 09:45

## 2018-01-01 RX ADMIN — DILTIAZEM HYDROCHLORIDE 240 MG: 240 CAPSULE, COATED, EXTENDED RELEASE ORAL at 17:55

## 2018-01-01 RX ADMIN — HEPARIN SODIUM 2000 UNITS: 1000 INJECTION, SOLUTION INTRAVENOUS; SUBCUTANEOUS at 10:29

## 2018-01-01 RX ADMIN — ACETAMINOPHEN 650 MG: 325 TABLET ORAL at 22:04

## 2018-01-01 RX ADMIN — CARBIDOPA AND LEVODOPA 1 TABLET: 25; 100 TABLET ORAL at 13:22

## 2018-01-01 RX ADMIN — LOSARTAN POTASSIUM 50 MG: 50 TABLET ORAL at 09:58

## 2018-01-01 RX ADMIN — HYDRALAZINE HYDROCHLORIDE 100 MG: 100 TABLET, FILM COATED ORAL at 14:39

## 2018-01-01 RX ADMIN — ATORVASTATIN CALCIUM 80 MG: 80 TABLET, FILM COATED ORAL at 21:18

## 2018-01-01 RX ADMIN — PRIMIDONE 50 MG: 50 TABLET ORAL at 23:07

## 2018-01-01 RX ADMIN — PANTOPRAZOLE SODIUM 40 MG: 40 GRANULE, DELAYED RELEASE ORAL at 06:19

## 2018-01-01 RX ADMIN — ATORVASTATIN CALCIUM 80 MG: 80 TABLET, FILM COATED ORAL at 22:03

## 2018-01-01 RX ADMIN — METOPROLOL TARTRATE 25 MG: 25 TABLET ORAL at 10:12

## 2018-01-01 RX ADMIN — CEFTRIAXONE 1 G: 1 INJECTION, POWDER, FOR SOLUTION INTRAMUSCULAR; INTRAVENOUS at 09:49

## 2018-01-01 RX ADMIN — LEVOTHYROXINE SODIUM 50 MCG: 50 TABLET ORAL at 15:16

## 2018-01-01 RX ADMIN — VENLAFAXINE 75 MG: 75 TABLET ORAL at 15:17

## 2018-01-01 RX ADMIN — Medication 10 ML: at 20:49

## 2018-01-01 RX ADMIN — ASPIRIN 81 MG: 81 TABLET ORAL at 09:22

## 2018-01-01 RX ADMIN — LEVETIRACETAM 1000 MG: 500 TABLET, FILM COATED ORAL at 22:47

## 2018-01-01 RX ADMIN — HYDRALAZINE HYDROCHLORIDE 100 MG: 100 TABLET, FILM COATED ORAL at 23:13

## 2018-01-01 RX ADMIN — LACTOBACILLUS TAB 4 TABLET: TAB at 21:34

## 2018-01-01 RX ADMIN — HYDRALAZINE HYDROCHLORIDE 100 MG: 100 TABLET, FILM COATED ORAL at 18:07

## 2018-01-01 RX ADMIN — ONDANSETRON 4 MG: 2 INJECTION INTRAMUSCULAR; INTRAVENOUS at 06:51

## 2018-01-01 RX ADMIN — CEFUROXIME AXETIL 250 MG: 500 TABLET ORAL at 21:59

## 2018-01-01 RX ADMIN — APIXABAN 2.5 MG: 2.5 TABLET, FILM COATED ORAL at 10:16

## 2018-01-01 RX ADMIN — HYDRALAZINE HYDROCHLORIDE 100 MG: 100 TABLET, FILM COATED ORAL at 10:00

## 2018-01-01 RX ADMIN — METOPROLOL TARTRATE 25 MG: 25 TABLET ORAL at 09:04

## 2018-01-01 RX ADMIN — IPRATROPIUM BROMIDE AND ALBUTEROL SULFATE 3 ML: .5; 3 SOLUTION RESPIRATORY (INHALATION) at 12:56

## 2018-01-01 RX ADMIN — DOCUSATE SODIUM 100 MG: 50 LIQUID ORAL at 08:58

## 2018-01-01 RX ADMIN — DILTIAZEM HYDROCHLORIDE 240 MG: 240 CAPSULE, COATED, EXTENDED RELEASE ORAL at 17:45

## 2018-01-01 RX ADMIN — ISOSORBIDE MONONITRATE 60 MG: 60 TABLET, EXTENDED RELEASE ORAL at 10:31

## 2018-01-01 RX ADMIN — HEPARIN SODIUM 5000 UNITS: 5000 INJECTION, SOLUTION INTRAVENOUS; SUBCUTANEOUS at 10:00

## 2018-01-01 RX ADMIN — CARBIDOPA AND LEVODOPA 1 TABLET: 25; 100 TABLET ORAL at 09:14

## 2018-01-01 RX ADMIN — FENTANYL CITRATE 100 MCG/HR: 50 INJECTION, SOLUTION INTRAMUSCULAR; INTRAVENOUS at 07:24

## 2018-01-01 RX ADMIN — AMIODARONE HYDROCHLORIDE 200 MG: 200 TABLET ORAL at 13:18

## 2018-01-01 RX ADMIN — PIPERACILLIN SODIUM,TAZOBACTAM SODIUM 3.38 G: 3; .375 INJECTION, POWDER, FOR SOLUTION INTRAVENOUS at 13:59

## 2018-01-01 RX ADMIN — POLYETHYLENE GLYCOL 3350, SODIUM SULFATE, SODIUM CHLORIDE, POTASSIUM CHLORIDE, ASCORBIC ACID, SODIUM ASCORBATE 100 G: KIT at 18:24

## 2018-01-01 RX ADMIN — HYDRALAZINE HYDROCHLORIDE 5 MG: 20 INJECTION INTRAMUSCULAR; INTRAVENOUS at 15:38

## 2018-01-01 RX ADMIN — Medication 10 ML: at 21:13

## 2018-01-01 RX ADMIN — AMIODARONE HYDROCHLORIDE 200 MG: 200 TABLET ORAL at 10:30

## 2018-01-01 RX ADMIN — FOLIC ACID 1 MG: 1 TABLET ORAL at 13:25

## 2018-01-01 RX ADMIN — CALCIUM ACETATE 667 MG: 667 CAPSULE ORAL at 09:37

## 2018-01-01 RX ADMIN — HEPARIN SODIUM 5000 UNITS: 5000 INJECTION, SOLUTION INTRAVENOUS; SUBCUTANEOUS at 21:58

## 2018-01-01 RX ADMIN — HYDRALAZINE HYDROCHLORIDE 10 MG: 20 INJECTION INTRAMUSCULAR; INTRAVENOUS at 12:07

## 2018-01-01 RX ADMIN — CHLORTHALIDONE 25 MG: 25 TABLET ORAL at 10:16

## 2018-01-01 RX ADMIN — SODIUM CHLORIDE: 9 INJECTION, SOLUTION INTRAVENOUS at 17:41

## 2018-01-01 RX ADMIN — DILTIAZEM HYDROCHLORIDE 240 MG: 60 TABLET, FILM COATED ORAL at 09:15

## 2018-01-01 RX ADMIN — HYDRALAZINE HYDROCHLORIDE 100 MG: 100 TABLET, FILM COATED ORAL at 21:32

## 2018-01-01 RX ADMIN — AMIODARONE HYDROCHLORIDE 200 MG: 200 TABLET ORAL at 09:54

## 2018-01-01 RX ADMIN — Medication 10 ML: at 21:45

## 2018-01-01 RX ADMIN — SODIUM CHLORIDE 8 MG/HR: 0.9 INJECTION INTRAVENOUS at 13:45

## 2018-01-01 RX ADMIN — CALCIUM ACETATE 667 MG: 667 CAPSULE ORAL at 17:41

## 2018-01-01 RX ADMIN — SODIUM CHLORIDE: 9 INJECTION, SOLUTION INTRAVENOUS at 10:19

## 2018-01-01 RX ADMIN — PRIMIDONE 50 MG: 50 TABLET ORAL at 08:31

## 2018-01-01 RX ADMIN — HYDRALAZINE HYDROCHLORIDE 5 MG: 20 INJECTION INTRAMUSCULAR; INTRAVENOUS at 11:16

## 2018-01-01 RX ADMIN — VENLAFAXINE HYDROCHLORIDE 150 MG: 75 CAPSULE, EXTENDED RELEASE ORAL at 09:58

## 2018-01-01 RX ADMIN — DEXTROSE AND SODIUM CHLORIDE: 5; 900 INJECTION, SOLUTION INTRAVENOUS at 05:35

## 2018-01-01 RX ADMIN — AMIODARONE HYDROCHLORIDE 200 MG: 200 TABLET ORAL at 09:22

## 2018-01-01 RX ADMIN — CALCIUM ACETATE 667 MG: 667 CAPSULE ORAL at 18:37

## 2018-01-01 RX ADMIN — BACITRACIN, NEOMYCIN, POLYMYXIN B 3.5 G: 400; 3.5; 5 OINTMENT TOPICAL at 10:06

## 2018-01-01 RX ADMIN — ALPRAZOLAM 0.5 MG: 0.5 TABLET ORAL at 20:54

## 2018-01-01 RX ADMIN — PRIMIDONE 50 MG: 50 TABLET ORAL at 21:18

## 2018-01-01 RX ADMIN — METOPROLOL TARTRATE 25 MG: 25 TABLET ORAL at 21:39

## 2018-01-01 RX ADMIN — APIXABAN 2.5 MG: 2.5 TABLET, FILM COATED ORAL at 13:19

## 2018-01-01 RX ADMIN — ACETAMINOPHEN 650 MG: 325 TABLET ORAL at 18:30

## 2018-01-01 RX ADMIN — Medication 10 ML: at 10:37

## 2018-01-01 RX ADMIN — CALCIUM ACETATE 667 MG: 667 CAPSULE ORAL at 17:26

## 2018-01-01 RX ADMIN — Medication 10 ML: at 10:08

## 2018-01-01 RX ADMIN — METOROPROLOL TARTRATE 5 MG: 5 INJECTION, SOLUTION INTRAVENOUS at 22:15

## 2018-01-01 RX ADMIN — DILTIAZEM HYDROCHLORIDE 240 MG: 240 CAPSULE, COATED, EXTENDED RELEASE ORAL at 17:18

## 2018-01-01 RX ADMIN — CEFTRIAXONE 1 G: 1 INJECTION, POWDER, FOR SOLUTION INTRAMUSCULAR; INTRAVENOUS at 09:55

## 2018-01-01 RX ADMIN — DARBEPOETIN ALFA 40 MCG: 40 SOLUTION INTRAVENOUS; SUBCUTANEOUS at 22:22

## 2018-01-01 RX ADMIN — Medication 10 ML: at 11:35

## 2018-01-01 RX ADMIN — COLLAGENASE SANTYL: 250 OINTMENT TOPICAL at 10:07

## 2018-01-01 RX ADMIN — METOROPROLOL TARTRATE 5 MG: 5 INJECTION, SOLUTION INTRAVENOUS at 14:08

## 2018-01-01 RX ADMIN — PROPOFOL 40 MCG/KG/MIN: 10 INJECTION, EMULSION INTRAVENOUS at 14:07

## 2018-01-01 RX ADMIN — IPRATROPIUM BROMIDE AND ALBUTEROL SULFATE 3 ML: .5; 3 SOLUTION RESPIRATORY (INHALATION) at 14:17

## 2018-01-01 RX ADMIN — ASPIRIN 81 MG: 81 TABLET ORAL at 10:12

## 2018-01-01 RX ADMIN — APIXABAN 2.5 MG: 2.5 TABLET, FILM COATED ORAL at 08:59

## 2018-01-01 RX ADMIN — CLONIDINE HYDROCHLORIDE 0.1 MG: 0.1 TABLET ORAL at 23:07

## 2018-01-01 RX ADMIN — Medication 125 MG: at 23:57

## 2018-01-01 RX ADMIN — Medication 10 ML: at 22:07

## 2018-01-01 RX ADMIN — Medication 10 ML: at 00:01

## 2018-01-01 RX ADMIN — ISOSORBIDE MONONITRATE 60 MG: 60 TABLET, EXTENDED RELEASE ORAL at 08:49

## 2018-01-01 RX ADMIN — PANTOPRAZOLE SODIUM 40 MG: 40 INJECTION, POWDER, FOR SOLUTION INTRAVENOUS at 09:51

## 2018-01-01 RX ADMIN — ASPIRIN 81 MG: 81 TABLET, COATED ORAL at 09:04

## 2018-01-01 RX ADMIN — HEPARIN SODIUM 7000 UNITS: 1000 INJECTION, SOLUTION INTRAVENOUS; SUBCUTANEOUS at 13:20

## 2018-01-01 RX ADMIN — AMIODARONE HYDROCHLORIDE 200 MG: 200 TABLET ORAL at 13:25

## 2018-01-01 RX ADMIN — HYDRALAZINE HYDROCHLORIDE 100 MG: 100 TABLET, FILM COATED ORAL at 13:12

## 2018-01-01 RX ADMIN — FLUTICASONE PROPIONATE 2 SPRAY: 50 SPRAY, METERED NASAL at 09:02

## 2018-01-01 RX ADMIN — CALCIUM ACETATE 667 MG: 667 CAPSULE ORAL at 18:26

## 2018-01-01 RX ADMIN — AMIODARONE HYDROCHLORIDE 200 MG: 200 TABLET ORAL at 09:43

## 2018-01-01 RX ADMIN — HYDRALAZINE HYDROCHLORIDE 10 MG: 20 INJECTION INTRAMUSCULAR; INTRAVENOUS at 15:53

## 2018-01-01 RX ADMIN — Medication 15 ML: at 08:57

## 2018-01-01 RX ADMIN — METOROPROLOL TARTRATE 5 MG: 5 INJECTION, SOLUTION INTRAVENOUS at 03:51

## 2018-01-01 RX ADMIN — Medication 10 ML: at 10:31

## 2018-01-01 RX ADMIN — ASPIRIN 81 MG: 81 TABLET ORAL at 13:23

## 2018-01-01 RX ADMIN — METOROPROLOL TARTRATE 2.5 MG: 5 INJECTION, SOLUTION INTRAVENOUS at 15:59

## 2018-01-01 RX ADMIN — Medication 10 ML: at 09:17

## 2018-01-01 RX ADMIN — METOROPROLOL TARTRATE 5 MG: 5 INJECTION, SOLUTION INTRAVENOUS at 03:38

## 2018-01-01 RX ADMIN — METOROPROLOL TARTRATE 5 MG: 5 INJECTION, SOLUTION INTRAVENOUS at 17:34

## 2018-01-01 RX ADMIN — ISOSORBIDE MONONITRATE 60 MG: 60 TABLET, EXTENDED RELEASE ORAL at 09:16

## 2018-01-01 RX ADMIN — METOPROLOL TARTRATE 25 MG: 25 TABLET ORAL at 22:00

## 2018-01-01 RX ADMIN — Medication 1 MG: at 10:34

## 2018-01-01 RX ADMIN — PRIMIDONE 50 MG: 50 TABLET ORAL at 10:35

## 2018-01-01 RX ADMIN — PANTOPRAZOLE SODIUM 40 MG: 40 GRANULE, DELAYED RELEASE ORAL at 05:04

## 2018-01-01 RX ADMIN — Medication 10 ML: at 09:00

## 2018-01-01 RX ADMIN — SODIUM CHLORIDE 250 ML: 9 INJECTION, SOLUTION INTRAVENOUS at 23:30

## 2018-01-01 RX ADMIN — PRIMIDONE 50 MG: 50 TABLET ORAL at 21:36

## 2018-01-01 RX ADMIN — FOLIC ACID 1 MG: 1 TABLET ORAL at 10:12

## 2018-01-01 RX ADMIN — SODIUM BICARBONATE 325 MG: 650 TABLET ORAL at 08:58

## 2018-01-01 RX ADMIN — Medication 15 ML: at 21:33

## 2018-01-01 RX ADMIN — SODIUM CHLORIDE: 9 INJECTION, SOLUTION INTRAVENOUS at 02:56

## 2018-01-01 RX ADMIN — Medication 10 ML: at 09:57

## 2018-01-01 RX ADMIN — PANTOPRAZOLE SODIUM 40 MG: 40 INJECTION, POWDER, FOR SOLUTION INTRAVENOUS at 18:03

## 2018-01-01 RX ADMIN — DOCUSATE SODIUM 100 MG: 100 CAPSULE, LIQUID FILLED ORAL at 10:16

## 2018-01-01 RX ADMIN — DEXTROSE AND SODIUM CHLORIDE: 5; 900 INJECTION, SOLUTION INTRAVENOUS at 09:14

## 2018-01-01 RX ADMIN — HYDRALAZINE HYDROCHLORIDE 100 MG: 100 TABLET, FILM COATED ORAL at 22:22

## 2018-01-01 RX ADMIN — PROPOFOL 20 MCG/KG/MIN: 10 INJECTION, EMULSION INTRAVENOUS at 05:04

## 2018-01-01 RX ADMIN — HYDROCODONE BITARTRATE AND ACETAMINOPHEN 1 TABLET: 5; 325 TABLET ORAL at 20:46

## 2018-01-01 RX ADMIN — DEXTROSE MONOHYDRATE 12.5 G: 500 INJECTION PARENTERAL at 00:08

## 2018-01-01 RX ADMIN — DILTIAZEM HYDROCHLORIDE 240 MG: 60 TABLET, FILM COATED ORAL at 22:20

## 2018-01-01 RX ADMIN — METOPROLOL TARTRATE 25 MG: 25 TABLET ORAL at 09:35

## 2018-01-01 RX ADMIN — SODIUM BICARBONATE 325 MG: 650 TABLET ORAL at 14:40

## 2018-01-01 RX ADMIN — ISOSORBIDE MONONITRATE 60 MG: 60 TABLET, EXTENDED RELEASE ORAL at 18:41

## 2018-01-01 RX ADMIN — INSULIN LISPRO 3 UNITS: 100 INJECTION, SOLUTION INTRAVENOUS; SUBCUTANEOUS at 18:38

## 2018-01-01 RX ADMIN — CARBIDOPA AND LEVODOPA 1 TABLET: 25; 100 TABLET ORAL at 13:35

## 2018-01-01 RX ADMIN — LEVOTHYROXINE SODIUM 50 MCG: 50 TABLET ORAL at 10:19

## 2018-01-01 RX ADMIN — INSULIN GLARGINE 14 UNITS: 100 INJECTION, SOLUTION SUBCUTANEOUS at 21:09

## 2018-01-01 RX ADMIN — LOSARTAN POTASSIUM 50 MG: 50 TABLET ORAL at 10:24

## 2018-01-01 RX ADMIN — FOLIC ACID 1 MG: 1 TABLET ORAL at 11:18

## 2018-01-01 RX ADMIN — CARBIDOPA AND LEVODOPA 1 TABLET: 25; 100 TABLET ORAL at 13:27

## 2018-01-01 RX ADMIN — VANCOMYCIN HYDROCHLORIDE 1000 MG: 1 INJECTION, SOLUTION INTRAVENOUS at 19:35

## 2018-01-01 RX ADMIN — CALCIUM 500 MG: 500 TABLET ORAL at 09:35

## 2018-01-01 RX ADMIN — PREDNISONE 10 MG: 10 TABLET ORAL at 22:00

## 2018-01-01 RX ADMIN — PANTOPRAZOLE SODIUM 40 MG: 40 INJECTION, POWDER, FOR SOLUTION INTRAVENOUS at 09:30

## 2018-01-01 RX ADMIN — INSULIN LISPRO 2 UNITS: 100 INJECTION, SOLUTION INTRAVENOUS; SUBCUTANEOUS at 17:04

## 2018-01-01 RX ADMIN — HYDRALAZINE HYDROCHLORIDE 100 MG: 100 TABLET, FILM COATED ORAL at 15:24

## 2018-01-01 RX ADMIN — CARBIDOPA AND LEVODOPA 1 TABLET: 25; 100 TABLET ORAL at 15:34

## 2018-01-01 RX ADMIN — LORAZEPAM 2 MG: 2 INJECTION INTRAMUSCULAR at 08:19

## 2018-01-01 RX ADMIN — METOROPROLOL TARTRATE 2.5 MG: 5 INJECTION, SOLUTION INTRAVENOUS at 11:16

## 2018-01-01 RX ADMIN — LOSARTAN POTASSIUM 50 MG: 50 TABLET ORAL at 10:36

## 2018-01-01 RX ADMIN — APIXABAN 2.5 MG: 2.5 TABLET, FILM COATED ORAL at 21:10

## 2018-01-01 RX ADMIN — CALCIUM 500 MG: 500 TABLET ORAL at 20:37

## 2018-01-01 RX ADMIN — PREDNISONE 10 MG: 10 TABLET ORAL at 08:31

## 2018-01-01 RX ADMIN — LOSARTAN POTASSIUM 100 MG: 50 TABLET ORAL at 08:09

## 2018-01-01 RX ADMIN — ASPIRIN 81 MG: 81 TABLET ORAL at 10:33

## 2018-01-01 RX ADMIN — COLLAGENASE SANTYL: 250 OINTMENT TOPICAL at 09:36

## 2018-01-01 RX ADMIN — DOCUSATE SODIUM 100 MG: 100 CAPSULE, LIQUID FILLED ORAL at 12:22

## 2018-01-01 RX ADMIN — AMIODARONE HYDROCHLORIDE 0.5 MG/MIN: 50 INJECTION, SOLUTION INTRAVENOUS at 17:02

## 2018-01-01 RX ADMIN — ATORVASTATIN CALCIUM 80 MG: 80 TABLET, FILM COATED ORAL at 20:36

## 2018-01-01 RX ADMIN — METOPROLOL TARTRATE 25 MG: 25 TABLET ORAL at 21:18

## 2018-01-01 RX ADMIN — SODIUM BICARBONATE 325 MG: 650 TABLET ORAL at 09:58

## 2018-01-01 RX ADMIN — CARBIDOPA AND LEVODOPA 1 TABLET: 25; 100 TABLET ORAL at 15:38

## 2018-01-01 RX ADMIN — METOROPROLOL TARTRATE 2.5 MG: 5 INJECTION, SOLUTION INTRAVENOUS at 17:03

## 2018-01-01 RX ADMIN — ISOSORBIDE MONONITRATE 60 MG: 60 TABLET, EXTENDED RELEASE ORAL at 10:35

## 2018-01-01 RX ADMIN — CLONIDINE HYDROCHLORIDE 0.1 MG: 0.1 TABLET ORAL at 09:35

## 2018-01-01 RX ADMIN — ISOSORBIDE MONONITRATE 60 MG: 60 TABLET, EXTENDED RELEASE ORAL at 08:08

## 2018-01-01 RX ADMIN — CARBIDOPA AND LEVODOPA 1 TABLET: 25; 100 TABLET ORAL at 10:20

## 2018-01-01 RX ADMIN — TAZOBACTAM SODIUM AND PIPERACILLIN SODIUM 2.25 G: 250; 2 INJECTION, SOLUTION INTRAVENOUS at 01:44

## 2018-01-01 RX ADMIN — PRIMIDONE 50 MG: 50 TABLET ORAL at 20:55

## 2018-01-01 RX ADMIN — PREDNISONE 10 MG: 10 TABLET ORAL at 22:06

## 2018-01-01 RX ADMIN — CHLORTHALIDONE 25 MG: 25 TABLET ORAL at 08:49

## 2018-01-01 RX ADMIN — CARBIDOPA AND LEVODOPA 1 TABLET: 25; 100 TABLET ORAL at 10:57

## 2018-01-01 RX ADMIN — METOPROLOL TARTRATE 10 MG: 5 INJECTION, SOLUTION INTRAVENOUS at 01:05

## 2018-01-01 RX ADMIN — TAZOBACTAM SODIUM AND PIPERACILLIN SODIUM 3.38 G: 375; 3 INJECTION, SOLUTION INTRAVENOUS at 15:38

## 2018-01-01 RX ADMIN — CALCIUM ACETATE 667 MG: 667 CAPSULE ORAL at 18:12

## 2018-01-01 RX ADMIN — ASPIRIN 81 MG: 81 TABLET, COATED ORAL at 10:16

## 2018-01-01 RX ADMIN — Medication 150 MG: at 17:46

## 2018-01-01 RX ADMIN — HYDRALAZINE HYDROCHLORIDE 100 MG: 100 TABLET, FILM COATED ORAL at 09:04

## 2018-01-01 RX ADMIN — AMIODARONE HYDROCHLORIDE 200 MG: 200 TABLET ORAL at 14:08

## 2018-01-01 RX ADMIN — METOROPROLOL TARTRATE 5 MG: 5 INJECTION, SOLUTION INTRAVENOUS at 12:43

## 2018-01-01 RX ADMIN — DILTIAZEM HYDROCHLORIDE 120 MG: 60 TABLET, FILM COATED ORAL at 18:18

## 2018-01-01 RX ADMIN — DILTIAZEM HYDROCHLORIDE 180 MG: 180 CAPSULE, COATED, EXTENDED RELEASE ORAL at 17:45

## 2018-01-01 RX ADMIN — INSULIN LISPRO 2 UNITS: 100 INJECTION, SOLUTION INTRAVENOUS; SUBCUTANEOUS at 09:22

## 2018-01-01 RX ADMIN — DOCUSATE SODIUM 100 MG: 100 CAPSULE, LIQUID FILLED ORAL at 08:32

## 2018-01-01 RX ADMIN — METOPROLOL TARTRATE 25 MG: 25 TABLET ORAL at 13:14

## 2018-01-01 RX ADMIN — SODIUM CHLORIDE 250 ML: 9 INJECTION, SOLUTION INTRAVENOUS at 04:15

## 2018-01-01 RX ADMIN — ACETAMINOPHEN 650 MG: 325 TABLET ORAL at 05:29

## 2018-01-01 RX ADMIN — MICONAZOLE NITRATE: 20 CREAM TOPICAL at 09:04

## 2018-01-01 RX ADMIN — IPRATROPIUM BROMIDE AND ALBUTEROL SULFATE 3 ML: .5; 3 SOLUTION RESPIRATORY (INHALATION) at 20:21

## 2018-01-01 RX ADMIN — VENLAFAXINE HYDROCHLORIDE 150 MG: 150 CAPSULE, EXTENDED RELEASE ORAL at 13:22

## 2018-01-01 RX ADMIN — VENLAFAXINE HYDROCHLORIDE 150 MG: 75 CAPSULE, EXTENDED RELEASE ORAL at 09:22

## 2018-01-01 RX ADMIN — METOROPROLOL TARTRATE 2.5 MG: 5 INJECTION, SOLUTION INTRAVENOUS at 05:02

## 2018-01-01 RX ADMIN — INSULIN LISPRO 4 UNITS: 100 INJECTION, SOLUTION INTRAVENOUS; SUBCUTANEOUS at 12:19

## 2018-01-01 RX ADMIN — PRIMIDONE 50 MG: 50 TABLET ORAL at 06:18

## 2018-01-01 RX ADMIN — DOCUSATE SODIUM 100 MG: 100 CAPSULE, LIQUID FILLED ORAL at 08:08

## 2018-01-01 RX ADMIN — GUAIFENESIN 600 MG: 600 TABLET, EXTENDED RELEASE ORAL at 20:15

## 2018-01-01 RX ADMIN — ACETAMINOPHEN 650 MG: 325 TABLET, FILM COATED ORAL at 03:30

## 2018-01-01 RX ADMIN — METHYLPREDNISOLONE SODIUM SUCCINATE 20 MG: 40 INJECTION, POWDER, FOR SOLUTION INTRAMUSCULAR; INTRAVENOUS at 01:08

## 2018-01-01 RX ADMIN — CALCIUM ACETATE 667 MG: 667 CAPSULE ORAL at 10:17

## 2018-01-01 RX ADMIN — Medication 10 ML: at 09:22

## 2018-01-01 RX ADMIN — INSULIN LISPRO 1 UNITS: 100 INJECTION, SOLUTION INTRAVENOUS; SUBCUTANEOUS at 17:55

## 2018-01-01 RX ADMIN — VENLAFAXINE HYDROCHLORIDE 150 MG: 150 CAPSULE, EXTENDED RELEASE ORAL at 10:00

## 2018-01-01 RX ADMIN — NITROGLYCERIN 1 INCH: 20 OINTMENT TOPICAL at 17:52

## 2018-01-01 RX ADMIN — ACETAMINOPHEN 325 MG: 325 TABLET ORAL at 09:36

## 2018-01-01 RX ADMIN — Medication 10 ML: at 21:39

## 2018-01-01 RX ADMIN — DILTIAZEM HYDROCHLORIDE 120 MG: 60 TABLET, FILM COATED ORAL at 02:04

## 2018-01-01 RX ADMIN — DOCUSATE SODIUM 100 MG: 100 CAPSULE, LIQUID FILLED ORAL at 08:52

## 2018-01-01 RX ADMIN — NITROGLYCERIN 1 INCH: 20 OINTMENT TOPICAL at 13:48

## 2018-01-01 RX ADMIN — AMIODARONE HYDROCHLORIDE 1 MG/MIN: 50 INJECTION, SOLUTION INTRAVENOUS at 14:20

## 2018-01-01 RX ADMIN — ISOSORBIDE MONONITRATE 60 MG: 60 TABLET, EXTENDED RELEASE ORAL at 09:45

## 2018-01-01 RX ADMIN — Medication 500 MG: at 10:25

## 2018-01-01 RX ADMIN — SODIUM CHLORIDE 8 MG/HR: 0.9 INJECTION INTRAVENOUS at 09:53

## 2018-01-01 RX ADMIN — TAZOBACTAM SODIUM AND PIPERACILLIN SODIUM 3.38 G: 375; 3 INJECTION, SOLUTION INTRAVENOUS at 05:07

## 2018-01-01 RX ADMIN — Medication 10 ML: at 10:51

## 2018-01-01 RX ADMIN — LOSARTAN POTASSIUM 100 MG: 50 TABLET ORAL at 09:14

## 2018-01-01 RX ADMIN — LACTOBACILLUS TAB 1 TABLET: TAB at 15:23

## 2018-01-01 RX ADMIN — Medication 10 ML: at 08:35

## 2018-01-01 RX ADMIN — CARBIDOPA AND LEVODOPA 1 TABLET: 25; 100 TABLET ORAL at 08:59

## 2018-01-01 RX ADMIN — METOROPROLOL TARTRATE 5 MG: 5 INJECTION, SOLUTION INTRAVENOUS at 05:05

## 2018-01-01 RX ADMIN — Medication 400 MG: at 12:20

## 2018-01-01 RX ADMIN — DOCUSATE SODIUM 100 MG: 50 LIQUID ORAL at 10:57

## 2018-01-01 RX ADMIN — Medication 15 ML: at 09:31

## 2018-01-01 RX ADMIN — Medication 125 MG: at 18:55

## 2018-01-01 RX ADMIN — PROPOFOL 20 MG: 10 INJECTION, EMULSION INTRAVENOUS at 14:25

## 2018-01-01 RX ADMIN — Medication 15 ML: at 21:32

## 2018-01-01 RX ADMIN — ACETAMINOPHEN 650 MG: 325 TABLET ORAL at 15:43

## 2018-01-01 RX ADMIN — HYDRALAZINE HYDROCHLORIDE 100 MG: 100 TABLET, FILM COATED ORAL at 06:18

## 2018-01-01 RX ADMIN — HYDRALAZINE HYDROCHLORIDE 10 MG: 20 INJECTION INTRAMUSCULAR; INTRAVENOUS at 23:48

## 2018-01-01 RX ADMIN — ACETAMINOPHEN 650 MG: 325 TABLET ORAL at 21:00

## 2018-01-01 RX ADMIN — CLONIDINE HYDROCHLORIDE 0.1 MG: 0.1 TABLET ORAL at 21:58

## 2018-01-01 RX ADMIN — Medication 10 ML: at 20:36

## 2018-01-01 RX ADMIN — METOROPROLOL TARTRATE 5 MG: 5 INJECTION, SOLUTION INTRAVENOUS at 06:32

## 2018-01-01 RX ADMIN — Medication 10 ML: at 08:41

## 2018-01-01 RX ADMIN — HYDRALAZINE HYDROCHLORIDE 100 MG: 100 TABLET, FILM COATED ORAL at 21:36

## 2018-01-01 RX ADMIN — HYDRALAZINE HYDROCHLORIDE 100 MG: 100 TABLET, FILM COATED ORAL at 22:42

## 2018-01-01 RX ADMIN — METOROPROLOL TARTRATE 5 MG: 5 INJECTION, SOLUTION INTRAVENOUS at 13:22

## 2018-01-01 RX ADMIN — ACETAMINOPHEN 650 MG: 325 TABLET ORAL at 06:22

## 2018-01-01 RX ADMIN — DOCUSATE SODIUM 100 MG: 100 CAPSULE, LIQUID FILLED ORAL at 10:44

## 2018-01-01 RX ADMIN — PROPOFOL 50 MCG/KG/MIN: 10 INJECTION, EMULSION INTRAVENOUS at 06:18

## 2018-01-01 RX ADMIN — INSULIN GLARGINE 14 UNITS: 100 INJECTION, SOLUTION SUBCUTANEOUS at 21:41

## 2018-01-01 RX ADMIN — INSULIN GLARGINE 14 UNITS: 100 INJECTION, SOLUTION SUBCUTANEOUS at 22:21

## 2018-01-01 RX ADMIN — FENTANYL CITRATE: 50 INJECTION, SOLUTION INTRAMUSCULAR; INTRAVENOUS at 08:15

## 2018-01-01 RX ADMIN — TAZOBACTAM SODIUM AND PIPERACILLIN SODIUM 2.25 G: 250; 2 INJECTION, SOLUTION INTRAVENOUS at 06:09

## 2018-01-01 RX ADMIN — HYDRALAZINE HYDROCHLORIDE 100 MG: 100 TABLET, FILM COATED ORAL at 20:56

## 2018-01-01 RX ADMIN — PIPERACILLIN SODIUM,TAZOBACTAM SODIUM 3.38 G: 3; .375 INJECTION, POWDER, FOR SOLUTION INTRAVENOUS at 08:35

## 2018-01-01 RX ADMIN — DOCUSATE SODIUM 100 MG: 50 LIQUID ORAL at 21:32

## 2018-01-01 RX ADMIN — ALPRAZOLAM 0.5 MG: 0.5 TABLET ORAL at 21:38

## 2018-01-01 RX ADMIN — VENLAFAXINE HYDROCHLORIDE 150 MG: 75 CAPSULE, EXTENDED RELEASE ORAL at 13:18

## 2018-01-01 RX ADMIN — HYDRALAZINE HYDROCHLORIDE 100 MG: 100 TABLET, FILM COATED ORAL at 10:11

## 2018-01-01 RX ADMIN — AMIODARONE HYDROCHLORIDE 200 MG: 200 TABLET ORAL at 09:59

## 2018-01-01 RX ADMIN — HYDRALAZINE HYDROCHLORIDE 100 MG: 100 TABLET, FILM COATED ORAL at 09:17

## 2018-01-01 RX ADMIN — PRIMIDONE 50 MG: 50 TABLET ORAL at 20:15

## 2018-01-01 RX ADMIN — METOPROLOL TARTRATE 25 MG: 25 TABLET ORAL at 08:09

## 2018-01-01 RX ADMIN — SODIUM CHLORIDE 250 ML: 9 INJECTION, SOLUTION INTRAVENOUS at 16:15

## 2018-01-01 RX ADMIN — CARBIDOPA AND LEVODOPA 1 TABLET: 25; 100 TABLET ORAL at 10:45

## 2018-01-01 RX ADMIN — DILTIAZEM HYDROCHLORIDE 120 MG: 60 TABLET, FILM COATED ORAL at 00:21

## 2018-01-01 RX ADMIN — CALCIUM ACETATE 667 MG: 667 CAPSULE ORAL at 08:31

## 2018-01-01 RX ADMIN — FOLIC ACID 1 MG: 1 TABLET ORAL at 09:22

## 2018-01-01 RX ADMIN — LEVOTHYROXINE SODIUM 25 MCG: 25 TABLET ORAL at 06:19

## 2018-01-01 RX ADMIN — Medication 10 ML: at 18:44

## 2018-01-01 RX ADMIN — LOSARTAN POTASSIUM 100 MG: 50 TABLET ORAL at 13:20

## 2018-01-01 RX ADMIN — HYDROXYZINE HYDROCHLORIDE 10 MG: 10 TABLET, FILM COATED ORAL at 13:51

## 2018-01-01 RX ADMIN — HYDRALAZINE HYDROCHLORIDE 100 MG: 100 TABLET, FILM COATED ORAL at 08:32

## 2018-01-01 RX ADMIN — VENLAFAXINE HYDROCHLORIDE 150 MG: 150 CAPSULE, EXTENDED RELEASE ORAL at 09:01

## 2018-01-01 RX ADMIN — ENOXAPARIN SODIUM 40 MG: 100 INJECTION SUBCUTANEOUS at 14:20

## 2018-01-01 RX ADMIN — CALCIUM ACETATE 667 MG: 667 CAPSULE ORAL at 17:55

## 2018-01-01 RX ADMIN — INSULIN GLARGINE 18 UNITS: 100 INJECTION, SOLUTION SUBCUTANEOUS at 21:29

## 2018-01-01 RX ADMIN — Medication 125 MG: at 14:42

## 2018-01-01 RX ADMIN — LEVOTHYROXINE SODIUM 50 MCG: 50 TABLET ORAL at 11:53

## 2018-01-01 RX ADMIN — LEVOTHYROXINE SODIUM 25 MCG: 25 TABLET ORAL at 06:08

## 2018-01-01 RX ADMIN — FLUCONAZOLE 200 MG: 2 INJECTION, SOLUTION INTRAVENOUS at 13:05

## 2018-01-01 RX ADMIN — FLUTICASONE PROPIONATE 2 SPRAY: 50 SPRAY, METERED NASAL at 10:07

## 2018-01-01 RX ADMIN — ALPRAZOLAM 0.5 MG: 0.5 TABLET ORAL at 23:53

## 2018-01-01 RX ADMIN — LOSARTAN POTASSIUM 50 MG: 50 TABLET ORAL at 08:57

## 2018-01-01 RX ADMIN — HEPARIN SODIUM 5000 UNITS: 5000 INJECTION, SOLUTION INTRAVENOUS; SUBCUTANEOUS at 15:28

## 2018-01-01 RX ADMIN — DILTIAZEM HYDROCHLORIDE 120 MG: 60 TABLET, FILM COATED ORAL at 10:57

## 2018-01-01 RX ADMIN — LOSARTAN POTASSIUM 50 MG: 50 TABLET ORAL at 15:17

## 2018-01-01 RX ADMIN — AMIODARONE HYDROCHLORIDE 200 MG: 200 TABLET ORAL at 09:46

## 2018-01-01 RX ADMIN — ISOSORBIDE MONONITRATE 60 MG: 60 TABLET, EXTENDED RELEASE ORAL at 08:33

## 2018-01-01 RX ADMIN — LACTOBACILLUS TAB 1 TABLET: TAB at 08:59

## 2018-01-01 RX ADMIN — MICONAZOLE NITRATE: 20 CREAM TOPICAL at 20:37

## 2018-01-01 RX ADMIN — AMIODARONE HYDROCHLORIDE 0.5 MG/MIN: 50 INJECTION, SOLUTION INTRAVENOUS at 05:29

## 2018-01-01 RX ADMIN — HEPARIN SODIUM 5000 UNITS: 5000 INJECTION, SOLUTION INTRAVENOUS; SUBCUTANEOUS at 20:11

## 2018-01-01 RX ADMIN — LEVOTHYROXINE SODIUM 25 MCG: 25 TABLET ORAL at 05:39

## 2018-01-01 RX ADMIN — ETOMIDATE 3 MG: 2 INJECTION INTRAVENOUS at 09:41

## 2018-01-01 RX ADMIN — BACITRACIN, NEOMYCIN, POLYMYXIN B 3.5 G: 400; 3.5; 5 OINTMENT TOPICAL at 20:58

## 2018-01-01 RX ADMIN — INSULIN LISPRO 2 UNITS: 100 INJECTION, SOLUTION INTRAVENOUS; SUBCUTANEOUS at 13:57

## 2018-01-01 RX ADMIN — ATORVASTATIN CALCIUM 80 MG: 80 TABLET, FILM COATED ORAL at 20:30

## 2018-01-01 RX ADMIN — Medication 150 MG: at 10:17

## 2018-01-01 RX ADMIN — ATORVASTATIN CALCIUM 80 MG: 80 TABLET, FILM COATED ORAL at 20:14

## 2018-01-01 RX ADMIN — PRIMIDONE 50 MG: 50 TABLET ORAL at 07:49

## 2018-01-01 RX ADMIN — AZITHROMYCIN MONOHYDRATE 500 MG: 500 INJECTION, POWDER, LYOPHILIZED, FOR SOLUTION INTRAVENOUS at 15:51

## 2018-01-01 RX ADMIN — VENLAFAXINE HYDROCHLORIDE 150 MG: 150 CAPSULE, EXTENDED RELEASE ORAL at 08:52

## 2018-01-01 RX ADMIN — TAZOBACTAM SODIUM AND PIPERACILLIN SODIUM 3.38 G: 375; 3 INJECTION, SOLUTION INTRAVENOUS at 20:35

## 2018-01-01 RX ADMIN — Medication 400 MG: at 21:37

## 2018-01-01 RX ADMIN — ATORVASTATIN CALCIUM 80 MG: 80 TABLET, FILM COATED ORAL at 21:59

## 2018-01-01 RX ADMIN — VENLAFAXINE HYDROCHLORIDE 150 MG: 75 CAPSULE, EXTENDED RELEASE ORAL at 10:33

## 2018-01-01 RX ADMIN — ASPIRIN 81 MG: 81 TABLET, COATED ORAL at 09:53

## 2018-01-01 RX ADMIN — Medication 1 MG: at 10:24

## 2018-01-01 RX ADMIN — VENLAFAXINE HYDROCHLORIDE 150 MG: 75 CAPSULE, EXTENDED RELEASE ORAL at 09:17

## 2018-01-01 RX ADMIN — Medication 10 ML: at 20:30

## 2018-01-01 RX ADMIN — CALCIUM ACETATE 667 MG: 667 CAPSULE ORAL at 09:56

## 2018-01-01 RX ADMIN — DEXTROSE MONOHYDRATE 25 G: 25 INJECTION, SOLUTION INTRAVENOUS at 08:05

## 2018-01-01 RX ADMIN — IPRATROPIUM BROMIDE AND ALBUTEROL SULFATE 3 ML: .5; 3 SOLUTION RESPIRATORY (INHALATION) at 07:07

## 2018-01-01 RX ADMIN — PRIMIDONE 50 MG: 50 TABLET ORAL at 10:00

## 2018-01-01 RX ADMIN — DIPHENHYDRAMINE HYDROCHLORIDE, ZINC ACETATE: 2; .1 CREAM TOPICAL at 23:02

## 2018-01-01 RX ADMIN — ASPIRIN 81 MG: 81 TABLET, COATED ORAL at 09:00

## 2018-01-01 RX ADMIN — INSULIN LISPRO 1 UNITS: 100 INJECTION, SOLUTION INTRAVENOUS; SUBCUTANEOUS at 08:13

## 2018-01-01 RX ADMIN — Medication 400 MG: at 09:04

## 2018-01-01 RX ADMIN — PROPOFOL 40 MCG/KG/MIN: 10 INJECTION, EMULSION INTRAVENOUS at 00:07

## 2018-01-01 RX ADMIN — ALPRAZOLAM 0.5 MG: 0.5 TABLET ORAL at 22:24

## 2018-01-01 RX ADMIN — Medication 400 MG: at 07:49

## 2018-01-01 RX ADMIN — TAZOBACTAM SODIUM AND PIPERACILLIN SODIUM 3.38 G: 375; 3 INJECTION, SOLUTION INTRAVENOUS at 21:54

## 2018-01-01 RX ADMIN — SODIUM CHLORIDE: 9 INJECTION, SOLUTION INTRAVENOUS at 08:17

## 2018-01-01 RX ADMIN — PANTOPRAZOLE SODIUM 40 MG: 40 GRANULE, DELAYED RELEASE ORAL at 09:02

## 2018-01-01 RX ADMIN — LOSARTAN POTASSIUM 50 MG: 50 TABLET, FILM COATED ORAL at 09:01

## 2018-01-01 RX ADMIN — HEPARIN SODIUM 2000 UNITS: 1000 INJECTION, SOLUTION INTRAVENOUS; SUBCUTANEOUS at 10:34

## 2018-01-01 RX ADMIN — Medication 10 ML: at 21:12

## 2018-01-01 RX ADMIN — IPRATROPIUM BROMIDE AND ALBUTEROL SULFATE 3 ML: .5; 3 SOLUTION RESPIRATORY (INHALATION) at 20:31

## 2018-01-01 RX ADMIN — DOCUSATE SODIUM 100 MG: 100 CAPSULE, LIQUID FILLED ORAL at 21:10

## 2018-01-01 RX ADMIN — CEFTRIAXONE SODIUM 1 G: 10 INJECTION, POWDER, FOR SOLUTION INTRAVENOUS at 01:05

## 2018-01-01 RX ADMIN — ISOSORBIDE MONONITRATE 60 MG: 60 TABLET, EXTENDED RELEASE ORAL at 14:08

## 2018-01-01 RX ADMIN — HYDROCORTISONE SODIUM SUCCINATE 50 MG: 100 INJECTION, POWDER, FOR SOLUTION INTRAMUSCULAR; INTRAVENOUS at 18:40

## 2018-01-01 RX ADMIN — METOROPROLOL TARTRATE 5 MG: 5 INJECTION, SOLUTION INTRAVENOUS at 13:04

## 2018-01-01 RX ADMIN — ISOSORBIDE MONONITRATE 60 MG: 60 TABLET, EXTENDED RELEASE ORAL at 10:00

## 2018-01-01 RX ADMIN — PRIMIDONE 50 MG: 50 TABLET ORAL at 17:08

## 2018-01-01 RX ADMIN — ATORVASTATIN CALCIUM 20 MG: 20 TABLET, FILM COATED ORAL at 21:10

## 2018-01-01 RX ADMIN — AMIODARONE HYDROCHLORIDE 200 MG: 200 TABLET ORAL at 18:23

## 2018-01-01 RX ADMIN — HYDRALAZINE HYDROCHLORIDE 20 MG: 20 INJECTION INTRAMUSCULAR; INTRAVENOUS at 04:41

## 2018-01-01 RX ADMIN — Medication 400 MG: at 17:07

## 2018-01-01 RX ADMIN — LEVOTHYROXINE SODIUM 25 MCG: 25 TABLET ORAL at 08:50

## 2018-01-01 RX ADMIN — NITROGLYCERIN 1 INCH: 20 OINTMENT TOPICAL at 11:59

## 2018-01-01 RX ADMIN — Medication 10 MG: at 23:41

## 2018-01-01 RX ADMIN — ATORVASTATIN CALCIUM 80 MG: 80 TABLET, FILM COATED ORAL at 20:18

## 2018-01-01 RX ADMIN — PANTOPRAZOLE SODIUM 20 MG: 20 TABLET, DELAYED RELEASE ORAL at 06:09

## 2018-01-01 RX ADMIN — FAMOTIDINE 20 MG: 20 TABLET ORAL at 22:42

## 2018-01-01 RX ADMIN — SODIUM CHLORIDE 1000 ML: 9 INJECTION, SOLUTION INTRAVENOUS at 12:10

## 2018-01-01 RX ADMIN — ISOSORBIDE MONONITRATE 60 MG: 60 TABLET, EXTENDED RELEASE ORAL at 14:24

## 2018-01-01 RX ADMIN — Medication 10 ML: at 23:19

## 2018-01-01 RX ADMIN — MICONAZOLE NITRATE: 20 CREAM TOPICAL at 08:07

## 2018-01-01 RX ADMIN — DIGOXIN 250 MCG: 250 INJECTION, SOLUTION INTRAMUSCULAR; INTRAVENOUS; PARENTERAL at 18:35

## 2018-01-01 RX ADMIN — LOSARTAN POTASSIUM 50 MG: 50 TABLET ORAL at 09:54

## 2018-01-01 RX ADMIN — Medication 15 ML: at 10:38

## 2018-01-01 RX ADMIN — APIXABAN 2.5 MG: 2.5 TABLET, FILM COATED ORAL at 22:02

## 2018-01-01 RX ADMIN — FUROSEMIDE 40 MG: 40 TABLET ORAL at 14:08

## 2018-01-01 RX ADMIN — METOROPROLOL TARTRATE 5 MG: 5 INJECTION, SOLUTION INTRAVENOUS at 22:49

## 2018-01-01 RX ADMIN — AMIODARONE HYDROCHLORIDE 400 MG: 200 TABLET ORAL at 20:18

## 2018-01-01 RX ADMIN — IPRATROPIUM BROMIDE AND ALBUTEROL SULFATE 3 ML: 2.5; .5 SOLUTION RESPIRATORY (INHALATION) at 18:56

## 2018-01-01 RX ADMIN — ATORVASTATIN CALCIUM 80 MG: 40 TABLET, FILM COATED ORAL at 21:00

## 2018-01-01 RX ADMIN — Medication 1 MG: at 14:43

## 2018-01-01 RX ADMIN — MAGNESIUM OXIDE TAB 400 MG (241.3 MG ELEMENTAL MG) 400 MG: 400 (241.3 MG) TAB at 10:12

## 2018-01-01 RX ADMIN — AMIODARONE HYDROCHLORIDE 200 MG: 200 TABLET ORAL at 09:04

## 2018-01-01 RX ADMIN — CHLORTHALIDONE 25 MG: 25 TABLET ORAL at 09:17

## 2018-01-01 RX ADMIN — AMIODARONE HYDROCHLORIDE 0.5 MG/MIN: 50 INJECTION, SOLUTION INTRAVENOUS at 08:36

## 2018-01-01 RX ADMIN — AMIODARONE HYDROCHLORIDE 200 MG: 200 TABLET ORAL at 08:31

## 2018-01-01 RX ADMIN — DOCUSATE SODIUM 100 MG: 50 LIQUID ORAL at 19:59

## 2018-01-01 RX ADMIN — DOCUSATE SODIUM 100 MG: 100 CAPSULE, LIQUID FILLED ORAL at 21:38

## 2018-01-01 RX ADMIN — INSULIN LISPRO 1 UNITS: 100 INJECTION, SOLUTION INTRAVENOUS; SUBCUTANEOUS at 09:03

## 2018-01-01 RX ADMIN — FUROSEMIDE 40 MG: 10 INJECTION, SOLUTION INTRAVENOUS at 09:38

## 2018-01-01 RX ADMIN — KIT FOR THE PREPARATION OF TECHNETIUM TC 99M PENTETATE 1 MILLICURIE: 20 INJECTION, POWDER, LYOPHILIZED, FOR SOLUTION INTRAVENOUS; RESPIRATORY (INHALATION) at 17:05

## 2018-01-01 RX ADMIN — CLONIDINE HYDROCHLORIDE 0.3 MG: 0.1 TABLET ORAL at 15:35

## 2018-01-01 RX ADMIN — VENLAFAXINE HYDROCHLORIDE 150 MG: 150 CAPSULE, EXTENDED RELEASE ORAL at 07:49

## 2018-01-01 RX ADMIN — FENTANYL CITRATE 50 MCG/HR: 50 INJECTION, SOLUTION INTRAMUSCULAR; INTRAVENOUS at 06:02

## 2018-01-01 RX ADMIN — Medication 10 ML: at 23:01

## 2018-01-01 RX ADMIN — METOPROLOL TARTRATE 25 MG: 25 TABLET ORAL at 08:38

## 2018-01-01 RX ADMIN — HYDRALAZINE HYDROCHLORIDE 100 MG: 100 TABLET, FILM COATED ORAL at 13:06

## 2018-01-01 RX ADMIN — FOLIC ACID 1 MG: 1 TABLET ORAL at 13:26

## 2018-01-01 RX ADMIN — HYDRALAZINE HYDROCHLORIDE 20 MG: 20 INJECTION INTRAMUSCULAR; INTRAVENOUS at 23:02

## 2018-01-01 RX ADMIN — Medication: at 03:15

## 2018-01-01 RX ADMIN — METOROPROLOL TARTRATE 5 MG: 5 INJECTION, SOLUTION INTRAVENOUS at 01:25

## 2018-01-01 RX ADMIN — DILTIAZEM HYDROCHLORIDE 240 MG: 240 CAPSULE, COATED, EXTENDED RELEASE ORAL at 10:44

## 2018-01-01 RX ADMIN — HEPARIN SODIUM 4000 UNITS: 1000 INJECTION, SOLUTION INTRAVENOUS; SUBCUTANEOUS at 15:30

## 2018-01-01 RX ADMIN — HEPARIN SODIUM 5000 UNITS: 5000 INJECTION, SOLUTION INTRAVENOUS; SUBCUTANEOUS at 14:44

## 2018-01-01 RX ADMIN — LEVOTHYROXINE SODIUM 50 MCG: 50 TABLET ORAL at 07:01

## 2018-01-01 RX ADMIN — IPRATROPIUM BROMIDE AND ALBUTEROL SULFATE 1 AMPULE: .5; 3 SOLUTION RESPIRATORY (INHALATION) at 15:52

## 2018-01-01 RX ADMIN — FOLIC ACID 1 MG: 1 TABLET ORAL at 09:59

## 2018-01-01 RX ADMIN — METOPROLOL TARTRATE 25 MG: 25 TABLET ORAL at 22:22

## 2018-01-01 RX ADMIN — SODIUM CHLORIDE 250 ML: 9 INJECTION, SOLUTION INTRAVENOUS at 01:00

## 2018-01-01 RX ADMIN — PRIMIDONE 50 MG: 50 TABLET ORAL at 10:30

## 2018-01-01 RX ADMIN — FENTANYL CITRATE 50 MCG/HR: 50 INJECTION, SOLUTION INTRAMUSCULAR; INTRAVENOUS at 05:06

## 2018-01-01 RX ADMIN — Medication 10 MG: at 11:01

## 2018-01-01 RX ADMIN — LACTOBACILLUS TAB 1 TABLET: TAB at 21:40

## 2018-01-01 RX ADMIN — HYDRALAZINE HYDROCHLORIDE 100 MG: 100 TABLET, FILM COATED ORAL at 18:24

## 2018-01-01 RX ADMIN — PANTOPRAZOLE SODIUM 40 MG: 40 GRANULE, DELAYED RELEASE ORAL at 05:43

## 2018-01-01 RX ADMIN — Medication 10 ML: at 21:56

## 2018-01-01 RX ADMIN — CALCIUM ACETATE 667 MG: 667 CAPSULE ORAL at 11:44

## 2018-01-01 RX ADMIN — IPRATROPIUM BROMIDE AND ALBUTEROL SULFATE 3 ML: .5; 3 SOLUTION RESPIRATORY (INHALATION) at 21:33

## 2018-01-01 RX ADMIN — CEFUROXIME AXETIL 250 MG: 500 TABLET ORAL at 09:26

## 2018-01-01 RX ADMIN — Medication 400 MG: at 08:08

## 2018-01-01 RX ADMIN — Medication 10 MG: at 21:38

## 2018-01-01 RX ADMIN — PANTOPRAZOLE SODIUM 40 MG: 40 GRANULE, DELAYED RELEASE ORAL at 06:31

## 2018-01-01 RX ADMIN — Medication 10 ML: at 00:00

## 2018-01-01 RX ADMIN — APIXABAN 2.5 MG: 2.5 TABLET, FILM COATED ORAL at 20:56

## 2018-01-01 RX ADMIN — ACETAMINOPHEN 650 MG: 325 TABLET ORAL at 06:18

## 2018-01-01 RX ADMIN — VENLAFAXINE 75 MG: 75 TABLET ORAL at 08:58

## 2018-01-01 RX ADMIN — LEVETIRACETAM 1000 MG: 500 TABLET, FILM COATED ORAL at 20:18

## 2018-01-01 RX ADMIN — SODIUM CHLORIDE 1000 ML: 9 INJECTION, SOLUTION INTRAVENOUS at 09:00

## 2018-01-01 RX ADMIN — INSULIN LISPRO 6 UNITS: 100 INJECTION, SOLUTION INTRAVENOUS; SUBCUTANEOUS at 12:07

## 2018-01-01 RX ADMIN — PANTOPRAZOLE SODIUM 40 MG: 40 INJECTION, POWDER, FOR SOLUTION INTRAVENOUS at 08:36

## 2018-01-01 RX ADMIN — Medication 500 MG: at 08:50

## 2018-01-01 RX ADMIN — PROPOFOL 20 MG: 10 INJECTION, EMULSION INTRAVENOUS at 14:39

## 2018-01-01 RX ADMIN — HEPARIN SODIUM 7000 UNITS: 1000 INJECTION, SOLUTION INTRAVENOUS; SUBCUTANEOUS at 11:52

## 2018-01-01 RX ADMIN — METOROPROLOL TARTRATE 5 MG: 5 INJECTION, SOLUTION INTRAVENOUS at 08:35

## 2018-01-01 RX ADMIN — SODIUM BICARBONATE 325 MG: 650 TABLET ORAL at 22:24

## 2018-01-01 RX ADMIN — PRIMIDONE 50 MG: 50 TABLET ORAL at 22:00

## 2018-01-01 RX ADMIN — CALCIUM 500 MG: 500 TABLET ORAL at 22:00

## 2018-01-01 RX ADMIN — ISOSORBIDE MONONITRATE 60 MG: 60 TABLET, EXTENDED RELEASE ORAL at 10:45

## 2018-01-01 RX ADMIN — IPRATROPIUM BROMIDE AND ALBUTEROL SULFATE 3 ML: .5; 3 SOLUTION RESPIRATORY (INHALATION) at 08:59

## 2018-01-01 RX ADMIN — FENTANYL CITRATE 50 MCG/HR: 50 INJECTION, SOLUTION INTRAMUSCULAR; INTRAVENOUS at 17:44

## 2018-01-01 RX ADMIN — METOPROLOL TARTRATE 10 MG: 5 INJECTION, SOLUTION INTRAVENOUS at 21:41

## 2018-01-01 RX ADMIN — DEXTROSE MONOHYDRATE 12.5 G: 25 INJECTION, SOLUTION INTRAVENOUS at 07:51

## 2018-01-01 RX ADMIN — Medication 400 MG: at 08:45

## 2018-01-01 RX ADMIN — TAZOBACTAM SODIUM AND PIPERACILLIN SODIUM 3.38 G: 375; 3 INJECTION, SOLUTION INTRAVENOUS at 04:05

## 2018-01-01 RX ADMIN — HYDRALAZINE HYDROCHLORIDE 5 MG: 20 INJECTION INTRAMUSCULAR; INTRAVENOUS at 15:50

## 2018-01-01 RX ADMIN — CEFTRIAXONE SODIUM 1 G: 1 INJECTION, POWDER, FOR SOLUTION INTRAMUSCULAR; INTRAVENOUS at 13:44

## 2018-01-01 RX ADMIN — NITROGLYCERIN 1 INCH: 20 OINTMENT TOPICAL at 00:37

## 2018-01-01 RX ADMIN — SODIUM CHLORIDE 8 MG/HR: 0.9 INJECTION INTRAVENOUS at 03:27

## 2018-01-01 RX ADMIN — INSULIN LISPRO 4 UNITS: 100 INJECTION, SOLUTION INTRAVENOUS; SUBCUTANEOUS at 09:48

## 2018-01-01 RX ADMIN — Medication 10 ML: at 14:27

## 2018-01-01 RX ADMIN — CHOLESTYRAMINE 2 G: 4 POWDER, FOR SUSPENSION ORAL at 05:38

## 2018-01-01 RX ADMIN — PANTOPRAZOLE SODIUM 40 MG: 40 INJECTION, POWDER, FOR SOLUTION INTRAVENOUS at 10:58

## 2018-01-01 RX ADMIN — METOPROLOL TARTRATE 2.5 MG: 5 INJECTION, SOLUTION INTRAVENOUS at 04:40

## 2018-01-01 RX ADMIN — APIXABAN 2.5 MG: 2.5 TABLET, FILM COATED ORAL at 09:16

## 2018-01-01 RX ADMIN — PRIMIDONE 50 MG: 50 TABLET ORAL at 14:21

## 2018-01-01 RX ADMIN — COLLAGENASE SANTYL: 250 OINTMENT TOPICAL at 08:37

## 2018-01-01 RX ADMIN — PRIMIDONE 50 MG: 50 TABLET ORAL at 20:29

## 2018-01-01 RX ADMIN — ISOSORBIDE MONONITRATE 60 MG: 60 TABLET, EXTENDED RELEASE ORAL at 09:43

## 2018-01-01 RX ADMIN — BARIUM SULFATE 50 G: 0.81 POWDER, FOR SUSPENSION ORAL at 13:45

## 2018-01-01 RX ADMIN — Medication 10 ML: at 20:38

## 2018-01-01 RX ADMIN — CALCIUM 500 MG: 500 TABLET ORAL at 10:16

## 2018-01-01 RX ADMIN — Medication 125 MG: at 13:18

## 2018-01-01 RX ADMIN — FENTANYL CITRATE 25 MCG/HR: 50 INJECTION, SOLUTION INTRAMUSCULAR; INTRAVENOUS at 10:22

## 2018-01-01 RX ADMIN — DOCUSATE SODIUM 100 MG: 100 CAPSULE, LIQUID FILLED ORAL at 20:58

## 2018-01-01 RX ADMIN — METOPROLOL TARTRATE 10 MG: 5 INJECTION, SOLUTION INTRAVENOUS at 18:45

## 2018-01-01 RX ADMIN — CARBIDOPA AND LEVODOPA 1 TABLET: 25; 100 TABLET ORAL at 14:26

## 2018-01-01 RX ADMIN — MAGNESIUM OXIDE TAB 400 MG (241.3 MG ELEMENTAL MG) 400 MG: 400 (241.3 MG) TAB at 08:50

## 2018-01-01 RX ADMIN — ALPRAZOLAM 0.5 MG: 0.5 TABLET ORAL at 08:04

## 2018-01-01 RX ADMIN — Medication 400 MG: at 18:23

## 2018-01-01 RX ADMIN — CALCIUM ACETATE 667 MG: 667 CAPSULE ORAL at 09:26

## 2018-01-01 RX ADMIN — CARBIDOPA AND LEVODOPA 1 TABLET: 25; 100 TABLET ORAL at 09:58

## 2018-01-01 RX ADMIN — Medication 1 MG: at 13:18

## 2018-01-01 RX ADMIN — APIXABAN 2.5 MG: 2.5 TABLET, FILM COATED ORAL at 09:25

## 2018-01-01 RX ADMIN — DILTIAZEM HYDROCHLORIDE 120 MG: 60 TABLET, FILM COATED ORAL at 11:10

## 2018-01-01 RX ADMIN — ASPIRIN 81 MG: 81 TABLET, COATED ORAL at 15:33

## 2018-01-01 RX ADMIN — PANTOPRAZOLE SODIUM 40 MG: 40 INJECTION, POWDER, FOR SOLUTION INTRAVENOUS at 08:45

## 2018-01-01 RX ADMIN — HYDROMORPHONE HYDROCHLORIDE 0.5 MG: 1 INJECTION, SOLUTION INTRAMUSCULAR; INTRAVENOUS; SUBCUTANEOUS at 11:57

## 2018-01-01 RX ADMIN — DILTIAZEM HYDROCHLORIDE 240 MG: 240 CAPSULE, COATED, EXTENDED RELEASE ORAL at 10:43

## 2018-01-01 RX ADMIN — DOCUSATE SODIUM 100 MG: 50 LIQUID ORAL at 09:04

## 2018-01-01 RX ADMIN — METOROPROLOL TARTRATE 5 MG: 5 INJECTION, SOLUTION INTRAVENOUS at 01:33

## 2018-01-01 RX ADMIN — CALCIUM ACETATE 667 MG: 667 CAPSULE ORAL at 18:41

## 2018-01-01 RX ADMIN — CALCIUM ACETATE 667 MG: 667 CAPSULE ORAL at 17:07

## 2018-01-01 RX ADMIN — Medication 400 MG: at 08:31

## 2018-01-01 RX ADMIN — Medication 15 ML: at 21:07

## 2018-01-01 RX ADMIN — PANTOPRAZOLE SODIUM 40 MG: 40 GRANULE, DELAYED RELEASE ORAL at 06:41

## 2018-01-01 RX ADMIN — IPRATROPIUM BROMIDE AND ALBUTEROL SULFATE 3 ML: .5; 3 SOLUTION RESPIRATORY (INHALATION) at 21:08

## 2018-01-01 RX ADMIN — Medication 10 MG: at 18:03

## 2018-01-01 RX ADMIN — SODIUM CHLORIDE 8 MG/HR: 0.9 INJECTION INTRAVENOUS at 17:43

## 2018-01-01 RX ADMIN — VANCOMYCIN HYDROCHLORIDE 500 MG: 500 INJECTION, SOLUTION INTRAVENOUS at 18:52

## 2018-01-01 RX ADMIN — LOSARTAN POTASSIUM 100 MG: 50 TABLET ORAL at 10:35

## 2018-01-01 RX ADMIN — DILTIAZEM HYDROCHLORIDE 120 MG: 60 TABLET, FILM COATED ORAL at 00:43

## 2018-01-01 RX ADMIN — LEVETIRACETAM 1000 MG: 100 INJECTION, SOLUTION INTRAVENOUS at 22:50

## 2018-01-01 RX ADMIN — Medication 125 MG: at 22:58

## 2018-01-01 RX ADMIN — FAMOTIDINE 20 MG: 20 TABLET ORAL at 09:35

## 2018-01-01 RX ADMIN — FUROSEMIDE 40 MG: 40 TABLET ORAL at 09:43

## 2018-01-01 RX ADMIN — INSULIN LISPRO 2 UNITS: 100 INJECTION, SOLUTION INTRAVENOUS; SUBCUTANEOUS at 12:20

## 2018-01-01 RX ADMIN — HYDRALAZINE HYDROCHLORIDE 100 MG: 100 TABLET, FILM COATED ORAL at 10:17

## 2018-01-01 RX ADMIN — CALCIUM ACETATE 667 MG: 667 CAPSULE ORAL at 12:31

## 2018-01-01 RX ADMIN — HYDRALAZINE HYDROCHLORIDE 100 MG: 100 TABLET, FILM COATED ORAL at 06:41

## 2018-01-01 RX ADMIN — PANTOPRAZOLE SODIUM 40 MG: 40 INJECTION, POWDER, FOR SOLUTION INTRAVENOUS at 08:56

## 2018-01-01 RX ADMIN — DILTIAZEM HYDROCHLORIDE 240 MG: 240 CAPSULE, COATED, EXTENDED RELEASE ORAL at 11:17

## 2018-01-01 RX ADMIN — FLUTICASONE PROPIONATE 2 SPRAY: 50 SPRAY, METERED NASAL at 08:19

## 2018-01-01 RX ADMIN — IPRATROPIUM BROMIDE AND ALBUTEROL SULFATE 1 AMPULE: .5; 3 SOLUTION RESPIRATORY (INHALATION) at 02:15

## 2018-01-01 RX ADMIN — CARBIDOPA AND LEVODOPA 1 TABLET: 25; 100 TABLET ORAL at 10:12

## 2018-01-01 RX ADMIN — Medication 10 ML: at 10:03

## 2018-01-01 RX ADMIN — FENTANYL CITRATE 100 MCG/HR: 50 INJECTION, SOLUTION INTRAMUSCULAR; INTRAVENOUS at 21:33

## 2018-01-01 RX ADMIN — HYDRALAZINE HYDROCHLORIDE 100 MG: 100 TABLET, FILM COATED ORAL at 21:39

## 2018-01-01 RX ADMIN — BACITRACIN, NEOMYCIN, POLYMYXIN B 3.5 G: 400; 3.5; 5 OINTMENT TOPICAL at 22:00

## 2018-01-01 RX ADMIN — METOROPROLOL TARTRATE 5 MG: 5 INJECTION, SOLUTION INTRAVENOUS at 20:30

## 2018-01-01 RX ADMIN — METOPROLOL TARTRATE 25 MG: 25 TABLET ORAL at 09:30

## 2018-01-01 RX ADMIN — CALCIUM ACETATE 667 MG: 667 CAPSULE ORAL at 13:35

## 2018-01-01 RX ADMIN — HYDRALAZINE HYDROCHLORIDE 100 MG: 100 TABLET, FILM COATED ORAL at 20:00

## 2018-01-01 RX ADMIN — Medication 10 ML: at 18:02

## 2018-01-01 RX ADMIN — HYDRALAZINE HYDROCHLORIDE 100 MG: 100 TABLET, FILM COATED ORAL at 10:57

## 2018-01-01 RX ADMIN — DILTIAZEM HYDROCHLORIDE 120 MG: 60 TABLET, FILM COATED ORAL at 10:19

## 2018-01-01 RX ADMIN — AMIODARONE HYDROCHLORIDE 200 MG: 200 TABLET ORAL at 11:18

## 2018-01-01 RX ADMIN — PIPERACILLIN SODIUM,TAZOBACTAM SODIUM 3.38 G: 3; .375 INJECTION, POWDER, FOR SOLUTION INTRAVENOUS at 12:10

## 2018-01-01 RX ADMIN — CHLORTHALIDONE 25 MG: 25 TABLET ORAL at 09:26

## 2018-01-01 RX ADMIN — BACITRACIN, NEOMYCIN, POLYMYXIN B 3.5 G: 400; 3.5; 5 OINTMENT TOPICAL at 20:00

## 2018-01-01 RX ADMIN — PROPOFOL 35 MCG/KG/MIN: 10 INJECTION, EMULSION INTRAVENOUS at 13:50

## 2018-01-01 RX ADMIN — Medication 10 ML: at 08:38

## 2018-01-01 RX ADMIN — LOSARTAN POTASSIUM 50 MG: 50 TABLET ORAL at 08:58

## 2018-01-01 RX ADMIN — ISOSORBIDE MONONITRATE 60 MG: 60 TABLET, EXTENDED RELEASE ORAL at 10:12

## 2018-01-01 RX ADMIN — ALPRAZOLAM 0.5 MG: 0.5 TABLET ORAL at 01:05

## 2018-01-01 RX ADMIN — CARBIDOPA AND LEVODOPA 1 TABLET: 25; 100 TABLET ORAL at 09:33

## 2018-01-01 RX ADMIN — AMIODARONE HYDROCHLORIDE 200 MG: 200 TABLET ORAL at 10:36

## 2018-01-01 RX ADMIN — CARBIDOPA AND LEVODOPA 1 TABLET: 25; 100 TABLET ORAL at 15:17

## 2018-01-01 RX ADMIN — ATORVASTATIN CALCIUM 80 MG: 80 TABLET, FILM COATED ORAL at 22:00

## 2018-01-01 RX ADMIN — CEFTRIAXONE SODIUM 1 G: 10 INJECTION, POWDER, FOR SOLUTION INTRAVENOUS at 02:15

## 2018-01-01 RX ADMIN — IPRATROPIUM BROMIDE AND ALBUTEROL SULFATE 3 ML: .5; 3 SOLUTION RESPIRATORY (INHALATION) at 08:17

## 2018-01-01 RX ADMIN — CLONIDINE HYDROCHLORIDE 0.1 MG: 0.1 TABLET ORAL at 13:18

## 2018-01-01 RX ADMIN — HYDRALAZINE HYDROCHLORIDE 100 MG: 100 TABLET, FILM COATED ORAL at 00:44

## 2018-01-01 RX ADMIN — DILTIAZEM HYDROCHLORIDE 180 MG: 180 CAPSULE, COATED, EXTENDED RELEASE ORAL at 08:32

## 2018-01-01 RX ADMIN — IPRATROPIUM BROMIDE AND ALBUTEROL SULFATE 3 ML: .5; 3 SOLUTION RESPIRATORY (INHALATION) at 17:19

## 2018-01-01 RX ADMIN — CALCIUM ACETATE 667 MG: 667 CAPSULE ORAL at 15:32

## 2018-01-01 RX ADMIN — FOLIC ACID 1 MG: 1 TABLET ORAL at 09:35

## 2018-01-01 RX ADMIN — Medication 400 MG: at 09:37

## 2018-01-01 RX ADMIN — NOREPINEPHRINE BITARTRATE 10.03 MCG/MIN: 1 INJECTION INTRAVENOUS at 18:52

## 2018-01-01 RX ADMIN — ALBUMIN (HUMAN) 50 G: 0.25 INJECTION, SOLUTION INTRAVENOUS at 11:29

## 2018-01-01 RX ADMIN — Medication 10 ML: at 22:25

## 2018-01-01 RX ADMIN — IPRATROPIUM BROMIDE AND ALBUTEROL SULFATE 3 ML: .5; 3 SOLUTION RESPIRATORY (INHALATION) at 19:27

## 2018-01-01 RX ADMIN — LACTOBACILLUS TAB 1 TABLET: TAB at 15:39

## 2018-01-01 RX ADMIN — Medication 10 ML: at 09:09

## 2018-01-01 RX ADMIN — SODIUM BICARBONATE 325 MG: 650 TABLET ORAL at 14:16

## 2018-01-01 RX ADMIN — HYDRALAZINE HYDROCHLORIDE 100 MG: 100 TABLET, FILM COATED ORAL at 22:00

## 2018-01-01 RX ADMIN — IPRATROPIUM BROMIDE AND ALBUTEROL SULFATE 3 ML: .5; 3 SOLUTION RESPIRATORY (INHALATION) at 08:03

## 2018-01-01 RX ADMIN — APIXABAN 2.5 MG: 2.5 TABLET, FILM COATED ORAL at 22:59

## 2018-01-01 RX ADMIN — METOROPROLOL TARTRATE 2.5 MG: 5 INJECTION, SOLUTION INTRAVENOUS at 00:07

## 2018-01-01 RX ADMIN — VENLAFAXINE HYDROCHLORIDE 150 MG: 75 CAPSULE, EXTENDED RELEASE ORAL at 09:35

## 2018-01-01 RX ADMIN — APIXABAN 2.5 MG: 2.5 TABLET, FILM COATED ORAL at 22:24

## 2018-01-01 RX ADMIN — Medication 125 MG: at 05:56

## 2018-01-01 RX ADMIN — CLONIDINE HYDROCHLORIDE 0.1 MG: 0.1 TABLET ORAL at 10:12

## 2018-01-01 RX ADMIN — GABAPENTIN 100 MG: 100 CAPSULE ORAL at 20:15

## 2018-01-01 RX ADMIN — FLUTICASONE PROPIONATE 2 SPRAY: 50 SPRAY, METERED NASAL at 09:55

## 2018-01-01 RX ADMIN — PRIMIDONE 50 MG: 50 TABLET ORAL at 08:08

## 2018-01-01 RX ADMIN — Medication 15 ML: at 08:58

## 2018-01-01 RX ADMIN — CARBIDOPA AND LEVODOPA 1 TABLET: 25; 100 TABLET ORAL at 09:44

## 2018-01-01 RX ADMIN — METHYLPREDNISOLONE SODIUM SUCCINATE 20 MG: 40 INJECTION, POWDER, FOR SOLUTION INTRAMUSCULAR; INTRAVENOUS at 01:05

## 2018-01-01 RX ADMIN — CHLORTHALIDONE 25 MG: 25 TABLET ORAL at 09:44

## 2018-01-01 RX ADMIN — ACETAMINOPHEN 650 MG: 325 TABLET ORAL at 06:25

## 2018-01-01 RX ADMIN — LOSARTAN POTASSIUM 50 MG: 50 TABLET ORAL at 21:27

## 2018-01-01 RX ADMIN — LACTOBACILLUS TAB 1 TABLET: TAB at 21:10

## 2018-01-01 RX ADMIN — LIDOCAINE HYDROCHLORIDE 5 ML: 20 INJECTION, SOLUTION INFILTRATION; PERINEURAL at 11:00

## 2018-01-01 RX ADMIN — HYDRALAZINE HYDROCHLORIDE 100 MG: 100 TABLET, FILM COATED ORAL at 22:21

## 2018-01-01 RX ADMIN — HYDRALAZINE HYDROCHLORIDE 5 MG: 20 INJECTION INTRAMUSCULAR; INTRAVENOUS at 06:08

## 2018-01-01 RX ADMIN — VENLAFAXINE HYDROCHLORIDE 150 MG: 150 CAPSULE, EXTENDED RELEASE ORAL at 15:53

## 2018-01-01 RX ADMIN — Medication 1000 ML: at 02:55

## 2018-01-01 RX ADMIN — GABAPENTIN 100 MG: 100 CAPSULE ORAL at 20:18

## 2018-01-01 RX ADMIN — PREDNISONE 10 MG: 10 TABLET ORAL at 14:44

## 2018-01-01 RX ADMIN — HYDRALAZINE HYDROCHLORIDE 50 MG: 50 TABLET, FILM COATED ORAL at 21:00

## 2018-01-01 RX ADMIN — INSULIN GLARGINE 14 UNITS: 100 INJECTION, SOLUTION SUBCUTANEOUS at 21:24

## 2018-01-01 RX ADMIN — PRIMIDONE 50 MG: 50 TABLET ORAL at 22:20

## 2018-01-01 RX ADMIN — CALCIUM 500 MG: 500 TABLET ORAL at 07:49

## 2018-01-01 RX ADMIN — LOSARTAN POTASSIUM 100 MG: 50 TABLET ORAL at 18:23

## 2018-01-01 RX ADMIN — AMIODARONE HYDROCHLORIDE 400 MG: 200 TABLET ORAL at 14:42

## 2018-01-01 RX ADMIN — AMIODARONE HYDROCHLORIDE 200 MG: 200 TABLET ORAL at 10:35

## 2018-01-01 RX ADMIN — DILTIAZEM HYDROCHLORIDE 20 MG: 5 INJECTION INTRAVENOUS at 02:51

## 2018-01-01 RX ADMIN — DILTIAZEM HYDROCHLORIDE 240 MG: 240 CAPSULE, COATED, EXTENDED RELEASE ORAL at 09:59

## 2018-01-01 RX ADMIN — NITROGLYCERIN 1 INCH: 20 OINTMENT TOPICAL at 13:20

## 2018-01-01 RX ADMIN — DILTIAZEM HYDROCHLORIDE 120 MG: 60 TABLET, FILM COATED ORAL at 18:35

## 2018-01-01 RX ADMIN — DOCUSATE SODIUM 100 MG: 50 LIQUID ORAL at 21:48

## 2018-01-01 RX ADMIN — APIXABAN 2.5 MG: 2.5 TABLET, FILM COATED ORAL at 21:38

## 2018-01-01 RX ADMIN — LEVOTHYROXINE SODIUM 25 MCG: 25 TABLET ORAL at 06:30

## 2018-01-01 RX ADMIN — DOCUSATE SODIUM 100 MG: 100 CAPSULE, LIQUID FILLED ORAL at 20:16

## 2018-01-01 RX ADMIN — Medication 10 ML: at 21:53

## 2018-01-01 RX ADMIN — LOSARTAN POTASSIUM 50 MG: 50 TABLET, FILM COATED ORAL at 10:17

## 2018-01-01 RX ADMIN — ALPRAZOLAM 0.5 MG: 0.5 TABLET ORAL at 21:34

## 2018-01-01 RX ADMIN — PANTOPRAZOLE SODIUM 40 MG: 40 GRANULE, DELAYED RELEASE ORAL at 05:56

## 2018-01-01 RX ADMIN — DOCUSATE SODIUM 100 MG: 100 CAPSULE, LIQUID FILLED ORAL at 17:07

## 2018-01-01 RX ADMIN — VENLAFAXINE HYDROCHLORIDE 150 MG: 75 CAPSULE, EXTENDED RELEASE ORAL at 10:34

## 2018-01-01 RX ADMIN — CHLORTHALIDONE 25 MG: 25 TABLET ORAL at 14:25

## 2018-01-01 RX ADMIN — PRIMIDONE 50 MG: 50 TABLET ORAL at 09:04

## 2018-01-01 RX ADMIN — VENLAFAXINE HYDROCHLORIDE 150 MG: 150 CAPSULE, EXTENDED RELEASE ORAL at 10:31

## 2018-01-01 RX ADMIN — HYDRALAZINE HYDROCHLORIDE 20 MG: 20 INJECTION INTRAMUSCULAR; INTRAVENOUS at 06:57

## 2018-01-01 RX ADMIN — METOROPROLOL TARTRATE 5 MG: 5 INJECTION, SOLUTION INTRAVENOUS at 06:57

## 2018-01-01 RX ADMIN — VENLAFAXINE HYDROCHLORIDE 150 MG: 150 CAPSULE, EXTENDED RELEASE ORAL at 08:08

## 2018-01-01 RX ADMIN — BACITRACIN, NEOMYCIN, POLYMYXIN B 3.5 G: 400; 3.5; 5 OINTMENT TOPICAL at 21:11

## 2018-01-01 RX ADMIN — Medication 10 ML: at 08:46

## 2018-01-01 RX ADMIN — Medication 15 ML: at 20:29

## 2018-01-01 RX ADMIN — LEVOTHYROXINE SODIUM 25 MCG: 25 TABLET ORAL at 08:57

## 2018-01-01 RX ADMIN — Medication 10 ML: at 23:49

## 2018-01-01 RX ADMIN — CARBIDOPA AND LEVODOPA 1 TABLET: 25; 100 TABLET ORAL at 08:45

## 2018-01-01 RX ADMIN — HYDRALAZINE HYDROCHLORIDE 20 MG: 20 INJECTION INTRAMUSCULAR; INTRAVENOUS at 19:34

## 2018-01-01 RX ADMIN — CLONIDINE HYDROCHLORIDE 0.1 MG: 0.1 TABLET ORAL at 22:59

## 2018-01-01 RX ADMIN — NEPHROCAP 1 MG: 1 CAP ORAL at 09:04

## 2018-01-01 RX ADMIN — LACTOBACILLUS TAB 1 TABLET: TAB at 22:25

## 2018-01-01 RX ADMIN — METOROPROLOL TARTRATE 5 MG: 5 INJECTION, SOLUTION INTRAVENOUS at 01:03

## 2018-01-01 RX ADMIN — DEXTROSE MONOHYDRATE: 50 INJECTION, SOLUTION INTRAVENOUS at 06:09

## 2018-01-01 RX ADMIN — CALCIUM ACETATE 667 MG: 667 CAPSULE ORAL at 10:29

## 2018-01-01 RX ADMIN — HYDRALAZINE HYDROCHLORIDE 100 MG: 100 TABLET, FILM COATED ORAL at 22:23

## 2018-01-01 RX ADMIN — CHLORTHALIDONE 25 MG: 25 TABLET ORAL at 10:19

## 2018-01-01 RX ADMIN — PROPOFOL 50 MCG/KG/MIN: 10 INJECTION, EMULSION INTRAVENOUS at 02:59

## 2018-01-01 RX ADMIN — LOSARTAN POTASSIUM 50 MG: 50 TABLET, FILM COATED ORAL at 14:40

## 2018-01-01 RX ADMIN — ACETAMINOPHEN 650 MG: 325 TABLET ORAL at 09:43

## 2018-01-01 RX ADMIN — DILTIAZEM HYDROCHLORIDE 240 MG: 120 CAPSULE, EXTENDED RELEASE ORAL at 09:22

## 2018-01-01 RX ADMIN — APIXABAN 2.5 MG: 2.5 TABLET, FILM COATED ORAL at 21:19

## 2018-01-01 RX ADMIN — CHLORTHALIDONE 25 MG: 25 TABLET ORAL at 14:42

## 2018-01-01 RX ADMIN — CALCIUM ACETATE 667 MG: 667 CAPSULE ORAL at 12:22

## 2018-01-01 RX ADMIN — FLUTICASONE PROPIONATE 2 SPRAY: 50 SPRAY, METERED NASAL at 08:07

## 2018-01-01 RX ADMIN — PANTOPRAZOLE SODIUM 20 MG: 20 TABLET, DELAYED RELEASE ORAL at 05:30

## 2018-01-01 RX ADMIN — FUROSEMIDE 40 MG: 40 TABLET ORAL at 10:00

## 2018-01-01 RX ADMIN — Medication 10 ML: at 07:57

## 2018-01-01 RX ADMIN — CALCIUM 500 MG: 500 TABLET ORAL at 09:38

## 2018-01-01 RX ADMIN — HEPARIN SODIUM 5000 UNITS: 5000 INJECTION, SOLUTION INTRAVENOUS; SUBCUTANEOUS at 09:56

## 2018-01-01 RX ADMIN — DOCUSATE SODIUM 100 MG: 100 CAPSULE, LIQUID FILLED ORAL at 20:56

## 2018-01-01 RX ADMIN — HEPARIN SODIUM 7000 UNITS: 1000 INJECTION, SOLUTION INTRAVENOUS; SUBCUTANEOUS at 13:33

## 2018-01-01 RX ADMIN — PROPOFOL 20 MG: 10 INJECTION, EMULSION INTRAVENOUS at 14:37

## 2018-01-01 RX ADMIN — CARBIDOPA AND LEVODOPA 1 TABLET: 25; 100 TABLET ORAL at 08:30

## 2018-01-01 RX ADMIN — Medication 125 MG: at 17:41

## 2018-01-01 RX ADMIN — CALCIUM ACETATE 667 MG: 667 CAPSULE ORAL at 10:44

## 2018-01-01 RX ADMIN — METOROPROLOL TARTRATE 5 MG: 5 INJECTION, SOLUTION INTRAVENOUS at 12:58

## 2018-01-01 RX ADMIN — Medication 10 ML: at 10:38

## 2018-01-01 RX ADMIN — HYDRALAZINE HYDROCHLORIDE 100 MG: 100 TABLET, FILM COATED ORAL at 14:55

## 2018-01-01 RX ADMIN — COLLAGENASE SANTYL: 250 OINTMENT TOPICAL at 02:23

## 2018-01-01 RX ADMIN — FAMOTIDINE 20 MG: 20 TABLET ORAL at 17:38

## 2018-01-01 RX ADMIN — IPRATROPIUM BROMIDE AND ALBUTEROL SULFATE 1 AMPULE: .5; 3 SOLUTION RESPIRATORY (INHALATION) at 13:11

## 2018-01-01 RX ADMIN — SODIUM CHLORIDE, POTASSIUM CHLORIDE, SODIUM LACTATE AND CALCIUM CHLORIDE: 600; 310; 30; 20 INJECTION, SOLUTION INTRAVENOUS at 14:14

## 2018-01-01 RX ADMIN — SODIUM CHLORIDE 1000 ML: 9 INJECTION, SOLUTION INTRAVENOUS at 13:36

## 2018-01-01 RX ADMIN — ISOSORBIDE MONONITRATE 60 MG: 60 TABLET, EXTENDED RELEASE ORAL at 09:38

## 2018-01-01 RX ADMIN — PROPOFOL 10 MCG/KG/MIN: 10 INJECTION, EMULSION INTRAVENOUS at 17:39

## 2018-01-01 RX ADMIN — POTASSIUM CHLORIDE 20 MEQ: 20 SOLUTION ORAL at 08:57

## 2018-01-01 RX ADMIN — ATORVASTATIN CALCIUM 80 MG: 80 TABLET, FILM COATED ORAL at 21:35

## 2018-01-01 RX ADMIN — ATORVASTATIN CALCIUM 20 MG: 20 TABLET, FILM COATED ORAL at 22:59

## 2018-01-01 RX ADMIN — PROPOFOL 20 MG: 10 INJECTION, EMULSION INTRAVENOUS at 14:33

## 2018-01-01 RX ADMIN — DOCUSATE SODIUM 100 MG: 100 CAPSULE, LIQUID FILLED ORAL at 09:26

## 2018-01-01 RX ADMIN — SODIUM BICARBONATE 325 MG: 650 TABLET ORAL at 17:06

## 2018-01-01 RX ADMIN — INSULIN LISPRO 3 UNITS: 100 INJECTION, SOLUTION INTRAVENOUS; SUBCUTANEOUS at 18:25

## 2018-01-01 RX ADMIN — VANCOMYCIN HYDROCHLORIDE 500 MG: 500 INJECTION, SOLUTION INTRAVENOUS at 14:42

## 2018-01-01 RX ADMIN — DILTIAZEM HYDROCHLORIDE 120 MG: 60 TABLET, FILM COATED ORAL at 05:35

## 2018-01-01 RX ADMIN — DEXTROSE MONOHYDRATE 12.5 G: 500 INJECTION PARENTERAL at 06:20

## 2018-01-01 RX ADMIN — POTASSIUM CHLORIDE: 2 INJECTION, SOLUTION, CONCENTRATE INTRAVENOUS at 12:40

## 2018-01-01 RX ADMIN — PREDNISONE 10 MG: 10 TABLET ORAL at 21:40

## 2018-01-01 RX ADMIN — HYDROCODONE BITARTRATE AND ACETAMINOPHEN 1 TABLET: 5; 325 TABLET ORAL at 06:26

## 2018-01-01 RX ADMIN — VENLAFAXINE 75 MG: 75 TABLET ORAL at 09:56

## 2018-01-01 RX ADMIN — LEVOTHYROXINE SODIUM 25 MCG: 25 TABLET ORAL at 06:09

## 2018-01-01 RX ADMIN — Medication 10 ML: at 09:31

## 2018-01-01 RX ADMIN — FUROSEMIDE 40 MG: 10 INJECTION, SOLUTION INTRAVENOUS at 15:28

## 2018-01-01 RX ADMIN — DILTIAZEM HYDROCHLORIDE 10 MG: 5 INJECTION INTRAVENOUS at 22:38

## 2018-01-01 RX ADMIN — Medication 15 ML: at 08:36

## 2018-01-01 RX ADMIN — CALCIUM ACETATE 667 MG: 667 CAPSULE ORAL at 17:04

## 2018-01-01 RX ADMIN — AMIODARONE HYDROCHLORIDE 200 MG: 200 TABLET ORAL at 17:20

## 2018-01-01 RX ADMIN — ETOMIDATE 12 MG: 2 INJECTION INTRAVENOUS at 06:59

## 2018-01-01 RX ADMIN — VENLAFAXINE 75 MG: 75 TABLET ORAL at 08:44

## 2018-01-01 RX ADMIN — HYDRALAZINE HYDROCHLORIDE 100 MG: 100 TABLET, FILM COATED ORAL at 09:29

## 2018-01-01 RX ADMIN — PRIMIDONE 50 MG: 50 TABLET ORAL at 22:41

## 2018-01-01 RX ADMIN — HYDRALAZINE HYDROCHLORIDE 100 MG: 100 TABLET, FILM COATED ORAL at 10:24

## 2018-01-01 RX ADMIN — METOPROLOL TARTRATE 25 MG: 25 TABLET ORAL at 09:22

## 2018-01-01 RX ADMIN — HYDRALAZINE HYDROCHLORIDE 100 MG: 100 TABLET, FILM COATED ORAL at 08:58

## 2018-01-01 RX ADMIN — MICONAZOLE NITRATE: 20 CREAM TOPICAL at 21:47

## 2018-01-01 RX ADMIN — CARBIDOPA AND LEVODOPA 1 TABLET: 25; 100 TABLET ORAL at 10:24

## 2018-01-01 RX ADMIN — LEVETIRACETAM 1000 MG: 500 TABLET, FILM COATED ORAL at 20:14

## 2018-01-01 RX ADMIN — Medication 10 ML: at 04:04

## 2018-01-01 RX ADMIN — LOSARTAN POTASSIUM 50 MG: 50 TABLET, FILM COATED ORAL at 09:25

## 2018-01-01 RX ADMIN — Medication 1 MG: at 09:01

## 2018-01-01 RX ADMIN — ALPRAZOLAM 0.5 MG: 0.5 TABLET ORAL at 17:05

## 2018-01-01 RX ADMIN — CARBIDOPA AND LEVODOPA 1 TABLET: 25; 100 TABLET ORAL at 08:32

## 2018-01-01 RX ADMIN — LEVOTHYROXINE SODIUM 50 MCG: 50 TABLET ORAL at 10:34

## 2018-01-01 RX ADMIN — Medication 10 ML: at 21:17

## 2018-01-01 RX ADMIN — CARBIDOPA AND LEVODOPA 1 TABLET: 25; 100 TABLET ORAL at 09:01

## 2018-01-01 RX ADMIN — GUAIFENESIN 600 MG: 600 TABLET, EXTENDED RELEASE ORAL at 22:07

## 2018-01-01 RX ADMIN — ISOSORBIDE MONONITRATE 120 MG: 120 TABLET ORAL at 09:22

## 2018-01-01 RX ADMIN — Medication 10 ML: at 20:54

## 2018-01-01 RX ADMIN — PANTOPRAZOLE SODIUM 20 MG: 20 TABLET, DELAYED RELEASE ORAL at 10:36

## 2018-01-01 RX ADMIN — CALCIUM 500 MG: 500 TABLET ORAL at 09:44

## 2018-01-01 RX ADMIN — GUAIFENESIN 600 MG: 600 TABLET, EXTENDED RELEASE ORAL at 08:08

## 2018-01-01 RX ADMIN — METOROPROLOL TARTRATE 5 MG: 5 INJECTION, SOLUTION INTRAVENOUS at 21:36

## 2018-01-01 RX ADMIN — PROPOFOL 20 MCG/KG/MIN: 10 INJECTION, EMULSION INTRAVENOUS at 07:53

## 2018-01-01 RX ADMIN — CALCIUM 500 MG: 500 TABLET ORAL at 08:09

## 2018-01-01 RX ADMIN — PROPOFOL 40 MCG/KG/MIN: 10 INJECTION, EMULSION INTRAVENOUS at 21:08

## 2018-01-01 RX ADMIN — PIPERACILLIN SODIUM,TAZOBACTAM SODIUM 3.38 G: 3; .375 INJECTION, POWDER, FOR SOLUTION INTRAVENOUS at 13:33

## 2018-01-01 RX ADMIN — IPRATROPIUM BROMIDE AND ALBUTEROL SULFATE 3 ML: .5; 3 SOLUTION RESPIRATORY (INHALATION) at 07:22

## 2018-01-01 RX ADMIN — HEPARIN SODIUM 5000 UNITS: 5000 INJECTION, SOLUTION INTRAVENOUS; SUBCUTANEOUS at 13:40

## 2018-01-01 RX ADMIN — IPRATROPIUM BROMIDE AND ALBUTEROL SULFATE 3 ML: .5; 3 SOLUTION RESPIRATORY (INHALATION) at 13:14

## 2018-01-01 RX ADMIN — CEFTRIAXONE 1 G: 1 INJECTION, POWDER, FOR SOLUTION INTRAMUSCULAR; INTRAVENOUS at 13:17

## 2018-01-01 RX ADMIN — HYDRALAZINE HYDROCHLORIDE 100 MG: 100 TABLET, FILM COATED ORAL at 13:49

## 2018-01-01 RX ADMIN — Medication 10 ML: at 10:11

## 2018-01-01 RX ADMIN — CALCIUM ACETATE 667 MG: 667 CAPSULE ORAL at 09:22

## 2018-01-01 RX ADMIN — HYDROXYZINE HYDROCHLORIDE 10 MG: 10 TABLET, FILM COATED ORAL at 09:25

## 2018-01-01 RX ADMIN — DILTIAZEM HYDROCHLORIDE 10 MG: 5 INJECTION INTRAVENOUS at 12:12

## 2018-01-01 RX ADMIN — CARBIDOPA AND LEVODOPA 1 TABLET: 25; 100 TABLET ORAL at 09:04

## 2018-01-01 RX ADMIN — SODIUM CHLORIDE 125 ML/HR: 9 INJECTION, SOLUTION INTRAVENOUS at 00:43

## 2018-01-01 RX ADMIN — VENLAFAXINE HYDROCHLORIDE 150 MG: 75 CAPSULE, EXTENDED RELEASE ORAL at 09:46

## 2018-01-01 RX ADMIN — CALCIUM ACETATE 667 MG: 667 CAPSULE ORAL at 14:53

## 2018-01-01 RX ADMIN — PANTOPRAZOLE SODIUM 20 MG: 20 TABLET, DELAYED RELEASE ORAL at 06:20

## 2018-01-01 RX ADMIN — HYDRALAZINE HYDROCHLORIDE 100 MG: 100 TABLET, FILM COATED ORAL at 14:44

## 2018-01-01 RX ADMIN — POTASSIUM CHLORIDE 40 MEQ: 1500 TABLET, EXTENDED RELEASE ORAL at 17:34

## 2018-01-01 RX ADMIN — LEVOTHYROXINE SODIUM 25 MCG: 25 TABLET ORAL at 10:36

## 2018-01-01 RX ADMIN — DILTIAZEM HYDROCHLORIDE 240 MG: 240 CAPSULE, COATED, EXTENDED RELEASE ORAL at 10:25

## 2018-01-01 RX ADMIN — HYDRALAZINE HYDROCHLORIDE 100 MG: 100 TABLET, FILM COATED ORAL at 21:12

## 2018-01-01 RX ADMIN — FLUTICASONE PROPIONATE 2 SPRAY: 50 SPRAY, METERED NASAL at 09:18

## 2018-01-01 RX ADMIN — CALCIUM ACETATE 667 MG: 667 CAPSULE ORAL at 17:27

## 2018-01-01 RX ADMIN — WARFARIN SODIUM 2.5 MG: 2.5 TABLET ORAL at 18:44

## 2018-01-01 RX ADMIN — GUAIFENESIN 600 MG: 600 TABLET, EXTENDED RELEASE ORAL at 21:40

## 2018-01-01 RX ADMIN — AMIODARONE HYDROCHLORIDE 200 MG: 200 TABLET ORAL at 08:49

## 2018-01-01 RX ADMIN — LORAZEPAM 2 MG: 2 INJECTION INTRAMUSCULAR; INTRAVENOUS at 08:19

## 2018-01-01 RX ADMIN — METOROPROLOL TARTRATE 5 MG: 5 INJECTION, SOLUTION INTRAVENOUS at 05:50

## 2018-01-01 RX ADMIN — HYDRALAZINE HYDROCHLORIDE 100 MG: 100 TABLET, FILM COATED ORAL at 09:25

## 2018-01-01 RX ADMIN — SODIUM CHLORIDE 500 ML: 9 INJECTION, SOLUTION INTRAVENOUS at 03:49

## 2018-01-01 RX ADMIN — CEFTRIAXONE 1 G: 1 INJECTION, POWDER, FOR SOLUTION INTRAMUSCULAR; INTRAVENOUS at 14:25

## 2018-01-01 RX ADMIN — LOSARTAN POTASSIUM 100 MG: 50 TABLET ORAL at 09:36

## 2018-01-01 RX ADMIN — HYDRALAZINE HYDROCHLORIDE 100 MG: 100 TABLET, FILM COATED ORAL at 15:38

## 2018-01-01 RX ADMIN — Medication 500 MG: at 13:18

## 2018-01-01 RX ADMIN — HEPARIN SODIUM 4000 UNITS: 1000 INJECTION, SOLUTION INTRAVENOUS; SUBCUTANEOUS at 19:30

## 2018-01-01 RX ADMIN — PREDNISONE 10 MG: 10 TABLET ORAL at 09:28

## 2018-01-01 RX ADMIN — Medication 0.1 MG: at 15:07

## 2018-01-01 RX ADMIN — CLONIDINE HYDROCHLORIDE 0.1 MG: 0.1 TABLET ORAL at 14:51

## 2018-01-01 RX ADMIN — LOSARTAN POTASSIUM 50 MG: 50 TABLET ORAL at 09:56

## 2018-01-01 RX ADMIN — INSULIN GLARGINE 14 UNITS: 100 INJECTION, SOLUTION SUBCUTANEOUS at 22:54

## 2018-01-01 RX ADMIN — HYDRALAZINE HYDROCHLORIDE 100 MG: 100 TABLET, FILM COATED ORAL at 06:22

## 2018-01-01 RX ADMIN — Medication 125 MG: at 06:19

## 2018-01-01 RX ADMIN — ATORVASTATIN CALCIUM 20 MG: 20 TABLET, FILM COATED ORAL at 22:07

## 2018-01-01 RX ADMIN — DILTIAZEM HYDROCHLORIDE 240 MG: 240 CAPSULE, COATED, EXTENDED RELEASE ORAL at 09:53

## 2018-01-01 RX ADMIN — COLLAGENASE SANTYL: 250 OINTMENT TOPICAL at 16:58

## 2018-01-01 RX ADMIN — DILTIAZEM HYDROCHLORIDE 240 MG: 240 CAPSULE, COATED, EXTENDED RELEASE ORAL at 21:25

## 2018-01-01 RX ADMIN — HYDRALAZINE HYDROCHLORIDE 100 MG: 100 TABLET, FILM COATED ORAL at 05:56

## 2018-01-01 RX ADMIN — PROPOFOL 20 MG: 10 INJECTION, EMULSION INTRAVENOUS at 14:35

## 2018-01-01 RX ADMIN — IPRATROPIUM BROMIDE AND ALBUTEROL SULFATE 3 ML: .5; 3 SOLUTION RESPIRATORY (INHALATION) at 19:31

## 2018-01-01 RX ADMIN — METOPROLOL TARTRATE 10 MG: 5 INJECTION, SOLUTION INTRAVENOUS at 04:10

## 2018-01-01 RX ADMIN — HYDROCODONE BITARTRATE AND ACETAMINOPHEN 1 TABLET: 5; 325 TABLET ORAL at 22:02

## 2018-01-01 RX ADMIN — HYDRALAZINE HYDROCHLORIDE 100 MG: 100 TABLET, FILM COATED ORAL at 09:23

## 2018-01-01 RX ADMIN — Medication 400 MG: at 10:00

## 2018-01-01 RX ADMIN — DOCUSATE SODIUM 100 MG: 100 CAPSULE, LIQUID FILLED ORAL at 09:38

## 2018-01-01 RX ADMIN — HYDRALAZINE HYDROCHLORIDE 5 MG: 20 INJECTION INTRAMUSCULAR; INTRAVENOUS at 15:03

## 2018-01-01 RX ADMIN — CALCIUM 500 MG: 500 TABLET ORAL at 10:12

## 2018-01-01 RX ADMIN — FAMOTIDINE 20 MG: 20 TABLET ORAL at 21:34

## 2018-01-01 RX ADMIN — IPRATROPIUM BROMIDE AND ALBUTEROL SULFATE 1 AMPULE: .5; 3 SOLUTION RESPIRATORY (INHALATION) at 20:35

## 2018-01-01 RX ADMIN — ISOSORBIDE MONONITRATE 60 MG: 60 TABLET, EXTENDED RELEASE ORAL at 09:54

## 2018-01-01 RX ADMIN — INSULIN GLARGINE 14 UNITS: 100 INJECTION, SOLUTION SUBCUTANEOUS at 02:32

## 2018-01-01 RX ADMIN — PROPOFOL 30 MCG/KG/MIN: 10 INJECTION, EMULSION INTRAVENOUS at 03:52

## 2018-01-01 RX ADMIN — HYDRALAZINE HYDROCHLORIDE 100 MG: 100 TABLET, FILM COATED ORAL at 22:15

## 2018-01-01 RX ADMIN — METOROPROLOL TARTRATE 5 MG: 5 INJECTION, SOLUTION INTRAVENOUS at 15:59

## 2018-01-01 RX ADMIN — METOPROLOL TARTRATE 10 MG: 5 INJECTION, SOLUTION INTRAVENOUS at 04:03

## 2018-01-01 RX ADMIN — ONDANSETRON 4 MG: 2 INJECTION INTRAMUSCULAR; INTRAVENOUS at 10:03

## 2018-01-01 RX ADMIN — HEPARIN SODIUM 4000 UNITS: 1000 INJECTION, SOLUTION INTRAVENOUS; SUBCUTANEOUS at 19:15

## 2018-01-01 RX ADMIN — Medication 10 ML: at 08:25

## 2018-01-01 RX ADMIN — HEPARIN SODIUM 1000 UNITS: 1000 INJECTION, SOLUTION INTRAVENOUS; SUBCUTANEOUS at 15:28

## 2018-01-01 RX ADMIN — SENNOSIDES AND DOCUSATE SODIUM 1 TABLET: 8.6; 5 TABLET ORAL at 17:38

## 2018-01-01 RX ADMIN — INSULIN LISPRO 3 UNITS: 100 INJECTION, SOLUTION INTRAVENOUS; SUBCUTANEOUS at 08:53

## 2018-01-01 RX ADMIN — CARBIDOPA AND LEVODOPA 1 TABLET: 25; 100 TABLET ORAL at 10:16

## 2018-01-01 RX ADMIN — PANTOPRAZOLE SODIUM 40 MG: 40 INJECTION, POWDER, FOR SOLUTION INTRAVENOUS at 07:51

## 2018-01-01 RX ADMIN — HYDRALAZINE HYDROCHLORIDE 100 MG: 100 TABLET, FILM COATED ORAL at 20:15

## 2018-01-01 RX ADMIN — CALCIUM ACETATE 667 MG: 667 CAPSULE ORAL at 17:00

## 2018-01-01 RX ADMIN — HYDRALAZINE HYDROCHLORIDE 100 MG: 100 TABLET, FILM COATED ORAL at 15:13

## 2018-01-01 RX ADMIN — Medication 10 ML: at 22:19

## 2018-01-01 RX ADMIN — PROPOFOL 20 MG: 10 INJECTION, EMULSION INTRAVENOUS at 14:27

## 2018-01-01 RX ADMIN — Medication 400 MG: at 14:41

## 2018-01-01 RX ADMIN — AMIODARONE HYDROCHLORIDE 200 MG: 200 TABLET ORAL at 14:41

## 2018-01-01 RX ADMIN — CALCIUM ACETATE 667 MG: 667 CAPSULE ORAL at 08:52

## 2018-01-01 RX ADMIN — LOSARTAN POTASSIUM 50 MG: 50 TABLET ORAL at 14:25

## 2018-01-01 RX ADMIN — Medication 10 ML: at 21:01

## 2018-01-01 RX ADMIN — ALPRAZOLAM 0.5 MG: 0.5 TABLET ORAL at 21:12

## 2018-01-01 RX ADMIN — PANTOPRAZOLE SODIUM 40 MG: 40 GRANULE, DELAYED RELEASE ORAL at 05:39

## 2018-01-01 RX ADMIN — HYDRALAZINE HYDROCHLORIDE 100 MG: 100 TABLET, FILM COATED ORAL at 21:58

## 2018-01-01 RX ADMIN — MAGNESIUM OXIDE TAB 400 MG (241.3 MG ELEMENTAL MG) 400 MG: 400 (241.3 MG) TAB at 09:55

## 2018-01-01 RX ADMIN — ROCURONIUM BROMIDE 15 MG: 10 INJECTION INTRAVENOUS at 08:51

## 2018-01-01 RX ADMIN — HYDROMORPHONE HYDROCHLORIDE 0.5 MG: 1 INJECTION, SOLUTION INTRAMUSCULAR; INTRAVENOUS; SUBCUTANEOUS at 11:27

## 2018-01-01 RX ADMIN — IPRATROPIUM BROMIDE AND ALBUTEROL SULFATE 3 ML: 2.5; .5 SOLUTION RESPIRATORY (INHALATION) at 19:57

## 2018-01-01 RX ADMIN — PANTOPRAZOLE SODIUM 40 MG: 40 TABLET, DELAYED RELEASE ORAL at 07:01

## 2018-01-01 RX ADMIN — Medication 1 MG: at 09:53

## 2018-01-01 RX ADMIN — Medication 1 MG: at 09:44

## 2018-01-01 RX ADMIN — CEFUROXIME AXETIL 250 MG: 500 TABLET ORAL at 22:06

## 2018-01-01 RX ADMIN — LEVETIRACETAM 1000 MG: 500 TABLET, FILM COATED ORAL at 13:35

## 2018-01-01 RX ADMIN — Medication 10 ML: at 09:47

## 2018-01-01 RX ADMIN — FOLIC ACID 1 MG: 1 TABLET ORAL at 13:36

## 2018-01-01 RX ADMIN — PANTOPRAZOLE SODIUM 20 MG: 20 TABLET, DELAYED RELEASE ORAL at 06:19

## 2018-01-01 RX ADMIN — NITROGLYCERIN 1 INCH: 20 OINTMENT TOPICAL at 18:35

## 2018-01-01 RX ADMIN — PIPERACILLIN SODIUM,TAZOBACTAM SODIUM 3.38 G: 3; .375 INJECTION, POWDER, FOR SOLUTION INTRAVENOUS at 15:59

## 2018-01-01 RX ADMIN — HYDRALAZINE HYDROCHLORIDE 50 MG: 50 TABLET, FILM COATED ORAL at 05:30

## 2018-01-01 RX ADMIN — LACTOBACILLUS TAB 1 TABLET: TAB at 10:17

## 2018-01-01 RX ADMIN — CARBIDOPA AND LEVODOPA 1 TABLET: 25; 100 TABLET ORAL at 09:37

## 2018-01-01 RX ADMIN — DILTIAZEM HYDROCHLORIDE 240 MG: 240 CAPSULE, COATED, EXTENDED RELEASE ORAL at 09:04

## 2018-01-01 RX ADMIN — Medication 1 MG: at 10:44

## 2018-01-01 RX ADMIN — VENLAFAXINE HYDROCHLORIDE 150 MG: 75 CAPSULE, EXTENDED RELEASE ORAL at 14:24

## 2018-01-01 RX ADMIN — PROPOFOL 40 MCG/KG/MIN: 10 INJECTION, EMULSION INTRAVENOUS at 14:56

## 2018-01-01 RX ADMIN — HYDRALAZINE HYDROCHLORIDE 100 MG: 100 TABLET, FILM COATED ORAL at 09:58

## 2018-01-01 RX ADMIN — DILTIAZEM HYDROCHLORIDE 240 MG: 240 CAPSULE, COATED, EXTENDED RELEASE ORAL at 09:17

## 2018-01-01 RX ADMIN — CALCIUM 500 MG: 500 TABLET ORAL at 09:00

## 2018-01-01 RX ADMIN — CEFUROXIME AXETIL 250 MG: 500 TABLET ORAL at 20:15

## 2018-01-01 RX ADMIN — Medication 10 ML: at 09:25

## 2018-01-01 RX ADMIN — IPRATROPIUM BROMIDE AND ALBUTEROL SULFATE 3 ML: .5; 3 SOLUTION RESPIRATORY (INHALATION) at 07:52

## 2018-01-01 RX ADMIN — DILTIAZEM HYDROCHLORIDE 120 MG: 60 TABLET, FILM COATED ORAL at 06:57

## 2018-01-01 RX ADMIN — ALBUMIN (HUMAN) 25 G: 0.25 INJECTION, SOLUTION INTRAVENOUS at 03:14

## 2018-01-01 RX ADMIN — Medication 400 MG: at 10:30

## 2018-01-01 RX ADMIN — Medication 10 ML: at 18:04

## 2018-01-01 RX ADMIN — LACTOBACILLUS TAB 1 TABLET: TAB at 08:08

## 2018-01-01 RX ADMIN — ALBUMIN (HUMAN) 25 G: 0.25 INJECTION, SOLUTION INTRAVENOUS at 16:28

## 2018-01-01 RX ADMIN — HEPARIN SODIUM 5000 UNITS: 5000 INJECTION, SOLUTION INTRAVENOUS; SUBCUTANEOUS at 22:42

## 2018-01-01 RX ADMIN — METOROPROLOL TARTRATE 5 MG: 5 INJECTION, SOLUTION INTRAVENOUS at 12:17

## 2018-01-01 RX ADMIN — Medication 10 ML: at 07:51

## 2018-01-01 RX ADMIN — AMIODARONE HYDROCHLORIDE 200 MG: 200 TABLET ORAL at 09:26

## 2018-01-01 RX ADMIN — PANTOPRAZOLE SODIUM 40 MG: 40 INJECTION, POWDER, FOR SOLUTION INTRAVENOUS at 08:58

## 2018-01-01 RX ADMIN — HYDRALAZINE HYDROCHLORIDE 10 MG: 20 INJECTION INTRAMUSCULAR; INTRAVENOUS at 05:40

## 2018-01-01 RX ADMIN — IPRATROPIUM BROMIDE AND ALBUTEROL SULFATE 3 ML: 2.5; .5 SOLUTION RESPIRATORY (INHALATION) at 13:38

## 2018-01-01 RX ADMIN — Medication 400 MG: at 00:29

## 2018-01-01 RX ADMIN — CARBIDOPA AND LEVODOPA 1 TABLET: 25; 100 TABLET ORAL at 08:52

## 2018-01-01 RX ADMIN — CALCIUM ACETATE 667 MG: 667 CAPSULE ORAL at 17:34

## 2018-01-01 RX ADMIN — HYDRALAZINE HYDROCHLORIDE 100 MG: 100 TABLET, FILM COATED ORAL at 14:54

## 2018-01-01 RX ADMIN — INSULIN LISPRO 4 UNITS: 100 INJECTION, SOLUTION INTRAVENOUS; SUBCUTANEOUS at 17:08

## 2018-01-01 RX ADMIN — AMIODARONE HYDROCHLORIDE 200 MG: 200 TABLET ORAL at 07:49

## 2018-01-01 RX ADMIN — PREDNISONE 10 MG: 10 TABLET ORAL at 09:25

## 2018-01-01 RX ADMIN — Medication 125 MG: at 12:08

## 2018-01-01 RX ADMIN — AMIODARONE HYDROCHLORIDE 200 MG: 200 TABLET ORAL at 18:10

## 2018-01-01 RX ADMIN — MICONAZOLE NITRATE: 20 CREAM TOPICAL at 10:40

## 2018-01-01 RX ADMIN — DAPTOMYCIN 245 MG: 500 INJECTION, POWDER, LYOPHILIZED, FOR SOLUTION INTRAVENOUS at 18:06

## 2018-01-01 RX ADMIN — ENOXAPARIN SODIUM 30 MG: 30 INJECTION SUBCUTANEOUS at 09:24

## 2018-01-01 RX ADMIN — HEPARIN SODIUM 5000 UNITS: 5000 INJECTION, SOLUTION INTRAVENOUS; SUBCUTANEOUS at 10:10

## 2018-01-01 RX ADMIN — NITROGLYCERIN 1 INCH: 20 OINTMENT TOPICAL at 00:10

## 2018-01-01 RX ADMIN — PRIMIDONE 50 MG: 50 TABLET ORAL at 15:31

## 2018-01-01 RX ADMIN — Medication 10 ML: at 22:02

## 2018-01-01 RX ADMIN — Medication 10 ML: at 08:26

## 2018-01-01 RX ADMIN — SODIUM CHLORIDE 250 ML: 9 INJECTION, SOLUTION INTRAVENOUS at 07:00

## 2018-01-01 RX ADMIN — LEVETIRACETAM 1000 MG: 100 INJECTION, SOLUTION INTRAVENOUS at 21:38

## 2018-01-01 RX ADMIN — Medication 10 ML: at 20:56

## 2018-01-01 RX ADMIN — CALCIUM ACETATE 667 MG: 667 CAPSULE ORAL at 09:59

## 2018-01-01 RX ADMIN — Medication 0.1 MG: at 15:05

## 2018-01-01 RX ADMIN — CLONIDINE HYDROCHLORIDE 0.1 MG: 0.1 TABLET ORAL at 21:33

## 2018-01-01 RX ADMIN — HEPARIN SODIUM 7000 UNITS: 1000 INJECTION, SOLUTION INTRAVENOUS; SUBCUTANEOUS at 11:29

## 2018-01-01 RX ADMIN — DILTIAZEM HYDROCHLORIDE 120 MG: 60 TABLET, FILM COATED ORAL at 15:38

## 2018-01-01 RX ADMIN — CALCIUM ACETATE 667 MG: 667 CAPSULE ORAL at 07:49

## 2018-01-01 RX ADMIN — ALPRAZOLAM 0.5 MG: 0.5 TABLET ORAL at 03:52

## 2018-01-01 RX ADMIN — METOROPROLOL TARTRATE 5 MG: 5 INJECTION, SOLUTION INTRAVENOUS at 16:26

## 2018-01-01 RX ADMIN — CARBIDOPA AND LEVODOPA 1 TABLET: 25; 100 TABLET ORAL at 09:22

## 2018-01-01 RX ADMIN — WARFARIN SODIUM 5 MG: 5 TABLET ORAL at 16:48

## 2018-01-01 RX ADMIN — NITROGLYCERIN 1 INCH: 20 OINTMENT TOPICAL at 06:05

## 2018-01-01 RX ADMIN — DEXTROSE MONOHYDRATE 50 MG: 50 INJECTION, SOLUTION INTRAVENOUS at 09:54

## 2018-01-01 RX ADMIN — NEPHROCAP 1 MG: 1 CAP ORAL at 08:08

## 2018-01-01 RX ADMIN — DILTIAZEM HYDROCHLORIDE 120 MG: 60 TABLET, FILM COATED ORAL at 06:40

## 2018-01-01 RX ADMIN — HYDRALAZINE HYDROCHLORIDE 100 MG: 100 TABLET, FILM COATED ORAL at 16:25

## 2018-01-01 RX ADMIN — CALCIUM ACETATE 667 MG: 667 CAPSULE ORAL at 12:17

## 2018-01-01 RX ADMIN — Medication 10 MG: at 07:49

## 2018-01-01 RX ADMIN — HYDRALAZINE HYDROCHLORIDE 20 MG: 20 INJECTION INTRAMUSCULAR; INTRAVENOUS at 00:39

## 2018-01-01 RX ADMIN — DILTIAZEM HYDROCHLORIDE 240 MG: 240 CAPSULE, COATED, EXTENDED RELEASE ORAL at 16:46

## 2018-01-01 RX ADMIN — DILTIAZEM HYDROCHLORIDE 120 MG: 60 TABLET, FILM COATED ORAL at 12:17

## 2018-01-01 RX ADMIN — DEXTROSE AND SODIUM CHLORIDE: 5; 900 INJECTION, SOLUTION INTRAVENOUS at 18:37

## 2018-01-01 RX ADMIN — AMIODARONE HYDROCHLORIDE 200 MG: 200 TABLET ORAL at 10:13

## 2018-01-01 RX ADMIN — ACETAMINOPHEN 650 MG: 325 TABLET, FILM COATED ORAL at 09:01

## 2018-01-01 RX ADMIN — PANTOPRAZOLE SODIUM 40 MG: 40 GRANULE, DELAYED RELEASE ORAL at 10:34

## 2018-01-01 RX ADMIN — DILTIAZEM HYDROCHLORIDE 120 MG: 60 TABLET, FILM COATED ORAL at 13:49

## 2018-01-01 RX ADMIN — CALCIUM ACETATE 667 MG: 667 CAPSULE ORAL at 16:49

## 2018-01-01 RX ADMIN — IPRATROPIUM BROMIDE AND ALBUTEROL SULFATE 3 ML: .5; 3 SOLUTION RESPIRATORY (INHALATION) at 13:25

## 2018-01-01 RX ADMIN — INSULIN GLARGINE 14 UNITS: 100 INJECTION, SOLUTION SUBCUTANEOUS at 23:01

## 2018-01-01 RX ADMIN — LEVOTHYROXINE SODIUM 25 MCG: 25 TABLET ORAL at 17:08

## 2018-01-01 RX ADMIN — ACETAMINOPHEN 650 MG: 325 TABLET, FILM COATED ORAL at 21:37

## 2018-01-01 RX ADMIN — HYDRALAZINE HYDROCHLORIDE 20 MG: 20 INJECTION INTRAMUSCULAR; INTRAVENOUS at 03:38

## 2018-01-01 RX ADMIN — MAGNESIUM OXIDE TAB 400 MG (241.3 MG ELEMENTAL MG) 400 MG: 400 (241.3 MG) TAB at 13:19

## 2018-01-01 RX ADMIN — MAGNESIUM OXIDE TAB 400 MG (241.3 MG ELEMENTAL MG) 400 MG: 400 (241.3 MG) TAB at 22:02

## 2018-01-01 RX ADMIN — Medication 10 ML: at 09:37

## 2018-01-01 RX ADMIN — DESMOPRESSIN ACETATE 15 MCG: 4 SOLUTION INTRAVENOUS at 21:06

## 2018-01-01 RX ADMIN — LACTOBACILLUS TAB 1 TABLET: TAB at 14:18

## 2018-01-01 RX ADMIN — PREDNISONE 10 MG: 10 TABLET ORAL at 20:16

## 2018-01-01 RX ADMIN — ASPIRIN 81 MG: 81 TABLET, COATED ORAL at 14:40

## 2018-01-01 RX ADMIN — CALCIUM ACETATE 667 MG: 667 CAPSULE ORAL at 13:19

## 2018-01-01 RX ADMIN — SODIUM BICARBONATE 325 MG: 650 TABLET ORAL at 22:01

## 2018-01-01 RX ADMIN — ASPIRIN 81 MG: 81 TABLET, COATED ORAL at 14:26

## 2018-01-01 RX ADMIN — CALCIUM ACETATE 667 MG: 667 CAPSULE ORAL at 09:16

## 2018-01-01 RX ADMIN — FLUTICASONE PROPIONATE 2 SPRAY: 50 SPRAY, METERED NASAL at 09:24

## 2018-01-01 RX ADMIN — CALCIUM 500 MG: 500 TABLET ORAL at 09:22

## 2018-01-01 RX ADMIN — VENLAFAXINE 75 MG: 75 TABLET ORAL at 15:38

## 2018-01-01 RX ADMIN — DILTIAZEM HYDROCHLORIDE 120 MG: 60 TABLET, FILM COATED ORAL at 23:47

## 2018-01-01 RX ADMIN — DILTIAZEM HYDROCHLORIDE 240 MG: 240 CAPSULE, COATED, EXTENDED RELEASE ORAL at 20:18

## 2018-01-01 RX ADMIN — DARBEPOETIN ALFA 40 MCG: 40 SOLUTION INTRAVENOUS; SUBCUTANEOUS at 13:20

## 2018-01-01 RX ADMIN — METOROPROLOL TARTRATE 5 MG: 5 INJECTION, SOLUTION INTRAVENOUS at 05:54

## 2018-01-01 RX ADMIN — VANCOMYCIN HYDROCHLORIDE 1 G: 1 INJECTION, POWDER, LYOPHILIZED, FOR SOLUTION INTRAVENOUS at 09:57

## 2018-01-01 RX ADMIN — LOSARTAN POTASSIUM 100 MG: 50 TABLET ORAL at 09:59

## 2018-01-01 RX ADMIN — ETOMIDATE 10 MG: 20 INJECTION, SOLUTION INTRAVENOUS at 07:28

## 2018-01-01 RX ADMIN — METOROPROLOL TARTRATE 2.5 MG: 5 INJECTION, SOLUTION INTRAVENOUS at 05:25

## 2018-01-01 RX ADMIN — METOPROLOL TARTRATE 25 MG: 25 TABLET ORAL at 07:49

## 2018-01-01 RX ADMIN — Medication 10 ML: at 22:22

## 2018-01-01 RX ADMIN — CALCIUM 500 MG: 500 TABLET ORAL at 10:00

## 2018-01-01 RX ADMIN — METOPROLOL TARTRATE 25 MG: 25 TABLET ORAL at 22:43

## 2018-01-01 RX ADMIN — HYDRALAZINE HYDROCHLORIDE 10 MG: 20 INJECTION INTRAMUSCULAR; INTRAVENOUS at 23:35

## 2018-01-01 RX ADMIN — COLLAGENASE SANTYL: 250 OINTMENT TOPICAL at 08:28

## 2018-01-01 RX ADMIN — AMIODARONE HYDROCHLORIDE 200 MG: 200 TABLET ORAL at 23:34

## 2018-01-01 RX ADMIN — HEPARIN SODIUM 3000 UNITS: 1000 INJECTION, SOLUTION INTRAVENOUS; SUBCUTANEOUS at 17:40

## 2018-01-01 RX ADMIN — ACETAMINOPHEN 650 MG: 325 TABLET ORAL at 02:32

## 2018-01-01 RX ADMIN — BACITRACIN, NEOMYCIN, POLYMYXIN B 3.5 G: 400; 3.5; 5 OINTMENT TOPICAL at 20:17

## 2018-01-01 RX ADMIN — ATORVASTATIN CALCIUM 20 MG: 20 TABLET, FILM COATED ORAL at 22:00

## 2018-01-01 RX ADMIN — PROPOFOL 25 MCG/KG/MIN: 10 INJECTION, EMULSION INTRAVENOUS at 02:48

## 2018-01-01 RX ADMIN — PANTOPRAZOLE SODIUM 20 MG: 20 TABLET, DELAYED RELEASE ORAL at 06:38

## 2018-01-01 RX ADMIN — FOLIC ACID 1 MG: 1 TABLET ORAL at 10:34

## 2018-01-01 RX ADMIN — Medication 10 ML: at 20:27

## 2018-01-01 RX ADMIN — Medication 10 ML: at 21:03

## 2018-01-01 RX ADMIN — ISOSORBIDE MONONITRATE 60 MG: 60 TABLET, EXTENDED RELEASE ORAL at 15:33

## 2018-01-01 RX ADMIN — CARBIDOPA AND LEVODOPA 1 TABLET: 25; 100 TABLET ORAL at 08:31

## 2018-01-01 RX ADMIN — DEXTROSE MONOHYDRATE 12.5 G: 25 INJECTION, SOLUTION INTRAVENOUS at 17:17

## 2018-01-01 RX ADMIN — CLONIDINE HYDROCHLORIDE 0.1 MG: 0.1 TABLET ORAL at 22:04

## 2018-01-01 RX ADMIN — INSULIN GLARGINE 5 UNITS: 100 INJECTION, SOLUTION SUBCUTANEOUS at 22:01

## 2018-01-01 RX ADMIN — PRIMIDONE 50 MG: 50 TABLET ORAL at 14:50

## 2018-01-01 RX ADMIN — SODIUM CHLORIDE 250 ML: 9 INJECTION, SOLUTION INTRAVENOUS at 11:01

## 2018-01-01 RX ADMIN — DILTIAZEM HYDROCHLORIDE 240 MG: 240 CAPSULE, COATED, EXTENDED RELEASE ORAL at 21:47

## 2018-01-01 RX ADMIN — CLONIDINE HYDROCHLORIDE 0.1 MG: 0.1 TABLET ORAL at 22:22

## 2018-01-01 RX ADMIN — METOROPROLOL TARTRATE 5 MG: 5 INJECTION, SOLUTION INTRAVENOUS at 01:17

## 2018-01-01 RX ADMIN — Medication 10 ML: at 08:32

## 2018-01-01 RX ADMIN — VENLAFAXINE 75 MG: 75 TABLET ORAL at 20:30

## 2018-01-01 RX ADMIN — FOLIC ACID 1 MG: 1 TABLET ORAL at 09:43

## 2018-01-01 RX ADMIN — INSULIN LISPRO 10 UNITS: 100 INJECTION, SOLUTION INTRAVENOUS; SUBCUTANEOUS at 17:21

## 2018-01-01 RX ADMIN — VENLAFAXINE 75 MG: 75 TABLET ORAL at 09:31

## 2018-01-01 RX ADMIN — INSULIN LISPRO 1 UNITS: 100 INJECTION, SOLUTION INTRAVENOUS; SUBCUTANEOUS at 12:34

## 2018-01-01 RX ADMIN — ATORVASTATIN CALCIUM 80 MG: 80 TABLET, FILM COATED ORAL at 21:34

## 2018-01-01 RX ADMIN — AMIODARONE HYDROCHLORIDE: 50 INJECTION, SOLUTION INTRAVENOUS at 15:29

## 2018-01-01 RX ADMIN — Medication 10 ML: at 21:04

## 2018-01-01 RX ADMIN — GUAIFENESIN 600 MG: 600 TABLET, EXTENDED RELEASE ORAL at 14:40

## 2018-01-01 RX ADMIN — LOSARTAN POTASSIUM 50 MG: 50 TABLET ORAL at 09:59

## 2018-01-01 RX ADMIN — DARBEPOETIN ALFA 40 MCG: 40 SOLUTION INTRAVENOUS; SUBCUTANEOUS at 13:17

## 2018-01-01 RX ADMIN — PROPOFOL 40 MCG/KG/MIN: 10 INJECTION, EMULSION INTRAVENOUS at 08:40

## 2018-01-01 RX ADMIN — CALCIUM ACETATE 667 MG: 667 CAPSULE ORAL at 16:46

## 2018-01-01 RX ADMIN — AMIODARONE HYDROCHLORIDE 200 MG: 200 TABLET ORAL at 08:08

## 2018-01-01 RX ADMIN — HEPARIN SODIUM 4000 UNITS: 1000 INJECTION, SOLUTION INTRAVENOUS; SUBCUTANEOUS at 18:00

## 2018-01-01 RX ADMIN — LEVOTHYROXINE SODIUM 50 MCG: 50 TABLET ORAL at 05:56

## 2018-01-01 RX ADMIN — Medication 400 MG: at 08:32

## 2018-01-01 RX ADMIN — Medication 10 ML: at 20:41

## 2018-01-01 RX ADMIN — INSULIN LISPRO 1 UNITS: 100 INJECTION, SOLUTION INTRAVENOUS; SUBCUTANEOUS at 12:01

## 2018-01-01 RX ADMIN — LEVOTHYROXINE SODIUM 25 MCG: 25 TABLET ORAL at 06:22

## 2018-01-01 RX ADMIN — IPRATROPIUM BROMIDE AND ALBUTEROL SULFATE 3 ML: .5; 3 SOLUTION RESPIRATORY (INHALATION) at 14:48

## 2018-01-01 RX ADMIN — AZITHROMYCIN MONOHYDRATE 500 MG: 500 INJECTION, POWDER, LYOPHILIZED, FOR SOLUTION INTRAVENOUS at 01:12

## 2018-01-01 RX ADMIN — NOREPINEPHRINE BITARTRATE 6 MCG/MIN: 1 INJECTION, SOLUTION, CONCENTRATE INTRAVENOUS at 04:30

## 2018-01-01 RX ADMIN — TAZOBACTAM SODIUM AND PIPERACILLIN SODIUM 3.38 G: 375; 3 INJECTION, SOLUTION INTRAVENOUS at 04:30

## 2018-01-01 RX ADMIN — HYDRALAZINE HYDROCHLORIDE 100 MG: 100 TABLET, FILM COATED ORAL at 06:10

## 2018-01-01 RX ADMIN — APIXABAN 2.5 MG: 2.5 TABLET, FILM COATED ORAL at 21:34

## 2018-01-01 RX ADMIN — AMIODARONE HYDROCHLORIDE 200 MG: 200 TABLET ORAL at 08:59

## 2018-01-01 ASSESSMENT — ENCOUNTER SYMPTOMS
ABDOMINAL DISTENTION: 0
RHINORRHEA: 0
WHEEZING: 1
COLOR CHANGE: 0
SORE THROAT: 0
COUGH: 0
COLOR CHANGE: 0
VOMITING: 0
WHEEZING: 0
CHEST TIGHTNESS: 0
EYE PAIN: 0
EYE DISCHARGE: 0
SHORTNESS OF BREATH: 1
VOMITING: 1
BLOOD IN STOOL: 0
CHOKING: 0
TROUBLE SWALLOWING: 0
CHEST TIGHTNESS: 0
COUGH: 0
COUGH: 1
SHORTNESS OF BREATH: 0
STRIDOR: 0
VOMITING: 0
CONSTIPATION: 0
COLOR CHANGE: 0
VOMITING: 0
COUGH: 0
ABDOMINAL DISTENTION: 0
SHORTNESS OF BREATH: 0
DOUBLE VISION: 1
DIARRHEA: 1
CHOKING: 0
SORE THROAT: 0
RHINORRHEA: 0
VOMITING: 1
SINUS PRESSURE: 0
NAUSEA: 0
PHOTOPHOBIA: 0
EYE PAIN: 0
PHOTOPHOBIA: 0
CHOKING: 0
NAUSEA: 0
WHEEZING: 0
SHORTNESS OF BREATH: 0
NAUSEA: 1
ABDOMINAL PAIN: 0
COUGH: 0
RHINORRHEA: 0
VOMITING: 0
DIARRHEA: 0
DIARRHEA: 0
CHOKING: 0
ROS SKIN COMMENTS: COCCYX WOUND
BACK PAIN: 0
COUGH: 0
ABDOMINAL PAIN: 0
CONSTIPATION: 0
VOMITING: 0
CONSTIPATION: 1
RESPIRATORY NEGATIVE: 1
ABDOMINAL DISTENTION: 0
ANAL BLEEDING: 0
COUGH: 0
SPUTUM PRODUCTION: 0
WHEEZING: 0
SHORTNESS OF BREATH: 0
TROUBLE SWALLOWING: 0
VOMITING: 0
COUGH: 0
SORE THROAT: 0
NAUSEA: 0
VOMITING: 0
NAUSEA: 0
COLOR CHANGE: 0
VOMITING: 0
SPUTUM PRODUCTION: 0
COUGH: 0
CONSTIPATION: 0
SHORTNESS OF BREATH: 1
ABDOMINAL DISTENTION: 0
WHEEZING: 1
EYE REDNESS: 0
SORE THROAT: 0
ROS SKIN COMMENTS: SACRAL WOUND
DIARRHEA: 0
ABDOMINAL PAIN: 0
ABDOMINAL DISTENTION: 0
SHORTNESS OF BREATH: 1
VOMITING: 0
SHORTNESS OF BREATH: 0
ABDOMINAL PAIN: 0
APNEA: 0
EYES NEGATIVE: 1
GASTROINTESTINAL NEGATIVE: 1
NAUSEA: 0
CHEST TIGHTNESS: 0
NAUSEA: 0
DIARRHEA: 0
BACK PAIN: 0
CHEST TIGHTNESS: 0
VOMITING: 0
PHOTOPHOBIA: 0
VOICE CHANGE: 0
VOMITING: 0
SINUS PRESSURE: 0
APNEA: 0
WHEEZING: 0
ANAL BLEEDING: 0
BACK PAIN: 0
STRIDOR: 0
COUGH: 0
COUGH: 1
ABDOMINAL PAIN: 0
WHEEZING: 0
ABDOMINAL PAIN: 0
COUGH: 0
SHORTNESS OF BREATH: 1
CONSTIPATION: 1
SHORTNESS OF BREATH: 0
BLOOD IN STOOL: 0
NAUSEA: 0
COUGH: 0
ALLERGIC/IMMUNOLOGIC NEGATIVE: 1
BLOOD IN STOOL: 0
SHORTNESS OF BREATH: 0
ABDOMINAL PAIN: 0
ABDOMINAL PAIN: 0
SHORTNESS OF BREATH: 0
SORE THROAT: 0
COLOR CHANGE: 0
SHORTNESS OF BREATH: 0
ABDOMINAL PAIN: 0
EYE DISCHARGE: 0
ABDOMINAL PAIN: 0
GASTROINTESTINAL NEGATIVE: 1
WHEEZING: 0
HEARTBURN: 0
GASTROINTESTINAL NEGATIVE: 1
HEMOPTYSIS: 0
BLURRED VISION: 0
VOICE CHANGE: 0
SHORTNESS OF BREATH: 0
ABDOMINAL PAIN: 0
EYES NEGATIVE: 1
RESPIRATORY NEGATIVE: 1
RECTAL PAIN: 0
EYE DISCHARGE: 0
COLOR CHANGE: 0
PHOTOPHOBIA: 0
EYES NEGATIVE: 1
SHORTNESS OF BREATH: 0
COUGH: 0
EYE DISCHARGE: 0
PHOTOPHOBIA: 0
EYE DISCHARGE: 0
ALLERGIC/IMMUNOLOGIC NEGATIVE: 1
VOICE CHANGE: 0
ABDOMINAL DISTENTION: 0
TROUBLE SWALLOWING: 0
COLOR CHANGE: 0
BACK PAIN: 1
NAUSEA: 0
WHEEZING: 0
SHORTNESS OF BREATH: 1
VOICE CHANGE: 0
COUGH: 0
VOMITING: 0
NAUSEA: 0
TROUBLE SWALLOWING: 0
COUGH: 0
NAUSEA: 0
COUGH: 1
DIARRHEA: 0
WHEEZING: 0
DIARRHEA: 1
ABDOMINAL DISTENTION: 0
DIARRHEA: 0
EYE DISCHARGE: 0
SORE THROAT: 0
COUGH: 0
VOICE CHANGE: 0
EYE PAIN: 0
ALLERGIC/IMMUNOLOGIC NEGATIVE: 1
RHINORRHEA: 0
ABDOMINAL PAIN: 0
STRIDOR: 0
DIARRHEA: 0
DIARRHEA: 0
COUGH: 0
VOMITING: 1
NAUSEA: 0
COUGH: 1
APNEA: 0
GASTROINTESTINAL NEGATIVE: 1
SPUTUM PRODUCTION: 0
BLOOD IN STOOL: 0
DOUBLE VISION: 0
WHEEZING: 0
EYE DISCHARGE: 0
HEMOPTYSIS: 0
EYE DISCHARGE: 0
SHORTNESS OF BREATH: 1
CONSTIPATION: 0
ABDOMINAL PAIN: 0
RESPIRATORY NEGATIVE: 1
RESPIRATORY NEGATIVE: 1
VOICE CHANGE: 0
VOMITING: 0
ORTHOPNEA: 1
SORE THROAT: 0
RHINORRHEA: 0
CHOKING: 0
FACIAL SWELLING: 0
APNEA: 0
ANAL BLEEDING: 0
APNEA: 0
VOMITING: 0
COLOR CHANGE: 0
ABDOMINAL PAIN: 0
EYE PAIN: 0
SHORTNESS OF BREATH: 1
VOMITING: 0
BACK PAIN: 0
ABDOMINAL PAIN: 0
NAUSEA: 0
EYE REDNESS: 0
TROUBLE SWALLOWING: 0
NAUSEA: 0
VOMITING: 0
SORE THROAT: 0
CHEST TIGHTNESS: 0
VOMITING: 0
WHEEZING: 0
VOMITING: 0
ABDOMINAL PAIN: 0
BLOOD IN STOOL: 0
ABDOMINAL PAIN: 0
SHORTNESS OF BREATH: 1
NAUSEA: 0
SHORTNESS OF BREATH: 0
ANAL BLEEDING: 0
EYE ITCHING: 0
ANAL BLEEDING: 0
GASTROINTESTINAL NEGATIVE: 1
EYES NEGATIVE: 1
COUGH: 0
NAUSEA: 0
BACK PAIN: 0
ABDOMINAL DISTENTION: 0
ABDOMINAL DISTENTION: 0
WHEEZING: 0
SHORTNESS OF BREATH: 0
CONSTIPATION: 1
GASTROINTESTINAL NEGATIVE: 1
RECTAL PAIN: 0
SHORTNESS OF BREATH: 0
ABDOMINAL PAIN: 0
CONSTIPATION: 0
SHORTNESS OF BREATH: 1
EYE DISCHARGE: 0
VOMITING: 0
ANAL BLEEDING: 0
ABDOMINAL PAIN: 0
COUGH: 0
DIARRHEA: 0
SHORTNESS OF BREATH: 0
NAUSEA: 0
CHEST TIGHTNESS: 0
RHINORRHEA: 0
DIARRHEA: 0
SHORTNESS OF BREATH: 0
SHORTNESS OF BREATH: 0
DIARRHEA: 0
NAUSEA: 0
NAUSEA: 0
EYE DISCHARGE: 0
CHOKING: 0
GASTROINTESTINAL NEGATIVE: 1
BACK PAIN: 0
ANAL BLEEDING: 0
SHORTNESS OF BREATH: 0
VOMITING: 0
NAUSEA: 0
ORTHOPNEA: 0
SINUS PRESSURE: 0
ABDOMINAL PAIN: 0
SHORTNESS OF BREATH: 0
APNEA: 0
CHEST TIGHTNESS: 0
SORE THROAT: 0
SHORTNESS OF BREATH: 0
EYES NEGATIVE: 1
ABDOMINAL DISTENTION: 0
EYE DISCHARGE: 0
BACK PAIN: 0
SHORTNESS OF BREATH: 1
BACK PAIN: 1
WHEEZING: 0
NAUSEA: 0
ABDOMINAL PAIN: 0
HEMOPTYSIS: 0
SHORTNESS OF BREATH: 1
COUGH: 0
VOMITING: 0
NAUSEA: 0
RESPIRATORY NEGATIVE: 1
SPUTUM PRODUCTION: 1
COLOR CHANGE: 0
VOMITING: 0
GASTROINTESTINAL NEGATIVE: 1
STRIDOR: 0
DIARRHEA: 0
SHORTNESS OF BREATH: 0
NAUSEA: 0
DIARRHEA: 0
CONSTIPATION: 0
SHORTNESS OF BREATH: 1
NAUSEA: 0
TROUBLE SWALLOWING: 0
ROS SKIN COMMENTS: SACRAL WOUND
NAUSEA: 0
VOMITING: 0
NAUSEA: 0
VOMITING: 0
CONSTIPATION: 1
SHORTNESS OF BREATH: 0
VOICE CHANGE: 0
VOMITING: 0
EYES NEGATIVE: 1
VOMITING: 0
NAUSEA: 1
VOMITING: 0
DIARRHEA: 0
SHORTNESS OF BREATH: 1
WHEEZING: 0
BACK PAIN: 0
SHORTNESS OF BREATH: 0
EYES NEGATIVE: 1
HEMOPTYSIS: 0
SHORTNESS OF BREATH: 0
COUGH: 0
COUGH: 0
BACK PAIN: 0
ANAL BLEEDING: 0
VOMITING: 1
ABDOMINAL DISTENTION: 0
SORE THROAT: 0
VOMITING: 0
WHEEZING: 0
PHOTOPHOBIA: 0

## 2018-01-01 ASSESSMENT — PAIN DESCRIPTION - PAIN TYPE
TYPE: ACUTE PAIN
TYPE: CHRONIC PAIN
TYPE: CHRONIC PAIN
TYPE: ACUTE PAIN
TYPE: ACUTE PAIN;OTHER (COMMENT)
TYPE: ACUTE PAIN
TYPE: CHRONIC PAIN
TYPE: CHRONIC PAIN
TYPE: ACUTE PAIN
TYPE: CHRONIC PAIN
TYPE: CHRONIC PAIN;ACUTE PAIN
TYPE: CHRONIC PAIN
TYPE: ACUTE PAIN
TYPE: OTHER (COMMENT)
TYPE: ACUTE PAIN

## 2018-01-01 ASSESSMENT — PAIN SCALES - GENERAL
PAINLEVEL_OUTOF10: 0
PAINLEVEL_OUTOF10: 9
PAINLEVEL_OUTOF10: 0
PAINLEVEL_OUTOF10: 2
PAINLEVEL_OUTOF10: 0
PAINLEVEL_OUTOF10: 0
PAINLEVEL_OUTOF10: 3
PAINLEVEL_OUTOF10: 0
PAINLEVEL_OUTOF10: 4
PAINLEVEL_OUTOF10: 4
PAINLEVEL_OUTOF10: 7
PAINLEVEL_OUTOF10: 0
PAINLEVEL_OUTOF10: 0
PAINLEVEL_OUTOF10: 4
PAINLEVEL_OUTOF10: 0
PAINLEVEL_OUTOF10: 0
PAINLEVEL_OUTOF10: 8
PAINLEVEL_OUTOF10: 0
PAINLEVEL_OUTOF10: 0
PAINLEVEL_OUTOF10: 6
PAINLEVEL_OUTOF10: 0
PAINLEVEL_OUTOF10: 0
PAINLEVEL_OUTOF10: 2
PAINLEVEL_OUTOF10: 0
PAINLEVEL_OUTOF10: 2
PAINLEVEL_OUTOF10: 0
PAINLEVEL_OUTOF10: 10
PAINLEVEL_OUTOF10: 0
PAINLEVEL_OUTOF10: 6
PAINLEVEL_OUTOF10: 0
PAINLEVEL_OUTOF10: 7
PAINLEVEL_OUTOF10: 0
PAINLEVEL_OUTOF10: 3
PAINLEVEL_OUTOF10: 0
PAINLEVEL_OUTOF10: 3
PAINLEVEL_OUTOF10: 0
PAINLEVEL_OUTOF10: 5
PAINLEVEL_OUTOF10: 0
PAINLEVEL_OUTOF10: 8
PAINLEVEL_OUTOF10: 0
PAINLEVEL_OUTOF10: 8
PAINLEVEL_OUTOF10: 5
PAINLEVEL_OUTOF10: 0
PAINLEVEL_OUTOF10: 0
PAINLEVEL_OUTOF10: 6
PAINLEVEL_OUTOF10: 0
PAINLEVEL_OUTOF10: 6
PAINLEVEL_OUTOF10: 0
PAINLEVEL_OUTOF10: 8
PAINLEVEL_OUTOF10: 0
PAINLEVEL_OUTOF10: 2
PAINLEVEL_OUTOF10: 7
PAINLEVEL_OUTOF10: 2
PAINLEVEL_OUTOF10: 0
PAINLEVEL_OUTOF10: 6
PAINLEVEL_OUTOF10: 0
PAINLEVEL_OUTOF10: 6
PAINLEVEL_OUTOF10: 0
PAINLEVEL_OUTOF10: 5
PAINLEVEL_OUTOF10: 5
PAINLEVEL_OUTOF10: 0
PAINLEVEL_OUTOF10: 5
PAINLEVEL_OUTOF10: 0
PAINLEVEL_OUTOF10: 2
PAINLEVEL_OUTOF10: 0
PAINLEVEL_OUTOF10: 5
PAINLEVEL_OUTOF10: 0
PAINLEVEL_OUTOF10: 5
PAINLEVEL_OUTOF10: 0
PAINLEVEL_OUTOF10: 0
PAINLEVEL_OUTOF10: 8
PAINLEVEL_OUTOF10: 2
PAINLEVEL_OUTOF10: 0
PAINLEVEL_OUTOF10: 7
PAINLEVEL_OUTOF10: 0
PAINLEVEL_OUTOF10: 5
PAINLEVEL_OUTOF10: 0
PAINLEVEL_OUTOF10: 0
PAINLEVEL_OUTOF10: 7
PAINLEVEL_OUTOF10: 4
PAINLEVEL_OUTOF10: 0
PAINLEVEL_OUTOF10: 8
PAINLEVEL_OUTOF10: 0
PAINLEVEL_OUTOF10: 2
PAINLEVEL_OUTOF10: 0
PAINLEVEL_OUTOF10: 2
PAINLEVEL_OUTOF10: 6
PAINLEVEL_OUTOF10: 2
PAINLEVEL_OUTOF10: 0
PAINLEVEL_OUTOF10: 3
PAINLEVEL_OUTOF10: 0
PAINLEVEL_OUTOF10: 9
PAINLEVEL_OUTOF10: 0
PAINLEVEL_OUTOF10: 2
PAINLEVEL_OUTOF10: 0
PAINLEVEL_OUTOF10: 7
PAINLEVEL_OUTOF10: 4
PAINLEVEL_OUTOF10: 0
PAINLEVEL_OUTOF10: 5
PAINLEVEL_OUTOF10: 0
PAINLEVEL_OUTOF10: 6
PAINLEVEL_OUTOF10: 0
PAINLEVEL_OUTOF10: 2
PAINLEVEL_OUTOF10: 0
PAINLEVEL_OUTOF10: 3
PAINLEVEL_OUTOF10: 0
PAINLEVEL_OUTOF10: 4
PAINLEVEL_OUTOF10: 0
PAINLEVEL_OUTOF10: 10
PAINLEVEL_OUTOF10: 0
PAINLEVEL_OUTOF10: 4
PAINLEVEL_OUTOF10: 0
PAINLEVEL_OUTOF10: 0
PAINLEVEL_OUTOF10: 5
PAINLEVEL_OUTOF10: 0
PAINLEVEL_OUTOF10: 8
PAINLEVEL_OUTOF10: 0
PAINLEVEL_OUTOF10: 3
PAINLEVEL_OUTOF10: 0
PAINLEVEL_OUTOF10: 6
PAINLEVEL_OUTOF10: 0
PAINLEVEL_OUTOF10: 2
PAINLEVEL_OUTOF10: 0
PAINLEVEL_OUTOF10: 6
PAINLEVEL_OUTOF10: 0
PAINLEVEL_OUTOF10: 0
PAINLEVEL_OUTOF10: 2
PAINLEVEL_OUTOF10: 0
PAINLEVEL_OUTOF10: 6
PAINLEVEL_OUTOF10: 0
PAINLEVEL_OUTOF10: 2
PAINLEVEL_OUTOF10: 0
PAINLEVEL_OUTOF10: 0
PAINLEVEL_OUTOF10: 3
PAINLEVEL_OUTOF10: 0
PAINLEVEL_OUTOF10: 6
PAINLEVEL_OUTOF10: 0
PAINLEVEL_OUTOF10: 6
PAINLEVEL_OUTOF10: 0
PAINLEVEL_OUTOF10: 8
PAINLEVEL_OUTOF10: 0
PAINLEVEL_OUTOF10: 8
PAINLEVEL_OUTOF10: 0
PAINLEVEL_OUTOF10: 0
PAINLEVEL_OUTOF10: 6
PAINLEVEL_OUTOF10: 7
PAINLEVEL_OUTOF10: 0
PAINLEVEL_OUTOF10: 7
PAINLEVEL_OUTOF10: 0
PAINLEVEL_OUTOF10: 8
PAINLEVEL_OUTOF10: 2
PAINLEVEL_OUTOF10: 0
PAINLEVEL_OUTOF10: 5
PAINLEVEL_OUTOF10: 0
PAINLEVEL_OUTOF10: 5
PAINLEVEL_OUTOF10: 0
PAINLEVEL_OUTOF10: 8

## 2018-01-01 ASSESSMENT — PULMONARY FUNCTION TESTS
PIF_VALUE: 23
PIF_VALUE: 3
PIF_VALUE: 10
PIF_VALUE: 33
PIF_VALUE: 23
PIF_VALUE: 24
PIF_VALUE: 1
PIF_VALUE: 23
PIF_VALUE: 25
PIF_VALUE: 20
PIF_VALUE: 23
PIF_VALUE: 25
PIF_VALUE: 23
PIF_VALUE: 0
PIF_VALUE: 23
PIF_VALUE: 24
PIF_VALUE: 1
PIF_VALUE: 17
PIF_VALUE: 42
PIF_VALUE: 38
PIF_VALUE: 2
PIF_VALUE: 23
PIF_VALUE: 23
PIF_VALUE: 31
PIF_VALUE: 19
PIF_VALUE: 16
PIF_VALUE: 29
PIF_VALUE: 23
PIF_VALUE: 23
PIF_VALUE: 16
PIF_VALUE: 0
PIF_VALUE: 24
PIF_VALUE: 24
PIF_VALUE: 23
PIF_VALUE: 22
PIF_VALUE: 20
PIF_VALUE: 16
PIF_VALUE: 25
PIF_VALUE: 30
PIF_VALUE: 15
PIF_VALUE: 35
PIF_VALUE: 24
PIF_VALUE: 0
PIF_VALUE: 24
PIF_VALUE: 0
PIF_VALUE: 21
PIF_VALUE: 1
PIF_VALUE: 12
PIF_VALUE: 20
PIF_VALUE: 21
PIF_VALUE: 24
PIF_VALUE: 11
PIF_VALUE: 16
PIF_VALUE: 1
PIF_VALUE: 23
PIF_VALUE: 17
PIF_VALUE: 1
PIF_VALUE: 25
PIF_VALUE: 2
PIF_VALUE: 20
PIF_VALUE: 12
PIF_VALUE: 2
PIF_VALUE: 24
PIF_VALUE: 20
PIF_VALUE: 1
PIF_VALUE: 16
PIF_VALUE: 0
PIF_VALUE: 15
PIF_VALUE: 22
PIF_VALUE: 23
PIF_VALUE: 1
PIF_VALUE: 1
PIF_VALUE: 23
PIF_VALUE: 23
PIF_VALUE: 20
PIF_VALUE: 23
PIF_VALUE: 20
PIF_VALUE: 1
PIF_VALUE: 22
PIF_VALUE: 2
PIF_VALUE: 0
PIF_VALUE: 23
PIF_VALUE: 0
PIF_VALUE: 1
PIF_VALUE: 25
PIF_VALUE: 24
PIF_VALUE: 0
PIF_VALUE: 20
PIF_VALUE: 1
PIF_VALUE: 0
PIF_VALUE: 59
PIF_VALUE: 19
PIF_VALUE: 16
PIF_VALUE: 0
PIF_VALUE: 25
PIF_VALUE: 18
PIF_VALUE: 24
PIF_VALUE: 19
PIF_VALUE: 23
PIF_VALUE: 13
PEFR_L/MIN: 60
PIF_VALUE: 20
PIF_VALUE: 0
PIF_VALUE: 25
PIF_VALUE: 26
PIF_VALUE: 23
PIF_VALUE: 23
PIF_VALUE: 15
PIF_VALUE: 11
PIF_VALUE: 2
PIF_VALUE: 22
PIF_VALUE: 0
PIF_VALUE: 0
PIF_VALUE: 22
PIF_VALUE: 16
PIF_VALUE: 25
PIF_VALUE: 16
PIF_VALUE: 23
PIF_VALUE: 20
PIF_VALUE: 22
PIF_VALUE: 24
PIF_VALUE: 2
PIF_VALUE: 24
PIF_VALUE: 21
PIF_VALUE: 32
PIF_VALUE: 18
PIF_VALUE: 27
PIF_VALUE: 26
PIF_VALUE: 20
PIF_VALUE: 23
PIF_VALUE: 22
PIF_VALUE: 23
PIF_VALUE: 16
PIF_VALUE: 25
PIF_VALUE: 1
PIF_VALUE: 23
PIF_VALUE: 20
PIF_VALUE: 29
PIF_VALUE: 23
PIF_VALUE: 24
PIF_VALUE: 23
PIF_VALUE: 16
PIF_VALUE: 1
PIF_VALUE: 6.7
PIF_VALUE: 20
PIF_VALUE: 1
PIF_VALUE: 23
PIF_VALUE: 37
PIF_VALUE: 42
PIF_VALUE: 23
PIF_VALUE: 20
PIF_VALUE: 22
PIF_VALUE: 1
PIF_VALUE: 26
PIF_VALUE: 1
PIF_VALUE: 15
PIF_VALUE: 22
PIF_VALUE: 1
PIF_VALUE: 3
PIF_VALUE: 25
PIF_VALUE: 10
PIF_VALUE: 25
PIF_VALUE: 23
PIF_VALUE: 1
PIF_VALUE: 24
PIF_VALUE: 36
PIF_VALUE: 16
PIF_VALUE: 2
PIF_VALUE: 20
PIF_VALUE: 20
PIF_VALUE: 2
PIF_VALUE: 24
PIF_VALUE: 29
PIF_VALUE: 23
PIF_VALUE: 0
PIF_VALUE: 0
PIF_VALUE: 1
PIF_VALUE: 24
PIF_VALUE: 0
PIF_VALUE: 22
PIF_VALUE: 15
PIF_VALUE: 0
PIF_VALUE: 26
PIF_VALUE: 10
PIF_VALUE: 23
PIF_VALUE: 22
PIF_VALUE: 0
PIF_VALUE: 11
PIF_VALUE: 0
PIF_VALUE: 24
PIF_VALUE: 13
PIF_VALUE: 10
PIF_VALUE: 2
PIF_VALUE: 23
PIF_VALUE: 0
PIF_VALUE: 15
PIF_VALUE: 25
PIF_VALUE: 23
PIF_VALUE: 25
PIF_VALUE: 20
PIF_VALUE: 23
PIF_VALUE: 24
PIF_VALUE: 14
PIF_VALUE: 23
PIF_VALUE: 22
PIF_VALUE: 16
PIF_VALUE: 23
PIF_VALUE: 23
PIF_VALUE: 22
PIF_VALUE: 12
PIF_VALUE: 16
PIF_VALUE: 0
PIF_VALUE: 1
PIF_VALUE: 32
PIF_VALUE: 9.19
PIF_VALUE: 28
PIF_VALUE: 1
PIF_VALUE: 21
PIF_VALUE: 10
PIF_VALUE: 21
PIF_VALUE: 28
PIF_VALUE: 1
PIF_VALUE: 16
PIF_VALUE: 23
PIF_VALUE: 24
PIF_VALUE: 20
PIF_VALUE: 1
PIF_VALUE: 3
PIF_VALUE: 0
PIF_VALUE: 1
PIF_VALUE: 25
PIF_VALUE: 24
PIF_VALUE: 1
PIF_VALUE: 26
PIF_VALUE: 0
PIF_VALUE: 23
PIF_VALUE: 26
PIF_VALUE: 19
PIF_VALUE: 23
PIF_VALUE: 2
PIF_VALUE: 0
PIF_VALUE: 22
PIF_VALUE: 23
PIF_VALUE: 23
PIF_VALUE: 24
PIF_VALUE: 16
PIF_VALUE: 15
PIF_VALUE: 0
PIF_VALUE: 24
PIF_VALUE: 20
PIF_VALUE: 26
PIF_VALUE: 16
PIF_VALUE: 16
PIF_VALUE: 23
PIF_VALUE: 23
PIF_VALUE: 22
PIF_VALUE: 1
PIF_VALUE: 13
PIF_VALUE: 20
PIF_VALUE: 0
PIF_VALUE: 31
PIF_VALUE: 22
PIF_VALUE: 16
PIF_VALUE: 0
PIF_VALUE: 23
PIF_VALUE: 13
PIF_VALUE: 20
PIF_VALUE: 22
PIF_VALUE: 2
PIF_VALUE: 25
PIF_VALUE: 22
PIF_VALUE: 25
PIF_VALUE: 2
PIF_VALUE: 23
PIF_VALUE: 2
PIF_VALUE: 24
PIF_VALUE: 31
PIF_VALUE: 2
PIF_VALUE: 11
PIF_VALUE: 2
PIF_VALUE: 2
PIF_VALUE: 1
PIF_VALUE: 20
PIF_VALUE: 1
PIF_VALUE: 23
PIF_VALUE: 15
PIF_VALUE: 25
PIF_VALUE: 25
PIF_VALUE: 3
PIF_VALUE: 17
PIF_VALUE: 24
PIF_VALUE: 19
PIF_VALUE: 3
PIF_VALUE: 12
PIF_VALUE: 0
PIF_VALUE: 23
PIF_VALUE: 16
PIF_VALUE: 25
PIF_VALUE: 23
PIF_VALUE: 1
PIF_VALUE: 0
PIF_VALUE: 13
PIF_VALUE: 2
PIF_VALUE: 12
PIF_VALUE: 23
PIF_VALUE: 0
PIF_VALUE: 1

## 2018-01-01 ASSESSMENT — PAIN DESCRIPTION - DESCRIPTORS
DESCRIPTORS: ACHING
DESCRIPTORS: THROBBING
DESCRIPTORS: CONSTANT;DISCOMFORT
DESCRIPTORS: DISCOMFORT
DESCRIPTORS: ACHING
DESCRIPTORS: ACHING
DESCRIPTORS: SORE
DESCRIPTORS: SORE
DESCRIPTORS_2: DISCOMFORT
DESCRIPTORS: HEADACHE
DESCRIPTORS: ACHING;DISCOMFORT
DESCRIPTORS: ACHING

## 2018-01-01 ASSESSMENT — PAIN DESCRIPTION - LOCATION
LOCATION: BACK
LOCATION: COCCYX
LOCATION: KNEE
LOCATION: KNEE
LOCATION_2: HEAD
LOCATION: BACK
LOCATION: BACK
LOCATION: SACRUM
LOCATION: HEAD
LOCATION: HEAD
LOCATION: SACRUM
LOCATION: HEAD
LOCATION: ARM;HIP;LEG
LOCATION: SACRUM
LOCATION: LEG;BUTTOCKS
LOCATION: HEAD
LOCATION: SACRUM
LOCATION: BACK
LOCATION: SACRUM
LOCATION: BACK
LOCATION: BACK
LOCATION: BACK;BUTTOCKS
LOCATION: BUTTOCKS
LOCATION: BUTTOCKS
LOCATION: KNEE
LOCATION: HEAD
LOCATION: CHEST;ABDOMEN
LOCATION: ANKLE
LOCATION: KNEE
LOCATION: BACK;COCCYX
LOCATION: HEAD

## 2018-01-01 ASSESSMENT — PAIN DESCRIPTION - FREQUENCY
FREQUENCY: INTERMITTENT
FREQUENCY: CONTINUOUS
FREQUENCY: INTERMITTENT
FREQUENCY: INTERMITTENT
FREQUENCY: CONTINUOUS

## 2018-01-01 ASSESSMENT — PAIN DESCRIPTION - ORIENTATION
ORIENTATION: LOWER
ORIENTATION: RIGHT
ORIENTATION: LOWER
ORIENTATION: MID
ORIENTATION: RIGHT
ORIENTATION: RIGHT
ORIENTATION_2: RIGHT
ORIENTATION: RIGHT
ORIENTATION: LOWER
ORIENTATION: RIGHT
ORIENTATION: MID
ORIENTATION: RIGHT

## 2018-01-01 ASSESSMENT — PAIN SCALES - WONG BAKER
WONGBAKER_NUMERICALRESPONSE: 4
WONGBAKER_NUMERICALRESPONSE: 6
WONGBAKER_NUMERICALRESPONSE: 6
WONGBAKER_NUMERICALRESPONSE: 4
WONGBAKER_NUMERICALRESPONSE: 4

## 2018-01-01 ASSESSMENT — PAIN DESCRIPTION - PROGRESSION
CLINICAL_PROGRESSION: GRADUALLY WORSENING
CLINICAL_PROGRESSION: NOT CHANGED
CLINICAL_PROGRESSION: NOT CHANGED
CLINICAL_PROGRESSION: GRADUALLY WORSENING
CLINICAL_PROGRESSION: GRADUALLY WORSENING
CLINICAL_PROGRESSION: GRADUALLY IMPROVING
CLINICAL_PROGRESSION: NOT CHANGED
CLINICAL_PROGRESSION: GRADUALLY WORSENING
CLINICAL_PROGRESSION: NOT CHANGED
CLINICAL_PROGRESSION: GRADUALLY WORSENING
CLINICAL_PROGRESSION: GRADUALLY WORSENING
CLINICAL_PROGRESSION: NOT CHANGED

## 2018-01-01 ASSESSMENT — PAIN DESCRIPTION - DURATION: DURATION_2: CONTINUOUS

## 2018-01-01 ASSESSMENT — PAIN DESCRIPTION - ONSET
ONSET: ON-GOING
ONSET: ON-GOING
ONSET: GRADUAL
ONSET: GRADUAL
ONSET: ON-GOING
ONSET: ON-GOING
ONSET: GRADUAL

## 2018-01-01 ASSESSMENT — HEART SCORE: ECG: 1

## 2018-01-01 ASSESSMENT — PAIN DESCRIPTION - INTENSITY: RATING_2: 3

## 2018-01-01 ASSESSMENT — PAIN - FUNCTIONAL ASSESSMENT: PAIN_FUNCTIONAL_ASSESSMENT: 0-10

## 2018-01-01 ASSESSMENT — COPD QUESTIONNAIRES: CAT_SEVERITY: NO INTERVAL CHANGE

## 2018-01-02 NOTE — ED NOTES
D/C instructions reviewed, pt and family verbalized understanding.  Pt pushed out to awaiting car via CLAUD[address] by mayur Panda RN  01/02/18 8271

## 2018-01-02 NOTE — ED TRIAGE NOTES
Pt fell, states she bent over and fell backwards, hit back of head on floor, she is on eliquis, denies loc, denies vomiting, denies blurred vision, rates headache pain 2/10.

## 2018-01-02 NOTE — ED PROVIDER NOTES
[facility] ED  eMERGENCY dEPARTMENT eNCOUnter      Pt Name: Stephanie Arevalo  MRN: [id number]  [place] 1935  Date of evaluation: 1/2/2018  Provider: Darren Suarez Dr       Chief Complaint   Patient presents with    Fall     hit head on floor, on eliquis         HISTORY OF PRESENT ILLNESS   (Location/Symptom, Timing/Onset, Context/Setting, Quality, Duration, Modifying Factors, Severity)  Note limiting factors. Stephanie Arevalo is a 81 y.o. female who presents to the emergency department Presents with fall she the back of her head patient states she bent over and went get back up fell backwards. She denies loss of consciousness denies neck pain back pain chest pain shortness of breath denies nausea vomiting denies change in vision or speech difficulty. Patient is on Eliquis and is concerned about possibly for bleeding. Symptoms mild in severity nothing improves or worsens symptoms. HPI    Nursing Notes were reviewed. REVIEW OF SYSTEMS    (2-9 systems for level 4, 10 or more for level 5)     Review of Systems   Constitutional: Negative for activity change, appetite change and unexpected weight change. HENT: Negative for ear discharge, nosebleeds and voice change. Eyes: Negative for photophobia and discharge. Respiratory: Negative for apnea, cough and shortness of breath. Cardiovascular: Negative for chest pain. Gastrointestinal: Negative for abdominal distention, anal bleeding, nausea and vomiting. Endocrine: Negative for cold intolerance, heat intolerance and polyphagia. Genitourinary: Negative for genital sores. Musculoskeletal: Negative for joint swelling, neck pain and neck stiffness. Skin: Negative for pallor. Allergic/Immunologic: Negative for immunocompromised state. Neurological: Positive for headaches. Negative for dizziness, tremors, seizures, syncope and facial asymmetry. Hematological: Does not bruise/bleed easily. sounds normal. No stridor. No respiratory distress. She has no wheezes. She has no rales. Abdominal: Soft. Bowel sounds are normal. She exhibits no distension. There is no tenderness. There is no rebound. Musculoskeletal: Normal range of motion. She exhibits no tenderness or deformity. Negative C, T, L tenderness. Neurological: She is alert and oriented to person, place, and time. No cranial nerve deficit. Skin: Skin is warm. No erythema. Psychiatric: She has a normal mood and affect. Her behavior is normal.   Nursing note and vitals reviewed. DIAGNOSTIC RESULTS     EKG: All EKG's are interpreted by the Emergency Department Physician who either signs or Co-signs this chart in the absence of a cardiologist.         RADIOLOGY:   Non-plain film images such as CT, Ultrasound and MRI are read by the radiologist. Plain radiographic images are visualized and preliminarily interpreted by the emergency physician with the below findings:    See final rad report    Interpretation per the Radiologist below, if available at the time of this note:    CT Head WO Contrast    (Results Pending)         ED BEDSIDE ULTRASOUND:   Performed by ED Physician - none    LABS:  Labs Reviewed - No data to display    All other labs were within normal range or not returned as of this dictation. EMERGENCY DEPARTMENT COURSE and DIFFERENTIAL DIAGNOSIS/MDM:   Vitals:    Vitals:    01/02/18 0458   BP: (!) 182/77   Pulse: 75   Resp: 18   Temp: 98.4 °F (36.9 °C)   TempSrc: Oral   SpO2: 100%   Weight: 123 lb (55.8 kg)   Height: 4' 11\" (1.499 m)            MDM  Number of Diagnoses or Management Options  Fall, initial encounter:   Injury of head, initial encounter:   Diagnosis management comments: . Patient I discussed follow-up with primary care tomorrow return to ER if any symptoms worsen his symptoms.        Amount and/or Complexity of Data Reviewed  Tests in the radiology section of CPT®: ordered and reviewed  Discuss the patient

## 2018-01-02 NOTE — ED NOTES
Pt back from CT with this RN. Pt was able to stand and pivot to CT table with assist x 1. CT obtained without issue. Pt back in bed, call light within reach, requests lights be turned off, warm blanket given for comfort.  Pt appears in no acute distress at this, will continue to monitor     Abdullahi Marmolejo RN  01/02/18 5649

## 2018-01-14 NOTE — ED PROVIDER NOTES
[facility] ED  eMERGENCY dEPARTMENT eNCOUnter      Pt Name: Charlotte Wilson  MRN: [id number]  [place] 1935  Date of evaluation: 1/14/2018  Provider: Maureen Cesar PA-C    CHIEF COMPLAINT       Chief Complaint   Patient presents with    Shortness of Breath         HISTORY OF PRESENT ILLNESS   (Location/Symptom, Timing/Onset, Context/Setting, Quality, Duration, Modifying Factors, Severity)  Note limiting factors. Charlotte Wilson is a 81 y.o. female who presents to the emergency department Patient presents with shortness of breath ×2 days has slight cough vomiting post dialysis patient denies fever chills chest pain. She notes she has a pulse oximeter home and it was 93-94%. Family at bedside. Right history as well patient hard of hearing symptoms mild to moderate severity nothing improves or worsens symptoms    HPI    Nursing Notes were reviewed. REVIEW OF SYSTEMS    (2-9 systems for level 4, 10 or more for level 5)     Review of Systems   Constitutional: Negative for activity change, appetite change and unexpected weight change. HENT: Negative for ear discharge, nosebleeds and voice change. Eyes: Negative for photophobia and discharge. Respiratory: Positive for shortness of breath. Negative for apnea and cough. Cardiovascular: Negative for chest pain. Gastrointestinal: Positive for vomiting. Negative for abdominal distention and anal bleeding. Endocrine: Negative for cold intolerance, heat intolerance and polyphagia. Genitourinary: Negative for genital sores. Musculoskeletal: Negative for joint swelling. Skin: Negative for pallor. Allergic/Immunologic: Negative for immunocompromised state. Neurological: Negative for seizures and facial asymmetry. Hematological: Does not bruise/bleed easily. Psychiatric/Behavioral: Negative for behavioral problems, self-injury and sleep disturbance. All other systems reviewed and are negative.       Except as noted above DEPARTMENT COURSE and DIFFERENTIAL DIAGNOSIS/MDM:   Vitals:    Vitals:    01/14/18 0229 01/14/18 0339   BP: (!) 176/68 (!) 165/61   Pulse: 74 71   Resp: 18 19   Temp: 98.3 °F (36.8 °C)    TempSrc: Oral    SpO2: 100% 97%   Weight: 125 lb (56.7 kg)    Height: 4' 11\" (1.499 m)             MDM  Number of Diagnoses or Management Options  Diagnosis management comments: Pt sleeping comfortably in emergency wakes to voice states she feels better family at bedside patient's EKG and x-ray lab work discussed patient to return to ER for any symptoms worsen.  on Monday. Amount and/or Complexity of Data Reviewed  Clinical lab tests: reviewed and ordered  Tests in the radiology section of CPT®: ordered and reviewed  Tests in the medicine section of CPT®: reviewed  Obtain history from someone other than the patient: yes  Discuss the patient with other providers: yes        CRITICAL CARE TIME       CONSULTS:  None    PROCEDURES:  Unless otherwise noted below, none     Procedures    FINAL IMPRESSION      1. Shortness of breath    2.  Chronic kidney disease, unspecified CKD stage          DISPOSITION/PLAN   DISPOSITION Decision To Discharge 01/14/2018 05:11:12 AM      PATIENT REFERRED TO:  Jessica Velasco  [address], [address] [id number] [phone]    Schedule an appointment as soon as possible for a visit in 2 days      [facility]) ED  [address] [address]  [phone]    If symptoms worsen      DISCHARGE MEDICATIONS:  New Prescriptions    No medications on file          (Please note that portions of this note were completed with a voice recognition program.  Efforts were made to edit the dictations but occasionally words are mis-transcribed.)    Best Mueller PA-C (electronically signed)  Attending Emergency Physician         Best Mueller PA-C  01/15/18 1052

## 2018-01-15 NOTE — TELEPHONE ENCOUNTER
Pt past due, unable to leave a message as mailbox is full. 2nd phone call, will send another letter.     [facility]  Staff Pharmacist  1/15/2018  10:25 AM

## 2018-01-26 PROBLEM — A04.72 C. DIFFICILE COLITIS: Status: RESOLVED | Noted: 2017-11-10 | Resolved: 2018-01-01

## 2018-01-26 PROBLEM — J18.9 PNEUMONIA OF LOWER LOBE DUE TO INFECTIOUS ORGANISM: Status: RESOLVED | Noted: 2017-10-25 | Resolved: 2018-01-01

## 2018-01-26 PROBLEM — K51.00 PANCOLITIS (HCC): Status: RESOLVED | Noted: 2017-11-10 | Resolved: 2018-01-01

## 2018-01-26 PROBLEM — D64.9 ANEMIA: Status: RESOLVED | Noted: 2017-11-10 | Resolved: 2018-01-01

## 2018-01-26 PROBLEM — D72.829 LEUKOCYTOSIS: Status: RESOLVED | Noted: 2017-11-10 | Resolved: 2018-01-01

## 2018-01-26 PROBLEM — J18.9 PNEUMONIA: Status: ACTIVE | Noted: 2018-01-01

## 2018-01-26 PROBLEM — I49.5 SSS (SICK SINUS SYNDROME) (HCC): Status: RESOLVED | Noted: 2017-11-12 | Resolved: 2018-01-01

## 2018-01-26 PROBLEM — J44.1 CHRONIC OBSTRUCTIVE PULMONARY DISEASE WITH ACUTE EXACERBATION (HCC): Status: ACTIVE | Noted: 2018-01-01

## 2018-01-26 NOTE — CONSULTS
Consults   Pulmonary Medicine  Consult Note      Reason for consultation: Pneumonia   Consulting physician: Dr. Jennifer Mccrary:    This is a 81-year-old female who was presented to ER with increased shortness of breath for last 2 days which get worse in evening time. Patient has a family member who was sick Home with upper respiratory tract infection. Patient denies any fever or chills. No chest pain or pleuritic pain. No hemoptysis. She has some productive cough for mostly clear mucus. No nausea vomiting diarrhea or abdominal pain. Patient just came back from dialysis. She had a chest x-ray done which shows small left pleural effusion lungs are clear. Patient said she is feeling better after dialysis ,about 2 L of fluids was removed today.     Past Medical History:        Diagnosis Date    Abnormal presence of protein in urine 6/2004    Dr. Blair Luna    Atrial fibrillation ([facility]     Constipation, chronic     Diverticular disease of the colon     Fistula     left    GERD (gastroesophageal reflux disease)     GERD, PUD, Hiatal Hernia    Granulomatous lung disease ([address]     History of endoscopy 2-21-12    Dr. Zaira Braden Hyperlipidemia     Hypothyroidism     Kidney stones 11/2001    Dr. Blair Luna    Neuropathy in diabetes ([facility]     legs    Osteoarthritis     Positive ORTEGA (antinuclear antibody)     1:80    Tachycardia     Thrombophlebitis leg superficial     Type II or unspecified type diabetes mellitus without mention of complication, not stated as uncontrolled 1998       Past Surgical History:        Procedure Laterality Date    APPENDECTOMY  2007    BLADDER SUSPENSION  2008 & 2009    [facility], second at [address]  2/2006    COLONOSCOPY  4/2007    Dr. Tarsha Smith [address] N/A 11/2/2017    CATHETER INSERTION HEMODIALYSIS performed by Rosalba Au MD at [address] [place] [address] Rd  [phone]    Bilateral    MV Settings: ABGs: No results for input(s): PHART, NPJ3NVZ, PO2ART, XLN5FAJ, BEART, T0QBJUAS, KNZ1DTO in the last 72 hours. O2 Device: Nasal cannula  O2 Flow Rate (L/min): 2 L/min  Lab Results   Component Value Date    LACTA 2.0 11/09/2017    LACTA 0.7 04/14/2016    LACTA 1.7 10/02/2013       Radiology  Xr Chest Standard (2 Vw)    Result Date: 1/26/2018  Chest 2 views. HISTORY: Cough. FINDINGS: Comparison, January 14, 2018. Pacemaker generator and wires, unchanged in position. Dual-lumen right jugular vein central line, unchanged in position. Cardiopericardial silhouette is normal. Blunting left costophrenic angle unchanged. Lungs clear. Small left effusion. No interval change. Assessment and  plan:     1. Shortness of breath secondary to fluid overload, improved   2. Small left pleural effusion secondary to above  3. Doubt active pneumonia  4. COPD mild exacerbation  5. Hypothyroidism  6. Hyperlipidemia  7. Diabetes mellitus  8. Hypertension  9. Chronic kidney disease on dialysis    Patient said her shortness of breath is significantly improved after dialysis. Patient is on nebulizer with DuoNeb 3 times a day and albuterol every 2 hours when necessary. She is on Solu-Medrol 20 mg every 12 hours  and empirically Rocephin 1 g every 24 hourly. Chest x-ray shows small left pleural effusion without definite infiltration, lungs are clear and doubt active pneumonia. WBC is 5.8k and patient is afebrile. At this time it didn't present treatment plan if patient remained afebrile change antibiotic to by mouth.   Thank you for this consultation    Electronically signed by Armando Marcano MD, FCCP on 1/26/2018 at 4:23 PM

## 2018-01-26 NOTE — PROGRESS NOTES
RECOMMENDATIONS:  Discharge Recommendations: Continue to assess pending progress    Assessment: Pt demonstrates the above deficits. Pt would benefit from  physical therapy while in house to prevent decline in function. Pt would also benefit to resume physical therapy to improve safety & independence with ambulation, improve LE strength, improve standing balance, improve endurance to allow for decrease risk for falls, return to previous level of function, and improve QOL. REQUIRES PT FOLLOW UP: Yes      PLAN OF CARE:  Plan  Times per week: 3-6  Times per day: Daily  Current Treatment Recommendations: Strengthening, Functional Mobility Training, Neuromuscular Re-education, Home Exercise Program, Equipment Evaluation, Education, & procurement, Transfer Training, Gait Training, Balance Training, Endurance Training, Patient/Caregiver Education & Training, Safety Education & Training  Safety Devices  Type of devices: All fall risk precautions in place    G-Code:  PT G-Codes  Functional Assessment Tool Used: clinical judgement  Functional Limitation: Mobility: Walking and moving around  Mobility: Walking and Moving Around Current Status (): At least 20 percent but less than 40 percent impaired, limited or restricted  Mobility: Walking and Moving Around Goal Status (): At least 1 percent but less than 20 percent impaired, limited or restricted    Goals:  Patient goals :  \"Go home\"  Short term goals  Short term goal 1: indep with functional transfers  Short term goal 2: amb >50ft with LRAD vs no AD with mod I  Short term goal 3: indep with HEP to improve LE strength and balance  Short term goal 4: 5xSTS <15.0 seconds    Therapy Time:   Individual   Time In 0926   Time Out 0943   Minutes [address], [place], 01/26/18 at 9:53 AM

## 2018-01-26 NOTE — ED PROVIDER NOTES
Normocephalic and atraumatic. Right Ear: External ear normal.   Left Ear: External ear normal.   Mouth/Throat: Oropharynx is clear and moist. No oropharyngeal exudate. Eyes: Conjunctivae and EOM are normal. Pupils are equal, round, and reactive to light. Neck: Normal range of motion. Neck supple. No tracheal deviation present. Cardiovascular: Normal heart sounds and intact distal pulses. Pulmonary/Chest: Effort normal and breath sounds normal. No stridor. No respiratory distress. No labored respirations. Faint crackles auscultated in left lower base. Lungs clear to auscultation throughout   Abdominal: Soft. Bowel sounds are normal. She exhibits no distension and no mass. There is no tenderness. There is no rebound and no guarding. Musculoskeletal: Normal range of motion. Neurological: She is alert and oriented to person, place, and time. She has normal reflexes. Skin: Skin is warm and dry. No rash noted. Psychiatric: She has a normal mood and affect.  Her behavior is normal. Judgment and thought content normal.       DIAGNOSTIC RESULTS     EKG: All EKG's are interpreted by the Emergency Department Physician who either signs or Co-signs this chart in the absence of a cardiologist.    EKG shows sinus rhythm, heart rate 77, normal intervals, normal axis, no ST segment changes    RADIOLOGY:   Non-plain film images such as CT, Ultrasound and MRI are read by the radiologist. Plain radiographic images are visualized and preliminarily interpreted by the emergency physician with the below findings:    Interpretation per the Radiologist below, if available at the time of this note:    XR CHEST STANDARD (2 VW)    (Results Pending)           LABS:  Labs Reviewed   CBC WITH AUTO DIFFERENTIAL - Abnormal; Notable for the following:        Result Value    RBC 3.42 (*)     Hemoglobin 10.7 (*)     Hematocrit 33.1 (*)     MCH 31.4 (*)     MCHC 32.4 (*)     RDW 16.9 (*)     Platelets 423 (*)     All other

## 2018-01-26 NOTE — CONSULTS
[facility]  Consult [facility]           Reason for Consult:  Fluid overload, esrd  Requesting Physician:  Dr. Moore Patient    Chief Complaint:  Shortness of breath  History Obtained From:  patient, electronic medical record    History of Present Ilness:    81y.o. year old female admitted with shortness of breath. Follows Dr. Asia Simons for ESRD. On HD MWF. Had CXR on 1/14 that was ok. Now with some congestion vs pna. Pt seen on HD.  3 liters removed. Feels better since fluid removed. 3k bath. Past Medical History:        Diagnosis Date    Abnormal presence of protein in urine 6/2004    Dr. Marianela Campos    Atrial fibrillation ([facility] [address]     Constipation, chronic     Diverticular disease of the colon     Fistula     left    GERD (gastroesophageal reflux disease)     GERD, PUD, Hiatal Hernia    Granulomatous lung disease ([facility] [address]     History of endoscopy 2-21-12    Dr. Lidia Coleman Hyperlipidemia     Hypothyroidism     Kidney stones 11/2001    Dr. Marianela Campos    Neuropathy in diabetes ([facility] [address]     legs    Osteoarthritis     Positive ORTEGA (antinuclear antibody)     1:80    Tachycardia     Thrombophlebitis leg superficial     Type II or unspecified type diabetes mellitus without mention of complication, not stated as uncontrolled 1998       Past Surgical History:        Procedure Laterality Date    APPENDECTOMY  2007    BLADDER SUSPENSION  2008 & 2009    CCv[facility]y Pires at [address]  2/2006    COLONOSCOPY  4/2007    Dr. Marlin Watts [address] N/A 11/2/2017    CATHETER INSERTION HEMODIALYSIS performed by Janneth Bonds MD at [address] [place] [address] Rd  [phone]    Bilateral    [facility]  3/2003       Home Medications:    No current facility-administered medications on file prior to encounter.       Current Outpatient Prescriptions on File Prior to Encounter   Medication Sig Dispense Refill    docusate sodium (COLACE) 100 MG capsule Take 100 mg by mouth daily      losartan (COZAAR) 50 MG tablet Take 50 mg by mouth daily      apixaban (ELIQUIS) 2.5 MG TABS tablet Take 1 tablet by mouth 2 times daily 60 tablet 3    diltiazem (CARDIZEM CD) 180 MG extended release capsule Take 1 capsule by mouth 2 times daily 30 capsule 3    amiodarone (CORDARONE) 200 MG tablet Take 1 tablet by mouth daily 30 tablet 3    cholestyramine light 4 g packet Take 0.5 packets by mouth 3 times daily as needed (DIARRHEA) 60 packet 3    buprenorphine (BUPRENEX) 5 MCG/HR PTWK Place 1 patch onto the skin once a week 1 patch 0    hydrALAZINE (APRESOLINE) 100 MG tablet Take 1 tablet by mouth 3 times daily 90 tablet 3    venlafaxine (EFFEXOR XR) 75 MG extended release capsule 150 mg       levothyroxine (SYNTHROID) 50 MCG tablet TAKE 1 TABLET DAILY ALTERNATING WITH THE OTHER DOSE EVERY OTHER DAY 50 tablet 3    levothyroxine (SYNTHROID) 25 MCG tablet TAKE 1 TABLET DAILY ALTERNATING WITH 50 MCG DOSE EVERY OTHER DAY 50 tablet 2    pantoprazole sodium (PROTONIX) 40 MG PACK packet Take 40 mg by mouth every morning (before breakfast)       CRESTOR 40 MG tablet       chlorthalidone (HYGROTON) 25 MG tablet Take 25 mg by mouth daily      fluticasone (FLONASE) 50 MCG/ACT nasal spray 2 sprays by Nasal route daily.         ondansetron (ZOFRAN) 4 MG tablet Take 4 mg by mouth every 8 hours as needed for Nausea or Vomiting      Amino Acids-Protein Hydrolys (PRO-STAT) LIQD Take 30 mLs by mouth 2 times daily      calcium acetate (PHOSLO) 667 MG capsule Take 667 mg by mouth 3 times daily (with meals)      B Complex-C-Folic Acid (RENAL) 1 MG CAPS Take 1 capsule by mouth daily      LANTUS SOLOSTAR 100 UNIT/ML injection pen INJECT 14 UNITS IN THE MORNING 75 mL 2    magnesium oxide (MAG-OX) 400 (241.3 Mg) MG TABS tablet Take 1 tablet by mouth daily 30 tablet 0    vancomycin (VANCOCIN) 50 mg/mL oral solution Take 2.5 mLs by mouth every 8 hours      ALPRAZolam (XANAX) 0.5 MG tablet Take 1 tablet by mouth 3 times daily as needed for Anxiety 10 tablet 0    ipratropium-albuterol (DUONEB) 0.5-2.5 (3) MG/3ML SOLN nebulizer solution Inhale 3 mLs into the lungs every 4 hours as needed for Shortness of Breath 360 mL     sodium bicarbonate 325 MG tablet Take 325 mg by mouth 3 times daily       IRON POLYSACCH [id number] PO Take 1 tablet by mouth daily      insulin lispro (HUMALOG KWIKPEN) 100 UNIT/ML pen Inject 2 Units into the skin 3 times daily (before meals) (Patient taking differently: Inject into the skin 3 times daily (before meals) ) 15 mL 3    Lomitapide Mesylate (JUXTAPID) 40 MG CAPS Take by mouth daily      isosorbide mononitrate (IMDUR) 60 MG CR tablet Take 60 mg by mouth daily       carbidopa-levodopa (SINEMET)  MG per tablet Take 1 tablet by mouth daily       glucose blood VI test strips (TRUETEST TEST) strip As needed. 200 strip 11    aspirin 81 MG EC tablet Take 81 mg by mouth daily       calcium carbonate (OSCAL) 500 MG TABS tablet Take 500 mg by mouth daily. Allergies:  Arthrotec [diclofenac-misoprostol]; Depakote [divalproex sodium]; Dilantin [phenytoin]; Klonopin [clonazepam]; and Statins    Social History:    Social History     Social History    Marital status:      Spouse name: N/A    Number of children: N/A    Years of education: N/A     Occupational History    Not on file.      Social History Main Topics    Smoking status: Former Smoker    Smokeless tobacco: Never Used    Alcohol use No    Drug use: No    Sexual activity: Not on file     Other Topics Concern    Not on file     Social History Narrative    No narrative on file       Family History:   Family History   Problem Relation Age of Onset    Heart Disease Father     Early Death Father 47    Diabetes Father     Heart Disease Mother     Diabetes Mother     High Blood Pressure Mother     High Blood Pressure Sister     High Cholesterol Sister        Review of Systems:   10 point review of systems negative other than what is in HPI      Physical exam:   Constitutional:  VITALS:  BP (!) 148/53   Pulse 88   Temp 98.4 °F (36.9 °C)   Resp 20   Ht 4' 11\" (1.499 m)   Wt 122 lb 7 oz (55.5 kg)   SpO2 100%   BMI 24.73 kg/m²   Gen: alert, awake, nad  Skin: no rash, turgor wnl  Heent:  eomi, mmm  Neck: no bruits or jvd noted, thyroid normal  Lungs:  Normal expansion. Clear to auscultation. No rales, rhonchi, or wheezing. Heart:  Heart sounds are normal.  Regular rate and rhythm without murmur, gallop or rub. Abdomen:  +bs, soft, nt, nd, no hepatosplenomegaly   Extremities: Extremities warm to touch, pink, with no edema. Psychiatric: mood and affect appropriate; judgement and insight intact  Musculoskeletal:  Rom, muscular strength intact; digits, nails normal    Data/  CBC:   Lab Results   Component Value Date    WBC 5.8 01/26/2018    RBC 3.42 01/26/2018    RBC 3.24 12/17/2011    HGB 10.7 01/26/2018    HCT 33.1 01/26/2018    MCV 96.8 01/26/2018    MCH 31.4 01/26/2018    MCHC 32.4 01/26/2018    RDW 16.9 01/26/2018     01/26/2018    MPV 10.0 08/21/2015     BMP:    Lab Results   Component Value Date     01/25/2018    K 3.9 01/25/2018    CL 96 01/25/2018    CO2 27 01/25/2018    BUN 33 01/25/2018    LABALBU 3.8 01/14/2018    LABALBU 3.5 12/17/2011    CREATININE 4.38 01/25/2018    CALCIUM 8.3 01/25/2018    GFRAA 11.7 01/25/2018    LABGLOM 9.7 01/25/2018    GLUCOSE 148 01/25/2018    GLUCOSE 197 02/17/2012     Xr Chest Standard (2 Vw)    Result Date: 1/26/2018  Chest 2 views. HISTORY: Cough. FINDINGS: Comparison, January 14, 2018. Pacemaker generator and wires, unchanged in position. Dual-lumen right jugular vein central line, unchanged in position. Cardiopericardial silhouette is normal. Blunting left costophrenic angle unchanged. Lungs clear. Small left effusion. No interval change.     Ct Head Wo Contrast    Result Date: with questions.     Linsey Prescott

## 2018-01-26 NOTE — H&P
Anxiety    Diabetes mellitus due to underlying condition, controlled, with stage 5 chronic kidney disease not on chronic dialysis, with long-term current use of insulin (HCC)    Essential hypertension    Idiopathic Parkinson's disease ([facility] [address]    Hyperkalemia    Acute on chronic diastolic congestive heart failure (HCC)    ESRD (end stage renal disease) on dialysis [facility])    Cardiac pacemaker    Pneumonia    Chronic obstructive pulmonary disease with acute exacerbation (HCC)           Plan:  Admit the patient.   Give oxygen and breathing treatments with Rocephin  Steroids  Pulmonary and nephrology consults  PT and OT evaluation  For dialysis today    Tracey Cox MD  1/26/2018

## 2018-01-26 NOTE — CARE COORDINATION
Met with pt and son Alma Severino. They report they live together and son is primary caregiver. Pt was DC'd from [facility] on 1/1/18 and is current with [facility]([phone]. Pt uses a walker and no home 02. Son transports to dialysis at [facility]. They plan DC back home with the same.

## 2018-01-26 NOTE — PLAN OF CARE
Problem: IP BALANCE  Goal: LTG - patient will maintain standing balance to allow for completion of daily activities  Outcome: Ongoing  Please see OT evaluation and progress notes related to goal

## 2018-01-26 NOTE — PROGRESS NOTES
[facility] OCCUPATIONAL THERAPY EVALUATION - ACUTE     Date: 2018  Patient Name: Mariela Marlow        MRN: [id number]  Account: [de-identified]   : 1935  (81 y.o.)  Room: [place] [id number]    Chart Review:  Diagnosis:  The primary encounter diagnosis was Bronchitis. Diagnoses of Shortness of breath, Pulmonary vascular congestion, Chronic renal failure, stage 5 (HCC), and Pleural effusion on right were also pertinent to this visit.   Past Medical History:   Diagnosis Date    Abnormal presence of protein in urine 2004    Dr. Lacie Vivar    Atrial fibrillation ([facility] [address]     Constipation, chronic     Diverticular disease of the colon     Fistula     left    GERD (gastroesophageal reflux disease)     GERD, PUD, Hiatal Hernia    Granulomatous lung disease ([facility] [address]     History of endoscopy 12    Dr. Tom Mcnulty Hyperlipidemia     Hypothyroidism     Kidney stones 2001    Dr. Lacie Vivar    Neuropathy in diabetes ([facility] [address]     legs    Osteoarthritis     Positive ORTEGA (antinuclear antibody)     1:80    Tachycardia     Thrombophlebitis leg superficial     Type II or unspecified type diabetes mellitus without mention of complication, not stated as uncontrolled      Past Surgical History:   Procedure Laterality Date    APPENDECTOMY     [employer] BLADDER SUSPENSION   &     [facility], second at [address]  2006    COLONOSCOPY  2007    Dr. Hailey Crawford [address] N/A 2017    CATHETER INSERTION HEMODIALYSIS performed by Roe Willis MD at [address] [place] [address] Rd  [phone]    Bilateral    [address] [facility]  3/2003     Precautions:  Falls Risk  Restrictions/Precautions: Fall Risk    Evaluation and Pt. rights have been reviewed: [x]Yes   [] No   If no why not:   Falls safety interventions in place  [x]Yes   [] No    Comments:     Subjective: Patient receptive to participate in OT

## 2018-01-27 NOTE — PROGRESS NOTES
INPATIENT PROGRESS NOTES    PATIENT NAME: Dimitri Sheppard  MRN: [id number]  SERVICE DATE:  January 27, 2018   SERVICE TIME:  1:00 PM      PRIMARY SERVICE: Pulmonary Disease    CHIEF COMPLAINTS: Shortness breath    INTERVAL HPI: Patient seen and examined at bedside, Interval Notes, orders reviewed. Nursing notes noted  Patient reports improvement in her breathing since admission. She denies productive cough, pleuritic pain, has not been noticed to have fever since admission. She is oxygenating well currently. OBJECTIVE    Body mass index is 24.04 kg/m². PHYSICAL EXAM:  Vitals:  BP (!) 175/67   Pulse 132   Temp 98.1 °F (36.7 °C) (Axillary)   Resp 20   Ht 4' 11\" (1.499 m)   Wt 119 lb 0.8 oz (54 kg)   SpO2 97%   BMI 24.04 kg/m²   General: Patient is  Alert, awake . comfortable in bed, No distress. Head: Atraumatic , Normocephalic   Eyes: PERRL. No icteric sclera. No conjunctival injection. No discharge   ENT: No nasal  discharge. Pharynx clear. Neck:  Trachea midline. No thyromegaly, no JVD, No cervical adenopathy. Chest : Normal effort, symmetric bilateral excursions  Lung : Clear breath sounds bilaterally, no significant rhonchi or crackles  Heart[de-identified] Regular rhythm and rate. No mumur ,  Rub or gallop  ABD: Benign. Non-tender. Non-distended. No masses or organmegaly. Normal bowel sounds. EXT: Mild Pitting edema both lower extremities , No Cyanosis No clubbing  Neuro: no focal weakness  Skin: Warm and dry. No erythema or rash on exposed extremities.       DATA:   Recent Labs      01/26/18   0027  01/27/18   0542   WBC  5.8  8.3   HGB  10.7*  10.4*   HCT  33.1*  32.2*   MCV  96.8  96.4   PLT  105*  116*     Recent Labs      01/25/18   2345  01/27/18   0542   NA  138  137   K  3.9  4.6   CL  96*  94*   CO2  27  26   BUN  33*  31*   CREATININE  4.38*  3.43*   GLUCOSE  148*  131*   CALCIUM  8.3*  8.6   LABGLOM  9.7*  12.8*   GFRAA  11.7*  15.5*       No results for input(s): PHART, EWA4RBG, PO2ART, 14, 2018. Pacemaker generator and wires, unchanged in position. Dual-lumen right jugular vein central line, unchanged in position. Cardiopericardial silhouette is normal. Blunting left costophrenic angle unchanged. Lungs clear. Small left effusion. No interval change. IMPRESSION AND SUGGESTION:  1. Chronic left lower lobe atelectasis with minimal effusion seen on prior x-rays  2. Shortness breath associated with mild fluid overload improved with fluid removal with dialysis  3.  Doubt pneumonia clinically  Follow-up labs tomorrow per hospitalist, can be discharged any time from my standpoint            Electronically signed by Chun Germain MD, FCCP on 1/27/2018 at 1:00 PM

## 2018-01-27 NOTE — PROGRESS NOTES
[facility]    Progress Note    1/27/2018    11:10 AM    Name:   Palma Oppenheim  MRN:     [id number]     Acct:      [de-identified]   Room:   [address] Day:  1  Admit Date:  1/25/2018 11:29 PM    PCP:   Dick Velasco  Code Status:  Full Code    Subjective:     C/C:   Chief Complaint   Patient presents with    Shortness of Breath       Interval History Status: . The patient is a 81 y.o.  female who is admitted to the hospital for the management of Shortness of Breath  . Patient has been having intermittent cough with SOB    Brief History:         Review of Systems:     Constitutional:  negative for chills, fevers, sweats  Respiratory:  negative for  dyspnea on exertion, hemoptysis, , wheezing  Cardiovascular:  negative for chest pain, chest pressure/discomfort, dyspnea, lower extremity edema, palpitations  Gastrointestinal:  negative for abdominal pain, constipation, diarrhea, nausea, vomiting  Neurological:  negative for dizziness, headache    Medications: Allergies:     Allergies   Allergen Reactions    Arthrotec [Diclofenac-Misoprostol] Nausea Only    Depakote [Divalproex Sodium] Itching    Dilantin [Phenytoin]     Klonopin [Clonazepam]     Statins Other (See Comments)     achy       Current Meds:   Scheduled Meds:    amiodarone  200 mg Oral Daily    apixaban  2.5 mg Oral BID    aspirin  81 mg Oral Daily    b complex-C-folic acid  1 capsule Oral Daily    calcium acetate  667 mg Oral TID WC    calcium elemental  500 mg Oral Daily    carbidopa-levodopa  1 tablet Oral Daily    chlorthalidone  25 mg Oral Daily    atorvastatin  20 mg Oral Nightly    diltiazem  180 mg Oral BID    fluticasone  2 spray Nasal Daily    hydrALAZINE  100 mg Oral TID    isosorbide mononitrate  60 mg Oral Daily    insulin glargine  14 Units Subcutaneous QAM    levothyroxine  25 mcg Oral Every Other Day    levothyroxine  50 mcg Oral Every Other Day

## 2018-01-28 NOTE — PROGRESS NOTES
 Lomitapide Mesylate  40 mg Oral Daily    losartan  50 mg Oral Daily    magnesium oxide  400 mg Oral Daily    pantoprazole sodium  40 mg Oral QAM AC    sodium bicarbonate  325 mg Oral TID    venlafaxine  150 mg Oral Daily with breakfast    sodium chloride flush  10 mL Intravenous 2 times per day    docusate sodium  100 mg Oral BID    insulin lispro  0-6 Units Subcutaneous TID WC    insulin lispro  0-3 Units Subcutaneous Nightly    methylPREDNISolone  20 mg Intravenous Q12H    guaiFENesin  600 mg Oral BID    cefTRIAXone (ROCEPHIN) IV  1 g Intravenous Q24H    pill splitter   Does not apply Once    ipratropium-albuterol  3 mL Inhalation TID    lactobacillus acidophilus  1 tablet Oral TID    iron polysaccharides  150 mg Oral Daily     Continuous Infusions:   dextrose         CBC:   Recent Labs      01/27/18   0542  01/28/18   0542   WBC  8.3  9.5   HGB  10.4*  10.5*   PLT  116*  111*     CMP:    Recent Labs      01/25/18   2345  01/27/18   0542  01/28/18   0542   NA  138  137  137   K  3.9  4.6  5.5*   CL  96*  94*  94*   CO2  27  26  27   BUN  33*  31*  52*   CREATININE  4.38*  3.43*  5.19*   GLUCOSE  148*  131*  221*   CALCIUM  8.3*  8.6  8.3*   LABGLOM  9.7*  12.8*  7.9*     Troponin: No results for input(s): TROPONINI in the last 72 hours. BNP: No results for input(s): BNP in the last 72 hours. INR: No results for input(s): INR in the last 72 hours. Lipids: No results for input(s): CHOL, LDLDIRECT, TRIG, HDL, AMYLASE, LIPASE in the last 72 hours. Liver:   Recent Labs      01/28/18   0542   AST  41*   ALT  24   ALKPHOS  72   PROT  6.7   LABALBU  3.9   BILITOT  0.2     Iron:  No results for input(s): IRONS, FERRITIN in the last 72 hours. Invalid input(s): LABIRONS  Urinalysis: No results for input(s): UA in the last 72 hours.     Objective:   Vitals: BP (!) 170/79   Pulse 81   Temp 97.9 °F (36.6 °C) (Oral)   Resp 18   Ht 4' 11\" (1.499 m)   Wt 114 lb 13.8 oz (52.1 kg)   SpO2 95%   BMI

## 2018-01-28 NOTE — PROGRESS NOTES
AGE-RELATED FINDINGS. NO SIGN OF ACUTE TRAUMATIC BRAIN INJURY. All CT scans at this facility use dose modulation, iterative reconstruction, and/or weight based dosing when appropriate to reduce radiation dose to as low as reasonably achievable. Xr Chest Portable    Result Date: 1/15/2018  EXAMINATION: CHEST PORTABLE VIEW  CLINICAL HISTORY: Short of breath COMPARISONS: November 23, 2017  FINDINGS: Single  views of the chest is submitted. Again note is made of right-sided dialysis catheter. Tip unchanged. There is a left-sided ICD device leads overlying the cardiac silhouette. Unchanged. The cardiac silhouette is of normal size configuration. The  mediastinum is unremarkable. Pulmonary vascular unremarkable. Right sided trachea. Small area of atelectasis/ infiltrate left lower lobe. There is blunting of left costophrenic suggesting trace left pleural effusion. No Pneumothoraces. SMALL AREA OF ATELECTASIS/INFILTRATE LEFT LOWER LOBE. THERE IS BLUNTING OF LEFT COSTOPHRENIC SUGGESTING TRACE LEFT PLEURAL EFFUSION.       Physical Examination:        General appearance:  alert, cooperative and no distress  Mental Status:  oriented to person, place and time and normal affect  Lungs:  clear to auscultation bilaterally, normal effort  Heart[de-identified]  regular rate and rhythm, no murmur  Abdomen:  soft, nontender, nondistended, normal bowel sounds, no masses, hepatomegaly, splenomegaly  Extremities:  no edema, redness, tenderness in the calves  Skin:  no gross lesions, rashes, induration    Assessment:        Primary Problem  Pneumonia    Active Hospital Problems    Diagnosis Date Noted    Cardiac pacemaker [Z95.0] 11/12/2017     Priority: High    Pneumonia [J18.9] 01/26/2018    COPD with exacerbation (HCC) [J44.1] 01/26/2018    Hypervolemia [E87.70]     Pleural effusion [J90]     ESRD (end stage renal disease) on dialysis ([facility] [address] [N18.6,

## 2018-01-28 NOTE — PROGRESS NOTES
Pt up in chair and escorted to bathroom with walker, No SOB noted at this time. Noted occasional dry cough.

## 2018-01-28 NOTE — FLOWSHEET NOTE
Pt continues with scratching and noted small bleeding area and bottom of rt foot baidaide applied, benadrly given once as ordered

## 2018-01-29 NOTE — CONSULTS
Constipation, chronic     Diverticular disease of the colon     Fistula     left    GERD (gastroesophageal reflux disease)     GERD, PUD, Hiatal Hernia    Granulomatous lung disease ([facility]     History of endoscopy 2-21-12    Dr. David Shook Hyperlipidemia     Hypothyroidism     Kidney stones 11/2001    Dr. Jalen Fabian    Neuropathy in diabetes ([facility]     legs    Osteoarthritis     Positive ORTEGA (antinuclear antibody)     1:80    Tachycardia     Thrombophlebitis leg superficial     Type II or unspecified type diabetes mellitus without mention of complication, not stated as uncontrolled 1998       PAST SURGICAL HISTORY:  Past Surgical History:   Procedure Laterality Date    APPENDECTOMY  2007   [facility] BLADDER SUSPENSION  2008 & 2009    [facility], second at [address]  2/2006    COLONOSCOPY  4/2007    Dr. Andria Arenas [address] N/A 11/2/2017    CATHETER INSERTION HEMODIALYSIS performed by Kemal Daly MD at [address] [place] [address] Rd  [id number]    Bilateral   [address] [facility]  3/2003       MEDICATIONS:  Current Facility-Administered Medications   Medication Dose Route Frequency Provider Last Rate Last Dose    ipratropium-albuterol (DUONEB) nebulizer solution 3 mL  3 mL Inhalation Q4H PRN Mike Rock MD        cefUROXime (CEFTIN) tablet 250 mg  250 mg Oral BID Radha Harding MD   250 mg at 01/29/18 0926    predniSONE (DELTASONE) tablet 10 mg  10 mg Oral BID Radha Harding MD   10 mg at 01/29/18 0925    insulin glargine (LANTUS) injection vial 18 Units  18 Units Subcutaneous QAM Jovon Robin MD        insulin lispro (HUMALOG) injection vial 4 Units  4 Units Subcutaneous TID  DYLAN Rollins        diphenhydrAMINE-zinc acetate cream   Topical TID PRN Jack Calzada MD        hydrOXYzine (ATARAX) tablet 10 mg  10 mg Oral TID PRN Radha Cruz MD   10 mg at 01/29/18 4264 fibrillation with rapid ventricular response, and short run of nonsustained wide complex tachycardia which could be nonsustained VT. As noted above, her potassium was mildly elevated at 5.8. She has since converted to sinus rhythm and the repeat EKG shows QTC of about 418 ms. She is not short of breath now, but has a moist cough that is difficult to expectorate. Her examination shows clear lung fields with diminished breath sounds at the posterior lower lung fields, regular rate and rhythm with grade 2 to 3/6 crescendo decrescendo murmur along the left sternal border, a central venous dialysis catheter right infra-clavicular area, left arm AV fistula with thrill, and no peripheral edema. She is alert and oriented ×3. My recommendations are that we discontinue her Cozaar given her tendency for hyperkalemia and end-stage renal disease. I would increase her amiodarone to 400 mg daily for a week and then decrease to 200 mg daily, will follow EKG.   Thank you  We'll continue to follow, and if atrial fibrillation recurs

## 2018-01-29 NOTE — PROGRESS NOTES
PT HR in the 80's NSR per monitor room. Pt resting in bed. Bed alarm on, call light in reach.     Electronically signed by Josi Lui RN on 1/29/2018 at 4:12 PM

## 2018-01-29 NOTE — PROGRESS NOTES
[facility] [address] [facility] (Dr. Roland Frias). Pt just had large BM that was very dark green/black in color. Stool was positive for hemoccult blood. Waiting for call back/new orders. ..     Electronically signed by Lasahe Douglass RN on 1/29/2018 at 6:35 PM

## 2018-01-29 NOTE — PROGRESS NOTES
injection 2 mg  2 mg Intravenous Q4H PRN Karely Mclain MD        insulin lispro (HUMALOG) injection vial 0-6 Units  0-6 Units Subcutaneous TID WC Karely Mclain MD   1 Units at 01/28/18 1812    insulin lispro (HUMALOG) injection vial 0-3 Units  0-3 Units Subcutaneous Nightly Karely Mclain MD   3 Units at 01/28/18 2300    glucose (GLUTOSE) 40 % oral gel 15 g  15 g Oral PRN Karely Mclain MD        dextrose 50 % solution 12.5 g  12.5 g Intravenous PRN Karely Mclain MD        glucagon (rDNA) injection 1 mg  1 mg Intramuscular PRN Karely Mclain MD        dextrose 5 % solution  100 mL/hr Intravenous PRN Karely Mclain MD        guaiFENesin [facility]) extended release tablet 600 mg  600 mg Oral BID Karely Mclain MD   600 mg at 01/28/18 2259    benzonatate (TESSALON) capsule 100 mg  100 mg Oral TID PRN Karely Mclain MD        pill splitter   Does not apply Once Karely Mclain MD        lactobacillus acidophilus [facility]) 1 tablet  1 tablet Oral TID Karely Mclain MD   1 tablet at 01/28/18 2259     Allergies   Allergen Reactions    Arthrotec [Diclofenac-Misoprostol] Nausea Only    Depakote [Divalproex Sodium] Itching    Dilantin [Phenytoin]     Klonopin [Clonazepam]     Statins Other (See Comments)     achy     Principal Problem:    Pneumonia  Active Problems:    Cardiac pacemaker    Hypothyroidism    Hypercholesteremia    CKD (chronic kidney disease)    Paroxysmal atrial fibrillation (HCC)    Anemia    Acid reflux    Anxiety    Diabetes mellitus due to underlying condition, controlled, with stage 5 chronic kidney disease not on chronic dialysis, with long-term current use of insulin (HCC)    Essential hypertension    Idiopathic Parkinson's disease ([address]    Hyperkalemia    Acute on chronic diastolic congestive heart failure (HCC)    ESRD (end stage renal disease) on dialysis ([address]    COPD with exacerbation (HCC)    Hypervolemia    Pleural effusion    Blood pressure (!) 185/62, pulse 87, temperature 98.4 °F (36.9 °C), temperature source Oral, resp. rate 19, height 4' 11\" (1.499 m), weight 113 lb 8.6 oz (51.5 kg), SpO2 98 %. Subjective:  Symptoms:  Improved. No shortness of breath or chest pain. Diet:  Adequate intake. No nausea or vomiting. Activity level: Returning to normal.    Pain:  She reports no pain. Objective:  General Appearance:  Comfortable. Vital signs: (most recent): Blood pressure (!) 185/62, pulse 87, temperature 98.4 °F (36.9 °C), temperature source Oral, resp. rate 19, height 4' 11\" (1.499 m), weight 113 lb 8.6 oz (51.5 kg), SpO2 98 %. Vital signs are normal.  No fever. Output: Producing urine. HEENT: Normal HEENT exam.    Lungs:  Normal effort and normal respiratory rate. She is not in respiratory distress. Heart: Normal rate. Regular rhythm. Abdomen: Abdomen is soft. Hypoactive bowel sounds. There is no abdominal tenderness. Extremities: Decreased range of motion. Pulses: Distal pulses are intact. Neurological: Patient is alert and oriented to person, place and time. Pupils:  Pupils are equal, round, and reactive to light. Skin:  Warm and pale. labd reviewed  Assessment:  (Pneumonia  COPD exacerbation improved  Patient Active Problem List:     Hypothyroidism     Hypercholesteremia     CKD (chronic kidney disease)     Paroxysmal atrial fibrillation (HCC)     Anemia     Acid reflux     Anxiety     Diabetes mellitus due to underlying condition, controlled, with stage 5 chronic kidney disease not on chronic dialysis, with long-term current use of insulin (HCC)     Essential hypertension     Idiopathic Parkinson's disease ([facility]     Hyperkalemia     Acute on chronic diastolic congestive heart failure (HCC)     ESRD (end stage renal disease) on dialysis [facility])     Cardiac pacemaker     Pneumonia     COPD with exacerbation (HCC)     Hypervolemia     Pleural effusion    ).      Plan:   (Discharge after dialysis today  Home health care consult  Dc time 34 minutes).        Carmencita Resendiz MD  1/29/2018

## 2018-01-29 NOTE — CONSULTS
insulin-dependent  2. Steroid-induced hyperglycemia  3. Chronic renal failure, on dialysis  4. Cough      Plan:  1. Increase Lantus 18 units at bedtime, start Humalog 4 units 3 times a day with meals, hold if glucose is less than 120  2. Monitor glycemic control closely  3. We will reduce insulin dosing regimen as steroids are tapered off or discontinued        The History and Physical exam, radiologic and laboratory findings have all been discussed with Dr Silvia Boles, who agrees with the assessment and plan as described above.      Electronically signed by DYLAN Mcclure on 1/29/2018 at 3:30 PM

## 2018-01-29 NOTE — PROGRESS NOTES
Secure message sent to Dr. Elvin Reynolds regarding Pt stus post dialysis & d/c order:    \"Pt had 4 separate runs of v-tach post dialysis about 5-6 beats each. Pt is now in a-fib. She denies any chest pain or SOB. Last set of vitals: /81  99% Room air. Do you still want to discharge this patient today? \"    Waiting for reply/orders. ..     Electronically signed by Vidhya Mcnulty RN on 1/29/2018 at 2:47 PM

## 2018-01-29 NOTE — FLOWSHEET NOTE
Patient continues to scratch everywhere on her body. Her anterior thighs are red, inflammed. Atarax, benadryl cream and xanax were given but the patient has no relief.      Electronically signed by Linda Patel RN on 1/29/18 at 1:15 AM

## 2018-01-30 NOTE — PROGRESS NOTES
Endocrinology Progress Note    Assessment and Plan:   Assessment-  1. Type 2 insulin-dependent diabetes  2. Hyperglycemia  3. Atrial fibrillation, on anticoagulation and amiodarone  4. Guaiac positive stools        Plan-  1. Continue Lantus 18 units at bedtime and Humalog 4 units with meals and low-dose sliding scale coverage. 2. We will increase this patient's by mouth intake improves  3. Monitor glycemic control closely    POC Glucose:   Recent Labs      01/29/18   1237  01/29/18   1659  01/29/18   2250  01/30/18   0804  01/30/18   1128   POCGLU  141*  286*  124*  196*  192*     HGBA1C:  No results for input(s): LABA1C in the last 72 hours. CBC:   Recent Labs      01/29/18   1956  01/30/18   0550   WBC  9.5  11.9*   HGB  11.1*  11.8*   PLT  134  136     CMP:    Recent Labs      01/28/18   0542  01/29/18   0522  01/30/18   0550   NA  137  135  138   K  5.5*  5.8*  4.9   CL  94*  89*  96*   CO2  27  24  25   BUN  52*  76*  35*   CREATININE  5.19*  6.80*  4.06*   GLUCOSE  221*  265*  220*   CALCIUM  8.3*  8.3*  8.3*   LABGLOM  7.9*  5.8*  10.6*         CC:   Chief Complaint   Patient presents with    Shortness of Breath       Subjective: Interval History: Patient is an 81year-old type 2 insulin-dependent diabetic admitted for shortness of breath, diagnosed with pneumonia. She has had paroxysmal atrial fibrillation converted to normal normal sinus rhythm currently and guaiac positive stools, for possible colonoscopy. Her glycemic control is improved with intermittent hyperglycemic episodes her diet and appetite are very erratic during this admission.   We will monitor her closely    Review of systems: denies polyuria, polydipsia, ABD pain, flank pain, N/V/D, or diaphoresis  Medications:   Scheduled Meds:   cefUROXime  250 mg Oral BID    predniSONE  10 mg Oral BID    insulin glargine  18 Units Subcutaneous QAM    insulin lispro  4 Units Subcutaneous TID WC    amiodarone  200 mg Oral BID    Followed by   [facility] [START ON 2/6/2018] amiodarone  200 mg Oral Daily    amiodarone  200 mg Oral Daily    b complex-C-folic acid  1 capsule Oral Daily    calcium acetate  667 mg Oral TID     calcium elemental  500 mg Oral Daily    carbidopa-levodopa  1 tablet Oral Daily    chlorthalidone  25 mg Oral Daily    atorvastatin  20 mg Oral Nightly    diltiazem  180 mg Oral BID    fluticasone  2 spray Nasal Daily    hydrALAZINE  100 mg Oral TID    isosorbide mononitrate  60 mg Oral Daily    levothyroxine  25 mcg Oral Every Other Day    levothyroxine  50 mcg Oral Every Other Day    Lomitapide Mesylate  40 mg Oral Daily    magnesium oxide  400 mg Oral Daily    pantoprazole sodium  40 mg Oral QAM AC    venlafaxine  150 mg Oral Daily with breakfast    sodium chloride flush  10 mL Intravenous 2 times per day    docusate sodium  100 mg Oral BID    insulin lispro  0-6 Units Subcutaneous TID WC    insulin lispro  0-3 Units Subcutaneous Nightly    guaiFENesin  600 mg Oral BID    pill splitter   Does not apply Once    lactobacillus acidophilus  1 tablet Oral TID     Continuous Infusions:   dextrose         Objective:   Vitals: BP (!) 154/70   Pulse 80   Temp 97.9 °F (36.6 °C) (Oral)   Resp 17   Ht 4' 11\" (1.499 m)   Wt 108 lb 0.4 oz (49 kg)   SpO2 100%   BMI 21.82 kg/m²    Wt Readings from Last 3 Encounters:   01/29/18 108 lb 0.4 oz (49 kg)   01/14/18 125 lb (56.7 kg)   01/02/18 123 lb (55.8 kg)        General appearance: alert, appears stated age, cachectic, cooperative, fatigued, no distress and More alert today  Skin: Dry skin both arms  Neck: no lymphadenopathy  Lungs: clear to auscultation bilaterally  Heart: prominent apical impulse, regular rate and rhythm, S1, S2 normal and systolic murmur: holosystolic 4/6, blowing, harsh and rumbling at 2nd left intercostal space  Abdomen: soft, non-tender. Bowel sounds normal. No masses,  no organomegaly.   Extremities: extremities normal, atraumatic, no cyanosis or edema    Patient Active Problem List:     Hypothyroidism     Hypercholesteremia     CKD (chronic kidney disease)     Paroxysmal atrial fibrillation (HCC)     Anemia     Acid reflux     Anxiety     Diabetes mellitus due to underlying condition, controlled, with stage 5 chronic kidney disease not on chronic dialysis, with long-term current use of insulin (HCC)     Essential hypertension     Idiopathic Parkinson's disease ([facility]     Hyperkalemia     Acute on chronic diastolic congestive heart failure ([facility]     ESRD (end stage renal disease) on dialysis [facility])     Cardiac pacemaker     Pneumonia     COPD with exacerbation (HCC)     Hypervolemia     Pleural effusion            Electronically signed by DYLNA Lantigua on 1/30/2018 at 2:34 PM

## 2018-01-30 NOTE — PROGRESS NOTES
Pt assessment has been completed. Pt resting in bed is alert and oriented. Denies pain, SOB, NV at this time. Had BM and was cleaned up and repositioned. Spoke with alyssa Stafford order is for AM, ok to make it PM also said it was okay to hold eliquis due to possible rectal bleed. Pt started on amiodarone, spoke with tech in monitor room QT interval .34, ok to give. Safety precautions in place, no further needs at this time, will continue to monitor.     Electronically signed by Nuria Bower RN on 1/29/2018 at 10:21 PM

## 2018-01-30 NOTE — PROGRESS NOTES
symmetric, not enlarged and no tenderness, skin normal  LUNGS:  Increased work of breathing,  Poor air exchange, Bilateral wheezing  CARDIOVASCULAR:  Normal apical impulse, irregular rate and rhythm, normal S1 and S2,  and 2/6 murmur noted  ABDOMEN:  Normal bowel sounds, soft, non-distended, non-tender, no masses palpated, no hepatosplenomegally  EXTREMITIES:  Trace edema, Pulses diminished throughout. NEUROLOGIC:  Awake, alert, oriented to name, place and time.  Following all commands and moving all extremties  SKIN:  no bruising or bleeding, normal skin color, texture, turgor and no rashes     Data:        LABS:      Recent Results (from the past 24 hour(s))   POCT Glucose    Collection Time: 01/29/18 12:37 PM   Result Value Ref Range    POC Glucose 141 (H) 60 - 115 mg/dl    Performed on ACCU-CHEK    EKG 12 Lead    Collection Time: 01/29/18  4:32 PM   Result Value Ref Range    Ventricular Rate 80 BPM    Atrial Rate 80 BPM    P-R Interval 166 ms    QRS Duration 96 ms    Q-T Interval 430 ms    QTc Calculation (Bazett) 495 ms    P Axis 73 degrees    R Axis 62 degrees    T Axis 68 degrees   POCT Glucose    Collection Time: 01/29/18  4:59 PM   Result Value Ref Range    POC Glucose 286 (H) 60 - 115 mg/dl    Performed on ACCU-CHEK    CBC Auto Differential    Collection Time: 01/29/18  7:56 PM   Result Value Ref Range    WBC 9.5 4.8 - 10.8 K/uL    RBC 3.56 (L) 4.20 - 5.40 M/uL    Hemoglobin 11.1 (L) 12.0 - 16.0 g/dL    Hematocrit 34.5 (L) 37.0 - 47.0 %    MCV 97.0 82.0 - 100.0 fL    MCH 31.1 27.0 - 31.3 pg    MCHC 32.1 (L) 33.0 - 37.0 %    RDW 16.2 (H) 11.5 - 14.5 %    Platelets 715 102 - 192 K/uL    Neutrophils % 82.6 %    Lymphocytes % 4.5 %    Monocytes % 12.6 %    Eosinophils % 0.0 %    Basophils % 0.3 %    Neutrophils # 7.8 (H) 1.4 - 6.5 K/uL    Lymphocytes # 0.4 (L) 1.0 - 4.8 K/uL    Monocytes # 1.2 (H) 0.2 - 0.8 K/uL    Eosinophils # 0.0 0.0 - 0.7 K/uL    Basophils # 0.0 0.0 - 0.2 K/uL   POCT Glucose    Collection Time: 01/29/18 10:50 PM   Result Value Ref Range    POC Glucose 124 (H) 60 - 115 mg/dl    Performed on ACCU-CHEK    EKG 12 Lead    Collection Time: 01/30/18  4:01 AM   Result Value Ref Range    Ventricular Rate 79 BPM    Atrial Rate 79 BPM    P-R Interval 170 ms    QRS Duration 96 ms    Q-T Interval 424 ms    QTc Calculation (Bazett) 486 ms    P Axis 49 degrees    R Axis 24 degrees    T Axis 64 degrees   Basic Metabolic Panel    Collection Time: 01/30/18  5:50 AM   Result Value Ref Range    Sodium 138 132 - 144 mEq/L    Potassium 4.9 3.5 - 5.1 mEq/L    Chloride 96 (L) 98 - 107 mEq/L    CO2 25 22 - 29 mEq/L    Anion Gap 17 (H) 7 - 13 mEq/L    Glucose 220 (H) 74 - 109 mg/dL    BUN 35 (H) 8 - 23 mg/dL    CREATININE 4.06 (H) 0.50 - 0.90 mg/dL    GFR Non-African American 10.6 (L) >60    GFR  12.8 (L) >60    Calcium 8.3 (L) 8.6 - 10.2 mg/dL   CBC Auto Differential    Collection Time: 01/30/18  5:50 AM   Result Value Ref Range    WBC 11.9 (H) 4.8 - 10.8 K/uL    RBC 3.76 (L) 4.20 - 5.40 M/uL    Hemoglobin 11.8 (L) 12.0 - 16.0 g/dL    Hematocrit 36.7 (L) 37.0 - 47.0 %    MCV 97.5 82.0 - 100.0 fL    MCH 31.4 (H) 27.0 - 31.3 pg    MCHC 32.2 (L) 33.0 - 37.0 %    RDW 16.3 (H) 11.5 - 14.5 %    Platelets 106 037 - 887 K/uL    Neutrophils % 86.1 %    Lymphocytes % 2.4 %    Monocytes % 11.5 %    Eosinophils % 0.0 %    Basophils % 0.0 %    Neutrophils # 10.2 (H) 1.4 - 6.5 K/uL    Lymphocytes # 0.3 (L) 1.0 - 4.8 K/uL    Monocytes # 1.4 (H) 0.2 - 0.8 K/uL    Eosinophils # 0.0 0.0 - 0.7 K/uL    Basophils # 0.0 0.0 - 0.2 K/uL   POCT Glucose    Collection Time: 01/30/18  8:04 AM   Result Value Ref Range    POC Glucose 196 (H) 60 - 115 mg/dl    Performed on ACCU-CHEK         EKG: SR,      TELEMETRY: SR, No AFIB          Devin Polanco MD ,[address] [place] [address] Cardiology

## 2018-01-30 NOTE — PROGRESS NOTES
 acetaminophen (TYLENOL) tablet 650 mg  650 mg Oral Q4H PRN Sloane Cordero MD        docusate sodium (COLACE) capsule 100 mg  100 mg Oral BID Sloane Cordero MD   100 mg at 01/29/18 2140    ondansetron (ZOFRAN) injection 4 mg  4 mg Intravenous Q6H PRN Sloane Cordero MD        morphine injection 2 mg  2 mg Intravenous Q4H PRN Sloane Cordero MD        insulin lispro (HUMALOG) injection vial 0-6 Units  0-6 Units Subcutaneous TID WC Sloane Cordero MD   3 Units at 01/29/18 1838    insulin lispro (HUMALOG) injection vial 0-3 Units  0-3 Units Subcutaneous Nightly Sloane Cordero MD   3 Units at 01/28/18 2300    glucose (GLUTOSE) 40 % oral gel 15 g  15 g Oral PRN Sloane Cordero MD        dextrose 50 % solution 12.5 g  12.5 g Intravenous PRN Sloane Cordero MD        glucagon (rDNA) injection 1 mg  1 mg Intramuscular PRN Sloane Cordero MD        dextrose 5 % solution  100 mL/hr Intravenous PRN Sloane Cordero MD        guaiFENesin [facility]) extended release tablet 600 mg  600 mg Oral BID Sloane Cordero MD   600 mg at 01/29/18 2140    benzonatate (TESSALON) capsule 100 mg  100 mg Oral TID PRN Sloane Cordero MD        pill splitter   Does not apply Once Sloane Cordero MD        lactobacillus acidophilus [facility]) 1 tablet  1 tablet Oral TID Sloane Cordero MD   1 tablet at 01/29/18 2140     Allergies   Allergen Reactions    Arthrotec [Diclofenac-Misoprostol] Nausea Only    Depakote [Divalproex Sodium] Itching    Dilantin [Phenytoin]     Klonopin [Clonazepam]     Statins Other (See Comments)     achy     Principal Problem:    Pneumonia  Active Problems:    Hypothyroidism    Hypercholesteremia    CKD (chronic kidney disease)    Paroxysmal atrial fibrillation (HCC)    Anemia    Acid reflux    Anxiety    Diabetes mellitus due to underlying condition, controlled, with stage 5 chronic kidney disease not on chronic dialysis, with long-term current use of insulin ([facility]    Essential

## 2018-01-30 NOTE — PROGRESS NOTES
Physical Therapy Med Surg Daily Treatment Note  Facility[facility]Department: [facility]  Room: [place] [address]       NAME: Yoandy Campos  : 1935 (82 y.o.)  MRN: [id number]  CODE STATUS: Full Code    Date of Service: 2018    Patient Diagnosis(es): Pneumonia [J18.9]  Shortness of breath [R06.02]   Chief Complaint   Patient presents with    Shortness of Breath     Patient Active Problem List    Diagnosis Date Noted    Cardiac pacemaker 2017     Priority: High    Pneumonia 2018    COPD with exacerbation ([facility] 2018    Hypervolemia     Pleural effusion     ESRD (end stage renal disease) on dialysis ([facility] 11/10/2017    Acute on chronic diastolic congestive heart failure ([facility] 10/30/2017    Hyperkalemia 10/26/2017    Diabetes mellitus due to underlying condition, controlled, with stage 5 chronic kidney disease not on chronic dialysis, with long-term current use of insulin ([facility] 2017    Essential hypertension 2017    Anxiety 2017    Paroxysmal atrial fibrillation ([facility] 10/27/2016    Acid reflux 2015    Idiopathic Parkinson's disease ([facility] 2014    CKD (chronic kidney disease) 10/15/2012    Anemia 2011    Hypothyroidism 10/04/2011    Hypercholesteremia 10/04/2011        Past Medical History:   Diagnosis Date    Abnormal presence of protein in urine 2004    Dr. Liyah Gatica. Cirilo Done Atrial fibrillation ([facility]     Constipation, chronic     Diverticular disease of the colon     Fistula     left    GERD (gastroesophageal reflux disease)     GERD, PUD, Hiatal Hernia    Granulomatous lung disease ([address]     History of endoscopy 12    Dr. Laura Stone    Hyperlipidemia     Hypothyroidism     Kidney stones 2001    Dr. Priya Avilez    Neuropathy in diabetes ([facility]     legs    Osteoarthritis     Positive ORTEGA (antinuclear antibody)     1:80    Tachycardia     Thrombophlebitis leg superficial     Type II or unspecified type diabetes mellitus without ~50' d/t feeling as if LEs were to give out. Pt declined further activity following amb. Discharge Recommendations:  Continue to assess pending progress    Goals  Short term goals  Short term goal 1: indep with functional transfers  Short term goal 2: amb >50ft with LRAD vs no AD with mod I  Short term goal 3: indep with HEP to improve LE strength and balance  Short term goal 4: 5xSTS <15.0 seconds  Patient Goals   Patient goals : \"Go home\"    Plan    Plan  Times per week: 3-6  Times per day: Daily  Current Treatment Recommendations: Strengthening, Functional Mobility Training, Neuromuscular Re-education, Home Exercise Program, Equipment Evaluation, Education, & procurement, Transfer Training, Gait Training, Balance Training, Endurance Training, Patient/Caregiver Education & Training, Safety Education & Training  Plan Comment: continue current POC  Safety Devices  Type of devices:  All fall risk precautions in place     Therapy Time   Individual   Time In 1500   Time Out 1515   Minutes 15     Timed Code Treatment Minutes: [address], [address] [facility], 01/30/18 at 4:16 PM

## 2018-01-31 NOTE — PROGRESS NOTES
Pt seen on hd  2k bath  No hd related complications  bp is stable  No heparin with hd    - HD MWF  - f/u GI recs  - ok for d/c at any time from renal standpoint

## 2018-01-31 NOTE — PROGRESS NOTES
Jonah Forrester is a 81 y.o. female patient.     Current Facility-Administered Medications   Medication Dose Route Frequency Provider Last Rate Last Dose    PEG-KCl-NaCl-NaSulf-Na Asc-C (MOVIPREP) solution 100 g  100 g Oral Once Milena Espinosa MD        amiodarone (CORDARONE) tablet 400 mg  400 mg Oral BID Milad Avila MD   400 mg at 01/30/18 2018    Followed by   Khanh Armenta ON 2/2/2018] amiodarone (CORDARONE) tablet 200 mg  200 mg Oral Daily Milad Avila MD        diltiazem (CARDIZEM CD) extended release capsule 240 mg  240 mg Oral BID Milad Avila MD   240 mg at 01/30/18 2018    metoprolol (LOPRESSOR) injection 5 mg  5 mg Intravenous Q6H Milad Avila MD        ipratropium-albuterol (DUONEB) nebulizer solution 3 mL  3 mL Inhalation Q4H PRN Marimar Zamora MD        cefUROXime (CEFTIN) tablet 250 mg  250 mg Oral BID Justine Calix MD   250 mg at 01/30/18 2015    predniSONE (DELTASONE) tablet 10 mg  10 mg Oral BID Justine Calix MD   10 mg at 01/30/18 2016    insulin glargine (LANTUS) injection vial 18 Units  18 Units Subcutaneous QAM Stephanie Srviastava MD   18 Units at 01/30/18 2129    insulin lispro (HUMALOG) injection vial 4 Units  4 Units Subcutaneous TID DYLAN Ramsey   4 Units at 01/30/18 1754    diphenhydrAMINE-zinc acetate cream   Topical TID PRN Everton Vallejo MD        hydrOXYzine (ATARAX) tablet 10 mg  10 mg Oral TID PRN Kirstie Gomez MD   10 mg at 01/29/18 0925    heparin (porcine) injection 1,000 Units  1,000 Units Intravenous PRN Kirstie Gomez MD        heparin (porcine) injection 7,000 Units  7,000 Units Intravenous PRN Kirstie Gomez MD   7,000 Units at 01/29/18 0959    ALPRAZolam Anthony [place]) tablet 0.5 mg  0.5 mg Oral TID PRN Justine Calix MD   0.5 mg at 01/28/18 2251    b complex-C-folic acid (NEPHROCAPS) capsule 1 mg  1 capsule Oral Daily Justine Calix MD   1 mg at 01/30/18 0831    calcium acetate (PHOSLO) capsule 667 mg  667 mg Oral diastolic congestive heart failure (HCC)    ESRD (end stage renal disease) on dialysis [facility])    Cardiac pacemaker    COPD with exacerbation (HCC)    Hypervolemia    Pleural effusion    Blood pressure (!) 167/87, pulse 61, temperature 97.1 °F (36.2 °C), temperature source Oral, resp. rate 17, height 4' 11\" (1.499 m), weight 108 lb 0.4 oz (49 kg), SpO2 99 %. Subjective:  Symptoms:  Stable. No shortness of breath or chest pain. Diet:  Adequate intake. No nausea or vomiting. Activity level: Returning to normal.    Pain:  She reports no pain. Objective:  General Appearance: In no acute distress. Vital signs: (most recent): Blood pressure (!) 167/87, pulse 61, temperature 97.1 °F (36.2 °C), temperature source Oral, resp. rate 17, height 4' 11\" (1.499 m), weight 108 lb 0.4 oz (49 kg), SpO2 99 %. No fever. Output: Producing urine. HEENT: Normal HEENT exam.    Lungs:  Normal effort and normal respiratory rate. She is not in respiratory distress. Heart: Tachycardia. Abdomen: Abdomen is soft. Hypoactive bowel sounds. There is no abdominal tenderness. Extremities: Decreased range of motion. Pulses: Distal pulses are intact. Neurological: Patient is alert and oriented to person, place and time. Pupils:  Pupils are equal, round, and reactive to light. Skin:  Warm and pale. labd reviewed  Assessment:  (Pneumonia  COPD exacerbation improved  Atrial fib with runs of V.  Tach  Positive occult stool  Patient Active Problem List:     Hypothyroidism     Hypercholesteremia     CKD (chronic kidney disease)     Paroxysmal atrial fibrillation (HCC)     Anemia     Acid reflux     Anxiety     Diabetes mellitus due to underlying condition, controlled, with stage 5 chronic kidney disease not on chronic dialysis, with long-term current use of insulin (HCC)     Essential hypertension     Idiopathic Parkinson's disease (HCC)     Hyperkalemia     Acute on chronic diastolic congestive heart failure ([facility] [address]     ESRD (end stage renal disease) on dialysis [facility])     Cardiac pacemaker     Pneumonia     COPD with exacerbation (HCC)     Hypervolemia     Pleural effusion    ). Plan:   (Cardiology following atrial fib  GI was consulted, will consider endoscopic  if cardiovascularly stable).        China Valenzuela PA-C  1/31/2018

## 2018-01-31 NOTE — PROGRESS NOTES
Physical Therapy Missed Treatment   Facility/Department: [facility] MED SURG I800/[id number]    NAME: Luz Marina Whipple    : 1935 (81 y.o.)  MRN: [id number]    Account: [de-identified]  Gender: female      Pt tx attempted. Pt off floor for HD. Will follow and attempt PT tx again per POC. Jamia Suarez, PT, 18 at 1:32 PM         Pt returned to floor between 1330 and 1400. PT tx attempted however pt pleasantly declining. \"Dialysis is too rough. I would like to rest today if I can. \"    Will attempt PT tx again per POC.     Electronically signed by Jamia Suarez PT on 2018 at 2:24 PM

## 2018-01-31 NOTE — PROGRESS NOTES
Call out to Dr. Gutierrez Hinders about dialysis orders today. Waiting for call back.     Electronically signed by Judy Richey RN on 1/31/2018 at 6:38 AM

## 2018-01-31 NOTE — HOME CARE
SPOKE with Rosy Frances at [facility]. Pt is current with SN PT OT HHA. GAVE her update and will send ADT and hhc order now and send follow up notes later.

## 2018-01-31 NOTE — PROGRESS NOTES
CARDIOLOGY [facility] PROGRESS NOTE         1/31/2018      Selma Álvarez    [id number]  1935    Rounding MD: Benito Kay MD ,[facility]    PRIMARY CARDIOLOGIST: Dr. Ranjit Arthur  ================================================================     REASON FOR INITIAL CONSULTATION:    NSVT    SUBJECTIVE:     Patient has recurrent Rapid Afib,In and Out of AFIB now     No CP. Some SOB     Cardiac and general ROS otherwise negative and unchanged.     IMPRESSIONS:    1. PAF, with short recurrence following HD. Now with PAF, AFIB now. On amiodarone and anticoagulated with Eliquis. 2. Nonsustained VT in setting of hyperkalemia. QTc slightly prolonged on admission, resolved. 3. ESRD on HD  4. Acute exacerbation of COPD     RECOMMENDATIONS:    1. Cardiac Supportive Care. 2. Telemetry  3. Maximize medications  4. Continue Amiodarone. 5. Continue Eliquis. 6. Cardioversion not helpful if PAF / SR, Consider if needed. 7. Further Recommendations. 8. See Orders.     CARDIAC HISTORY:    1. CAD s/p remote PCI/BMS to LCX 2010, [facility] 1/2013 showing diffuse vessel calcification with a 30%-50% stenosis of the proximal RCA, normal LM, heavily calcified LAD with mid to distal tapering with 100% distal occlusion, filled by collaterals, mild disease of LCX. Perfusion scan 1/2015 negative for ischemia. Medically managed. 2. Normal LV systolic function with EF 60-65%, moderate AS per echo 12/2017  3. Sinus node dysfunction s/p PPM 11/2017  4. Persistent AF, on amiodarone, anticoagulated with eliquis. Last device check 1/17/18 showing 11.6% of time in AF, longest duration 2 hours. 5. HTN  6. HLD     NON CARDIAC HISTORY:    1. ESRD on HD  2. See problem list     PRESENTING HPI:     Selma Álvarez is a 81 y.o. female with history of coronary artery disease, status post remote PCI to LCX, [facility] 2013 showing diffuse vessel calcification, with total occlusion of the distal LAD, filled by collaterals.   Medical management was Once    amiodarone  400 mg Oral BID    Followed by   Dhaval Mccord ON 2/2/2018] amiodarone  200 mg Oral Daily    diltiazem  240 mg Oral BID    metoprolol  5 mg Intravenous Q6H    cefUROXime  250 mg Oral BID    predniSONE  10 mg Oral BID    insulin glargine  18 Units Subcutaneous QAM    insulin lispro  4 Units Subcutaneous TID WC    b complex-C-folic acid  1 capsule Oral Daily    calcium acetate  667 mg Oral TID WC    calcium elemental  500 mg Oral Daily    carbidopa-levodopa  1 tablet Oral Daily    chlorthalidone  25 mg Oral Daily    atorvastatin  20 mg Oral Nightly    fluticasone  2 spray Nasal Daily    hydrALAZINE  100 mg Oral TID    isosorbide mononitrate  60 mg Oral Daily    levothyroxine  25 mcg Oral Every Other Day    levothyroxine  50 mcg Oral Every Other Day    Lomitapide Mesylate  40 mg Oral Daily    magnesium oxide  400 mg Oral Daily    pantoprazole sodium  40 mg Oral QAM AC    venlafaxine  150 mg Oral Daily with breakfast    sodium chloride flush  10 mL Intravenous 2 times per day    docusate sodium  100 mg Oral BID    insulin lispro  0-6 Units Subcutaneous TID     insulin lispro  0-3 Units Subcutaneous Nightly    guaiFENesin  600 mg Oral BID    pill splitter   Does not apply Once    lactobacillus acidophilus  1 tablet Oral TID     Continuous Infusions:   sodium chloride      dextrose       PRN Meds:ipratropium-albuterol, diphenhydrAMINE-zinc acetate, hydrOXYzine, heparin (porcine), heparin (porcine), ALPRAZolam, cholestyramine light, sodium chloride flush, acetaminophen, ondansetron, morphine, glucose, dextrose, glucagon (rDNA), dextrose, benzonatate    PHYSICAL EXAM:    CURRENT VITALS: BP (!) 151/88   Pulse 79   Temp 97.5 °F (36.4 °C)   Resp 17   Ht 4' 11\" (1.499 m)   Wt 110 lb 0.2 oz (49.9 kg)   SpO2 99%   BMI 22.22 kg/m²     CONSTITUTIONAL:  awake, alert, cooperative, no apparent distress,   ENT:  Normocephalic, without obvious abnormality, atraumatic, sinuses

## 2018-02-01 NOTE — PROGRESS NOTES
[facility] Occupational Therapy      Date: 2018  Patient Name: Floresita Sanchez        MRN: [id number]  Account: [de-identified]   : 1935  (81 y.o.)  [id number]    Chart reviewed, attempted OT tx at 10:02 AM, but pt. unavailable 2° to:    [] Hold per nsg request    [x] Pt declined, and stated she was waiting for nurse. Asked to attempt later. [] Pt. . off floor for test/procedure. Will attempt again when able.     Electronically signed by PRIYA Lange on 2018 at 10:02 AM

## 2018-02-01 NOTE — PROGRESS NOTES
recommended, perfusion scan in 2015 negative for ischemia. She has normal LV systolic function with a left ventricular ejection fraction 60-65%, moderate aortic stenosis per echocardiogram obtained in December 2017. She underwent permanent pacemaker implant for sinus node dysfunction in November 2017, and has history of persistent atrial fibrillation, previously on sotalol. This was discontinued and she was initiated on amiodarone. She was admitted to [facility] in December 2017 with recurrence of AF with RVR, her amiodarone was adjusted, and she had spontaneous return of sinus rhythm. Most recent device interrogation 1/17/18 showing 11.6% of time spent in AF with maximum duration 2 hours. She is maintained on chronic anticoagulation with eliquis. She was admitted 1/26/18 with 2 day history of worsening dypnea secondary to bronchitis, treated with bronchodilators, steroids and IV antibiotics. Usual dialysis schedule has been maintained. Symptomatically has improved. Today had several hours of AF recurrence with RVR during dialysis as well as short salvos of nonsustained VT. K+ mild elevation of 5.8. Asymptomatic, and has spontaneously returned to NSR.   Afib Recurrent.     PROBLEM LIST:         Diagnosis    Hypothyroidism    Hypercholesteremia    CKD (chronic kidney disease)    Paroxysmal atrial fibrillation (HCC)    Anemia    Acid reflux    Anxiety    Diabetes mellitus due to underlying condition, controlled, with stage 5 chronic kidney disease not on chronic dialysis, with long-term current use of insulin (HCC)    Essential hypertension    Idiopathic Parkinson's disease ([address]    Hyperkalemia    Acute on chronic diastolic congestive heart failure ([address]    ESRD (end stage renal disease) on dialysis ([address]    Cardiac pacemaker    Pneumonia    COPD with exacerbation (HCC)    Hypervolemia    Pleural effusion       OBJECTIVE:     MEDICATIONS:     Scheduled Meds:   metoprolol tartrate  25 mg Oral BID    kg/m²     CONSTITUTIONAL:  awake, alert, cooperative, no apparent distress,   ENT:  Normocephalic, without obvious abnormality, atraumatic, sinuses nontender on palpation, external ears without lesions,  NECK:  Supple, symmetrical, trachea midline, no adenopathy, thyroid symmetric, not enlarged and no tenderness, skin normal  LUNGS:  Increased work of breathing,  Poor air exchange, Bilateral wheezing  CARDIOVASCULAR:  Normal apical impulse, irregular rate and rhythm, normal S1 and S2,  and 2/6 murmur noted  ABDOMEN:  Normal bowel sounds, soft, non-distended, non-tender, no masses palpated, no hepatosplenomegally  EXTREMITIES:  Trace edema, Pulses diminished throughout. NEUROLOGIC:  Awake, alert, oriented to name, place and time.  Following all commands and moving all extremties  SKIN:  no bruising or bleeding, normal skin color, texture, turgor and no rashes     Data:        LABS:      Recent Results (from the past 24 hour(s))   POCT Glucose    Collection Time: 01/31/18  5:34 PM   Result Value Ref Range    POC Glucose 250 (H) 60 - 115 mg/dl    Performed on ACCU-CHEK    POCT Glucose    Collection Time: 01/31/18  8:52 PM   Result Value Ref Range    POC Glucose 181 (H) 60 - 115 mg/dl    Performed on ACCU-CHEK    EKG 12 Lead    Collection Time: 02/01/18  5:01 AM   Result Value Ref Range    Ventricular Rate 61 BPM    Atrial Rate 61 BPM    P-R Interval 168 ms    QRS Duration 102 ms    Q-T Interval 540 ms    QTc Calculation (Bazett) 543 ms    P Axis 7 degrees    R Axis 18 degrees    T Axis 71 degrees   CBC    Collection Time: 02/01/18  5:41 AM   Result Value Ref Range    WBC 21.9 (H) 4.8 - 10.8 K/uL    RBC 3.66 (L) 4.20 - 5.40 M/uL    Hemoglobin 11.4 (L) 12.0 - 16.0 g/dL    Hematocrit 35.5 (L) 37.0 - 47.0 %    MCV 97.0 82.0 - 100.0 fL    MCH 31.1 27.0 - 31.3 pg    MCHC 32.1 (L) 33.0 - 37.0 %    RDW 15.6 (H) 11.5 - 14.5 %    Platelets 690 253 - 744 K/uL   Comprehensive Metabolic Panel    Collection Time: 02/01/18  5:41 AM Result Value Ref Range    Sodium 140 132 - 144 mEq/L    Potassium 3.9 3.5 - 5.1 mEq/L    Chloride 96 (L) 98 - 107 mEq/L    CO2 25 22 - 29 mEq/L    Anion Gap 19 (H) 7 - 13 mEq/L    Glucose 78 74 - 109 mg/dL    BUN 27 (H) 8 - 23 mg/dL    CREATININE 3.81 (H) 0.50 - 0.90 mg/dL    GFR Non- 11.4 (L) >60    GFR  13.7 (L) >60    Calcium 7.9 (L) 8.6 - 10.2 mg/dL    Total Protein 5.8 (L) 6.4 - 8.1 g/dL    Alb 3.3 (L) 3.9 - 4.9 g/dL    Total Bilirubin 0.2 0.0 - 1.2 mg/dL    Alkaline Phosphatase 71 40 - 130 U/L    ALT 16 0 - 33 U/L    AST 31 0 - 35 U/L    Globulin 2.5 2.3 - 3.5 g/dL   Magnesium    Collection Time: 02/01/18  5:41 AM   Result Value Ref Range    Magnesium 2.3 1.7 - 2.3 mg/dL   High sensitivity CRP    Collection Time: 02/01/18  5:41 AM   Result Value Ref Range    CRP High Sensitivity 28.9 (H) 0.0 - 5.0 mg/L   Sedimentation Rate    Collection Time: 02/01/18  5:41 AM   Result Value Ref Range    Sed Rate 30 0 - 30 mm   POCT Glucose    Collection Time: 02/01/18  7:50 AM   Result Value Ref Range    POC Glucose 100 60 - 115 mg/dl    Performed on ACCU-CHEK    POCT Glucose    Collection Time: 02/01/18 11:05 AM   Result Value Ref Range    POC Glucose 84 60 - 115 mg/dl    Performed on ACCU-CHEK         EKG: SR.     TELEMETRY: SR, No AFIB          Anshul Murphy MD ,[address] [place] [address] Cardiology

## 2018-02-01 NOTE — PROGRESS NOTES
Lizeth Anna MD        docusate sodium (COLACE) capsule 100 mg  100 mg Oral BID Savanna Wang MD   100 mg at 01/31/18 1440    ondansetron (ZOFRAN) injection 4 mg  4 mg Intravenous Q6H PRN Savanna Wang MD        morphine injection 2 mg  2 mg Intravenous Q4H PRN Savanna Wang MD        insulin lispro (HUMALOG) injection vial 0-6 Units  0-6 Units Subcutaneous TID WC Savanna Wang MD   3 Units at 01/31/18 1825    insulin lispro (HUMALOG) injection vial 0-3 Units  0-3 Units Subcutaneous Nightly Savanna Wang MD   1 Units at 01/31/18 2202    glucose (GLUTOSE) 40 % oral gel 15 g  15 g Oral PRN Savanna Wang MD        dextrose 50 % solution 12.5 g  12.5 g Intravenous PRN Savanna Wang MD        glucagon (rDNA) injection 1 mg  1 mg Intramuscular PRN Savanna Wang MD        dextrose 5 % solution  100 mL/hr Intravenous PRN Savanna Wang MD        guaiFENesin [facility]) extended release tablet 600 mg  600 mg Oral BID Savanna Wang MD   600 mg at 01/31/18 1440    benzonatate (TESSALON) capsule 100 mg  100 mg Oral TID PRN Savanna Wang MD        pill splitter   Does not apply Once Savanna Wang MD        lactobacillus acidophilus [facility]) 1 tablet  1 tablet Oral TID Savanna Wang MD   1 tablet at 01/31/18 2200     Allergies   Allergen Reactions    Arthrotec [Diclofenac-Misoprostol] Nausea Only    Depakote [Divalproex Sodium] Itching    Dilantin [Phenytoin]     Klonopin [Clonazepam]     Statins Other (See Comments)     achy     Principal Problem:    Pneumonia  Active Problems:    Hypothyroidism    Hypercholesteremia    CKD (chronic kidney disease)    Paroxysmal atrial fibrillation (HCC)    Anemia    Acid reflux    Anxiety    Diabetes mellitus due to underlying condition, controlled, with stage 5 chronic kidney disease not on chronic dialysis, with long-term current use of insulin (HCC)    Essential hypertension    Idiopathic Parkinson's disease ([facility] [address]    Hyperkalemia    Acute on hypertension     Idiopathic Parkinson's disease ([facility] [address]     Hyperkalemia     Acute on chronic diastolic congestive heart failure (HCC)     ESRD (end stage renal disease) on dialysis [facility])     Cardiac pacemaker     Pneumonia     COPD with exacerbation (HCC)     Hypervolemia     Pleural effusion    ). Plan:   (Patient is to have endoscopic studies today   Discharge planning in progress).        Alberto Polk PA-C  2/1/2018

## 2018-02-01 NOTE — ANESTHESIA PRE PROCEDURE
01/31/18 2200    fluticasone (FLONASE) 50 MCG/ACT nasal spray 2 spray  2 spray Nasal Daily Regina Diallo MD   2 spray at 01/30/18 1562    hydrALAZINE (APRESOLINE) tablet 100 mg  100 mg Oral TID Regina Diallo MD   100 mg at 02/01/18 5771    isosorbide mononitrate (IMDUR) extended release tablet 60 mg  60 mg Oral Daily Regina Diallo MD   60 mg at 02/01/18 0930    levothyroxine (SYNTHROID) tablet 25 mcg  25 mcg Oral Every Other Day Regina Diallo MD   25 mcg at 02/01/18 0631    levothyroxine (SYNTHROID) tablet 50 mcg  50 mcg Oral Every Other Day Regina Diallo MD   50 mcg at 01/31/18 0615    Lomitapide Mesylate CAPS 40 mg  40 mg Oral Daily Regina Diallo MD   Stopped at 01/26/18 8300    magnesium oxide (MAG-OX) tablet 400 mg  400 mg Oral Daily Regina Diallo MD   400 mg at 02/01/18 0936    pantoprazole sodium (PROTONIX) packet 40 mg  40 mg Oral QAM AC Regina Diallo MD   40 mg at 02/01/18 0631    venlafaxine (EFFEXOR XR) extended release capsule 150 mg  150 mg Oral Daily with breakfast Regina Diallo MD   Stopped at 02/01/18 0931    sodium chloride flush 0.9 % injection 10 mL  10 mL Intravenous 2 times per day Regina Diallo MD   10 mL at 02/01/18 0931    sodium chloride flush 0.9 % injection 10 mL  10 mL Intravenous PRN Regina Diallo MD        acetaminophen (TYLENOL) tablet 650 mg  650 mg Oral Q4H PRN Regina Diallo MD        docusate sodium (COLACE) capsule 100 mg  100 mg Oral BID Regina Diallo MD   100 mg at 01/31/18 1440    ondansetron (ZOFRAN) injection 4 mg  4 mg Intravenous Q6H PRN Regina Diallo MD        morphine injection 2 mg  2 mg Intravenous Q4H PRN Regina Diallo MD        insulin lispro (HUMALOG) injection vial 0-6 Units  0-6 Units Subcutaneous TID WC Regina Diallo MD   3 Units at 01/31/18 1825    insulin lispro (HUMALOG) injection vial 0-3 Units  0-3 Units Subcutaneous Nightly Regina Diallo MD   1 Units at 01/31/18 2202    glucose (GLUTOSE) 40 % the colon     Fistula     left    GERD (gastroesophageal reflux disease)     GERD, PUD, Hiatal Hernia    Granulomatous lung disease ([facility] [address]     History of endoscopy 2-21-12    Dr. Rj Jenkins Hyperlipidemia     Hypothyroidism     Kidney stones 11/2001    Dr. Durga Salcido    Neuropathy in diabetes ([facility]     legs    Osteoarthritis     Positive ORTEGA (antinuclear antibody)     1:80    Tachycardia     Thrombophlebitis leg superficial     Type II or unspecified type diabetes mellitus without mention of complication, not stated as uncontrolled 1998       Past Surgical History:        Procedure Laterality Date    APPENDECTOMY  2007    BLADDER SUSPENSION  2008 & 2009    [facility], second at [address]  2/2006    COLONOSCOPY  4/2007    Dr. Shalini Victoria [address] N/A 11/2/2017    CATHETER INSERTION HEMODIALYSIS performed by Colleen Hamlin MD at [address] [place] [address] Rd  [id number]    Bilateral    [facility]  3/2003       Social History:    Social History   Substance Use Topics    Smoking status: Former Smoker    Smokeless tobacco: Never Used    Alcohol use No                                Counseling given: Not Answered      Vital Signs (Current):   Vitals:    01/31/18 1615 01/31/18 1815 01/31/18 2351 02/01/18 1101   BP: 116/65 116/72 (!) 135/51 (!) 159/57   Pulse: 140 64 62 59   Resp:   20 20   Temp:   99.5 °F (37.5 °C) 98.6 °F (37 °C)   TempSrc:   Oral Oral   SpO2:   100% 99%   Weight:       Height:                                                  BP Readings from Last 3 Encounters:   02/01/18 (!) 159/57   01/14/18 (!) 163/90   01/02/18 (!) 182/77       NPO Status:                                                                                 BMI:   Wt Readings from Last 3 Encounters:   01/31/18 104 lb 8 oz (47.4 kg)   01/14/18 125 lb (56.7 kg)   01/02/18 123 lb (55.8 kg)     Body mass index is 21.11 kg/m². CBC:   Lab Results   Component Value Date    WBC 21.9 02/01/2018    RBC 3.66 02/01/2018    RBC 3.24 12/17/2011    HGB 11.4 02/01/2018    HCT 35.5 02/01/2018    MCV 97.0 02/01/2018    RDW 15.6 02/01/2018     02/01/2018       CMP:   Lab Results   Component Value Date     02/01/2018    K 3.9 02/01/2018    K 4.6 01/27/2018    CL 96 02/01/2018    CO2 25 02/01/2018    BUN 27 02/01/2018    CREATININE 3.81 02/01/2018    GFRAA 13.7 02/01/2018    LABGLOM 11.4 02/01/2018    GLUCOSE 78 02/01/2018    GLUCOSE 197 02/17/2012    PROT 5.8 02/01/2018    CALCIUM 7.9 02/01/2018    BILITOT 0.2 02/01/2018    ALKPHOS 71 02/01/2018    AST 31 02/01/2018    ALT 16 02/01/2018       POC Tests:   Recent Labs      02/01/18   1105   POCGLU  84       Coags:   Lab Results   Component Value Date    PROTIME 20.2 11/15/2017    PROTIME 16.5 10/20/2017    PROTIME 25.7 09/29/2016    INR 1.9 11/15/2017    APTT 40.2 10/24/2017       HCG (If Applicable): No results found for: PREGTESTUR, PREGSERUM, HCG, HCGQUANT     ABGs:   Lab Results   Component Value Date    PHART 7.452 10/30/2017    PO2ART 151 10/30/2017    EOQ4XOU 35 10/30/2017    XOA4SXB 24.5 10/30/2017    BEART 1 10/30/2017    S3YTNNEJ 99 10/30/2017        Type & Screen (If Applicable):  No results found for: LABABO, LABRH    Anesthesia Evaluation    Airway: Mallampati: II  TM distance: >3 FB   Neck ROM: full  Mouth opening: > = 3 FB Dental: normal exam         Pulmonary:normal exam                               Cardiovascular:                      Neuro/Psych:               GI/Hepatic/Renal:             Endo/Other:                     Abdominal:           Vascular:                                        Anesthesia Plan      MAC     ASA 4             Anesthetic plan and risks discussed with patient. Plan discussed with CRNA.     Attending anesthesiologist reviewed and agrees with Pre Eval content              Alicia Alcala MD   2/1/2018

## 2018-02-01 NOTE — PROGRESS NOTES
Pt at procedure. Had hd yesterday. I d/w Isra Lobo earlier - ok with IVF as NPO and felt like was dehydrated.     - hd tomorrow

## 2018-02-01 NOTE — FLOWSHEET NOTE
Pt resting in bed she is alert and oriented. Vitals are stable. She denies pain. She is incont of frequent clear yellow stools due to movi prep for scope. she remains npo .  meds given with sip of water

## 2018-02-01 NOTE — PROGRESS NOTES
Physical Therapy Missed Treatment   Facility/Department: [facility] MED SURG MLOZ OR Pool/NONE    NAME: Lauren Birmingham    : 1935 (81 y.o.)  MRN: [id number]    Account: [de-identified]  Gender: female         [x] Patient Unavailable: EGD and colonoscopy. Will attempt PT treatment again at earliest convenience.       Electronically signed by Sheyla Jane PTA on 18 at 1:45 PM

## 2018-02-01 NOTE — PROGRESS NOTES
Units Subcutaneous TID     insulin lispro  3 Units Subcutaneous Once    [START ON 2/2/2018] amiodarone  200 mg Oral Daily    diltiazem  240 mg Oral BID    cefUROXime  250 mg Oral BID    predniSONE  10 mg Oral BID    b complex-C-folic acid  1 capsule Oral Daily    calcium acetate  667 mg Oral TID     calcium elemental  500 mg Oral Daily    carbidopa-levodopa  1 tablet Oral Daily    chlorthalidone  25 mg Oral Daily    atorvastatin  20 mg Oral Nightly    fluticasone  2 spray Nasal Daily    hydrALAZINE  100 mg Oral TID    isosorbide mononitrate  60 mg Oral Daily    levothyroxine  25 mcg Oral Every Other Day    levothyroxine  50 mcg Oral Every Other Day    Lomitapide Mesylate  40 mg Oral Daily    magnesium oxide  400 mg Oral Daily    pantoprazole sodium  40 mg Oral QAM AC    venlafaxine  150 mg Oral Daily with breakfast    sodium chloride flush  10 mL Intravenous 2 times per day    docusate sodium  100 mg Oral BID    insulin lispro  0-6 Units Subcutaneous TID     insulin lispro  0-3 Units Subcutaneous Nightly    guaiFENesin  600 mg Oral BID    pill splitter   Does not apply Once    lactobacillus acidophilus  1 tablet Oral TID     Continuous Infusions:   dextrose 5 % and 0.9 % NaCl      dextrose         Objective:   Vitals: BP (!) 159/57   Pulse 59   Temp 98.6 °F (37 °C) (Oral)   Resp 20   Ht 4' 11\" (1.499 m)   Wt 104 lb 8 oz (47.4 kg)   SpO2 99%   BMI 21.11 kg/m²    Wt Readings from Last 3 Encounters:   01/31/18 104 lb 8 oz (47.4 kg)   01/14/18 125 lb (56.7 kg)   01/02/18 123 lb (55.8 kg)        General appearance: alert, appears stated age, cachectic, cooperative, fatigued, no distress and More alert today  Skin: Dry skin both arms  Neck: no lymphadenopathy  Lungs: clear to auscultation bilaterally  Heart: prominent apical impulse, regular rate and rhythm, S1, S2 normal and systolic murmur: holosystolic 4/6, blowing, harsh and rumbling at 2nd left intercostal space  Abdomen:

## 2018-02-01 NOTE — ANESTHESIA PRE PROCEDURE
Historical Provider, MD   levothyroxine (SYNTHROID) 50 MCG tablet TAKE 1 TABLET DAILY ALTERNATING WITH THE OTHER DOSE EVERY OTHER DAY 5/8/17  Yes Sue Colbert MD   levothyroxine (SYNTHROID) 25 MCG tablet TAKE 1 TABLET DAILY ALTERNATING WITH 50 MCG DOSE EVERY OTHER DAY 5/8/17  Yes Sue Colbert MD   pantoprazole sodium (PROTONIX) 40 MG PACK packet Take 40 mg by mouth every morning (before breakfast)    Yes Historical Provider, MD   CRESTOR 40 MG tablet  4/26/15  Yes Historical Provider, MD   chlorthalidone (HYGROTON) 25 MG tablet Take 25 mg by mouth daily   Yes Historical Provider, MD   fluticasone (FLONASE) 50 MCG/ACT nasal spray 2 sprays by Nasal route daily.      Yes Historical Provider, MD   ondansetron (ZOFRAN) 4 MG tablet Take 4 mg by mouth every 8 hours as needed for Nausea or Vomiting    Historical Provider, MD   Amino Acids-Protein Hydrolys (PRO-STAT) LIQD Take 30 mLs by mouth 2 times daily    Historical Provider, MD   calcium acetate (PHOSLO) 667 MG capsule Take 667 mg by mouth 3 times daily (with meals)    Historical Provider, MD   B Complex-C-Folic Acid (RENAL) 1 MG CAPS Take 1 capsule by mouth daily    Historical Provider, MD   LANTUS SOLOSTAR 100 UNIT/ML injection pen INJECT 14 UNITS IN THE MORNING 12/22/17   Sue Colbert MD   magnesium oxide (MAG-OX) 400 (241.3 Mg) MG TABS tablet Take 1 tablet by mouth daily 11/24/17   Wilfredo Rice MD   ALPRAZolam Melissa Grady) 0.5 MG tablet Take 1 tablet by mouth 3 times daily as needed for Anxiety 11/3/17   Daysi Arnett MD   ipratropium-albuterol (DUONEB) 0.5-2.5 (3) MG/3ML SOLN nebulizer solution Inhale 3 mLs into the lungs every 4 hours as needed for Shortness of Breath 11/3/17   Daysi Arnett MD   insulin lispro (HUMALOG KWIKPEN) 100 UNIT/ML pen Inject 2 Units into the skin 3 times daily (before meals)  Patient taking differently: Inject into the skin 3 times daily (before meals)  10/17/16   Sue Colbert MD   Lomitapide Mesylate (JUXTAPID) 40 MG CAPS Take by mouth daily    Historical Provider, MD   isosorbide mononitrate (IMDUR) 60 MG CR tablet Take 60 mg by mouth daily  2/18/15   Historical Provider, MD   carbidopa-levodopa (SINEMET)  MG per tablet Take 1 tablet by mouth daily  10/16/13   Historical Provider, MD   glucose blood VI test strips (TRUETEST TEST) strip As needed. 5/1/13   Dionisio Mosquera MD   aspirin 81 MG EC tablet Take 81 mg by mouth daily     Historical Provider, MD   calcium carbonate (OSCAL) 500 MG TABS tablet Take 500 mg by mouth daily.       Historical Provider, MD       Current medications:    Current Facility-Administered Medications   Medication Dose Route Frequency Provider Last Rate Last Dose    dextrose 5 % and 0.9 % sodium chloride infusion   Intravenous Continuous Jovon Robin MD        fentaNYL (SUBLIMAZE) injection 50 mcg  50 mcg Intravenous Q10 Min PRN Nathalie Denis MD        HYDROmorphone (DILAUDID) injection 0.5 mg  0.5 mg Intravenous Q10 Min PRN Nathalie Denis MD        HYDROcodone-acetaminophen [facility]) 5-325 MG per tablet 1 tablet  1 tablet Oral PRN Nathalie Denis MD        Or    HYDROcodone-acetaminophen [facility]) 5-325 MG per tablet 2 tablet  2 tablet Oral PRN Nathalie Denis MD        diphenhydrAMINE (BENADRYL) injection 12.5 mg  12.5 mg Intravenous Once PRN Nathalie Denis MD        ondansetron [facility]) injection 4 mg  4 mg Intravenous Once PRN Nathalie Denis MD        metoclopramide [facility]) injection 10 mg  10 mg Intravenous Once PRN Nathalie Denis MD        meperidine (DEMEROL) injection 12.5 mg  12.5 mg Intravenous Q5 Min PRN Nathalie Denis MD        metoprolol tartrate (LOPRESSOR) tablet 25 mg  25 mg Oral BID Luis Eduardo Corea MD   25 mg at 02/01/18 0930    insulin glargine (LANTUS) injection vial 12 Units  12 Units Subcutaneous QAM Jovon Robin MD   12 Units at 01/31/18 2201    insulin lispro (HUMALOG) injection vial 3 Units  3 Units Subcutaneous TID  Jaswant Cruz MD   3 Units at 01/31/18 1824    insulin lispro (HUMALOG) injection vial 3 Units  3 Units Subcutaneous Once Jaswant Cruz MD        [START ON 2/2/2018] amiodarone (CORDARONE) tablet 200 mg  200 mg Oral Daily Belle Castanon MD        diltiazem (CARDIZEM CD) extended release capsule 240 mg  240 mg Oral BID Belle Castanon MD   240 mg at 02/01/18 0929    ipratropium-albuterol (DUONEB) nebulizer solution 3 mL  3 mL Inhalation Q4H PRN Mike Rock MD        cefUROXime (CEFTIN) tablet 250 mg  250 mg Oral BID Radha Harding MD   250 mg at 02/01/18 7977    predniSONE (DELTASONE) tablet 10 mg  10 mg Oral BID Radha Harding MD   10 mg at 02/01/18 0445    diphenhydrAMINE-zinc acetate cream   Topical TID PRN Jack Calzada MD        hydrOXYzine (ATARAX) tablet 10 mg  10 mg Oral TID PRN Radha Cruz MD   10 mg at 01/29/18 0925    heparin (porcine) injection 1,000 Units  1,000 Units Intravenous PRN Radha Cruz MD        heparin (porcine) injection 7,000 Units  7,000 Units Intravenous PRN Radha Cruz MD   7,000 Units at 01/31/18 1316    ALPRAZolam (XANAX) tablet 0.5 mg  0.5 mg Oral TID PRN Radha Harding MD   0.5 mg at 01/28/18 2259    b complex-C-folic acid (NEPHROCAPS) capsule 1 mg  1 capsule Oral Daily Radha Harding MD   Stopped at 02/01/18 0933    calcium acetate (PHOSLO) capsule 667 mg  667 mg Oral TID  Radha Harding MD   Stopped at 02/01/18 2170    calcium elemental (OSCAL) tablet 500 mg  500 mg Oral Daily Radha Harding MD   Stopped at 02/01/18 0933    carbidopa-levodopa (SINEMET)  MG per tablet 1 tablet  1 tablet Oral Daily Radha Harding MD   1 tablet at 02/01/18 0930    chlorthalidone (HYGROTON) tablet 25 mg  25 mg Oral Daily Radha Harding MD   Stopped at 02/01/18 0966    cholestyramine light packet 2 g  2 g Oral TID PRN Radha Harding MD        atorvastatin (LIPITOR) tablet 20 mg  20 mg Oral Nightly Radha Harding MD   20 mg at oral gel 15 g  15 g Oral PRN Ronalee Kehr, MD        dextrose 50 % solution 12.5 g  12.5 g Intravenous PRN Ronalee Kehr, MD        glucagon (rDNA) injection 1 mg  1 mg Intramuscular PRN Ronalee Kehr, MD        dextrose 5 % solution  100 mL/hr Intravenous PRN Ronalee Kehr, MD        guaiFENesin [facility]) extended release tablet 600 mg  600 mg Oral BID Ronalee Kehr, MD   Stopped at 02/01/18 0932    benzonatate (TESSALON) capsule 100 mg  100 mg Oral TID PRN Ronalee Kehr, MD        pill splitter   Does not apply Once Ronalee Kehr, MD        lactobacillus acidophilus [facility]) 1 tablet  1 tablet Oral TID Ronalee Kehr, MD   Stopped at 02/01/18 0932       Allergies:     Allergies   Allergen Reactions    Arthrotec [Diclofenac-Misoprostol] Nausea Only    Depakote [Divalproex Sodium] Itching    Dilantin [Phenytoin]     Klonopin [Clonazepam]     Statins Other (See Comments)     achy       Problem List:    Patient Active Problem List   Diagnosis Code    Hypothyroidism E03.9    Hypercholesteremia E78.00    CKD (chronic kidney disease) N18.9    Paroxysmal atrial fibrillation ([facility]) I48.0    Anemia D64.9    Acid reflux K21.9    Anxiety F41.9    Diabetes mellitus due to underlying condition, controlled, with stage 5 chronic kidney disease not on chronic dialysis, with long-term current use of insulin ([facility]) E08.22, N18.5, Z79.4    Essential hypertension I10    Idiopathic Parkinson's disease ([facility] [address] G20    Hyperkalemia E87.5    Acute on chronic diastolic congestive heart failure ([facility]) I50.33    ESRD (end stage renal disease) on dialysis ([facility]) N18.6, Z99.2    Cardiac pacemaker Z95.0    Pneumonia J18.9    COPD with exacerbation ([facility]) J44.1    Hypervolemia E87.70    Pleural effusion J90       Past Medical History:        Diagnosis Date    Abnormal presence of protein in urine 6/2004    Dr. Mary Kay Hogan    Atrial fibrillation ([facility]     Constipation, chronic     Diverticular disease of index is 21.11 kg/m². CBC:   Lab Results   Component Value Date    WBC 21.9 02/01/2018    RBC 3.66 02/01/2018    RBC 3.24 12/17/2011    HGB 11.4 02/01/2018    HCT 35.5 02/01/2018    MCV 97.0 02/01/2018    RDW 15.6 02/01/2018     02/01/2018       CMP:   Lab Results   Component Value Date     02/01/2018    K 3.9 02/01/2018    K 4.6 01/27/2018    CL 96 02/01/2018    CO2 25 02/01/2018    BUN 27 02/01/2018    CREATININE 3.81 02/01/2018    GFRAA 13.7 02/01/2018    LABGLOM 11.4 02/01/2018    GLUCOSE 78 02/01/2018    GLUCOSE 197 02/17/2012    PROT 5.8 02/01/2018    CALCIUM 7.9 02/01/2018    BILITOT 0.2 02/01/2018    ALKPHOS 71 02/01/2018    AST 31 02/01/2018    ALT 16 02/01/2018       POC Tests:   Recent Labs      02/01/18   0750   POCGLU  100       Coags:   Lab Results   Component Value Date    PROTIME 20.2 11/15/2017    PROTIME 16.5 10/20/2017    PROTIME 25.7 09/29/2016    INR 1.9 11/15/2017    APTT 40.2 10/24/2017       HCG (If Applicable): No results found for: PREGTESTUR, PREGSERUM, HCG, HCGQUANT     ABGs:   Lab Results   Component Value Date    PHART 7.452 10/30/2017    PO2ART 151 10/30/2017    IIC0SFU 35 10/30/2017    NHE9RUU 24.5 10/30/2017    BEART 1 10/30/2017    Q3MYKQRM 99 10/30/2017        Type & Screen (If Applicable):  No results found for: LABABO, [address]    Anesthesia Evaluation  Patient summary reviewed and Nursing notes reviewed no history of anesthetic complications:   Airway:         Dental:          Pulmonary:   (+) pneumonia: no interval change,  COPD: no interval change,                             Cardiovascular:    (+) hypertension:, dysrhythmias:, CHF:,                   Neuro/Psych:   Negative Neuro/Psych ROS              GI/Hepatic/Renal:   (+) renal disease: ESRD,           Endo/Other:    (+) hypothyroidism::., .                 Abdominal:           Vascular: negative vascular ROS.                                        Anesthesia Plan        Betsey Denis MD 2/1/2018

## 2018-02-01 NOTE — FLOWSHEET NOTE
Patient is alert and oriented, pleasant. Very shaky and unstable gate. Tolerating bowel prep well. Lungs clear. Bowels active. Vitals stable. Denies any needs at this time.

## 2018-02-02 NOTE — PROGRESS NOTES
CARDIOLOGY [facility] PROGRESS NOTE         2/2/2018      Mariela Marlow    [id number]  1935    Rounding MD: Tom Campos MD ,[facility]    PRIMARY CARDIOLOGIST: Dr. Alexandro Chung  ================================================================     REASON FOR INITIAL CONSULTATION:    NSVT    SUBJECTIVE:     Patient had recurrent Rapid Afib, now back in SR and APaced   No CP. Some SOB   Had endoscopy. Full results to follow. DC per GI and Primary. Cardiac and general ROS otherwise negative and unchanged.     IMPRESSIONS:    1. PAF, with short recurrence following HD. Now with PAF, AFIB now. On amiodarone and anticoagulated with Eliquis. 2. Nonsustained VT in setting of hyperkalemia. QTc slightly prolonged on admission, resolved. 3. ESRD on HD  4. Acute exacerbation of COPD     RECOMMENDATIONS:    1. Cardiac Supportive Care. 2. Telemetry  3. Maximize medications  4. Continue Amiodarone. 5. Restart Eliquis once ok with GI.  6. DC per GI and Primary. 7. Further Recommendations. 8. See Orders.     CARDIAC HISTORY:    1. CAD s/p remote PCI/BMS to LCX 2010, [facility] 1/2013 showing diffuse vessel calcification with a 30%-50% stenosis of the proximal RCA, normal LM, heavily calcified LAD with mid to distal tapering with 100% distal occlusion, filled by collaterals, mild disease of LCX. Perfusion scan 1/2015 negative for ischemia. Medically managed. 2. Normal LV systolic function with EF 60-65%, moderate AS per echo 12/2017  3. Sinus node dysfunction s/p PPM 11/2017  4. Persistent AF, on amiodarone, anticoagulated with eliquis. Last device check 1/17/18 showing 11.6% of time in AF, longest duration 2 hours. 5. HTN  6. HLD     NON CARDIAC HISTORY:    1. ESRD on HD  2.  See problem list     PRESENTING HPI:     Mariela Marlow is a 81 y.o. female with history of coronary artery disease, status post remote PCI to LCX, [facility] 2013 showing diffuse vessel calcification, with total occlusion of the distal LAD, filled by collaterals. Medical management was recommended, perfusion scan in 2015 negative for ischemia. She has normal LV systolic function with a left ventricular ejection fraction 60-65%, moderate aortic stenosis per echocardiogram obtained in December 2017. She underwent permanent pacemaker implant for sinus node dysfunction in November 2017, and has history of persistent atrial fibrillation, previously on sotalol. This was discontinued and she was initiated on amiodarone. She was admitted to [facility] in December 2017 with recurrence of AF with RVR, her amiodarone was adjusted, and she had spontaneous return of sinus rhythm. Most recent device interrogation 1/17/18 showing 11.6% of time spent in AF with maximum duration 2 hours. She is maintained on chronic anticoagulation with eliquis. She was admitted 1/26/18 with 2 day history of worsening dypnea secondary to bronchitis, treated with bronchodilators, steroids and IV antibiotics. Usual dialysis schedule has been maintained. Symptomatically has improved. Today had several hours of AF recurrence with RVR during dialysis as well as short salvos of nonsustained VT. K+ mild elevation of 5.8. Asymptomatic, and has spontaneously returned to NSR.   Afib Recurrent.     PROBLEM LIST:         Diagnosis    Hypothyroidism    Hypercholesteremia    CKD (chronic kidney disease)    Paroxysmal atrial fibrillation (HCC)    Anemia    Acid reflux    Anxiety    Diabetes mellitus due to underlying condition, controlled, with stage 5 chronic kidney disease not on chronic dialysis, with long-term current use of insulin (HCC)    Essential hypertension    Idiopathic Parkinson's disease ([address]    Hyperkalemia    Acute on chronic diastolic congestive heart failure ([address]    ESRD (end stage renal disease) on dialysis ([address]    Cardiac pacemaker    Pneumonia    COPD with exacerbation (HCC)    Hypervolemia    Pleural effusion       OBJECTIVE:     MEDICATIONS:     Scheduled Meds:  

## 2018-02-02 NOTE — CARE COORDINATION
Phone call to son Shannon Zuluaga due to the fact that pt has been declining therapy and she may have decreased weakness. Shannon Zuluaga said he does not believe pt has SNF days left and he feels pt just needs to DC to home. He feels pt will do better when at home. He does want to resume [facility]

## 2018-02-02 NOTE — FLOWSHEET NOTE
Patient has been down to dialysis since early am. Had alittle  Breakfast from fridge. Lung clear. Alert and oriented this am. Up to [facility] with assisted of 2. Unable to urinate. Possible discharge home today. Tele-Paced 61.

## 2018-02-02 NOTE — PROGRESS NOTES
Physical Therapy  Facility/[facility] MED SURG [id number]/[id number]  Daily Progress Note    NAME: Selma Álvarez    : 1935 (81 y.o.)  MRN: [id number]    Account: [de-identified]  Gender: female    Date of Service: 18    Patient Diagnosis(es):   Patient Active Problem List    Diagnosis Date Noted    Cardiac pacemaker 2017     Priority: High    Pneumonia 2018    COPD with exacerbation ([address] 2018    Hypervolemia     Pleural effusion     ESRD (end stage renal disease) on dialysis ([address] 11/10/2017    Acute on chronic diastolic congestive heart failure ([address] 10/30/2017    Hyperkalemia 10/26/2017    Diabetes mellitus due to underlying condition, controlled, with stage 5 chronic kidney disease not on chronic dialysis, with long-term current use of insulin ([address] 2017    Essential hypertension 2017    Anxiety 2017    Paroxysmal atrial fibrillation ([address] 10/27/2016    Acid reflux 2015    Idiopathic Parkinson's disease ([address] 2014    CKD (chronic kidney disease) 10/15/2012    Anemia 2011    Hypothyroidism 10/04/2011    Hypercholesteremia 10/04/2011       Precautions/Weight Bearing Restrictions: Full weight bearing,   Restrictions/Precautions: Fall Risk  Falls Safety Armband Present:  [x] Yes  [] No    SUBJECTIVE: \"I know I'm unsteady, I'll probably have to use my walker when I go home. \"  Pain:   Initial Pain level: none   Location: NA   Description: N/A  Action:  [x]  Pt declined intervention  [] Nursing notified     [x] Pain acceptable level for treatment  [] Other:      Reassessment of Pain: none   Location: NA   Description: N/A  Action:  [x]  Pt declined intervention  [] Nursing notified     [x] Pain acceptable level for treatment  [] Other:    OBJECTIVE:     Bed Mobility:   Rolling: - Modified Independent  Supine to Sit: -  Modified Independent   Sit to Supine: -  Modified Independent      Transfers:   Sit to Stand: - Supervision   Stand

## 2018-02-02 NOTE — PROGRESS NOTES
insulin lispro  3 Units Subcutaneous TID     insulin lispro  3 Units Subcutaneous Once    amiodarone  200 mg Oral Daily    diltiazem  240 mg Oral BID    cefUROXime  250 mg Oral BID    predniSONE  10 mg Oral BID    b complex-C-folic acid  1 capsule Oral Daily    calcium acetate  667 mg Oral TID     calcium elemental  500 mg Oral Daily    carbidopa-levodopa  1 tablet Oral Daily    chlorthalidone  25 mg Oral Daily    atorvastatin  20 mg Oral Nightly    fluticasone  2 spray Nasal Daily    hydrALAZINE  100 mg Oral TID    isosorbide mononitrate  60 mg Oral Daily    levothyroxine  25 mcg Oral Every Other Day    levothyroxine  50 mcg Oral Every Other Day    Lomitapide Mesylate  40 mg Oral Daily    magnesium oxide  400 mg Oral Daily    venlafaxine  150 mg Oral Daily with breakfast    sodium chloride flush  10 mL Intravenous 2 times per day    docusate sodium  100 mg Oral BID    insulin lispro  0-6 Units Subcutaneous TID     insulin lispro  0-3 Units Subcutaneous Nightly    guaiFENesin  600 mg Oral BID    pill splitter   Does not apply Once    lactobacillus acidophilus  1 tablet Oral TID     Continuous Infusions:   sodium chloride      dextrose 5 % and 0.9 % NaCl Stopped (02/01/18 7149)    dextrose         Objective:   Vitals: BP (!) 160/82   Pulse 60   Temp 97.4 °F (36.3 °C)   Resp 16   Ht 4' 11\" (1.499 m)   Wt 110 lb 7.2 oz (50.1 kg)   SpO2 100%   BMI 22.31 kg/m²    Wt Readings from Last 3 Encounters:   02/02/18 110 lb 7.2 oz (50.1 kg)   01/14/18 125 lb (56.7 kg)   01/02/18 123 lb (55.8 kg)        General appearance: alert, appears stated age, cachectic, cooperative, fatigued, no distress and More alert today  Skin: Dry skin both arms  Neck: no lymphadenopathy  Lungs: clear to auscultation bilaterally  Heart: prominent apical impulse, regular rate and rhythm, S1, S2 normal and systolic murmur: holosystolic 4/6, blowing, harsh and rumbling at 2nd left intercostal space  Abdomen:

## 2018-02-05 NOTE — PROGRESS NOTES
Physical Therapy  Facility/Department: [facility] MED SURG [id number]/[id number]  Physical Therapy Discharge      NAME: Charlotte Wilson    : 1935 (81 y.o.)  MRN: [id number]    Account: [de-identified]  Gender: female      Patient has been discharged from acute care hospital. DC patient from current PT program.      Electronically signed by Lizzette Torres PT on 18 at 10:57 AM

## 2018-02-14 NOTE — ED TRIAGE NOTES
Alert and oriented female to [id number] via [address], [address], c/o right ankle pain, no obvious deformitu noted, positive pedal pulse, skin pale, warm and dry

## 2018-02-14 NOTE — ED NOTES
Bed: 26  Expected date: 2/14/18  Expected time: 11:29 AM  Means of arrival: [facility]  Comments:  81 F - Rt ankle edema, fall last night     Kal Mcclain RN  02/14/18 4041

## 2018-02-14 NOTE — TELEPHONE ENCOUNTER
Note on board that this pt has changed to [facility]. Left message with [facility] to verify this info before discharging pt.     [facility]  Staff Pharmacist  2/14/2018  9:30 AM

## 2018-03-06 PROBLEM — J91.8 PLEURAL EFFUSION ASSOCIATED WITH PULMONARY INFECTION: Status: ACTIVE | Noted: 2018-01-01

## 2018-03-06 PROBLEM — J18.9 PLEURAL EFFUSION ASSOCIATED WITH PULMONARY INFECTION: Status: ACTIVE | Noted: 2018-01-01

## 2018-03-06 NOTE — ED TRIAGE NOTES
Patient was recently seen and treated for UTI. Patient stated she was short of breath earlier. She denies shortness of breath at this time. Patient denies pain at this time. Patient was seen by home health nurse. Patient was \"feeling fatigued\" per home health RN. Patient is alert and oriented X4.

## 2018-03-06 NOTE — ED PROVIDER NOTES
and moist. No oropharyngeal exudate. Eyes: Conjunctivae are normal. Pupils are equal, round, and reactive to light. Neck: Normal range of motion. Neck supple. No JVD present. No tracheal deviation present. No thyromegaly present. Cardiovascular: Normal rate and normal heart sounds. No murmur heard. Pulmonary/Chest: Effort normal. No respiratory distress. She has wheezes. Abdominal: Soft. Bowel sounds are normal. There is no tenderness. There is no guarding. Musculoskeletal: Normal range of motion. She exhibits no edema or tenderness. Neurological: She is alert and oriented to person, place, and time. No cranial nerve deficit. Skin: Skin is warm and dry. No rash noted. She is not diaphoretic. There is pallor. Psychiatric: She has a normal mood and affect. Her behavior is normal. Judgment and thought content normal.       DIAGNOSTIC RESULTS     EKG: All EKG's are interpreted by the Emergency Department Physician who either signs or Co-signs this chart in the absence of a cardiologist.    Normal sinus rhythm 74 bpm.  Nonspecific ST-T change. Prolonged QT. RADIOLOGY:   Non-plain film images such as CT, Ultrasound and MRI are read by the radiologist. Plain radiographic images are visualized and preliminarily interpreted by the emergency physician with the below findings:    Chest x-ray as below    Interpretation per the Radiologist below, if available at the time of this note:    XR CHEST PORTABLE   Final Result   1. VERY LARGE PLEURAL EFFUSION OCCUPYING APPROXIMATELY THREE QUARTERS OF THE LEFT HEMITHORAX. 2. SUSPECT UNDERLYING COMPRESSION ATELECTASIS AND/OR INFILTRATE IN THE LEFT LUNG. 3. SATISFACTORY PLACEMENT OF THE RIGHT-SIDED IJ HEMODIALYSIS CATHETER WITH TIP OF CATHETER IN SVC. 4. PROBABLE CARDIOMEGALY WITH A BIPOLAR CARDIAC PACEMAKER IN PLACE.             ED BEDSIDE ULTRASOUND:   Performed by ED Physician - none    LABS:  Labs Reviewed   CBC WITH AUTO DIFFERENTIAL   COMPREHENSIVE

## 2018-03-07 PROBLEM — N39.0 UTI (URINARY TRACT INFECTION): Status: ACTIVE | Noted: 2018-01-01

## 2018-03-07 PROBLEM — N18.5 CKD (CHRONIC KIDNEY DISEASE) STAGE 5, GFR LESS THAN 15 ML/MIN (HCC): Status: ACTIVE | Noted: 2018-01-01

## 2018-03-07 PROBLEM — J90 PLEURAL EFFUSION: Status: ACTIVE | Noted: 2018-01-01

## 2018-03-07 PROBLEM — A41.9 SEPSIS (HCC): Status: ACTIVE | Noted: 2018-01-01

## 2018-03-07 PROBLEM — R06.02 SOB (SHORTNESS OF BREATH): Status: ACTIVE | Noted: 2018-01-01

## 2018-03-07 NOTE — CARE COORDINATION
LSW spoke to Pt, she stated she is SOB and is upset with her son because he told her not to come to hospital. Pt thinks he does that because she comes to hospital to much. Pt stated she feels safe at home with son. Pt is agreeable to go to SNF for therapy. Pt stated she has been to one before but does remember name. SNF list was given so Pt can go over it with son. Pt stated LSW can talk to son. LSW called son but it was a wrong no. LSW to follow. .Electronically signed by ALIDA Gomez on 3/7/2018 at 12:19 PM

## 2018-03-07 NOTE — CONSULTS
Consults   Pulmonary Medicine  Consult Note      Reason for consultation: Shortness of breath, pleural effusion    Consulting physician: Dr. Sruthi Coyle:      This is a 81-year-old female with end-stage renal disease on hemodialysis, recently treated for UTI was having short of breath at home. Patient also feeling fatigued. She had a chest x-ray shows large left and moderate right pleural effusion with compressive atelectasis. Patient is currently on 2 L O2 via nasal cannula saturation is 100%. patient does not have a shortness of breath at rest.  She denies any chest pain or pleuritic pain. No fever or chills. No nausea vomiting, diarrhea or abdominal pain. She was seen by Dr. Bakari Foster. She also has a leukocytosis with WBC of 33.8, She is on Rocephin 1 g every 24 hourly. Pulmonic consult was done due to shortness of breath and pleural effusion.         Past Medical History:        Diagnosis Date    Abnormal presence of protein in urine 6/2004    Dr. Lilo Mcgowan    Atrial fibrillation ([address]     Constipation, chronic     Diverticular disease of the colon     ESRD (end stage renal disease) ([address]     Fistula     left    GERD (gastroesophageal reflux disease)     GERD, PUD, Hiatal Hernia    Granulomatous lung disease ([address]     History of endoscopy 2-21-12    Dr. Lety Daley Hyperlipidemia     Hypothyroidism     Kidney stones 11/2001    Dr. Lilo Mcgowan    Neuropathy in diabetes ([address]     legs    Osteoarthritis     Positive ORTEGA (antinuclear antibody)     1:80    Tachycardia     Thrombophlebitis leg superficial     Type II or unspecified type diabetes mellitus without mention of complication, not stated as uncontrolled 1998       Past Surgical History:        Procedure Laterality Date    APPENDECTOMY  2007    BLADDER SUSPENSION  2008 & 2009    [address] [place] [address], [facility] [address]  2/2006    COLONOSCOPY  4/2007    Dr. Gladis Sanchez    [address] [place] [address] Drive N/A 11/2/2017 Oral BID    sodium chloride flush  10 mL Intravenous 2 times per day    docusate sodium  100 mg Oral BID    insulin lispro  0-12 Units Subcutaneous TID WC    insulin lispro  0-6 Units Subcutaneous Nightly    darbepoetin lizeth-polysorbate  40 mcg Subcutaneous Weekly    cefTRIAXone (ROCEPHIN) IV  1 g Intravenous Q24H    levothyroxine  50 mcg Oral Every Other Day    And    [START ON 3/8/2018] levothyroxine  25 mcg Oral Every Other Day       PRN Meds:sodium chloride flush, ALPRAZolam, ipratropium-albuterol, cholestyramine light, ondansetron, benzonatate, sodium chloride flush, acetaminophen, ondansetron, glucose, dextrose, glucagon (rDNA), dextrose, albumin human, heparin (porcine), heparin (porcine), heparin (porcine)    REVIEW OF SYSTEMS:  As in history of present illness  Other 14 point review of system is negative. PHYSICAL EXAM:    Vitals:  BP (!) 155/60   Pulse 79   Temp 98.6 °F (37 °C)   Resp 20   Ht 4' 11\" (1.499 m)   Wt 104 lb 12.8 oz (47.5 kg)   LMP  (LMP Unknown)   SpO2 100%   Breastfeeding? No   BMI 21.17 kg/m²   General: Alert, awake, Oriented x3  .comfortable in bed, No distress. Head: Atraumatic , Normocephalic   Eyes: PERRL. No sclera icterus. No conjunctival injection. No discharge   ENT: No nasal  discharge. Pharynx clear. Neck:  Trachea midline. No thyromegaly, no JVD, No cervical adenopathy. Chest : Bilaterally symmetrical ,Normal effort,  No accessory muscle use  Lung : Diminished breath sound at left base. No Rales. No wheezing. No rhonchi. dullness on percussion present at left base. Heart[de-identified] Normal  rate. Regular rhythm. No mumur ,  Rub or gallop  ABD: Non-tender. Non-distended. No masses. No organmegaly. Normal bowel sounds. No hernia. Musculoskeleton: normal range of motion in all extremites, strength and tone   Extremities: No pitting ,Cyanosis ,No clubbing  Neuro: no cranial nerve abnormality, normal reflex and sensation, no focal weakness   Skin: Warm and dry.   No erythema rash on exposed extremities. Data Review  Recent Labs      03/06/18   1833  03/07/18   0914   WBC  37.9*  33.8*   HGB  8.6*  8.7*   HCT  26.7*  27.0*   PLT  254  249      Recent Labs      03/06/18   1815  03/07/18   0914   NA  136  134   K  3.8  3.0*   CL  92*  91*   CO2  27  26   BUN  27*  34*   CREATININE  3.52*  4.23*   GLUCOSE  95  166*     O2 Device: None (Room air)  O2 Flow Rate (L/min): 2 L/min  Lab Results   Component Value Date    LACTA 0.7 03/07/2018    LACTA 2.0 11/09/2017    LACTA 0.7 04/14/2016       Radiology    Ct Chest Wo Contrast    Result Date: 3/6/2018  EXAMINATION:  CT CHEST WITHOUT IV CONTRAST CLINICAL HISTORY:  EVALUATE LEFT PLEURAL EFFUSION, PATIENT WITH DYSPNEA COMPARISON:  None available TECHNIQUE:  CT chest is obtained without IV contrast. Axial and MPR CT images of the chest were obtained. CT images are viewed with pulmonary and mediastinal window settings. FINDINGS:  There is a very large left pleural effusion with concomitant compression atelectasis of the left lower lobe. Also, there is a moderate size right pleural effusion with concomitant subsegmental atelectasis of the right lower lobe. There is no definite CT evidence of a pulmonary mass however, an endobronchial lesion involving the bilateral lower lobe bronchi is not excluded. There is mild cardiomegaly without a pericardial effusion. There are coronary artery calcifications. There is atherosclerotic calcification of the aorta and there is no thoracic aorta aneurysm. There are calcified hilar and subcarinal lymph nodes compatible with remote granulomatous disease. There is degenerative bone spurring throughout the spine and there is evidence of a previous cholecystectomy. 1. Very large left pleural effusion with concomitant compression atelectasis of the left lower lobe. 2. Moderate size right pleural effusion with concomitant subsegmental atelectasis of the right lower lobe.  3. Pulmonary mass not definitely rule out sepsis  5. Bipolar cardiac pacemaker    Patient has bilateral pleural effusions, larger on the left side and moderate on right side likely due to fluid overload. Patient with end-stage renal disease on hemodialysis. Patient has short of breath but no cough or sputum production, no fever or chest pain  and less likely underlying pneumonia mostly compressive atelectasis. Repeat chest x-ray after dialysis if no improvement may consider thoracentesis on the left side. On 2 L O2 saturation is 100%. Severe leukocytosis and blood culture is requested. She has a recent UTI currently on Rocephin 1 g every 24 hours. Will have a follow-up chest x-ray after dialysis.   Thank you for consultation    Electronically signed by Tushar Wade MD, FCCP on 3/7/2018 at 11:03 AM

## 2018-03-07 NOTE — ED NOTES
Critical trop reported via lab. Results 0.087. Dr Medina Risk aware.      Donavon Lares, GLORY  03/06/18 [id number]

## 2018-03-07 NOTE — CONSULTS
g/dL   Troponin    Collection Time: 03/06/18  6:15 PM   Result Value Ref Range    Troponin 0.087 (HH) 0.000 - 0.010 ng/mL   Brain Natriuretic Peptide    Collection Time: 03/06/18  6:15 PM   Result Value Ref Range    Pro-BNP 15,885 pg/mL   CBC Auto Differential    Collection Time: 03/06/18  6:33 PM   Result Value Ref Range    WBC 37.9 (H) 4.8 - 10.8 K/uL    RBC 2.88 (L) 4.20 - 5.40 M/uL    Hemoglobin 8.6 (L) 12.0 - 16.0 g/dL    Hematocrit 26.7 (L) 37.0 - 47.0 %    MCV 92.7 82.0 - 100.0 fL    MCH 30.0 27.0 - 31.3 pg    MCHC 32.4 (L) 33.0 - 37.0 %    RDW 16.5 (H) 11.5 - 14.5 %    Platelets 072 089 - 999 K/uL    PLATELET SLIDE REVIEW Normal     Neutrophils % 92.0 %    Lymphocytes % 4.0 %    Monocytes % 2.9 %    Eosinophils % 0.0 %    Basophils % 0.1 %    Neutrophils # 35.2 (H) 1.4 - 6.5 K/uL    Lymphocytes # 1.5 1.0 - 4.8 K/uL    Monocytes # 1.1 (H) 0.2 - 0.8 K/uL    Eosinophils # 0.0 0.0 - 0.7 K/uL    Basophils # 0.0 0.0 - 0.2 K/uL    Myelocytes Relative 1 %    Toxic Granulation 2+     Anisocytosis 1+     Polychromasia 1+     Hypochromia 2+    Urinalysis Reflex to Culture    Collection Time: 03/06/18  7:15 PM   Result Value Ref Range    Color, UA FARZAD (A) Straw/Yellow    Clarity, UA TURBID (A) Clear    Glucose, Ur Negative Negative mg/dL    Bilirubin Urine SMALL (A) Negative    Ketones, Urine TRACE (A) Negative mg/dL    Specific Gravity, UA 1.023 1.005 - 1.030    Blood, Urine Negative Negative    pH, UA 5.5 5.0 - 9.0    Protein, UA >=300 (A) Negative mg/dL    Urobilinogen, Urine 0.2 <2.0 E.U./dL    Nitrite, Urine Negative Negative    Leukocyte Esterase, Urine MODERATE (A) Negative    Urine Reflex to Culture YES    Microscopic Urinalysis    Collection Time: 03/06/18  7:15 PM   Result Value Ref Range    WBC, UA 20-50 (A) 0 - 5 /HPF    RBC, UA 3-5 (A) 0 - 2 /HPF    Epi Cells 10-20 /HPF    Renal Epithelial, Urine 3-5 /HPF    Bacteria, UA Many /HPF   POCT Glucose    Collection Time: 03/07/18  6:13 AM   Result Value Ref

## 2018-03-07 NOTE — FLOWSHEET NOTE
Patient arrived to the floor via cart. oriented patient to the call light and her room. no complain of pain,no distress

## 2018-03-07 NOTE — PROGRESS NOTES
[facility] EMERGENCY [facility] AT [place] Respiratory Therapy Evaluation   Current Order:  Duoneb Q4 prn      Home Regimen: PRN      Ordering Physician: Shawn Cooks  Re-evaluation Date:  3/10     Diagnosis:SOB     Patient Status: Stable / Unstable + Physician notified    The following MDI Criteria must be met in order to convert aerosol to MDI with spacer. If unable to meet, MDI will be converted to aerosol:  []  Patient able to demonstrate the ability to use MDI effectively  []  Patient alert and cooperative  []  Patient able to take deep breath with 5-10 second hold  []  Medication(s) available in this delivery method   []  Peak flow greater than or equal to 200 ml/min            Current Order Substituted To  (same drug, same frequency)   Aerosol to MDI [] Albuterol Sulfate 0.083% unit dose by aerosol Albuterol Sulfate MDI 2 puffs by inhalation with spacer    [] Levalbuterol 1.25 mg unit dose by aerosol Levalbuterol MDI 2 puffs by inhalation with spacer    [] Levalbuterol 0.63 mg unit dose by aerosol Levalbuterol MDI 2 puffs by inhalation with spacer    [] Ipratropium Bromide 0.02% unit dose by aerosol Ipratropium Bromide MDI 2 puffs by inhalation with spacer    [] Duoneb (Ipratropium + Albuterol) unit dose by aerosol Ipratropium MDI + Albuterol MDI 2 puffs by inhalation w/spacer   MDI to Aerosol [] Albuterol Sulfate MDI Albuterol Sulfate 0.083% unit dose by aerosol    [] Levalbuterol MDI 2 puffs by inhalation Levalbuterol 1.25 mg unit dose by aerosol    [] Ipratropium Bromide MDI by inhalation Ipratropium Bromide 0.02% unit dose by aerosol    [] Combivent (Ipratropium + Albuterol) MDI by inhalation Duoneb (Ipratropium + Albuterol) unit dose by aerosol   Treatment Assessment [Frequency/Schedule]:  Change frequency to: Duoneb Q4 prn __________________________________________________per Protocol, P&T, MEC      Points 0 1 2 3 4   Pulmonary Status  Non-Smoker  [x]   Smoking history   < 20 pack years  []   Smoking history  ?  20 pack years  [] Pulmonary Disorder  (acute or chronic)  []   Severe or Chronic w/ Exacerbation  []     Surgical Status No [x]   Surgeries     General []   Surgery Lower []   Abdominal Thoracic or []   Upper Abdominal Thoracic with  PulmonaryDisorder  []     Chest X-ray Clear/Not  Ordered     []  Chronic Changes  Results Pending  []  Infiltrates, atelectasis, pleural effusion, or edema  []  Infiltrates in more than one lobe []  Infiltrate + Atelectasis, &/or pleural effusion  [x]    Respiratory Pattern Regular,  RR = 12-20 []  Increased,  RR = 21-25 []  JAMES, irregular,  or RR = 26-30 []  Decreased FEV1  or RR = 31-35 []  Severe SOB, use  of accessory muscles, or RR ? 35  []    Mental Status Alert, oriented,  Cooperative []  Confused but Follows commands []  Lethargic or unable to follow commands []  Obtunded  []  Comatose  []    Breath Sounds Clear to  auscultation  []  Decreased unilaterally or  in bases only [x]  Decreased  bilaterally  []  Crackles or intermittent wheezes []  Wheezes []    Cough Strong, Spontan., & nonproductive [x]  Strong,  spontaneous, &  productive []  Weak,  Nonproductive []  Weak, productive or  with wheezes []  No spontaneous  cough or may require suctioning []    Level of Activity Ambulatory [x]  Ambulatory w/ Assist  []  Non-ambulatory []  Paraplegic []  Quadriplegic []    Total    Score:___5____     Triage Score:_____5___      Tri       Triage:     1. (>20) Freq: Q3    2. (16-20) Freq: Q4   3. (11-15) Freq: QID & Albuterol Q2 PRN    4. (6-10) Freq: TID & Albuterol Q2 PRN    5. (0-5) Freq Q4prn

## 2018-03-07 NOTE — CONSULTS
Miguel Ángel Santana MD at [address] [facility],[address] B THYROID SURGERY      URETHRAL STRICTURE DILATATION  3/2003       Home Medications:    No current facility-administered medications on file prior to encounter.       Current Outpatient Prescriptions on File Prior to Encounter   Medication Sig Dispense Refill    insulin lispro (HUMALOG KWIKPEN) 100 UNIT/ML pen Inject 3 Units into the skin 3 times daily (before meals) 5 pen 3    insulin glargine (LANTUS SOLOSTAR) 100 UNIT/ML injection pen Inject 14 Units into the skin nightly 5 pen 2    amiodarone (CORDARONE) 200 MG tablet Take 1 tablet by mouth daily 30 tablet 3    diltiazem (CARDIZEM CD) 240 MG extended release capsule Take 1 capsule by mouth 2 times daily 60 capsule 3    benzonatate (TESSALON) 100 MG capsule Take 1 capsule by mouth 3 times daily as needed for Cough 20 capsule 0    Amino Acids-Protein Hydrolys (PRO-STAT) LIQD Take 30 mLs by mouth 2 times daily      docusate sodium (COLACE) 100 MG capsule Take 100 mg by mouth daily      calcium acetate (PHOSLO) 667 MG capsule Take 667 mg by mouth 3 times daily (with meals)      losartan (COZAAR) 50 MG tablet Take 50 mg by mouth daily      B Complex-C-Folic Acid (RENAL) 1 MG CAPS Take 1 capsule by mouth daily      apixaban (ELIQUIS) 2.5 MG TABS tablet Take 1 tablet by mouth 2 times daily 60 tablet 3    amiodarone (CORDARONE) 200 MG tablet Take 1 tablet by mouth daily 30 tablet 3    magnesium oxide (MAG-OX) 400 (241.3 Mg) MG TABS tablet Take 1 tablet by mouth daily 30 tablet 0    ALPRAZolam (XANAX) 0.5 MG tablet Take 1 tablet by mouth 3 times daily as needed for Anxiety 10 tablet 0    ipratropium-albuterol (DUONEB) 0.5-2.5 (3) MG/3ML SOLN nebulizer solution Inhale 3 mLs into the lungs every 4 hours as needed for Shortness of Breath 360 mL     hydrALAZINE (APRESOLINE) 100 MG tablet Take 1 tablet by mouth 3 times daily 90 tablet 3    venlafaxine (EFFEXOR XR) 75 MG extended release capsule 150 History   Problem Relation Age of Onset    Heart Disease Father     Early Death Father 47    Diabetes Father     Heart Disease Mother     Diabetes Mother     High Blood Pressure Mother     High Blood Pressure Sister     High Cholesterol Sister        Review of Systems:   10 point review of systems negative other than what is in HPI      Physical exam:   Constitutional:  VITALS:  /69   Pulse 100   Temp 98.8 °F (37.1 °C) (Oral)   Resp 20   Ht 4' 11\" (1.499 m)   Wt 104 lb 12.8 oz (47.5 kg)   LMP  (LMP Unknown)   SpO2 95%   Breastfeeding? No   BMI 21.17 kg/m²   Gen: alert, awake, nad  Skin: no rash, turgor wnl  Heent:  eomi, mmm  Neck: no bruits or jvd noted, thyroid normal  Lungs:  Diminished at bases. Some rhonchi  Heart:  Heart sounds are normal.  Regular rate and rhythm without murmur, gallop or rub.   Abdomen:  +bs, soft, nt, nd, no hepatosplenomegaly   Extremities: trace pedal edema  Psychiatric: mood and affect appropriate; judgement and insight intact  Musculoskeletal:  Rom, muscular strength intact; digits, nails normal    Data/  CBC:   Lab Results   Component Value Date    WBC 37.9 03/06/2018    RBC 2.88 03/06/2018    RBC 3.24 12/17/2011    HGB 8.6 03/06/2018    HCT 26.7 03/06/2018    MCV 92.7 03/06/2018    MCH 30.0 03/06/2018    MCHC 32.4 03/06/2018    RDW 16.5 03/06/2018     03/06/2018    MPV 10.0 08/21/2015     BMP:    Lab Results   Component Value Date     03/06/2018    K 3.8 03/06/2018    K 4.6 01/27/2018    CL 92 03/06/2018    CO2 27 03/06/2018    BUN 27 03/06/2018    LABALBU 3.0 03/06/2018    LABALBU 3.5 12/17/2011    CREATININE 3.52 03/06/2018    CALCIUM 8.2 03/06/2018    GFRAA 15.1 03/06/2018    LABGLOM 12.4 03/06/2018    GLUCOSE 95 03/06/2018    GLUCOSE 197 02/17/2012     Xr Ankle Right (min 3 Views)    Result Date: 2/14/2018  XR FOOT RIGHT (MIN 3 VIEWS), XR ANKLE RIGHT (MIN 3 VIEWS)  CLINICAL HISTORY: Generalized pain after fall yesterday COMPARISON: None FINDINGS: Three  views of the right foot are submitted. There is diffuse generalized osteopenia There is a fracture at the base and medial side of the proximal phalanx of the right second toe. No dislocations. There is a calcaneal enthesophyte There are some degenerative changes of the midfoot. FRACTURE AT THE BASE AND MEDIAL SIDE OF THE PROXIMAL PHALANX OF THE RIGHT SECOND TOE. EXAMINATION: XR FOOT RIGHT (MIN 3 VIEWS), XR ANKLE RIGHT (MIN 3 VIEWS)  CLINICAL HISTORY:Generalized pain after fall yesterday COMPARISONS: None TECHNIQUE: THREE VIEWS  FINDINGS: Three views of the right ankle are submitted. No acute fractures. No dislocations. There is a calcaneal enthesophyte                                                                               IMPRESSION: NO ACUTE FRACTURES    Xr Foot Right (min 3 Views)    Result Date: 2/14/2018  XR FOOT RIGHT (MIN 3 VIEWS), XR ANKLE RIGHT (MIN 3 VIEWS)  CLINICAL HISTORY: Generalized pain after fall yesterday COMPARISON: None  FINDINGS: Three  views of the right foot are submitted. There is diffuse generalized osteopenia There is a fracture at the base and medial side of the proximal phalanx of the right second toe. No dislocations. There is a calcaneal enthesophyte There are some degenerative changes of the midfoot. FRACTURE AT THE BASE AND MEDIAL SIDE OF THE PROXIMAL PHALANX OF THE RIGHT SECOND TOE. EXAMINATION: XR FOOT RIGHT (MIN 3 VIEWS), XR ANKLE RIGHT (MIN 3 VIEWS)  CLINICAL HISTORY:Generalized pain after fall yesterday COMPARISONS: None TECHNIQUE: THREE VIEWS  FINDINGS: Three views of the right ankle are submitted. No acute fractures. No dislocations.  There is a calcaneal enthesophyte                                                                               IMPRESSION: NO ACUTE FRACTURES    Ct Chest Wo Contrast    Result Date: 3/6/2018  EXAMINATION:  CT CHEST WITHOUT IV CONTRAST CLINICAL HISTORY:  EVALUATE LEFT PLEURAL EFFUSION, PATIENT WITH DYSPNEA COMPARISON:  None available TECHNIQUE:  CT chest is obtained without IV contrast. Axial and MPR CT images of the chest were obtained. CT images are viewed with pulmonary and mediastinal window settings. FINDINGS:  There is a very large left pleural effusion with concomitant compression atelectasis of the left lower lobe. Also, there is a moderate size right pleural effusion with concomitant subsegmental atelectasis of the right lower lobe. There is no definite CT evidence of a pulmonary mass however, an endobronchial lesion involving the bilateral lower lobe bronchi is not excluded. There is mild cardiomegaly without a pericardial effusion. There are coronary artery calcifications. There is atherosclerotic calcification of the aorta and there is no thoracic aorta aneurysm. There are calcified hilar and subcarinal lymph nodes compatible with remote granulomatous disease. There is degenerative bone spurring throughout the spine and there is evidence of a previous cholecystectomy. 1. Very large left pleural effusion with concomitant compression atelectasis of the left lower lobe. 2. Moderate size right pleural effusion with concomitant subsegmental atelectasis of the right lower lobe. 3. Pulmonary mass not definitely visualized however, an endobronchial lesion in lower lobe bronchi is not excluded. 4. Extensive atherosclerotic calcification of the aortic arch but no thoracic aorta aneurysm. 5. Mild cardiomegaly with coronary artery calcifications. There is no pericardial effusion. All CT scans at this facility use dose modulation, iterative reconstruction, and/or weight based dosing when appropriate to reduce radiation dose to as low as reasonably achievable.     Xr Chest Portable    Result Date: 3/6/2018  EXAMINATION: PORTABLE CHEST X-RAY FROM 3/6/2018 AT 18:08 HOURS CLINICAL HISTORY: RENAL FAILURE ON HEMODIALYSIS, DYSPNEA COMPARISONS: 1/25/2018 FINDINGS: There is a very large pleural effusion occupying approximately three quarters of the left hemithorax. Suspect underlying atelectasis and/or infiltrate in the mid and lower left lung field. There are small granulomas in the lungs and hilar regions compatible with remote granulomatous disease. Suspect cardiomegaly and there is a bipolar cardiac pacemaker superimposed on the left hemithorax with electrodes in the region of the right atrium and right ventricle. There is a right-sided IJ hemodialysis catheter with the distal tip of the catheter in the region of the SVC. There is degenerative bone spurring throughout the spine and arthritis in both shoulders. 1. VERY LARGE PLEURAL EFFUSION OCCUPYING APPROXIMATELY THREE QUARTERS OF THE LEFT HEMITHORAX. 2. SUSPECT UNDERLYING COMPRESSION ATELECTASIS AND/OR INFILTRATE IN THE LEFT LUNG. 3. SATISFACTORY PLACEMENT OF THE RIGHT-SIDED IJ HEMODIALYSIS CATHETER WITH TIP OF CATHETER IN SVC. 4. PROBABLE CARDIOMEGALY WITH A BIPOLAR CARDIAC PACEMAKER IN PLACE. Assessment/  82 yo with ESRD with large pleural effusions and SOB. Has leukocytosis and anemia as well. Plan/  1- blood cx drawn and ID consulted. If any positive cultures, needs HD cath removed  2- d/c ivf  3- HD today with 2.5 to 3 liters removed  4_ Aranesp for anemia    Thank you for the consultation. Please do not hesitate to call with questions.     Jose Luis Bran

## 2018-03-07 NOTE — CONSULTS
Consults   Infectious Disease     Patient Name: Palma Oppenheim  Date: 3/7/2018  YOB: 1935  Medical Record Number: [id number]    History of growing VRE  Possible C. Diff  Possible UTI      History of Present Illness:    hemodialysis,    recent UTI  recently in the hospital at [facility]. short of breath denies any chest pain Fatigue  Found to have WBC of 33.8  Placed on ceftriaxone      CT  1. Very large left pleural effusion with concomitant compression atelectasis of the left lower lobe. 2. Moderate size right pleural effusion with concomitant subsegmental atelectasis of the right lower lobe. Microscopic Urinalysis [[id number]] (Abnormal) Collected: 03/06/18 1915       Updated: 03/06/18 2204      WBC, UA 20-50 (A) /HPF       RBC, UA 3-5 (A) /HPF       Epi Cells 10-20 /HPF       Renal Epithelial, Urine 3-5 /HPF       Bacteria, UA Many /HPF      Urine Culture [[id number]] Collected: 03/06/18 1915      Updated: 03/06/18 2128     Urinalysis Reflex to Culture [[id number]] (Abnormal) Collected: 03/06/18 1915     Specimen: Urine, clean catch Updated: 03/06/18 2127      Color, UA FARZAD (A)      Clarity, UA TURBID (A)      Glucose, Ur Negative mg/dL       Bilirubin Urine SMALL (A)      Ketones, Urine TRACE (A) mg/dL       Specific Gravity, UA 1.023      Blood, Urine Negative      pH, UA 5.5      Protein, UA >=300 (A) mg/dL       Urobilinogen, Urine 0.2 E.U./dL       Nitrite, Urine Negative      Leukocyte Esterase, Urine MODERATE (A)      Urine Reflex to Culture YES           Review of Systems   Constitutional: Positive for malaise/fatigue. Negative for chills, diaphoresis, fever and weight loss. Respiratory: Negative. Cardiovascular: Negative. Gastrointestinal: Positive for diarrhea. Negative for abdominal pain, nausea and vomiting. Genitourinary: Negative. Musculoskeletal: Negative. Skin: Negative. Neurological: Positive for weakness.        Review of Systems: All 14 review of systems negative other than as stated above    Social History   Substance Use Topics    Smoking status: Former Smoker    Smokeless tobacco: Never Used    Alcohol use No         Past Medical History:   Diagnosis Date    Abnormal presence of protein in urine 6/2004    Dr. Martínez Patel. Dennehotso Sutton Atrial fibrillation ([facility] [address]     Constipation, chronic     Diverticular disease of the colon     ESRD (end stage renal disease) ([facility] [address] [facility]     Fistula     left    GERD (gastroesophageal reflux disease)     GERD, PUD, Hiatal Hernia    Granulomatous lung disease ([facility] [address] [facility]     History of endoscopy 2-21-12    Dr. Lidia Coleman Hyperlipidemia     Hypothyroidism     Kidney stones 11/2001    Dr. Marianela Campos    Neuropathy in diabetes ([facility] [address]     legs    Osteoarthritis     Positive ORTEGA (antinuclear antibody)     1:80    Tachycardia     Thrombophlebitis leg superficial     Type II or unspecified type diabetes mellitus without mention of complication, not stated as uncontrolled 1998           Past Surgical History:   Procedure Laterality Date    APPENDECTOMY  2007    BLADDER SUSPENSION  2008 & 2009    [facility], second at [address]  2/2006    COLONOSCOPY  4/2007    Dr. Yoo Dose    [address] N/A 11/2/2017    CATHETER INSERTION HEMODIALYSIS performed by Janneth Bonds MD at [address] [place] [address] Rd  [phone]    Bilateral    [place] COLONOSCOPY FLX DX W/COLLJ SPEC WHEN PFRMD N/A 2/1/2018    COLONOSCOPY performed by Calos Vinson MD at [address] [facility] EGD TRANSORAL BIOPSY SINGLE/MULTIPLE N/A 2/1/2018    EGD ESOPHAGOGASTRODUODENOSCOPY WITH BIOPSY performed by Calos Vinson MD at [address] [place] [address]  3/2003         No current facility-administered medications on file prior to encounter.       Current Outpatient Prescriptions on File Prior to Encounter   Medication Sig Dispense Refill    insulin lispro (HUMALOG KWIKPEN) 100 UNIT/ML pen Inject 3 Units into the skin 3 times daily (before meals) 5 pen 3    insulin glargine (LANTUS SOLOSTAR) 100 UNIT/ML injection pen Inject 14 Units into the skin nightly 5 pen 2    amiodarone (CORDARONE) 200 MG tablet Take 1 tablet by mouth daily 30 tablet 3    diltiazem (CARDIZEM CD) 240 MG extended release capsule Take 1 capsule by mouth 2 times daily 60 capsule 3    benzonatate (TESSALON) 100 MG capsule Take 1 capsule by mouth 3 times daily as needed for Cough 20 capsule 0    Amino Acids-Protein Hydrolys (PRO-STAT) LIQD Take 30 mLs by mouth 2 times daily      docusate sodium (COLACE) 100 MG capsule Take 100 mg by mouth daily      calcium acetate (PHOSLO) 667 MG capsule Take 667 mg by mouth 3 times daily (with meals)      losartan (COZAAR) 50 MG tablet Take 50 mg by mouth daily      B Complex-C-Folic Acid (RENAL) 1 MG CAPS Take 1 capsule by mouth daily      apixaban (ELIQUIS) 2.5 MG TABS tablet Take 1 tablet by mouth 2 times daily 60 tablet 3    amiodarone (CORDARONE) 200 MG tablet Take 1 tablet by mouth daily 30 tablet 3    magnesium oxide (MAG-OX) 400 (241.3 Mg) MG TABS tablet Take 1 tablet by mouth daily 30 tablet 0    ALPRAZolam (XANAX) 0.5 MG tablet Take 1 tablet by mouth 3 times daily as needed for Anxiety 10 tablet 0    ipratropium-albuterol (DUONEB) 0.5-2.5 (3) MG/3ML SOLN nebulizer solution Inhale 3 mLs into the lungs every 4 hours as needed for Shortness of Breath 360 mL     hydrALAZINE (APRESOLINE) 100 MG tablet Take 1 tablet by mouth 3 times daily 90 tablet 3    venlafaxine (EFFEXOR XR) 75 MG extended release capsule 150 mg       levothyroxine (SYNTHROID) 50 MCG tablet TAKE 1 TABLET DAILY ALTERNATING WITH THE OTHER DOSE EVERY OTHER DAY 50 tablet 3    Lomitapide Mesylate (JUXTAPID) 40 MG CAPS Take by mouth daily      pantoprazole sodium (PROTONIX) 40 MG PACK packet Take 40 mg by mouth every morning (before breakfast)       CRESTOR 40 MG tablet       chlorthalidone heard.  Pulmonary/Chest: Breath sounds normal. No respiratory distress. She has no wheezes. She has no rales. She exhibits no tenderness. Abdominal: Soft. Bowel sounds are normal. She exhibits no distension and no mass. There is no hepatosplenomegaly, splenomegaly or hepatomegaly. There is no tenderness. There is no rebound, no guarding and no CVA tenderness. Musculoskeletal: She exhibits no edema or tenderness. Lymphadenopathy:     She has no cervical adenopathy. She has no axillary adenopathy. Right: No inguinal, no supraclavicular and no epitrochlear adenopathy present. Left: No inguinal, no supraclavicular and no epitrochlear adenopathy present. Neurological: She is alert. Skin: Skin is warm and dry. No rash noted. No erythema. No pallor. Lab Results   Component Value Date    WBC 33.8 (H) 03/07/2018    HGB 8.7 (L) 03/07/2018    HCT 27.0 (L) 03/07/2018    MCV 92.5 03/07/2018     03/07/2018     Lab Results   Component Value Date     03/07/2018    K 3.0 03/07/2018    K 4.6 01/27/2018    CL 91 03/07/2018    CO2 26 03/07/2018    BUN 34 03/07/2018    CREATININE 4.23 03/07/2018    GLUCOSE 166 03/07/2018    GLUCOSE 197 02/17/2012    CALCIUM 7.5 03/07/2018        EXAMINATION:  CT CHEST WITHOUT IV CONTRAST       CLINICAL HISTORY:  EVALUATE LEFT PLEURAL EFFUSION, PATIENT WITH DYSPNEA       COMPARISON:  None available       TECHNIQUE:  CT chest is obtained without IV contrast. Axial and MPR CT images of the chest were obtained. CT images are viewed with pulmonary and mediastinal window settings.       FINDINGS: Zygmunt Alin is a very large left pleural effusion with concomitant compression atelectasis of the left lower lobe. Also, there is a moderate size right pleural effusion with concomitant subsegmental atelectasis of the right lower lobe.  There is no    definite CT evidence of a pulmonary mass however, an endobronchial lesion involving the bilateral lower lobe bronchi is not href=\"epic://request1. [id number]/\">6/10/2013   Office Visit on 4/22/2013  [facility]\")' href=\"epic://request1. [id number]/\">4/22/2013   Office Visit on 4/22/2013  [facility]\")' href=\"epic://request1. [id number]/\">4/22/2013   Hospital Encounter on 3/17/2013  [facility]\")' href=\"epic://request1. [id number]/\">3/18/2013   Office Visit on 7/2/2012  [facility]\")' href=\"epic://request1. [id number]/\">7/31/2012   Results Only on 7/2/2012  [facility]\")' href=\"epic://request1. [id number]/\">7/2/2012   Office Visit on 7/2/2012  [facility]\")' href=\"epic://request1. [id number]/\">7/2/2012   Office Visit on 4/27/2012  [facility]\")' href=\"epic://request1. [id number]/\">4/27/2012   Results Only on 3/27/2012  [facility]\")' href=\"epic://request1. [id number]/\">3/27/2012   Office Visit on 3/27/2012  [facility]\")' href=\"epic://request1. [id number]/\">4/09/4639   Hospital Encounter on 11/25/2011  [facility]\")' href=\"epic://request1. [id number]/\">11/25/2011   Office Visit on 9/7/2011  [facility]\")' href=\"epic://request1. [id number]/\">9/7/2011       Specimen: Urine Random - URINE\")'>Negative   Specimen: URINE\")'>50 (A)   Specimen: Urine Random - URINE\")'>50 (A) View Detailed Result Report  UA CHEMSTRIP ONLY  3/21/2017\")' href=\"epic://[email or web address]. [id number] CHEMSTRIP ONLY/\">  Specimen: URINE\")'>50 (A)   Specimen: URINE\")'>50 (A)   Specimen: Urine Random - URINE\")'>Negative   Specimen: Urine Random - BACK OFFICE PROC\")'>Neg   Specimen: Urine Random - BACK OFFICE PROC\")'>neg     Specimen: Urine Random - URINE\")'>1.023   Specimen: URINE\")'>1.015   Specimen: Urine Random - URINE\")'>1.011 View Detailed Result Report  [facility] CHEMSTRIP ONLY  3/21/2017\")' href=\"epic://[email or web address]. [id number] CHEMSTRIP ONLY/\">  Specimen: URINE\")'>1.014   Specimen: URINE\")'>1.013   Specimen: Urine Random - URINE\")'>1.014   Specimen: Urine Random - BACK OFFICE PROC\")'>1.005   Specimen: Urine Random - URINE\")'>1.013   Specimen: Urine Random - URINE\")'>1.008   Specimen: Urine Random - BACK OFFICE PROC\")'>1.010   Specimen: Urine Random - URINE\")'>1.015   Specimen: Urine Random - BACK OFFICE PROC\")'>1.010   Specimen: Urine Random - URINE\")'>1.011   Specimen: Urine Random - URINE\")'>1.025   Specimen: Urine Random - URINE\")'>1.015 1.014   Specimen: Urine Random - BACK OFFICE PROC\")'>1.015   Specimen: Urine Random - URINE\")'>1.015   Specimen: Urine Random - URINE\")'>1.016 1.015   Specimen: Urine Random - BACK OFFICE PROC\")'>1.015   Specimen: Urine Random - URINE\")'>1.014   Specimen: Urine Random - BACK OFFICE PROC\")'>1.015   Specimen: Urine Random - URINE\")'>1.008   Specimen: Urine Random - URINE\")'>1.014 1.015   Specimen: Urine Random - BACK OFFICE PROC\")'>1.015   Specimen: Urine Random - URINE\")'>1.014 1.018   Specimen: Urine Random - BACK OFFICE PROC\")'>1.015   Specimen: Urine Random - URINE\")'>1.01   Specimen: Urine Random - BACK OFFICE PROC\")'>1.01     Specimen: Urine Random - URINE\")'>1+ (A)   Specimen: URINE\")'>Negative   Specimen: Urine Random - URINE\")'>Negative View Detailed Result Report  UA CHEMSTRIP ONLY  3/21/2017\")' href=\"epic://[email or web address]. [id number] CHEMSTRIP ONLY/\">  Specimen: URINE\")'>Negative   Specimen: URINE\")'>Negative   Specimen: Urine Random - URINE\")'>1+ (A)   Specimen: Urine Random - BACK OFFICE PROC\")'>trace   Specimen: Urine Random - URINE\")'>Negative   Specimen: Urine Random - Specimen: Urine Random - URINE\")'>6.0   Specimen: Urine Random - BACK OFFICE PROC\")'>6.5   Specimen: Urine Random - URINE\")'>6.5   Specimen: Urine Random - URINE\")'>7 7   Specimen: Urine Random - BACK OFFICE PROC\")'>7.0   Specimen: Urine Random - URINE\")'>6.5 6.5   Specimen: Urine Random - BACK OFFICE PROC\")'>6   Specimen: Urine Random - URINE\")'>5.5   Specimen: Urine Random - BACK OFFICE PROC\")'>6     Specimen: Urine Random - URINE\")'>>=300 (A)   Specimen: URINE\")'>>=300 (A)   Specimen: Urine Random - URINE\")'>>=300 (A) View Detailed Result Report  UA CHEMSTRIP ONLY  3/21/2017\")' href=\"epic://[email or web address]. [id number] CHEMSTRIP ONLY/\">  Specimen: URINE\")'>>=300 (A)   Specimen: URINE\")'>100 (A)   Specimen: Urine Random - URINE\")'>100 (A)   Specimen: Urine Random - BACK OFFICE PROC\")'>300   Specimen: Urine Random - URINE\")'>100 (A)   Specimen: Urine Random - URINE\")'>100 (A)   Specimen: Urine Random - BACK OFFICE PROC\")'>300   Specimen: Urine Random - URINE\")'>>=300 (A)   Specimen: Urine Random - BACK OFFICE PROC\")'>300   Specimen: Urine Random - URINE\")'>100 (A)   Specimen: Urine Random - URINE\")'>>=300 (A)   Specimen: Urine Random - URINE\")'>>=300 (A) >=300 (A)   Specimen: Urine Random - BACK OFFICE PROC\")'>2,000   Specimen: Urine Random - URINE\")'>>=300 (A)   Specimen: Urine Random - URINE\")'>>=300 (A) >=300 (A)   Specimen: Urine Random - BACK OFFICE PROC\")'>100   Specimen: Urine Random - URINE\")'>100 (A)   Specimen: Urine Random - BACK OFFICE PROC\")'>100   Specimen: Urine Random - URINE\")'>Trace (A)   Specimen: Urine Random - URINE\")'>30 (A) Trace (A)   Specimen: Urine Random - BACK OFFICE PROC\")'>trace   Specimen: Urine Random - URINE\")'>Trace (A) 100 (A)   Specimen: Urine Random - BACK OFFICE PROC\")'>300   Specimen: Urine Random - URINE\")'>Negative   Specimen: Urine Random - BACK OFFICE PROC\")'>trace                 Specimen: Urine Random - BACK OFFICE PROC\")'>normal     ONLY  9/15/2017\")' href=\"epic://[email or web address]. [id number] CHEMSTRIP ONLY/\">SEE COMMENT View Detailed Result Report  UA CHEMSTRIP ONLY  3/21/2017\")' href=\"epic://ordersummary1. [id number] CHEMSTRIP ONLY/\">SEE COMMENT View Detailed Result Report  URINALYSIS WITH MICROSCOPIC  7/7/2016\")' href=\"epic://[email or web address]. [id number] WITH MICROSCOPIC/\">SEE COMMENT View Detailed Result Report  UA CHEMSTRIP ONLY  3/17/2016\")' href=\"epic://ordersummary1. [id number] CHEMSTRIP ONLY/\">SEE COMMENT   View Detailed Result Report  UA CHEMSTRIP ONLY  9/3/2015\")' href=\"epic://[email or web address]. [id number] CHEMSTRIP ONLY/\">SEE COMMENT View Detailed Result Report  URINALYSIS WITH MICROSCOPIC  7/14/2015\")' href=\"epic://[email or web address]. [id number] WITH MICROSCOPIC/\">SEE COMMENT   N/A   N/A View Detailed Result Report  URINALYSIS WITH MICROSCOPIC  11/22/2013\")' href=\"epic://[email or web address]. [id number] WITH MICROSCOPIC/\">  Specimen: Urine Random - URINE\")'>CALLED TO D... N/A View Detailed Result Report  URINALYSIS WITH MICROSCOPIC  10/16/2013\")' href=\"epic://[email or web address]. [id number] WITH MICROSCOPIC/\">Rechecked b. ..   Result rechecked.           Result rechecked.   Result rechecked.   Result rechecked. Result rechecked.   View Detailed Result Report  URINALYSIS WITH MICROSCOPIC  11/25/2011\")' href=\"epic://[email or web address]. [id number] WITH MICROSCOPIC/\">  Specimen: Urine Random - URINE\")'>Rechecked b. ..       Specimen: Urine Random - URINE\")'>0 - 5   Specimen: URINE\")'>6 - 10 (A)   Specimen: Urine Random - URINE\")'>11 - 25 (A) View Detailed Result Report  UA CHEMSTRIP ONLY  3/21/2017\")' href=\"epic://[email or web address]. [id number] CHEMSTRIP ONLY/\">  Specimen: URINE\")'>0 - 5   Specimen: URINE\")'>6 - 10 (A)   Specimen: Urine Random - URINE\")'>11 - 25 (A)     Specimen: Urine Random - URINE\")'>0 - 5   Specimen: Urine Random - URINE\")'>0 - 5     Specimen: Urine Random - URINE\")'>0 - 5     Specimen: Urine Random - URINE\")'>0 - 5   Specimen: Urine Random - URINE\")'>0 - 5   Specimen: Urine Random - URINE\")'>0 - 5 6 - 10 (A)     Specimen: Urine Random - URINE\")'>0 - 5   Specimen: Urine Random - URINE\")'>6 - 10 (A) 6 - 10 (A)     Specimen: Urine Random - URINE\")'>>25 (A)     Specimen: Urine Random - URINE\")'>0 - 5   Specimen: Urine Random - URINE\")'>0 - 5 0 - 5     Specimen: Urine Random - URINE\")'>0 - 5 0 - 5     Specimen: Urine Random - URINE\")'>0 - 5       Specimen: Urine Random - URINE\")'>6 - 10 (A)   Specimen: URINE\")'>0 - 3   Specimen: Urine Random - URINE\")'>0 - 3 View Detailed Result Report  UA CHEMSTRIP ONLY  3/21/2017\")' href=\"epic://[email or web address]. [id number] CHEMSTRIP ONLY/\">  Specimen: URINE\")'>0 - 3   Specimen: URINE\")'>0 - 3   Specimen: Urine Random - URINE\")'>6 - 10 (A)     Specimen: Urine Random - URINE\")'>0 - 3   Specimen: Urine Random - URINE\")'>0 - 3     Specimen: Urine Random - URINE\")'>0 - 3     Specimen: Urine Random - URINE\")'>3 - 5 (A)   Specimen: Urine Random - URINE\")'>3 - 5 (A)   Specimen: Urine Random - URINE\")'>0 - 3 0 - 3     Specimen: Urine Random - URINE\")'>0 - 3   Specimen: Urine Random - URINE\")'>11 - 25 (A) 11 - 25 (A)     Specimen: Urine Random - URINE\")'>3 - 5 (A)     Specimen: Urine Random - URINE\")'>0 - 3   Specimen: Urine Random - URINE\")'>0 - 3 0 - 3     Specimen: Urine Random - URINE\")'>0 - 3 0 - 3     Specimen: Urine Random - URINE\")'>6 - 10 (A)     View Detailed Result Report  URINALYSIS WITH MICROSCOPIC AK,AV,EU,FV,HL,PARADISE,MM,SP  2/20/2018\")' MICROSCOPIC  7/2/2012\")' href=\"epic://[email or web address]. [id number] WITH MICROSCOPIC/\">>10 (A)   View Detailed Result Report  UA CHEMSTRIP ONLY  4/27/2012\")' href=\"epic://ordersmary1. [id number] CHEMSTRIP ONLY/\">  Specimen: Urine Random - URINE\")'>1 - 3 (A) View Detailed Result Report  URINALYSIS WITH MICROSCOPIC  3/27/2012\")' [facility]=\"[facility] [id number] WITH MICROSCOPIC/\">1 - 3 (A)   View Detailed Result Report  URINALYSIS WITH MICROSCOPIC  11/25/2011\")' href=\"epic://[email or web address]. [id number] WITH MICROSCOPIC/\">  Specimen: Urine Random - URINE\")'>4 - 10 (A)                                                                 No eosinophils seen     View Detailed Result Report  URINALYSIS WITH MICROSCOPIC AK,AV,EU,FV,HL,PARADISE,MM,SP  2/20/2018\")' href=\"epic://ordersmary1. [id number] WITH MICROSCOPIC AK,AV,EU,FV,HL,PARADISE,MM,SP/\">  Specimen: Urine Random - URINE\")'>SEE COMMENT View Detailed Result Report  UA CHEMSTRIP ONLY  9/22/2017\")' href=\"epic://ordersmary1. [id number] CHEMSTRIP ONLY/\">  Specimen: URINE\")'>SEE COMMENT View Detailed Result Report  UA CHEMSTRIP ONLY  9/15/2017\")' href=\"epic://ordersummary1. [id number] CHEMSTRIP ONLY/\">  Specimen: Urine Random - URINE\")'>SEE COMMENT View Detailed Result Report  UA CHEMSTRIP ONLY  3/21/2017\")' href=\"epic://ordersummary1. [id number] CHEMSTRIP ONLY/\">  Specimen: URINE\")'>SEE COMMENT View Detailed Result Report  URINALYSIS WITH MICROSCOPIC  7/7/2016\")' href=\"epic://ordersummary1. [id number] WITH MICROSCOPIC/\">  Specimen: URINE\")'>SEE COMMENT View Detailed Result Report  UA CHEMSTRIP

## 2018-03-08 NOTE — PROGRESS NOTES
91*  96*   CO2  27  26  24   BUN  27*  34*  16   CREATININE  3.52*  4.23*  2.85*   GLUCOSE  95  166*  123*   CALCIUM  8.2*  7.5*  7.6*   PROT  6.2*   --   5.3*   LABALBU  3.0*   --   2.6*   BILITOT  0.3   --   0.3   ALKPHOS  176*   --   131*   AST  65*   --   34   ALT  17   --   17   LABGLOM  12.4*  10.1*  15.9*   GFRAA  15.1*  12.2*  19.2*   GLOB  3.2   --   2.7     O2 Device: None (Room air)  O2 Flow Rate (L/min): 2 L/min    DIET CARB CONTROL;     MEDICATIONS during current hospitalization:    Continuous Infusions:   dextrose         Scheduled Meds:   daptomycin (CUBICIN) IVPB  4 mg/kg Intravenous Q48H    sodium chloride flush  10 mL Intravenous 2 times per day    aspirin  81 mg Oral Daily    fluticasone  2 spray Nasal Daily    calcium elemental  500 mg Oral Daily    carbidopa-levodopa  1 tablet Oral Daily    pantoprazole sodium  40 mg Oral QAM AC    chlorthalidone  25 mg Oral Daily    isosorbide mononitrate  60 mg Oral Daily    venlafaxine  150 mg Oral Daily with breakfast    hydrALAZINE  100 mg Oral TID    amiodarone  200 mg Oral Daily    magnesium oxide  400 mg Oral Daily    apixaban  2.5 mg Oral BID    calcium acetate  667 mg Oral TID WC    losartan  50 mg Oral Daily    b complex-C-folic acid  1 capsule Oral Daily    insulin lispro  3 Units Subcutaneous TID AC    insulin glargine  14 Units Subcutaneous Nightly    diltiazem  240 mg Oral BID    sodium chloride flush  10 mL Intravenous 2 times per day    docusate sodium  100 mg Oral BID    insulin lispro  0-12 Units Subcutaneous TID WC    insulin lispro  0-6 Units Subcutaneous Nightly    darbepoetin lizeth-polysorbate  40 mcg Subcutaneous Weekly    cefTRIAXone (ROCEPHIN) IV  1 g Intravenous Q24H    levothyroxine  50 mcg Oral Every Other Day    And    levothyroxine  25 mcg Oral Every Other Day    vancomycin  125 mg Oral 4 times per day       PRN Meds:sodium chloride flush, ALPRAZolam, ipratropium-albuterol, cholestyramine light,

## 2018-03-08 NOTE — PROGRESS NOTES
she was initiated on amiodarone. Peg Boyds was admitted to [facility] in December 2017 with recurrence of AF with RVR, her amiodarone was adjusted, and she had spontaneous return of sinus rhythm. Most recent device interrogation 1/17/18 showing 11.6% of time spent in AF with maximum duration 2 hours. She is maintained on chronic anticoagulation with eliquis. She was admitted 1/26/18 with 2 day history of worsening dypnea secondary to bronchitis, treated with bronchodilators, steroids and IV antibiotics. Usual dialysis schedule has been maintained. Symptomatically improved. She had several episodes recurrent AFIb. She was DC'd and had been doing well.     She now is readmitted with Pneumonia and is in [facility]   Denies CP or TIA or CVA symptoms.     Cardiac and General ROS otherwise negative     PROBLEM LIST:            Diagnosis    Hypothyroidism    Hypercholesteremia    CKD (chronic kidney disease)    Paroxysmal atrial fibrillation (HCC)    Anemia    Acid reflux    Anxiety    Diabetes mellitus due to underlying condition, controlled, with stage 5 chronic kidney disease not on chronic dialysis, with long-term current use of insulin (HCC)    Essential hypertension    Idiopathic Parkinson's disease [facility]    Hyperkalemia    Acute on chronic diastolic congestive heart failure (HCC)    ESRD (end stage renal disease) on dialysis ([facility] [facility]    Cardiac pacemaker    Pneumonia    COPD with exacerbation (HCC)    Hypervolemia    Pleural effusion          OBJECTIVE:     MEDICATIONS:     Scheduled Meds:   sodium chloride flush  10 mL Intravenous 2 times per day    aspirin  81 mg Oral Daily    fluticasone  2 spray Nasal Daily    calcium elemental  500 mg Oral Daily    carbidopa-levodopa  1 tablet Oral Daily    pantoprazole sodium  40 mg Oral QAM AC    chlorthalidone  25 mg Oral Daily    isosorbide mononitrate  60 mg Oral Daily    venlafaxine  150 mg Oral Daily with breakfast    hydrALAZINE  100 mg Oral TID    amiodarone hepatosplenomegally  EXTREMITIES:  Trace edema, Pulses diminished throughout. NEUROLOGIC:  Awake, alert, oriented to name, place and time.  Following all commands and moving all extremties  SKIN:  no bruising or bleeding, normal skin color, texture, turgor and no rashes     Data:        LABS:      Recent Results (from the past 24 hour(s))   POCT Glucose    Collection Time: 03/07/18  5:11 PM   Result Value Ref Range    POC Glucose 108 60 - 115 mg/dl    Performed on ACCU-CHEK    POCT Glucose    Collection Time: 03/07/18  7:33 PM   Result Value Ref Range    POC Glucose 229 (H) 60 - 115 mg/dl    Performed on ACCU-CHEK    CBC Auto Differential    Collection Time: 03/08/18  6:00 AM   Result Value Ref Range    WBC 31.3 (H) 4.8 - 10.8 K/uL    RBC 3.01 (L) 4.20 - 5.40 M/uL    Hemoglobin 9.0 (L) 12.0 - 16.0 g/dL    Hematocrit 28.2 (L) 37.0 - 47.0 %    MCV 93.9 82.0 - 100.0 fL    MCH 30.0 27.0 - 31.3 pg    MCHC 31.9 (L) 33.0 - 37.0 %    RDW 17.0 (H) 11.5 - 14.5 %    Platelets 084 127 - 021 K/uL    Neutrophils % 89.1 %    Lymphocytes % 3.4 %    Monocytes % 7.2 %    Eosinophils % 0.2 %    Basophils % 0.1 %    Neutrophils # 27.9 (H) 1.4 - 6.5 K/uL    Lymphocytes # 1.1 1.0 - 4.8 K/uL    Monocytes # 2.3 (H) 0.2 - 0.8 K/uL    Eosinophils # 0.1 0.0 - 0.7 K/uL    Basophils # 0.0 0.0 - 0.2 K/uL   Magnesium    Collection Time: 03/08/18  6:00 AM   Result Value Ref Range    Magnesium 2.0 1.7 - 2.3 mg/dL   Phosphorus    Collection Time: 03/08/18  6:00 AM   Result Value Ref Range    Phosphorus 1.5 (L) 2.5 - 4.5 mg/dL   Comprehensive Metabolic Panel    Collection Time: 03/08/18  6:00 AM   Result Value Ref Range    Sodium 137 132 - 144 mEq/L    Potassium 4.3 3.5 - 5.1 mEq/L    Chloride 96 (L) 98 - 107 mEq/L    CO2 24 22 - 29 mEq/L    Anion Gap 17 (H) 7 - 13 mEq/L    Glucose 123 (H) 74 - 109 mg/dL    BUN 16 8 - 23 mg/dL    CREATININE 2.85 (H) 0.50 - 0.90 mg/dL    GFR Non-African American 15.9 (L) >60    GFR  19.2 (L) >60

## 2018-03-08 NOTE — PROGRESS NOTES
Wound Ostomy Continence Nurse  Consult Note       NAME:  Selma Álvarez  MEDICAL RECORD NUMBER:  [id number]  AGE: 81 y.o. 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 female referred by:   [x] Physician  [x] Nursing  [] Other:     Wound Identification:  Wound Type: pressure and IAD with denudation  Contributing Factors: diabetes, chronic pressure, decreased mobility, shear force, malnutrition, incontinence of stool, incontinence of urine and cachectic appearance    Wound History: Per patient's step son at bedside, patient had had wounds to her bottom for a couple weeks now - been using a \"diaper rash slav\" at home for treatment.   Current Wound Care Treatment:  Recommendin) Santyl dressing change to left side sacral wounds 2) Follow-up in [address] [place] [address] in 1 week after discharge for wound re-check of left sacral wound (follow-up included in discharge instruction) 3) Incontinence care 4) Protective barrier with zinc to affected areas of IAD and denudation     Patient Goal of Care:  [x] Wound Healing  [] Odor Control  [] Palliative Care  [] Pain Control   [] Other:         PAST MEDICAL HISTORY        Diagnosis Date    Abnormal presence of protein in urine 2004    Dr. Lonnie Madden. Jeovany Retana Atrial fibrillation ([facility] [address]     Constipation, chronic     Diverticular disease of the colon     ESRD (end stage renal disease) ([facility] [address] [facility]     Fistula     left    GERD (gastroesophageal reflux disease)     GERD, PUD, Hiatal Hernia    Granulomatous lung disease ([facility] [address]     History of endoscopy 12    Dr. Haylee Coronado    Hyperlipidemia     Hypothyroidism     Kidney stones 2001    Dr. Mumtaz Andres    Neuropathy in diabetes ([facility] [address]     legs    Osteoarthritis     Positive ORTEGA (antinuclear antibody)     1:80    Tachycardia     Thrombophlebitis  carbidopa-levodopa (SINEMET)  MG per tablet Take 1 tablet by mouth daily       glucose blood VI test strips (TRUETEST TEST) strip As needed. 200 strip 11    aspirin 81 MG EC tablet Take 81 mg by mouth daily       fluticasone (FLONASE) 50 MCG/ACT nasal spray 2 sprays by Nasal route daily.  calcium carbonate (OSCAL) 500 MG TABS tablet Take 500 mg by mouth daily.  Acetaminophen (APAP) 325 MG TABS Take 650 mg by mouth every 6 hours as needed for Pain 20 tablet 0    levothyroxine (SYNTHROID) 25 MCG tablet TAKE 1 TABLET DAILY ALTERNATING WITH 50 MCG DOSE EVERY OTHER DAY 50 tablet 2    ondansetron (ZOFRAN) 4 MG tablet Take 4 mg by mouth every 8 hours as needed for Nausea or Vomiting      cholestyramine light 4 g packet Take 0.5 packets by mouth 3 times daily as needed (DIARRHEA) 60 packet 3       Objective    BP (!) 152/53   Pulse 84   Temp 98.2 °F (36.8 °C) (Oral)   Resp 18   Ht 4' 11\" (1.499 m)   Wt 100 lb 5 oz (45.5 kg)   LMP  (LMP Unknown)   SpO2 100%   Breastfeeding?  No   BMI 20.26 kg/m²     LABS:  WBC:    Lab Results   Component Value Date    WBC 31.3 03/08/2018     H/H:    Lab Results   Component Value Date    HGB 9.0 03/08/2018    HCT 28.2 03/08/2018     PTT:    Lab Results   Component Value Date    APTT 40.2 10/24/2017    PTT 28.5 03/14/2013   [APTT}  PT/INR:    Lab Results   Component Value Date    PROTIME 20.2 11/15/2017    PROTIME 16.5 10/20/2017    PROTIME 25.7 09/29/2016    INR 1.9 11/15/2017     HgBA1c:    Lab Results   Component Value Date    LABA1C 5.6 03/07/2018       Assessment   Raheem Risk Score: Raheem Scale Score: 14    Patient Active Problem List   Diagnosis    Hypothyroidism    Hypercholesteremia    CKD (chronic kidney disease)    Anemia    Acid reflux    Anxiety    Diabetes mellitus due to underlying condition, controlled, with stage 5 chronic kidney disease not on chronic dialysis, with long-term current use of insulin ([address]    Essential

## 2018-03-08 NOTE — PROGRESS NOTES
Nutrition Assessment    Type and Reason for Visit: Initial, Positive Nutrition Screen (wound, poor intake)    Nutrition Recommendations:     Carb Control 3. Diabetic ONS (Glucerna)  BID    Malnutrition Assessment:  · Malnutrition Status: Insufficient data  · Context: Chronic illness  · Findings of the 6 clinical characteristics of malnutrition (Minimum of 2 out of 6 clinical characteristics is required to make the diagnosis of moderate or severe Protein Calorie Malnutrition based on AND/ASPEN Guidelines):  1. Energy Intake-Not available, not able to assess    2. Weight Loss-Unable to assess,    3. Fat Loss-Unable to assess,    4. Muscle Loss-Unable to assess,    5. Fluid Accumulation-No significant fluid accumulation,    6.  Strength-Not measured    Nutrition Diagnosis:   · Problem: Increased nutrient needs  · Etiology: related to Increased demand for energy/nutrients due to     Signs and symptoms:  as evidenced by Presence of wounds    Nutrition Assessment:  · Subjective Assessment: pt currently sleeping soundly, unable to interview. · Nutrition-Focused Physical Findings: BM (3/8). I/O: 600/2600. Peripheral line  · Wound Type: Pressure Ulcer, Unstageable  · Current Nutrition Therapies:  · Oral Diet Orders:  (Carb Control)   · Oral Diet intake: 26-50% (one meal recorded)  · Oral Nutrition Supplement (ONS) Orders: None  · Anthropometric Measures:  · Ht: 4' 11\" (149.9 cm)   · Current Body Wt: 100 lb (45.4 kg) (3-7)  · Admission Body Wt: 140 lb (63.5 kg) (estimated )  · Usual Body Wt: 109 lb (49.4 kg) ((2/17), 124 lb (10/17).   Variable weights per review of old records)  · % Weight Change: difficult to assess with variable weight history,     · Ideal Body Wt: 95 lb (43.1 kg), % Ideal Body 105%  · BMI Classification: BMI 18.5 - 24.9 Normal Weight  · Comparative Standards (Estimated Nutrition Needs):  · Estimated Daily Total Kcal: 5996-9428  · Estimated Daily Protein (g): 54-63    Estimated Intake vs Estimated

## 2018-03-08 NOTE — PROGRESS NOTES
Infectious Diseases Inpatient Progress Note          HISTORY OF PRESENT ILLNESS:  Follow up sepsis, recurrent UTI, pneumonia, Large L pleural effusion, recent C diff colitis on IV Rocephin and PO Vanco, well tolerated. Patient had 1 loose BM today, is very weak all over, no cough, on 2 lit NC. Poor historian, no pain. Poor appetite. Current Medications:     sodium chloride flush  10 mL Intravenous 2 times per day    aspirin  81 mg Oral Daily    fluticasone  2 spray Nasal Daily    calcium elemental  500 mg Oral Daily    carbidopa-levodopa  1 tablet Oral Daily    pantoprazole sodium  40 mg Oral QAM AC    chlorthalidone  25 mg Oral Daily    isosorbide mononitrate  60 mg Oral Daily    venlafaxine  150 mg Oral Daily with breakfast    hydrALAZINE  100 mg Oral TID    amiodarone  200 mg Oral Daily    magnesium oxide  400 mg Oral Daily    apixaban  2.5 mg Oral BID    calcium acetate  667 mg Oral TID WC    losartan  50 mg Oral Daily    b complex-C-folic acid  1 capsule Oral Daily    insulin lispro  3 Units Subcutaneous TID AC    insulin glargine  14 Units Subcutaneous Nightly    diltiazem  240 mg Oral BID    sodium chloride flush  10 mL Intravenous 2 times per day    docusate sodium  100 mg Oral BID    insulin lispro  0-12 Units Subcutaneous TID WC    insulin lispro  0-6 Units Subcutaneous Nightly    darbepoetin lizeth-polysorbate  40 mcg Subcutaneous Weekly    cefTRIAXone (ROCEPHIN) IV  1 g Intravenous Q24H    levothyroxine  50 mcg Oral Every Other Day    And    levothyroxine  25 mcg Oral Every Other Day    vancomycin  125 mg Oral 4 times per day       Allergies:  Arthrotec [diclofenac-misoprostol]; Depakote [divalproex sodium]; Dilantin [phenytoin];  Klonopin [clonazepam]; and Statins      ROS  unable to provide ROS because of weakness and wasting      Physical Exam  Vitals:    03/07/18 1600 03/07/18 1630 03/08/18 0000 03/08/18 0748   BP: (!) 114/57 107/62 (!) 143/54 (!) 152/53   Pulse: 82 91 84 84 Resp:   18 18   Temp:   99 °F (37.2 °C) 98.2 °F (36.8 °C)   TempSrc:   Oral Oral   SpO2:   95% 100%   Weight:  100 lb 5 oz (45.5 kg)     Height:         General Appearance: alert, severe generalized wasting, in no acute distress  Skin: warm and dry, no rash. Head: normocephalic and atraumatic  Eyes:  conjunctivae normal, anicteric sclerae  ENT: oropharynx clear and moist with normal mucous membranes. No thrush  Lungs: diminished BS over L lung field, no rhonchi, no crackles, no wheezes  Heart:RRR, nl S1/S2, no murmur  Abdomen: soft, no tenderness, no H-S-megaly, + BS  NEUROLOGICAL: Lethargic, arousable, weak all over, no focal deficits  No leg edema  No erythema, no warmth, no tenderness  Intact dialysis catheter in chest    DATA:    Lab Results   Component Value Date    WBC 31.3 (H) 03/08/2018    HGB 9.0 (L) 03/08/2018    HCT 28.2 (L) 03/08/2018    MCV 93.9 03/08/2018     03/08/2018     Lab Results   Component Value Date    CREATININE 2.85 (H) 03/08/2018    BUN 16 03/08/2018     03/08/2018    K 4.3 03/08/2018    CL 96 (L) 03/08/2018    CO2 24 03/08/2018       Hepatic Function Panel:   Lab Results   Component Value Date    ALKPHOS 131 03/08/2018    ALT 17 03/08/2018    AST 34 03/08/2018    PROT 5.3 03/08/2018    BILITOT 0.3 03/08/2018    BILIDIR 0.0 09/26/2017    IBILI 0.1 09/26/2017    LABALBU 2.6 03/08/2018    LABALBU 3.5 12/17/2011       Microbiology:   Recent Labs      03/07/18   0914   BC  No Growth to date. Any change in status will be called. Recent Labs      03/07/18   0914   BLOODCULT2  No Growth to date. Any change in status will be called. Recent Labs      03/06/18   1915   LABURIN  >100,000 CFU/ml  ID and sensitivity to follow       No results for input(s): WNDABS in the last 72 hours. No results for input(s): CULTRESP in the last 72 hours.       Imaging: CXR: L lhemithorax near total opacification      IMPRESSION:    · Sepsis  · Recurrent UTI, recent VRE  · Diarrhea, recent C

## 2018-03-08 NOTE — PROGRESS NOTES
 History of endoscopy 2-21-12    Dr. Carmen Block Hyperlipidemia     Hypothyroidism     Kidney stones 11/2001    Dr. Kiersten Hopkins    Neuropathy in diabetes [facility])     legs    Osteoarthritis     Positive ORTEGA (antinuclear antibody)     1:80    Tachycardia     Thrombophlebitis leg superficial     Type II or unspecified type diabetes mellitus without mention of complication, not stated as uncontrolled 1998     Past Surgical History:   Procedure Laterality Date    APPENDECTOMY  2007    BLADDER SUSPENSION  2008 & 2009    [facility], second at [address]  2/2006    COLONOSCOPY  4/2007    Dr. Isrrael Reynoso    [address] N/A 11/2/2017    CATHETER INSERTION HEMODIALYSIS performed by Sara Braden MD at [address] [place] [address] Rd  [phone]    Bilateral    IN COLONOSCOPY FLX DX W/COLLJ SPEC WHEN PFRMD N/A 2/1/2018    COLONOSCOPY performed by Meg Dennis MD at [address] [facility] EGD TRANSORAL BIOPSY SINGLE/MULTIPLE N/A 2/1/2018    EGD ESOPHAGOGASTRODUODENOSCOPY WITH BIOPSY performed by Meg Dennis MD at [address] [place] [address]  3/2003       Chart Reviewed: Yes  Family / Caregiver Present: Yes (son)    Restrictions: Body mass index is 20.26 kg/m².      SUBJECTIVE:    Pre Treatment Pain Screening  Pain at present: 0    Post Treatment Pain Screening:   Pain Screening  Patient Currently in Pain: No    Prior Level of Function:  Social/Functional History  Lives With: Son  Type of Home: House  Home Layout: One level  Home Access: Ramped entrance  Home Equipment: Rolling walker, Wheelchair-manual  ADL Assistance: Needs assistance  Ambulation Assistance: Needs assistance (approx 4 weeks ago pt was able to ambulate with ww)  Transfer Assistance: Needs assistance    OBJECTIVE:   Vision/Hearing:  Vision: Impaired  Vision Exceptions: Wears glasses for reading  Hearing: Exceptions to [facility]  Hearing Exceptions: Recommendations: Strengthening, Balance Training, Endurance Training, Transfer Training, Functional Mobility Training, Neuromuscular Re-education  Safety Devices  Type of devices:  (with nursing for care after eval)    G-Code:  PT G-Codes  Functional Limitation: Changing and maintaining body position  Changing and Maintaining Body Position Current Status (): 100 percent impaired, limited or restricted  Changing and Maintaining Body Position Goal Status ():  At least 40 percent but less than 60 percent impaired, limited or restricted    Goals:  Short term goals  Short term goal 1: mod assist bed mobility  Short term goal 2: pt able to stand 30 sec with mod assist +2   Short term goal 3: pt able to sit EOB 0 sec with supervision  Short term goal 4: increase LE strength for pt lift LE's against gravity  Short term goal 5: imporve pts endurance for pt to tolerate 5 min of continuous activity    Therapy Time:   Individual   Time In 1530   Time Out 1600   Minutes Veena Johnson [address] [place] [address], 03/08/18 at 4:14 PM

## 2018-03-08 NOTE — CARE COORDINATION
LSW met with son, SNF list given and LSW went over list. Son agreeable to [facility] because she has been there before but aware that her insurance might not cover. Son 2nd choice is [facility]. [facility] has info. Both SNF CAN NOT TAKE WITH CUBICIN. Awaiting on PT EVAL. Paco Pastor Electronically signed by ALIDA Moran on 3/8/2018 at 3:20 PM

## 2018-03-08 NOTE — PROGRESS NOTES
INPATIENT PROGRESS NOTE    SERVICE DATE:  3/8/2018   SERVICE TIME:  12:31 pm    ASSESSMENT AND PLAN   Marie Anand is a 82 y/o F with a PMH sig for ESRD on HD, DMII, HTN, HLD, afib, parkinsons who presented from the home after being recently discharged from the nursing home with generalized weakness, SOB, found to have sepsis, UTI, leukocytosis, and fluid overload with pleural effusions. Sepsis: POA with leukocytosis, tachypnea, tachycardia and urinary source with UTI as per U/A and micro. - IV Rocephin initiated  - Urine culture  - Blood cultures  - ESR and CRP  - ID consulted. Per Dr. Huber Vázquez, History of growing VRE and recent stay at the clinic clinic however urinalysis from that visit the fluid didn't show evidence of infection infected patient is on hemodialysis mean she makes little urine complicating a diagnosis of urinary tract infection concentrated urine often looks infected without pain. Leukocytosis sepsis. Possible UTI. Check stool for C. Diff. Continue Rocephin possible urinary tract infection and possible sepsis   at this point add oral vancomycin for possibility of C. Diff. Steph further recs     chronic diastolic congestive heart failure: With elevated BNP, fluid overload and pleural effusions.   - Telemetry  - HD per nephrology  - Cardio consulted. Per Dr. Kira Romero, Elevated Troponins, Due to Above, Type 2 NSTEMI. PAF, in SR now. On amiodarone and anticoagulated with Eliquis. CAD s/p remote PCI/BMS to LCX 2010, [facility] 1/2013 showing diffuse vessel calcification with a 30%-50% stenosis of the proximal RCA, normal LM, heavily calcified LAD with mid to distal tapering with 100% distal occlusion, filled by collaterals, mild disease of LCX. Cardiac Pulmonary and Primary Supportive Care. - Continue to monitor    ESRD: on HD with fluid overload as above  - nephrology consulted. Per Dr. Kvng Hastings, ESRD with large pleural effusions and SOB.  Has leukocytosis and anemia as well.  blood cx drawn and ID 03/08/18 1226    levothyroxine (SYNTHROID) tablet 50 mcg  50 mcg Oral Every Other Day Rachele Holt MD   50 mcg at 03/08/18 0539    And    levothyroxine (SYNTHROID) tablet 25 mcg  25 mcg Oral Every Other Day Rachele Holt MD   25 mcg at 03/08/18 0850    vancomycin (VANCOCIN) oral solution 125 mg  125 mg Oral 4 times per day Gamaliel Oneill MD   125 mg at 03/08/18 1218       OBJECTIVE  PHYSICAL EXAM:   BP (!) 152/53   Pulse 84   Temp 98.2 °F (36.8 °C) (Oral)   Resp 18   Ht 4' 11\" (1.499 m)   Wt 100 lb 5 oz (45.5 kg)   LMP  (LMP Unknown)   SpO2 100%   Breastfeeding? No   BMI 20.26 kg/m²   Body mass index is 20.26 kg/m². CONSTITUTIONAL:  awake, alert, cooperative, no apparent distress, and appears stated age  EYES:  Lids and lashes normal, pupils equal, round and reactive to light, extra ocular muscles intact, sclera clear, conjunctiva normal  LUNGS:  Diminished BS bilaterally  CARDIOVASCULAR:  Normal apical impulse, regular rate and rhythm, normal S1 and S2, no S3 or S4, and no murmur noted  ABDOMEN:  No scars, normal bowel sounds, soft, non-distended, non-tender, no masses palpated, no hepatosplenomegally  EXT: No c/c/e    DATA:   Diagnostic tests reviewed for today's visit:    Most recent labs and imaging results reviewed.      SIGNATURE: Rachele Holt MD PATIENT NAMESofía Block   DATE: March 8, 2018 MRN: [id number]   TIME: 12:31 PM PAGER: [phone]

## 2018-03-08 NOTE — CARE COORDINATION
LSW looking into [employer] for Pt. At this time [facility] needs to see PT/OT evals and if Pt will be on cubicin. LSW will follow. Electronically signed by ALIDA Jennings on 3/8/2018 at 1:53 PM

## 2018-03-09 NOTE — PROGRESS NOTES
Renal Progress Note    Assessment and Plan:    80 yo with ESRD with large pleural effusions and SOB. Has leukocytosis and anemia as well.        Plan/  1- blood cx drawn and ID consulted. If any positive cultures, needs HD cath removed  2-- HD today with 2.5 to 3 liters removed  3_ Aranesp for anemia      Patient Active Problem List:     Hypothyroidism     Hypercholesteremia     CKD (chronic kidney disease)     Anemia     Acid reflux     Anxiety     Diabetes mellitus due to underlying condition, controlled, with stage 5 chronic kidney disease not on chronic dialysis, with long-term current use of insulin ([facility])     Essential hypertension     Idiopathic Parkinson's disease ([facility])     Hyperkalemia     Acute on chronic diastolic congestive heart failure ([facility])     C. difficile colitis     ESRD (end stage renal disease) on dialysis ([facility])     Leukocytosis     Cardiac pacemaker     Pneumonia     COPD with exacerbation ([facility])     Hypervolemia     Pleural effusion     Pleural effusion associated with pulmonary infection     Pleural effusion     CKD (chronic kidney disease) stage 5, GFR less than 15 ml/min ([facility])     SOB (shortness of breath)     UTI (urinary tract infection)     Sepsis ([facility]      Subjective:   Admit Date: 3/6/2018    Interval History: still with some SOB but better. Blood cx negative. Urine with VRE.   On dapto      Medications:   Scheduled Meds:   daptomycin (CUBICIN) IVPB  4 mg/kg Intravenous Q48H    collagenase   Topical Daily    sodium chloride flush  10 mL Intravenous 2 times per day    aspirin  81 mg Oral Daily    fluticasone  2 spray Nasal Daily    calcium elemental  500 mg Oral Daily    carbidopa-levodopa  1 tablet Oral Daily    pantoprazole sodium  40 mg Oral QAM AC    chlorthalidone  25 mg Oral Daily    isosorbide mononitrate  60 mg Oral Daily    venlafaxine  150 mg Oral Daily with breakfast    hydrALAZINE  100 mg Oral TID    amiodarone  200 mg Oral Daily    magnesium oxide  400 mg 100%   Breastfeeding? No   BMI 20.26 kg/m²    Wt Readings from Last 3 Encounters:   03/07/18 100 lb 5 oz (45.5 kg)   02/14/18 109 lb (49.4 kg)   02/02/18 107 lb 2.3 oz (48.6 kg)      24HR INTAKE/OUTPUT:    Intake/Output Summary (Last 24 hours) at 03/09/18 1245  Last data filed at 03/08/18 1721   Gross per 24 hour   Intake              200 ml   Output                0 ml   Net              200 ml       Constitutional:  Alert, awake, no apparent distress   Skin:normal, no rash  HEENT:sclera anicteric.   Head atraumatic normocephalic  Neck:supple with no thyromegally  Cardiovascular:  S1, S2 without m/r/g   Respiratory:  CTA B without w/r/r   Abdomen: +bs, soft, nt  Ext: no LE edema  Musculoskeletal:Intact  Neuro:Alert and oriented with no deficit      Electronically signed by Ninfa Carreon MD on 3/9/2018 at 12:45 PM

## 2018-03-09 NOTE — CARE COORDINATION
Per [facility] the [address] [place] [address]. Gladys Child Electronically signed by ALIDA Tate on 3/9/2018 at 11:44 AM

## 2018-03-09 NOTE — PROGRESS NOTES
[facility] MED SURG TREATMENT NOTE     Date: 3/9/2018  Patient Name: Radha Marinelli        MRN: [id number]  Account: [de-identified]   : 1935  (81 y.o.)  Room: [place] [address]    Chart Review:  Diagnosis:  The primary encounter diagnosis was Pleural effusion. Diagnoses of Paroxysmal atrial fibrillation ([facility] Fatigue, unspecified type, and Leukocytosis, unspecified type were also pertinent to this visit. Restrictions:  Contact, O2   Activity Orders: up with assistance, cushion in chiar     Subjective:  Patient states:  \"I'm tired. \"\"  Pain: 5/10  Description: sore   Location:  Buttocks     Objective:  ADL:  Feeding:  Positive hand to mouth   Grooming:  NT pt declined   UE Self -Care:  NT  LE Self-Care:  Dep to don socks   Toileting:  Anticipated Dep  Toilet transfers:  Anticipated Max A     Treatment consisted of:   [] ADL Training  [x] Strengthening   [x] Transfer Training    [] DME Education  [] HEP   [] Manual Therapy   [] Patient Education  [] Other:      Assessment: Pt 3 attempts,Max A, supine to sit with VC for hand placement and technique. Pt unable to sit unsupported for greater than 20 seconds. Attempted can exercises. Pt reported feeling tired and her hips were hurting sitting on EOB. Max A to lay back down in bed. Educated pt on can exercises. Pt verbalized understanding and stated she will try to attempt can exercises later. Pt asked for light off. Plan:  [x] Continue OT per POC  [] D/C OT  [] Other:     Goals/Plan:   [x] Improve balance  [x] Improve Strength   [x] Improve [place] with functional transfers   [x]  Improve [place] with ADLs     Time In: 10:51  Time Out[de-identified]  11:00     Electronically signed by:     PRIYA Brown    3/9/2018, 11:08 AM

## 2018-03-09 NOTE — PROGRESS NOTES
16  24*   CREATININE  2.85*  3.82*   GLUCOSE  123*  132*   CALCIUM  7.6*  7.8*   PROT  5.3*  5.2*   LABALBU  2.6*  2.6*   BILITOT  0.3  0.2   ALKPHOS  131*  117   AST  34  24   ALT  17  11   LABGLOM  15.9*  11.3*   GFRAA  19.2*  13.7*   GLOB  2.7  2.6     O2 Device: Nasal cannula  O2 Flow Rate (L/min): 2 L/min    Dietary Nutrition Supplements: Diabetic Oral Supplement  DIET CARB CONTROL; Carb Control: 3 carbs/meal (approximate 1500 kcals/day)     MEDICATIONS during current hospitalization:    Continuous Infusions:   dextrose         Scheduled Meds:   daptomycin (CUBICIN) IVPB  4 mg/kg Intravenous Q48H    collagenase   Topical Daily    sodium chloride flush  10 mL Intravenous 2 times per day    aspirin  81 mg Oral Daily    fluticasone  2 spray Nasal Daily    calcium elemental  500 mg Oral Daily    carbidopa-levodopa  1 tablet Oral Daily    pantoprazole sodium  40 mg Oral QAM AC    chlorthalidone  25 mg Oral Daily    isosorbide mononitrate  60 mg Oral Daily    venlafaxine  150 mg Oral Daily with breakfast    hydrALAZINE  100 mg Oral TID    amiodarone  200 mg Oral Daily    magnesium oxide  400 mg Oral Daily    apixaban  2.5 mg Oral BID    calcium acetate  667 mg Oral TID WC    losartan  50 mg Oral Daily    b complex-C-folic acid  1 capsule Oral Daily    insulin lispro  3 Units Subcutaneous TID AC    insulin glargine  14 Units Subcutaneous Nightly    diltiazem  240 mg Oral BID    sodium chloride flush  10 mL Intravenous 2 times per day    docusate sodium  100 mg Oral BID    insulin lispro  0-12 Units Subcutaneous TID WC    insulin lispro  0-6 Units Subcutaneous Nightly    darbepoetin lizeth-polysorbate  40 mcg Subcutaneous Weekly    cefTRIAXone (ROCEPHIN) IV  1 g Intravenous Q24H    levothyroxine  50 mcg Oral Every Other Day    And    levothyroxine  25 mcg Oral Every Other Day    vancomycin  125 mg Oral 4 times per day       PRN Meds:sodium chloride flush, ALPRAZolam, ipratropium-albuterol, evidence of a previous cholecystectomy. 1. Very large left pleural effusion with concomitant compression atelectasis of the left lower lobe. 2. Moderate size right pleural effusion with concomitant subsegmental atelectasis of the right lower lobe. 3. Pulmonary mass not definitely visualized however, an endobronchial lesion in lower lobe bronchi is not excluded. 4. Extensive atherosclerotic calcification of the aortic arch but no thoracic aorta aneurysm. 5. Mild cardiomegaly with coronary artery calcifications. There is no pericardial effusion. All CT scans at this facility use dose modulation, iterative reconstruction, and/or weight based dosing when appropriate to reduce radiation dose to as low as reasonably achievable. Xr Chest Portable    Result Date: 3/6/2018  EXAMINATION: PORTABLE CHEST X-RAY FROM 3/6/2018 AT 18:08 HOURS CLINICAL HISTORY: RENAL FAILURE ON HEMODIALYSIS, DYSPNEA COMPARISONS: 1/25/2018 FINDINGS: There is a very large pleural effusion occupying approximately three quarters of the left hemithorax. Suspect underlying atelectasis and/or infiltrate in the mid and lower left lung field. There are small granulomas in the lungs and hilar regions compatible with remote granulomatous disease. Suspect cardiomegaly and there is a bipolar cardiac pacemaker superimposed on the left hemithorax with electrodes in the region of the right atrium and right ventricle. There is a right-sided IJ hemodialysis catheter with the distal tip of the catheter in the region of the SVC. There is degenerative bone spurring throughout the spine and arthritis in both shoulders. 1. VERY LARGE PLEURAL EFFUSION OCCUPYING APPROXIMATELY THREE QUARTERS OF THE LEFT HEMITHORAX. 2. SUSPECT UNDERLYING COMPRESSION ATELECTASIS AND/OR INFILTRATE IN THE LEFT LUNG. 3. SATISFACTORY PLACEMENT OF THE RIGHT-SIDED IJ HEMODIALYSIS CATHETER WITH TIP OF CATHETER IN SVC.  4. PROBABLE CARDIOMEGALY WITH A BIPOLAR CARDIAC PACEMAKER IN

## 2018-03-09 NOTE — PROGRESS NOTES
on the left side with compressive atelectasis likely secondary to fluid overload in a patient with renal failure. Patient has short of breath but no cough or sputum production, no fever or chest pain  and less likely underlying pneumonia mostly compressive atelectasis.  Repeat chest x-ray after dialysis if no improvement may consider thoracentesis on the left side. Steph further recs  - Continue to monitor     DMII  HTN  HLD     Dispo: Await clinical improvement and consultant eval and clearance prior to discharge. Advised F/U with PCP 1 - 2 days of discharge. SUBJECTIVE  CHIEF COMPLAINT: SOB    PRIMARY SERVICE: Medicine    INTERVAL HPI: Pt says she feels the same today. No acute complaints.      MEDICATIONS:    Current Facility-Administered Medications   Medication Dose Route Frequency Provider Last Rate Last Dose    sodium chloride 0.9 % infusion             daptomycin (CUBICIN) 180 mg in sodium chloride 0.9 % 50 mL IVPB  4 mg/kg Intravenous Q48H Cheli Agustin MD   Stopped at 03/08/18 1500    collagenase ointment   Topical Daily Ilana Dunlap MD        sodium chloride flush 0.9 % injection 10 mL  10 mL Intravenous 2 times per day Apryl Johnson MD   10 mL at 03/08/18 2113    sodium chloride flush 0.9 % injection 10 mL  10 mL Intravenous PRN Apryl Johnson MD        aspirin EC tablet 81 mg  81 mg Oral Daily Ilana Dunlap MD   81 mg at 03/09/18 0953    fluticasone (FLONASE) 50 MCG/ACT nasal spray 2 spray  2 spray Nasal Daily Ilana Dunlap MD   2 spray at 03/09/18 0955    calcium elemental (OSCAL) tablet 500 mg  500 mg Oral Daily Ilana Dunlap MD   500 mg at 03/09/18 0953    carbidopa-levodopa (SINEMET)  MG per tablet 1 tablet  1 tablet Oral Daily Ilana Dunlap MD   1 tablet at 03/09/18 0954    pantoprazole sodium (PROTONIX) packet 40 mg  40 mg Oral QAM AC Ilana Dunlap MD   40 mg at 03/09/18 0641    chlorthalidone (HYGROTON) tablet 25 mg  25 mg Oral Daily Ilana Dunlap MD Other Day Meir Shin MD   25 mcg at 03/08/18 0850    vancomycin (VANCOCIN) oral solution 125 mg  125 mg Oral 4 times per day Rohan Sahfer MD   125 mg at 03/09/18 1208       OBJECTIVE  PHYSICAL EXAM:   BP (!) 119/56   Pulse 82   Temp 97.7 °F (36.5 °C)   Resp 18   Ht 4' 11\" (1.499 m)   Wt 120 lb 2.4 oz (54.5 kg)   LMP  (LMP Unknown)   SpO2 100%   Breastfeeding? No   BMI 24.27 kg/m²   Body mass index is 24.27 kg/m². CONSTITUTIONAL:  awake, alert, cooperative, no apparent distress, and appears stated age  EYES:  Lids and lashes normal, pupils equal, round and reactive to light, extra ocular muscles intact, sclera clear, conjunctiva normal  LUNGS:  Diminished BS bilaterally  CARDIOVASCULAR:  Normal apical impulse, regular rate and rhythm, normal S1 and S2, no S3 or S4, and no murmur noted  ABDOMEN:  No scars, normal bowel sounds, soft, non-distended, non-tender, no masses palpated, no hepatosplenomegally  EXT: No c/c/e    DATA:   Diagnostic tests reviewed for today's visit:    Most recent labs and imaging results reviewed.      SIGNATURE: Meir Shin MD PATIENT NAMECostephen Cox   DATE: March 9, 2018 MRN: [id number]   TIME: 1:32 PM PAGER: [phone]

## 2018-03-09 NOTE — CARE COORDINATION
EYADW spoke with son Roosevelt James he is aware that [place] there would be a 27 precent co-pay. Son is aware that [facility] can take Pt and they started precert today. .Electronically signed by ALIDA Johnson on 3/9/2018 at 11:40 AM

## 2018-03-09 NOTE — PROGRESS NOTES
she was initiated on amiodarone. Gege Donnelly was admitted to [facility] in December 2017 with recurrence of AF with RVR, her amiodarone was adjusted, and she had spontaneous return of sinus rhythm. Most recent device interrogation 1/17/18 showing 11.6% of time spent in AF with maximum duration 2 hours. She is maintained on chronic anticoagulation with eliquis. She was admitted 1/26/18 with 2 day history of worsening dypnea secondary to bronchitis, treated with bronchodilators, steroids and IV antibiotics. Usual dialysis schedule has been maintained. Symptomatically improved. She had several episodes recurrent AFIb. She was DC'd and had been doing well.     She now is readmitted with Pneumonia and is in [facility]   Denies CP or TIA or CVA symptoms.     Cardiac and General ROS otherwise negative     PROBLEM LIST:            Diagnosis    Hypothyroidism    Hypercholesteremia    CKD (chronic kidney disease)    Paroxysmal atrial fibrillation (HCC)    Anemia    Acid reflux    Anxiety    Diabetes mellitus due to underlying condition, controlled, with stage 5 chronic kidney disease not on chronic dialysis, with long-term current use of insulin (HCC)    Essential hypertension    Idiopathic Parkinson's disease ([facility]    Hyperkalemia    Acute on chronic diastolic congestive heart failure (HCC)    ESRD (end stage renal disease) on dialysis ([facility]    Cardiac pacemaker    Pneumonia    COPD with exacerbation (HCC)    Hypervolemia    Pleural effusion          OBJECTIVE:     MEDICATIONS:     Scheduled Meds:   daptomycin (CUBICIN) IVPB  4 mg/kg Intravenous Q48H    collagenase   Topical Daily    sodium chloride flush  10 mL Intravenous 2 times per day    aspirin  81 mg Oral Daily    fluticasone  2 spray Nasal Daily    calcium elemental  500 mg Oral Daily    carbidopa-levodopa  1 tablet Oral Daily    pantoprazole sodium  40 mg Oral QAM AC    chlorthalidone  25 mg Oral Daily    isosorbide mononitrate  60 mg Oral Daily   

## 2018-03-10 NOTE — PROCEDURES
Alma Luna is a 81 y.o. female patient. 1. Pleural effusion    2. Paroxysmal atrial fibrillation (HCC)    3. Fatigue, unspecified type    4. Leukocytosis, unspecified type      Past Medical History:   Diagnosis Date    Abnormal presence of protein in urine 6/2004    Dr. Ashely Kim Atrial fibrillation ([facility]     Constipation, chronic     Diverticular disease of the colon     ESRD (end stage renal disease) ([facility]     Fistula     left    GERD (gastroesophageal reflux disease)     GERD, PUD, Hiatal Hernia    Granulomatous lung disease ([facility]     History of endoscopy 2-21-12    Dr. Cristopher Alanis    Hyperlipidemia     Hypothyroidism     Kidney stones 11/2001    Dr. Christelle Cervantes    Neuropathy in diabetes ([facility]     legs    Osteoarthritis     Positive ORTEGA (antinuclear antibody)     1:80    Tachycardia     Thrombophlebitis leg superficial     Type II or unspecified type diabetes mellitus without mention of complication, not stated as uncontrolled 1998     Blood pressure (!) 119/99, pulse 155, temperature 98.1 °F (36.7 °C), temperature source Oral, resp. rate 16, height 4' 11\" (1.499 m), weight 108 lb 7.5 oz (49.2 kg), SpO2 100 %, not currently breastfeeding. Thoracentesis  Date/Time: 3/10/2018 4:24 PM  Performed by: Adriano Nolasco  Authorized by: Adriano Nolasco       Thoracentesis    03/10/18    Indications   Large left pleural effusion of unknown etiology    Consent  obtained from patient prior to procedure    Anesthesia   local    Procedure   Left lower posterior chest wall area was prepared with chloroprep and drape in a sterile fashion appropriate level of fluid was assessed by auscultation and percussion then an aesthetic was applied locally with lidocaine after that a long thoracentesis catheter was advanced into the pleural space and a total of 1200 mL of grossly bloody fluid was obtained.   Patient developed cough consistent with reexpansion of the lung followed by increasing pleuritic pain and site stopped

## 2018-03-10 NOTE — PROGRESS NOTES
LABGLOM  11.3*  18.8*   GFRAA  13.7*  22.8*   GLOB  2.6  2.8       No results for input(s): PHART, RCN5IJL, PO2ART, TGY8LCT, BEART, H3TCCUSZ in the last 72 hours.     O2 Device: Nasal cannula  O2 Flow Rate (L/min): 2 L/min    Dietary Nutrition Supplements: Diabetic Oral Supplement  DIET CARB CONTROL; Carb Control: 3 carbs/meal (approximate 1500 kcals/day)     MEDICATIONS during current hospitalization:    Continuous Infusions:   dextrose         Scheduled Meds:   daptomycin (CUBICIN) IVPB  4 mg/kg Intravenous Q48H    collagenase   Topical Daily    sodium chloride flush  10 mL Intravenous 2 times per day    aspirin  81 mg Oral Daily    fluticasone  2 spray Nasal Daily    calcium elemental  500 mg Oral Daily    carbidopa-levodopa  1 tablet Oral Daily    pantoprazole sodium  40 mg Oral QAM AC    chlorthalidone  25 mg Oral Daily    isosorbide mononitrate  60 mg Oral Daily    venlafaxine  150 mg Oral Daily with breakfast    hydrALAZINE  100 mg Oral TID    amiodarone  200 mg Oral Daily    magnesium oxide  400 mg Oral Daily    apixaban  2.5 mg Oral BID    calcium acetate  667 mg Oral TID WC    losartan  50 mg Oral Daily    b complex-C-folic acid  1 capsule Oral Daily    insulin lispro  3 Units Subcutaneous TID AC    insulin glargine  14 Units Subcutaneous Nightly    diltiazem  240 mg Oral BID    sodium chloride flush  10 mL Intravenous 2 times per day    docusate sodium  100 mg Oral BID    insulin lispro  0-12 Units Subcutaneous TID WC    insulin lispro  0-6 Units Subcutaneous Nightly    darbepoetin lizeth-polysorbate  40 mcg Subcutaneous Weekly    cefTRIAXone (ROCEPHIN) IV  1 g Intravenous Q24H    levothyroxine  50 mcg Oral Every Other Day    And    levothyroxine  25 mcg Oral Every Other Day    vancomycin  125 mg Oral 4 times per day       PRN Meds:sodium chloride flush, ALPRAZolam, ipratropium-albuterol, cholestyramine light, ondansetron, benzonatate, sodium chloride flush, acetaminophen, disease. There is degenerative bone spurring throughout the spine and there is evidence of a previous cholecystectomy. 1. Very large left pleural effusion with concomitant compression atelectasis of the left lower lobe. 2. Moderate size right pleural effusion with concomitant subsegmental atelectasis of the right lower lobe. 3. Pulmonary mass not definitely visualized however, an endobronchial lesion in lower lobe bronchi is not excluded. 4. Extensive atherosclerotic calcification of the aortic arch but no thoracic aorta aneurysm. 5. Mild cardiomegaly with coronary artery calcifications. There is no pericardial effusion. All CT scans at this facility use dose modulation, iterative reconstruction, and/or weight based dosing when appropriate to reduce radiation dose to as low as reasonably achievable. Xr Chest Portable    Result Date: 3/6/2018  EXAMINATION: PORTABLE CHEST X-RAY FROM 3/6/2018 AT 18:08 HOURS CLINICAL HISTORY: RENAL FAILURE ON HEMODIALYSIS, DYSPNEA COMPARISONS: 1/25/2018 FINDINGS: There is a very large pleural effusion occupying approximately three quarters of the left hemithorax. Suspect underlying atelectasis and/or infiltrate in the mid and lower left lung field. There are small granulomas in the lungs and hilar regions compatible with remote granulomatous disease. Suspect cardiomegaly and there is a bipolar cardiac pacemaker superimposed on the left hemithorax with electrodes in the region of the right atrium and right ventricle. There is a right-sided IJ hemodialysis catheter with the distal tip of the catheter in the region of the SVC. There is degenerative bone spurring throughout the spine and arthritis in both shoulders. 1. VERY LARGE PLEURAL EFFUSION OCCUPYING APPROXIMATELY THREE QUARTERS OF THE LEFT HEMITHORAX. 2. SUSPECT UNDERLYING COMPRESSION ATELECTASIS AND/OR INFILTRATE IN THE LEFT LUNG.  3. SATISFACTORY PLACEMENT OF THE RIGHT-SIDED IJ HEMODIALYSIS CATHETER WITH TIP OF CATHETER

## 2018-03-10 NOTE — PROGRESS NOTES
Physical Therapy  Facility/Department: [facility] [id number]  Daily Progress Note    NAME: Stephanie Arevalo    : 1935 (81 y.o.)  MRN: [id number]    Account: [de-identified]  Gender: female    Date of Service: 03/10/18    Patient Diagnosis(es):   Patient Active Problem List    Diagnosis Date Noted    Cardiac pacemaker 2017     Priority: High    Pleural effusion 2018    CKD (chronic kidney disease) stage 5, GFR less than 15 ml/min (HCC) 2018    SOB (shortness of breath) 2018    UTI (urinary tract infection) 2018    Sepsis ([address] 2018    Pleural effusion associated with pulmonary infection 2018    Pneumonia 2018    COPD with exacerbation ([address] 2018    Hypervolemia     Pleural effusion     C. difficile colitis 11/10/2017    ESRD (end stage renal disease) on dialysis ([address] 11/10/2017    Leukocytosis 11/10/2017    Acute on chronic diastolic congestive heart failure ([address] 10/30/2017    Hyperkalemia 10/26/2017    Diabetes mellitus due to underlying condition, controlled, with stage 5 chronic kidney disease not on chronic dialysis, with long-term current use of insulin ([address] 2017    Essential hypertension 2017    Anxiety 2017    Acid reflux 2015    Idiopathic Parkinson's disease ([address] 2014    CKD (chronic kidney disease) 10/15/2012    Anemia 2011    Hypothyroidism 10/04/2011    Hypercholesteremia 10/04/2011       Precautions/Weight Bearing Restrictions: Full weight bearing, falls risk. Falls Safety Armband Present:  [x] Yes  [] No    SUBJECTIVE: Pt sleeping upon arrival, argeeable to PT. Pain:   Initial Pain level: none  Reassessment of Pain: none     OBJECTIVE:     Bed Mobility:   Rolling: - Stand by Assist  Supine to Sit: -  Stand by Assist   Sit to Supine: -  Stand by Assist    HOB flat, 1 HR used incidentally, no LOB or physical assistance needed this date.     Transfers:   Sit to Stand: - Minimal Assist   Stand to Sit: - Minimal Assist   VC's for safe hand placement, pt reporting inc dizziness upon standing however able to stand for 2 minutes w/ min assist. Heavy posterior lean. Gait/Ambulation:   NOP    Exercise:  Seated:  AP x20  LAQ x10  Marching x10  Hip add w/ pillow x10    Neuromuscular Re-Education/Balance:  Standing w/ [employer] x2 minutes w/ min-mod assist. Heavy posterior lean, lat/ant wt shifts provided. Pt needing to sit d/t inc dizziness. Returned to supine and pt reported dizziness subsided. Plan   Plan  Current Treatment Recommendations: Strengthening, Balance Training, Endurance Training, Transfer Training, Functional Mobility Training, Neuromuscular Re-education  Safety Devices  Type of devices:  (with nursing for care after eval)    Goals  Short term goals  Short term goal 1: mod assist bed mobility  Short term goal 2: pt able to stand 30 sec with mod assist +2   Short term goal 3: pt able to sit EOB 0 sec with supervision  Short term goal 4: increase LE strength for pt lift LE's against gravity  Short term goal 5: imporve pts endurance for pt to tolerate 5 min of continuous activity     Comments: Pt returned BTB post tx session w/ alarm activated and call light in reach. Pt will need updated PT goals, will inform supervising PT. Suggested +2 for safety.     Therapy Time   Individual   Time In 1012   Time Out 1025   Minutes 13     Trans/BM: 8  Therex 5    Electronically signed by Meghan Wu PTA on 3/10/2018 at 10:27 AM

## 2018-03-10 NOTE — PROGRESS NOTES
shortness of breath. No acute nausea,vomiting. Has diarrhea. She had 3 BM's last night. No abdominal pain. Denies acute headache or dizziness. No hemetemesis,hemtaochezia,melena,hematuria. No acute onset of focal weakness,tingling or numbness  No acute mood changes.       MEDICATIONS:    Current Facility-Administered Medications   Medication Dose Route Frequency Provider Last Rate Last Dose    daptomycin (CUBICIN) 180 mg in sodium chloride 0.9 % 50 mL IVPB  4 mg/kg Intravenous Q48H Evelyne Dominguez MD   Stopped at 03/08/18 1500    collagenase ointment   Topical Daily Juliane Song MD        sodium chloride flush 0.9 % injection 10 mL  10 mL Intravenous 2 times per day Rodrigo Coles MD   10 mL at 03/08/18 2113    sodium chloride flush 0.9 % injection 10 mL  10 mL Intravenous PRN Rodrigo Coles MD        aspirin EC tablet 81 mg  81 mg Oral Daily Juliane Song MD   81 mg at 03/10/18 0916    fluticasone (FLONASE) 50 MCG/ACT nasal spray 2 spray  2 spray Nasal Daily Juliane Song MD   2 spray at 03/10/18 0921    calcium elemental (OSCAL) tablet 500 mg  500 mg Oral Daily Juliane Song MD   500 mg at 03/10/18 0917    carbidopa-levodopa (SINEMET)  MG per tablet 1 tablet  1 tablet Oral Daily Juliane Song MD   1 tablet at 03/10/18 0916    pantoprazole sodium (PROTONIX) packet 40 mg  40 mg Oral QAM AC Juliane Song MD   40 mg at 03/10/18 5594    chlorthalidone (HYGROTON) tablet 25 mg  25 mg Oral Daily Juliane Song MD   25 mg at 03/10/18 0917    isosorbide mononitrate (IMDUR) extended release tablet 60 mg  60 mg Oral Daily Juliane Song MD   60 mg at 03/10/18 0916    venlafaxine (EFFEXOR XR) extended release capsule 150 mg  150 mg Oral Daily with breakfast Juliane Song MD   150 mg at 03/10/18 0917    ALPRAZolam (XANAX) tablet 0.5 mg  0.5 mg Oral TID PRN Juliane Song MD   0.5 mg at 03/09/18 1441    ipratropium-albuterol (DUONEB) nebulizer solution 3 mL  3 mL Inhalation Q4H PRN Meana

## 2018-03-10 NOTE — PROGRESS NOTES
Infectious Diseases Inpatient Progress Note          HISTORY OF PRESENT ILLNESS:  Follow up sepsis, recurrent VRE UTI, ? pneumonia, Large L pleural effusion, recurrent C diff colitis on IV Cubicin, Rocephin and PO Vanco, well tolerated. Patient had 1 loose BM today, is very weak all over, occasional cough, on 2 lit NC. Poor historian, no pain. improved appetite. Current Medications:     albumin human  50 g Intravenous Once    daptomycin (CUBICIN) IVPB  4 mg/kg Intravenous Q48H    collagenase   Topical Daily    sodium chloride flush  10 mL Intravenous 2 times per day    aspirin  81 mg Oral Daily    fluticasone  2 spray Nasal Daily    calcium elemental  500 mg Oral Daily    carbidopa-levodopa  1 tablet Oral Daily    pantoprazole sodium  40 mg Oral QAM AC    chlorthalidone  25 mg Oral Daily    isosorbide mononitrate  60 mg Oral Daily    venlafaxine  150 mg Oral Daily with breakfast    hydrALAZINE  100 mg Oral TID    amiodarone  200 mg Oral Daily    magnesium oxide  400 mg Oral Daily    apixaban  2.5 mg Oral BID    calcium acetate  667 mg Oral TID WC    losartan  50 mg Oral Daily    b complex-C-folic acid  1 capsule Oral Daily    insulin lispro  3 Units Subcutaneous TID AC    insulin glargine  14 Units Subcutaneous Nightly    diltiazem  240 mg Oral BID    sodium chloride flush  10 mL Intravenous 2 times per day    docusate sodium  100 mg Oral BID    insulin lispro  0-12 Units Subcutaneous TID WC    insulin lispro  0-6 Units Subcutaneous Nightly    darbepoetin lizeth-polysorbate  40 mcg Subcutaneous Weekly    cefTRIAXone (ROCEPHIN) IV  1 g Intravenous Q24H    levothyroxine  50 mcg Oral Every Other Day    And    levothyroxine  25 mcg Oral Every Other Day    vancomycin  125 mg Oral 4 times per day       Allergies:  Arthrotec [diclofenac-misoprostol]; Depakote [divalproex sodium]; Dilantin [phenytoin];  Klonopin [clonazepam]; and Statins      ROS  unable to provide ROS because of weakness and

## 2018-03-10 NOTE — PROGRESS NOTES
(rDNA) injection 1 mg  1 mg Intramuscular PRN Celine Curran MD        dextrose 5 % solution  100 mL/hr Intravenous PRN Celine Curran MD        insulin lispro (HUMALOG) injection vial 0-12 Units  0-12 Units Subcutaneous TID WC Celine Curran MD   2 Units at 03/10/18 7142    insulin lispro (HUMALOG) injection vial 0-6 Units  0-6 Units Subcutaneous Nightly Celine Curran MD   1 Units at 03/09/18 2139    albumin human 25 % solution 25 g  25 g Intravenous Daily PRN Rafiq Bob MD        heparin (porcine) injection 2,000 Units  2,000 Units Intravenous PRN Rafiq Bob MD   4,000 Units at 03/07/18 1530    heparin (porcine) injection 1,000 Units  1,000 Units Intravenous PRN Rafiq Bob MD   1,000 Units at 03/07/18 1528    heparin (porcine) injection 1,000 Units  1,000 Units Intercatheter PRN Rafiq Bob MD   2,000 Units at 03/07/18 1526    darbepoetin lizeth-polysorbate (ARANESP) injection 40 mcg  40 mcg Subcutaneous Weekly Rafiq Bob MD        cefTRIAXone (ROCEPHIN) 1 g IVPB in 50 mL D5W minibag  1 g Intravenous Q24H Celine Curran MD   Stopped at 03/10/18 0948    levothyroxine (SYNTHROID) tablet 50 mcg  50 mcg Oral Every Other Day Celine Curran MD   50 mcg at 03/09/18 6755    And    levothyroxine (SYNTHROID) tablet 25 mcg  25 mcg Oral Every Other Day Celine Curran MD   25 mcg at 03/10/18 8560    vancomycin (VANCOCIN) oral solution 125 mg  125 mg Oral 4 times per day Puja Ferraro MD   125 mg at 03/10/18 4693     Allergies   Allergen Reactions    Arthrotec [Diclofenac-Misoprostol] Nausea Only    Depakote [Divalproex Sodium] Itching    Dilantin [Phenytoin]     Klonopin [Clonazepam]     Statins Other (See Comments)     achy     Principal Problem:    Sepsis ([address]  Active Problems:    Acute on chronic diastolic congestive heart failure (HCC)    Pleural effusion    CKD (chronic kidney disease) stage 5, GFR less than 15 ml/min ([facility])    SOB (shortness of breath)    UTI (urinary tract infection)  Resolved Problems:    * No resolved hospital problems. *    Blood pressure 121/77, pulse (!) 38, temperature 98.2 °F (36.8 °C), temperature source Oral, resp. rate 17, height 4' 11\" (1.499 m), weight 108 lb 7.5 oz (49.2 kg), SpO2 100 %, not currently breastfeeding. Subjective:  Symptoms:  Stable. Diet:  Adequate intake. Activity level: Impaired due to weakness. Objective:  General Appearance:  Comfortable. Vital signs: (most recent): Blood pressure 121/77, pulse (!) 38, temperature 98.2 °F (36.8 °C), temperature source Oral, resp. rate 17, height 4' 11\" (1.499 m), weight 108 lb 7.5 oz (49.2 kg), SpO2 100 %, not currently breastfeeding. Vital signs are normal.    Output: Minimal urine output. HEENT: Normal HEENT exam.    Lungs:  Normal effort and normal respiratory rate. She is not in respiratory distress. No decreased breath sounds, wheezes or rhonchi. Heart: Normal rate. Regular rhythm. S1 normal.    Abdomen: Abdomen is soft. Bowel sounds are normal.     Extremities: (Weakness legs)  Pulses: There are decreased pulses. Neurological: Patient is alert and oriented to person, place and time.       Assessment & Plan   ESRDx on hemodialysis                                      OHDx CHF                                      DM-type-2  Hypothyroid Parkinson                                      Hypoalbumenia          Plan Routine dialysis therapy/PT to evaluate    Maryla Halsted, DO  3/10/2018

## 2018-03-11 NOTE — PROGRESS NOTES
Renal Progress Note    Assessment and Plan:    82 yo with ESRD with large pleural effusions and SOB. Has leukocytosis and anemia as well.   had thoracentesis with 1200 cc removed and on HD MWF      Plan/  1- Aranesp for anemia  2- blood cx clear. Access is currently permcath. Has developing left fistula  3- hd tomorrow. 4- was staying at home, but needs SNF/NH now      Patient Active Problem List:     Hypothyroidism     Hypercholesteremia     CKD (chronic kidney disease)     Anemia     Acid reflux     Anxiety     Diabetes mellitus due to underlying condition, controlled, with stage 5 chronic kidney disease not on chronic dialysis, with long-term current use of insulin (HCC)     Essential hypertension     Idiopathic Parkinson's disease ([facility])     Hyperkalemia     Acute on chronic diastolic congestive heart failure ([facility])     C. difficile colitis     ESRD (end stage renal disease) on dialysis ([facility])     Leukocytosis     Cardiac pacemaker     Pneumonia     COPD with exacerbation ([facility])     Hypervolemia     Pleural effusion     Pleural effusion associated with pulmonary infection     Pleural effusion     CKD (chronic kidney disease) stage 5, GFR less than 15 ml/min ([facility])     SOB (shortness of breath)     UTI (urinary tract infection)     Sepsis ([facility])      Subjective:   Admit Date: 3/6/2018    Interval History: on dapto for VRE in urine. Had thoracentesis. Breathing is better.         Medications:   Scheduled Meds:   daptomycin (CUBICIN) IVPB  4 mg/kg Intravenous Q48H    collagenase   Topical Daily    sodium chloride flush  10 mL Intravenous 2 times per day    aspirin  81 mg Oral Daily    fluticasone  2 spray Nasal Daily    calcium elemental  500 mg Oral Daily    carbidopa-levodopa  1 tablet Oral Daily    pantoprazole sodium  40 mg Oral QAM AC    chlorthalidone  25 mg Oral Daily    isosorbide mononitrate  60 mg Oral Daily    venlafaxine  150 mg Oral Daily with breakfast    hydrALAZINE  100 mg Oral TID   iAyana Harp Wt 109 lb 12.6 oz (49.8 kg)   LMP  (LMP Unknown)   SpO2 100%   Breastfeeding? No   BMI 22.17 kg/m²    Wt Readings from Last 3 Encounters:   03/11/18 109 lb 12.6 oz (49.8 kg)   02/14/18 109 lb (49.4 kg)   02/02/18 107 lb 2.3 oz (48.6 kg)      24HR INTAKE/OUTPUT:      Intake/Output Summary (Last 24 hours) at 03/11/18 0939  Last data filed at 03/11/18 0001   Gross per 24 hour   Intake               10 ml   Output                0 ml   Net               10 ml       Constitutional:  Alert, awake, no apparent distress   Skin:normal, no rash  HEENT:sclera anicteric.   Head atraumatic normocephalic  Neck:supple with no thyromegally  Cardiovascular:  S1, S2 without m/r/g   Respiratory:  CTA B without w/r/r   Abdomen: +bs, soft, nt  Ext: no LE edema  Musculoskeletal:Intact  Neuro:Alert and oriented with no deficit      Electronically signed by Sintia Skaggs MD on 3/11/2018 at 9:39 AM

## 2018-03-11 NOTE — PROGRESS NOTES
INPATIENT PROGRESS NOTE    SERVICE DATE:  3/11/2018       Patient Active Problem List   Diagnosis    Hypothyroidism    Hypercholesteremia    CKD (chronic kidney disease)    Anemia    Acid reflux    Anxiety    Diabetes mellitus due to underlying condition, controlled, with stage 5 chronic kidney disease not on chronic dialysis, with long-term current use of insulin ([facility])    Essential hypertension    Idiopathic [facility]'s disease [facility]    Hyperkalemia    Acute on chronic diastolic congestive heart failure ([facility])    C. difficile colitis    ESRD (end stage renal disease) on dialysis ([facility]    Leukocytosis    Cardiac pacemaker    Pneumonia    COPD with exacerbation ([facility])    Hypervolemia    Pleural effusion    Pleural effusion associated with pulmonary infection    Pleural effusion    CKD (chronic kidney disease) stage 5, GFR less than 15 ml/min ([facility])    SOB (shortness of breath)    UTI (urinary tract infection)    Sepsis ([facility]     PLAN-  S/P THORACENTESIS  AWAIT pleural fluid results  Continue antibiotics  PT/OT  Renal fxn worse-due for dialysis tomorrow  Slight improvement in leukocytosis  Slight improvement in anemia  DIARRHEA- 5 semi formed stools yesterday  May need SNIF at discharge      IMPRESSION AND SUGGESTION:- PER PULMONARY-  1. Large left-sided pleural effusion of unknown etiology  2. Chronic kidney disease on hemodialysis  Thoracentesis was done, 1200 mL of grossly bloody fluid was obtained and sent for analysis. Day nature of the fluid suggest the possibility of malignant effusion. Await fluid analysis results    RECOMMENDATIONS: PER CARDIOLOGY     1. Cardiac Pulmonary and Primary Supportive Care. 2. Telemetry, remains in SR.  3. Continue medications  4. Primary care  5. Renal Care  6. OK to DC home Once OK with Others. 7. FU as Prior  8. See Orders.       PLAN: PER ID     · Recommend thoracentesis  · IV Cubicin  · Continue IV Rocephin, PO Vanco    Plan/ per nephrology  1- blood cx Oral Daily with breakfast Governor Laura MD   150 mg at 03/11/18 0946    ALPRAZolam (XANAX) tablet 0.5 mg  0.5 mg Oral TID PRN Governor Laura MD   0.5 mg at 03/10/18 2138    ipratropium-albuterol (DUONEB) nebulizer solution 3 mL  3 mL Inhalation Q4H PRN Governor Laura MD        hydrALAZINE (APRESOLINE) tablet 100 mg  100 mg Oral TID Governor Laura MD   100 mg at 03/11/18 0944    amiodarone (CORDARONE) tablet 200 mg  200 mg Oral Daily Governor Laura MD   200 mg at 03/11/18 0946    cholestyramine light packet 2 g  2 g Oral TID PRN Governor Laura MD   2 g at 03/08/18 0538    magnesium oxide (MAG-OX) tablet 400 mg  400 mg Oral Daily Governor Laura MD   400 mg at 03/11/18 0944    apixaban (ELIQUIS) tablet 2.5 mg  2.5 mg Oral BID Governor Laura MD   2.5 mg at 03/11/18 0946    calcium acetate (PHOSLO) capsule 667 mg  667 mg Oral TID WC Governor Laura MD   667 mg at 03/11/18 0945    losartan (COZAAR) tablet 50 mg  50 mg Oral Daily Governor Laura MD   50 mg at 03/11/18 0945    b complex-C-folic acid (NEPHROCAPS) capsule 1 mg  1 capsule Oral Daily Governor Laura MD   1 mg at 03/11/18 0944    ondansetron (ZOFRAN) tablet 4 mg  4 mg Oral Q8H PRN Governor Laura MD        benzonatate (TESSALON) capsule 100 mg  100 mg Oral TID PRN Governor Laura MD        insulin lispro (HUMALOG) injection vial 3 Units  3 Units Subcutaneous TID AC Governor Laura MD   3 Units at 03/10/18 1226    insulin glargine (LANTUS) injection vial 14 Units  14 Units Subcutaneous Nightly Governor Laura MD   14 Units at 03/10/18 2141    diltiazem (CARDIZEM CD) extended release capsule 240 mg  240 mg Oral BID Governor Laura MD   240 mg at 03/10/18 1741    sodium chloride flush 0.9 % injection 10 mL  10 mL Intravenous 2 times per day Governor Laura MD   10 mL at 03/11/18 0947    sodium chloride flush 0.9 % injection 10 mL  10 mL Intravenous PRN Governor Laura MD   10 mL at 03/07/18 1038    acetaminophen (TYLENOL) tablet 650 mg EXAM:   /67   Pulse 83   Temp 97.2 °F (36.2 °C) (Oral)   Resp 17   Ht 4' 11\" (1.499 m)   Wt 109 lb 12.6 oz (49.8 kg)   LMP  (LMP Unknown)   SpO2 100%   Breastfeeding? No   BMI 22.17 kg/m²   Body mass index is 22.17 kg/m². CONSTITUTIONAL:  awake  NECK:  supple, symmetrical, trachea midline  LUNGS:  No increased work of breathing, decreased BS at bases  CARDIOVASCULAR:  normal S1 and S2  ABDOMEN:  normal bowel sounds  MUSCULOSKELETAL:  full range of motion noted  NEUROLOGIC:  Awake, alert, oriented to name, place and time. Cranial nerves II-XII are grossly intact.     SKIN:  no bruising or bleeding    Recent Results (from the past 24 hour(s))   POCT Glucose    Collection Time: 03/10/18 11:31 AM   Result Value Ref Range    POC Glucose 259 (H) 60 - 115 mg/dl    Performed on ACCU-CHEK    POCT Glucose    Collection Time: 03/10/18  4:07 PM   Result Value Ref Range    POC Glucose 94 60 - 115 mg/dl    Performed on ACCU-CHEK    Glucose, Body Fluid    Collection Time: 03/10/18  7:50 PM   Result Value Ref Range    Glucose, Fluid 169.3 mg/dL    Fluid Type Thoracentesis    Lactate Dehydrogenase, Body Fluid    Collection Time: 03/10/18  7:50 PM   Result Value Ref Range    LD, Fluid 182.0 U/L   Protein, Body Fluid    Collection Time: 03/10/18  7:50 PM   Result Value Ref Range    Protein, Fluid 3.4 g/dL   Body Fluid Culture    Collection Time: 03/10/18  8:50 PM   Result Value Ref Range    Gram Stain Result Rare WBC's  No organisms seen      Body Fluid Cell Count with Differential    Collection Time: 03/10/18  9:15 PM   Result Value Ref Range    Cell Count Fluid Type Thoracentesis     Color, Fluid Red     Appearance, Fluid Bloody     Clot Eval. see below     Nucl Cell, Fluid 1,750 /cumm    RBC, Fluid 99,250 /cumm    Neutrophil Count, Fluid 77 %    Lymphocytes, Body Fluid 10 %    Monocyte Count, Fluid 13 %    Number of Cells Counted Fluid 100     Path Consult + CELL CT/DIFF-BF Yes    POCT Glucose    Collection Time:

## 2018-03-11 NOTE — PROGRESS NOTES
INPATIENT PROGRESS NOTES    PATIENT NAME: Alma Luna  MRN: [id number]  SERVICE DATE:  March 11, 2018   SERVICE TIME:  4:40 PM      PRIMARY SERVICE: Pulmonary Disease    CHIEF COMPLAINTS: Shortness breath    INTERVAL HPI: Patient seen and examined at bedside, Interval Notes, orders reviewed. Nursing notes noted  Patient reports significant improvement in her breathing since thoracentesis yesterday. She still having some pleuritic pain on deep inspiration though. Chest x-ray showed significant improvement following thoracentesis with residual mild to moderate pleural effusion on the left and small on the right. She is oxygenating very well on nasal cannula hemodynamic status remained stable. Analysis of fluid showed borderline exudative fluid with predominant neutrophilic differential and mainly bloody fluid with very high RBC count      OBJECTIVE    Body mass index is 22.17 kg/m². PHYSICAL EXAM:  Vitals:  BP (!) 115/52   Pulse 81   Temp 98.1 °F (36.7 °C) (Oral)   Resp 17   Ht 4' 11\" (1.499 m)   Wt 109 lb 12.6 oz (49.8 kg)   LMP  (LMP Unknown)   SpO2 100%   Breastfeeding? No   BMI 22.17 kg/m²   General: Patient is  Alert, awake . comfortable in bed, No distress. Head: Atraumatic , Normocephalic   Eyes: PERRL. No icteric sclera. No conjunctival injection. No discharge   ENT: No nasal  discharge. Pharynx clear. Neck:  Trachea midline. No thyromegaly, no JVD, No cervical adenopathy. Chest : Adequate effort, symmetric bilateral excursions  Lung : Decreased breath sounds bilaterally, mainly in the bases  Heart[de-identified] Regular rhythm and rate. No mumur ,  Rub or gallop  ABD: Benign. Non-tender. Non-distended. No masses or organmegaly. Normal bowel sounds. EXT: No significant Pitting edema both lower extremities , No Cyanosis No clubbing  Neuro: no focal weakness  Skin: Warm and dry. No erythema or rash on exposed extremities.       DATA:   Recent Labs      03/09/18   0725  03/11/18   0611   WBC  17.0* (MIN 3 VIEWS)  CLINICAL HISTORY:Generalized pain after fall yesterday COMPARISONS: None TECHNIQUE: THREE VIEWS  FINDINGS: Three views of the right ankle are submitted. No acute fractures. No dislocations. There is a calcaneal enthesophyte                                                                               IMPRESSION: NO ACUTE FRACTURES    Xr Foot Right (min 3 Views)    Result Date: 2/14/2018  XR FOOT RIGHT (MIN 3 VIEWS), XR ANKLE RIGHT (MIN 3 VIEWS)  CLINICAL HISTORY: Generalized pain after fall yesterday COMPARISON: None  FINDINGS: Three  views of the right foot are submitted. There is diffuse generalized osteopenia There is a fracture at the base and medial side of the proximal phalanx of the right second toe. No dislocations. There is a calcaneal enthesophyte There are some degenerative changes of the midfoot. FRACTURE AT THE BASE AND MEDIAL SIDE OF THE PROXIMAL PHALANX OF THE RIGHT SECOND TOE. EXAMINATION: XR FOOT RIGHT (MIN 3 VIEWS), XR ANKLE RIGHT (MIN 3 VIEWS)  CLINICAL HISTORY:Generalized pain after fall yesterday COMPARISONS: None TECHNIQUE: THREE VIEWS  FINDINGS: Three views of the right ankle are submitted. No acute fractures. No dislocations. There is a calcaneal enthesophyte                                                                               IMPRESSION: NO ACUTE FRACTURES    Ct Chest Wo Contrast    Result Date: 3/6/2018  EXAMINATION:  CT CHEST WITHOUT IV CONTRAST CLINICAL HISTORY:  EVALUATE LEFT PLEURAL EFFUSION, PATIENT WITH DYSPNEA COMPARISON:  None available TECHNIQUE:  CT chest is obtained without IV contrast. Axial and MPR CT images of the chest were obtained. CT images are viewed with pulmonary and mediastinal window settings. FINDINGS:  There is a very large left pleural effusion with concomitant compression atelectasis of the left lower lobe.  Also, there is a moderate size right pleural effusion 18:08 HOURS CLINICAL HISTORY: RENAL FAILURE ON HEMODIALYSIS, DYSPNEA COMPARISONS: 1/25/2018 FINDINGS: There is a very large pleural effusion occupying approximately three quarters of the left hemithorax. Suspect underlying atelectasis and/or infiltrate in the mid and lower left lung field. There are small granulomas in the lungs and hilar regions compatible with remote granulomatous disease. Suspect cardiomegaly and there is a bipolar cardiac pacemaker superimposed on the left hemithorax with electrodes in the region of the right atrium and right ventricle. There is a right-sided IJ hemodialysis catheter with the distal tip of the catheter in the region of the SVC. There is degenerative bone spurring throughout the spine and arthritis in both shoulders. 1. VERY LARGE PLEURAL EFFUSION OCCUPYING APPROXIMATELY THREE QUARTERS OF THE LEFT HEMITHORAX. 2. SUSPECT UNDERLYING COMPRESSION ATELECTASIS AND/OR INFILTRATE IN THE LEFT LUNG. 3. SATISFACTORY PLACEMENT OF THE RIGHT-SIDED IJ HEMODIALYSIS CATHETER WITH TIP OF CATHETER IN SVC. 4. PROBABLE CARDIOMEGALY WITH A BIPOLAR CARDIAC PACEMAKER IN PLACE. IMPRESSION AND SUGGESTION:  1. Large left-sided pleural effusion of unknown etiology, awaiting cytology, cultures negative so far  2. Chronic kidney disease on hemodialysis  Continue current treatment, obtain follow-up PN lateral chest x-ray in a.m.

## 2018-03-12 NOTE — PROGRESS NOTES
105/61   Pulse 81   Temp 97.7 °F (36.5 °C)   Resp 18   Ht 4' 11\" (1.499 m)   Wt 133 lb 13.1 oz (60.7 kg)   LMP  (LMP Unknown)   SpO2 100%   Breastfeeding? No   BMI 27.03 kg/m²    Wt Readings from Last 3 Encounters:   03/12/18 133 lb 13.1 oz (60.7 kg)   02/14/18 109 lb (49.4 kg)   02/02/18 107 lb 2.3 oz (48.6 kg)      24HR INTAKE/OUTPUT:    No intake or output data in the 24 hours ending 03/12/18 0924    Constitutional:  Alert, awake, no apparent distress   Skin:normal, no rash  HEENT:sclera anicteric.   Head atraumatic normocephalic  Neck:supple with no thyromegally  Cardiovascular:  S1, S2 without m/r/g   Respiratory:  CTA B without w/r/r   Abdomen: +bs, soft, nt  Ext: no LE edema  Musculoskeletal:Intact  Neuro:Alert and oriented with no deficit      Electronically signed by Brandi Akins MD on 3/12/2018 at 9:24 AM

## 2018-03-12 NOTE — PROGRESS NOTES
Physical Therapy Missed Treatment   Facility/Department: [facility] MED SURG [id number]    NAME: Lauren Birmingham    : 1935 (81 y.o.)  MRN: [id number]    Account: [de-identified]  Gender: female           [x] Patient Unavailable: Off floor for Dialysis. Will attempt PT treatment again at earliest convenience.       Electronically signed by Randy Arreola PTA on 3/12/18 at 1:33 PM

## 2018-03-12 NOTE — PROGRESS NOTES
pleural effusion of unknown etiology, awaiting cytology, cultures negative so far. Chronic kidney disease on hemodialysis. Continue current treatment, obtain follow-up PN lateral chest x-ray in a.m. Steph further dispo recs. - Continue to monitor     DMII  HTN  HLD     Dispo: Await consultant clearance and pre-cert prior to discharge to SNF. Advised F/U with PCP 1 - 2 days of discharge. SUBJECTIVE  CHIEF COMPLAINT: SOB    PRIMARY SERVICE: Medicine    INTERVAL HPI: Pt says she feels the same today. No acute complaints.      MEDICATIONS:    Current Facility-Administered Medications   Medication Dose Route Frequency Provider Last Rate Last Dose    daptomycin (CUBICIN) 245 mg in sodium chloride 0.9 % 50 mL IVPB  4 mg/kg Intravenous Q48H Vinayak Dennis MD        collagenase ointment   Topical Daily John Tafoya MD        sodium chloride flush 0.9 % injection 10 mL  10 mL Intravenous 2 times per day Baudilio Wright MD   10 mL at 03/12/18 1427    sodium chloride flush 0.9 % injection 10 mL  10 mL Intravenous PRN Baudilio Wright MD        aspirin EC tablet 81 mg  81 mg Oral Daily John Tafoya MD   81 mg at 03/12/18 1426    fluticasone (FLONASE) 50 MCG/ACT nasal spray 2 spray  2 spray Nasal Daily John Tafoya MD   Stopped at 03/12/18 0830    calcium elemental (OSCAL) tablet 500 mg  500 mg Oral Daily John Tafoya MD   Stopped at 03/12/18 0830    carbidopa-levodopa (SINEMET)  MG per tablet 1 tablet  1 tablet Oral Daily John Tafoya MD   1 tablet at 03/12/18 1426    pantoprazole sodium (PROTONIX) packet 40 mg  40 mg Oral QAM AC John Tafoya MD   40 mg at 03/12/18 0610    chlorthalidone (HYGROTON) tablet 25 mg  25 mg Oral Daily John Tafoya MD   25 mg at 03/12/18 1425    isosorbide mononitrate (IMDUR) extended release tablet 60 mg  60 mg Oral Daily John Tafoya MD   60 mg at 03/12/18 1424    venlafaxine (EFFEXOR XR) extended release capsule 150 mg  150 mg Oral Daily with

## 2018-03-12 NOTE — PROGRESS NOTES
Roseanne Vega is a 81 y.o. female patient.       · Sepsis  · Recurrent UTI, with VRE  · recurrent C diff      Current Facility-Administered Medications   Medication Dose Route Frequency Provider Last Rate Last Dose    daptomycin (CUBICIN) 245 mg in sodium chloride 0.9 % 50 mL IVPB  4 mg/kg Intravenous Q48H Lakisha Garza MD        collagenase ointment   Topical Daily Juliane Song MD        sodium chloride flush 0.9 % injection 10 mL  10 mL Intravenous 2 times per day Rodrigo Coles MD   10 mL at 03/12/18 1427    sodium chloride flush 0.9 % injection 10 mL  10 mL Intravenous PRN Rodrigo Coles MD        aspirin EC tablet 81 mg  81 mg Oral Daily Juliane Song MD   81 mg at 03/12/18 1426    fluticasone (FLONASE) 50 MCG/ACT nasal spray 2 spray  2 spray Nasal Daily Juliane Song MD   Stopped at 03/12/18 0830    calcium elemental (OSCAL) tablet 500 mg  500 mg Oral Daily Juliane Song MD   Stopped at 03/12/18 0830    carbidopa-levodopa (SINEMET)  MG per tablet 1 tablet  1 tablet Oral Daily Juliane Song MD   1 tablet at 03/12/18 1426    pantoprazole sodium (PROTONIX) packet 40 mg  40 mg Oral QAM AC Juliane Song MD   40 mg at 03/12/18 0610    chlorthalidone (HYGROTON) tablet 25 mg  25 mg Oral Daily Juliane Song MD   25 mg at 03/12/18 1425    isosorbide mononitrate (IMDUR) extended release tablet 60 mg  60 mg Oral Daily Juliane Song MD   60 mg at 03/12/18 1424    venlafaxine (EFFEXOR XR) extended release capsule 150 mg  150 mg Oral Daily with breakfast Juliane Song MD   150 mg at 03/12/18 1424    ALPRAZolam (XANAX) tablet 0.5 mg  0.5 mg Oral TID PRN Juliane Song MD   0.5 mg at 03/10/18 2138    ipratropium-albuterol (DUONEB) nebulizer solution 3 mL  3 mL Inhalation Q4H PRN Juliane Song MD        hydrALAZINE (APRESOLINE) tablet 100 mg  100 mg Oral TID Juliane Song MD   100 mg at 03/12/18 1426    amiodarone (CORDARONE) tablet 200 mg  200 mg Oral Daily Meana R 1 mg Intramuscular PRN Soren Head MD        dextrose 5 % solution  100 mL/hr Intravenous PRN Soren Head MD        insulin lispro (HUMALOG) injection vial 0-12 Units  0-12 Units Subcutaneous TID WC Soren Head MD   4 Units at 03/12/18 1708    insulin lispro (HUMALOG) injection vial 0-6 Units  0-6 Units Subcutaneous Nightly Soren Head MD   3 Units at 03/10/18 2140    albumin human 25 % solution 25 g  25 g Intravenous Daily PRN Leslie Coyle MD        heparin (porcine) injection 2,000 Units  2,000 Units Intravenous PRN Leslie Coyle MD   3,000 Units at 03/12/18 1035    heparin (porcine) injection 1,000 Units  1,000 Units Intravenous PRN Leslie Coyle MD   2,000 Units at 03/12/18 1034    heparin (porcine) injection 1,000 Units  1,000 Units Intercatheter PRN Leslie Coyle MD   1,000 Units at 03/12/18 1034    darbepoetin lizeth-polysorbate (ARANESP) injection 40 mcg  40 mcg Subcutaneous Weekly Leslie Coyle MD        cefTRIAXone (ROCEPHIN) 1 g IVPB in 50 mL D5W minibag  1 g Intravenous Q24H Soren Head MD   Stopped at 03/12/18 1500    levothyroxine (SYNTHROID) tablet 50 mcg  50 mcg Oral Every Other Day Soren Head MD   50 mcg at 03/11/18 0543    And    levothyroxine (SYNTHROID) tablet 25 mcg  25 mcg Oral Every Other Day Soren Head MD   25 mcg at 03/12/18 8124    vancomycin (VANCOCIN) oral solution 125 mg  125 mg Oral 4 times per day Jero Sanders MD   125 mg at 03/12/18 1442     Allergies   Allergen Reactions    Arthrotec [Diclofenac-Misoprostol] Nausea Only    Depakote [Divalproex Sodium] Itching    Dilantin [Phenytoin]     Klonopin [Clonazepam]     Statins Other (See Comments)     achy     Principal Problem:    Sepsis ([facility] [address]  Active Problems:    Acute on chronic diastolic congestive heart failure (HCC)    Pleural effusion    CKD (chronic kidney disease) stage 5, GFR less than 15 ml/min ([facility])    SOB (shortness of breath)    UTI (urinary tract infection)  Resolved

## 2018-03-12 NOTE — CARE COORDINATION
SHOLA left a message for Estela wheat for [facility].  Electronically signed by ALIDA Asher on 3/12/18 at 11:27 AM

## 2018-03-12 NOTE — PROGRESS NOTES
INPATIENT PROGRESS NOTES    PATIENT NAME: Roseanne Vega  MRN: [id number]  SERVICE DATE:  March 12, 2018   SERVICE TIME:  3:55 PM      PRIMARY SERVICE: Pulmonary Disease    CHIEF COMPLAINTS: Shortness breath    INTERVAL HPI: Patient seen and examined at bedside, Interval Notes, orders reviewed. Nursing notes noted  Patient had thoracentesis done and about 1200 mL of bloody fluid removed on left side. Patient reports significant improvement in her breathing since thoracentesis . Pleural fluid is exudative. Patient had dialysis today. She still having some pleuritic pain on deep inspiration though. OBJECTIVE    Body mass index is 25.65 kg/m². PHYSICAL EXAM:  Vitals:  BP (!) 152/70   Pulse 84   Temp 98.6 °F (37 °C) (Oral)   Resp 16   Ht 4' 11\" (1.499 m)   Wt 126 lb 15.8 oz (57.6 kg)   LMP  (LMP Unknown)   SpO2 100%   Breastfeeding? No   BMI 25.65 kg/m²   General: Patient is  Alert, awake . comfortable in bed, No distress. Head: Atraumatic , Normocephalic   Eyes: PERRL. No icteric sclera. No conjunctival injection. No discharge   ENT: No nasal  discharge. Pharynx clear. Neck:  Trachea midline. No thyromegaly, no JVD, No cervical adenopathy. Chest : Adequate effort, symmetric bilateral excursions  Lung : Decreased breath sounds bilaterally, mainly in the bases  Heart[de-identified] Regular rhythm and rate. No mumur ,  Rub or gallop  ABD: Benign. Non-tender. Non-distended. No masses or organmegaly. Normal bowel sounds. EXT: No significant Pitting edema both lower extremities , No Cyanosis No clubbing  Neuro: no focal weakness  Skin: Warm and dry. No erythema or rash on exposed extremities.       DATA:   Recent Labs      03/11/18   0611  03/12/18   0545   WBC  16.4*  15.5*   HGB  9.0*  9.5*   HCT  28.3*  29.2*   MCV  95.5  94.7   PLT  212  212     Recent Labs      03/10/18   0622  03/11/18   0611  03/12/18   0545   NA  136  134  136   K  4.1  4.7  4.4   CL  95*  94*  94*   CO2  27  27  28   BUN  14  25*  39*

## 2018-03-12 NOTE — CARE COORDINATION
LSW was approached by the pt's son, Alma Severino.  Son states he wants to know what insurance will cover for [employer]. Pt and son do not want SNF if pt owes 30%. Per son, Luann Cast of [place] - insurance did not cover 30%. LSW left a message for Amaury Ramirez. Electronically signed by ALIDA Franco on 3/12/18 at 2:24 PM    Per Joe Engel is in network and pt has no co pay. LSW called the pt's son, Maribel Pugh, and notified him about what Amaury Ramirez, stated to the LSW. LSW offered for the son to also call [employer] also. Son states no, he did not need to call and appreciated LSW calling him back.  Electronically signed by ALIDA Franco on 3/12/18 at 3:06 PM

## 2018-03-12 NOTE — PLAN OF CARE
Problem: Falls - Risk of  Goal: Absence of falls  Outcome: Ongoing      Problem: Risk for Impaired Skin Integrity  Goal: Tissue integrity - skin and mucous membranes  Structural intactness and normal physiological function of skin and  mucous membranes.    Outcome: Ongoing      Problem: Nutrition  Goal: Optimal nutrition therapy  Outcome: Ongoing      Problem: Pain:  Goal: Pain level will decrease  Pain level will decrease   Outcome: Ongoing    Goal: Control of acute pain  Control of acute pain   Outcome: Ongoing    Goal: Control of chronic pain  Control of chronic pain   Outcome: Ongoing

## 2018-03-12 NOTE — PROGRESS NOTES
CARDIOLOGY 1451  [facility] PROGRESS NOTE         3/12/2018      Sybil Huston    [id number]  1935    Rounding MD: Angelica Rome MD ,[facility]    PRIMARY CARDIOLOGIST: Dr. Evita Roman SOB, PNA, SEPSIS, NSTEMI, PAFIB      SUBJECTIVE:     Patient has no new cardiac complaints. Still SOB and Tired. Pending Placement. Cardiac and general ROS otherwise negative and unchanged.     IMPRESSIONS:     1. SOB  2. PNA  3. UTI  4. Sepsis  5. Elevated Troponins, Due to Above, Type 2 NSTEMI  6. PAF, in SR now. On amiodarone and anticoagulated with Eliquis. 7. Sinus node dysfunction s/p PPM 11/2017  8. CAD s/p remote PCI/BMS to LCX 2010, [facility] 1/2013 showing diffuse vessel calcification with a 30%-50% stenosis of the proximal RCA, normal LM, heavily calcified LAD with mid to distal tapering with 100% distal occlusion, filled by collaterals, mild disease of LCX. 9. Perfusion scan 1/2015 negative for ischemia. 10. Normal LV systolic function with EF 60-65%, moderate AS per echo 12/2017  11. HTN  12. HLP  13. COPD  15. ESRD on HD  15. Otherwise as per PMH below.     RECOMMENDATIONS:     1. Cardiac Pulmonary and Primary Supportive Care. 2. Telemetry, remains in SR.  3. Continue medications  4. Primary care  5. Renal Care  6. OK to DC home Once OK with Others. 7. Will Follow with you.   8. See Orders.        PRESENTING HPI:      Katiana Sherman is a 81 y.o. female with history of coronary artery disease, status post remote PCI to LCX, [facility] 2013 showing diffuse vessel calcification, with total occlusion of the distal LAD, filled by collaterals.  Medical management was recommended, perfusion scan in 2015 negative for ischemia.  She has normal LV systolic function with a left ventricular ejection fraction 60-65%, moderate aortic stenosis per echocardiogram obtained in December 2017.  She underwent permanent pacemaker implant for sinus node dysfunction in November 2017, and has

## 2018-03-13 NOTE — PROGRESS NOTES
Jeannie Carballo is a 81 y.o. female patient. · Sepsis  · Recurrent UTI, with VRE  · recurrent C diff        Subjective   Review of Systems   Unable to perform ROS: Acuity of condition   Constitutional: Negative for fever. BP (!) 113/47   Pulse 82   Temp 98.7 °F (37.1 °C) (Oral)   Resp 18   Ht 4' 11\" (1.499 m)   Wt 116 lb 13.5 oz (53 kg)   LMP  (LMP Unknown)   SpO2 99%   Breastfeeding? No   BMI 23.60 kg/m²      Output: Producing urine. Lungs:  Normal effort and normal respiratory rate. She is not in respiratory distress. No decreased breath sounds, wheezes or rhonchi. Heart: Regular rhythm. S1 normal and S2 normal.  No murmur or gallop. Abdomen: There is no epigastric area or suprapubic area tenderness. There is no rebound tenderness. There is no mass. There is no splenomegaly. There is no hepatomegaly. Extremities: Normal range of motion. There is local swelling.        Urine Culture [[id number]] (Abnormal)  Collected: 03/06/18 1915   Order Status: Completed Specimen: Urine, clean catch Updated: 03/09/18 0934    Urine Culture, Routine --    Organism Enterococcus faecium VRE (A)    Urine Culture, Routine -- (A)    >100,000 CFU/ml   CONTACT PRECAUTIONS INDICATED        Assessment & Plan   Sepsis  Recurrent UTI, with VRE  recurrent C diff    IV Cubicin ,IV Rocephin,for 3 days more   PO Vanco for 2 weeks    Tracey Freed MD  3/13/2018

## 2018-03-13 NOTE — PROGRESS NOTES
Standards (Estimated Nutrition Needs):  · Estimated Daily Total Kcal: 6946-3057  · Estimated Daily Protein (g): 63-74    Estimated Intake vs Estimated Needs: Intake Improving    Nutrition Risk Level: Moderate    Nutrition Interventions:   Continue current ONS, Continue current diet  Continued Inpatient Monitoring, Education Not Indicated    Nutrition Evaluation:   · Evaluation: Goals set   · Goals: po intake > 50% of meals and supplements. Maintain weight ~ 100 lb dry weight. Improved wound healing    · Monitoring: Meal Intake, Supplement Intake, Weight, Pertinent Labs, Wound Healing    See Adult Nutrition Doc Flowsheet for more detail.      Electronically signed by Jas Art RD, LD on 3/13/18 at 11:37 AM

## 2018-03-13 NOTE — PROGRESS NOTES
INPATIENT PROGRESS NOTES    PATIENT NAME: Luz Marina Whipple  MRN: [id number]  SERVICE DATE:  March 13, 2018   SERVICE TIME:  4:31 PM      PRIMARY SERVICE: Pulmonary Disease    CHIEF COMPLAINTS: Shortness breath    INTERVAL HPI: Patient seen and examined at bedside, Interval Notes, orders reviewed. Nursing notes noted  Patient had thoracentesis done and about 1200 mL of bloody fluid removed on left side. Patient reports significant improvement in her breathing since thoracentesis . Pleural fluid is exudative. Patient had dialysis today. She still having some pleuritic pain on deep inspiration though. OBJECTIVE    Body mass index is 23.6 kg/m². PHYSICAL EXAM:  Vitals:  BP (!) 113/47   Pulse 82   Temp 98.7 °F (37.1 °C) (Oral)   Resp 18   Ht 4' 11\" (1.499 m)   Wt 116 lb 13.5 oz (53 kg)   LMP  (LMP Unknown)   SpO2 99%   Breastfeeding? No   BMI 23.60 kg/m²   General: Patient is  Alert, awake . comfortable in bed, No distress. Head: Atraumatic , Normocephalic   Eyes: PERRL. No icteric sclera. No conjunctival injection. No discharge   ENT: No nasal  discharge. Pharynx clear. Neck:  Trachea midline. No thyromegaly, no JVD, No cervical adenopathy. Chest : Adequate effort, symmetric bilateral excursions  Lung : Decreased breath sounds bilaterally, mainly in the bases  Heart[de-identified] Regular rhythm and rate. No mumur ,  Rub or gallop  ABD: Benign. Non-tender. Non-distended. No masses or organmegaly. Normal bowel sounds. EXT: No significant Pitting edema both lower extremities , No Cyanosis No clubbing  Neuro: no focal weakness  Skin: Warm and dry. No erythema or rash on exposed extremities.       DATA:   Recent Labs      03/12/18 0545  03/13/18 0527   WBC  15.5*  17.0*   HGB  9.5*  9.6*   HCT  29.2*  30.0*   MCV  94.7  95.4   PLT  212  197     Recent Labs      03/12/18 0545  03/13/18 0527   NA  136  134   K  4.4  4.4   CL  94*  93*   CO2  28  28   BUN  39*  21   CREATININE  4.78*  3.00*   GLUCOSE  74  65* chloride flush, ALPRAZolam, ipratropium-albuterol, cholestyramine light, ondansetron, benzonatate, sodium chloride flush, acetaminophen, ondansetron, glucose, dextrose, glucagon (rDNA), dextrose, albumin human, heparin (porcine), heparin (porcine), heparin (porcine)    Radiology  Xr Chest Portable    Result Date: 3/11/2018  Chest one view. HISTORY: Post thoracentesis. FINDINGS: Comparison, March 8, 2018. Right dual-lumen catheter, pacemaker generator, pacemaker wires unchanged. A homogeneous area of increased opacity is found in the left lower lung, obscuring the left cardiac silhouette, left diaphragm, and left lower lobe. Mild airspace disease found at  right lung base. Small to moderate left pleural effusion, markedly decreased since prior study. Right lower lung subsegmental atelectasis/pneumonia. IMPRESSION AND SUGGESTION:  1. Large left-sided pleural effusion of unknown etiology, status post thoracentesis, awaiting cytology, cultures negative so far  2. Chronic kidney disease on hemodialysis    she had a follow-up chest x-ray which shows increasing left pleural effusion, from small to moderate size. Pleural fluid cytology is pending. Continue present treatment plan    Torsten Raphael MD. [facility]

## 2018-03-13 NOTE — PROGRESS NOTES
at 03/13/18 0958    ALPRAZolam (XANAX) tablet 0.5 mg  0.5 mg Oral TID PRN Ana Florian MD   0.5 mg at 03/10/18 2138    ipratropium-albuterol (DUONEB) nebulizer solution 3 mL  3 mL Inhalation Q4H PRN Ana Florian MD        hydrALAZINE (APRESOLINE) tablet 100 mg  100 mg Oral TID Ana Florian MD   100 mg at 03/13/18 0958    amiodarone (CORDARONE) tablet 200 mg  200 mg Oral Daily Ana Florian MD   200 mg at 03/13/18 0958    cholestyramine light packet 2 g  2 g Oral TID PRN Ana Florian MD   2 g at 03/08/18 0538    magnesium oxide (MAG-OX) tablet 400 mg  400 mg Oral Daily Ana Florian MD   400 mg at 03/13/18 0959    apixaban (ELIQUIS) tablet 2.5 mg  2.5 mg Oral BID Ana Florian MD   2.5 mg at 03/13/18 0959    calcium acetate (PHOSLO) capsule 667 mg  667 mg Oral TID WC Ana Florian MD   667 mg at 03/13/18 1218    losartan (COZAAR) tablet 50 mg  50 mg Oral Daily Ana Florian MD   50 mg at 03/13/18 0958    b complex-C-folic acid (NEPHROCAPS) capsule 1 mg  1 capsule Oral Daily Ana Florian MD   1 mg at 03/13/18 0959    ondansetron (ZOFRAN) tablet 4 mg  4 mg Oral Q8H PRN Ana Florian MD        benzonatate (TESSALON) capsule 100 mg  100 mg Oral TID PRN Ana Florian MD        insulin lispro (HUMALOG) injection vial 3 Units  3 Units Subcutaneous TID AC Ana Florian MD   3 Units at 03/13/18 1218    insulin glargine (LANTUS) injection vial 14 Units  14 Units Subcutaneous Nightly Ana Florian MD   14 Units at 03/12/18 2255    diltiazem (CARDIZEM CD) extended release capsule 240 mg  240 mg Oral BID Ana Florian MD   240 mg at 03/13/18 0959    sodium chloride flush 0.9 % injection 10 mL  10 mL Intravenous 2 times per day Ana Folrian MD   10 mL at 03/13/18 1003    sodium chloride flush 0.9 % injection 10 mL  10 mL Intravenous PRN Ana Florian MD   10 mL at 03/07/18 1038    acetaminophen (TYLENOL) tablet 650 mg  650 mg Oral Q4H PRN Ana Florian MD   650 mg at

## 2018-03-13 NOTE — PROGRESS NOTES
magnesium oxide  400 mg Oral Daily    apixaban  2.5 mg Oral BID    calcium acetate  667 mg Oral TID WC    losartan  50 mg Oral Daily    b complex-C-folic acid  1 capsule Oral Daily    insulin lispro  3 Units Subcutaneous TID AC    insulin glargine  14 Units Subcutaneous Nightly    diltiazem  240 mg Oral BID    sodium chloride flush  10 mL Intravenous 2 times per day    docusate sodium  100 mg Oral BID    insulin lispro  0-12 Units Subcutaneous TID WC    insulin lispro  0-6 Units Subcutaneous Nightly    darbepoetin lizeth-polysorbate  40 mcg Subcutaneous Weekly    cefTRIAXone (ROCEPHIN) IV  1 g Intravenous Q24H    levothyroxine  50 mcg Oral Every Other Day    And    levothyroxine  25 mcg Oral Every Other Day    vancomycin  125 mg Oral 4 times per day     Continuous Infusions:   dextrose         CBC:   Recent Labs      03/12/18   0545  03/13/18   0527   WBC  15.5*  17.0*   HGB  9.5*  9.6*   PLT  212  197     CMP:    Recent Labs      03/11/18   0611  03/12/18   0545  03/13/18   0527   NA  134  136  134   K  4.7  4.4  4.4   CL  94*  94*  93*   CO2  27  28  28   BUN  25*  39*  21   CREATININE  3.93*  4.78*  3.00*   GLUCOSE  82  74  65*   CALCIUM  7.7*  7.7*  7.9*   LABGLOM  11.0*  8.7*  15.0*     Troponin:   No results for input(s): TROPONINI in the last 72 hours. BNP: No results for input(s): BNP in the last 72 hours. INR: No results for input(s): INR in the last 72 hours. Lipids: No results for input(s): CHOL, LDLDIRECT, TRIG, HDL, AMYLASE, LIPASE in the last 72 hours. Liver:   Recent Labs      03/13/18   0527   AST  24   ALT  9   ALKPHOS  95   PROT  5.9*   LABALBU  2.9*   BILITOT  0.3     Iron:  No results for input(s): IRONS, FERRITIN in the last 72 hours. Invalid input(s): LABIRONS  Urinalysis: No results for input(s): UA in the last 72 hours.     Objective:   Vitals: BP (!) 160/55   Pulse 82   Temp 98.8 °F (37.1 °C)   Resp 17   Ht 4' 11\" (1.499 m)   Wt 116 lb 13.5 oz (53 kg)   LMP  (LMP Unknown)   SpO2 100%   Breastfeeding? No   BMI 23.60 kg/m²    Wt Readings from Last 3 Encounters:   03/13/18 116 lb 13.5 oz (53 kg)   02/14/18 109 lb (49.4 kg)   02/02/18 107 lb 2.3 oz (48.6 kg)      24HR INTAKE/OUTPUT:      Intake/Output Summary (Last 24 hours) at 03/13/18 1243  Last data filed at 03/13/18 0616   Gross per 24 hour   Intake              440 ml   Output                0 ml   Net              440 ml       Constitutional:  Alert, awake, no apparent distress   Skin:normal, no rash  HEENT:sclera anicteric.   Head atraumatic normocephalic  Neck:supple with no thyromegally  Cardiovascular:  S1, S2 without m/r/g   Respiratory:  CTA B without w/r/r   Abdomen: +bs, soft, nt  Ext: no LE edema  Musculoskeletal:Intact  Neuro:Alert and oriented with no deficit      Electronically signed by Dunia Jason MD on 3/13/2018 at 12:43 PM

## 2018-03-13 NOTE — CARE COORDINATION
LSW paged PT treatment. Per Jeri Washington. Pt's insurance is asking for 3/13/18 physical therapy note. Electronically signed by ALIDA Ramirez on 3/13/18 at 12:15 PM    LSW paged PT treatment again. They will work with the pt this afternoon. Electronically signed by ALIDA Ramirez on 3/13/18 at 3:06 PM    PT note faxed to Patricia Wheeler/ Electronically signed by ALIDA Ramirez on 3/13/18 at 4:38 PM    [id number] completed.  Electronically signed by ALIDA Ramirez on 3/13/18 at 4:38 PM

## 2018-03-13 NOTE — PROGRESS NOTES
Physical Therapy Med Surg Daily Treatment Note  Fac[facility]  [facility]       NAME: Derrick Acuna  : 1935 (82 y.o.)  MRN: [id number]  CODE STATUS: Full Code    Date of Service: 3/13/2018    Patient Diagnosis(es): Pleural effusion associated with pulmonary infection [J18.9, J91.8]   Chief Complaint   Patient presents with    Fatigue     Patient Active Problem List    Diagnosis Date Noted    Cardiac pacemaker 2017     Priority: High    VRE (vancomycin-resistant Enterococci) infection     Pleural effusion 2018    CKD (chronic kidney disease) stage 5, GFR less than 15 ml/min ([address] 2018    SOB (shortness of breath) 2018    UTI (urinary tract infection) 2018    Sepsis ([address] 2018    Pleural effusion associated with pulmonary infection 2018    Pneumonia 2018    COPD with exacerbation ([address] 2018    Hypervolemia     Pleural effusion     C. difficile colitis 11/10/2017    ESRD (end stage renal disease) on dialysis ([address] 11/10/2017    Leukocytosis 11/10/2017    Acute on chronic diastolic congestive heart failure ([address] 10/30/2017    Hyperkalemia 10/26/2017    Diabetes mellitus due to underlying condition, controlled, with stage 5 chronic kidney disease not on chronic dialysis, with long-term current use of insulin ([address] 2017    Essential hypertension 2017    Anxiety 2017    Acid reflux 2015    Idiopathic Parkinson's disease ([address] 2014    CKD (chronic kidney disease) 10/15/2012    Anemia 2011    Hypothyroidism 10/04/2011    Hypercholesteremia 10/04/2011        Past Medical History:   Diagnosis Date    Abnormal presence of protein in urine 2004    Dr. Yeny Singh. Luisa Berry Atrial fibrillation ([address]     Constipation, chronic     Diverticular disease of the colon     ESRD (end stage renal disease) ([address]     Fistula     left    GERD (gastroesophageal reflux disease)     GERD,

## 2018-03-14 NOTE — PROGRESS NOTES
 venlafaxine  150 mg Oral Daily with breakfast    hydrALAZINE  100 mg Oral TID    amiodarone  200 mg Oral Daily    magnesium oxide  400 mg Oral Daily    apixaban  2.5 mg Oral BID    calcium acetate  667 mg Oral TID WC    losartan  50 mg Oral Daily    b complex-C-folic acid  1 capsule Oral Daily    insulin lispro  3 Units Subcutaneous TID AC    insulin glargine  14 Units Subcutaneous Nightly    diltiazem  240 mg Oral BID    sodium chloride flush  10 mL Intravenous 2 times per day    docusate sodium  100 mg Oral BID    insulin lispro  0-12 Units Subcutaneous TID WC    insulin lispro  0-6 Units Subcutaneous Nightly    darbepoetin lizeth-polysorbate  40 mcg Subcutaneous Weekly    cefTRIAXone (ROCEPHIN) IV  1 g Intravenous Q24H    levothyroxine  50 mcg Oral Every Other Day    And    levothyroxine  25 mcg Oral Every Other Day    vancomycin  125 mg Oral 4 times per day     Continuous Infusions:   sodium chloride      dextrose         CBC:   Recent Labs      03/13/18   0527  03/14/18   0544   WBC  17.0*  15.8*   HGB  9.6*  9.4*   PLT  197  212     CMP:    Recent Labs      03/12/18   0545  03/13/18   0527  03/14/18   0544   NA  136  134  132   K  4.4  4.4  5.6*   CL  94*  93*  92*   CO2  28  28  27   BUN  39*  21  35*   CREATININE  4.78*  3.00*  4.33*   GLUCOSE  74  65*  187*   CALCIUM  7.7*  7.9*  8.1*   LABGLOM  8.7*  15.0*  9.8*     Troponin:   No results for input(s): TROPONINI in the last 72 hours. BNP: No results for input(s): BNP in the last 72 hours. INR: No results for input(s): INR in the last 72 hours. Lipids: No results for input(s): CHOL, LDLDIRECT, TRIG, HDL, AMYLASE, LIPASE in the last 72 hours. Liver:   Recent Labs      03/13/18   0527   AST  24   ALT  9   ALKPHOS  95   PROT  5.9*   LABALBU  2.9*   BILITOT  0.3     Iron:  No results for input(s): IRONS, FERRITIN in the last 72 hours.     Invalid input(s): LABIRONS  Urinalysis: No results for input(s): UA in the last 72 hours.    Objective:   Vitals: /69   Pulse 66   Temp 98.4 °F (36.9 °C) (Oral)   Resp 18   Ht 4' 11\" (1.499 m)   Wt 107 lb 9.4 oz (48.8 kg)   LMP  (LMP Unknown)   SpO2 98%   Breastfeeding? No   BMI 21.73 kg/m²    Wt Readings from Last 3 Encounters:   03/14/18 107 lb 9.4 oz (48.8 kg)   02/14/18 109 lb (49.4 kg)   02/02/18 107 lb 2.3 oz (48.6 kg)      24HR INTAKE/OUTPUT:      Intake/Output Summary (Last 24 hours) at 03/14/18 9758  Last data filed at 03/14/18 0630   Gross per 24 hour   Intake              600 ml   Output               50 ml   Net              550 ml       Constitutional:  Alert, awake, no apparent distress   Skin:normal, no rash  HEENT:sclera anicteric.   Head atraumatic normocephalic  Neck:supple with no thyromegally  Cardiovascular:  S1, S2 without m/r/g   Respiratory:  CTA B without w/r/r   Abdomen: +bs, soft, nt  Ext: no LE edema  Musculoskeletal:Intact  Neuro:Alert and oriented with no deficit      Electronically signed by Kosta Rodriguez MD on 3/14/2018 at 9:27 AM

## 2018-03-14 NOTE — PROGRESS NOTES
INPATIENT PROGRESS NOTES    PATIENT NAME: Luis Angel Yeboah  MRN: [id number]  SERVICE DATE:  March 14, 2018   SERVICE TIME:  10:11 AM      PRIMARY SERVICE: Pulmonary Disease    CHIEF COMPLAINTS: Shortness breath    INTERVAL HPI: Patient seen and examined at bedside, Interval Notes, orders reviewed. Nursing notes noted  Patient is on dialysis today. Patient reports significant improvement in her breathing since thoracentesis . Pleural fluid is exudative. Pleura for cytology is still pending. She still having some pleuritic pain on deep inspiration. She had a chest x-ray done which shows left pleural effusion size is increasing from small to moderate. She has no fever or chills. No nausea vomiting diarrhea or abdominal pain. OBJECTIVE    Body mass index is 21.73 kg/m². PHYSICAL EXAM:  Vitals:  BP (!) 101/58   Pulse 89   Temp 98.4 °F (36.9 °C) (Oral)   Resp 18   Ht 4' 11\" (1.499 m)   Wt 107 lb 9.4 oz (48.8 kg)   LMP  (LMP Unknown)   SpO2 98%   Breastfeeding? No   BMI 21.73 kg/m²   General: Patient is  Alert, awake . comfortable in bed, No distress. Head: Atraumatic , Normocephalic   Eyes: PERRL. No icteric sclera. No conjunctival injection. No discharge   ENT: No nasal  discharge. Pharynx clear. Neck:  Trachea midline. No thyromegaly, no JVD, No cervical adenopathy. Chest : Adequate effort, symmetric bilateral excursions  Lung : Decreased breath sounds bilaterally, mainly in the bases  Heart[de-identified] Regular rhythm and rate. No mumur ,  Rub or gallop  ABD: Benign. Non-tender. Non-distended. No masses or organmegaly. Normal bowel sounds. EXT: No significant Pitting edema both lower extremities , No Cyanosis No clubbing  Neuro: no focal weakness  Skin: Warm and dry. No erythema or rash on exposed extremities.       DATA:   Recent Labs      03/13/18 0527 03/14/18   0544   WBC  17.0*  15.8*   HGB  9.6*  9.4*   HCT  30.0*  29.5*   MCV  95.4  95.8   PLT  197  212     Recent Labs      03/13/18 0527 levothyroxine  50 mcg Oral Every Other Day    And    levothyroxine  25 mcg Oral Every Other Day    vancomycin  125 mg Oral 4 times per day       PRN Meds:sodium chloride flush, ALPRAZolam, ipratropium-albuterol, cholestyramine light, ondansetron, benzonatate, sodium chloride flush, acetaminophen, ondansetron, glucose, dextrose, glucagon (rDNA), dextrose, albumin human, heparin (porcine), heparin (porcine), heparin (porcine)    Radiology  Xr Chest Portable    Result Date: 3/11/2018  Chest one view. HISTORY: Post thoracentesis. FINDINGS: Comparison, March 8, 2018. Right dual-lumen catheter, pacemaker generator, pacemaker wires unchanged. A homogeneous area of increased opacity is found in the left lower lung, obscuring the left cardiac silhouette, left diaphragm, and left lower lobe. Mild airspace disease found at  right lung base. Small to moderate left pleural effusion, markedly decreased since prior study. Right lower lung subsegmental atelectasis/pneumonia. IMPRESSION AND SUGGESTION:  1. Large left-sided pleural effusion of unknown etiology, status post thoracentesis, awaiting cytology, cultures negative so far  2. Chronic kidney disease on hemodialysis  3. End-stage renal disease on hemodialysis  4. C. diff colitis  5. Recurrent UTI with VRE    Continue O2 to keep saturation 93% or above. she had a follow-up chest x-ray which shows increasing left pleural effusion, from small to moderate size. Pleural fluid cytology is pending. Continue present treatment plan    Vianney Anand MD. [facility].

## 2018-03-14 NOTE — PROGRESS NOTES
Spoke with  to address antibiotics. I am unable to order the IV antibiotics in the computer for discharge. Doctor states he won't make it to the floor for awhile but will order them as soon as he can. Electronically signed by Kathy Mckeon on 3/14/2018 at 7:46 PM

## 2018-03-14 NOTE — CARE COORDINATION
PATIENT DENIED SKILLED THRU [employer]-- PERFECT SERVE OUT TO DR Dinah Muñoz ASKING IF HE WILL CALL PEER TO PEER TODAY BY 4 PM PER [employer]/ LH

## 2018-03-14 NOTE — PROGRESS NOTES
CARDIOLOGY [address]  [place] PROGRESS NOTE         3/14/2018      Selma Álvarez    [id number]  1935    Rounding MD: Benito Kay MD ,[facility] [place]    PRIMARY CARDIOLOGIST: Dr. Isaak Marquez     SOB, PNA, SEPSIS, NSTEMI, PAFIB      SUBJECTIVE:     Patient has no new cardiac complaints. Still SOB and Tired. Pending Placement. Cardiac and general ROS otherwise negative and unchanged.     IMPRESSIONS:     1. SOB  2. PNA, Post Right Thoracentesis ( exudative )  3. UTI  4. Sepsis  5. Elevated Troponins, Due to Above, Type 2 NSTEMI  6. PAF, in SR now. On amiodarone and anticoagulated with Eliquis. 7. Sinus node dysfunction s/p PPM 11/2017  8. CAD s/p remote PCI/BMS to LCX 2010, [facility] 1/2013 showing diffuse vessel calcification with a 30%-50% stenosis of the proximal RCA, normal LM, heavily calcified LAD with mid to distal tapering with 100% distal occlusion, filled by collaterals, mild disease of LCX. 9. Perfusion scan 1/2015 negative for ischemia. 10. Normal LV systolic function with EF 60-65%, moderate AS per echo 12/2017  11. HTN  12. HLP  13. COPD  15. ESRD on HD  15. Otherwise as per PMH below.     RECOMMENDATIONS:     1. Cardiac Pulmonary and Primary Supportive Care. 2. Telemetry, remains in SR.  3. Continue medications, adjust as needed. 4. Primary, Pulmonary and Renal care  5. OK to DC home Once OK with Others. 6. Will Follow with you.   7. See Orders.        PRESENTING HPI:      Katiana Sherman is a 81 y.o. female with history of coronary artery disease, status post remote PCI to LCX, [facility] 2013 showing diffuse vessel calcification, with total occlusion of the distal LAD, filled by collaterals.  Medical management was recommended, perfusion scan in 2015 negative for ischemia.  She has normal LV systolic function with a left ventricular ejection fraction 60-65%, moderate aortic stenosis per echocardiogram obtained in December 2017.  She underwent permanent pacemaker implant for sinus node dysfunction in November 2017, and has history of persistent atrial fibrillation, previously on sotalol.  This was discontinued and she was initiated on amiodarone. Dottie Gupta was admitted to [facility] in December 2017 with recurrence of AF with RVR, her amiodarone was adjusted, and she had spontaneous return of sinus rhythm. Most recent device interrogation 1/17/18 showing 11.6% of time spent in AF with maximum duration 2 hours. She is maintained on chronic anticoagulation with eliquis. She was admitted 1/26/18 with 2 day history of worsening dypnea secondary to bronchitis, treated with bronchodilators, steroids and IV antibiotics. Usual dialysis schedule has been maintained. Symptomatically improved. She had several episodes recurrent AFIb. She was DC'd and had been doing well.     She now is readmitted with Pneumonia and is in [facility]   Denies CP or TIA or CVA symptoms.     Cardiac and General ROS otherwise negative     PROBLEM LIST:            Diagnosis    Hypothyroidism    Hypercholesteremia    CKD (chronic kidney disease)    Paroxysmal atrial fibrillation (HCC)    Anemia    Acid reflux    Anxiety    Diabetes mellitus due to underlying condition, controlled, with stage 5 chronic kidney disease not on chronic dialysis, with long-term current use of insulin (HCC)    Essential hypertension    Idiopathic Parkinson's disease ([facility]    Hyperkalemia    Acute on chronic diastolic congestive heart failure (HCC)    ESRD (end stage renal disease) on dialysis ([facility]    Cardiac pacemaker    Pneumonia    COPD with exacerbation (HCC)    Hypervolemia    Pleural effusion          OBJECTIVE:     MEDICATIONS:     Scheduled Meds:   daptomycin (CUBICIN) IVPB  4 mg/kg Intravenous Q48H    collagenase   Topical Daily    sodium chloride flush  10 mL Intravenous 2 times per day    aspirin  81 mg Oral Daily    fluticasone  2 spray Nasal Daily    calcium elemental  500 mg Oral Daily    carbidopa-levodopa  1 tablet Oral Daily    pantoprazole sodium  40 mg Oral QAM AC    chlorthalidone  25 mg Oral Daily    isosorbide mononitrate  60 mg Oral Daily    venlafaxine  150 mg Oral Daily with breakfast    hydrALAZINE  100 mg Oral TID    amiodarone  200 mg Oral Daily    magnesium oxide  400 mg Oral Daily    apixaban  2.5 mg Oral BID    calcium acetate  667 mg Oral TID WC    losartan  50 mg Oral Daily    b complex-C-folic acid  1 capsule Oral Daily    insulin lispro  3 Units Subcutaneous TID AC    insulin glargine  14 Units Subcutaneous Nightly    diltiazem  240 mg Oral BID    sodium chloride flush  10 mL Intravenous 2 times per day    docusate sodium  100 mg Oral BID    insulin lispro  0-12 Units Subcutaneous TID WC    insulin lispro  0-6 Units Subcutaneous Nightly    darbepoetin lizeth-polysorbate  40 mcg Subcutaneous Weekly    cefTRIAXone (ROCEPHIN) IV  1 g Intravenous Q24H    levothyroxine  50 mcg Oral Every Other Day    And    levothyroxine  25 mcg Oral Every Other Day    vancomycin  125 mg Oral 4 times per day     Continuous Infusions:   sodium chloride      dextrose       PRN Meds:sodium chloride flush, ALPRAZolam, ipratropium-albuterol, cholestyramine light, ondansetron, benzonatate, sodium chloride flush, acetaminophen, ondansetron, glucose, dextrose, glucagon (rDNA), dextrose, albumin human, heparin (porcine), heparin (porcine), heparin (porcine)    PHYSICAL EXAM:    CURRENT VITALS: BP 93/71   Pulse 80   Temp 98.4 °F (36.9 °C) (Oral)   Resp 18   Ht 4' 11\" (1.499 m)   Wt 107 lb 9.4 oz (48.8 kg)   LMP  (LMP Unknown)   SpO2 98%   Breastfeeding?  No   BMI 21.73 kg/m²     CONSTITUTIONAL:  awake, alert, cooperative, no apparent distress,   ENT:  Normocephalic, without obvious abnormality, atraumatic, sinuses nontender on palpation, external ears without lesions,  NECK:  Supple, symmetrical, trachea midline, no adenopathy, thyroid symmetric, not enlarged and no tenderness,

## 2018-03-14 NOTE — CARE COORDINATION
Pt is precerted for Jaycee Alarcon per Levy Paul. C3 and RN notified.   Electronically signed by ALIDA Srivastava on 3/14/18 at 3:18 PM

## 2018-03-15 NOTE — PROGRESS NOTES
Luis Angel Yeboah is a 81 y.o. female patient. · Sepsis  · Recurrent UTI, with VRE  · recurrent C diff      Unable to perform ROS: Acuity of condition   Constitutional: Negative for fever. BP (!) 140/57   Pulse 85   Temp 97.7 °F (36.5 °C) (Oral)   Resp 18   Ht 4' 11\" (1.499 m)   Wt 101 lb 6.6 oz (46 kg)   LMP  (LMP Unknown)   SpO2 98%   Breastfeeding? No   BMI 20.48 kg/m²      Output: Producing urine. Lungs:  Normal effort and normal respiratory rate. She is not in respiratory distress. No decreased breath sounds, wheezes or rhonchi. Heart: Regular rhythm. S1 normal and S2 normal.  No murmur or gallop. Abdomen: There is no epigastric area or suprapubic area tenderness. There is no rebound tenderness. There is no mass. There is no splenomegaly. There is no hepatomegaly. Extremities: Normal range of motion. There is local swelling.            Assessment & Plan   Sepsis  Recurrent UTI, with VRE  recurrent C diff    IV Cubicin ,IV Rocephin,for 1 days more   PO Vanco for 2 weeks    Kamila Brunner MD  3/14/2018

## 2018-03-16 NOTE — PROGRESS NOTES
presence of protein in urine 6/2004    Dr. Minerva Watters    Atrial fibrillation ([facility]     Constipation, chronic     Diverticular disease of the colon     ESRD (end stage renal disease) ([address]     Fistula     left    GERD (gastroesophageal reflux disease)     GERD, PUD, Hiatal Hernia    Granulomatous lung disease ([facility]     History of endoscopy 2-21-12    Dr. Fallon Jesus Hyperlipidemia     Hypothyroidism     Kidney stones 11/2001    Dr. 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 Jarred Minaya    [address] N/A 11/2/2017    CATHETER INSERTION HEMODIALYSIS performed by Aldo Arellano MD at [address] [place] [address] Rd  [phone]    Bilateral    RI COLONOSCOPY FLX DX W/COLLJ SPEC WHEN PFRMD N/A 2/1/2018    COLONOSCOPY performed by Christopher Bragg MD at [address] [facility] EGD TRANSORAL BIOPSY SINGLE/MULTIPLE N/A 2/1/2018    EGD ESOPHAGOGASTRODUODENOSCOPY WITH BIOPSY performed by Christopher Bragg MD at [address] [place] [address]  3/2003       Medications reviewed at facility. Objective  Vitals:    03/16/18 0958   BP: (!) 146/57   Pulse: 65   Resp: 16   Temp: 97.6 °F (36.4 °C)   SpO2: 97%   Weight: 103 lb (46.7 kg)   Height: 4' 11\" (1.499 m)     Physical Exam   Constitutional: She appears unhealthy. No distress. Frail, pleasant   Cardiovascular: Normal rate, regular rhythm and normal heart sounds.     Left chest pacer  No lower extremity edema   Pulmonary/Chest: Effort normal. She

## 2018-03-23 PROBLEM — J91.8 PLEURAL EFFUSION ASSOCIATED WITH PULMONARY INFECTION: Status: RESOLVED | Noted: 2018-01-01 | Resolved: 2018-01-01

## 2018-03-23 PROBLEM — N39.0 UTI (URINARY TRACT INFECTION): Status: RESOLVED | Noted: 2018-01-01 | Resolved: 2018-01-01

## 2018-03-23 PROBLEM — R06.02 SOB (SHORTNESS OF BREATH): Status: RESOLVED | Noted: 2018-01-01 | Resolved: 2018-01-01

## 2018-03-23 PROBLEM — I50.33 ACUTE ON CHRONIC DIASTOLIC CONGESTIVE HEART FAILURE (HCC): Status: RESOLVED | Noted: 2017-10-30 | Resolved: 2018-01-01
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
## 2018-03-23 PROBLEM — E11.9 TYPE 2 DIABETES MELLITUS (HCC): Chronic | Status: ACTIVE | Noted: 2018-01-01

## 2018-03-23 PROBLEM — E08.22: Status: RESOLVED | Noted: 2017-05-31 | Resolved: 2018-01-01

## 2018-03-23 PROBLEM — A04.72 C. DIFFICILE COLITIS: Status: RESOLVED | Noted: 2017-11-10 | Resolved: 2018-01-01

## 2018-03-23 PROBLEM — J18.9 PLEURAL EFFUSION ASSOCIATED WITH PULMONARY INFECTION: Status: RESOLVED | Noted: 2018-01-01 | Resolved: 2018-01-01

## 2018-03-23 PROBLEM — D72.829 LEUKOCYTOSIS: Status: RESOLVED | Noted: 2017-11-10 | Resolved: 2018-01-01

## 2018-03-23 PROBLEM — Z79.4: Status: RESOLVED | Noted: 2017-05-31 | Resolved: 2018-01-01

## 2018-03-23 PROBLEM — J44.1 COPD WITH EXACERBATION (HCC): Status: RESOLVED | Noted: 2018-01-01 | Resolved: 2018-01-01

## 2018-03-23 PROBLEM — J18.9 PNEUMONIA: Status: RESOLVED | Noted: 2018-01-01 | Resolved: 2018-01-01

## 2018-03-23 PROBLEM — E87.5 HYPERKALEMIA: Status: RESOLVED | Noted: 2017-10-26 | Resolved: 2018-01-01

## 2018-03-23 PROBLEM — N18.5: Status: RESOLVED | Noted: 2017-05-31 | Resolved: 2018-01-01

## 2018-03-23 PROBLEM — A41.9 SEPSIS (HCC): Status: RESOLVED | Noted: 2018-01-01 | Resolved: 2018-01-01

## 2018-03-23 PROBLEM — J90 PLEURAL EFFUSION: Status: RESOLVED | Noted: 2018-01-01 | Resolved: 2018-01-01

## 2018-03-23 PROBLEM — S09.90XA CLOSED HEAD INJURY WITHOUT CONCUSSION: Status: ACTIVE | Noted: 2018-01-01

## 2018-03-23 PROBLEM — N18.5 CKD (CHRONIC KIDNEY DISEASE) STAGE 5, GFR LESS THAN 15 ML/MIN (HCC): Status: RESOLVED | Noted: 2018-01-01 | Resolved: 2018-01-01

## 2018-03-28 PROBLEM — D62 ACUTE BLOOD LOSS ANEMIA: Status: ACTIVE | Noted: 2018-01-01

## 2018-03-28 PROBLEM — R04.0 EPISTAXIS: Status: ACTIVE | Noted: 2018-01-01

## 2018-03-28 PROBLEM — J96.00 ACUTE RESPIRATORY FAILURE (HCC): Status: ACTIVE | Noted: 2018-01-01

## 2018-03-29 PROBLEM — J93.9 PNEUMOTHORAX ON RIGHT: Status: ACTIVE | Noted: 2018-01-01

## 2018-03-29 NOTE — ANESTHESIA PRE PROCEDURE
solid consumption: 0000                        Date of last liquid consumption: 03/30/18                        Date of last solid food consumption: 03/30/18    BMI:   Wt Readings from Last 3 Encounters:   03/28/18 118 lb 6.2 oz (53.7 kg)   03/16/18 103 lb (46.7 kg)   03/14/18 101 lb 6.6 oz (46 kg)     Body mass index is 23.91 kg/m².     CBC:   Lab Results   Component Value Date    WBC 11.2 03/30/2018    RBC 2.31 03/30/2018    RBC 3.24 12/17/2011    HGB 7.0 03/30/2018    HCT 20.6 03/30/2018    MCV 88.9 03/30/2018    RDW 15.9 03/30/2018     03/30/2018       CMP:   Lab Results   Component Value Date     03/29/2018    K 3.5 03/29/2018    K 4.9 03/24/2018    CL 96 03/29/2018    CO2 26 03/29/2018    BUN 14 03/29/2018    CREATININE 2.15 03/29/2018    GFRAA 26.6 03/29/2018    LABGLOM 22.0 03/29/2018    GLUCOSE 80 03/29/2018    GLUCOSE 197 02/17/2012    PROT 4.9 03/29/2018    CALCIUM 7.3 03/29/2018    BILITOT 0.5 03/29/2018    ALKPHOS 79 03/29/2018    AST 27 03/29/2018    ALT 11 03/29/2018       POC Tests:   Recent Labs      03/29/18   2031   POCGLU  111       Coags:   Lab Results   Component Value Date    PROTIME 12.5 03/25/2018    PROTIME 16.5 10/20/2017    PROTIME 25.7 09/29/2016    INR 1.2 03/25/2018    APTT 34.0 03/25/2018       HCG (If Applicable): No results found for: PREGTESTUR, PREGSERUM, HCG, HCGQUANT     ABGs:   Lab Results   Component Value Date    PHART 7.263 03/28/2018    PO2ART 262 03/28/2018    LOY8CCM 46 03/28/2018    OCP7KTQ 20.7 03/28/2018    BEART -6 03/28/2018    J8FIUKBA 100 03/28/2018        Type & Screen (If Applicable):  No results found for: NUNU [facility]    Anesthesia Evaluation  Patient summary reviewed and Nursing notes reviewed no history of anesthetic complications:   Airway: Mallampati: II  TM distance: >3 FB   Neck ROM: full  Comment: ETT in situ  Mouth opening: > = 3 FB Dental: normal exam         Pulmonary:Negative Pulmonary ROS and normal exam  breath sounds clear to

## 2018-03-30 NOTE — ANESTHESIA POSTPROCEDURE EVALUATION
Department of Anesthesiology  Postprocedure Note    Patient: Orren Scheuermann  MRN: [id number]  YOB: 1935  Date of evaluation: 3/30/2018  Time:  9:57 AM     Procedure Summary     Date:  03/30/18 Room / Location:  [address]    Anesthesia Start:  0745 Anesthesia Stop:      Procedures:       EXAM UNDER ANESTHESIA, LARYNGOSCOPY ,PHARYNGOSCOPY (N/A )      REMOVAL PACKING POSS CONTROL EPISTAXIS, NASAL ENDOSCOPY (N/A ) Diagnosis:  (INPATIENT )    Surgeon:  Michelle Cool MD Responsible Provider:  Fannie Doshi DO    Anesthesia Type:  general ASA Status:  4          Anesthesia Type: general    Vernell Phase I:      Vernell Phase II:      Last vitals: Reviewed and per EMR flowsheets. Anesthesia Post Evaluation    Patient location during evaluation: ICU  Patient participation: complete - patient cannot participate  Level of consciousness: sedated and ventilated  Pain scale: unable to assess.   Airway patency: patent  Nausea & Vomiting: no nausea and no vomiting  Complications: no  Cardiovascular status: hemodynamically stable  Respiratory status: ventilator  Hydration status: euvolemic
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
## 2018-05-12 NOTE — DISCHARGE SUMMARY
Patient was seen by Dr. Sana Kerr on March 6/18. She is admitted with the diagnosis of pleural effusion. Proximal A. fib, fatigue, and leukocytosis. He was seen by nephrologist and his impression was fluid overload with large pleural effusions. He recommended blood cultures and stoppage of IV fluids. He also recommended removing HD cath if cultures are positive. Recommended continue with Aranesp. Patient was seen by pulmonologist, his impression was bilateral pleural effusion, left greater than right. He has recommended thoracentesis. He also recommended continuing dialysis. Patient was then seen by Dr. Nell Omalley. His impression was dyspnea, pneumonia, UTI and sepsis. He felt the patient has elevated troponins due to type II, non-STEMI. He recommended continuing with cardiac and pulmonary supportive care. Patient was seen by Dr. [facility] and his impression was VRE leukocytosis and sepsis. He recommended continuing Rocephin. Patient is being followed by other hospitalist during the stay and was only encounter was in the 14th    I discussed the case with the insurance position. Patient was then transferred to SNF.
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
## 2018-06-16 PROBLEM — A41.9 SEPSIS (HCC): Status: ACTIVE | Noted: 2018-01-01

## 2018-06-18 PROBLEM — L89.153 SACRAL DECUBITUS ULCER, STAGE III (HCC): Status: ACTIVE | Noted: 2018-01-01

## 2018-06-19 PROBLEM — E43 SEVERE MALNUTRITION (HCC): Status: ACTIVE | Noted: 2018-01-01

## 2018-06-20 PROBLEM — E43 SEVERE PROTEIN-CALORIE MALNUTRITION (GOMEZ: LESS THAN 60% OF STANDARD WEIGHT) (HCC): Status: ACTIVE | Noted: 2018-01-01

## 2018-06-28 PROBLEM — A41.9 SEPSIS (HCC): Status: RESOLVED | Noted: 2018-01-01 | Resolved: 2018-01-01

## 2018-06-28 PROBLEM — D62 ACUTE BLOOD LOSS ANEMIA: Status: RESOLVED | Noted: 2018-01-01 | Resolved: 2018-01-01

## 2018-06-28 PROBLEM — E43 SEVERE PROTEIN-CALORIE MALNUTRITION (GOMEZ: LESS THAN 60% OF STANDARD WEIGHT) (HCC): Status: RESOLVED | Noted: 2018-01-01 | Resolved: 2018-01-01

## 2018-06-28 PROBLEM — Z99.2 ESRD (END STAGE RENAL DISEASE) ON DIALYSIS (HCC): Status: RESOLVED | Noted: 2017-11-10 | Resolved: 2018-01-01

## 2018-06-28 PROBLEM — S09.90XA CLOSED HEAD INJURY WITHOUT CONCUSSION: Status: RESOLVED | Noted: 2018-01-01 | Resolved: 2018-01-01
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
## 2018-06-28 PROBLEM — N18.6 ESRD (END STAGE RENAL DISEASE) ON DIALYSIS (HCC): Status: RESOLVED | Noted: 2017-11-10 | Resolved: 2018-01-01

## 2018-06-28 PROBLEM — R04.0 EPISTAXIS: Status: RESOLVED | Noted: 2018-01-01 | Resolved: 2018-01-01

## 2018-06-28 PROBLEM — I50.43 CHF (CONGESTIVE HEART FAILURE), NYHA CLASS III, ACUTE ON CHRONIC, COMBINED (HCC): Status: ACTIVE | Noted: 2018-01-01

## 2018-06-28 PROBLEM — J93.9 PNEUMOTHORAX ON RIGHT: Status: RESOLVED | Noted: 2018-01-01 | Resolved: 2018-01-01

## 2018-06-28 PROBLEM — J96.00 ACUTE RESPIRATORY FAILURE (HCC): Status: RESOLVED | Noted: 2018-01-01 | Resolved: 2018-01-01

## 2018-06-28 NOTE — ED PROVIDER NOTES
difficulty urinating, dysuria, flank pain and urgency. Musculoskeletal: Negative for arthralgias. Skin: Negative for color change, pallor, rash and wound. Neurological: Negative for dizziness, tremors, syncope, numbness and headaches. Psychiatric/Behavioral: Negative for agitation and confusion. Except as noted above the remainder of the review of systems was reviewed and negative.        PAST MEDICAL HISTORY     Past Medical History:   Diagnosis Date    Abnormal presence of protein in urine 6/2004    Dr. Zee Mao Acute on chronic diastolic heart failure (HCC)     Anemia     Anxiety     Atherosclerotic heart disease     Atrial fibrillation (HCC)     Breath shortness     Cardiac arrhythmia     Chronic obstructive pulmonary disease (COPD) (HCC)     Constipation, chronic     Difficulty in walking     Diverticular disease of the colon     End stage renal disease (HCC)     Enterocolitis due to Clostridium difficile, not specified as recurrent     [facility]D [facility] disease) [facility]     Fistula     left    Gastro-esophageal reflux disease without esophagitis     GERD (gastroesophageal reflux disease)     GERD, PUD, Hiatal Hernia    Granulomatous lung disease ([facility]     History of endoscopy 2-21-12    Dr. Jesus Mejia    Hyperlipemia     Hyperlipidemia     Hypertension     Hypothyroidism     Kidney stones 11/2001    Dr. 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)     legs    Osteoarthritis     Parkinsons disease (HCC)     Peptic ulcer, site unspecified, unspecified as acute or chronic, without hemorrhage or perforation     Positive ORTEGA (antinuclear antibody)     1:80    Presence of cardiac pacemaker     Seizures (HCC)     Tachycardia     Tachypnea, not elsewhere classified     Thrombophlebitis leg superficial     Type 2 diabetes mellitus with hypoglycemia without coma ([facility]     Type II or TABLET    Take 81 mg by mouth daily     ATORVASTATIN (LIPITOR) 80 MG TABLET    Take 1 tablet by mouth nightly    B COMPLEX-C-FOLIC ACID (NEPHROCAPS) 1 MG CAPSULE    Take 1 capsule by mouth daily    B COMPLEX-C-FOLIC ACID (RENAL) 1 MG CAPS    Take 1 capsule by mouth daily    BENZONATATE (TESSALON) 100 MG CAPSULE    Take 1 capsule by mouth 3 times daily as needed for Cough    CALCIUM ACETATE (PHOSLO) 667 MG CAPSULE    Take 667 mg by mouth 3 times daily (with meals)    CALCIUM ELEMENTAL (OSCAL) 500 MG TABS TABLET    Take 1 tablet by mouth 2 times daily    CARBIDOPA-LEVODOPA (SINEMET)  MG PER TABLET    Take 1 tablet by mouth daily    CHOLESTYRAMINE LIGHT 4 G PACKET    Take 0.5 packets by mouth 3 times daily as needed (DIARRHEA)    COLLAGENASE 250 UNIT/GM OINTMENT    Apply 250 each topically daily Apply to verbal affected area topically in the morning for health maintenance. DILTIAZEM (CARDIZEM CD) 240 MG EXTENDED RELEASE CAPSULE    Take 1 capsule by mouth 2 times daily    DOCUSATE SODIUM (COLACE) 100 MG CAPSULE    Take 1 capsule by mouth daily    ESTRADIOL (ESTRACE VAGINAL) 0.1 MG/GM VAGINAL CREAM    Place 2 g vaginally daily    FLUTICASONE (FLONASE) 50 MCG/ACT NASAL SPRAY    2 sprays by Nasal route daily. FOLIC ACID (FOLVITE) 1 MG TABLET    Take 1 mg by mouth daily    GLUCOSE BLOOD VI TEST STRIPS (TRUETEST TEST) STRIP    As needed.     GUAIFENESIN (ROBITUSSIN) 100 MG/5ML SOLN ORAL SOLUTION    Take 10 mLs by mouth every 4 hours as needed for Cough    HYDRALAZINE (APRESOLINE) 100 MG TABLET    Take 1 tablet by mouth 3 times daily    INSULIN LISPRO (HUMALOG) 100 UNIT/ML INJECTION VIAL    Inject 0-6 Units into the skin 3 times daily (with meals)    IPRATROPIUM-ALBUTEROL (DUONEB) 0.5-2.5 (3) MG/3ML SOLN NEBULIZER SOLUTION    Inhale 3 mLs into the lungs every 4 hours as needed for Shortness of Breath    ISOSORBIDE MONONITRATE (IMDUR) 60 MG EXTENDED RELEASE TABLET    Take 1 tablet by mouth daily

## 2018-06-28 NOTE — ED NOTES
Pt 91% on room air. Placed on 3 liters nasal canula. Pt resting with eyes closed but easily awakens when I enter the room. Family at the bedside.        Raul Osborne RN  06/28/18 1720

## 2018-06-28 NOTE — PROGRESS NOTES
[facility] [place] Respiratory Therapy Evaluation   Current Order:  Desire Gonzales Q4PRN     Home Regimen: Yes      Ordering Physician: Susan  Re-evaluation Date:  7/1   Diagnosis: Elevated Trops, SOB   Patient Status: Stable     The following MDI Criteria must be met in order to convert aerosol to MDI with spacer. If unable to meet, MDI will be converted to aerosol:  []  Patient able to demonstrate the ability to use MDI effectively  []  Patient alert and cooperative  []  Patient able to take deep breath with 5-10 second hold  []  Medication(s) available in this delivery method   []  Peak flow greater than or equal to 200 ml/min            Current Order Substituted To  (same drug, same frequency)   Aerosol to MDI [] Albuterol Sulfate 0.083% unit dose by aerosol Albuterol Sulfate MDI 2 puffs by inhalation with spacer    [] Levalbuterol 1.25 mg unit dose by aerosol Levalbuterol MDI 2 puffs by inhalation with spacer    [] Levalbuterol 0.63 mg unit dose by aerosol Levalbuterol MDI 2 puffs by inhalation with spacer    [] Ipratropium Bromide 0.02% unit dose by aerosol Ipratropium Bromide MDI 2 puffs by inhalation with spacer    [] Duoneb (Ipratropium + Albuterol) unit dose by aerosol Ipratropium MDI + Albuterol MDI 2 puffs by inhalation w/spacer   MDI to Aerosol [] Albuterol Sulfate MDI Albuterol Sulfate 0.083% unit dose by aerosol    [] Levalbuterol MDI 2 puffs by inhalation Levalbuterol 1.25 mg unit dose by aerosol    [] Ipratropium Bromide MDI by inhalation Ipratropium Bromide 0.02% unit dose by aerosol    [] Combivent (Ipratropium + Albuterol) MDI by inhalation Duoneb (Ipratropium + Albuterol) unit dose by aerosol   Treatment Assessment [Frequency/Schedule]:  Change frequency to: ___change to Duoneb TID & alb Q2PRN____________________________________per Protocol, P&T, MEC      Points 0 1 2 3 4   Pulmonary Status  Non-Smoker  []   Smoking history   < 20 pack years  []   Smoking history  ?  20 pack years  []   Pulmonary Disorder  (acute or chronic)  [x]   Severe or Chronic w/ Exacerbation  []     Surgical Status No [x]   Surgeries     General []   Surgery Lower []   Abdominal Thoracic or []   Upper Abdominal Thoracic with  PulmonaryDisorder  []     Chest X-ray Clear/Not  Ordered     []  Chronic Changes  Results Pending  []  Infiltrates, atelectasis, pleural effusion, or edema  []  Infiltrates in more than one lobe [x]  Infiltrate + Atelectasis, &/or pleural effusion  []    Respiratory Pattern Regular,  RR = 12-20 [x]  Increased,  RR = 21-25 []  JAMES, irregular,  or RR = 26-30 []  Decreased FEV1  or RR = 31-35 []  Severe SOB, use  of accessory muscles, or RR ? 35  []    Mental Status Alert, oriented,  Cooperative [x]  Confused but Follows commands []  Lethargic or unable to follow commands []  Obtunded  []  Comatose  []    Breath Sounds Clear to  auscultation  [x]  Decreased unilaterally or  in bases only []  Decreased  bilaterally  []  Crackles or intermittent wheezes []  Wheezes []    Cough Strong, Spontan., & nonproductive [x]  Strong,  spontaneous, &  productive []  Weak,  Nonproductive []  Weak, productive or  with wheezes []  No spontaneous  cough or may require suctioning []    Level of Activity Ambulatory []  Ambulatory w/ Assist  [x]  Non-ambulatory []  Paraplegic []  Quadriplegic []    Total    Score:___7____     Triage Score:_____4___      Tri       Triage:     1. (>20) Freq: Q3    2. (16-20) Freq: Q4   3. (11-15) Freq: QID & Albuterol Q2 PRN    4. (6-10) Freq: TID & Albuterol Q2 PRN    5. (0-5) Freq Q4prn

## 2018-06-29 NOTE — PROGRESS NOTES
Wound Ostomy Continence Nurse  Consult Note       NAME:  Ulises Hough  MEDICAL RECORD NUMBER:  [id number]  AGE: 81 y.o. GENDER: female  : 1935  TODAY'S DATE:  2018    Subjective   Reason for [id number] Nurse Evaluation and Assessment: Sacral pressure injury      Ulises Hough is a 81 y.o. female referred by:   [x] Physician  [x] Nursing  [] Other:     Wound Identification:  Wound Type: pressure and skin tear  Contributing Factors: diabetes, chronic pressure, decreased mobility, shear force, malnutrition, incontinence of stool, incontinence of urine and ESRD    Wound History: Patient has been seen for wounds a few weeks ago.  Patient has sacral pressure injury and multiple healing/scabbed skin tears  Current Wound Care Treatment:  Recommendin) pressure injury prevention interventions 2) Consult plastic surgery for evaluation of sacral pressure injury 3) normal saline wet to dry until evaluated by plastics    Patient Goal of Care:  [x] Wound Healing  [] Odor Control  [] Palliative Care  [] Pain Control   [] Other:         PAST MEDICAL HISTORY        Diagnosis Date    Abnormal presence of protein in urine 2004    Dr. Arron Pabon. Ivan Cheese Acute on chronic diastolic heart failure ([address]     Anemia     Anxiety     Atherosclerotic heart disease     Atrial fibrillation (HCC)     Breath shortness     Cardiac arrhythmia     Chronic obstructive pulmonary disease (COPD) ([address]     Constipation, chronic     Difficulty in walking     Diverticular disease of the colon     End stage renal disease ([address]     Enterocolitis due to Clostridium difficile, not specified as recurrent     ESRD (end stage renal disease) ([address]     Fistula     left    Gastro-esophageal reflux disease without esophagitis     GERD (gastroesophageal reflux disease)     GERD, PUD, Hiatal Hernia    Granulomatous lung disease ([address]     History of endoscopy 12    Dr. Geovanni Booker    Hyperlipemia     Hyperlipidemia     Hypertension     Hydrolys (PRO-STAT) LIQD Take 30 mLs by mouth 2 times daily      calcium acetate (PHOSLO) 667 MG capsule Take 667 mg by mouth 3 times daily (with meals)      B Complex-C-Folic Acid (RENAL) 1 MG CAPS Take 1 capsule by mouth daily      cholestyramine light 4 g packet Take 0.5 packets by mouth 3 times daily as needed (DIARRHEA) 60 packet 3    magnesium oxide (MAG-OX) 400 (241.3 Mg) MG TABS tablet Take 1 tablet by mouth daily 30 tablet 0    ALPRAZolam (XANAX) 0.5 MG tablet Take 1 tablet by mouth 3 times daily as needed for Anxiety 10 tablet 0    ipratropium-albuterol (DUONEB) 0.5-2.5 (3) MG/3ML SOLN nebulizer solution Inhale 3 mLs into the lungs every 4 hours as needed for Shortness of Breath 360 mL     venlafaxine (EFFEXOR XR) 75 MG extended release capsule 150 mg       pantoprazole sodium (PROTONIX) 40 MG PACK packet Take 20 mg by mouth every morning (before breakfast)       glucose blood VI test strips (TRUETEST TEST) strip As needed. 200 strip 11    aspirin 81 MG EC tablet Take 81 mg by mouth daily       fluticasone (FLONASE) 50 MCG/ACT nasal spray 2 sprays by Nasal route daily.            Objective    BP (!) 142/55   Pulse 63   Temp 98.4 °F (36.9 °C) (Oral)   Resp 16   Ht 4' 11\" (1.499 m)   Wt 90 lb (40.8 kg)   LMP  (LMP Unknown)   SpO2 93%   BMI 18.18 kg/m²     LABS:  WBC:    Lab Results   Component Value Date    WBC 8.2 06/29/2018     H/H:    Lab Results   Component Value Date    HGB 9.9 06/29/2018    HCT 30.1 06/29/2018     PTT:    Lab Results   Component Value Date    APTT 37.9 06/28/2018    PTT 28.5 03/14/2013   [APTT}  PT/INR:    Lab Results   Component Value Date    PROTIME 15.6 06/29/2018    PROTIME 17.7 06/06/2018    PROTIME 25.7 09/29/2016    INR 1.5 06/29/2018     HgBA1c:    Lab Results   Component Value Date    LABA1C 4.6 06/29/2018       Assessment   Raheem Risk Score: Raheem Scale Score: 14    Patient Active Problem List   Diagnosis    Hypothyroidism    Hypercholesteremia    CKD

## 2018-06-29 NOTE — CONSULTS
Tachypnea, not elsewhere classified     Thrombophlebitis leg superficial     Type 2 diabetes mellitus with hypoglycemia without coma ([facility]     Type II or unspecified type diabetes mellitus without mention of complication, not stated as uncontrolled 1998    Unspecified osteoarthritis, unspecified site     Urinary tract infection     Weakness        Past Surgical History:        Procedure Laterality Date    APPENDECTOMY  2007    [facility] & [facility] [address]  2/2006    COLONOSCOPY  4/2007    Dr. Arvind Monsalve    [address] N/A 11/2/2017    CATHETER INSERTION HEMODIALYSIS performed by Katie Ni MD at [address] [place] [address] Rd  [phone]    Bilateral    [place] COLONOSCOPY FLX DX W/COLLJ SPEC WHEN PFRMD N/A 2/1/2018    COLONOSCOPY performed by Ken Kuo MD at [address] [facility] EGD TRANSORAL BIOPSY SINGLE/MULTIPLE N/A 2/1/2018    EGD ESOPHAGOGASTRODUODENOSCOPY WITH BIOPSY performed by Ken Kuo MD at [address] [facility] EGD TRANSORAL BIOPSY SINGLE/MULTIPLE N/A 3/27/2018    EGD [facility]OPHAG[facility] N/A 3/30/2018    EXAM UNDER ANESTHESIA, LARYNGOSCOPY ,PHARYNGOSCOPY performed by Mario Staton MD at [facility] 32 N/A 3/30/2018    REMOVAL PACKING POSS CONTROL EPISTAXIS, NASAL ENDOSCOPY performed by Mario Staton MD at [address] [place] [address] 3  3/2003       Home Medications:    No current facility-administered medications on file prior to encounter. Current Outpatient Prescriptions on File Prior to Encounter   Medication Sig Dispense Refill    atorvastatin (LIPITOR) 80 MG tablet Take 1 tablet by mouth nightly 30 tablet 3    levothyroxine (SYNTHROID) 25 MCG tablet Take 25 mcg ( 1 tab) and 50 mcg ( 2 tabs) alternatively.  39 CONSOLIDATIONS COMPARED TO PRIOR FILM. THERE ARE STILL SMALL BILATERAL PLEURAL EFFUSIONS. EXAMINATION: XR CHEST (2 VW), XR HIP RIGHT MIN 4VW W PELVIS, XR RADIUS ULNA RIGHT (2 VIEWS), XR HUMERUS RIGHT (MIN 2 VIEWS)  CLINICAL HISTORY: Generalized pain after fall COMPARISONS: None. FINDINGS: Two views of the right forearm are submitted. No acute fractures. No focal bony abnormalities                                                                               IMPRESSION: NO ACUTE FRACTURES. XR CHEST (2 VW), XR HIP RIGHT MIN 4VW W PELVIS, XR RADIUS ULNA RIGHT (2 VIEWS), XR HUMERUS RIGHT (MIN 2 VIEWS)  CLINICAL HISTORY:  low sat r/o pneumonia (history of pneumonia) COMPARISONS: Generalized pain after fall  FINDINGS: Two views of the right humerus are submitted. No acute fractures. No dislocations. There is loss of the subacromial space which could imply underlying tendinopathy. There appears to been surgical resection of the distal right clavicle. Correlate patient's surgical history. IMPRESSION: NO ACUTE FRACTURES EXAMINATION: RIGHT HIP  CLINICAL HISTORY: Generalized pain after fall COMPARISON: None  FINDINGS: 3 views are  submitted. There is diffuse generalized osteopenia. Within the field-of-view these degenerative changes visualized lower lumbar spine and SI joints. No acute fractures. No dislocations. No focal bony abnormalities                                                                               IMPRESSION: NO ACUTE FRACTURE. Xr Humerus Right (min 2 Views)    Result Date: 6/15/2018  EXAMINATION: XR CHEST (2 VW), XR HIP RIGHT MIN 4VW W PELVIS, XR RADIUS ULNA RIGHT (2 VIEWS), XR HUMERUS RIGHT (MIN 2 VIEWS)  CLINICAL HISTORY: Short of breath. Charles Door out of bed.) COMPARISONS: April 7, 2018 FINDINGS: Two views of the chest are submitted. There is a right-sided dialysis catheter.  Tip at the junction superior vena cava and right atrium. There is a left-sided ICD device leads overlying the cardiac silhouette. Unchanged. The cardiac silhouette is of enlarged unchanged configuration. .  The mediastinum is unremarkable. Pulmonary vascular attenuated. Lung fields are hyperinflated. . Right sided trachea. Interval improvement in the bibasilar infiltrates consolidations compared to prior film. There are still small bilateral pleural effusions. No Pneumothoraces. There is degenerative changes of the left shoulder                                                                                   RADIOGRAPH FINDINGS JUST COPD. Theresa Krystal INTERVAL IMPROVEMENT IN THE BIBASILAR INFILTRATES CONSOLIDATIONS COMPARED TO PRIOR FILM. THERE ARE STILL SMALL BILATERAL PLEURAL EFFUSIONS. EXAMINATION: XR CHEST (2 VW), XR HIP RIGHT MIN 4VW W PELVIS, XR RADIUS ULNA RIGHT (2 VIEWS), XR HUMERUS RIGHT (MIN 2 VIEWS)  CLINICAL HISTORY: Generalized pain after fall COMPARISONS: None. FINDINGS: Two views of the right forearm are submitted. No acute fractures. No focal bony abnormalities                                                                               IMPRESSION: NO ACUTE FRACTURES. XR CHEST (2 VW), XR HIP RIGHT MIN 4VW W PELVIS, XR RADIUS ULNA RIGHT (2 VIEWS), XR HUMERUS RIGHT (MIN 2 VIEWS)  CLINICAL HISTORY:  low sat r/o pneumonia (history of pneumonia) COMPARISONS: Generalized pain after fall  FINDINGS: Two views of the right humerus are submitted. No acute fractures. No dislocations. There is loss of the subacromial space which could imply underlying tendinopathy. There appears to been surgical resection of the distal right clavicle. Correlate patient's surgical history. IMPRESSION: NO ACUTE FRACTURES EXAMINATION: RIGHT HIP  CLINICAL HISTORY: Generalized pain after fall COMPARISON: None  FINDINGS: 3 views are  submitted. There is diffuse generalized osteopenia.  Within the field-of-view CONTRAST: 6/15/2018 CLINICAL HISTORY:  fall out of bed, hit head; on coumadin r/o skull fx and or bleed . COMPARISON: Head and C-spine CTs 3/23/2018. TECHNIQUE: ROUTINE All CT scans at this facility use dose modulation, iterative reconstruction, and/or weight based dosing when appropriate to reduce radiation dose to as low as reasonably achievable. HEAD CT FINDINGS: There is no intracranial hemorrhage, mass effect, midline shift, extra-axial collection, hydrocephalus, evidence of a recent or remote ischemic infarct or skull fracture identified. Mild age-related atrophic changes appear unchanged. CERVICAL SPINE CT FINDINGS: The spine is visualized from the craniovertebral junction through the T1-2 level. Moderate cervical spondylosis with degenerative grade 1 anterolisthesis of C5 over C6 and C6 over C7 appears unchanged. There is no fracture, significant subluxation, or acute paraspinous soft tissue abnormalities identified. Moderate calcific plaquing of the carotid bulbs is again noted. FINAL IMPRESSION: NO ACUTE INTRACRANIAL PROCESS, OR SIGNIFICANT CHANGE FROM 3/23/2018 IDENTIFIED. Ct Chest Wo Contrast    Result Date: 6/16/2018  CT CHEST WO CONTRAST : 6/16/2018 CLINICAL HISTORY: TRAUMA TO TRUNK/THORAX . COMPARISON: 3/28/2018, and interval chest radiographs. TECHNIQUE: Spiral unenhanced images were obtained of the chest without contrast. Routine reconstructions were performed. All CT scans at this facility use dose modulation, iterative reconstruction, and/or weight based dosing when appropriate to reduce radiation dose to as low as reasonably achievable. FINDINGS: Since the prior studies, right pneumothorax has resolved, and the right-sided pigtail chest tube has been removed. A tunneled right internal jugular approach hemodialysis catheter, left subclavian dual-lead pacemaker appear unchanged.  Moderate patchy airspace and interstitial infiltrates and atelectasis throughout both lungs worsening LEFT GREATER THAN RIGHT WITH BILATERAL PLEURAL EFFUSIONS    Xr Hip Right Min 4vw W Pelvis    Result Date: 6/15/2018  EXAMINATION: XR CHEST (2 VW), XR HIP RIGHT MIN 4VW W PELVIS, XR RADIUS ULNA RIGHT (2 VIEWS), XR HUMERUS RIGHT (MIN 2 VIEWS)  CLINICAL HISTORY: Short of breath. [place] Ede out of bed.) COMPARISONS: April 7, 2018 FINDINGS: Two views of the chest are submitted. There is a right-sided dialysis catheter. Tip at the junction superior vena cava and right atrium. There is a left-sided ICD device leads overlying the cardiac silhouette. Unchanged. The cardiac silhouette is of enlarged unchanged configuration. .  The mediastinum is unremarkable. Pulmonary vascular attenuated. Lung fields are hyperinflated. . Right sided trachea. Interval improvement in the bibasilar infiltrates consolidations compared to prior film. There are still small bilateral pleural effusions. No Pneumothoraces. There is degenerative changes of the left shoulder                                                                                   RADIOGRAPH FINDINGS JUST COPD. Cleotha Gills INTERVAL IMPROVEMENT IN THE BIBASILAR INFILTRATES CONSOLIDATIONS COMPARED TO PRIOR FILM. THERE ARE STILL SMALL BILATERAL PLEURAL EFFUSIONS. EXAMINATION: XR CHEST (2 VW), XR HIP RIGHT MIN 4VW W PELVIS, XR RADIUS ULNA RIGHT (2 VIEWS), XR HUMERUS RIGHT (MIN 2 VIEWS)  CLINICAL HISTORY: Generalized pain after fall COMPARISONS: None. FINDINGS: Two views of the right forearm are submitted. No acute fractures. No focal bony abnormalities                                                                               IMPRESSION: NO ACUTE FRACTURES. XR CHEST (2 VW), XR HIP RIGHT MIN 4VW W PELVIS, XR RADIUS ULNA RIGHT (2 VIEWS), XR HUMERUS RIGHT (MIN 2 VIEWS)  CLINICAL HISTORY:  low sat r/o pneumonia (history of pneumonia) COMPARISONS: Generalized pain after fall  FINDINGS: Two views of the right humerus are submitted. No acute fractures. No dislocations.  There is loss of the

## 2018-06-29 NOTE — CARE COORDINATION
Spoke to patient's son Jenni Matute and Cris Crenshaw - dtr Kristin's mother. They state patient lives with grandson Deric Recio. Family has discussed applying for Medicaid for nursing home coverage and not willing to do so @ this time. Plan is for patient to return home with grandson w/ HHC. They are active with [facility] for PT/OT, dietary, SN, wound care & LSW. [facility] transports patient to HD. Son states patient does not have a nebulizer or home O2. States patient completely dependent now. Patient needs luis lift. Per Cris Crenshaw, she cares for patient during the day and Kelly Rosado cares for patient @ night. Cris Crenshaw stating they have dietary recommendation from dialysis center. RN voicing concerns for dressing found dated 6/18. Family states they were re-using Mepilex just to cover bony prominences and new protective dressings already ordered by Giuliano Irizarry. Cris Crenshaw stating patient has new treatment that just started yesterday. Call to clarify if patient followed by [facility]. Spoke to [place], verified patient's first visit is scheduled for 7/3. She is following closely to see if they need to reschedule. Family re-checking coumadin dose and to call into nurse.  Cris Crenshaw states patient was to have appointment with doctor and have checked and patient came to hospital.

## 2018-06-29 NOTE — CONSULTS
Consults                                                                         [facility] HEART CARDIOLOGY CONSULTATION NOTE    PATIENT NAME: Esteban Pham  PATIENT MRN: [id number]  SERVICE DATE:  6/29/2018  SERVICE TIME: 1:58 PM    PRIMARY CARE PHYSICIAN: ROCK   CONSULTING CARDIOLOGIST : Benja Diaz MD  PRIMARY CARDIOLOGIST: Jeffrey Alvarenga MD  ================================================================    REASON FOR CONSULTATION:    Shortness of breath, in patient with multiple comorbidities and extensive cardiac history, clinical diagnosis of volume overload/congestive heart failure. History is primarily from chart review, patient is a very poor historian. HPI:   Esteban Pham is a 81 y.o. female who presented through the emergency department with progressively worsening shortness of breath. She denied to me medical or dietary noncompliance, says she is compliant with her dialysis, she denied chest pressure tightness or heaviness denies fever chills or cough, she was most recently discharged 2 weeks ago after being admitted for a fall. Her overall clinical status has improved significantly since admission. She is cachectic, malnourished, with stage 3-4 sacral decubitus ulcer. CARDIAC HISTORY:  1. Atherosclerotic native vessel coronary artery disease, PCI and bare metal stent to circumflex in 2010  2. Cardiac catheterization January 2013 diffuse calcification 30-50% proximal RCA normal left main heavily calcified % distal location filling by collaterals mild disease circumflex  3. Perfusion imaging January 2015 negative for ischemia  4.  Echo March 2018 LVEF 60% mild concentric left ventricular hypertrophy discrete upper septal thickening 1+ mitral regurgitation probable bicuspid aortic valve valve area 1.3 cm² maximum gradient 35 mean gradient 17 mmHg, mild to moderate aortic regurgitation 1-2+ tricuspid regurgitation PA pressure 47 mmHg normal RV size and systolic function pacer wires ([email or web address]     Peptic ulcer, site unspecified, unspecified as acute or chronic, without hemorrhage or perforation     Positive ORTEGA (antinuclear antibody)     1:80    Presence of cardiac pacemaker     Seizures [facility]     Tachycardia     Tachypnea, not elsewhere classified     Thrombophlebitis leg superficial     Type 2 diabetes mellitus with hypoglycemia without coma ([email or web address].)     Type II or unspecified type diabetes mellitus without mention of complication, not stated as uncontrolled 1998    Unspecified osteoarthritis, unspecified site     Urinary tract infection     Weakness        PAST SURGICAL HISTORY:  Past Surgical History:   Procedure Laterality Date    APPENDECTOMY  2007    BLADDER SUSPENSION  2008 & 2009    [facility], second at [address]  2/2006    COLONOSCOPY  4/2007    Dr. Palomo Plan    [address] N/A 11/2/2017    CATHETER INSERTION HEMODIALYSIS performed by Kinsey Herr MD at [address] [place] [address] Rd  [phone]    Bilateral    MN COLONOSCOPY FLX DX W/COLLJ SPEC WHEN PFRMD N/A 2/1/2018    COLONOSCOPY performed by Blane Noble MD at [address] [place] [address] EGD TRANSORAL BIOPSY SINGLE/MULTIPLE N/A 2/1/2018    EGD ESOPHAGOGASTRODUODENOSCOPY WITH BIOPSY performed by Blane Noble MD at [address] [place] [address] EGD TRANSORAL BIOPSY SINGLE/MULTIPLE N/A 3/27/2018    EGD ESOPHAGOGASTRODUODENOSCOPY performed by Blane Noble MD at [address] N/A 3/30/2018    EXAM UNDER ANESTHESIA, LARYNGOSCOPY ,PHARYNGOSCOPY performed by Salazar Marie MD at [facility] 32 N/A 3/30/2018    REMOVAL PACKING POSS CONTROL EPISTAXIS, NASAL ENDOSCOPY performed by Salazar Marie MD at [address] [place] [address],[address] B THYROID SURGERY      URETHRAL STRICTURE DILATATION  3/2003       FAMILY HISTORY:    Family History   Problem Relation Age of Onset    Heart Disease Father     Early Death Father 49    as low as reasonably achievable. FINDINGS: Since the prior studies, right pneumothorax has resolved, and the right-sided pigtail chest tube has been removed. A tunneled right internal jugular approach hemodialysis catheter, left subclavian dual-lead pacemaker appear unchanged. Moderate patchy airspace and interstitial infiltrates and atelectasis throughout both lungs worsening inferiorly, which likely represents a combination of ARDS, edema, and/bronchopneumonia. Small to moderate-sized mildly loculated pleural effusions are noted bilaterally, with extension medially and subpleurally on the left. The heart remains moderately enlarged with extensive coronary artery calcifications, aortic and mitral valve calcifications again noted. There is no significant pericardial effusion. No displaced fractures or other significant changes are identified elsewhere. MILD TO MODERATE PATCHY AIRSPACE AND INTERSTITIAL INFILTRATES THROUGHOUT BOTH LUNGS, SUGGESTIVE OF ARDS, EDEMA AND/OR BRONCHOPNEUMONIA. PROBABLE MILD TO MODERATE ATELECTASIS OF THE MID TO LOWER LUNG FIELDS. SMALL TO MODERATE-SIZED SOMEWHAT LOCULATED BILATERAL PLEURAL EFFUSIONS. MODERATE CARDIOMEGALY, AND OTHER CHRONIC FINDINGS, NOT SIGNIFICANTLY UNCHANGED. Ct Cervical Spine Wo Contrast    Result Date: 6/15/2018  CT HEAD WO CONTRAST, CT CERVICAL SPINE WO CONTRAST: 6/15/2018 CLINICAL HISTORY:  fall out of bed, hit head; on coumadin r/o skull fx and or bleed . COMPARISON: Head and C-spine CTs 3/23/2018. TECHNIQUE: ROUTINE All CT scans at this facility use dose modulation, iterative reconstruction, and/or weight based dosing when appropriate to reduce radiation dose to as low as reasonably achievable. HEAD CT FINDINGS: There is no intracranial hemorrhage, mass effect, midline shift, extra-axial collection, hydrocephalus, evidence of a recent or remote ischemic infarct or skull fracture identified. Mild age-related atrophic changes appear unchanged.  CERVICAL SPINE infiltrates consolidations left greater than right with bilateral pleural effusions. No Pneumothoraces. There is degenerative changes left shoulder                                                                                   PLAIN VASCULAR IS CONGESTED WITH INCREASED INTERSTITIAL MARKINGS SUGGESTING CHF. CORRELATE CLINICALLY SUPERIMPOSED BIBASILAR INFILTRATES CONSOLIDATIONS LEFT GREATER THAN RIGHT WITH BILATERAL PLEURAL EFFUSIONS    Xr Hip Right Min 4vw W Pelvis    Result Date: 6/15/2018  EXAMINATION: XR CHEST (2 VW), XR HIP RIGHT MIN 4VW W PELVIS, XR RADIUS ULNA RIGHT (2 VIEWS), XR HUMERUS RIGHT (MIN 2 VIEWS)  CLINICAL HISTORY: Short of breath. Anne-Marie Beverage out of bed.) COMPARISONS: April 7, 2018 FINDINGS: Two views of the chest are submitted. There is a right-sided dialysis catheter. Tip at the junction superior vena cava and right atrium. There is a left-sided ICD device leads overlying the cardiac silhouette. Unchanged. The cardiac silhouette is of enlarged unchanged configuration. .  The mediastinum is unremarkable. Pulmonary vascular attenuated. Lung fields are hyperinflated. . Right sided trachea. Interval improvement in the bibasilar infiltrates consolidations compared to prior film. There are still small bilateral pleural effusions. No Pneumothoraces. There is degenerative changes of the left shoulder                                                                                   RADIOGRAPH FINDINGS JUST COPD. Abigail Churn INTERVAL IMPROVEMENT IN THE BIBASILAR INFILTRATES CONSOLIDATIONS COMPARED TO PRIOR FILM. THERE ARE STILL SMALL BILATERAL PLEURAL EFFUSIONS. EXAMINATION: XR CHEST (2 VW), XR HIP RIGHT MIN 4VW W PELVIS, XR RADIUS ULNA RIGHT (2 VIEWS), XR HUMERUS RIGHT (MIN 2 VIEWS)  CLINICAL HISTORY: Generalized pain after fall COMPARISONS: None. FINDINGS: Two views of the right forearm are submitted. No acute fractures.  No focal bony abnormalities

## 2018-06-30 NOTE — PROGRESS NOTES
Objective:  General Appearance: In no acute distress. Vital signs: (most recent): Blood pressure 124/86, pulse 66, temperature 98.2 °F (36.8 °C), temperature source Oral, resp. rate 19, height 4' 11\" (1.499 m), weight 90 lb (40.8 kg), SpO2 100 %, not currently breastfeeding. Vital signs are normal.  No fever. Output: Minimal urine output. HEENT: Normal HEENT exam.    Lungs:  Normal effort and normal respiratory rate. She is not in respiratory distress. There are decreased breath sounds. Heart: Normal rate. Irregular rhythm. Abdomen: Abdomen is soft. There is no abdominal tenderness. Extremities: Decreased range of motion. Pulses: Distal pulses are intact. Neurological: (Resting). Pupils:  Pupils are equal, round, and reactive to light. Skin:  Pale. Labs reviewed  Assessment:  (Acute on top of chronic diastolic congestive heart failure with COPD exacerbation and interstitial fibrosis with interstitial lung disease. Unspecified severe protein-calorie malnutrition due to chronic illness in the setting of acute systolic heart failure being treated with dietician consult. Patient Active Problem List:     Hypothyroidism     Hypercholesteremia     CKD (chronic kidney disease)     Atrial fibrillation (HCC)     Acid reflux     Anxiety     Essential hypertension     Idiopathic Parkinson's disease ([address]     Cardiac pacemaker     Type 2 diabetes mellitus ([address]     Sacral decubitus ulcer, stage III (HCC)     Severe malnutrition (HCC)     CHF (congestive heart failure), NYHA class III, acute on chronic, combined ([address]    ). Plan:   (Agree with current management. Patient is improving. Discharge planning is in progress. She will need skilled nursing unit placement).        Annie Hui MD  6/30/2018

## 2018-06-30 NOTE — PROGRESS NOTES
Chair:  NT for safety     Seated Balance:   Unable to sit unsupported EOB greater than 20 seconds    Static: [] Good  [] Fair   [x] Poor   Dynamic: []  Good  [] Fair   [x] Poor     Standing Balance:   NT  Static: [] Good   [] Fair  [] Poor   Dynamic: [] Good   [] Fair   [] Poor     Functional Endurance: [] Good  [] Fair  [x] Poor     ADLs  Feeding:  Pt reported able to eat pie with increase time and effort, PCA confirmed. PCA reported pt need assistance eating soup. UE Dressing:  Max A    LB Dressing:  Dep  Bathing:  Max A   Toileting: Max A  Grooming: MAx A     Treatment Plan will consist of:   [x] ADL Training   [x] Strengthening   [x] Endurance   [x] Transfer Training   []  DME ed      [] HEP  [] Manual Therapy   [] AROM/PROM    [x] Coordination   [] Cognitive Training   []Safety training   [] Other :    Goals:   Patient will:   [x]  Improve functional endurance to tolerate/complete 8-10 mins of ADL's   [x]  Be Mod A in UB ADLs    [x]  Be Max A in LB ADLs   [x]  Be Max A in ADL transfers without LOB   [x]  Be Mod A  in toileting tasks   [x]  Improve B hand fine motor coordination to [facility] in order to manage clothing fasteners/self-care containers in a timely manner   [x]  Improve B UE Function (AROM, strength, motor control, tone normalization) to complete ADLs as projected. [x]  Improve B UE strength and endurance by 1/2 MMT in order to participate in self-care activities as projected.    [x]  Access appropriate D/C site with as few architectural barriers as possible.   []  Sequence self-care tasks with    []  Other :     Assessment/Discharge Disposition:     Performance deficits / Impairments: Decreased functional mobility , Decreased ADL status, Decreased ROM, Decreased strength, Decreased fine motor control, Decreased endurance, Decreased sensation, Decreased balance, Decreased coordination, Decreased safe awareness  Discharge Recommendations: Continue to assess pending progress  History: Multi comorb Exam: 10 perf imp     Prognosis:  [] Good   []Fair   [] Poor  [x] Guarded     Barriers to Improvement:  Pain     Six Click Score  How much help for putting on and taking off regular lower body clothing?: Total  How much help for Bathing?: A Lot  How much help for Toileting?: A Lot  How much help for putting on and taking off regular upper body clothing?: A Lot  How much help for taking care of personal grooming?: A Lot  How much help for eating meals?: A Little  AM-PAC Inpatient Daily Activity Raw Score: 12  AM-PAC Inpatient ADL T-Scale Score : 30.6  ADL Inpatient CMS 0-100% Score: 66.57    Recommended DME:  [] W/W   [] Cane   [] Rollator   [] W/C   [] Grab Bars  [] Shower Chair   []Dressing AD []  BSC  [] Other:    Plan:Times per week: 1-3,       Treatment: Core strengthening sitting EOB at midline in order to assist with changing sheets. Pt with Max A to maintain static sitting balance. VC's for hand placement for support and WB activities. Educated pt on exercises with can to increase UE strength while lying in bed. Pt verbalized understating. G-Codes:  OT G-codes  Functional Limitation: Self care  Self Care Current Status (): At least 80 percent but less than 100 percent impaired, limited or restricted  Self Care Goal Status (): At least 60 percent but less than 80 percent impaired, limited or restricted      Time in:  1:56  Time out:  2:26  Timed treatment minutes:  8  Total treatment time/minutes:  30    Electronically signed by:     PRIYA Reza  6/30/2018, 2:34 PM

## 2018-06-30 NOTE — PROGRESS NOTES
cholestyramine light, guaiFENesin, sodium chloride, glucose, dextrose, glucagon (rDNA), dextrose, sodium chloride flush, ondansetron, acetaminophen, albuterol    Radiology  Xr Chest Standard (2 Vw)    Result Date: 6/17/2018  EXAMINATION: XR CHEST (2 VW). DATE AND TIME:6/17/2018 6:00 AM CLINICAL HISTORY: Shortness of breath   sob  COMPARISONS: Susy 15, 2018 FINDINGS: Dialysis catheter unchanged in position. Cardiac pacemaker unchanged. Persistent bilateral airspace opacities with septal lines and with effusions,. Persistent diffuse bilateral airspace opacities possibly pulmonary edema. No significant change     Xr Chest Standard (2 Vw)    Result Date: 6/15/2018  EXAMINATION: XR CHEST (2 VW), XR HIP RIGHT MIN 4VW W PELVIS, XR RADIUS ULNA RIGHT (2 VIEWS), XR HUMERUS RIGHT (MIN 2 VIEWS)  CLINICAL HISTORY: Short of breath. [place] out of bed.) COMPARISONS: April 7, 2018 FINDINGS: Two views of the chest are submitted. There is a right-sided dialysis catheter. Tip at the junction superior vena cava and right atrium. There is a left-sided ICD device leads overlying the cardiac silhouette. Unchanged. The cardiac silhouette is of enlarged unchanged configuration. .  The mediastinum is unremarkable. Pulmonary vascular attenuated. Lung fields are hyperinflated. . Right sided trachea. Interval improvement in the bibasilar infiltrates consolidations compared to prior film. There are still small bilateral pleural effusions. No Pneumothoraces. There is degenerative changes of the left shoulder                                                                                   RADIOGRAPH FINDINGS JUST COPD. Jolaine Dent INTERVAL IMPROVEMENT IN THE BIBASILAR INFILTRATES CONSOLIDATIONS COMPARED TO PRIOR FILM. THERE ARE STILL SMALL BILATERAL PLEURAL EFFUSIONS. EXAMINATION: XR CHEST (2 VW), XR HIP RIGHT MIN 4VW W PELVIS, XR RADIUS ULNA RIGHT (2 VIEWS), XR HUMERUS RIGHT (MIN 2 VIEWS)  CLINICAL HISTORY: Generalized pain after fall COMPARISONS: None. FINDINGS: Two views of the right forearm are submitted. No acute fractures. No focal bony abnormalities                                                                               IMPRESSION: NO ACUTE FRACTURES. XR CHEST (2 VW), XR HIP RIGHT MIN 4VW W PELVIS, XR RADIUS ULNA RIGHT (2 VIEWS), XR HUMERUS RIGHT (MIN 2 VIEWS)  CLINICAL HISTORY:  low sat r/o pneumonia (history of pneumonia) COMPARISONS: Generalized pain after fall  FINDINGS: Two views of the right humerus are submitted. No acute fractures. No dislocations. There is loss of the subacromial space which could imply underlying tendinopathy. There appears to been surgical resection of the distal right clavicle. Correlate patient's surgical history. IMPRESSION: NO ACUTE FRACTURES EXAMINATION: RIGHT HIP  CLINICAL HISTORY: Generalized pain after fall COMPARISON: None  FINDINGS: 3 views are  submitted. There is diffuse generalized osteopenia. Within the field-of-view these degenerative changes visualized lower lumbar spine and SI joints. No acute fractures. No dislocations. No focal bony abnormalities                                                                               IMPRESSION: NO ACUTE FRACTURE. Xr Humerus Right (min 2 Views)    Result Date: 6/15/2018  EXAMINATION: XR CHEST (2 VW), XR HIP RIGHT MIN 4VW W PELVIS, XR RADIUS ULNA RIGHT (2 VIEWS), XR HUMERUS RIGHT (MIN 2 VIEWS)  CLINICAL HISTORY: Short of breath. Bethany Skylar out of bed.) COMPARISONS: April 7, 2018 FINDINGS: Two views of the chest are submitted. There is a right-sided dialysis catheter. Tip at the junction superior vena cava and right atrium. There is a left-sided ICD device leads overlying the cardiac silhouette. Unchanged. The cardiac silhouette is of enlarged unchanged configuration. .  The mediastinum is unremarkable. Pulmonary vascular attenuated. Lung fields are hyperinflated. . Right sided trachea.  Interval

## 2018-07-01 NOTE — CARE COORDINATION
DISCUSSED CASE WITH DR Molly Rudolph DR, PT IS AGREEABLE FOR SNF AS OF NOW. I THEN MET WITH PATIENT. SHE IS AGREEABLE FOR SNF(LIST GIVEN) ONLY IF ITS COVERED, PT IS UNSURE IF SHE HAS DAYS LEFT, UNABLE TO CONFIRM UNTIL Monday. IF PT DOES NOT HAVE DAYS LEFT PLAN WILL BE HOME WITH [facility]. PT STATES HER SON IS WITH HER MOST OF THE TIME AND IS RARELY LEFT ALONE. I DID LET HER KNOW DRS AND THERAPY ARE RECOMMENDING 24/7 CARE, PT STATES SHE WILL NEED TO HIRE SOMEONE TO HELP. I WILL DISCUSS THIS ALL WITH LSW. PT IS A PRECERT FOR SNF.

## 2018-07-01 NOTE — PROGRESS NOTES
Daily Annie Hui MD   100 mg at 07/01/18 0904    magnesium oxide (MAG-OX) tablet 400 mg  400 mg Oral Daily Annie Hui MD   400 mg at 07/01/18 6879    metoprolol tartrate (LOPRESSOR) tablet 25 mg  25 mg Oral BID Annie Hui MD   25 mg at 07/01/18 0904    miconazole (MICOTIN) 2 % cream   Topical BID Annie Hui MD        pantoprazole sodium (PROTONIX) packet 20 mg  20 mg Oral QAM AC Annie Hui MD   20 mg at 07/01/18 7142    primidone (MYSOLINE) tablet 50 mg  50 mg Oral TID Annie Hui MD   50 mg at 07/01/18 0904    sodium chloride (OCEAN, BABY AYR) 0.65 % nasal spray 2 spray  2 spray Nasal 4x Daily PRN Annie Hui MD        venlafaxine (EFFEXOR XR) extended release capsule 150 mg  150 mg Oral Daily with breakfast Annie Hui MD   150 mg at 07/01/18 0904    insulin lispro (HUMALOG) injection vial 0-6 Units  0-6 Units Subcutaneous TID WC Annie Hui MD   2 Units at 07/01/18 0906    insulin lispro (HUMALOG) injection vial 0-3 Units  0-3 Units Subcutaneous Nightly Annie Hui MD   1 Units at 06/30/18 2038    glucose (GLUTOSE) 40 % oral gel 15 g  15 g Oral PRN Annie Hui MD        dextrose 50 % solution 12.5 g  12.5 g Intravenous PRN Annie Hui MD        glucagon (rDNA) injection 1 mg  1 mg Intramuscular PRN Annie Hui MD        dextrose 5 % solution  100 mL/hr Intravenous PRN Annie Hui MD        sodium chloride flush 0.9 % injection 10 mL  10 mL Intravenous 2 times per day Annie Hui MD   10 mL at 07/01/18 0906    sodium chloride flush 0.9 % injection 10 mL  10 mL Intravenous PRN Annie Hui MD   10 mL at 06/28/18 1844    ondansetron (ZOFRAN) injection 4 mg  4 mg Intravenous Q6H PRN Annie Hui MD        acetaminophen (TYLENOL) tablet 650 mg  650 mg Oral Q4H PRN Annie Hui MD   325 mg at 06/29/18 7008    pill splitter   Does not apply Once Annie Hui MD        warfarin (COUMADIN) daily dosing

## 2018-07-01 NOTE — PROGRESS NOTES
Disorder  (acute or chronic)  [x]   Severe or Chronic w/ Exacerbation  []     Surgical Status No [x]   Surgeries     General []   Surgery Lower []   Abdominal Thoracic or []   Upper Abdominal Thoracic with  PulmonaryDisorder  []     Chest X-ray Clear/Not  Ordered     []  Chronic Changes  Results Pending  []  Infiltrates, atelectasis, pleural effusion, or edema  []  Infiltrates in more than one lobe []  Infiltrate + Atelectasis, &/or pleural effusion  [x]    Respiratory Pattern Regular,  RR = 12-20 [x]  Increased,  RR = 21-25 []  JAMES, irregular,  or RR = 26-30 []  Decreased FEV1  or RR = 31-35 []  Severe SOB, use  of accessory muscles, or RR ? 35  []    Mental Status Alert, oriented,  Cooperative [x]  Confused but Follows commands []  Lethargic or unable to follow commands []  Obtunded  []  Comatose  []    Breath Sounds Clear to  auscultation  []  Decreased unilaterally or  in bases only [x]  Decreased  bilaterally  []  Crackles or intermittent wheezes []  Wheezes []    Cough Strong, Spontan., & nonproductive [x]  Strong,  spontaneous, &  productive []  Weak,  Nonproductive []  Weak, productive or  with wheezes []  No spontaneous  cough or may require suctioning []    Level of Activity Ambulatory []  Ambulatory w/ Assist  [x]  Non-ambulatory []  Paraplegic []  Quadriplegic []    Total    Score:__9_____     Triage Score:___4_____      Tri       Triage:     1. (>20) Freq: Q3    2. (16-20) Freq: Q4   3. (11-15) Freq: QID & Albuterol Q2 PRN    4. (6-10) Freq: TID & Albuterol Q2 PRN    5. (0-5) Freq Q4prn

## 2018-07-01 NOTE — PLAN OF CARE
Problem: Falls - Risk of:  Goal: Will remain free from falls  Will remain free from falls   Outcome: Met This Shift    Goal: Absence of physical injury  Absence of physical injury   Outcome: Met This Shift      Problem: Risk for Impaired Skin Integrity  Goal: Tissue integrity - skin and mucous membranes  Structural intactness and normal physiological function of skin and  mucous membranes.    Outcome: Ongoing      Problem: Pain:  Goal: Pain level will decrease  Pain level will decrease   Outcome: Ongoing    Goal: Control of acute pain  Control of acute pain   Outcome: Ongoing    Goal: Control of chronic pain  Control of chronic pain   Outcome: Ongoing

## 2018-07-01 NOTE — PROGRESS NOTES
CREATININE  1.99*   < >  1.84*  2.63*   GLUCOSE  138*   < >  114*  203*   CALCIUM  8.4*   < >  8.3*  8.5*   PROT  6.9   --    --   7.1   LABALBU  2.6*   --    --   2.7*   BILITOT  <0.2   --    --   <0.2   ALKPHOS  129   --    --   124   AST  30   --    --   26   ALT  <5   --    --   6   LABGLOM  24.0*   < >  26.3*  17.4*   GFRAA  29.0*   < >  31.8*  21.1*   GLOB  4.3*   --    --   4.4*    < > = values in this interval not displayed. MV Settings:          No results for input(s): PHART, ZGU4AWC, PO2ART, HYS4DSV, BEART, G2LCVAXQ in the last 72 hours.     O2 Device: Nasal cannula  O2 Flow Rate (L/min): 2 L/min    DIET GENERAL;  Dietary Nutrition Supplements: Diabetic Oral Supplement     MEDICATIONS during current hospitalization:    Continuous Infusions:   dextrose         Scheduled Meds:   warfarin  5 mg Oral Once    amiodarone  200 mg Oral Daily    aspirin  81 mg Oral Daily    atorvastatin  80 mg Oral Nightly    b complex-C-folic acid  1 capsule Oral Daily    calcium acetate  667 mg Oral TID WC    calcium elemental  500 mg Oral BID    carbidopa-levodopa  1 tablet Oral Daily    diltiazem  240 mg Oral BID    docusate sodium  100 mg Oral Daily    fluticasone  2 spray Nasal Daily    hydrALAZINE  100 mg Oral TID    isosorbide mononitrate  60 mg Oral Daily    levothyroxine  25 mcg Oral Once per day on Sun Tue Thu Sat    losartan  100 mg Oral Daily    magnesium oxide  400 mg Oral Daily    metoprolol tartrate  25 mg Oral BID    miconazole   Topical BID    pantoprazole sodium  20 mg Oral QAM AC    primidone  50 mg Oral TID    venlafaxine  150 mg Oral Daily with breakfast    insulin lispro  0-6 Units Subcutaneous TID WC    insulin lispro  0-3 Units Subcutaneous Nightly    sodium chloride flush  10 mL Intravenous 2 times per day    pill splitter   Does not apply Once    warfarin (COUMADIN) daily dosing (placeholder)   Other RX Placeholder    ipratropium-albuterol  3 mL Inhalation TID       PRN

## 2018-07-02 NOTE — PROGRESS NOTES
daily dosing (placeholder)   Other RX Placeholder    ipratropium-albuterol  3 mL Inhalation TID     Continuous Infusions:   dextrose         CBC:   Recent Labs      07/01/18   0558  07/02/18   0554   WBC  9.5  8.0   HGB  10.2*  9.8*   PLT  149  144     CMP:  Recent Labs      06/30/18   0642  07/01/18   0558  07/02/18   0554   NA  134  133  131*   K  4.1  5.1  6.3*   CL  94*  94*  94*   CO2  27  27  24   BUN  19  35*  47*   CREATININE  1.84*  2.63*  3.60*   GLUCOSE  114*  203*  122*   CALCIUM  8.3*  8.5*  8.8   LABGLOM  26.3*  17.4*  12.1*     Troponin: No results for input(s): TROPONINI in the last 72 hours. BNP: No results for input(s): BNP in the last 72 hours. INR:   Recent Labs      07/02/18   0554   INR  3.1     Lipids: No results for input(s): CHOL, LDLDIRECT, TRIG, HDL, AMYLASE, LIPASE in the last 72 hours. Liver: Recent Labs      07/02/18   0554   AST  45*   ALT  8   ALKPHOS  155*   PROT  7.1   LABALBU  2.3*   BILITOT  0.3     Iron:  No results for input(s): IRONS, FERRITIN in the last 72 hours. Invalid input(s): LABIRONS  Urinalysis: No results for input(s): UA in the last 72 hours. Objective:   Vitals: /81   Pulse 59   Temp 97.2 °F (36.2 °C) (Oral)   Resp 20   Ht 4' 11\" (1.499 m)   Wt 97 lb (44 kg)   LMP  (LMP Unknown)   SpO2 90%   BMI 19.59 kg/m²    Wt Readings from Last 3 Encounters:   07/02/18 97 lb (44 kg)   06/20/18 87 lb 8.4 oz (39.7 kg)   05/29/18 106 lb (48.1 kg)      24HR INTAKE/OUTPUT:    Intake/Output Summary (Last 24 hours) at 07/02/18 1021  Last data filed at 07/02/18 0527   Gross per 24 hour   Intake              780 ml   Output                0 ml   Net              780 ml       Constitutional:  Thin, almost cachectic  Skin:normal, no rash  HEENT:sclera anicteric.   Head atraumatic normocephalic  Neck:supple with no thyromegally  Cardiovascular:  S1, S2 without m/r/g   Respiratory:  rhonchi  Abdomen: +bs, soft, nt  Ext: no LE edema  Musculoskeletal:Intact  Neuro:Alert

## 2018-07-02 NOTE — PROGRESS NOTES
Clinical Pharmacy Note    Warfarin consult follow-up    Recent Labs      07/02/18   0554   INR  3.1     Recent Labs      07/01/18   0558  07/02/18   0554   HGB  10.2*  9.8*   HCT  31.2*  31.3*   PLT  149  144       Notes:  Date INR Warfarin Dose   6/28/18 1.5 2.5 mg   6/29/18 1.5 3 mg    6/30/18 1.7 2 mg    7/01/18  2.0 5 mg (ordered by Dr Jaspal Rogel)   07/02/18 3.1 1.5 mg                     INR of 3.1 is slightly supra-therapeutic, goal range of 2-3. Will give patient home dose of warfarin 1.5 mg today. Daily PT/INR until stable within therapeutic range. RONIT Lund Ph.  7/2/2018  11:27 AM

## 2018-07-02 NOTE — PROGRESS NOTES
Physician Progress Note    2018   1:03 PM    Name:  Milad Nicole  MRN:    [id number]      Day: 4     Admit Date: 2018 12:03 PM  PCP: No primary care provider on file. Code Status:  Full Code    Subjective:     Dyspneic this morning. K is 6.3 but today is her regular dialysis day.      Physical Examination:      Vitals:  /81   Pulse 59   Temp 97.2 °F (36.2 °C) (Oral)   Resp 20   Ht 4' 11\" (1.499 m)   Wt 97 lb (44 kg)   LMP  (LMP Unknown)   SpO2 90%   BMI 19.59 kg/m²   Temp (24hrs), Av.1 °F (36.7 °C), Min:97.2 °F (36.2 °C), Max:98.4 °F (36.9 °C)      General appearance: alert, cooperative and no distress  Mental Status: oriented to person, place and time and normal affect  Lungs: clear to auscultation bilaterally, normal effort  Heart: regular rate and rhythm, no murmur  Abdomen: soft, nontender, nondistended, bowel sounds present, no masses  Extremities: no edema, redness, tenderness in the calves  Neuro: AOx3, good insight     Data:     Labs:  Recent Labs      18   0558  18   0554   WBC  9.5  8.0   HGB  10.2*  9.8*   PLT  149  144     Recent Labs      18   0558  18   0554   NA  133  131*   K  5.1  6.3*   CL  94*  94*   CO2  27  24   BUN  35*  47*   CREATININE  2.63*  3.60*   GLUCOSE  203*  122*     Recent Labs      18   0558  18   0554   AST  26  45*   ALT  6  8   BILITOT  <0.2  0.3   ALKPHOS  124  155*       Current Facility-Administered Medications   Medication Dose Route Frequency Provider Last Rate Last Dose    sodium chloride 250 mL  250 mL Intravenous PRN Steffan Castleman, MD        albumin human 25 % solution 25 g  25 g Intravenous Once PRN Steffan Castleman, MD        warfarin (COUMADIN) tablet 1.5 mg  1.5 mg Oral Once Kristi Nesbitt MD        HYDROcodone-acetaminophen [facility]) 5-325 MG per tablet 1 tablet  1 tablet Oral Q4H PRN Lynette Patel MD   1 tablet at 18 0626    heparin (porcine) injection 1,000 Units  1,000 Units Intravenous Sat Roosevelt Peguero MD   25 mcg at 07/01/18 7246    losartan (COZAAR) tablet 100 mg  100 mg Oral Daily Roosevelt Peguero MD   100 mg at 07/02/18 0809    magnesium oxide (MAG-OX) tablet 400 mg  400 mg Oral Daily Roosevelt Peguero MD   400 mg at 07/02/18 0809    metoprolol tartrate (LOPRESSOR) tablet 25 mg  25 mg Oral BID Roosevelt Peguero MD   25 mg at 07/02/18 0809    miconazole (MICOTIN) 2 % cream   Topical BID Roosevelt Peguero MD        pantoprazole sodium (PROTONIX) packet 20 mg  20 mg Oral QAM AC Roosevelt Peguero MD   20 mg at 07/02/18 4798    primidone (MYSOLINE) tablet 50 mg  50 mg Oral TID Roosevelt Peguero MD   50 mg at 07/02/18 0808    sodium chloride (OCEAN, BABY AYR) 0.65 % nasal spray 2 spray  2 spray Nasal 4x Daily PRN Roosevelt Peguero MD        venlafaxine (EFFEXOR XR) extended release capsule 150 mg  150 mg Oral Daily with breakfast Roosevelt Peguero MD   150 mg at 07/02/18 0808    insulin lispro (HUMALOG) injection vial 0-6 Units  0-6 Units Subcutaneous TID WC Roosevelt Peguero MD   Stopped at 07/02/18 1219    insulin lispro (HUMALOG) injection vial 0-3 Units  0-3 Units Subcutaneous Nightly Roosevelt Peguero MD   1 Units at 06/30/18 2038    glucose (GLUTOSE) 40 % oral gel 15 g  15 g Oral PRN Roosevelt Peguero MD        dextrose 50 % solution 12.5 g  12.5 g Intravenous PRN Roosevelt Peguero MD        glucagon (rDNA) injection 1 mg  1 mg Intramuscular PRN Roosevelt Peguero MD        dextrose 5 % solution  100 mL/hr Intravenous PRN Roosevelt Peguero MD        sodium chloride flush 0.9 % injection 10 mL  10 mL Intravenous 2 times per day Roosevelt Peguero MD   10 mL at 07/02/18 0808    sodium chloride flush 0.9 % injection 10 mL  10 mL Intravenous PRN Roosevelt Peguero MD   10 mL at 06/28/18 1844    ondansetron (ZOFRAN) injection 4 mg  4 mg Intravenous Q6H PRN Roosevelt Peguero MD   4 mg at 07/02/18 1003    acetaminophen (TYLENOL) tablet 650 mg  650 mg Oral Q4H PRN Roosevelt Peguero MD   325 mg at

## 2018-07-02 NOTE — PROGRESS NOTES
Wound Ostomy Continence Nursing    Accidental consult for [id number] [facility] Nurse to evaluate sacral wound. Patient was seen Friday After noon by this [id number] [address] [facility] Nurse. Dr. Nati Garner was consulted, saw patient yesterday for the same wound and ordered q other day santyl dressing change. Per [facility], no other wounds present at this time. Will follow as needed.     Deuce Stokes, ERICAN, RN, CWOCN on 7/2/2018 at 12:30 PM

## 2018-07-02 NOTE — PROGRESS NOTES
INPATIENT PROGRESS NOTES    PATIENT NAME: Eula Keating  MRN: [id number]  SERVICE DATE:  July 2, 2018   SERVICE TIME:  10:20 AM      PRIMARY SERVICE:  Pulmonary Medicine     INTERVAL HPI: Patient seen and examined at bedside, Interval Notes, orders reviewed. Nursing notes noted: Patient just getting back from CXR. Report pending but upon review R lung does seen more congested from prior imaging. Patient is due for dialysis later today. She reports feeling nauseous today but denies any significant abdominal pain. Patient coughing this morning and also with slight sputum due to nausea and vomited once. She is taking some medicine for nausea which is helping. Patient has remained afebrile without chills. She denies any chest pain/pleurtic pain. Patient reporting shortness of breath which has remained the same since admission. Patients WBC count WNL    Review of Systems  Please see HPI above. OBJECTIVE    Body mass index is 19.59 kg/m². PHYSICAL EXAM:  Vitals:  /81   Pulse 59   Temp 97.2 °F (36.2 °C) (Oral)   Resp 20   Ht 4' 11\" (1.499 m)   Wt 97 lb (44 kg)   LMP  (LMP Unknown)   SpO2 99%   BMI 19.59 kg/m²   General: Patient is comfortable in bed, No distress. Head: Atraumatic , Normocephalic   Eyes: PERRL. No sclera icterus. No conjunctival injection. No discharge   ENT: No nasal  discharge. Pharynx clear. Neck:  Trachea midline. No thyromegaly, no JVD, No cervical adenopathy. Resp : Fair breath sounds bilaterally with decreased breath sounds at the bases with Bibasilar rales noted at the bases. CV: Normal  rate. Regular rhythm. No mumur ,  Rub or gallop  ABD: Non-tender. Non-distended. No masses. No organmegaly. Normal bowel sounds. No hernia. EXT: No Pitting, No Cyanosis No clubbing  Neuro: no focal weakness  Skin: Warm and dry. No erythema rash on exposed extremities.     DATA:   Recent Labs      07/01/18   0558  07/02/18   0554   WBC  9.5  8.0   HGB  10.2*  9.8*   HCT  31.2*  31.3* MCV  92.4  94.2   PLT  149  144     Recent Labs      07/01/18   0558  07/02/18   0554   NA  133  131*   K  5.1  6.3*   CL  94*  94*   CO2  27  24   BUN  35*  47*   CREATININE  2.63*  3.60*   GLUCOSE  203*  122*   CALCIUM  8.5*  8.8   PROT  7.1  7.1   LABALBU  2.7*  2.3*   BILITOT  <0.2  0.3   ALKPHOS  124  155*   AST  26  45*   ALT  6  8   LABGLOM  17.4*  12.1*   GFRAA  21.1*  14.7*   GLOB  4.4*  4.8*         No results for input(s): PHART, DSM3QYX, PO2ART, LSV4YHF, BEART, W3NDXZLL in the last 72 hours.   O2 Device: Nasal cannula  O2 Flow Rate (L/min): 2 L/min  Lab Results   Component Value Date    LACTA 0.8 06/16/2018        MEDICATIONS during current hospitalization:    Continuous Infusions:   dextrose         Scheduled Meds:   amiodarone  200 mg Oral Daily    aspirin  81 mg Oral Daily    atorvastatin  80 mg Oral Nightly    b complex-C-folic acid  1 capsule Oral Daily    calcium acetate  667 mg Oral TID WC    calcium elemental  500 mg Oral BID    carbidopa-levodopa  1 tablet Oral Daily    diltiazem  240 mg Oral BID    docusate sodium  100 mg Oral Daily    fluticasone  2 spray Nasal Daily    hydrALAZINE  100 mg Oral TID    isosorbide mononitrate  60 mg Oral Daily    levothyroxine  25 mcg Oral Once per day on Sun Tue Thu Sat    losartan  100 mg Oral Daily    magnesium oxide  400 mg Oral Daily    metoprolol tartrate  25 mg Oral BID    miconazole   Topical BID    pantoprazole sodium  20 mg Oral QAM AC    primidone  50 mg Oral TID    venlafaxine  150 mg Oral Daily with breakfast    insulin lispro  0-6 Units Subcutaneous TID WC    insulin lispro  0-3 Units Subcutaneous Nightly    sodium chloride flush  10 mL Intravenous 2 times per day    pill splitter   Does not apply Once    warfarin (COUMADIN) daily dosing (placeholder)   Other RX Placeholder    ipratropium-albuterol  3 mL Inhalation TID       PRN Meds:sodium chloride, albumin human, HYDROcodone 5 mg - acetaminophen, heparin (porcine),

## 2018-07-03 NOTE — DISCHARGE SUMMARY
mLs by mouth every 4 hours as needed for Cough             hydrALAZINE (APRESOLINE) 100 MG tablet  Take 1 tablet by mouth 3 times daily             HYDROcodone-acetaminophen (NORCO) 5-325 MG per tablet  Take 1 tablet by mouth every 6 hours as needed for Pain for up to 7 days. .             insulin lispro (HUMALOG) 100 UNIT/ML injection vial  Inject 0-6 Units into the skin 3 times daily (with meals)             ipratropium-albuterol (DUONEB) 0.5-2.5 (3) MG/3ML SOLN nebulizer solution  Inhale 3 mLs into the lungs every 4 hours as needed for Shortness of Breath             isosorbide mononitrate (IMDUR) 60 MG extended release tablet  Take 1 tablet by mouth daily             L-Methylfolate-B6-B12 (FOLTANX) 3-35-2 MG TABS  Take by mouth Give one tab by mouth in the morning for health maintenance. levothyroxine (SYNTHROID) 25 MCG tablet  Take 25 mcg ( 1 tab) and 50 mcg ( 2 tabs) alternatively. levothyroxine (SYNTHROID) 50 MCG tablet  TAKE (1) 50 MCG TAB QOD ALTERNATING WITH (1) 25 MCG TAB QOD             losartan (COZAAR) 100 MG tablet  Take 1 tablet by mouth daily             magnesium oxide (MAG-OX) 400 (241.3 Mg) MG TABS tablet  Take 1 tablet by mouth daily             metoprolol tartrate (LOPRESSOR) 25 MG tablet  Take 1 tablet by mouth 2 times daily             miconazole (ALEX ANTIFUNGAL) 2 % cream  Apply topically 2 times daily Apply topically 2 times daily.              mirtazapine (REMERON) 15 MG tablet  Take 1 tablet by mouth nightly             ondansetron (ZOFRAN) 4 MG tablet  Take 1 tablet by mouth every 8 hours as needed for Nausea or Vomiting             pantoprazole sodium (PROTONIX) 40 MG PACK packet  Take 20 mg by mouth every morning (before breakfast)              primidone (MYSOLINE) 50 MG tablet  Take 50 mg by mouth 3 times daily             sodium chloride (NASAL MOISTURIZING SPRAY) 0.65 % nasal spray  2 sprays by Nasal route as needed for Congestion             sodium chloride,

## 2018-07-03 NOTE — PROGRESS NOTES
breakfast    insulin lispro  0-6 Units Subcutaneous TID WC    insulin lispro  0-3 Units Subcutaneous Nightly    sodium chloride flush  10 mL Intravenous 2 times per day    pill splitter   Does not apply Once    warfarin (COUMADIN) daily dosing (placeholder)   Other RX Placeholder    ipratropium-albuterol  3 mL Inhalation TID     Continuous Infusions:   dextrose         CBC:   Recent Labs      07/02/18   0554  07/03/18   0553   WBC  8.0  7.1   HGB  9.8*  10.1*   PLT  144  134     CMP:    Recent Labs      07/01/18   0558  07/02/18   0554  07/03/18   0553   NA  133  131*  136   K  5.1  6.3*  4.2   CL  94*  94*  95*   CO2  27  24  28   BUN  35*  47*  19   CREATININE  2.63*  3.60*  2.00*   GLUCOSE  203*  122*  147*   CALCIUM  8.5*  8.8  8.6   LABGLOM  17.4*  12.1*  23.9*     Troponin: No results for input(s): TROPONINI in the last 72 hours. BNP: No results for input(s): BNP in the last 72 hours. INR:   Recent Labs      07/03/18   0553   INR  3.1     Lipids: No results for input(s): CHOL, LDLDIRECT, TRIG, HDL, AMYLASE, LIPASE in the last 72 hours. Liver:   Recent Labs      07/02/18   0554   AST  45*   ALT  8   ALKPHOS  155*   PROT  7.1   LABALBU  2.3*   BILITOT  0.3     Iron:  No results for input(s): IRONS, FERRITIN in the last 72 hours. Invalid input(s): LABIRONS  Urinalysis: No results for input(s): UA in the last 72 hours.     Objective:   Vitals: BP (!) 134/53   Pulse 62   Temp 98.2 °F (36.8 °C)   Resp 18   Ht 4' 11\" (1.499 m)   Wt 92 lb 9.5 oz (42 kg)   LMP  (LMP Unknown)   SpO2 98%   BMI 18.70 kg/m²    Wt Readings from Last 3 Encounters:   07/03/18 92 lb 9.5 oz (42 kg)   06/20/18 87 lb 8.4 oz (39.7 kg)   05/29/18 106 lb (48.1 kg)      24HR INTAKE/OUTPUT:      Intake/Output Summary (Last 24 hours) at 07/03/18 0855  Last data filed at 07/03/18 0455   Gross per 24 hour   Intake              600 ml   Output             2300 ml   Net            -1700 ml       Constitutional:  Thin, almost

## 2018-07-03 NOTE — PROGRESS NOTES
CARDIOLOGY [address] PROGRESS NOTE         7/3/2018      Marlin Knowles    [id number]  1935    Rounding MD: David Powell MD ,[facility]    Primary Cardiologist:  Liya Kothari M.D.    SUBJECTIVE:     Patient has no new complaints. Denies chest pain, dyspnea and palpitations. Cardiac and general ROS otherwise negative and unchanged. PRIMARY CARDIOLOGIST: Dr. Christine Barclay:     Patient is feeling much better today. She denies any chest pain shortness of breath fever or chills. She is anticipating discharge today to nursing home.                 Cardiac and general ROS otherwise negative and unchanged.        IMPRESSIONS:     1. SOB. Woodard Antony 2. Acute Respiratory Failure, Post Arrest / intubation. 3. Hemoptysis / GIB, Post Transfusions  4. Aspiration PNA  5. Elevated Troponins, Due to Above, Type 2 NSTEMI (Demand Ischemia)  6. PAF, back in AFIB now. On IV Lopressor, anticoagulation with Eliquis currently held. Restart on DC home if ok with others. 7. Sinus node dysfunction s/p PPM 11/2017  8. CAD s/p remote PCI/BMS to LCX 2010, [facility] 1/2013 showing diffuse vessel calcification with a 30%-50% stenosis of the proximal RCA, normal LM, heavily calcified LAD with mid to distal tapering with 100% distal occlusion, filled by collaterals, mild disease of LCX. 9. Perfusion scan 1/2015 negative for ischemia. 10. Normal LV systolic function with EF 60-65%, moderate AS per echo 12/2017  11. HTN  12. HLP  13. COPD  15. ESRD on HD  15. Otherwise as per PMH below.     RECOMMENDATIONS:     1. Cardiac Pulmonary and Primary Supportive Care. 2.. Continue Amiodarone. 3.  OK to discharge to nursing home  4. Renal Care  5. [facility] as scheduled. 6.  Cardiology sign off  7.  See Orders.        PROBLEM LIST:            Diagnosis    Hypothyroidism    Hypercholesteremia    CKD (chronic kidney disease)    Paroxysmal atrial fibrillation (HCC)    Anemia    Acid reflux    glucagon (rDNA), dextrose, sodium chloride flush, ondansetron, acetaminophen, albuterol    PHYSICAL EXAM:    CURRENT VITALS: BP (!) 138/52   Pulse 62   Temp 98.3 °F (36.8 °C)   Resp 16   Ht 4' 11\" (1.499 m)   Wt 99 lb 3.3 oz (45 kg)   LMP  (LMP Unknown)   SpO2 98%   BMI 20.04 kg/m²     CONSTITUTIONAL:  awake, alert, cooperative, no apparent distress, on Vent. ENT:  Normocephalic, with hematoma forehead  NECK:  Supple, symmetrical, trachea midline, no adenopathy, thyroid symmetric, not enlarged and no tenderness, skin normal  LUNGS:  Increased work of breathing,  Poor air exchange, Bilateral wheezing  CARDIOVASCULAR:  Normal apical impulse, regular rate and rhythm, normal S1 and S2,  and 2/6 murmur noted  ABDOMEN:  Normal bowel sounds, soft, non-distended, non-tender, no masses palpated, no hepatosplenomegally  EXTREMITIES:  Trace edema, Pulses diminished throughout. NEUROLOGIC:  Awake, alert, oriented to name, place and time.  Not following all commands and moving all extremties  SKIN:  no bruising or bleeding, normal skin color, texture, turgor and no rashes     Data:        LABS:      Recent Results (from the past 24 hour(s))   POCT Glucose    Collection Time: 07/02/18  9:03 PM   Result Value Ref Range    POC Glucose 82 60 - 115 mg/dl    Performed on ACCU-CHEK    Protime-INR    Collection Time: 07/03/18  5:53 AM   Result Value Ref Range    Protime 32.9 (H) 9.6 - 12.3 sec    INR 3.1    Basic Metabolic Panel    Collection Time: 07/03/18  5:53 AM   Result Value Ref Range    Sodium 136 132 - 144 mEq/L    Potassium 4.2 3.5 - 5.1 mEq/L    Chloride 95 (L) 98 - 107 mEq/L    CO2 28 22 - 29 mEq/L    Anion Gap 13 7 - 13 mEq/L    Glucose 147 (H) 74 - 109 mg/dL    BUN 19 8 - 23 mg/dL    CREATININE 2.00 (H) 0.50 - 0.90 mg/dL    GFR Non-African American 23.9 (L) >60    GFR  28.9 (L) >60    Calcium 8.6 8.6 - 10.2 mg/dL   CBC Auto Differential    Collection Time: 07/03/18  5:53 AM   Result Value Ref Range

## 2018-07-03 NOTE — PROGRESS NOTES
INPATIENT PROGRESS NOTES    PATIENT NAME: Katherin Gallegos  MRN: [id number]  SERVICE DATE:  July 3, 2018   SERVICE TIME:  12:59 PM      PRIMARY SERVICE:  Pulmonary Medicine     INTERVAL HPI: Patient seen and examined at bedside, Interval Notes, orders reviewed. Nursing notes noted: Patient seen down in dialysis looking much improved from yesterday. Nausea resolved and SOB also significantly improved. CXR still with some congestion but clinically patient is doing much better. Patient denies any chest pain, fever, and chills. No nausea vomiting or diarrhea noted. Patient had dialysis 2 days in a row to help with fluid removal.  Patient denies any cough or sputum production today. WBC count WNL and patient is afebrile. Patient seems more awake and with more energy today. Patient might be going to a skilled facility later today. Patient reports having to use the restroom asking with help to get on bed pan. Review of Systems  Please see HPI above. OBJECTIVE    Body mass index is 20.04 kg/m². PHYSICAL EXAM:  Vitals:  BP (!) 146/57   Pulse 63   Temp 98.3 °F (36.8 °C)   Resp 16   Ht 4' 11\" (1.499 m)   Wt 99 lb 3.3 oz (45 kg)   LMP  (LMP Unknown)   SpO2 98%   BMI 20.04 kg/m²   General: Patient is comfortable in bed, No distress. Head: Atraumatic , Normocephalic   Eyes: PERRL. No sclera icterus. No conjunctival injection. No discharge   ENT: No nasal  discharge. Pharynx clear. Neck:  Trachea midline. No thyromegaly, no JVD, No cervical adenopathy. Resp : Fair breath sounds bilaterally decreased breath sounds at the bases with bibasilar rales noted  CV: Normal  rate. Regular rhythm. No mumur ,  Rub or gallop  ABD: Non-tender. Non-distended. No masses. No organmegaly. Normal bowel sounds. No hernia. EXT: No Pitting, No Cyanosis No clubbing  Neuro: no focal weakness  Skin: Warm and dry. No erythema rash on exposed extremities.     DATA:   Recent Labs      07/02/18   0554  07/03/18   0553   WBC  8.0  7.1 HGB  9.8*  10.1*   HCT  31.3*  31.0*   MCV  94.2  92.3   PLT  144  134     Recent Labs      07/01/18   0558  07/02/18   0554  07/03/18   0553   NA  133  131*  136   K  5.1  6.3*  4.2   CL  94*  94*  95*   CO2  27  24  28   BUN  35*  47*  19   CREATININE  2.63*  3.60*  2.00*   GLUCOSE  203*  122*  147*   CALCIUM  8.5*  8.8  8.6   PROT  7.1  7.1   --    LABALBU  2.7*  2.3*   --    BILITOT  <0.2  0.3   --    ALKPHOS  124  155*   --    AST  26  45*   --    ALT  6  8   --    LABGLOM  17.4*  12.1*  23.9*   GFRAA  21.1*  14.7*  28.9*   GLOB  4.4*  4.8*   --        No results for input(s): PHART, SNR7DPH, PO2ART, TEP1GIK, BEART, K6QBXSVJ in the last 72 hours.   O2 Device: Nasal cannula  O2 Flow Rate (L/min): 2 L/min  Lab Results   Component Value Date    LACTA 0.8 06/16/2018        MEDICATIONS during current hospitalization:    Continuous Infusions:   sodium chloride      dextrose         Scheduled Meds:   collagenase   Topical Every Other Day    warfarin  1.5 mg Oral Once    mirtazapine  15 mg Oral Nightly    amiodarone  200 mg Oral Daily    aspirin  81 mg Oral Daily    atorvastatin  80 mg Oral Nightly    b complex-C-folic acid  1 capsule Oral Daily    calcium acetate  667 mg Oral TID WC    calcium elemental  500 mg Oral BID    carbidopa-levodopa  1 tablet Oral Daily    diltiazem  240 mg Oral BID    docusate sodium  100 mg Oral Daily    fluticasone  2 spray Nasal Daily    hydrALAZINE  100 mg Oral TID    isosorbide mononitrate  60 mg Oral Daily    levothyroxine  25 mcg Oral Once per day on Sun Tue Thu Sat    losartan  100 mg Oral Daily    magnesium oxide  400 mg Oral Daily    metoprolol tartrate  25 mg Oral BID    miconazole   Topical BID    pantoprazole sodium  20 mg Oral QAM AC    primidone  50 mg Oral TID    insulin lispro  0-6 Units Subcutaneous TID WC    insulin lispro  0-3 Units Subcutaneous Nightly    sodium chloride flush  10 mL Intravenous 2 times per day    pill splitter   Does not AIRSPACE DISEASE WITHIN THE RIGHT UPPER RIGHT MIDDLE RIGHT LOWER AND LEFT LOWER LOBES. THERE ARE BILATERAL PLEURAL EFFUSIONS. Xr Chest Standard (2 Vw)    Result Date: 6/17/2018  EXAMINATION: XR CHEST (2 VW). DATE AND TIME:6/17/2018 6:00 AM CLINICAL HISTORY: Shortness of breath   sob  COMPARISONS: Susy 15, 2018 FINDINGS: Dialysis catheter unchanged in position. Cardiac pacemaker unchanged. Persistent bilateral airspace opacities with septal lines and with effusions,. Persistent diffuse bilateral airspace opacities possibly pulmonary edema. No significant change     Xr Chest Standard (2 Vw)    Result Date: 6/15/2018  EXAMINATION: XR CHEST (2 VW), XR HIP RIGHT MIN 4VW W PELVIS, XR RADIUS ULNA RIGHT (2 VIEWS), XR HUMERUS RIGHT (MIN 2 VIEWS)  CLINICAL HISTORY: Short of breath. Elwyn Silversmith out of bed.) COMPARISONS: April 7, 2018 FINDINGS: Two views of the chest are submitted. There is a right-sided dialysis catheter. Tip at the junction superior vena cava and right atrium. There is a left-sided ICD device leads overlying the cardiac silhouette. Unchanged. The cardiac silhouette is of enlarged unchanged configuration. .  The mediastinum is unremarkable. Pulmonary vascular attenuated. Lung fields are hyperinflated. . Right sided trachea. Interval improvement in the bibasilar infiltrates consolidations compared to prior film. There are still small bilateral pleural effusions. No Pneumothoraces. There is degenerative changes of the left shoulder                                                                                   RADIOGRAPH FINDINGS JUST COPD. Veleta Gift INTERVAL IMPROVEMENT IN THE BIBASILAR INFILTRATES CONSOLIDATIONS COMPARED TO PRIOR FILM. THERE ARE STILL SMALL BILATERAL PLEURAL EFFUSIONS. EXAMINATION: XR CHEST (2 VW), XR HIP RIGHT MIN 4VW W PELVIS, XR RADIUS ULNA RIGHT (2 VIEWS), XR HUMERUS RIGHT (MIN 2 VIEWS)  CLINICAL HISTORY: Generalized pain after fall COMPARISONS: None.   FINDINGS: Two views of the right forearm breath COMPARISONS: June 17, 2018  FINDINGS: Single  views of the chest is submitted. There is a right-sided dialysis catheter tip at the junction superior vena cava and right atrium. There is a left-sided ICD device. Leads overlying the cardiac silhouette. Unchanged. The cardiac silhouette enlarged. .  The mediastinum is unremarkable. Pulmonary vascular congested with increased interstitial markings. Right sided trachea. There are superimposed bibasilar infiltrates consolidations left greater than right with bilateral pleural effusions. No Pneumothoraces. There is degenerative changes left shoulder                                                                                   PLAIN VASCULAR IS CONGESTED WITH INCREASED INTERSTITIAL MARKINGS SUGGESTING CHF. CORRELATE CLINICALLY SUPERIMPOSED BIBASILAR INFILTRATES CONSOLIDATIONS LEFT GREATER THAN RIGHT WITH BILATERAL PLEURAL EFFUSIONS    ASSESSMENT /Plan:  1. Acute on chronic diastolic CHF with bilateral pleural effusion and compressive atelectasis  2. Renal failure on hemodialysis   3. Hypothyroidism  4. Hypertension    · Patient currently on 2 L of oxygen at 98% SPO2. · Patient recently with CT of the chest about 2 weeks ago and would advise avoiding a repeat CT of the chest at this time due to radiation.  Per Dr. Felicia Saldaña amiodarone toxicity is less likely  · Clinically patient much improved after dialysis yesterday and today    Electronically signed by Jesenia Loja PA-C on 7/3/2018 at 12:59 PM

## 2018-07-12 NOTE — TELEPHONE ENCOUNTER
Writer contacted  [facility] provider to inform of 30 day readmission risk. ED provider informed writer of a most likey discharge and follow up recommended.

## 2018-07-12 NOTE — ED PROVIDER NOTES
protein in urine 6/2004    Dr. Jaylan Chanel    Acute on chronic diastolic heart failure (HCC)     Anemia     Anxiety     Atherosclerotic heart disease     Atrial fibrillation (HCC)     Breath shortness     Cardiac arrhythmia     Chronic obstructive pulmonary disease (COPD) (HCC)     Constipation, chronic     Difficulty in walking     Diverticular disease of the colon     End stage renal disease (HCC)     Enterocolitis due to Clostridium difficile, not specified as recurrent     [facility]     Fistula     left    Gastro-esophageal reflux disease without esophagitis     GERD (gastroesophageal reflux disease)     GERD, PUD, Hiatal Hernia    Granulomatous lung disease [facility]     History of endoscopy 2-21-12    Dr. Pete Barron    Hyperlipemia     Hyperlipidemia     Hypertension     Hypothyroidism     Kidney stones 11/2001    Dr. 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)     legs    Osteoarthritis     Parkinsons disease (HCC)     Peptic ulcer, site unspecified, unspecified as acute or chronic, without hemorrhage or perforation     Positive ORTEGA (antinuclear antibody)     1:80    Presence of cardiac pacemaker     Seizures (HCC)     Tachycardia     Tachypnea, not elsewhere classified     Thrombophlebitis leg superficial     Type 2 diabetes mellitus with hypoglycemia without coma ([facility]     Type II or unspecified type diabetes mellitus without mention of complication, not stated as uncontrolled 1998    Unspecified osteoarthritis, unspecified site     Urinary tract infection     Weakness          SURGICAL HISTORY       Past Surgical History:   Procedure Laterality Date    APPENDECTOMY  2007    BLADDER SUSPENSION  2008 & 2009    [facility] at [address]  2/2006    COLONOSCOPY  4/2007    Dr. Dellia Ormond    [address] [place] [address] N/A 11/2/2017    CATHETER INSERTION MIRTAZAPINE (REMERON) 15 MG TABLET    Take 1 tablet by mouth nightly    ONDANSETRON (ZOFRAN) 4 MG TABLET    Take 1 tablet by mouth every 8 hours as needed for Nausea or Vomiting    PANTOPRAZOLE SODIUM (PROTONIX) 40 MG PACK PACKET    Take 20 mg by mouth every morning (before breakfast)     PRIMIDONE (MYSOLINE) 50 MG TABLET    Take 50 mg by mouth 3 times daily    SODIUM CHLORIDE (NASAL MOISTURIZING SPRAY) 0.65 % NASAL SPRAY    2 sprays by Nasal route as needed for Congestion    SODIUM CHLORIDE, INHALANT, 7 % NEBULIZER SOLUTION    Take 4 mLs by nebulization 2 times daily    WARFARIN (COUMADIN) 1 MG TABLET    Take 1.5 tablets by mouth daily       ALLERGIES     Arthrotec [diclofenac-misoprostol]; Depakote [divalproex sodium]; Dilantin [phenytoin]; Klonopin [clonazepam]; and Statins    FAMILY HISTORY       Family History   Problem Relation Age of Onset    Heart Disease Father     Early Death Father 47    Diabetes Father     Heart Disease Mother     Diabetes Mother     High Blood Pressure Mother     High Blood Pressure Sister     High Cholesterol Sister           SOCIAL HISTORY       Social History     Social History    Marital status:      Spouse name: N/A    Number of children: N/A    Years of education: N/A     Social History Main Topics    Smoking status: Former Smoker    Smokeless tobacco: Never Used    Alcohol use No    Drug use: No    Sexual activity: Not Asked     Other Topics Concern    None     Social History Narrative    Lives at home w/son       SCREENINGS             PHYSICAL EXAM    (up to 7 for level 4, 8 or more for level 5)     ED Triage Vitals [07/12/18 0646]   BP Temp Temp Source Pulse Resp SpO2 Height Weight   (!) 116/46 97.8 °F (36.6 °C) Oral 59 18 98 % 4' 11\" (1.499 m) 100 lb (45.4 kg)       Physical Exam   Constitutional: She is oriented to person, place, and time. She appears well-developed and well-nourished. HENT:   Head: Normocephalic.    Drug blood and scabbing noted in

## 2018-07-15 PROBLEM — E87.5 HYPERKALEMIA: Status: ACTIVE | Noted: 2018-01-01

## 2018-07-15 NOTE — H&P
Hospital Medicine  History and Physical    Patient:  Lilian Pryor  MRN: [id number]    CHIEF COMPLAINT:    Chief Complaint   Patient presents with    Epistaxis     coughing up blood       History Obtained From:  Patient, EMR  Primary Care Physician: No primary care provider on file. HISTORY OF PRESENT ILLNESS:   The patient is a 81 y.o. female with PMH of 80 y/o female with history of ESRD on dialysis,diastolic HF,PAF on Coumadin, CAD and mod-severe AS who sent to the hospital from her NH for nosebleed. Patient is a poor historian,but is able to tell me that the nosebleed lasted for a couple of hours and it spontaneously stopped, chart review showed a recent episodes of epistaxis severe enough to precipitate acute respiratory failure with subsequent cardiac arrest requiring intubation with subsequent transfer to [facility]. Upon arrival to ED nosebleed had completely stopped, initial labs showed evidence of hyperkalemia - 7.0 with no acute EKG changes, Kayexalate given in ED, plan for dialysis tonight per nephrology, CXR suggestive of pneumonia, started on broad spectrum antibiotics in ED, currently patient is comfortable, in no acute distress, afebrile,stable HD, saturating well on nasal canula.       Past Medical History:      Diagnosis Date    Abnormal presence of protein in urine 6/2004    Dr. Nadeem Moore Acute on chronic diastolic heart failure (HCC)     Anemia     Anxiety     Atherosclerotic heart disease     Atrial fibrillation (HCC)     Breath shortness     Cardiac arrhythmia     Chronic kidney disease     Chronic obstructive pulmonary disease (COPD) (HCC)     Constipation, chronic     Difficulty in walking     Diverticular disease of the colon     End stage renal disease (HCC)     Enterocolitis due to Clostridium difficile, not specified as recurrent     [facility]) [facility]     Fistula     left    Gastro-esophageal reflux disease without esophagitis     GERD (gastroesophageal reflux disease)     GERD, PUD, Hiatal Hernia    Granulomatous lung disease [facility]     History of endoscopy 2-21-12    Dr. Cinthya Hannon Hyperlipemia     Hyperlipidemia     Hypertension     Hypothyroidism     Kidney stones 11/2001    Dr. Fito Mcnamara    Long-term (current) use of anticoagulants     Muscle weakness (generalized)     Neuropathy in diabetes ([facility]     legs    Osteoarthritis     Parkinsons disease ([facility]     Peptic ulcer, site unspecified, unspecified as acute or chronic, without hemorrhage or perforation     Positive ORTEGA (antinuclear antibody)     1:80    Presence of cardiac pacemaker     Seizures ([facility]     Tachycardia     Tachypnea, not elsewhere classified     Thrombophlebitis leg superficial     Type 2 diabetes mellitus with hypoglycemia without coma ([facility]     Type II or unspecified type diabetes mellitus without mention of complication, not stated as uncontrolled 1998    Unspecified osteoarthritis, unspecified site     Urinary tract infection     Weakness        Past Surgical History:      Procedure Laterality Date    APPENDECTOMY  2007    BLADDER SUSPENSION  2008 & 2009    [facility] at [address]  2/2006    COLONOSCOPY  4/2007    Dr. Aaliyah Osorio    [address] N/A 11/2/2017    CATHETER INSERTION HEMODIALYSIS performed by Janet Russo MD at [address] [place] [address] Rd  [phone]    Bilateral    ID COLONOSCOPY FLX DX W/COLLJ SPEC WHEN PFRMD N/A 2/1/2018    COLONOSCOPY performed by Leti Cazares MD at [address] [place] [address] EGD TRANSORAL BIOPSY SINGLE/MULTIPLE N/A 2/1/2018    EGD ESOPHAGOGASTRODUODENOSCOPY WITH BIOPSY performed by Leti Cazares MD at [address] [place] [address] EGD TRANSORAL BIOPSY SINGLE/MULTIPLE N/A 3/27/2018    EGD ESOPHAGOGASTRODUODENOSCOPY performed by Leti Cazares MD at [address] N/A 3/30/2018    EXAM UNDER ANESTHESIA, LARYNGOSCOPY ,PHARYNGOSCOPY performed by Airam Garcia MD at [facility] N/A 3/30/2018    REMOVAL PACKING POSS CONTROL EPISTAXIS, NASAL ENDOSCOPY performed by Airam Garcia MD at [address] [place] [address],[address] THYROID SURGERY      URETHRAL STRICTURE DILATATION  3/2003       Medications Prior to Admission:    Prior to Admission medications    Medication Sig Start Date End Date Taking? Authorizing Provider   mirtazapine (REMERON) 15 MG tablet Take 1 tablet by mouth nightly 7/3/18  Yes Liane Hawk DO   atorvastatin (LIPITOR) 80 MG tablet Take 1 tablet by mouth nightly 6/22/18  Yes Kasey Briones MD   levothyroxine (SYNTHROID) 25 MCG tablet Take 25 mcg ( 1 tab) and 50 mcg ( 2 tabs) alternatively.  6/22/18  Yes Kasey Briones MD   warfarin (COUMADIN) 1 MG tablet Take 1.5 tablets by mouth daily 6/22/18  Yes Kasey Briones MD   losartan (COZAAR) 100 MG tablet Take 1 tablet by mouth daily 6/22/18  Yes Kasey Briones MD   isosorbide mononitrate (IMDUR) 60 MG extended release tablet Take 1 tablet by mouth daily 6/22/18  Yes Michael Winkler MD   carbidopa-levodopa (SINEMET)  MG per tablet Take 1 tablet by mouth daily 6/22/18  Yes Michael Winkler MD   hydrALAZINE (APRESOLINE) 100 MG tablet Take 1 tablet by mouth 3 times daily 6/22/18  Yes Michael Winkler MD   amiodarone (CORDARONE) 200 MG tablet Take 1 tablet by mouth daily 6/22/18  Yes Michael Winkler MD   docusate sodium (COLACE) 100 MG capsule Take 1 capsule by mouth daily 6/22/18  Yes Michael Winkler MD   diltiazem (CARDIZEM CD) 240 MG extended release capsule Take 1 capsule by mouth 2 times daily 6/22/18  Yes Michael Winkler MD   metoprolol tartrate (LOPRESSOR) 25 MG tablet Take 1 tablet by mouth 2 times daily 6/22/18  Yes Michael Winkler MD   calcium elemental (OSCAL) 500 MG TABS tablet Take 1 tablet by mouth 2 times daily 6/22/18  Yes Michael Winkler MD   insulin lispro (HUMALOG) 100 UNIT/ML injection vial Inject 0-6 Units into the skin 3 times daily (with meals) 6/20/18  Yes Kasey Urban MD   primidone (MYSOLINE) 50 MG tablet Take 50 mg by mouth 3 times daily   Yes Historical Provider, MD   levothyroxine (SYNTHROID) 50 MCG tablet TAKE (1) 50 MCG TAB QOD ALTERNATING WITH (1) 25 MCG TAB QOD  Patient taking differently: 25 mcg TAKE (1) 50 MCG TAB QOD ALTERNATING WITH (1) 25 MCG TAB QOD 5/7/18  Yes Doroteo Lindsay MD   collagenase 250 UNIT/GM ointment Apply 250 each topically daily Apply to verbal affected area topically in the morning for health maintenance. Yes Historical Provider, MD   sodium chloride, Inhalant, 7 % nebulizer solution Take 4 mLs by nebulization 2 times daily   Yes Historical Provider, MD   sodium chloride (NASAL MOISTURIZING SPRAY) 0.65 % nasal spray 2 sprays by Nasal route as needed for Congestion   Yes Historical Provider, MD   L-Methylfolate-B6-B12 (FOLTANX) 3-35-2 MG TABS Take by mouth Give one tab by mouth in the morning for health maintenance. Yes Historical Provider, MD   miconazole (ALEX ANTIFUNGAL) 2 % cream Apply topically 2 times daily Apply topically 2 times daily.    Yes Historical Provider, MD   estradiol (ESTRACE VAGINAL) 0.1 MG/GM vaginal cream Place 2 g vaginally daily   Yes Historical Provider, MD   folic acid (FOLVITE) 1 MG tablet Take 1 mg by mouth daily   Yes Historical Provider, MD   guaiFENesin (ROBITUSSIN) 100 MG/5ML SOLN oral solution Take 10 mLs by mouth every 4 hours as needed for Cough   Yes Historical Provider, MD   b complex-C-folic acid (NEPHROCAPS) 1 MG capsule Take 1 capsule by mouth daily   Yes Historical Provider, MD   ondansetron (ZOFRAN) 4 MG tablet Take 1 tablet by mouth every 8 hours as needed for Nausea or Vomiting 3/16/18  Yes EMANUEL Perdomo - CNP   acetaminophen (TYLENOL) 325 MG tablet Take 650 mg by mouth every 4 hours as needed for Pain   Yes Historical Provider, MD   Acetaminophen (APAP) 325 MG TABS Take 650 mg by mouth every 6 hours as needed for Pain 2/14/18  Yes Nolvia Chandler PA-C   benzonatate °C) (Oral)   Resp 20   Wt 100 lb (45.4 kg)   LMP  (LMP Unknown)   SpO2 100%   BMI 20.20 kg/m²     General appearance: awake and alert, frail,elderly female  Skin: pressure ulcer present on admission  HEENT: Head: Normocephalic, no lesions, without obvious abnormality. Eye: Normal external eye, conjunctiva, lids cornea, NICHOLAS. Neck: no adenopathy and supple, symmetrical, trachea midline  Lungs: diminished breath sounds bibasilar and rales bibasilar  Heart: RRR,S1S2,systolic murmur best heard at RUSB  Abdomen: soft, non-tender; bowel sounds normal; no masses,  no organomegaly  Extremities: extremities normal, atraumatic, no cyanosis or edema  Neurologic: Mental status: Alert, oriented, thought content appropriate     Recent Labs      07/15/18   1246   WBC  8.9   HGB  8.4*   PLT  153     Recent Labs      07/15/18   1230  07/15/18   1338   NA  132   --    K  7.0*  6.9*   CL  97*   --    CO2  24   --    BUN  54*   --    CREATININE  3.14*   --    GLUCOSE  198*   --    AST  57*   --    ALT  11   --    BILITOT  0.3   --    ALKPHOS  144*   --      Troponin T:   Recent Labs      07/15/18   1230   TROPONINI  0.128*       ABGs:   Lab Results   Component Value Date    PHART 7.430 04/09/2018    PO2ART 458 04/09/2018    FNF2XWN 42 04/09/2018     INR:   Recent Labs      07/15/18   1253   INR  1.4     URINALYSIS:  Recent Labs      07/15/18   1415   COLORU  FARZAD*   PHUR  7.0   WBCUA  10-20*   RBCUA  >100*   YEAST  Present*   BACTERIA  Many   CLARITYU  SLCLOUDY   SPECGRAV  1.017   LEUKOCYTESUR  LARGE*   UROBILINOGEN  0.2   BILIRUBINUR  Negative   BLOODU  LARGE*   GLUCOSEU  100*   AMORPHOUS  1+     -----------------------------------------------------------------   Xr Chest Portable    Result Date: 7/15/2018  Chest X-ray, 1 view Clinical information:  Cough Comparison:  July 2, 2018. Findings  Pacemaker generator and wires, and right jugular dual-lumen catheter unchanged.  Cardiopericardial silhouette upper limits of normal and

## 2018-07-15 NOTE — ED NOTES
Bed: 18  Expected date: 7/15/18  Expected time: 11:39 AM  Means of arrival: [facility]  Comments:  [place] [facility] 82yo female nosebleed     Lakshmi Aranda RN  07/15/18 9700

## 2018-07-15 NOTE — TELEPHONE ENCOUNTER
Writer contacted Solitario Mullins, ED provider to inform of 30 day readmission risk. ED provider informed writer of potential readmission. Isaiah Ibarra will continue to follow-up until decision is made.

## 2018-07-15 NOTE — ED TRIAGE NOTES
Pt from [facility] with c/o  Nosebleed and coughin gup blood now. Pt uses 02 at the nursing home and admits that it is not humidified.   No bleeding on arrival noted but squad did note that she spit up  Blood en route

## 2018-07-15 NOTE — ED PROVIDER NOTES
[facility])     History of endoscopy 2-21-12    Dr. Michelle Noble Hyperlipidemia     Hypertension     Hypothyroidism     Kidney stones 11/2001    Dr. Nakul Baker    Long-term (current) use of anticoagulants     Muscle weakness (generalized)     Neuropathy in diabetes ([facility]     legs    Osteoarthritis     Parkinsons disease ([facility]     Peptic ulcer, site unspecified, unspecified as acute or chronic, without hemorrhage or perforation     Positive ORTEGA (antinuclear antibody)     1:80    Presence of cardiac pacemaker     Seizures ([facility]     Tachycardia     Tachypnea, not elsewhere classified     Thrombophlebitis leg superficial     Type 2 diabetes mellitus with hypoglycemia without coma ([facility]     Type II or unspecified type diabetes mellitus without mention of complication, not stated as uncontrolled 1998    Unspecified osteoarthritis, unspecified site     Urinary tract infection     Weakness          SURGICAL HISTORY       Past Surgical History:   Procedure Laterality Date    APPENDECTOMY  2007    BLADDER SUSPENSION  2008 & 2009    [facility], vitaly at [address]  2/2006    COLONOSCOPY  4/2007    Dr. Frederick Gould    [address] N/A 11/2/2017    CATHETER INSERTION HEMODIALYSIS performed by Allyssa Mayorga MD at [address] [place] [address] Rd  [phone]    Bilateral    OK COLONOSCOPY FLX DX W/COLLJ SPEC WHEN PFRMD N/A 2/1/2018    COLONOSCOPY performed by Austen Mack MD at [address] [facility] EGD TRANSORAL BIOPSY SINGLE/MULTIPLE N/A 2/1/2018    EGD ESOPHAGOGASTRODUODENOSCOPY WITH BIOPSY performed by Austen Mack MD at [address] [facility] EGD TRANSORAL BIOPSY SINGLE/MULTIPLE N/A 3/27/2018    EGD ESOPHAGOGASTRODUODENOSCOPY performed by Austen Mack MD at [address] N/A 3/30/2018    EXAM UNDER ANESTHESIA, LARYNGOSCOPY ,PHARYNGOSCOPY performed by Gigi Quesada MD at [facility] 1 tablet by mouth 2 times daily  Qty: 30 tablet, Refills: 1      insulin lispro (HUMALOG) 100 UNIT/ML injection vial Inject 0-6 Units into the skin 3 times daily (with meals)  Qty: 1 vial, Refills: 3      primidone (MYSOLINE) 50 MG tablet Take 50 mg by mouth 3 times daily      !! levothyroxine (SYNTHROID) 50 MCG tablet TAKE (1) 50 MCG TAB QOD ALTERNATING WITH (1) 25 MCG TAB QOD  Qty: 50 tablet, Refills: 3    Associated Diagnoses: Hypothyroidism, unspecified type      collagenase 250 UNIT/GM ointment Apply 250 each topically daily Apply to verbal affected area topically in the morning for health maintenance.      sodium chloride, Inhalant, 7 % nebulizer solution Take 4 mLs by nebulization 2 times daily      sodium chloride (NASAL MOISTURIZING SPRAY) 0.65 % nasal spray 2 sprays by Nasal route as needed for Congestion      L-Methylfolate-B6-B12 (FOLTANX) 3-35-2 MG TABS Take by mouth Give one tab by mouth in the morning for health maintenance. miconazole (ALEX ANTIFUNGAL) 2 % cream Apply topically 2 times daily Apply topically 2 times daily. estradiol (ESTRACE VAGINAL) 0.1 MG/GM vaginal cream Place 2 g vaginally daily      folic acid (FOLVITE) 1 MG tablet Take 1 mg by mouth daily      guaiFENesin (ROBITUSSIN) 100 MG/5ML SOLN oral solution Take 10 mLs by mouth every 4 hours as needed for Cough      b complex-C-folic acid (NEPHROCAPS) 1 MG capsule Take 1 capsule by mouth daily      ondansetron (ZOFRAN) 4 MG tablet Take 1 tablet by mouth every 8 hours as needed for Nausea or Vomiting  Qty: 30 tablet, Refills: 0    Associated Diagnoses: Non-intractable vomiting with nausea, unspecified vomiting type      !! acetaminophen (TYLENOL) 325 MG tablet Take 650 mg by mouth every 4 hours as needed for Pain      !!  Acetaminophen (APAP) 325 MG TABS Take 650 mg by mouth every 6 hours as needed for Pain  Qty: 20 tablet, Refills: 0      benzonatate (TESSALON) 100 MG capsule Take 1 capsule by mouth 3 times daily as needed for Other Topics Concern    None     Social History Narrative    Lives at home w/son       SCREENINGS    Darleen Coma Scale  Eye Opening: Spontaneous  Best Verbal Response: Oriented  Best Motor Response: Obeys commands  Darleen Coma Scale Score: 15        PHYSICAL EXAM    (up to 7 for level 4, 8 or more for level 5)     ED Triage Vitals [07/15/18 1203]   BP Temp Temp Source Pulse Resp SpO2 Height Weight   (!) 94/46 97.8 °F (36.6 °C) Oral 60 18 95 % -- 100 lb (45.4 kg)       Physical Exam   Constitutional: She is oriented to person, place, and time. Vital signs are normal. She appears cachectic. She has a sickly appearance. She appears ill. HENT:   Head: Normocephalic and atraumatic. No active epistaxis but dried blood noted on the left nostril   Eyes: Conjunctivae and EOM are normal. Pupils are equal, round, and reactive to light. Neck: Normal range of motion. Neck supple. Cardiovascular: Normal rate, regular rhythm, normal heart sounds and intact distal pulses. Pulmonary/Chest: Effort normal and breath sounds normal.   Abdominal: Soft. Bowel sounds are normal. She exhibits no distension and no mass. There is no tenderness. There is no rebound and no guarding. Musculoskeletal: Normal range of motion. Neurological: She is alert and oriented to person, place, and time. She has normal reflexes. She displays normal reflexes. No cranial nerve deficit. She exhibits normal muscle tone. Coordination normal.   Skin: Skin is warm and dry. Psychiatric: She has a normal mood and affect. Her behavior is normal. Judgment and thought content normal.       DIAGNOSTIC RESULTS     EKG: All EKG's are interpreted by the Emergency Department Physician who either signs or Co-signs this chart in the absence of a cardiologist.    EKG: AV dual paced rhythm. Rate 60 no ST or T-wave changes      RADIOLOGY:   Non-plain film images such as CT, Ultrasound and MRI are read by the radiologist. Plain radiographic images are visualized and preliminarily interpreted by the emergency physician with the below findings:        Interpretation per the Radiologist below, if available at the time of this note:    XR CHEST PORTABLE   Final Result     Pacemaker generator and wires, and right jugular dual-lumen catheter unchanged. Cardiopericardial silhouette upper limits of normal and unchanged. Ill-defined area of increased opacity caudal two thirds of right lower lung, and caudal two thirds of left   lower lung, mildly improved since prior study. Blunting costophrenic angles bilaterally. Impression     Interstitial edema versus pneumonia. Bilateral pleural effusions. Cardiomegaly.   ED BEDSIDE ULTRASOUND:   Performed by ED Physician - none    LABS:  Labs Reviewed   COMPREHENSIVE METABOLIC PANEL - Abnormal; Notable for the following:        Result Value    Potassium 7.0 (*)     Chloride 97 (*)     Glucose 198 (*)     BUN 54 (*)     CREATININE 3.14 (*)     GFR Non- 14.2 (*)     GFR  17.2 (*)     Calcium 8.3 (*)     Alb 2.7 (*)     Alkaline Phosphatase 144 (*)     AST 57 (*)     Globulin 4.2 (*)     All other components within normal limits    Narrative:     CALL  Vieyra  [facility] tel. [phone],  Potassium results called to and read back by GLORY Burrell, 07/15/2018  13:03, by TIMA  Troponin results called to and read back by GLORY Burrell, 07/15/2018 13:02,  by TIMA   CBC WITH AUTO DIFFERENTIAL - Abnormal; Notable for the following:     RBC 2.77 (*)     Hemoglobin 8.4 (*)     Hematocrit 25.7 (*)     MCHC 32.7 (*)     RDW 19.4 (*)     Neutrophils # 6.9 (*)     All other components within normal limits   TROPONIN - Abnormal; Notable for the following:     Troponin 0.128 (*)     All other components within normal limits    Narrative:     CALL  Vieyra  [facility] tel. [phone],  Troponin results called to and read back by GLORY Burrell, 07/15/2018 13:02,  by TIMA   PROTIME-INR - Abnormal; Notable for the following:

## 2018-07-16 PROBLEM — E46 MALNUTRITION RELATED TO CHRONIC DISEASE (HCC): Status: ACTIVE | Noted: 2018-01-01

## 2018-07-16 NOTE — CONSULTS
maintenance.  miconazole (ALEX ANTIFUNGAL) 2 % cream Apply topically 2 times daily Apply topically 2 times daily.  estradiol (ESTRACE VAGINAL) 0.1 MG/GM vaginal cream Place 2 g vaginally daily      folic acid (FOLVITE) 1 MG tablet Take 1 mg by mouth daily      guaiFENesin (ROBITUSSIN) 100 MG/5ML SOLN oral solution Take 10 mLs by mouth every 4 hours as needed for Cough      b complex-C-folic acid (NEPHROCAPS) 1 MG capsule Take 1 capsule by mouth daily      ondansetron (ZOFRAN) 4 MG tablet Take 1 tablet by mouth every 8 hours as needed for Nausea or Vomiting 30 tablet 0    acetaminophen (TYLENOL) 325 MG tablet Take 650 mg by mouth every 4 hours as needed for Pain      Acetaminophen (APAP) 325 MG TABS Take 650 mg by mouth every 6 hours as needed for Pain 20 tablet 0    benzonatate (TESSALON) 100 MG capsule Take 1 capsule by mouth 3 times daily as needed for Cough 20 capsule 0    Amino Acids-Protein Hydrolys (PRO-STAT) LIQD Take 30 mLs by mouth 2 times daily      calcium acetate (PHOSLO) 667 MG capsule Take 667 mg by mouth 3 times daily (with meals)      cholestyramine light 4 g packet Take 0.5 packets by mouth 3 times daily as needed (DIARRHEA) 60 packet 3    magnesium oxide (MAG-OX) 400 (241.3 Mg) MG TABS tablet Take 1 tablet by mouth daily 30 tablet 0    ipratropium-albuterol (DUONEB) 0.5-2.5 (3) MG/3ML SOLN nebulizer solution Inhale 3 mLs into the lungs every 4 hours as needed for Shortness of Breath 360 mL     pantoprazole sodium (PROTONIX) 40 MG PACK packet Take 20 mg by mouth every morning (before breakfast)       glucose blood VI test strips (TRUETEST TEST) strip As needed. 200 strip 11    aspirin 81 MG EC tablet Take 81 mg by mouth daily       fluticasone (FLONASE) 50 MCG/ACT nasal spray 2 sprays by Nasal route daily. Allergies:  Arthrotec [diclofenac-misoprostol]; Depakote [divalproex sodium]; Dilantin [phenytoin];  Klonopin [clonazepam]; and Statins    Social History: Lab Results   Component Value Date     07/16/2018    K 4.5 07/16/2018    CL 99 07/16/2018    CO2 28 07/16/2018    BUN 22 07/16/2018    LABALBU 2.0 07/16/2018    LABALBU 3.5 12/17/2011    CREATININE 1.56 07/16/2018    CALCIUM 7.2 07/16/2018    GFRAA 38.5 07/16/2018    LABGLOM 31.8 07/16/2018    GLUCOSE 111 07/16/2018    GLUCOSE 197 02/17/2012     Xr Chest Standard (2 Vw)    Result Date: 7/3/2018  Chest X-ray, 2 views Clinical information:  Vascular congestion Comparison:  July 2, 2018, June 28, 2018 Findings  Dual-lumen dialysis catheter identified entering right jugular vein with tip in right atrium. Pacemaker generator overlies left upper chest with leads in region of right atrium and right ventricle. Cardiopericardial silhouette is enlarged. Ill-defined areas of increased opacity are found overlying the right and left lung zones. Blunting costophrenic angles. Impression Cardiomegaly. Cardiogenic versus noncardiogenic pulmonary edema, unchanged. Bilateral pleural effusions. Xr Chest Standard (2 Vw)    Result Date: 7/2/2018  EXAMINATION: XR CHEST (2 VW)  CLINICAL HISTORY:  CHF COMPARISONS: June 28, 2018  FINDINGS: Two views of the chest are submitted. There is a right-sided dialysis catheter. Tip at the junction superior vena cava and right atrium. Unchanged. There is a left-sided ICD device. Leads overlying the cardiac silhouette. Unchanged. The cardiac silhouette is enlarged unchanged configuration. .  The mediastinum is unremarkable. Pulmonary vascular has shown interval improvement as compared to prior examination. Right sided trachea. There is superimposed multifocal to confluent airspace disease within the right upper right middle right lower and left lower lobes. There are bilateral pleural effusions. .  No Pneumothoraces.                                                                                    SUPERIMPOSED MULTIFOCAL TO CONFLUENT AIRSPACE DISEASE WITHIN THE RIGHT UPPER RIGHT MIDDLE reasonably achievable. FINDINGS: Since the prior studies, right pneumothorax has resolved, and the right-sided pigtail chest tube has been removed. A tunneled right internal jugular approach hemodialysis catheter, left subclavian dual-lead pacemaker appear unchanged. Moderate patchy airspace and interstitial infiltrates and atelectasis throughout both lungs worsening inferiorly, which likely represents a combination of ARDS, edema, and/bronchopneumonia. Small to moderate-sized mildly loculated pleural effusions are noted bilaterally, with extension medially and subpleurally on the left. The heart remains moderately enlarged with extensive coronary artery calcifications, aortic and mitral valve calcifications again noted. There is no significant pericardial effusion. No displaced fractures or other significant changes are identified elsewhere. MILD TO MODERATE PATCHY AIRSPACE AND INTERSTITIAL INFILTRATES THROUGHOUT BOTH LUNGS, SUGGESTIVE OF ARDS, EDEMA AND/OR BRONCHOPNEUMONIA. PROBABLE MILD TO MODERATE ATELECTASIS OF THE MID TO LOWER LUNG FIELDS. SMALL TO MODERATE-SIZED SOMEWHAT LOCULATED BILATERAL PLEURAL EFFUSIONS. MODERATE CARDIOMEGALY, AND OTHER CHRONIC FINDINGS, NOT SIGNIFICANTLY UNCHANGED. Nm Lung Vent/perfusion (vq)    Result Date: 6/28/2018  Nuclear medicine ventilation/perfusion study. HISTORY: Shortness of breath. Elevated d-dimer. FINDINGS: 1.0 mCi technetium DTPA, and 5.2 mCi technetium MAA were administered in the standard fashion. Ventilation and perfusion imaging was obtained in the anterior, posterior, right and left lateral, and right and left posterior oblique positions. Study is done in comparison with AP erect portable chest radiograph from today. Matched perfusion defect identified at left lung base. Ventilation defect identified left mid chest on lateral projection with no corresponding perfusion abnormality. Finding not readily visualized in other projections.  Clumping of radioisotope

## 2018-07-16 NOTE — PROGRESS NOTES
Physical Therapy  Facility/Department: [facility] OR Pool/NONE  Interdisciplinary Communication Note      NAME: Jaden Bermudez    : 1935 (81 y.o.)  MRN: [id number]    Account: [de-identified]  Gender: female      Pt transferred to ICU. D/t this acute change in medical status, DC pt from current PT program. Will await new PT evaluation and tx orders when pt is medically appropriate and stable for OOB activity.  Thank you for your referral.    Fanny Lanier, PT, 18 at 8:23 AM

## 2018-07-16 NOTE — PROGRESS NOTES
Assessment completed, patient alert and oriented x 4, complaining of pain at the sacral wound site, 5/10, lungs diminished, heart rate regular, bowel sounds active, right upper chest vascath noted, patient has a non-working left upper arm AV fistula, bilateral lower extremities noted with 1+pitting edema, sacrum noted with a [id number] stage 3 wound with purulent drainage noted, wet to dry dressing reapplied, posterior right lower leg noted with skin tear, xeroform applied with mepilex covering

## 2018-07-16 NOTE — PROGRESS NOTES
years  []   Pulmonary Disorder  (acute or chronic)  [x]   Severe or Chronic w/ Exacerbation  []     Surgical Status No [x]   Surgeries     General []   Surgery Lower []   Abdominal Thoracic or []   Upper Abdominal Thoracic with  PulmonaryDisorder  []     Chest X-ray Clear/Not  Ordered     []  Chronic Changes  Results Pending  []  Infiltrates, atelectasis, pleural effusion, or edema  [x]  Infiltrates in more than one lobe []  Infiltrate + Atelectasis, &/or pleural effusion  []    Respiratory Pattern Regular,  RR = 12-20 [x]  Increased,  RR = 21-25 []  JAMES, irregular,  or RR = 26-30 []  Decreased FEV1  or RR = 31-35 []  Severe SOB, use  of accessory muscles, or RR ? 35  []    Mental Status Alert, oriented,  Cooperative [x]  Confused but Follows commands []  Lethargic or unable to follow commands []  Obtunded  []  Comatose  []    Breath Sounds Clear to  auscultation  []  Decreased unilaterally or  in bases only []  Decreased  bilaterally  [x]  Crackles or intermittent wheezes []  Wheezes []    Cough Strong, Spontan., & nonproductive []  Strong,  spontaneous, &  productive [x]  Weak,  Nonproductive []  Weak, productive or  with wheezes []  No spontaneous  cough or may require suctioning []    Level of Activity Ambulatory []  Ambulatory w/ Assist  [x]  Non-ambulatory []  Paraplegic []  Quadriplegic []    Total    Score:__8_____     Triage Score:_4_______      Tri       Triage:     1. (>20) Freq: Q3    2. (16-20) Freq: Q4   3. (11-15) Freq: QID & Albuterol Q2 PRN    4. (6-10) Freq: TID & Albuterol Q2 PRN    5. (0-5) Freq Q4prn

## 2018-07-16 NOTE — FLOWSHEET NOTE
Patient came back to floor after dialysis and was found with blood coming from her nose and mouth. Rapid response was initiated due to the frequency of blood coming from patient's nose and mouth. Patient's bp at time was 71/29 with a pulse of 77. Dr Jessy Cerrato was notified. Patient was given 1 bag of 500cc bolus. Patient will be transferred to ICU for further care. House supervisor was made aware of situation.
took pt down to surgery

## 2018-07-16 NOTE — ANESTHESIA PRE PROCEDURE
Department of Anesthesiology  Preprocedure Note       Name:  Ihsan Mcfadden   Age:  81 y.o.  :  1935                                          MRN:  [id number]         Date:  2018      Surgeon: Aayush Butler):  Giovani Guerra MD    Procedure: Procedure(s):  control epistaxis    Medications prior to admission:   Prior to Admission medications    Medication Sig Start Date End Date Taking? Authorizing Provider   mirtazapine (REMERON) 15 MG tablet Take 1 tablet by mouth nightly 7/3/18  Yes Michael Crawford DO   atorvastatin (LIPITOR) 80 MG tablet Take 1 tablet by mouth nightly 18  Yes Kasey Bob MD   levothyroxine (SYNTHROID) 25 MCG tablet Take 25 mcg ( 1 tab) and 50 mcg ( 2 tabs) alternatively.  18  Yes Kasey Bob MD   warfarin (COUMADIN) 1 MG tablet Take 1.5 tablets by mouth daily 18  Yes Kasey Bob MD   losartan (COZAAR) 100 MG tablet Take 1 tablet by mouth daily 18  Yes Kasey Bob MD   isosorbide mononitrate (IMDUR) 60 MG extended release tablet Take 1 tablet by mouth daily 18  Yes Earnestine Fothergill, MD   carbidopa-levodopa (SINEMET)  MG per tablet Take 1 tablet by mouth daily 18  Yes Earnestine Fothergill, MD   hydrALAZINE (APRESOLINE) 100 MG tablet Take 1 tablet by mouth 3 times daily 18  Yes Earnestine Fothergill, MD   amiodarone (CORDARONE) 200 MG tablet Take 1 tablet by mouth daily 18  Yes Earnestine Fothergill, MD   docusate sodium (COLACE) 100 MG capsule Take 1 capsule by mouth daily 18  Yes Earnestine Fothergill, MD   diltiazem (CARDIZEM CD) 240 MG extended release capsule Take 1 capsule by mouth 2 times daily 18  Yes Earnestine Fothergill, MD   metoprolol tartrate (LOPRESSOR) 25 MG tablet Take 1 tablet by mouth 2 times daily 18  Yes Earnestine Fothergill, MD   calcium elemental (OSCAL) 500 MG TABS tablet Take 1 tablet by mouth 2 times daily 18  Yes Earnestine Fothergill, MD   insulin lispro (HUMALOG) 100 UNIT/ML injection vial Inject 0-6 Units into the skin 3 times daily (with meals) (TESSALON) 100 MG capsule Take 1 capsule by mouth 3 times daily as needed for Cough 1/29/18  Yes Jeny Benavidez MD   Amino Acids-Protein Hydrolys (PRO-STAT) LIQD Take 30 mLs by mouth 2 times daily   Yes Historical Provider, MD   calcium acetate (PHOSLO) 667 MG capsule Take 667 mg by mouth 3 times daily (with meals)   Yes Historical Provider, MD   cholestyramine light 4 g packet Take 0.5 packets by mouth 3 times daily as needed (DIARRHEA) 11/23/17  Yes Magdalena Perez MD   magnesium oxide (MAG-OX) 400 (241.3 Mg) MG TABS tablet Take 1 tablet by mouth daily 11/24/17  Yes Magdalena Perez MD   ipratropium-albuterol (DUONEB) 0.5-2.5 (3) MG/3ML SOLN nebulizer solution Inhale 3 mLs into the lungs every 4 hours as needed for Shortness of Breath 11/3/17  Yes Jeny Benavidez MD   pantoprazole sodium (PROTONIX) 40 MG PACK packet Take 20 mg by mouth every morning (before breakfast)    Yes Historical Provider, MD   glucose blood VI test strips (TRUETEST TEST) strip As needed. 5/1/13  Yes Arla Merlin, MD   aspirin 81 MG EC tablet Take 81 mg by mouth daily    Yes Historical Provider, MD   fluticasone (FLONASE) 50 MCG/ACT nasal spray 2 sprays by Nasal route daily.      Yes Historical Provider, MD       Current medications:    Current Facility-Administered Medications   Medication Dose Route Frequency Provider Last Rate Last Dose    oxymetazoline (AFRIN) 0.05 % nasal spray 3 spray  3 spray Each Nare Once Megan Ledezma MD        0.9 % sodium chloride infusion   Intravenous Continuous Megan Ledezma MD 50 mL/hr at 07/16/18 0318      norepinephrine (LEVOPHED) 16 mg in dextrose 5 % 250 mL infusion  2 mcg/min Intravenous Continuous Megan Ledezma MD 7.5 mL/hr at 07/16/18 0459 8 mcg/min at 07/16/18 0459    0.9 % sodium chloride bolus  250 mL Intravenous Once Megan Ledezma MD 20 mL/hr at 07/16/18 0352 250 mL at 07/16/18 0352    etomidate (AMIDATE) 2 MG/ML injection             succinylcholine chloride (ANECTINE) 100 MG/5ML 07/16/18 0704    heparin (porcine) injection 1,000 Units  1,000 Units Intravenous PRN Yoseph Looney MD   4,000 Units at 07/15/18 2209    sodium chloride flush 0.9 % injection 10 mL  10 mL Intravenous 2 times per day Praful Marshall MD   10 mL at 07/15/18 2139    sodium chloride flush 0.9 % injection 10 mL  10 mL Intravenous PRN Praful Marshall MD        magnesium hydroxide (MILK OF MAGNESIA) 400 MG/5ML suspension 30 mL  30 mL Oral Daily PRN Praful Marshall MD        ondansetron (ZOFRAN) injection 4 mg  4 mg Intravenous Q6H PRN Praful Marshall MD   4 mg at 07/16/18 0651    glucose (GLUTOSE) 40 % oral gel 15 g  15 g Oral PRN Praful Marshall MD        dextrose 50 % solution 12.5 g  12.5 g Intravenous PRN Praful Marshall MD        glucagon (rDNA) injection 1 mg  1 mg Intramuscular PRN Praful Marshall MD        dextrose 5 % solution  100 mL/hr Intravenous PRN Praful Marshall MD        insulin lispro (HUMALOG) injection vial 0-6 Units  0-6 Units Subcutaneous TID WC Praful Marshall MD        insulin lispro (HUMALOG) injection vial 0-3 Units  0-3 Units Subcutaneous Nightly Praful Marshall MD   Stopped at 07/15/18 2339    ipratropium-albuterol (DUONEB) nebulizer solution 3 mL  3 mL Inhalation TID Praful Marshall MD        albuterol (PROVENTIL) nebulizer solution 2.5 mg  2.5 mg Nebulization Q2H PRN Praful Marshall MD        sodium chloride 0.9 % infusion                Allergies:     Allergies   Allergen Reactions    Arthrotec [Diclofenac-Misoprostol] Nausea Only    Depakote [Divalproex Sodium] Itching    Dilantin [Phenytoin]     Klonopin [Clonazepam]     Statins Other (See Comments)     achy       Problem List:    Patient Active Problem List   Diagnosis Code    Hypothyroidism E03.9    Hypercholesteremia E78.00    CKD (chronic kidney disease) N18.9    Atrial fibrillation (HCC) I48.91    Acid reflux K21.9    Anxiety F41.9    Essential hypertension I10    Idiopathic Parkinson's disease ([address] G20    Cardiac complication, not stated as uncontrolled 1998    Unspecified osteoarthritis, unspecified site     Urinary tract infection     Weakness        Past Surgical History:        Procedure Laterality Date    APPENDECTOMY  2007    BLADDER SUSPENSION  2008 & 2009    [facility] at [address]  2/2006    COLONOSCOPY  4/2007    Dr. Mosqueda Fruits [address] N/A 11/2/2017    CATHETER INSERTION HEMODIALYSIS performed by Gemma Luciano MD at [address] [place] [address] Rd  [phone]    Bilateral    CO COLONOSCOPY FLX DX W/COLLJ SPEC WHEN PFRMD N/A 2/1/2018    COLONOSCOPY performed by Edwige Rg MD at [address] [facility] EGD TRANSORAL BIOPSY SINGLE/MULTIPLE N/A 2/1/2018    EGD ESOPHAGOGASTRODUODENOSCOPY WITH BIOPSY performed by Edwige Rg MD at [address] [place] [address] EGD TRANSORAL BIOPSY SINGLE/MULTIPLE N/A 3/27/2018    EGD ESOPHAGOGASTRODUODENOSCOPY [facility] N/A 3/30/2018    EXAM UNDER ANESTHESIA, LARYNGOSCOPY ,PHARYNGOSCOPY performed by Lindy Mullins MD at [facility] 32 N/A 3/30/2018    REMOVAL PACKING POSS CONTROL EPISTAXIS, NASAL ENDOSCOPY performed by Lindy Mullins MD at [address] [place] [address]  3/2003       Social History:    Social History   Substance Use Topics    Smoking status: Former Smoker    Smokeless tobacco: Never Used    Alcohol use No                                Counseling given: Not Answered      Vital Signs (Current):   Vitals:    07/16/18 0615 07/16/18 0630 07/16/18 0635 07/16/18 0643   BP: (!) 144/62 (!) 117/31 (!) 123/41 (!) 118/47   Pulse: 77 78 77    Resp: 15 18 16    Temp:   98.2 °F (36.8 °C) 98.4 °F (36.9 °C)   TempSrc:   Axillary Axillary   SpO2: 94% 96%     Weight:                                                  BP Readings from Last 3 Encounters:   07/16/18 (!)

## 2018-07-16 NOTE — PROGRESS NOTES
Occupational Therapy  Facility/Department: [facility] OR Pool/NONE  Interdisciplinary Communication Note      To the referring provider,     While we thank you for your referral, Codi Hook was not seen for an evaluation as:     [x] The patient has had a change in status and/or is not medically stable to participate. Please re-order at your convenience if OT is warranted. [] The patient was observed as IND in the room and does not require skilled services. [] The patient was observed as DEP; skilled services would not improve the patient's functional status. Thank you,    Electronically signed by PRIYA Love on 7/16/18 at 7:39 AM    [facility] EMERGENCY [facility] AT [place] [facility]. Department.

## 2018-07-16 NOTE — CONSULTS
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 [address]     Type II or unspecified type diabetes mellitus without mention of complication, not stated as uncontrolled 1998    Unspecified osteoarthritis, unspecified site     Urinary tract infection     Weakness        Past Surgical History:        Procedure Laterality Date    APPENDECTOMY  2007    BLADDER SUSPENSION  2008 & 2009    [facility], second at [address]  2/2006    COLONOSCOPY  4/2007    Dr. [facility]    [address] N/A 11/2/2017    CATHETER INSERTION HEMODIALYSIS performed by Zoe Alex MD at [address] [place] [address] Rd  [phone]    Bilateral    RI COLONOSCOPY FLX DX W/COLLJ SPEC WHEN PFRMD N/A 2/1/2018    COLONOSCOPY performed by Gabrielle Adams MD at [facility] EGD TRANSORAL BIOPSY SINGLE/MULTIPLE N/A 2/1/2018    EGD ESOPHAGOGASTRODUODENOSCOPY WITH BIOPSY performed by Gabrielle Adams MD at [address] [place] [address] EGD TRANSORAL BIOPSY SINGLE/MULTIPLE N/A 3/27/2018    EGD ESOPHAGOGASTRODUODENOSCOPY performed by Gabrielle Adams MD at [facility] 112 N/A 3/30/2018    EXAM UNDER ANESTHESIA, LARYNGOSCOPY ,PHARYNGOSCOPY performed by Rocio Hobbs MD at [facility] 32 N/A 3/30/2018    REMOVAL PACKING POSS CONTROL EPISTAXIS, NASAL ENDOSCOPY performed by Rocio Hobbs MD at [address] [place] [address] 3  3/2003       Social History:     reports that she has quit smoking. She has never used smokeless tobacco. She reports that she does not drink alcohol or use drugs. Family History:   Family History   Problem Relation Age of Onset    Heart Disease Father     Early Death Father 47    Diabetes Father     Heart Disease Mother     Diabetes Mother     High Blood Pressure Mother     High Blood Pressure Sister     High Cholesterol Sister        Allergies:  Arthrotec [diclofenac-misoprostol]; Depakote [divalproex sodium]; Dilantin [phenytoin];  Klonopin [clonazepam]; and Statins        MEDICATIONS during current hospitalization:    Continuous Infusions:   norepinephrine 8 mcg/min (07/16/18 2270)    dextrose         Scheduled Meds:   oxymetazoline  3 spray Each Nare Once    sodium chloride  250 mL Intravenous Once    etomidate        succinylcholine chloride        midazolam        propofol        sodium chloride  250 mL Intravenous Once    amiodarone  200 mg Oral Daily    atorvastatin  80 mg Oral Nightly    b complex-C-folic acid  1 capsule Oral Daily    calcium acetate  667 mg Oral TID WC    carbidopa-levodopa  1 tablet Oral Daily    diltiazem  240 mg Oral BID    docusate sodium  100 mg Oral Daily    folic acid  1 mg Oral Daily    magnesium oxide  400 mg Oral Daily    metoprolol tartrate  25 mg Oral BID    mirtazapine  15 mg Oral Nightly    pantoprazole  20 mg Oral QAM AC    primidone  50 mg Oral TID    sodium chloride nebulizer  4 mL Nebulization BID    sodium chloride flush  10 mL Intravenous 2 times per day    insulin lispro  0-6 Units Subcutaneous TID WC    insulin lispro  0-3 Units Subcutaneous Nightly    ipratropium-albuterol  3 mL Inhalation TID       PRN Meds:heparin (porcine), sodium chloride, albumin human, acetaminophen, cholestyramine light, sodium chloride, heparin (porcine), sodium chloride flush, magnesium hydroxide, ondansetron, glucose, dextrose, glucagon (rDNA), dextrose, albuterol        REVIEW OF SYSTEMS:  ROS: 10 organs review of system is done including general, psychological, ENT, hematological, endocrine, respiratory, cardiovascular, gastrointestinal, musculoskeletal, neurological,  allergy and Immunology is done and is otherwise negative. PHYSICAL EXAM:    Vitals:  BP (!) 149/45   Pulse 75   Temp 98.4 °F (36.9 °C) (Axillary)   Resp 21   Ht 4' 11\" (1.499 m) Comment: per EMR  Wt 95 lb 14.4 oz (43.5 kg)   LMP  (LMP Unknown)   SpO2 95%   BMI 19.37 kg/m²     General: alert, cooperative, no distress  Head: normocephalic, atraumatic  Eyes:No gross abnormalities. and Sclera nonicteric  ENT:  MMM no lesions, packing with Lr catheter in the left nostril  Neck:  supple and no masses  Chest : Fine basilar rales, otherwise clear no wheezing. Heart[de-identified] Heart sounds are normal.  Regular rate and rhythm without murmur, gallop or rub. ABD:  symmetric, liver span normal to percussion, soft, non-tender, spleen non-palpable  Musculoskeletal : no cyanosis, no clubbing and no edema  Neuro:  Grossly normal  Skin: No rashes or nodules noted. Lymph node:  no cervical nodes  Urology: Yes Lr   Psychiatric: appropriate        Data Review  Recent Labs      07/15/18   1246  07/15/18   2223  07/16/18   0346  07/16/18   0933   WBC  8.9   --   7.2   --    HGB  8.4*  7.7*  6.1*  9.7*   HCT  25.7*  23.3*  18.2*  27.9*   PLT  153   --   112*   --       Recent Labs      07/15/18   1230  07/15/18   1338  07/16/18   0346   NA  132   --   135   K  7.0*  6.9*  4.5   CL  97*   --   99   CO2  24   --   28   BUN  54*   --   22   CREATININE  3.14*   --   1.56*   GLUCOSE  198*   --   111*       MV Settings: ABGs: No results for input(s): PHART, XVB7DVY, PO2ART, CLN5QBR, BEART, O0SXLKWV, WJL9STD in the last 72 hours.   O2 Device: Nasal cannula  O2 Flow Rate (L/min): 2 L/min (changed to room air)  Lab Results   Component Value Date    LACTA 1.0 07/15/2018    LACTA 0.8 06/16/2018    LACTA 1.4

## 2018-07-16 NOTE — CONSULTS
Consult to Cardiology  Consult performed by: Pascual Factor  Consult ordered by: Byron Bhandari              [address] [facility] Consult Note        Date of Consult:   2018    Patient:    Abril Liu    :    1935  CSN:    [id number]    Consulting Cardiologist: Reynaldo Hurtado MD     Primary Cardiologist: Angelo Bautista MD    Requesting Physician:  Ronit Garcia MD    Reason for Consult:  Epistaxis, Anemia, AFIB, LTAC use ( Eliquis ) and RF     IMPRESSIONS:     1. Epistaxis, post packing  2. Hx Hemoptysis / GIB, Post Transfusions  3. Aspiration PNA, recurrent  4. Elevated Troponins, Due to Above, Type 2 NSTEMI (Demand Ischemia)  5. PAF, in AV paced Rhythm. On IV Lopressor, anticoagulation with Coumadin currently held. Restart on DC home if ok with others. 6. Sinus node dysfunction s/p PPM 2017  7. CAD s/p remote PCI/BMS to LCX , [facility] 2013 showing diffuse vessel calcification with a 30%-50% stenosis of the proximal RCA, normal LM, heavily calcified LAD with mid to distal tapering with 100% distal occlusion, filled by collaterals, mild disease of LCX. 8. Perfusion scan 2015 negative for ischemia. 9. Normal LV systolic function with EF 60-65%, moderate AS per echo 2017  10. HTN  11. HLP  12. COPD  15. ESRD on HD  14. Otherwise as per PMH below.     RECOMMENDATIONS:     1. Cardiac, Pulmonary, ENT and Primary Supportive Care. 2.. Hold Coumadin for now. Would Favor Renal Dose Eliquis if tolerated and ok with others as suggested before. 3.  OK to discharge back to nursing home once stable  4. Renal Care  5.   See Orders.        PROBLEM LIST:            Diagnosis    Hypothyroidism    Hypercholesteremia    CKD (chronic kidney disease)    Paroxysmal atrial fibrillation (HCC)    Anemia    Acid reflux    Anxiety    Diabetes mellitus due to underlying condition, controlled, with stage 5 chronic kidney disease not on chronic dialysis, with long-term current use of insulin disease     Chronic obstructive pulmonary disease (COPD) (HCC)     Constipation, chronic     Difficulty in walking     Diverticular disease of the colon     End stage renal disease (HCC)     Enterocolitis due to Clostridium difficile, not specified as recurrent     ESRD (end stage renal disease) ([address]     Fistula     left    Gastro-esophageal reflux disease without esophagitis     GERD (gastroesophageal reflux disease)     GERD, PUD, Hiatal Hernia    Granulomatous lung disease ([address]     History of endoscopy 2-21-12    Dr. Rolly Stewart    Hyperlipemia     Hyperlipidemia     Hypertension     Hypothyroidism     Kidney stones 11/2001    Dr. Edgar Barajas    Long-term (current) use of anticoagulants     Muscle weakness (generalized)     Neuropathy in diabetes ([address]     legs    Osteoarthritis     Parkinsons disease ([address]     Peptic ulcer, site unspecified, unspecified as acute or chronic, without hemorrhage or perforation     Positive ORTEGA (antinuclear antibody)     1:80    Presence of cardiac pacemaker     Seizures ([address]     Tachycardia     Tachypnea, not elsewhere classified     Thrombophlebitis leg superficial     Type 2 diabetes mellitus with hypoglycemia without coma ([address]     Type II or unspecified type diabetes mellitus without mention of complication, not stated as uncontrolled 1998    Unspecified osteoarthritis, unspecified site     Urinary tract infection     Weakness        Past Surgical History:   Procedure Laterality Date    APPENDECTOMY  2007    BLADDER SUSPENSION  2008 & 2009    [facility] at [address]  2/2006    COLONOSCOPY  4/2007    Dr. Laney Hardy    [address] N/A 11/2/2017    CATHETER INSERTION HEMODIALYSIS performed by Loretta Arellano MD at [address] [place] [address] Rd  [phone]    Bilateral    DE COLONOSCOPY FLX DX W/COLLJ SPEC WHEN PFRMD N/A 2/1/2018    COLONOSCOPY performed by Marin Hair MD at [address] [place] [address] Collection Time: 07/15/18 12:30 PM   Result Value Ref Range    Troponin 0.128 (HH) 0.000 - 0.010 ng/mL   CBC Auto Differential    Collection Time: 07/15/18 12:46 PM   Result Value Ref Range    WBC 8.9 4.8 - 10.8 K/uL    RBC 2.77 (L) 4.20 - 5.40 M/uL    Hemoglobin 8.4 (L) 12.0 - 16.0 g/dL    Hematocrit 25.7 (L) 37.0 - 47.0 %    MCV 92.8 82.0 - 100.0 fL    MCH 30.3 27.0 - 31.3 pg    MCHC 32.7 (L) 33.0 - 37.0 %    RDW 19.4 (H) 11.5 - 14.5 %    Platelets 658 396 - 071 K/uL    Neutrophils % 77.4 %    Lymphocytes % 14.4 %    Monocytes % 7.8 %    Eosinophils % 0.1 %    Basophils % 0.3 %    Neutrophils # 6.9 (H) 1.4 - 6.5 K/uL    Lymphocytes # 1.3 1.0 - 4.8 K/uL    Monocytes # 0.7 0.2 - 0.8 K/uL    Eosinophils # 0.0 0.0 - 0.7 K/uL    Basophils # 0.0 0.0 - 0.2 K/uL   Protime-INR    Collection Time: 07/15/18 12:53 PM   Result Value Ref Range    Protime 15.2 (H) 9.6 - 12.3 sec    INR 1.4    EKG 12 Lead - Chest Pain    Collection Time: 07/15/18 12:56 PM   Result Value Ref Range    Ventricular Rate 60 BPM    Atrial Rate 60 BPM    P-R Interval 328 ms    QRS Duration 162 ms    Q-T Interval 504 ms    QTc Calculation (Bazett) 504 ms    P Axis -8 degrees    R Axis -66 degrees    T Axis 87 degrees   Potassium    Collection Time: 07/15/18  1:38 PM   Result Value Ref Range    Potassium 6.9 (HH) 3.5 - 5.1 mEq/L   Urine Reflex to Culture    Collection Time: 07/15/18  2:15 PM   Result Value Ref Range    Color, UA FARZAD (A) Straw/Yellow    Clarity, UA SLCLOUDY Clear    Glucose, Ur 100 (A) Negative mg/dL    Bilirubin Urine Negative Negative    Ketones, Urine Negative Negative mg/dL    Specific Gravity, UA 1.017 1.005 - 1.030    Blood, Urine LARGE (A) Negative    pH, UA 7.0 5.0 - 9.0    Protein, UA >=300 (A) Negative mg/dL    Urobilinogen, Urine 0.2 <2.0 E.U./dL    Nitrite, Urine Negative Negative    Leukocyte Esterase, Urine LARGE (A) Negative    Urine Reflex to Culture YES    Urine Culture    Collection Time: 07/15/18  2:15 PM   Result Value Ref Range    Urine Culture, Routine       Growth present in less than 12 hours. Unable to identify  organisms at present time, culture reincubated. Further  results to follow in 24 hours.      Microscopic Urinalysis    Collection Time: 07/15/18  2:15 PM   Result Value Ref Range    WBC, UA 10-20 (A) 0 - 5 /HPF    RBC, UA >100 (A) 0 - 2 /HPF    Epi Cells 5-10 /HPF    Renal Epithelial, Urine 3-5 /HPF    Bacteria, UA Many /HPF    Amorphous, UA 1+     Yeast, UA Present (A)    POCT Glucose    Collection Time: 07/15/18  5:30 PM   Result Value Ref Range    POC Glucose 138 (H) 60 - 115 mg/dl    Performed on ACCU-CHEK    Hemoglobin A1c    Collection Time: 07/15/18  6:11 PM   Result Value Ref Range    Hemoglobin A1C 5.3 4.8 - 5.9 %   Hepatitis Panel, Acute    Collection Time: 07/15/18  6:11 PM   Result Value Ref Range    Hep A IgM Non-reactive     Hep B Core Ab, IgM Non-reactive     Hep B S Ag Interp Non-reactive     Hep C Ab Interp Non-reactive     Hepatitis Interpretation: see below    POCT Glucose    Collection Time: 07/15/18  9:50 PM   Result Value Ref Range    POC Glucose 98 60 - 115 mg/dl    Performed on ACCU-CHEK    TYPE AND SCREEN    Collection Time: 07/15/18 10:23 PM   Result Value Ref Range    ABO/Rh A POS     Antibody Screen NEG    Hemoglobin and Hematocrit, Blood    Collection Time: 07/15/18 10:23 PM   Result Value Ref Range    Hemoglobin 7.7 (L) 12.0 - 16.0 g/dL    Hematocrit 23.3 (L) 37.0 - 47.0 %   PREPARE FRESH FROZEN PLASMA Number of Units: 2    Collection Time: 07/15/18 10:23 PM   Result Value Ref Range    Product Code Blood Bank Q6614V56     Description Blood Bank Plasma, 5 Day, Thawed     Unit Number [id number]     Dispense Status Blood Bank issued     Product Code Blood Bank [id number]     Description Blood Bank Plasma, 5 Day, Thawed     Unit Number [id number]     Dispense Status Blood Bank issued    PREPARE RBC (CROSSMATCH) Number of Units: 1, 1 Units    Collection Time: 07/15/18 10:23 PM

## 2018-07-16 NOTE — BRIEF OP NOTE
Brief Postoperative Note  ______________________________________________________________    Patient: Padmaja Tomlinson  YOB: 1935  MRN: [id number]  Date of Procedure: 7/16/2018    Pre-Op Diagnosis: uncontrolled bleeding    Post-Op Diagnosis: Same       Procedure(s):  control epistaxis    Anesthesia: General    Surgeon(s):  Kell Carroll MD    Staff:  First Assistant: Tatiana Reyes  Scrub Person First: Thomas Gómez     Estimated Blood Loss: * No values recorded between 7/16/2018  7:18 AM and 7/16/2018  8:05 AM 50 mL    Complications: None    Specimens:   * No specimens in log *    Implants:  * No implants in log *      Drains:   Urethral Catheter (Active)   Catheter Indications Need for fluid management in critically ill patients in a critical care setting not able to be managed by other means such as BSC with hat, bedpan, urinal, condom catheter, or short term intermittent urethral catherization 7/16/2018  4:00 AM   Site Assessment No urethral drainage 7/16/2018  4:00 AM   Urine Color Usha 7/16/2018  4:00 AM   Urine Appearance Cloudy 7/16/2018  4:00 AM   Output (mL) 250 mL 7/16/2018  6:40 AM       Urethral Catheter Latex 12 fr (Active)       Fecal Management System (Active)       Findings:some ozing and a area of nasal septal mucosa suggestive of blood vessel .     Kell Carroll MD  Date: 7/16/2018  Time: 8:05 AM

## 2018-07-16 NOTE — PROGRESS NOTES
History of endoscopy 2-21-12    Dr. Charli Donnelly Hyperlipemia     Hyperlipidemia     Hypertension     Hypothyroidism     Kidney stones 11/2001    Dr. Franklyn Villarreal    Long-term (current) use of anticoagulants     Muscle weakness (generalized)     Neuropathy in diabetes ([address]     legs    Osteoarthritis     Parkinsons disease ([address]     Peptic ulcer, site unspecified, unspecified as acute or chronic, without hemorrhage or perforation     Positive ORTEGA (antinuclear antibody)     1:80    Presence of cardiac pacemaker     Seizures ([address]     Tachycardia     Tachypnea, not elsewhere classified     Thrombophlebitis leg superficial     Type 2 diabetes mellitus with hypoglycemia without coma (HCC)     Type II or unspecified type diabetes mellitus without mention of complication, not stated as uncontrolled 1998    Unspecified osteoarthritis, unspecified site     Urinary tract infection     Weakness         Patient Active Problem List   Diagnosis    Hypothyroidism    Hypercholesteremia    CKD (chronic kidney disease)    Atrial fibrillation (HCC)    Acid reflux    Anxiety    Essential hypertension    Idiopathic Parkinson's disease ([address]    Cardiac pacemaker    Pleural effusion, bilateral    Type 2 diabetes mellitus ([address]    Sacral decubitus ulcer, stage III ([address]    Severe malnutrition ([address]    CHF (congestive heart failure), NYHA class III, acute on chronic, combined ([address]    Hypoxia    Atelectasis, bilateral    Hyperkalemia    Malnutrition related to chronic disease (HCC)        Allergies   Allergen Reactions    Arthrotec [Diclofenac-Misoprostol] Nausea Only    Depakote [Divalproex Sodium] Itching    Dilantin [Phenytoin]     Klonopin [Clonazepam]     Statins Other (See Comments)     achy        Current Inpatient Medications:    Current Facility-Administered Medications   Medication Dose Route Frequency Provider Last Rate Last Dose    oxymetazoline (AFRIN) 0.05 % nasal spray 3 spray  3 spray Each Nare Once Patrick Siddiqui MD        norepinephrine (LEVOPHED) 16 mg in dextrose 5 % 250 mL infusion  2 mcg/min Intravenous Continuous Patrick Siddiqui MD 7.5 mL/hr at 07/16/18 0459 8 mcg/min at 07/16/18 0459    0.9 % sodium chloride bolus  250 mL Intravenous Once Patrick Siddiqui MD 20 mL/hr at 07/16/18 0352 250 mL at 07/16/18 0352    etomidate (AMIDATE) 2 MG/ML injection             succinylcholine chloride (ANECTINE) 100 MG/5ML injection             midazolam (VERSED) 2 MG/2ML injection             propofol 1000 MG/100ML injection             0.9 % sodium chloride bolus  250 mL Intravenous Once Patrick Siddiqui MD        heparin (porcine) injection 1,000 Units  1,000 Units Intercatheter PRN Yoseph Looney MD        sodium chloride 250 mL  250 mL Intravenous PRN Yoseph Looney MD        albumin human 25 % solution 25 g  25 g Intravenous Once PRN Yoseph Looney MD        desmopressin (DDAVP) 13.04 mcg in sodium chloride 0.9 % 50 mL IVPB  0.3 mcg/kg Intravenous Once Zeny Casas MD        acetaminophen (TYLENOL) tablet 650 mg  650 mg Oral Q6H PRN Praful Marshall MD        amiodarone (CORDARONE) tablet 200 mg  200 mg Oral Daily Praful Marshall MD   Stopped at 07/15/18 2337    atorvastatin (LIPITOR) tablet 80 mg  80 mg Oral Nightly Praful Marshall MD   Stopped at 07/15/18 2338    b complex-C-folic acid (NEPHROCAPS) capsule 1 mg  1 capsule Oral Daily Praful Marshall MD   Stopped at 07/15/18 2338    calcium acetate (PHOSLO) capsule 667 mg  667 mg Oral TID  Praful Marshall MD        carbidopa-levodopa (SINEMET)  MG per tablet 1 tablet  1 tablet Oral Daily Praful Marshall MD   Stopped at 07/15/18 2338    cholestyramine light packet 2 g  2 g Oral TID PRN Praful Marshall MD        diltiazem (CARDIZEM CD) extended release capsule 240 mg  240 mg Oral BID Praful Marshall MD   Stopped at 07/15/18 2141    docusate sodium (COLACE) capsule 100 mg  100 mg Oral Daily Praful Marshall MD   Stopped at 07/15/18 9884    folic acid (FOLVITE) tablet 1 mg  1 mg Oral Daily Veronika Sy MD   Stopped at 07/15/18 2338    magnesium oxide (MAG-OX) tablet 400 mg  400 mg Oral Daily Veronika Sy MD   Stopped at 07/15/18 2339    metoprolol tartrate (LOPRESSOR) tablet 25 mg  25 mg Oral BID Veronika Sy MD   Stopped at 07/15/18 2339    mirtazapine (REMERON) tablet 15 mg  15 mg Oral Nightly Veronika Sy MD   Stopped at 07/15/18 2340    pantoprazole (PROTONIX) tablet 20 mg  20 mg Oral QAM AC Veronika Sy MD        primidone (MYSOLINE) tablet 50 mg  50 mg Oral TID Veronika Sy MD   Stopped at 07/15/18 2340    sodium chloride (OCEAN, BABY AYR) 0.65 % nasal spray 2 spray  2 spray Nasal PRN Veronika Sy MD        sodium chloride nebulizer 0.9 % solution 4 mL  4 mL Nebulization BID Veronika Sy MD   Stopped at 07/16/18 0704    heparin (porcine) injection 1,000 Units  1,000 Units Intravenous PRN Brandt Brown MD   4,000 Units at 07/15/18 2209    sodium chloride flush 0.9 % injection 10 mL  10 mL Intravenous 2 times per day Veronika Sy MD   10 mL at 07/15/18 2139    sodium chloride flush 0.9 % injection 10 mL  10 mL Intravenous PRN Veronika Sy MD        magnesium hydroxide (MILK OF MAGNESIA) 400 MG/5ML suspension 30 mL  30 mL Oral Daily PRN Veronika Sy MD        ondansetron (ZOFRAN) injection 4 mg  4 mg Intravenous Q6H PRN Veronika Sy MD   4 mg at 07/16/18 0651    glucose (GLUTOSE) 40 % oral gel 15 g  15 g Oral PRN Veronika Sy MD        dextrose 50 % solution 12.5 g  12.5 g Intravenous PRN Veronika Sy MD        glucagon (rDNA) injection 1 mg  1 mg Intramuscular PRN Veronika Sy MD        dextrose 5 % solution  100 mL/hr Intravenous PRN Veronika Sy MD        insulin lispro (HUMALOG) injection vial 0-6 Units  0-6 Units Subcutaneous TID WC Veronika Sy MD        insulin lispro (HUMALOG) injection vial 0-3 Units  0-3 Units Subcutaneous Nightly Veronika Sy MD   Stopped at 07/15/18 2339  ipratropium-albuterol (DUONEB) nebulizer solution 3 mL  3 mL Inhalation TID Rosalie Rollins MD        albuterol (PROVENTIL) nebulizer solution 2.5 mg  2.5 mg Nebulization Q2H PRN Rosalie Rollins MD               Labs:  CBC with Differential:    Lab Results   Component Value Date    WBC 7.2 07/16/2018    RBC 2.01 07/16/2018    RBC 3.24 12/17/2011    HGB 9.7 07/16/2018    HCT 27.9 07/16/2018     07/16/2018    MCV 90.2 07/16/2018    MCH 30.2 07/16/2018    MCHC 33.5 07/16/2018    RDW 16.8 07/16/2018    BANDSPCT 1 04/07/2018    METASPCT 1 03/23/2018    LYMPHOPCT 14.4 07/15/2018    MONOPCT 7.8 07/15/2018    MYELOPCT 1 03/14/2018    EOSPCT 1.0 12/17/2011    BASOPCT 0.3 07/15/2018    MONOSABS 0.7 07/15/2018    LYMPHSABS 1.3 07/15/2018    EOSABS 0.0 07/15/2018    BASOSABS 0.0 07/15/2018     CMP:    Lab Results   Component Value Date     07/16/2018    K 4.5 07/16/2018    CL 99 07/16/2018    CO2 28 07/16/2018    BUN 22 07/16/2018    CREATININE 1.56 07/16/2018    GFRAA 38.5 07/16/2018    LABGLOM 31.8 07/16/2018    GLUCOSE 111 07/16/2018    GLUCOSE 197 02/17/2012    PROT 5.1 07/16/2018    LABALBU 2.0 07/16/2018    LABALBU 3.5 12/17/2011    CALCIUM 7.2 07/16/2018    BILITOT 0.6 07/16/2018    ALKPHOS 87 07/16/2018    AST 30 07/16/2018    ALT 17 07/16/2018     Hepatic Function Panel:    Lab Results   Component Value Date    ALKPHOS 87 07/16/2018    ALT 17 07/16/2018    AST 30 07/16/2018    PROT 5.1 07/16/2018    BILITOT 0.6 07/16/2018    BILIDIR 0.0 09/26/2017    IBILI 0.1 09/26/2017    LABALBU 2.0 07/16/2018    LABALBU 3.5 12/17/2011     Magnesium:    Lab Results   Component Value Date    MG 1.7 07/16/2018    MG 1.4 12/17/2011     PT/INR:    Lab Results   Component Value Date    PROTIME 15.2 07/15/2018    PROTIME 20.7 07/12/2018    PROTIME 25.7 09/29/2016    INR 1.4 07/15/2018     Last 3 Troponin:    Lab Results   Component Value Date    TROPONINI 0.128 07/15/2018    TROPONINI 0.139 06/28/2018    TROPONINI 0.143 06/28/2018     U/A:    Lab Results   Component Value Date    COLORU FARZAD 07/15/2018    PHUR 7.0 07/15/2018    LABCAST 0-1 Hyaline 11/23/2013    WBCUA 10-20 07/15/2018    RBCUA >100 07/15/2018    MUCUS Present 09/04/2013    YEAST Present 07/15/2018    BACTERIA Many 07/15/2018    CLARITYU SLCLOUDY 07/15/2018    SPECGRAV 1.017 07/15/2018    LEUKOCYTESUR LARGE 07/15/2018    UROBILINOGEN 0.2 07/15/2018    BILIRUBINUR Negative 07/15/2018    BILIRUBINUR Negative 07/05/2018    BLOODU LARGE 07/15/2018    GLUCOSEU 100 07/15/2018    GLUCOSEU NEG 11/25/2011    AMORPHOUS 1+ 07/15/2018     ABG:    Lab Results   Component Value Date    PHART 7.430 04/09/2018    ZDL8TQR 42 04/09/2018    PO2ART 458 04/09/2018    AGM9GRR 28.0 04/09/2018    BEART 4 04/09/2018    TLR0WOO 29 04/09/2018    Z7FYVEFF 100 04/09/2018     FLP:    Lab Results   Component Value Date    TRIG 97 05/08/2018    HDL 25 05/08/2018    LDLCALC 55 05/08/2018     TSH:    Lab Results   Component Value Date    TSH 3.380 06/16/2018      DATA:   ECG: Sinus Rhythm       ASSESSMENT:   1  Acute  nosebleed  S/p  ent  Intervention  With  baloon hamostatsis  2  esrd  With  Hyperkalemia  neph  Following  3  Pneumonia  POA  On  levaquin  4   Cad no  Chest  Pain  5  paf    DM  PLAN    stable  At  presents

## 2018-07-16 NOTE — PROGRESS NOTES
Nutrition Assessment    Type and Reason for Visit: Initial, Positive Nutrition Screen, Consult (Raheem scale, + malnutrition screen)    Nutrition Recommendations:  Modify current diet, Start ONS   Change diet to KARRI  Add High Calorie ONS x1, Protein Modular @ L, Clear ONS @ D   Follow up  for accpetance    Malnutrition Assessment:  · Malnutrition Status: Meets the criteria for severe malnutrition  · Context: Chronic illness  · Findings of the 6 clinical characteristics of malnutrition (Minimum of 2 out of 6 clinical characteristics is required to make the diagnosis of moderate or severe Protein Calorie Malnutrition based on AND/ASPEN Guidelines):  1. Energy Intake-Less than or equal to 75%, greater than or equal to 3 months    2. Weight Loss-20% loss or greater, in 1 year (9 months)  3. Fat Loss-Mild subcutaneous fat loss, Triceps, Orbital  4. Muscle Loss-Moderate muscle mass loss, Thigh (quadriceps), Clavicles (pectoralis and deltoids), Temples (temporalis muscle)  5. Fluid Accumulation-Mild fluid accumulation, Generalized  6.  Strength-Normal    Nutrition Diagnosis:   · Problem: Severe malnutrition, in context of chronic illness  · Etiology: related to Insufficient energy/nutrient consumption, Renal dysfunction, Increased demand for energy/nutrients due to     Signs and symptoms:  as evidenced by Weight loss greater than or equal to 20% in 1 year, Diet history of poor intake, Presence of wounds, Localized or generalized fluid accumulation, Moderate muscle loss    Nutrition Assessment:  · Subjective Assessment: Pt admitted from NH for hyperkalemia, developed epixtasis during HD and tx to ICU. Has nasal packing in place and green catheter in nostril for drainange. Pt requesting soup to eat.  States she isn't a very big eater,   · Nutrition-Focused Physical Findings: last HD 7/15, 1+ pitting, generalized edema per nursing, I/O = 3932 ( 320 po)/3520 ( dialysis), peripheral live, has IVF @ 50, ordered to d/c when po tolerated. Noted pt with frequent hospitalizations  · Wound Type: Stage III (sacrum)  · Current Nutrition Therapies:  · Oral Diet Orders: Cardiac   · Oral Diet intake: Unable to assess  · Oral Nutrition Supplement (ONS) Orders:  (no trays consumed yet, being held. okay for lunch)  · ONS intake: Unable to assess  · Anthropometric Measures:  · Ht: 4' 11\" (149.9 cm) (per EMR)   · Current Body Wt: 95 lb (43.1 kg)  · Admission Body Wt: 100 lb (45.4 kg) (stated)  · Usual Body Wt: 124 lb (56.2 kg) (( 10/17), 107# ( 1/18), 101# ( 3/18))  · % Weight Change: 29# (23%),  9  months  · Ideal Body Wt: 95 lb (43.1 kg), % Ideal Body ~100%  · BMI Classification:  (19.2)  · Comparative Standards (Estimated Nutrition Needs):  · Estimated Daily Total Kcal: 3288-8955  · Estimated Daily Protein (g): 65-87    Estimated Intake vs Estimated Needs: Insufficient Data    Nutrition Risk Level: High    Nutrition Interventions:   Modify current diet, Start ONS (Change diet to KARRI, Add High Calorie ONS x1, Protein Modular @ L, Clear ONS @ D and follow up  for accpetance)  Continued Inpatient Monitoring, Education Not Indicated    Nutrition Evaluation:   · Evaluation: Goals set   · Goals: po > 50% meals  and supplements to promote wound healing, Add Carb COntrol restricion if gluc > 200, stable renal labs, wt 95# +/- 3#    · Monitoring: Meal Intake, Supplement Intake, Diet Tolerance, Skin Integrity, Fluid Balance, Weight, Pertinent Labs    See Adult Nutrition Doc Flowsheet for more detail.      Electronically signed by Seth Killian RD, LD on 7/16/18 at 10:33 AM

## 2018-07-16 NOTE — CONSULTS
[address]                       [address], [address] [facility]                                   CONSULTATION    PATIENT NAME: Dallin Chase                    :        1935  MED REC NO:   [id number]                            ROOM:       01  ACCOUNT NO:   [de-identified]                           ADMIT DATE: 07/15/2018  PROVIDER:     Chacho Workman MD    CONSULT DATE:  2018    REASON FOR CONSULTATION:  ENT evaluation and management for epistaxis. REPORT OF CONSULTATION:  This is an 60-year-old female, who has been  admitted with hyperkalemia, epistaxis, pneumonia, anemia, aortic stenosis,  Parkinson's disease. In the hospital, the patient had started bleeding  from the nose and it has continued. The patient did not have any packing. The patient's chart was reviewed and labs were reviewed. PAST MEDICAL HISTORY:  Chronic kidney disease; on dialysis, atrial  fibrillation, COPD, diverticular disease, gastroesophageal reflux disease,  osteoarthritis, type 2 diabetes. PAST SURGICAL HISTORY:  Appendectomy, bladder suspension, cholecystectomy,  colonoscopy, hemodialysis catheter, hysterectomy, joint replacement,  rotator cuff surgery, thyroid surgery. LABORATORY DATA:  The patient's labs reviewed show the potassium of 7.0,  creatinine 3.14, BUN 54. PHYSICAL EXAMINATION:  GENERAL:  Examination reveals an 60-year-old female, who is awake, alert,  not in any distress. HEENT:  Bleeding is noted from the left naris and some in the oral  cavities. NECK:  No masses. IMPRESSION:  Persistent epistaxis, anemia, chronic kidney disease. PLAN:  I will perform packing at bedside. Thank you Dr. Heidi Flannery for this consult.         Kaylin Toscano MD    D: 2018 7:26:42       T: 2018 9:21:16     [email or web address]  Job#: [id number]     Doc#: [id number]    CC:  MD Carl Tavarez MD

## 2018-07-17 NOTE — PLAN OF CARE
Problem: Pain:  Goal: Pain level will decrease  Pain level will decrease   Outcome: Ongoing    Goal: Control of acute pain  Control of acute pain   Outcome: Ongoing    Goal: Control of chronic pain  Control of chronic pain   Outcome: Ongoing      Problem: Falls - Risk of:  Goal: Will remain free from falls  Will remain free from falls   Outcome: Ongoing    Goal: Absence of physical injury  Absence of physical injury   Outcome: Ongoing      Problem: Risk for Impaired Skin Integrity  Goal: Tissue integrity - skin and mucous membranes  Structural intactness and normal physiological function of skin and  mucous membranes.    Outcome: Ongoing      Problem: Respiratory  Goal: O2 Sat > 90%  Outcome: Ongoing    Goal: Supplemental O2 requirements decreased  Outcome: Ongoing      Problem: Skin Integrity/Risk  Goal: No skin breakdown during hospitalization  Outcome: Ongoing    Goal: Wound healing  Outcome: Ongoing      Problem: Musculor/Skeletal Functional Status  Goal: Highest potential functional level  Outcome: Ongoing    Goal: Absence of falls  Outcome: Ongoing      Problem: Intellectual/Education/Knowledge Deficit  Goal: Teaching initiated upon admission  Outcome: Ongoing    Goal: Written Disposition Instruction form completed  Outcome: Ongoing

## 2018-07-30 NOTE — PROGRESS NOTES
PATIENT: Asiya Dominguez : 1935 DOS: 2018     [facility]    The patient is being seen for left lower extremity pain. Not able to obtain much history from the patient even though she is awake, alert and pleasant. The chart notes that the patient has a history of thrombophlebitis. Nursing now is complaining that she has lumpy areas on that leg, it is very tender to touch, also some edema, but there is no redness or warmth or open areas. The patient has history of acute on chronic diastolic CHF and COPD exacerbation and that is why she is here for PT/OT rehabilitation. She does have Norco q.6 hours. She says it does help quite a bit with the pain, but it is not lasting very long. No known allergies to antibiotics. No chest pain, shortness of breath. There is no worsening in lower extremity edema. MEDICATIONS AND ALLERGIES: Reviewed in the nursing facility chart.      SOCIAL HX/ FAMILY MEDICAL HX: SEE EPIC H & P     Past Medical History:   Diagnosis Date    Abnormal presence of protein in urine 2004    Dr. Suresh Holt Acute on chronic diastolic heart failure (HCC)     Anemia     Anxiety     Atherosclerotic heart disease     Atrial fibrillation (HCC)     Breath shortness     Cardiac arrhythmia     Chronic kidney disease     Chronic obstructive pulmonary disease (COPD) (HCC)     Constipation, chronic     Difficulty in walking     Diverticular disease of the colon     End stage renal disease (HCC)     Enterocolitis due to Clostridium difficile, not specified as recurrent     [facility]) [facility]     Fistula     left    Gastro-esophageal reflux disease without esophagitis     GERD (gastroesophageal reflux disease)     GERD, PUD, Hiatal Hernia    Granulomatous lung disease ([address]     History of endoscopy 12    Dr. Elda Fleischer    Hyperlipemia     Hyperlipidemia     Hypertension     Hypothyroidism     Kidney stones 2001    Dr. Suresh Holt

## 2018-07-30 NOTE — TELEPHONE ENCOUNTER
Writer contacted Seth Penn, ED provider to inform of 30 day readmission risk. ED provider informed writer admit or discharge has not been determined. Continue to follow-up.

## 2018-08-07 NOTE — PROGRESS NOTES
PATIENT: Osmar Gerardo : 1935 DOS: 2018     [facility]    She is here for rehab PT/OT. She is actually doing fairly well. She is very frail. She is a failure to thrive. She states she is having some knee pain that is bothering her. She was under fairly decent control for the most part. She said sometimes it gets more sore after she has had therapy, is on pain medications with pain usually below 5 out of a scale from 0 to 10. She states she is not eating good. She does not have an appetite as usual. She is moving her bowels. No problems with urination. MEDICATIONS AND ALLERGIES: Reviewed in the nursing facility chart.      Past Medical History:   Diagnosis Date    Abnormal presence of protein in urine 2004    Dr. Seth Figueroa Acute on chronic diastolic heart failure (HCC)     Anemia     Anxiety     Atherosclerotic heart disease     [facility] fibrillation [facility]     Breath shortness     Cardiac arrhythmia     Chronic kidney disease     Chronic obstructive pulmonary disease (COPD) ([facility]     Constipation, chronic     Difficulty in walking     Diverticular disease of the colon     End stage renal disease ([facility])     Enterocolitis due to Clostridium difficile, not specified as recurrent     [facility]D (end stage renal disease) [facility]     Fistula     left    Gastro-esophageal reflux disease without esophagitis     GERD (gastroesophageal reflux disease)     GERD, PUD, Hiatal Hernia    Granulomatous lung disease ([facility]     History of endoscopy 12    Dr. Rolly Stewart    Hyperlipemia     Hyperlipidemia     Hypertension     Hypothyroidism     Kidney stones 2001    Dr. Edgar Barajas    Long-term (current) use of anticoagulants     Muscle weakness (generalized)     Neuropathy in diabetes (HCC)     legs    Osteoarthritis     Parkinsons disease (HCC)     Peptic ulcer, site unspecified, unspecified as acute or chronic, without hemorrhage or perforation     Positive ORTEGA

## 2018-08-11 PROBLEM — R56.9 SEIZURE (HCC): Status: ACTIVE | Noted: 2018-01-01

## 2018-08-12 NOTE — CARE COORDINATION
PER PATIENTS SON, PATIENT IS LONG TERM AT [facility].   LSW PLEASE VERIFY IN AM.  Electronically signed by Mauro Cross RN on 8/12/18 at 2:08 PM

## 2018-08-12 NOTE — ED PROVIDER NOTES
[id number] ED  eMERGENCY dEPARTMENT eNCOUnter      Pt Name: Ashley Vargas  MRN: [id number]  [place] 1935  Date of evaluation: 8/11/2018  Provider: Martha Baltazar MD    CHIEF COMPLAINT       Chief Complaint   Patient presents with    Altered Mental Status     since this morning. [facility]         HISTORY OF PRESENT ILLNESS   (Location/Symptom, Timing/Onset, Context/Setting, Quality, Duration, Modifying Factors, Severity)  Note limiting factors. Ashley Vargas is a 81 y.o. female who presents to the emergency department Presents with altered mental status from a nursing home. The patient's son states that he and his sister went to visit his mother and she became stiff as a board and started foaming at the mouth. After that, she was extremely shaky and incoherent. Family was requested to leave the room and the nursing home called an ambulance and brought the patient here. Location symptoms are in head timing onset earlier today. Context in setting the patient came from [address] [place] [address]. Quality pain is sharp it's in her buttock she has a pressure sore on decubitus ulcer of the sacrum, stage III. Duration of the seizure was one to 2 minutes. Modifying factors: None. The patient's had no prior treatment for this condition and nothing makes it better or worse. Severity: Severe. HPI    Nursing Notes were reviewed. REVIEW OF SYSTEMS    (2-9 systems for level 4, 10 or more for level 5)     Review of Systems   Constitutional: Negative for appetite change, chills, diaphoresis, fatigue and fever. HENT: Negative for congestion, dental problem, ear discharge, ear pain, facial swelling, hearing loss, mouth sores, nosebleeds, postnasal drip, rhinorrhea, sinus pressure, sneezing, sore throat, tinnitus, trouble swallowing and voice change. Eyes: Negative for photophobia, pain, discharge, redness, itching and visual disturbance.    Respiratory: Negative for cough, She is oriented to person, place, and time. She appears well-developed and well-nourished. No distress. HENT:   Head: Normocephalic and atraumatic. Right Ear: External ear normal.   Left Ear: External ear normal.   Nose: Nose normal.   Mouth/Throat: Oropharynx is clear and moist. No oropharyngeal exudate. Eyes: Conjunctivae and EOM are normal. Pupils are equal, round, and reactive to light. Right eye exhibits no discharge. Left eye exhibits no discharge. No scleral icterus. Neck: Normal range of motion. Neck supple. No JVD present. No tracheal deviation present. No thyromegaly present. Cardiovascular: Normal rate, regular rhythm, normal heart sounds and intact distal pulses. Exam reveals no gallop and no friction rub. No murmur heard. Pulmonary/Chest: Effort normal and breath sounds normal. No stridor. No respiratory distress. She has no wheezes. She has no rales. She exhibits no tenderness. Abdominal: Soft. Bowel sounds are normal. She exhibits no distension and no mass. There is no tenderness. There is no rebound and no guarding. Musculoskeletal: Normal range of motion. She exhibits no edema or tenderness. Lymphadenopathy:     She has no cervical adenopathy. Neurological: She is alert and oriented to person, place, and time. She has normal reflexes. No cranial nerve deficit. She exhibits normal muscle tone. Coordination normal.   Skin: Skin is warm and dry. No rash noted. She is not diaphoretic. No erythema. No pallor. Psychiatric: She has a normal mood and affect. Her behavior is normal. Judgment and thought content normal.   Nursing note and vitals reviewed. DIAGNOSTIC RESULTS     EKG: All EKG's are interpreted by the Emergency Department Physician who either signs or Co-signs this chart in the absence of a cardiologist.    12-lead EKG was performed which shows normal sinus rhythm with a rate of 74 bpm.  There is a first-degree AV block otherwise normal EKG with no acute STEMI. There is no ectopy there are no hyperacute peaked T waves patient is a dialysis patient. Summary normal EKG. RADIOLOGY:   Non-plain film images such as CT, Ultrasound and MRI are read by the radiologist. Plain radiographic images are visualized and preliminarily interpreted by the emergency physician with the below findings:    CT scan of the head was performed which is interpreted by the radiologist to show no intracranial pathology.     Interpretation per the Radiologist below, if available at the time of this note:    XR CHEST PORTABLE    (Results Pending)   CT Head WO Contrast    (Results Pending)         ED BEDSIDE ULTRASOUND:   Performed by ED Physician - none    LABS:  Labs Reviewed   CBC WITH AUTO DIFFERENTIAL - Abnormal; Notable for the following:        Result Value    RBC 3.54 (*)     Hemoglobin 11.2 (*)     Hematocrit 34.2 (*)     MCH 31.6 (*)     MCHC 32.7 (*)     RDW 21.3 (*)     Lymphocytes # 0.7 (*)     All other components within normal limits   TROPONIN - Abnormal; Notable for the following:     Troponin 0.169 (*)     All other components within normal limits    Narrative:     CALL  Vieyra  [facility] tel. [phone],  Troponin results called to and read back by Betsey Joseph RN, 08/11/2018  19:11, by [id number]   COMPREHENSIVE METABOLIC PANEL - Abnormal; Notable for the following:     Potassium 5.7 (*)     Chloride 96 (*)     Anion Gap 15 (*)     Glucose 113 (*)     BUN 40 (*)     CREATININE 4.10 (*)     GFR Non- 10.4 (*)     GFR  12.6 (*)     Alb 2.4 (*)     AST 95 (*)     Globulin 4.5 (*)     All other components within normal limits    Narrative:     CALL  Vieyra  [facility] tel. [phone],  Troponin results called to and read back by Betsey Joseph RN, 08/11/2018  19:11, by JACOB   POCT GLUCOSE - Normal   APTT   PROTIME-INR   CK    Narrative:     Amena Certain  [facility] tel. [phone],  Troponin results called to and read back by Betsey Joseph RN, 08/11/2018  19:11, by Aldo NUNEZ

## 2018-08-12 NOTE — PROGRESS NOTES
Clinical Pharmacy Note    Jeffery Watters is a 81 y.o. female for whom pharmacy has been asked to manage warfarin therapy.     Reason for Admission: Seizures    Consulting Physician: Nitin Quinn  Warfarin dose prior to admission: 1.5 mg daily  Warfarin Indication: A fib  Target INR range: 2-3  Order for concomitant anticoagulant therapy: Asa EC 81 mg, heparin 5000 units q12h    Outpatient warfarin provider: [facility]    Past Medical History:   Diagnosis Date    Abnormal presence of protein in urine 6/2004    Dr. Ángel Carroll. Sweetie Billings Acute on chronic diastolic heart failure (HCC)     Anemia     Anxiety     Atherosclerotic heart disease     Atrial fibrillation (HCC)     Breath shortness     Cardiac arrhythmia     Chronic kidney disease     Chronic obstructive pulmonary disease (COPD) (HCC)     Constipation, chronic     Difficulty in walking     Diverticular disease of the colon     End stage renal disease (HCC)     Enterocolitis due to Clostridium difficile, not specified as recurrent     ESRD ([facility] stage renal disease) ([facility]     Fistula     left    Gastro-esophageal reflux disease without esophagitis     GERD (gastroesophageal reflux disease)     GERD, PUD, Hiatal Hernia    Granulomatous lung disease ([facility]     History of endoscopy 2-21-12    Dr. Vijay Magana    Hyperlipemia     Hyperlipidemia     Hypertension     Hypothyroidism     Kidney stones 11/2001    Dr. 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)     legs    Osteoarthritis     Parkinsons disease (HCC)     Peptic ulcer, site unspecified, unspecified as acute or chronic, without hemorrhage or perforation     Positive ORTEGA (antinuclear antibody)     1:80    Presence of cardiac pacemaker     Seizures (HCC)     Tachycardia     Tachypnea, not elsewhere classified     Thrombophlebitis leg superficial     Type 2 diabetes mellitus with hypoglycemia without coma [facility]     Type II or unspecified type diabetes mellitus without mention of complication, not stated as uncontrolled 1998    Unspecified osteoarthritis, unspecified site     Urinary tract infection     Weakness                Recent Labs      08/11/18   1821   INR  1.1     Recent Labs      08/11/18   1821  08/12/18   0612   HGB  11.2*  10.8*   HCT  34.2*  32.7*   PLT  133  133       Current warfarin drug-drug interactions: amiodarone, primidone, venlafaxine, mirtazapine    Date INR Warfarin Dose   08/12 1.1 2 mg                                     Unclear if patient has been compliant to warfarin therapy at home. Will give x1 dose of warfarin 2 mg today. Patient currently on heparin 5000 units BID as bridge for therapeutic INR. Daily PT/INR until stable within therapeutic range. Thank you for the consult.

## 2018-08-12 NOTE — FLOWSHEET NOTE
Multiple attempts to fax dialysis orders. First attempt at 12 AM when I spoke with the dialysis tech. Returned with no answer. Will attempt on different unit.  Electronically signed by Edilma Smith RN on 8/12/18 at 12:30 PM

## 2018-08-12 NOTE — PROGRESS NOTES
Facility/Department: [address] NEURO   BEDSIDE SWALLOW EVALUATION    NAME: Mace Buerger  : 1935  MRN: [id number]    ADMISSION DATE: 2018  ADMITTING DIAGNOSIS: has Hypothyroidism; Hypercholesteremia; CKD (chronic kidney disease); Atrial fibrillation ([address] Acid reflux; Anxiety; Essential hypertension; Idiopathic Parkinson's disease ([address] Cardiac pacemaker; Pleural effusion, bilateral; Type 2 diabetes mellitus ([address] Sacral decubitus ulcer, stage III ([address] Severe malnutrition ([address] CHF (congestive heart failure), NYHA class III, acute on chronic, combined ([address] Hypoxia; Atelectasis, bilateral; Hyperkalemia; Malnutrition related to chronic disease [facility]); and Seizure ([facility] on her problem list.    ONSET DATE: 2018    Date of Eval: 2018  Evaluating Therapist: Zeus Donaldson. Jethro SZYMANSKI CCC-SLP    Recommended Diet and Intervention  Diet Solids Recommendation: NPO  Liquid Consistency Recommendation: NPO  Recommended Form of Meds: Via alternative means of nutrition  Therapeutic Interventions: Therapeutic PO trials with SLP, Oral motor exercises, Tongue base strengthening, Pharyngeal exercises, Patient/Family education        Reason for Referral  Mace Buerger was referred for a bedside swallow evaluation to assess the efficiency of her swallow function, identify signs and symptoms of aspiration and make recommendations regarding safe dietary consistencies, effective compensatory strategies, and safe eating environment. General  Chart Reviewed: Yes  Behavior/Cognition: Agitated; Requires cueing;Doesn't follow directions (Stated name and birthday with decreased volume and breath support)  Respiratory Status: O2 via nasual cannula  O2 Device: Nasal cannula  Communication Observation: Non-verbal (Stated name and birthday initially with decreased volume. No other attempts to verbalize during exam. Not answering yes/no questions.  Not following directions.)  Follows Directions: None  Dentition:

## 2018-08-12 NOTE — ED NOTES
[facility]  08/11/18 [phone]

## 2018-08-12 NOTE — CONSULTS
[facility]  Consult [facility]           Reason for Consult:  Hyperkalemia in ESRD patient  Requesting Physician:  Dr. Sulaiman Roa    Chief Complaint:  Altered mental status  History Obtained From:  patient, electronic medical record    History of Present Ilness:    81y.o. year old female with history s/f ESRD on IHD MWF, A.fib, COPD, HTN, HLD, T2DM, hypothyroidism and parkinsons disease who presented from the SNF after an episode of tremors w/ concern for seizures. On admission found to have hyperkalemia to 6.5 s/p kayexalate given rectally because of failing swallow eval. Only minimal K able to be given. Last IHD on Friday.  Also found to have elevated troponins, no chest pain    Past Medical History:        Diagnosis Date    Abnormal presence of protein in urine 6/2004    Dr. Keely Bejarano    Acute on chronic diastolic heart failure (HCC)     Anemia     Anxiety     Atherosclerotic heart disease     Atrial fibrillation (HCC)     Breath shortness     Cardiac arrhythmia     Chronic kidney disease     Chronic obstructive pulmonary disease (COPD) (HCC)     Constipation, chronic     Difficulty in walking     Diverticular disease of the colon     End stage renal disease (HCC)     Enterocolitis due to Clostridium difficile, not specified as recurrent     ESRD (end stage renal disease) ([facility]     Fistula     left    Gastro-esophageal reflux disease without esophagitis     GERD (gastroesophageal reflux disease)     GERD, PUD, Hiatal Hernia    Granulomatous lung disease ([address]     History of endoscopy 2-21-12    Dr. Faina Renteria    Hyperlipemia     Hyperlipidemia     Hypertension     Hypothyroidism     Kidney stones 11/2001    Dr. 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)     legs    Osteoarthritis     Parkinsons disease (HCC)     Peptic ulcer, site unspecified, unspecified as acute or chronic, without hemorrhage or perforation     Positive ORTEGA (antinuclear antibody)     1:80    Presence of cardiac pacemaker     Seizures (HCC)     Tachycardia     Tachypnea, not elsewhere classified     Thrombophlebitis leg superficial     Type 2 diabetes mellitus with hypoglycemia without coma ([facility]     Type II or unspecified type diabetes mellitus without mention of complication, not stated as uncontrolled 1998    Unspecified osteoarthritis, unspecified site     Urinary tract infection     Weakness        Past Surgical History:        Procedure Laterality Date    APPENDECTOMY  2007    [facility] [address]  2/2006    COLONOSCOPY  4/2007    Dr. Saul Craig    [address] N/A 11/2/2017    CATHETER INSERTION HEMODIALYSIS performed by Saurav Alanis MD at [address] [place] [address] Rd  [phone]    Bilateral    DC COLONOSCOPY FLX DX W/COLLJ SPEC WHEN PFRMD N/A 2/1/2018    COLONOSCOPY performed by Marcus Allen MD at [facility] N/A 7/16/2018    control epistaxis performed by Jae Cardozo MD at [address] [facility] EGD TRANSORAL BIOPSY SINGLE/MULTIPLE N/A 2/1/2018    EGD ESOPHAGOGASTRODUODENOSCOPY WITH BIOPSY performed by Marcus Allen MD at [address] [facility] EGD TRANSORAL BIOPSY SINGLE/MULTIPLE N/A 3/27/2018    EGD ESOPHAGOGASTRODUODENOSCOPY performed by Marcus Allen MD at [address] N/A 3/30/2018    EXAM UNDER ANESTHESIA, LARYNGOSCOPY ,PHARYNGOSCOPY performed by Jae Cardozo MD at [facility] N/A 3/30/2018    REMOVAL PACKING POSS CONTROL EPISTAXIS, NASAL ENDOSCOPY performed by Jae Cardozo MD at [address] [place] [address]  3/2003       Home Medications:    No current facility-administered medications on file prior to encounter.       Current Outpatient Prescriptions on File Prior to Encounter   Medication Sig Dispense Refill    venlafaxine 150 MG extended release tablet Take 150 mg by mouth daily (with breakfast)      atorvastatin (LIPITOR) 80 MG tablet Take 1 tablet by mouth nightly 30 tablet 3    levothyroxine (SYNTHROID) 25 MCG tablet Take 25 mcg ( 1 tab) and 50 mcg ( 2 tabs) alternatively. 45 tablet 1    losartan (COZAAR) 100 MG tablet Take 1 tablet by mouth daily 30 tablet 3    isosorbide mononitrate (IMDUR) 60 MG extended release tablet Take 1 tablet by mouth daily 30 tablet 1    carbidopa-levodopa (SINEMET)  MG per tablet Take 1 tablet by mouth daily 90 tablet 1    hydrALAZINE (APRESOLINE) 100 MG tablet Take 1 tablet by mouth 3 times daily 90 tablet 3    amiodarone (CORDARONE) 200 MG tablet Take 1 tablet by mouth daily 30 tablet 3    docusate sodium (COLACE) 100 MG capsule Take 1 capsule by mouth daily 30 capsule 1    insulin lispro (HUMALOG) 100 UNIT/ML injection vial Inject 0-6 Units into the skin 3 times daily (with meals) 1 vial 3    primidone (MYSOLINE) 50 MG tablet Take 50 mg by mouth Daily with lunch       levothyroxine (SYNTHROID) 50 MCG tablet TAKE (1) 50 MCG TAB QOD ALTERNATING WITH (1) 25 MCG TAB QOD (Patient taking differently: 25 mcg TAKE (1) 50 MCG TAB QOD ALTERNATING WITH (1) 25 MCG TAB QOD) 50 tablet 3    sodium chloride (NASAL MOISTURIZING SPRAY) 0.65 % nasal spray 2 sprays by Nasal route as needed for Congestion      L-Methylfolate-B6-B12 (FOLTANX) 3-35-2 MG TABS Take by mouth Give one tab by mouth in the morning for health maintenance.       folic acid (FOLVITE) 1 MG tablet Take 1 mg by mouth daily      guaiFENesin (ROBITUSSIN) 100 MG/5ML SOLN oral solution Take 10 mLs by mouth every 4 hours as needed for Cough      b complex-C-folic acid (NEPHROCAPS) 1 MG capsule Take 1 capsule by mouth daily      acetaminophen (TYLENOL) 325 MG tablet Take 650 mg by mouth every 4 hours as needed for Pain      Acetaminophen (APAP) History:    Social History     Social History    Marital status:      Spouse name: N/A    Number of children: N/A    Years of education: N/A     Occupational History    Not on file.      Social History Main Topics    Smoking status: Former Smoker    Smokeless tobacco: Never Used    Alcohol use No    Drug use: No    Sexual activity: Not on file     Other Topics Concern    Not on file     Social History Narrative    Lives at home w/son       Family History:   Family History   Problem Relation Age of Onset    Heart Disease Father     Early Death Father 47    Diabetes Father     Heart Disease Mother     Diabetes Mother     High Blood Pressure Mother     High Blood Pressure Sister     High Cholesterol Sister        Review of Systems:   Review of Systems  Unable to obtain due to altered mental status    Physical exam:   Constitutional:  VITALS:  BP (!) 167/59   Pulse 79   Temp 97.7 °F (36.5 °C) (Oral)   Resp 16   Ht 4' 11\" (1.499 m)   Wt 99 lb 4.8 oz (45 kg)   LMP  (LMP Unknown)   SpO2 97%   BMI 20.06 kg/m²     General: somnolent, in no apparent distress, elderly  HEENT: normocephalic, atraumatic, anicteric  Neck: supple, no mass  Lungs: non-labored respirations, clear to auscultation bilaterally  Heart: regular rate and rhythm, no murmurs or rubs  Abdomen: soft, non-tender, non-distended  MSK: no joint swelling or tenderness  Ext: no cyanosis, no peripheral edema  Neuro: alert and oriented, no gross abnormalities  Psych: Unable to obtain due to altered mental status  Skin: no rash      Data/  CBC:   Lab Results   Component Value Date    WBC 7.5 08/12/2018    RBC 3.39 08/12/2018    RBC 3.24 12/17/2011    HGB 10.8 08/12/2018    HCT 32.7 08/12/2018    MCV 96.6 08/12/2018    MCH 32.0 08/12/2018    MCHC 33.1 08/12/2018    RDW 20.7 08/12/2018     08/12/2018     08/07/2018     BMP:    Lab Results   Component Value Date     08/12/2018    K 6.5 08/12/2018    K 4.5 07/16/2018 CL 96 08/12/2018    CO2 23 08/12/2018    BUN 44 08/12/2018    LABALBU 2.4 08/11/2018    LABALBU 3.5 12/17/2011    CREATININE 4.42 08/12/2018    CALCIUM 8.9 08/12/2018    GFRAA 11.6 08/12/2018    LABGLOM 9.6 08/12/2018    GLUCOSE 75 08/12/2018    GLUCOSE 197 02/17/2012     Ct Head Wo Contrast    Result Date: 8/12/2018  CT Brain Contrast medium:  Not utilized. History:  Acute mental status Comparison:  June Eckstein, 2018 Findings: Extra-axial spaces:  Normal. Intracranial hemorrhage:  None. Ventricular system: Ventricles mildly enlarged. Sulci mildly prominent. Basal Cisterns:  Normal. Cerebral Parenchyma: Bilateral symmetric periventricular areas of decreased attenuation. Midline Shift:  None. Cerebellum:  Normal. Paranasal sinuses and mastoid air cells:  Normal. Visualized Orbits:  Normal.     Impression: Mild cerebral atrophy. Chronic ischemic white matter disease. All CT scans at this facility use dose modulation, iterative reconstruction, and/or weight based dosing when appropriate to reduce radiation dose to as low as reasonably achievable. Xr Chest Portable    Result Date: 8/12/2018  EXAMINATION: XR CHEST PORTABLE CLINICAL HISTORY: ALTERED MENTAL STATUS COMPARISONS: JULY 30, 2018 FINDINGS: Pacemaker generator and wires unchanged in position. Dual-lumen right jugular vein central line unchanged. Narrowed distance between acromion and humeral heads bilaterally. Increased sclerosis of acetabulum, and left humeral head. Cardiopericardial silhouette normal. Blunting left costophrenic angle with increased opacity left lung base. Ill-defined areas of increased opacity right lung zone and left mid and upper lung with blunting right costophrenic angle. BILATERAL PLEURAL EFFUSIONS, LEFT GREATER THAN RIGHT. BILATERAL ATELECTASIS/PNEUMONIA.      Xr Chest Portable    Result Date: 7/30/2018  EXAMINATION: CHEST PORTABLE VIEW  CLINICAL HISTORY: Short of breath, COMPARISONS: July 15, 2018  FINDINGS: Single  views of the chest is submitted. There is a right-sided os catheter. The tip at the junction superior, and right atrium. There is a left-sided ICD device leads overlying the cardiac silhouette. Unchanged. The cardiac silhouette is enlarged. Unchanged configuration. .  The mediastinum is unremarkable. Pulmonary vascular congested with some increased cephalization. Right sided trachea. There is worsening superimposed right upper right middle right lower lobe and left lower lobe infiltrates with bilateral pleural effusions right greater than left No Pneumothoraces. PULMONARY VASCULAR IS CONGESTED WHICH SUGGEST A COMPONENT OF CHF. CORRELATE CLINICALLY. SUPERIMPOSED RIGHT UPPER, RIGHT MIDDLE, RIGHT LOWER AND LEFT LOWER LOBE INFILTRATES/CONSOLIDATION WITH BILATERAL PLEURAL EFFUSIONS RIGHT GREATER THAN LEFT    Xr Chest Portable    Result Date: 7/15/2018  Chest X-ray, 1 view Clinical information:  Cough Comparison:  July 2, 2018. Findings  Pacemaker generator and wires, and right jugular dual-lumen catheter unchanged. Cardiopericardial silhouette upper limits of normal and unchanged. Ill-defined area of increased opacity caudal two thirds of right lower lung, and caudal two thirds of left lower lung, mildly improved since prior study. Blunting costophrenic angles bilaterally. Impression Interstitial edema versus pneumonia. Bilateral pleural effusions. Cardiomegaly. Assessment/Plan:  81y.o. year old female with history s/f ESRD on IHD MWF, A.fib, COPD, HTN, HLD, T2DM, hypothyroidism and parkinsons disease who presented from the SNF after an episode of tremors w/ concern for seizures. Consult for hyperkalemia in ESRD patient    1. ESRD on IHD MWF  2.  Hyperkalemia: K 6.5, attempted kayexalate rectally as currently failed swallow, only small amount able to be given so will plan for IHD w/ 2K bath for 4 hours  - in the interim giving calcium gluconate,

## 2018-08-12 NOTE — H&P
Hospital Medicine  History and Physical    Patient:  Codi Hook  MRN: [id number]    CHIEF COMPLAINT:    Chief Complaint   Patient presents with    Altered Mental Status     since this morning. [facility]       History Obtained From:  patient, electronic medical record  Primary Care Physician: Gail Mccarthy MD    HISTORY OF PRESENT ILLNESS:   The patient is a 81 y.o. female with a history of Anemia, Afib, CKD, COPD, ESRD on HD, GERD, HTN, DLD, DMII, Hypothyroid, Parkinosons Disease presenting from SNF with family noting episodes of tremors. Unknown whether this was full tonic clonic behavior. Pt is ESRD on HD with Dr. Peyman Bentley. Several allergies to various seizure medications, however no noted history of epilepsy noted. On arrival to general medical floor, pt is somewhat post ictal, and minimally able to contribute history, however on chart review the pt has a history of frequent hospitalizations for a multitude of chronic medical issues, most recently for HCAP, CHF was previous diagnosis to that. She was brought to the ED with above concern for seizure, found to have elevated troponin. Denies any chest pain, shortness of breath on my evaluation. Pt last received HD yesterday.      Past Medical History:      Diagnosis Date    Abnormal presence of protein in urine 6/2004    Dr. Susana Meadows Acute on chronic diastolic heart failure (HCC)     Anemia     Anxiety     Atherosclerotic heart disease     Atrial fibrillation (HCC)     Breath shortness     Cardiac arrhythmia     Chronic kidney disease     Chronic obstructive pulmonary disease (COPD) (HCC)     Constipation, chronic     Difficulty in walking     Diverticular disease of the colon     End stage renal disease (HCC)     Enterocolitis due to Clostridium difficile, not specified as recurrent     [facility] ([facility] stage [facility] disease) [facility]     Fistula     left    Gastro-esophageal reflux disease without esophagitis     GERD (gastroesophageal LARYNGOSCOPY,DIRCT,OP,BIOPSY N/A 3/30/2018    EXAM UNDER ANESTHESIA, LARYNGOSCOPY ,PHARYNGOSCOPY performed by Jamilah Bob MD at [place] [address] N/A 3/30/2018    REMOVAL PACKING POSS CONTROL EPISTAXIS, NASAL ENDOSCOPY performed by Jamilah Bob MD at [address] [place] [address]  3/2003       Medications Prior to Admission:    Prior to Admission medications    Medication Sig Start Date End Date Taking? Authorizing Provider   gabapentin (NEURONTIN) 100 MG capsule Take 100 mg by mouth daily. .   Yes Historical Provider, MD   insulin lispro (HUMALOG) 100 UNIT/ML injection vial Inject 2 Units into the skin 3 times daily (before meals)   Yes Historical Provider, MD   diltiazem (CARDIZEM 12 HR) 120 MG extended release capsule Take 120 mg by mouth 2 times daily   Yes Historical Provider, MD   primidone (MYSOLINE) 50 MG tablet Take 50 mg by mouth 3 times daily   Yes Historical Provider, MD   venlafaxine 150 MG extended release tablet Take 150 mg by mouth daily (with breakfast)   Yes Historical Provider, MD   atorvastatin (LIPITOR) 80 MG tablet Take 1 tablet by mouth nightly 6/22/18  Yes Kasey Moy MD   levothyroxine (SYNTHROID) 25 MCG tablet Take 25 mcg ( 1 tab) and 50 mcg ( 2 tabs) alternatively.  6/22/18  Yes Kasey Moy MD   losartan (COZAAR) 100 MG tablet Take 1 tablet by mouth daily 6/22/18  Yes Kasey Moy MD   isosorbide mononitrate (IMDUR) 60 MG extended release tablet Take 1 tablet by mouth daily 6/22/18  Yes Kasey Moy MD   carbidopa-levodopa (SINEMET)  MG per tablet Take 1 tablet by mouth daily 6/22/18  Yes Kasey Moy MD   hydrALAZINE (APRESOLINE) 100 MG tablet Take 1 tablet by mouth 3 times daily 6/22/18  Yes Jessica Tejeda MD   amiodarone (CORDARONE) 200 MG tablet Take 1 tablet by mouth daily 6/22/18  Yes Jessica Tejeda MD   docusate sodium (COLACE) 100 MG capsule Take 1 capsule by mouth daily alcohol. OCCUPATION:NONE    Family History:   Family History   Problem Relation Age of Onset    Heart Disease Father     Early Death Father 47    Diabetes Father     Heart Disease Mother     Diabetes Mother     High Blood Pressure Mother     High Blood Pressure Sister     High Cholesterol Sister        REVIEW OF SYSTEMS:  Ten systems reviewed and negative except for as above. Physical Exam:    Vitals: /66   Pulse 74   Temp 97.7 °F (36.5 °C) (Oral)   Resp 16   Ht 4' 11\" (1.499 m)   Wt 99 lb 4.8 oz (45 kg)   LMP  (LMP Unknown)   SpO2 100%   BMI 20.06 kg/m²   Constitutional: lethargic obtuned minimally arousable thin and cachectic  Skin: Skin color, texture, turgor normal. No rashes or lesions  Eyes:Eye: Normal external eye, conjunctiva, NICHOLAS. ENT: Head: Normocephalic, no lesions, without obvious abnormality. Neck: no adenopathy, no carotid bruit, no JVD, supple, symmetrical, trachea midline and thyroid not enlarged, symmetric, no tenderness/mass/nodules  Respiratory: clear to auscultation  Upper lung fields, bibasilar rales noted on exam  Cardiovascular: regular rate and rhythm, diastolic murmor 3/6. Gastrointestinal: soft, non-tender; bowel sounds normal; no masses,  no organomegaly  Genitourinary: Deferred  Musculoskeletal:extremities normal, atraumatic, no cyanosis or edema  Neurologic: Mental status AAOxself No facial asymmetry or droop. Normal muscle strength b/l. Psychiatric: Appropriate mood and affect.  poor insight and judgement  Hematologic: No obvious bruising or bleeding    Recent Labs      08/11/18   1821   WBC  7.5   HGB  11.2*   PLT  133     Recent Labs      08/11/18   1806  08/11/18   1821   NA   --   134   K   --   5.7*   CL   --   96*   CO2   --   23   BUN   --   40*   CREATININE   --   4.10*   GLUCOSE  109  113*   AST   --   95*   ALT   --   8   BILITOT   --   0.5   ALKPHOS   --   103     Troponin T:   Recent Labs      08/11/18   1821   TROPONINI  0.169*       ABGs: ([facility] I50.43    Hypoxia R09.02    Atelectasis, bilateral J98.11    Hyperkalemia E87.5    Malnutrition related to chronic disease ([facility] [address] E46    Seizure ([facility] R56.9       Tia Christopher MD  Admitting Hospitalist    Emergency Contact:   Contact 1: Name: Glenn Kerr  Contact 1: Number: [phone]  Contact 1: Relationship: son/POA

## 2018-08-12 NOTE — FLOWSHEET NOTE
Per respiratory therapy request, message sent to Dr. Melody Dorantes to clarify if the patient still needs the 0.7% sodium chloride nebulizer treatment. Awaiting response at this time.  Electronically signed by Janelle Morgan RN on 8/12/18 at 4:34 PM

## 2018-08-12 NOTE — CONSULTS
[address]                       [address], [address] [facility]                                   CONSULTATION    PATIENT NAME: Lynn Guerra                    :        1935  MED REC NO:   [id number]                            ROOM:       G591  ACCOUNT NO:   [de-identified]                           ADMIT DATE: 2018  PROVIDER:     Carolyn Martinez MD    CONSULT DATE:  2018    ATTENDING:  Shey Dickinson DO    HISTORY OF PRESENT ILLNESS:  This is an 72-year-old right-handed female  admitted with a history of unresponsiveness. The patient was at [facility]. The details of this are not clear. She appeared to be  encephalopathic with mental status changes. History is obtained from the  nursing home, the son, and his sister who went to visit her mother and she  _____ started foaming at the mouth. After that, she was extremely shaky  and family requested to leave the room, and then ambulance came. The  patient was admitted here and a seizure, full-blown generalized seizure  here, duration now of 2-4 minutes. She is postictal at this time. She was  given Ativan and she has multiple other issues. PAST MEDICAL HISTORY:  Therefore reviewed. She has history of anemia,  anxiety. She has kidney disease, atrial fibrillation, cardiac arrhythmias,  and constipation. She has end-stage renal disease, hyperlipidemia,  hypertension, hypothyroidism, kidney stones, osteoarthritis, Parkinson's  disease, and seizures, which are listed in her list, but she is not on any  seizure medications. CURRENT MEDICATIONS:  Aspirin and atorvastatin. She is not on  anticoagulation for atrial fibrillation. She was just given a dose of  Vimpat and she was on Keppra. She was on levothyroxine. The patient is on  Mysoline for possible seizures or tremors. It appears that she has  tremors.     ALLERGIES:  She has allergy to SEIZURE MEDICATIONS and therefore, she  likely was on these medicines in the past.    REVIEW OF SYSTEMS:  Not possible. She is at nursing home. LABORATORY DATA:  She has a potassium of 6.7. She is likely to be  dialyzed. PHYSICAL EXAMINATION:  GENERAL:  She is very thinly built. No skin lesions or skin marks. There  is no pedal edema. There is no cyanosis or clubbing. NECK:  Supple. There are no meningeal signs. CARDIOVASCULAR:  S1, S2 are normal.  No murmurs appreciated. No carotid  bruits. RESPIRATORY:  Clear to auscultation. CNS:  She is obtunded. She does not follow any commands, but she is moving  all her extremities. Pupils are equal and reactive. She has roving eye  movements. There is no facial asymmetry noted. Reflexes are 1+. Toes are  downgoing. LABORATORY DATA:  Her lab examination is therefore reviewed. The patient  has TSH of 3.3 with sodium of 135, potassium 6.5, BUN of 44, and creatinine  4.42. Her WBC count was 7.5, hemoglobin 10.8, hematocrit 32.7, and  platelets 359,913. CK is 24. Her CT scan of the head did not reveal anything significant. IMPRESSION:  Generalized seizures. 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 Somehow, she is not on anticonvulsives, but she  is on Mysoline likely for tremors, which is helping some of her seizures. She will likely require anticonvulsants. She has been on Dilantin and  Depakote in the past that is listed as an allergy. The details of this are  very unclear. At this time, we will manage her acute seizures. The  patient will require further evaluation. I do not appreciate there is any  suggestion for encephalitis at least at this time. If she does not  improve, we will consider a lumbar puncture with MRI of the brain. We will continue on Keppra for now. The patient's potassium is elevated. This needs to be dialyzed. _____ Keppra, may dialyze after this.     Detailed

## 2018-08-12 NOTE — PROGRESS NOTES
Codi Hook is a 81 y.o. female patient. pt is lethargic, she states I hurts, but can not pin point where exactly is hurting, pt is on HD for ESRD, pt from NH, pt noted have jerks not seizures on examination.   Review of Systems   Unable to perform ROS: Acuity of condition     Current Facility-Administered Medications   Medication Dose Route Frequency Provider Last Rate Last Dose    aspirin EC tablet 81 mg  81 mg Oral Daily Kevan Torres, DO        fluticasone (FLONASE) 50 MCG/ACT nasal spray 2 spray  2 spray Nasal Daily [facility]        pantoprazole (PROTONIX) tablet 20 mg  20 mg Oral QAM AC Kevan Torres, DO        cholestyramine light packet 2 g  2 g Oral TID PRN [facility], [facility]        magnesium oxide (MAG-OX) tablet 400 mg  400 mg Oral Daily Debe Second D Sedar, DO        calcium acetate (PHOSLO) capsule 667 mg  667 mg Oral TID  Kevan Torres, DO        b complex-C-folic acid (NEPHROCAPS) capsule 1 mg  1 capsule Oral Daily [facility], DO        sodium chloride (Inhalant) 7 % nebulizer solution 4 mL  4 mL Nebulization BID [facility], [facility]        sodium chloride (OCEAN, BABY AYR) 0.65 % nasal spray 2 spray  2 spray Nasal PRN [facility], [facility]        folic acid (FOLVITE) tablet 1 mg  1 mg Oral Daily Debe Second D Sedar, DO        levothyroxine (SYNTHROID) tablet 25 mcg  25 mcg Oral Daily Debe Second D Sedar, DO        atorvastatin (LIPITOR) tablet 80 mg  80 mg Oral Nightly Kevan Torres, DO        losartan (COZAAR) tablet 100 mg  100 mg Oral Daily [facility], [facility]        isosorbide mononitrate (IMDUR) extended release tablet 60 mg  60 mg Oral Daily Kevan Torres, DO        carbidopa-levodopa (SINEMET)  MG per tablet 1 tablet  1 tablet Oral Daily [facility]        hydrALAZINE (APRESOLINE) tablet 100 mg  100 mg Oral TID [facility], [facility]        docusate sodium (COLACE) capsule 100 mg  100 mg Oral Daily Kevan Torres, DO        calcium elemental (OSCAL) tablet 500 mg  500 mg Oral BID Nghia Pres[facility]ennifer Torres DO  mirtazapine (REMERON) tablet 15 mg  15 mg Oral Nightly Kevan D Sedar, DO        venlafaxine (EFFEXOR XR) extended release capsule 150 mg  150 mg Oral Daily with breakfast [place] Sedar, DO        insulin lispro (HUMALOG) injection vial 2 Units  2 Units Subcutaneous TID AC Kevan D Sedar, DO        primidone (MYSOLINE) tablet 50 mg  50 mg Oral TID [place] [facility], DO        lacosamide (VIMPAT) 50 mg in dextrose 5 % 55 mL IVPB  50 mg Intravenous BID [place] [facility], DO        sodium chloride flush 0.9 % injection 10 mL  10 mL Intravenous 2 times per day [facility], DO        sodium chloride flush 0.9 % injection 10 mL  10 mL Intravenous PRN [place] Sedar, DO        magnesium hydroxide (MILK OF MAGNESIA) 400 MG/5ML suspension 30 mL  30 mL Oral Daily PRN [place] Sedar, DO        ondansetron (ZOFRAN) injection 4 mg  4 mg Intravenous Q6H PRN [place] Sedar, DO        enoxaparin (LOVENOX) injection 30 mg  30 mg Subcutaneous Daily [facility], DO        glucose (GLUTOSE) 40 % oral gel 15 g  15 g Oral PRN [place] Sedar, DO        dextrose 50 % solution 12.5 g  12.5 g Intravenous PRN [place] Sedar, DO        glucagon (rDNA) injection 1 mg  1 mg Intramuscular PRN [place] Sedar, DO        dextrose 5 % solution  100 mL/hr Intravenous PRN [place] Sedar, DO        insulin lispro (HUMALOG) injection vial 0-6 Units  0-6 Units Subcutaneous TID  Kevan D Sedar, DO        insulin lispro (HUMALOG) injection vial 0-3 Units  0-3 Units Subcutaneous Nightly [facility] Sedar, DO        ipratropium-albuterol (DUONEB) nebulizer solution 3 mL  3 mL Inhalation TID [place] Sedar, DO        albuterol (PROVENTIL) nebulizer solution 2.5 mg  2.5 mg Nebulization Q2H PRN [place] Sedar, DO        sodium polystyrene (KAYEXALATE) 15 GM/60ML suspension 30 g  30 g Rectal Once Macarena Nordmann, MD        sodium bicarbonate 8.4 % injection 25 mEq  25 mEq Intravenous Once Macarena Gutierrez MD        hydrALAZINE (APRESOLINE) injection 10 mg  10 mg Intravenous Q4H PRN Kita Talamantes MD        labetalol (NORMODYNE;TRANDATE) injection 10 mg  10 mg Intravenous Q6H PRN Kita Talamantes MD         Allergies   Allergen Reactions    Arthrotec [Diclofenac-Misoprostol] Nausea Only    Depakote [Divalproex Sodium] Itching    Dilantin [Phenytoin]     Klonopin [Clonazepam]     Statins Other (See Comments)     achy     Active Problems:    Seizure ([address]  Resolved Problems:    * No resolved hospital problems. *    Blood pressure (!) 185/61, pulse 70, temperature 97.7 °F (36.5 °C), temperature source Oral, resp. rate 16, height 4' 11\" (1.499 m), weight 99 lb 4.8 oz (45 kg), SpO2 100 %, not currently breastfeeding. Subjective  Objective:  General Appearance:  Ill-appearing. Vital signs: (most recent): Blood pressure (!) 185/61, pulse 70, temperature 97.7 °F (36.5 °C), temperature source Oral, resp. rate 16, height 4' 11\" (1.499 m), weight 99 lb 4.8 oz (45 kg), SpO2 100 %, not currently breastfeeding. HEENT: Normal HEENT exam.    Lungs:  Normal effort and normal respiratory rate. Heart: Positive for murmur. Abdomen: Abdomen is soft. Bowel sounds are normal.     Pupils:  Pupils are equal, round, and reactive to light. Skin:  Warm and dry.       Lab Results   Component Value Date    WBC 7.5 08/12/2018    HGB 10.8 (L) 08/12/2018    HCT 32.7 (L) 08/12/2018    MCV 96.6 08/12/2018     08/12/2018     Lab Results   Component Value Date     08/12/2018    K 6.5 08/12/2018    K 4.5 07/16/2018    CL 96 08/12/2018    CO2 23 08/12/2018    BUN 44 08/12/2018    CREATININE 4.42 08/12/2018    GLUCOSE 75 08/12/2018    GLUCOSE 197 02/17/2012    CALCIUM 8.9 08/12/2018      Past Medical History:   Diagnosis Date    Abnormal presence of protein in urine 6/2004    Dr. Raheem Baker    Acute on chronic diastolic heart failure (HCC)     Anemia     Anxiety     Atherosclerotic heart disease     Atrial fibrillation (HCC)     Breath shortness     Cardiac arrhythmia     Chronic kidney disease     Chronic obstructive pulmonary disease (COPD) (HCC)     Constipation, chronic     Difficulty in walking     Diverticular disease of the colon     End stage renal disease (HCC)     Enterocolitis due to Clostridium difficile, not specified as recurrent     ESRD (end stage renal disease) ([address]     Fistula     left    Gastro-esophageal reflux disease without esophagitis     GERD (gastroesophageal reflux disease)     GERD, PUD, Hiatal Hernia    Granulomatous lung disease ([address]     History of endoscopy 2-21-12    Dr. Faina Renteria    Hyperlipemia     Hyperlipidemia     Hypertension     Hypothyroidism     Kidney stones 11/2001    Dr. Keely Bejarano    Long-term (current) use of anticoagulants     Muscle weakness (generalized)     Neuropathy in diabetes ([address]     legs    Osteoarthritis     Parkinsons disease ([address]     Peptic ulcer, site unspecified, unspecified as acute or chronic, without hemorrhage or perforation     Positive ORTEGA (antinuclear antibody)     1:80    Presence of cardiac pacemaker     Seizures ([address]     Tachycardia     Tachypnea, not elsewhere classified     Thrombophlebitis leg superficial     Type 2 diabetes mellitus with hypoglycemia without coma ([address]     Type II or unspecified type diabetes mellitus without mention of complication, not stated as uncontrolled 1998    Unspecified osteoarthritis, unspecified site     Urinary tract infection     Weakness          Assessment & Plan  1) seizures, EEG  2) hyperkalemia  Kayexalate  bicarb  3) encephalopathy could be from ESRD vs parkinson, vs post ictal  CT head negative  UA negative  Neurology eval  4) elevated trop  EKG  Echo  cardiology eval  5) sacral sounds multiple  Wound care RN  6) DM ssi   stable    Joy Patten MD  8/12/2018

## 2018-08-12 NOTE — CONSULTS
Marital status:      Spouse name: N/A    Number of children: N/A    Years of education: N/A     Occupational History    Not on file. Social History Main Topics    Smoking status: Former Smoker    Smokeless tobacco: Never Used    Alcohol use No    Drug use: No    Sexual activity: Not on file     Other Topics Concern    Not on file     Social History Narrative    Lives at home w/son     Family History   Problem Relation Age of Onset    Heart Disease Father     Early Death Father 47    Diabetes Father     Heart Disease Mother     Diabetes Mother     High Blood Pressure Mother     High Blood Pressure Sister     High Cholesterol Sister      Allergies   Allergen Reactions    Arthrotec [Diclofenac-Misoprostol] Nausea Only    Depakote [Divalproex Sodium] Itching    Dilantin [Phenytoin]     Klonopin [Clonazepam]     Statins Other (See Comments)     achy             Objective:   BP (!) 179/75   Pulse 71   Temp 98.7 °F (37.1 °C) (Oral)   Resp 18   Ht 4' 11\" (1.499 m)   Wt 100 lb 1.4 oz (45.4 kg)   LMP  (LMP Unknown)   SpO2 97%   BMI 20.22 kg/m²    Wt Readings from Last 3 Encounters:   08/12/18 100 lb 1.4 oz (45.4 kg)   07/30/18 100 lb (45.4 kg)   07/15/18 95 lb 14.4 oz (43.5 kg)       TELEMETRY: paced                            Rhythm Strip: continuous atrial pacing  NYHA Class: III    Physical Exam:  General Appearance: lethargic, in no acute distress  Cardiovascular: normal rate, regular rhythm, normal S1 and S2, II/VI AS murmur, No rubs, clicks, or gallops,  no JVD  Pulmonary/Chest: clear to auscultation bilaterally- no wheezes, rales or rhonchi, normal air movement, no respiratory distress, however, poor air exchange.   Abdomen: soft, non-tender, non-distended, normal bowel sounds, no masses   Extremities: no cyanosis, clubbing or edema  Skin: warm and dry, no rash or erythema  Eyes: eomi  Neck: supple and non-tender without mass, no thyromegaly   Neurological: lethargic, moves of   outflow tract obstruction.   Mitral annular calcification is present.   Severe calcification of subvalvular apparatus.   Mild (1+) mitral regurgitation is present.   Aortic valve appears to be bicuspid.   Severely thickened trileaflet aortic valve with decresed excursion.   The aortic valve area is 1.3 cm2 with a maximum gradient of 35 mmHg and a   mean gradient of 17 mmHg. ---estimate moderate to moderately severe aortic   stenosis   Mild-to-moderate aortic regurgitation is noted.   Pressure half-time 469 msec .   No definite vegetation is noted but with the heavy calcification one could   not r/o small vegetation.  Also, consider Leibman-Sachs non infective   vegetation   There is mild to moderate ( 1-2 +) tricuspid regurgitation with estimated   RVSP of 47 mm Hg.--mild to moderate pulm htn   Normal right ventricle structure and function.   Normal right ventricle systolic pressure.   Pacemaker artifact noted in the right atrium and right ventricle.   C/W the report of 2013, the aortic stenosis has worsened, the lv fxn has   remained the same,and the pulm htn has increased   Consider ED if suspecting endocarditis or planning an intervention for   aortic valve    Lab Data:    Cardiac Enzymes:  Recent Labs      08/11/18   1821  08/12/18   0612  08/12/18   1041   CKTOTAL  24  25  23   CKMB   --   2.7  2.6   CKMBINDEX   --   10.8*  11.3*   TROPONINI  0.169*  0.193*  0.190*     Component      Latest Ref Rng & Units 7/15/2018          12:30 PM   Troponin      0.000 - 0.010 ng/mL 0.128 ()     ProBNP:   Lab Results   Component Value Date    PROBNP 64,023 07/30/2018       CBC:   Lab Results   Component Value Date    WBC 7.5 08/12/2018    RBC 3.39 08/12/2018    RBC 3.24 12/17/2011    HGB 10.8 08/12/2018    HCT 32.7 08/12/2018     08/12/2018       CMP:  Lab Results   Component Value Date     08/12/2018    K 6.1 08/12/2018    K 6.1 08/12/2018    CL 97 08/12/2018    CO2 22 08/12/2018    BUN 45 08/12/2018 CREATININE 4.53 08/12/2018    GFRAA 11.2 08/12/2018    LABGLOM 9.3 08/12/2018    GLUCOSE 99 08/12/2018    GLUCOSE 197 02/17/2012    CALCIUM 8.5 08/12/2018       Hepatic Function Panel:  Lab Results   Component Value Date    ALKPHOS 103 08/11/2018    ALT 8 08/11/2018    AST 95 08/11/2018    PROT 6.9 08/11/2018    BILITOT 0.5 08/11/2018    BILIDIR 0.0 09/26/2017    IBILI 0.1 09/26/2017    LABALBU 2.4 08/11/2018    LABALBU 3.5 12/17/2011       Magnesium:    Lab Results   Component Value Date    MG 2.3 08/11/2018    MG 1.4 12/17/2011       PT/INR:    Lab Results   Component Value Date    PROTIME 11.8 08/11/2018    PROTIME 20.7 07/12/2018    PROTIME 25.7 09/29/2016    INR 1.1 08/11/2018       Active Problems:    Seizure ([address]  Resolved Problems:    * No resolved hospital problems.  *           Electronically signed by Elinor Jason MD on 8/12/2018 at 2:28 PM

## 2018-08-12 NOTE — FLOWSHEET NOTE
Called into room by speech therapist. Patient having seizure, visualized twitching and frothing at the mouth. Dr. Ivette Aguilera on unit. Called immediately into room. Ativan ordered IV and administered, vitals obtained, ABGs ordered. HOB at 45 degrees. Seizure precautions. EKG obtained. Patient now sleeping. Unable to follow commands. Will continue to monitor.  Electronically signed by Nelda Mcknight RN on 8/12/18 at 8:22 AM

## 2018-08-13 PROBLEM — N18.6 ESRD (END STAGE RENAL DISEASE) ON DIALYSIS (HCC): Status: ACTIVE | Noted: 2018-01-01

## 2018-08-13 PROBLEM — R04.0 EPISTAXIS: Status: ACTIVE | Noted: 2018-01-01

## 2018-08-13 PROBLEM — E03.9 HYPOTHYROID: Status: ACTIVE | Noted: 2018-01-01

## 2018-08-13 PROBLEM — L89.304 DECUBITUS ULCER OF BUTTOCK, STAGE 4 (HCC): Status: ACTIVE | Noted: 2018-01-01

## 2018-08-13 PROBLEM — E43 SEVERE PROTEIN-CALORIE MALNUTRITION (HCC): Status: ACTIVE | Noted: 2018-01-01

## 2018-08-13 PROBLEM — Z99.2 ESRD (END STAGE RENAL DISEASE) ON DIALYSIS (HCC): Status: ACTIVE | Noted: 2018-01-01

## 2018-08-13 NOTE — PROGRESS NOTES
Daily with breakfast    insulin lispro  2 Units Subcutaneous TID AC    primidone  50 mg Oral TID    sodium chloride flush  10 mL Intravenous 2 times per day    insulin lispro  0-6 Units Subcutaneous TID WC    insulin lispro  0-3 Units Subcutaneous Nightly    ipratropium-albuterol  3 mL Inhalation TID    levetiracetam  1,000 mg Intravenous Q12H    heparin (porcine)  5,000 Units Subcutaneous 2 times per day    amiodarone  200 mg Oral Daily    diltiazem  240 mg Oral Daily     Continuous Infusions:   dextrose       PRN Meds:cholestyramine light, sodium chloride, sodium chloride flush, magnesium hydroxide, ondansetron, glucose, dextrose, glucagon (rDNA), dextrose, albuterol, hydrALAZINE, labetalol, LORazepam, heparin (porcine)    PHYSICAL EXAM:    CURRENT VITALS: BP (!) 187/63   Pulse 77   Temp 98 °F (36.7 °C) (Oral)   Resp 16   Ht 4' 11\" (1.499 m)   Wt 99 lb 3.3 oz (45 kg)   LMP  (LMP Unknown)   SpO2 100%   BMI 20.04 kg/m²     CONSTITUTIONAL:  awake, alert, cooperative, no apparent distress,   ENT:  Normocephalic, without obvious abnormality, atraumatic, sinuses nontender on palpation, external ears without lesions,  NECK:  Supple, symmetrical, trachea midline, no adenopathy, thyroid symmetric, not enlarged and no tenderness, skin normal  LUNGS:  No increased work of breathing, good air exchange, clear to auscultation bilaterally, no crackles or wheezing  CARDIOVASCULAR:  Normal apical impulse, regular rate and rhythm, normal S1 and S2,  and no murmur noted  ABDOMEN:  Normal bowel sounds, soft, non-distended, non-tender, no masses palpated, no hepatosplenomegally  EXTREMITIES:  No edema, Pulses strong throughout. NEUROLOGIC:  Awake, alert, oriented to name, place and time.  Following all commands and moving all extremties  SKIN:  no bruising or bleeding, normal skin color, texture, turgor and no rashes     Data:        LABS:      Recent Results (from the past 24 hour(s))   POCT Glucose    Collection ACCU-CHEK                EKG: See Report     Echo: See Report            Viviana Cook MD ,[facility] Cardiology

## 2018-08-13 NOTE — PROGRESS NOTES
Nephrology Progress Note    Assessment:   81y.o. year old female with history s/f ESRD on IHD MWF, A.fib, COPD, HTN, HLD, T2DM, hypothyroidism and parkinsons disease who presented from the SNF after an episode of tremors w/ concern for seizures. Consult for hyperkalemia in ESRD patient     1. ESRD on IHD MWF: having dialysis today  2. Hyperkalemia: corrected with IHD yesterday  3. Renal hyperparathyroidism: no PTH on file  4. Volume status/HTN: currently unable to tolerate PO, labetalol/hydralazine PRN  5.  Renal anemia: Hb @ goal      Patient Active Problem List:     Hypothyroidism     Hypercholesteremia     CKD (chronic kidney disease)     Atrial fibrillation (HCC)     Acid reflux     Anxiety     Essential hypertension     Idiopathic Parkinson's disease ([address]     Cardiac pacemaker     Pleural effusion, bilateral     Type 2 diabetes mellitus ([address]     Sacral decubitus ulcer, stage III (HCC)     Severe malnutrition (HCC)     CHF (congestive heart failure), NYHA class III, acute on chronic, combined (HCC)     Hypoxia     Atelectasis, bilateral     Hyperkalemia     Malnutrition related to chronic disease (HCC)     Seizure ([address]      Subjective:   Admit Date: 8/11/2018    Interval History: got IHD stat yesterday for hyperkalemia to 6.5, improved to 3.6      Medications:   Scheduled Meds:   warfarin  3 mg Oral Once    aspirin  81 mg Oral Daily    fluticasone  2 spray Nasal Daily    pantoprazole  20 mg Oral QAM AC    magnesium oxide  400 mg Oral Daily    calcium acetate  667 mg Oral TID WC    b complex-C-folic acid  1 capsule Oral Daily    folic acid  1 mg Oral Daily    levothyroxine  25 mcg Oral Daily    atorvastatin  80 mg Oral Nightly    losartan  100 mg Oral Daily    isosorbide mononitrate  60 mg Oral Daily    hydrALAZINE  100 mg Oral TID    docusate sodium  100 mg Oral Daily    calcium elemental  500 mg Oral BID    mirtazapine  15 mg Oral Nightly    venlafaxine  150 mg Oral Daily with breakfast    Intake              400 ml   Output              900 ml   Net             -500 ml        General: alert, in no apparent distress, elderly  HEENT: normocephalic, atraumatic, anicteric  Neck: supple, no mass  Lungs: non-labored respirations, clear to auscultation bilaterally  Heart: regular rate and rhythm, no murmurs or rubs  Abdomen: soft, non-tender, non-distended  MSK: no joint swelling or tenderness  Ext: no cyanosis, no peripheral edema  Neuro: alert and oriented, no gross abnormalities  Skin: no rash      Electronically signed by Maria R Mosher MD on 8/13/2018 at 2:23 PM

## 2018-08-13 NOTE — PROGRESS NOTES
Physical Therapy Missed Treatment   Facility/Department: [facility] MED SURG L859/[id number]    NAME: Guido Reyna    : 1935 (81 y.o.)  MRN: [id number]    Account: [de-identified]  Gender: female      PT evaluation and treatment orders received. Chart reviewed. PT evaluation attempted. Pt currently down for dialysis at this time. Will follow and attempt PT evaluation again at earliest availability.         Rosalie Shah, PT, 18 at 3:07 PM

## 2018-08-13 NOTE — FLOWSHEET NOTE
Patient is more alert, however she is very confused. She has been tugging at her lines. She pulled the saline lock/iv out of her right hand. She has been picking at her telemonitor and pulling off the leads. She also has said she has a trick for me then has pulled stool from her rectum. She then had stools incont 2 times and has played with them. She has tugged at her dialysis catheter. Called the supervisor and going to get an avasys to help watch her. She has had tremors and needs monitor also for seizure.

## 2018-08-13 NOTE — PROGRESS NOTES
Nutrition Assessment    Type and Reason for Visit: Initial, Positive Nutrition Screen (wounds)    Nutrition Recommendations:    Continue NPO. Suggest change IVF to PPN. If unable to initiate po diet in next 48 hrs, Recommend EN. Renal formula (Nepro) with a goal of 35 ml/hr, water flushes as per physician (1512 kcal/68 g protein/610 ml free water)    Malnutrition Assessment:  · Malnutrition Status: Insufficient data  · Context: Chronic illness  · Findings of the 6 clinical characteristics of malnutrition (Minimum of 2 out of 6 clinical characteristics is required to make the diagnosis of moderate or severe Protein Calorie Malnutrition based on AND/ASPEN Guidelines):  1. Energy Intake-Not available, not able to assess    2. Weight Loss-20% loss or greater,  (10 months)  3. Fat Loss-Unable to assess,    4. Muscle Loss-Unable to assess,    5. Fluid Accumulation-Mild fluid accumulation, Generalized  6.  Strength-Not measured    Nutrition Diagnosis:   · Problem: Inadequate oral intake  · Etiology: related to Difficulty swallowing, Cognitive or neurological impairment     Signs and symptoms:  as evidenced by Swallow study results    Nutrition Assessment:  · Subjective Assessment: Pt currently off unit for HD. Admitted from SNF with noted seizure activity. Currently pt has some confusion. She failed her swallow evaluation (8/12) with recommendations for NPO  · Nutrition-Focused Physical Findings: Peripheral/HD catheter. IVF = dextrose 5% & .9% NaCl @ 50 ml/hr. trace-1+ generalized edema.   BM (8/12) I/O: 400/900  · Wound Type: Pressure Ulcer, Stage III  · Current Nutrition Therapies:  · Oral Diet Orders: NPO   · Anthropometric Measures:  · Ht: 4' 11\" (149.9 cm)   · Admission Body Wt: 110 lb (49.9 kg) (estimated)  · Usual Body Wt: 124 lb (56.2 kg) ((10/17), 107 lb(1/18), 101 lb (3/18), 95 lb (7/16))  · % Weight Change: 20%,  10 months  · Ideal Body Wt: 95 lb (43.1 kg), % Ideal Body 100%  · BMI Classification: BMI 18.5 - 24.9 Normal Weight  · Comparative Standards (Estimated Nutrition Needs):  · Estimated Daily Total Kcal: 5022-9765  · Estimated Daily Protein (g): 64-77    Estimated Intake vs Estimated Needs: Intake Less Than Needs    Nutrition Risk Level: High    Nutrition Interventions:   Continue NPO (Suggest change IVF to PPN. If unable to initiate po diet in next 48 hrs, suggest EN. Recommend Renal formula (Nepro) with a goal of 35 ml/hr, water flushes as per physician (1512 kcal/68 g protein/610 ml free water))  Continued Inpatient Monitoring    Nutrition Evaluation:   · Evaluation: Goals set   · Goals: ability to initiate po diet vs. nutrition support    · Monitoring: NPO Status, Mental Status/Confusion, Weight, Pertinent Labs, Chewing/Swallowing    See Adult Nutrition Doc Flowsheet for more detail.      Electronically signed by Juan Daniel Tobar RD, LD on 8/13/18 at 2:42 PM

## 2018-08-13 NOTE — PROGRESS NOTES
[facility] Occupational Therapy      Date: 2018  Patient Name: Padmaja Tomlinson        MRN: [id number]  Account: [de-identified]   : 1935  (81 y.o.)  Room: [place] [address]    Chart reviewed, attempted OT at 1:00 for eval. Patient unavailable 2° to:    [] Hold per nsg request    [] Pt declined     [x] Pt. . off floor for test/procedure. dialysis  Will attempt again when able.     Electronically signed by PRIYA Stallings on 2018 at 12:34 PM

## 2018-08-13 NOTE — PROGRESS NOTES
records, evaluating patient, speaking with RN's and consultants where I was focused exclusively on this patient: 35 minutes. Subjective:   Admit Date: 8/11/2018  PCP: Nadya Levin MD    No acute events overnight. Had HD today. Telemetry reviewed. No new complaints. Pt denies chest pain, SOB, N/V, fevers or chills. Objective:     Vitals:    08/13/18 1600 08/13/18 1629 08/13/18 1630 08/13/18 1704   BP: (!) 191/92 (!) 187/80 (!) 191/89 (!) 197/75   Pulse: 71 71 72 75   Resp:   16 16   Temp:    98.2 °F (36.8 °C)   TempSrc:    Oral   SpO2:    99%   Weight:   97 lb 7.1 oz (44.2 kg)    Height:         General appearance: Mild acute distress, lethargic A + O x 3. No conversational dyspnea noted. Lungs: CTAB no exp wheezes, No rales , No rhonchi. No retractions; No use of accessory muscles  Heart:  S1, S2 normal, RRR, no MRG appreciated  Abdomen: (+) BS, soft, non-tender; non distended no guarding or rigidity. Extremities:  no cyanosis, no edema bilat lower exts, no calf tenderness bilaterally.  Dry skin noted       Medications:      sodium chloride      dextrose 5 % and 0.9 % NaCl      dextrose        diltiazem  10 mg Intravenous BID    amiodarone bolus  150 mg Intravenous Daily    aspirin  300 mg Rectal Daily    pantoprazole  40 mg Intravenous Daily    And    sodium chloride (PF)  10 mL Intravenous Daily    fluticasone  2 spray Nasal Daily    levothyroxine  25 mcg Oral Daily    sodium chloride flush  10 mL Intravenous 2 times per day    insulin lispro  0-6 Units Subcutaneous TID WC    insulin lispro  0-3 Units Subcutaneous Nightly    ipratropium-albuterol  3 mL Inhalation TID    levetiracetam  1,000 mg Intravenous Q12H    heparin (porcine)  5,000 Units Subcutaneous 2 times per day       LABS Reviewed    IMAGING Reviewed    César Bell MD  [facility] Hospitalist

## 2018-08-13 NOTE — PROGRESS NOTES
Report called to [facility]; medications due not available on floor at this time and message sent to pharmacy to send to 2W; p/u now.   Electronically signed by Nathalia Fowler RN on 8/13/2018 at 8:18 AM

## 2018-08-14 NOTE — PROGRESS NOTES
Nutrition Assessment    Type and Reason for Visit: Reassess    Nutrition Recommendations: Continue NPO, Modify current Parenteral Nutrition. (Suggest decrease PN rate to 75ml/hr). If unable to initiate po diet in next 48 hrs, suggest consider EN. If opted, recommend Renal formula (Nepro) with a goal of 35 ml/hr, water flushes as per physician (1512 kcal/68 g protein/610 ml free water). Malnutrition Assessment:  · Malnutrition Status: Insufficient data  · Context: Chronic illness  · Findings of the 6 clinical characteristics of malnutrition (Minimum of 2 out of 6 clinical characteristics is required to make the diagnosis of moderate or severe Protein Calorie Malnutrition based on AND/ASPEN Guidelines):  1. Energy Intake-Not available, not able to assess    2. Weight Loss-10% loss or greater,  (10 months)  3. Fat Loss-Unable to assess,    4. Muscle Loss-Unable to assess,    5. Fluid Accumulation-Mild fluid accumulation, Extremities  6.  Strength-Not measured    Nutrition Diagnosis:   · Problem: Inadequate oral intake  · Etiology: related to Difficulty swallowing, Cognitive or neurological impairment     Signs and symptoms:  as evidenced by Swallow study results    Nutrition Assessment:  · Subjective Assessment: Pt remains NPO. To repeat MBS per MD notes. PN initiated 8/14. · Nutrition-Focused Physical Findings: +1 BUE and trace BLE edema noted. Last BM 8/12.   · Wound Type: Pressure Ulcer, Stage III  · Current Nutrition Therapies:  · Oral Diet Orders: NPO   · Parenteral Nutrition Orders:  · Type and Formula: 2-in-1 Standard   · Lipids: None  · Additives: MVI & standard lytes  · Rate/Volume: 100ml/hr  · Duration: Continuous 24 hrs  · Current PN Order Provides: 1224cals/ 102grams protein  · Goal PN Orders Provides: 918cals/ 76grams protein (Suggest decrease PN rate to 75ml/hr.)  · Anthropometric Measures:  · Ht: 4' 11\" (149.9 cm)   · Current Body Wt: 106 lb (48.1 kg) (8-13)  · Admission Body Wt: 110 lb (49.9 kg) (estimated)  · Usual Body Wt: 124 lb (56.2 kg) ((10/17), 107 lb(1/18), 101 lb (3/18), 95 lb (7/16))  · % Weight Change: 14%,  10 months  · Ideal Body Wt: 95 lb (43.1 kg), % Ideal Body 100%  · BMI Classification: BMI 18.5 - 24.9 Normal Weight  · Comparative Standards (Estimated Nutrition Needs):  · Estimated Daily Total Kcal: 7777-9895  · Estimated Daily Protein (g): 62-72  · Estimated Daily Total Fluid (ml/day): per MD    Estimated Intake vs Estimated Needs: Intake Less Than Needs    Nutrition Risk Level: High    Nutrition Interventions:   Continue NPO, Modify current Parenteral Nutrition (Suggest decrease PN rate to 75ml/hr). If unable to initiate po diet in next 48 hrs, suggest consider EN. If opted, recommend Renal formula (Nepro) with a goal of 35 ml/hr, water flushes as per physician (1512 kcal/68 g protein/610 ml free water). Continued Inpatient Monitoring    Nutrition Evaluation:   · Evaluation: No progress toward goals   · Goals: ability to initiate po diet vs. nutrition support   · Monitoring: NPO Status, Mental Status/Confusion, Weight, Pertinent Labs, Chewing/Swallowing    See Adult Nutrition Doc Flowsheet for more detail.      Electronically signed by Tyler Ho RD, JIHAN on 8/14/18 at 12:24 PM

## 2018-08-14 NOTE — PROGRESS NOTES
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 [address]

## 2018-08-14 NOTE — PROGRESS NOTES
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 [place]

## 2018-08-14 NOTE — PROCEDURES
Supervision with all PO intake    [x] Double swallow    [] Chin tuck  [] Multiple swallow     [x] Feed in upright position   [x] Small bites/sips   [] Feed reclined, 45 degrees  [] Alternate solids / liquids  [] Check for oral pocketing  [x] No straw    [] Throat clear       [] Place food on left side  [] Place food on right side  [] Spoon sip liquids   [] Effortful swallow  [] Kali Taveras water protocol  [] Modified Casarez water protocol     [x] Administer meds ([x] whole   [x] crushed as able) with: [] Water [x] Applesauce    THERAPY RECOMMENDATIONS:   []  Therapy is not recommended     [x]  Therapy is recommended to:      [x]  Assess tolerance of recommended diet   [x]  Improve oral motor strength and range of motion    [x]  Improve tongue base retraction     []  Improve laryngeal strength and range of motion      []  Address cricopharyngeal dysfunction (Shaker Exercises)               [x]  Mealtime assessment of patient's tolerance of prescribed diet     [x] [place] RN notified                   OBJECTIVE ASSESSMENT    Oral mechanism examination:    []  Adequate lingual/labial strength  [x]  Generalized oral weakness  []  Right labiobuccal weakness   []  Left labiobuccal weakness    []  Right lingual deviation    []  Left lingual deviation   []  Decreased velar elevation              []  Decreased lingual coordination               []  Gag response present   []  Gag response absent     []  Volitional swallow    []  Volitional cough   []  Oral apraxia               []  CNT    Dentition: [x] Natural  []  Missing/teeth in poor repair  []  Edentulous  []  Dentures   []  Other    Current respiratory status:    [x] Room Air   [] Nasal cannula [] O2 mask           []  Non-rebreather mask  []  Tracheostomy  []  Vent dependent    Vocal quality prior to PO intake:  []  WFL  [x]  Weak  []  Wet    Cognitive status:    [] Intact   [x] Functional for exam  [] Confusion   [] Aphasia  [x] Cognitive deficits                DIET LEVEL PRIOR TO THIS EVALUATION :    Diet NPO Effective Now  CLINIMIX    PROCEDURE   Patient positioning: Seated 70-90°  Viewing Plane(s): LATERAL ONLY  Contrast: commercially prepared, non-standardized barium viscosities; graduated from thin liquid to pudding consistency. Administered via tsp (5 ml boluses), by cup or straw in single and sequentially swallowed boluses as tolerated. Solid evaluated with 1/2 antoine cracker/3 ml barium pudding coated as tolerated. Consistencies Administered During the Evaluation   Liquids: [x]Thin    [x]Nectar   []Honey   Solids:  [x]Pureed/Pudding  [x]Soft Solid   []Cookie  Other:       Method of Intake:     [x]Self Fed     []Set-Up     [x]Fed by Clinician     [x]Cup      [x]Spoon     []Straw                  RESULTS   ORAL STAGE []WNL  []WFL  [x]  Exceptions    [] Inadequate labial seal resulting anterior labial spillage from: []right[] left []midline     [x] Decreased mastication due to: []poor/missing dentition [x]decreased lingual control   []cognitive status    [x] Delayed A-P transit due to: []decreased initiation [x] reduced lingual strength     []cognitive function     [x] Decreased bolus formation resulting in premature pharyngeal spillage to the level of the: [x] valleculae [] pyriforms      [x] Oral residuals: []anterior sulcus  []sub lingually  []right buccal cavity     []left buccal cavity [x]on tongue base      []throughout oral cavity []on superior tongue  []on palate   []on velum        [x]Oral Stage Impression: Moderate oral dysphagia characterized by prolonged mastication time per mechanical soft consistencies d/t decreased lingual control, delayed A-P transit d/t reduced lingual strength, and decreased bolus formation resulting in premature pharyngeal spillage to the level of the valleculae per all consistencies tested. Min residuals observed on base of tongue that cleared with a cued double swallow.       PHARYNGEAL STAGE:     [] WNL  []WFL  [] Exceptions    ONSET TIME:  [] Onset time of the pharyngeal swallow was adequate    [x] Delayed initiation of the pharyngeal swallow:    []mild [x]moderate []marked []severe []absent     [x] Swallow reflex was triggered at:   []tongue base [x]valleculae []pyriform sinus     PHARYNGEAL RESIDUALS        Vallecula/Pharyngeal Wall  []No significant residuals were noted in the vallecula    [x]Reduced tongue base retraction resulting in:    [x]residuals in vallecula []residual on posterior pharyngeal wall    These residuals were noted for: [x]thin [x]nectar[] honey [x]pureed [x]soft solid  Which: []cleared  []did not clear  []partially cleared [x]mostly cleared  With:    [x]cued []spontaneous [x]double swallow []multiple swallow (  times)  []liquid chaser     Pyriform Sinuses  [x]No significant residuals were noted in the pyriform sinuses      LARYNGEAL PENETRATION/ASPIRATION  []Laryngeal penetration was not present during this evaluation    [x]Aspiration was not present during this evaluation     Stasis   valleculae Pooling  pyriform sinuses LARYNGEAL    PENETRATION ASPIRATION        Trace   Transient   Deep   Before    During    After   Thin           X       Nectar            Honey             Puree           Soft Solid           Regular  Solid                Response to laryngeal penetration:  Response to aspiration:    []spontaneous cough    [] spontaneous cough    [] throat clearing    [] throat clearing    [x] inconsistent/delayed cough  [] inconsistent/delayedcough     [] absent cough      [] absent cough      [] automatically cleared      Aspiration Scale  Puree,   Soft solid, Nectar, Thin-spoon 1 Material does not enter the airway    2 Material enters the airway, remains above the vocal folds, and is ejected from the airway   Thin-cup 3 Material enters the airway, remains above the vocal folds, and is not ejected from the airway    4 Material enters the airway, contacts the vocal folds, an is ejected from the airway    5 Material enters the airway, contacts the vocal folds, and is not ejected from the airway    6 Material enters the airway, passes below the vocal folds and is ejected into the larynx or out of the airway    7 Material enters the airway, passes below the vocal folds, and is not ejected from the trachea despite effort    8 Material enters the airway, passes below the vocal folds, and no effort is made to eject. Compensatory Strategies:  Effective compensatory strategies:     [x] double swallow        [] multiple swallow     [] alternating solids/liquids     [] chin tuck     [] cued throat clear     [] cued redirective cough    Pharyngeal Stage Impression:  Moderate pharyngeal dysphagia characterized by a moderately delayed initiation of the pharyngeal swallow with the swallow reflex triggered at the level of the valleculae per all consistencies tested. Reduced tongue base retraction resulted in residuals in the valleculae per all consistencies tested that mostly cleared with a cued double swallow. Decreased laryngeal elevation observed. Transient laryngeal penetration observed during the swallow per thin liquid via cup; pt produced a delayed cough. STRUCTURAL/FUNCTIONAL ANOMALIES    [x]No structural/functional anomalies were noted    []Inadequate velopharyngeal closure resulting in nasopharyngeal reflux.      [] There was presence of Zenkers Diverticulum per Radiologist     []Comments:     CERVICAL ESOPHAGEAL STAGE : [x]Adequate []Inadequate  []Not Assessed     []Cervical osteophytes present per Radiologist     []Structural/mechanical abnormality in cervical esophagus per Radiologist    [] Redirection of bolus from the esophagus into pharynx     Long Term Goals:  [x] Will tolerate least restrictive diet safely      []Goals to be determined after MBS      [] No Treatment indicated at this time      Short Term Goals:   Pt to be seen  2-3x/week    Oral Phase:  [x] Pt will complete oral motor ROM and strengthening exercises with 90% accuracy, given cues as needed, in order to strengthen lingual/labial/buccal musculature to promote safety and efficiency of oral phase of swallow and decrease risk for pocketing. [x]  Pt will complete lingual exercises that promote anterior to posterior propulsion of bolus and improve tongue base retraction with 80% accuracy in order to strengthen the muscles of the swallow to decrease risk of aspiration and to increase ability to safely handle the least restrictive diet level. Pharyngeal Phase:   [x]  Pt will tolerate thermal gustatory stimulation in 100% of opportunities to stimulate the swallow and improve swallow onset time. Trials and Continued assessment:  [x]  Pt will demonstrate double swallow for safe and efficient swallow of all consistencies in all given opportunities. [x]  Pt will demonstrate the ability to produce anterior to posterior propulsion of bolus and improve tongue base retraction with all consistencies in all given opportunities to improve efficiency with swallow and decrease risk for residuals. [x] Pt will tolerate the recommended diet level with no s/s of aspiration. Plan of Care:    [x] Oral/motor exercises   [] Adduction/voice/pitch exercises   [x] Dysphagia exercies    [] Mastication exercises   [] Therapeutic meal/monitoring/feeding  [] MBS Recommended    [x]  Prognosis for improvements is: Fair,  endurance    Pain:none, N/A   []Nursing notified    GCODES:     Swallow Current  CK   Swallow Goal  CJ     Severity Levels  CH - 0% Impaired or limited  CI - At least 1%, but less than 20%  CJ - At least 20%, but less than 40%  CK - At least 40%, but less than 60%  CL - At least 60%, but less than 80%  CM - At least 80%, but less than 100%  CN - 100% impaired or limited    Radiologist:  Available on Consult as needed, Radiology Tech    Treatment goals were discussed with the: [x] patient  [] family.    The []  patient []  family understand the diagnosis, prognosis and plan of care. [x]  Reinforcement is needed    Thank you for your referral.  Please direct any questions or concerns to Speech Pathology. Images are available through [facility]/PACS. Please see radiologist report for additional details.     Therapist:  Electronically signed by CIELO Alfredo on 8/14/2018 at 2:49 PM

## 2018-08-14 NOTE — PROGRESS NOTES
[facility] OCCUPATIONAL THERAPY EVALUATION - ACUTE   Room: K889/Z578-58  Date: 2018  Patient Name: Jazzy Russell        MRN: [id number]  Account: [de-identified]   : 1935  (81 y.o.)    Chart Review:  Diagnosis:  The primary encounter diagnosis was Disorientation. Diagnoses of Seizure ([facility] [address] and Decubitus ulcer of coccygeal region, stage II were also pertinent to this visit.   Past Medical History:   Diagnosis Date    Abnormal presence of protein in urine 2004    Dr. Stanley Narayanan Acute on chronic diastolic heart failure (HCC)     Anemia     Anxiety     Atherosclerotic heart disease     Atrial fibrillation (HCC)     Breath shortness     Cardiac arrhythmia     Chronic kidney disease     Chronic obstructive pulmonary disease (COPD) (HCC)     Constipation, chronic     Difficulty in walking     Diverticular disease of the colon     End stage renal disease (HCC)     Enterocolitis due to Clostridium difficile, not specified as recurrent     Epistaxis 2018    had surgery severe    ESRD (end stage renal disease) ([facility] [address]     Fistula     left    Gastro-esophageal reflux disease without esophagitis     GERD (gastroesophageal reflux disease)     GERD, PUD, Hiatal Hernia    Granulomatous lung disease ([address]     History of endoscopy 12    Dr. Darwin Bran    Hyperlipemia     Hyperlipidemia     Hypertension     Hypothyroidism     Kidney stones 2001    Dr. 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)     legs    Nosebleed 2018    Osteoarthritis     Parkinsons disease ([facility] [address]     Peptic ulcer, site unspecified, unspecified as acute or chronic, without hemorrhage or perforation     Positive ORTEGA (antinuclear antibody)     1:80    Presence of cardiac pacemaker     Seizures (HCC)     Tachycardia     Tachypnea, not elsewhere classified     Thrombophlebitis leg superficial     Type 2 diabetes mellitus Two level   []  Split level     []  Apartment:     Entrance:  Stairs: 0 Hand rails 0,    Inside: Stairs: 0 Hand rails: 0  Bathroom: [] Bath tub   [] Tub/Shower combo   [x]  Shower stall    Location: main level    DME: [] W/W   [] Kaci Boles   [] Rollator   []  W/C   [] Stephanie Sanford   [] Shower Chair   [] [facility]  [] Dressing  AE  [x] Other:SNF equip    Previous Functional Status:  Assist with ADLs and IADLs    Pain:   Start of session: 0/10  Location: na  Description: na  Action: [] No Action Necessary    [x] Patient reports pain at acceptable level for treatment  [] Nursing notified    [] Other      Objective:  Observation:  Alert, cooperative    Orientation: Oriented to  [x] Person   [x] Place  []Time    Vision:   []  WFL   [x] Impaired  Comments:   glasses   Hearing:  [] WFL   [x] Impaired  Comments:  [place]  Sensation:   [x] WFL   []  Impaired   Comments:      Cognition:   [x] WFL   [] Impaired  Comments:follows commands    Communication:   [x] WFL   [] Impaired  Comments:    Hand Dominance: [x] Right  [] Left    Range of Motion:  R UE AROM/PROM: [x]  WFL [] Impaired  Comments:   L UE AROM/PROM:  [x]  WFL [] Impaired  Comments:     Strength:   R UE Strength: []1    [] 2   [] 2+   [x] 3   [] 3+   [] 4   [] 4+  [] 5  Comments:   L UE Strength:  []1    [] 2   [] 2+   [x] 3   [] 3+  [] 4   [] 4+   [] 5  Comments:     Quality of Movement:  [x] Good   [] Fair   [] Poor     Coordination:  Gross motor: [x] WFL   [] Impaired   Fine motor: [x] WFL   [] Impaired     Functional Mobility:  Toilet Transfers: Mod A rolling for bed toileting  Bed Transfer: Mod A rolling L and R  Sit to stand:  Max A  Bed to Chair:  unsafe    Seated Balance:    Min A EOB  Static: [] Good  [x] Fair   [] Poor   Dynamic: []  Good  [x] Fair   [] Poor     Standing Balance: NT    Static: [] Good   [] Fair  [] Poor   Dynamic: [] Good   [] Fair   [] Poor     Functional Endurance: [] Good  [x] Fair  [] Poor     ADLs  Feeding:  NPO, can bring hands to mouth Ind  UE

## 2018-08-14 NOTE — PROGRESS NOTES
systolic murmurs  Abdomen: soft, non-tender, non-distended  MSK: no joint swelling or tenderness  Ext: no cyanosis, no peripheral edema  Neuro: alert and oriented, no gross abnormalities  Skin: no rash      Electronically signed by Cirilo Zuniga MD on 8/14/2018 at 12:30 PM

## 2018-08-14 NOTE — PROGRESS NOTES
Wound Ostomy Continence Nurse  Consult Note       NAME:  Oscar Burroughs  MEDICAL RECORD NUMBER:  [id number]  AGE: 81 y.o. GENDER: female  : 1935  TODAY'S DATE:  2018    Subjective   Reason for [address] Nurse Evaluation and Assessment:       Oscar Burroughs is a 81 y.o. female referred by:   [x] Physician  [] Nursing  [] Other:     Wound Identification:  Wound Type: pressure  Contributing Factors: diabetes, chronic pressure, decreased mobility, shear force, malnutrition, incontinence of stool, incontinence of urine and cachexia    Wound History: Patient states she has had the \"bed sore\" since the beginning of January. Patient also states she thought is was getting better.   Current Wound Care Treatment:  Recommendin) pressure injury prevention interventions 2) consult Dr. Maritza Pierson 3) Santyl dressing change    Patient Goal of Care:  [x] Wound Healing  [] Odor Control  [] Palliative Care  [] Pain Control   [] Other:         PAST MEDICAL HISTORY        Diagnosis Date    Abnormal presence of protein in urine 2004    Dr. Alan Bucio. Maricarmen Shukla Acute on chronic diastolic heart failure (HCC)     Anemia     Anxiety     Atherosclerotic heart disease     Atrial fibrillation (HCC)     Breath shortness     Cardiac arrhythmia     Chronic kidney disease     Chronic obstructive pulmonary disease (COPD) (HCC)     Constipation, chronic     Difficulty in walking     Diverticular disease of the colon     End stage renal disease ([address]     Enterocolitis due to Clostridium difficile, not specified as recurrent     Epistaxis 2018    had surgery severe    ESRD (end stage renal disease) ([address]     Fistula     left    Gastro-esophageal reflux disease without esophagitis     GERD (gastroesophageal reflux disease)     GERD, PUD, Hiatal Hernia    Granulomatous lung disease (HCC)     History of endoscopy 12    Dr. Dimitri Mehta    Hyperlipemia     Hyperlipidemia     Hypertension     Hypothyroidism     Kidney stones 11/2001    Dr. Thais Alfonso    Long-term (current) use of anticoagulants     Muscle weakness (generalized)     Neuropathy in diabetes ([facility] [address]     legs    Nosebleed 07/17/2018    Osteoarthritis     Parkinsons disease ([facility] [address]     Peptic ulcer, site unspecified, unspecified as acute or chronic, without hemorrhage or perforation     Positive ORTEGA (antinuclear antibody)     1:80    Presence of cardiac pacemaker     Seizures ([facility] [address] [facility]     Tachycardia     Tachypnea, not elsewhere classified     Thrombophlebitis leg superficial     Type 2 diabetes mellitus with hypoglycemia without coma ([facility] [address]     Type II or unspecified type diabetes mellitus without mention of complication, not stated as uncontrolled 1998    Unspecified osteoarthritis, unspecified site     Urinary tract infection     Weakness        PAST SURGICAL HISTORY    Past Surgical History:   Procedure Laterality Date    APPENDECTOMY  2007    BLADDER SUSPENSION  2008 & 2009    [facility], second at [address]  2/2006    COLONOSCOPY  4/2007    Dr. Rose Hammonds    [address] N/A 11/2/2017    CATHETER INSERTION HEMODIALYSIS performed by Yuko Persaud MD at [address] [place] [address] Rd  [id number]    Bilateral    [place] COLONOSCOPY FLX DX W/COLLJ SPEC WHEN PFRMD N/A 2/1/2018    COLONOSCOPY performed by Milagros Cohn MD at [address] [facility] N/A 7/16/2018    control epistaxis performed by Bethany Arthur MD at [address] [facility] EGD TRANSORAL BIOPSY SINGLE/MULTIPLE N/A 2/1/2018    EGD ESOPHAGOGASTRODUODENOSCOPY WITH BIOPSY performed by Milagros Cohn MD at [address] [facility] EGD TRANSORAL BIOPSY SINGLE/MULTIPLE N/A 3/27/2018    EGD ESOPHAGOGASTRODUODENOSCOPY performed by Milagros Cohn MD at [facility] [address] N/A 3/30/2018    EXAM UNDER ANESTHESIA, LARYNGOSCOPY ,PHARYNGOSCOPY performed by Bethany Arthur MD at [facility] 3/30/2018    REMOVAL PACKING POSS CONTROL EPISTAXIS, NASAL ENDOSCOPY performed by Suly Mai MD at [address] [facility] THYROID SURGERY      URETHRAL STRICTURE DILATATION  3/2003       FAMILY HISTORY    Family History   Problem Relation Age of Onset    Heart Disease Father     Early Death Father 47    Diabetes Father     Heart Disease Mother     Diabetes Mother     High Blood Pressure Mother     High Blood Pressure Sister     High Cholesterol Sister        SOCIAL HISTORY    Social History   Substance Use Topics    Smoking status: Former Smoker    Smokeless tobacco: Never Used    Alcohol use No       ALLERGIES    Allergies   Allergen Reactions    Arthrotec [Diclofenac-Misoprostol] Nausea Only    Depakote [Divalproex Sodium] Itching    Dilantin [Phenytoin]     Klonopin [Clonazepam]     Statins Other (See Comments)     achy       MEDICATIONS    No current facility-administered medications on file prior to encounter. Current Outpatient Prescriptions on File Prior to Encounter   Medication Sig Dispense Refill    venlafaxine 150 MG extended release tablet Take 150 mg by mouth daily (with breakfast)      atorvastatin (LIPITOR) 80 MG tablet Take 1 tablet by mouth nightly 30 tablet 3    levothyroxine (SYNTHROID) 25 MCG tablet Take 25 mcg ( 1 tab) and 50 mcg ( 2 tabs) alternatively.  45 tablet 1    losartan (COZAAR) 100 MG tablet Take 1 tablet by mouth daily 30 tablet 3    isosorbide mononitrate (IMDUR) 60 MG extended release tablet Take 1 tablet by mouth daily 30 tablet 1    carbidopa-levodopa (SINEMET)  MG per tablet Take 1 tablet by mouth daily 90 tablet 1    hydrALAZINE (APRESOLINE) 100 MG tablet Take 1 tablet by mouth 3 times daily 90 tablet 3    amiodarone (CORDARONE) 200 MG tablet Take 1 tablet by mouth daily 30 tablet 3    docusate sodium (COLACE) 100 MG capsule Take 1 capsule by mouth daily 30 capsule 1    insulin lispro (HUMALOG) 100 UNIT/ML injection ulcer, stage III ([address]    Severe malnutrition ([address]    CHF (congestive heart failure), NYHA class III, acute on chronic, combined (HCC)    Hypoxia    Atelectasis, bilateral    Hyperkalemia    Malnutrition related to chronic disease (HCC)    Seizure (HCC)    A-fib (HCC)    Decubitus ulcer of buttock, stage 4 (HCC)    Epistaxis    ESRD (end stage renal disease) on dialysis ([address]    Hypothyroid    Prolapse of vaginal vault after hysterectomy    Severe protein-calorie malnutrition (HCC)    Urgency of urination    Urinary frequency    Urinary hesitancy    Vaginal enterocele       Measurements:  Wound 03/24/18 Skin tear Leg Left; Lower skin tear (Active)   Number of days: 143       Wound 06/18/18 Other (Comment) Sacrum Mid;Inner Stage 3 Sacrum (Active)   Wound Type Wound 8/14/2018 12:08 PM   Wound Pressure Stage  4 8/14/2018 12:08 PM   Dressing Status Intact;Dry;Clean 8/13/2018 10:30 PM   Dressing Changed Dressing reinforced 7/17/2018 12:00 AM   Dressing/Treatment Moist to dry 8/14/2018 12:08 PM   Wound Cleansed Rinsed/Irrigated with saline 7/15/2018  4:00 PM   Dressing Change Due 08/14/18 8/14/2018 12:08 PM   Wound Length (cm) 4.5 cm 8/14/2018 12:08 PM   Wound Width (cm) 4 cm 8/14/2018 12:08 PM   Wound Depth (cm)  1 8/14/2018 12:08 PM   Calculated Wound Size (cm^2) (l*w) 18 cm^2 8/14/2018 12:08 PM   Change in Wound Size % (l*w) -100 8/14/2018 12:08 PM   Wound Assessment Yellow;Red;Fibrin;Slough 8/14/2018 12:08 PM   Drainage Amount Moderate 8/14/2018 12:08 PM   Drainage Description [place];Yellow 8/14/2018 12:08 PM   Odor None 8/14/2018 12:08 PM   Janneth-wound Assessment Red;Maceration; Intact 8/14/2018 12:08 PM   Red%Wound Bed 60 8/14/2018 12:08 PM   Yellow%Wound Bed 40 8/14/2018 12:08 PM   Culture Taken No 8/13/2018 10:37 AM   Number of days: 57       Wound 06/18/18 Skin tear Leg Posterior;Right; Lower Skin tear to the right lower posterior calf (Active)   Number of days: 57       Wound 06/18/18 Skin tear Hand Right Skin tear to the right hand (Active)   Number of days: 57       Wound 06/18/18 Skin tear Arm Right; Lower Skin tear on the right forearm (Active)   Number of days: 57       Assessment:    Patient already laying on her left side, able to turn her further for evaluation of wound. Patient appears to have stage 4 pressure injury to the sacrum - muscle exposed and also a lot of yellow fibrin/slough present, yellow/tan drainage, and no odor noted.  Please see LDA for any additional assessment details    Plan   Plan of Care: Wound 06/18/18 Other (Comment) Sacrum Mid;Inner Stage 3 Sacrum-Dressing/Treatment: Moist to dry    Specialty Bed Required : Yes   [] Low Air Loss   [] Pressure Redistribution  [x] Fluid Immersion  [] Bariatric  [] Other:     Current Diet: Diet NPO Effective Now  CLINIMIX  Dietician consult:  Yes    Discharge Plan:  Placement for patient upon discharge: skilled nursing    Patient appropriate for Outpatient [address]: Yes    Referrals:  []   [] [facility]  [] Supplies  [] Other    Patient/Caregiver Teaching:  Level of patient/caregiver understanding able to:   [] Indicates understanding       [] Needs reinforcement  [] Unsuccessful      [] Verbal Understanding  [] Demonstrated understanding       [] No evidence of learning  [] Refused teaching         [x] N/A       Electronically signed by DAE Lewis, RN, CWOCN on 8/14/2018 at 12:28 PM

## 2018-08-14 NOTE — CARE COORDINATION
8/14/18 PT IS NPO AND TO HAVE A REPEAT MBS TODAY. PT IS ON IV CLINIMIX RIGHT NOW. HE IS ALSO GETTING N/S SACRAL WOUND DRESSINGS BID. WOUND CARE NURSE ON CONSULT. TROPONINS ARE ELEVATED BUT CK WNL. SHE IS A HD M/W/F PT.

## 2018-08-14 NOTE — PROGRESS NOTES
CARDIOLOGY [address] PROGRESS NOTE         8/14/2018      Oscar Burroughs    [id number]  1935    Rounding MD: Imer Bradshaw MD ,[facility]    Primary Cardiologist:  Claudine Dickinson MD    SUBJECTIVE:    Patient had Mental changes and what Is thought to be a seizure. Better now. Had HD without issues. No New complaints. Apparently now with possible aspiration / swolling issues. NPO NOW. ALSO will need Decuitus ulcer debridement. Hold Anticoagualation for now. Of note, Patient discharged on medications but has not had all of these at the doses Rx while at the [facility]. Cardiac and general ROS otherwise negative and unchanged. IMPRESSIONS:     1. Mental Status Changes  2. HTN, Elevated due to [address] [place] discntinuation  3. Recent Aspiration PNA, Rule out  4. Elevated Troponins, Chronic elevated,  Due to above and ESRD, Type 2 NSTEMI   5. PAF, in AV paced Rhythm. On IV Lopressor, anticoagulation with Coumadin currently held. Restart on DC home if ok with others. 6. Sinus node dysfunction s/p PPM 11/2017  7. CAD s/p remote PCI/BMS to LCX 2010, [facility] 1/2013 showing diffuse vessel calcification with a 30%-50% stenosis of the proximal RCA, normal LM, heavily calcified LAD with mid to distal tapering with 100% distal occlusion, filled by collaterals, mild disease of LCX. 8. Perfusion scan 1/2015 negative for ischemia. 9. Normal LV systolic function with EF 60-65%, moderate AS per echo 12/2017  10. HTN  11. HLP  12. COPD  15. ESRD on HD  14. Hx Hemoptysis / GIB, Post Transfusions  15. Hx Epistatxis  16. Otherwise as per PMH below.     RECOMMENDATIONS:     1. Cardiac, Neurology and Primary Supportive Care. 2. OK for Surgery from CV point. 3.. Hold Coumadin for now. Will discuss with Son ( Tonny if possible)  4.  Since NPO, Change Diltiazem to IV. 4.  Renal Care  5. See Orders.       DR Betts Consult 8/112/18  Impression/Plan:  1. Elevated troponin: pattern not consistent with plaque rupture.  Can be explained by seizure in setting of ESRD. 2. Persis a.fib: has been on amiodarone and cardizem and these agents need continued. Will resume  3. HTN: perhaps related to being off some of her meds, will resume(cardizm 120 bid, losartan has to be held, contin hydralazine, may add clonidine if pressure remains up after dialysis)  4. Pacer: normal function on mon/ecg  5. Anticoag: hi risk emboli, resume warfarin as was taking as of 8/10. Monitor inr.     Chief Complaint/Active Symptoms: 82 yo currently on dialysis, lethargic. Has had seizure activity earlier per nursing and post ictal. She awakens enough to deny dyspnea or chest pain. Admitted with weakness and hyperkalemia and confusion.     ROS: lethargic, unable to get adequate history     Cardiac History  11/2017 Pacemaker Medtronic Advisa  MRI compatible, dual chamber  CHB with asystole NOV 2017  Coronary artery disease, coronary angiogram January 2013: Diffuse calcification. LM-normal.  RCA-30-50%. LAD-mid vessel occlusion. Circumflex diffuse irregularity.   LVEF normal.  2010: Bare-metal stent to circumflex  January/2017: Normal myocardial perfusion study  Persistent atrial fibrillation  Chronic anticoagulation   Essential hypertension  Hyperlipidemia  Aortic stenosis, echo march 2018 ava1.3  Pulmon htn: rvsp 47     Medical history  Diabetes  COPD  Hypothyroidism  End-stage renal disease on dialysis  Parkinson's disease  Recurrent epistaxis with nasal packing July 2018  Esophageal reflux  Nephrolithiasis    OBJECTIVE:     MEDICATIONS:     Scheduled Meds:   aspirin  300 mg Rectal Daily    pantoprazole  40 mg Intravenous Daily    And    sodium chloride (PF)  10 mL Intravenous Daily    IVPB builder   Intravenous Daily    diltiazem  10 mg Intravenous BID    levETIRAcetam  1,000 mg Oral BID    fluticasone  2 spray Nasal Daily    levothyroxine  25 mcg Oral Daily    sodium chloride flush  10 mL Intravenous 2 times per day    insulin lispro  0-6 Units POC Glucose 115 60 - 115 mg/dl    Performed on ACCU-CHEK    POCT Glucose    Collection Time: 08/13/18  8:55 PM   Result Value Ref Range    POC Glucose 78 60 - 115 mg/dl    Performed on ACCU-CHEK    CBC Auto Differential    Collection Time: 08/14/18  5:23 AM   Result Value Ref Range    WBC 6.1 4.8 - 10.8 K/uL    RBC 3.53 (L) 4.20 - 5.40 M/uL    Hemoglobin 11.1 (L) 12.0 - 16.0 g/dL    Hematocrit 34.3 (L) 37.0 - 47.0 %    MCV 97.2 82.0 - 100.0 fL    MCH 31.4 (H) 27.0 - 31.3 pg    MCHC 32.3 (L) 33.0 - 37.0 %    RDW 19.9 (H) 11.5 - 14.5 %    Platelets 404 (L) 273 - 400 K/uL    Neutrophils % 78.9 %    Lymphocytes % 9.4 %    Monocytes % 11.0 %    Eosinophils % 0.2 %    Basophils % 0.5 %    Neutrophils # 4.8 1.4 - 6.5 K/uL    Lymphocytes # 0.6 (L) 1.0 - 4.8 K/uL    Monocytes # 0.7 0.2 - 0.8 K/uL    Eosinophils # 0.0 0.0 - 0.7 K/uL    Basophils # 0.0 0.0 - 0.2 K/uL   Protime-INR    Collection Time: 08/14/18  5:23 AM   Result Value Ref Range    Protime 11.8 9.6 - 12.3 sec    INR 1.1    Comprehensive Metabolic Panel    Collection Time: 08/14/18  5:23 AM   Result Value Ref Range    Sodium 132 132 - 144 mEq/L    Potassium 4.2 3.5 - 5.1 mEq/L    Chloride 97 (L) 98 - 107 mEq/L    CO2 22 22 - 29 mEq/L    Anion Gap 13 7 - 13 mEq/L    Glucose 189 (H) 74 - 109 mg/dL    BUN 9 8 - 23 mg/dL    CREATININE 1.36 (H) 0.50 - 0.90 mg/dL    GFR Non-African American 37.2 (L) >60    GFR  45.1 (L) >60    Calcium 8.1 (L) 8.6 - 10.2 mg/dL    Total Protein 7.4 6.4 - 8.1 g/dL    Alb 2.6 (L) 3.9 - 4.9 g/dL    Total Bilirubin 0.4 0.0 - 1.2 mg/dL    Alkaline Phosphatase 113 40 - 130 U/L    ALT 82 (H) 0 - 33 U/L     (H) 0 - 35 U/L    Globulin 4.8 (H) 2.3 - 3.5 g/dL   Magnesium    Collection Time: 08/14/18  5:23 AM   Result Value Ref Range    Magnesium 2.0 1.7 - 2.3 mg/dL   Troponin    Collection Time: 08/14/18  5:23 AM   Result Value Ref Range    Troponin 0.174 (HH) 0.000 - 0.010 ng/mL   EKG 12 Lead    Collection Time: 08/14/18 5: 57 AM   Result Value Ref Range    Ventricular Rate 78 BPM    Atrial Rate 78 BPM    P-R Interval 224 ms    QRS Duration 102 ms    Q-T Interval 440 ms    QTc Calculation (Bazett) 501 ms    P Axis 78 degrees    R Axis 86 degrees    T Axis 66 degrees   POCT Glucose    Collection Time: 08/14/18  7:32 AM   Result Value Ref Range    POC Glucose 187 (H) 60 - 115 mg/dl    Performed on ACCU-CHEK    POCT Glucose    Collection Time: 08/14/18 11:04 AM   Result Value Ref Range    POC Glucose 205 (H) 60 - 115 mg/dl    Performed on ACCU-CHEK        EKG:   Sinus rhythm with 1st degree AV block      Echo: See Report            Kellee Dewitt MD ,[facility] Cardiology

## 2018-08-15 NOTE — FLOWSHEET NOTE
Pt iv in right upper arm infiltrated. Iv removed. Arm elevated and warm compress applied New iv in right wrist inserted. RN verified with Dr Louretta Lundborg if ok'd to restart PN in right arm.  He stated yes

## 2018-08-15 NOTE — PROGRESS NOTES
Hospitalist Progress Note  8/15/2018 3:39 PM      Assessment and Plan:   1. New onset seizure, without Status Epilepticus due to Cerebrovascular disease: Seizure precautions. Fall precautions. On antiepileptic meds. EEG, Neuro checks Q4hrs, Neurology consulted. 2. Dysphagia and At risk for aspiration. Passed MBS today. Speech evaluation for dysphagia assessment and recommendations ordered. Aspiration precautions. 3. Malignant HTN: Reordered home meds now that she can safely tolerate PO diet. Closely monitor BP. Labetalol PRN SBP greater than 185 or DBP greater than 110. Neurochecks Q4hrs. 4. Acute Metabolic Encephalopathy, likely multifactorial including vascular dementia: Neurochecks, Fall precautions, aspiration precautions. Neuro on board  5. Severe protein calorie malnutrition: Ordered PO supplemental Nutrition. Speech eval ordered. Dysphagia likely a component of poor PO intake. Dietitian evaluated patient. Daily weights, Calorie count. 6. A fib: rate controlled. Goal K and Mg 4.0 and 2.0, respectively. Home meds reordered now that she is on PO diet  7. Hypothyroidism: Synthroid home dosing reordered. 8. ESRD on HD: Had HD today. Nephro on board. 9. Sacral Decubitus Ulcer stage III: Gen Sx on board. 10. Functional Status and gait instability: Fall precautions. 11. Bowel Regimen: stool softners PRN ordered   12. Diet: DIET DYSPHAGIA II MECHANICALLY ALTERED; Dysphagia II Mechanically Altered; Nectar Thick  13. Advance Directive: Full Code   14. DVT prophylaxis: heparin BID  15. Discharge planning: SW on board. Will discharge once medically stable and cleared by all consultants. 16. High Risk Readmission Screening Tool Score Noted.      Additionally, the following hospital problems were addressed:  Principal Problem:    Seizure [facility])  Active Problems:    Atrial fibrillation ([facility]    Idiopathic Parkinson's disease ([facility]  Resolved Problems:    ESRD (end stage renal disease) on dialysis

## 2018-08-15 NOTE — PROGRESS NOTES
systolic murmurs  Abdomen: soft, non-tender, non-distended  MSK: no joint swelling or tenderness  Ext: no cyanosis, no peripheral edema  Neuro: alert and oriented, no gross abnormalities  Skin: no rash      Electronically signed by Cirilo Zuniga MD on 8/15/2018 at 9:55 AM

## 2018-08-15 NOTE — PROGRESS NOTES
Physical Therapy   Facility/Department: [facility] MED SURG [id number]    NAME: Aaron Pepe    : 1935 (81 y.o.)  MRN: [id number]    Account: [de-identified]  Gender: female    PT evaluation and treatment orders received. Chart reviewed. PT eval attempted. Patient Unavailable: at dialysis. Will attempt PT evaluation again at earliest convenience when pt returns.       Electronically signed by Deysi Monique PT on 8/15/18 at 8:36 AM

## 2018-08-15 NOTE — CONSULTS
Consults                                  Consultation Dictated. Impression: Pressure wound on Rt Hip                   Recommend: Start with chemical debridement with santyl           And reevaluation on weekly base for possibility of surgical debridement           If need.

## 2018-08-15 NOTE — PROGRESS NOTES
POC Glucose 145 (H) 60 - 115 mg/dl    Performed on ACCU-CHEK    POCT Glucose    Collection Time: 08/14/18  8:48 PM   Result Value Ref Range    POC Glucose 138 (H) 60 - 115 mg/dl    Performed on ACCU-CHEK    CBC Auto Differential    Collection Time: 08/15/18  5:18 AM   Result Value Ref Range    WBC 6.7 4.8 - 10.8 K/uL    RBC 3.60 (L) 4.20 - 5.40 M/uL    Hemoglobin 11.2 (L) 12.0 - 16.0 g/dL    Hematocrit 35.1 (L) 37.0 - 47.0 %    MCV 97.4 82.0 - 100.0 fL    MCH 31.0 27.0 - 31.3 pg    MCHC 31.8 (L) 33.0 - 37.0 %    RDW 19.7 (H) 11.5 - 14.5 %    Platelets 947 419 - 009 K/uL    Neutrophils % 77.3 %    Lymphocytes % 10.5 %    Monocytes % 11.4 %    Eosinophils % 0.6 %    Basophils % 0.2 %    Neutrophils # 5.2 1.4 - 6.5 K/uL    Lymphocytes # 0.7 (L) 1.0 - 4.8 K/uL    Monocytes # 0.8 0.2 - 0.8 K/uL    Eosinophils # 0.0 0.0 - 0.7 K/uL    Basophils # 0.0 0.0 - 0.2 K/uL   Basic Metabolic Panel    Collection Time: 08/15/18  5:18 AM   Result Value Ref Range    Sodium 133 132 - 144 mEq/L    Potassium 4.3 3.5 - 5.1 mEq/L    Chloride 97 (L) 98 - 107 mEq/L    CO2 23 22 - 29 mEq/L    Anion Gap 13 7 - 13 mEq/L    Glucose 204 (H) 74 - 109 mg/dL    BUN 29 (H) 8 - 23 mg/dL    CREATININE 2.08 (H) 0.50 - 0.90 mg/dL    GFR Non-African American 22.8 (L) >60    GFR  27.6 (L) >60    Calcium 8.2 (L) 8.6 - 10.2 mg/dL   Protime-INR    Collection Time: 08/15/18  5:18 AM   Result Value Ref Range    Protime 11.7 9.6 - 12.3 sec    INR 1.1        EKG:   Sinus rhythm with 1st degree AV block      Echo: See Report            Jesus Gonsalez MD ,[facility] Cardiology

## 2018-08-15 NOTE — CARE COORDINATION
EYADW spoke with pt and her son regarding DC needs. Son is anxious to find out if pt will be approved for more skilled time at [facility] as he has plans to go out of town this weekend. LSW asked son to stay in town until we have an answer from insurance. Awaiting PT eval to start precert. [facility] has accepted pt for admission if approved by insurance. If insurance denies son plans to take pt home with [facility]

## 2018-08-15 NOTE — FLOWSHEET NOTE
Dialysis called to inform this nurse of 2L off, pt bp's ranging from 735'B - 328'Q systolic. Dr. Eileen Peña aware via perfect serve.

## 2018-08-16 PROBLEM — I10 HTN (HYPERTENSION), MALIGNANT: Chronic | Status: ACTIVE | Noted: 2018-01-01

## 2018-08-16 PROBLEM — I69.398 SEIZURE AS LATE EFFECT OF CEREBROVASCULAR ACCIDENT (CVA) (HCC): Status: ACTIVE | Noted: 2018-01-01

## 2018-08-16 PROBLEM — R56.9 SEIZURE AS LATE EFFECT OF CEREBROVASCULAR ACCIDENT (CVA) (HCC): Status: ACTIVE | Noted: 2018-01-01

## 2018-08-16 PROBLEM — E11.65 HYPERGLYCEMIA DUE TO TYPE 2 DIABETES MELLITUS (HCC): Chronic | Status: ACTIVE | Noted: 2018-01-01

## 2018-08-16 PROBLEM — I69.991 DYSPHAGIA AS LATE EFFECT OF CEREBROVASCULAR DISEASE: Chronic | Status: ACTIVE | Noted: 2018-01-01

## 2018-08-16 NOTE — PROGRESS NOTES
Neurology Follow up    SUBJECTIVE:  NO Headache, double vision,blurry vision,difficulty with speech,difficulty with swallowing,weakness,numbness,pain,nausea,vomitting,chocking,neck pain,dizziness,    PHYSICAL EXAM:    BP (!) 173/71   Pulse 87   Temp 97.5 °F (36.4 °C) (Oral)   Resp 16   Ht 4' 11\" (1.499 m)   Wt 97 lb (44 kg)   LMP  (LMP Unknown)   SpO2 100%   BMI 19.59 kg/m²     General Appearance:      Mental Status Exam:             Level of Alertness:   awake            Orientation:   person, place, time            Memory:   normal                      Language:  normal      Funduscopic Exam:     Cranial Nerves            Cranial nerve III           Pupils:  equal, round, reactive to light      Cranial nerves III, IV, VI           Extraocular Movements: intact      Cranial nerve V           Facial sensation:  intact      Cranial nerve VII           Facial strength: intact      Cranial nerve VIII           Hearing:  intact      Cranial nerve IX           Palate:  intact      Cranial nerve XI         Shoulder shrug:  intact      Cranial nerve XII          Tongue movement:  normal    Motor:    Drift:  absent  Motor exam is symmetrical 4 out of 5 all extremities bilaterally  Tone:  normal  Abnormal Movements:  absent            Sensory:        Pinprick             Right Upper Extremity:  normal             Left Upper Extremity:  normal             Right Lower Extremity:  normal             Left Lower Extremity:  normal           Vibration                         Touch            Proprioception                 Coordination:           Finger/Nose   Right:  normal              Left:  normal          Heel-Knee-Shin                Right:  normal              Left:  normal          Rapid Alternating Movements              Right:  normal              Left:  normal          Gait:                       Casual:  defered                      Romberg:          Reflexes:             Deep Tendon Reflexes: 26   BUN  44*  45*  11   CREATININE  4.42*  4.53*  1.83*   GLUCOSE  75  99  89     Recent Labs      08/11/18   1821   BILITOT  0.5   ALKPHOS  103   AST  95*   ALT  8     Lab Results   Component Value Date    PROTIME 11.6 08/13/2018    PROTIME 20.7 07/12/2018    PROTIME 25.7 09/29/2016    INR 1.1 08/13/2018     Lab Results   Component Value Date    VALPROATE 41 03/14/2013       ASSESSMENT AND PLAN    Generalized seizure secondary to CVD  keppra  New dyspahgia  Repeat MBS  More awake and non focal neuro  Dialysis yesterday  Add MRI as new dysphagia to see if brainstem CVA    Loco Thomas MD, Kuldeep Faustin, American Board of Psychiatry & Neurology  Board Certified in Vascular Neurology  Board Certified in Neuromuscular Medicine  Certified in . [place]

## 2018-08-16 NOTE — PROGRESS NOTES
levETIRAcetam  1,000 mg Oral BID    isosorbide mononitrate  60 mg Oral Daily    losartan  100 mg Oral Daily    levothyroxine  50 mcg Oral Every Other Day    levothyroxine  25 mcg Oral Every Other Day    collagenase   Topical Daily    cloNIDine  0.1 mg Oral BID    fluticasone  2 spray Nasal Daily    sodium chloride flush  10 mL Intravenous 2 times per day    insulin lispro  0-6 Units Subcutaneous TID WC    insulin lispro  0-3 Units Subcutaneous Nightly    ipratropium-albuterol  3 mL Inhalation TID    heparin (porcine)  5,000 Units Subcutaneous 2 times per day     Continuous Infusions:   dextrose         CBC:   Recent Labs      08/15/18   0518  08/16/18   0536   WBC  6.7  4.9   HGB  11.2*  10.4*   PLT  133  138     CMP:    Recent Labs      08/14/18   0523  08/15/18   0518  08/16/18   0536   NA  132  133  137   K  4.2  4.3  4.2   CL  97*  97*  98   CO2  22  23  30*   BUN  9  29*  12   CREATININE  1.36*  2.08*  1.32*   GLUCOSE  189*  204*  60*   CALCIUM  8.1*  8.2*  7.9*   LABGLOM  37.2*  22.8*  38.5*     Troponin:   Recent Labs      08/14/18 0523   TROPONINI  0.174*     BNP: No results for input(s): BNP in the last 72 hours. INR:   Recent Labs      08/16/18 0536   INR  1.2     Lipids: No results for input(s): CHOL, LDLDIRECT, TRIG, HDL, AMYLASE, LIPASE in the last 72 hours. Liver:   Recent Labs      08/14/18 0523   AST  241*   ALT  82*   ALKPHOS  113   PROT  7.4   LABALBU  2.6*   BILITOT  0.4     Iron:  No results for input(s): IRONS, FERRITIN in the last 72 hours. Invalid input(s): LABIRONS  Urinalysis: No results for input(s): UA in the last 72 hours.     Objective:   Vitals: BP (!) 173/71   Pulse 79   Temp 97.5 °F (36.4 °C) (Oral)   Resp 16   Ht 4' 11\" (1.499 m)   Wt 97 lb (44 kg)   LMP  (LMP Unknown)   SpO2 100%   BMI 19.59 kg/m²    Wt Readings from Last 3 Encounters:   08/15/18 97 lb (44 kg)   07/30/18 100 lb (45.4 kg)   07/15/18 95 lb 14.4 oz (43.5 kg)      24HR INTAKE/OUTPUT:

## 2018-08-16 NOTE — PROGRESS NOTES
shortness     Cardiac arrhythmia     Chronic kidney disease     Chronic obstructive pulmonary disease (COPD) (HCC)     Constipation, chronic     Difficulty in walking     Diverticular disease of the colon     End stage renal disease (HCC)     Enterocolitis due to Clostridium difficile, not specified as recurrent     Epistaxis 07/17/2018    had surgery severe    ESRD (end stage renal disease) ([address]     Fistula     left    Gastro-esophageal reflux disease without esophagitis     GERD (gastroesophageal reflux disease)     GERD, PUD, Hiatal Hernia    Granulomatous lung disease ([address]     History of endoscopy 2-21-12    Dr. Kelly Monterroso    Hyperlipemia     Hyperlipidemia     Hypertension     Hypothyroidism     Kidney stones 11/2001    Dr. Raheem Baker    Long-term (current) use of anticoagulants     Muscle weakness (generalized)     Neuropathy in diabetes ([address]     legs    Nosebleed 07/17/2018    Osteoarthritis     Parkinsons disease ([address]     Peptic ulcer, site unspecified, unspecified as acute or chronic, without hemorrhage or perforation     Positive ORTEGA (antinuclear antibody)     1:80    Presence of cardiac pacemaker     Seizures ([address]     Tachycardia     Tachypnea, not elsewhere classified     Thrombophlebitis leg superficial     Type 2 diabetes mellitus with hypoglycemia without coma ([address]     Type II or unspecified type diabetes mellitus without mention of complication, not stated as uncontrolled 1998    Unspecified osteoarthritis, unspecified site     Urinary tract infection     Weakness      Past Surgical History:   Procedure Laterality Date    APPENDECTOMY  2007    BLADDER SUSPENSION  2008 & 2009    vitaly DeO liveira at [address]  2/2006    COLONOSCOPY  4/2007    Dr. Donna Valdes    [address] N/A 11/2/2017    CATHETER INSERTION HEMODIALYSIS performed by Db Okeefe MD at [facility] REPLACEMENT  [id number]    Bilateral   

## 2018-08-16 NOTE — PROGRESS NOTES
CARDIOLOGY [facility] PROGRESS NOTE         8/16/2018      Lilian Pryor    [id number]  1935    Rounding MD: Marjorie Walsh MD ,[facility] [place]    Primary Cardiologist:  Alexa Garcia MD    SUBJECTIVE:    Patient had Mental changes and what Is thought to be a seizure. Better now. Had HD without issues. No New complaints. Apparently now with possible aspiration / swollowing issues. ALSO had Decuitus ulcer debridement    Of note, Patient discharged on medications but has not had all of these at the doses Rx while at the [facility]. Long discussion with patient and son, they wished to discontinue all anticoagulation including Eliquis and Coumadin due to recurrent bleeding issues. There understand increased risk of stroke without anticoagulation therapy. Cardiac and general ROS otherwise negative and unchanged. IMPRESSIONS:     1. Mental Status Changes  2. HTN, Elevated due to [address] [facility] [address] discntinuation  3. Recent Aspiration PNA, Rule out  4. Elevated Troponins, Chronic elevated,  Due to above and ESRD, Type 2 NSTEMI   5. PAF, in AV paced Rhythm. On IV Lopressor, anticoagulation with Coumadin currently held. Restart on DC home if ok with others. 6. Sinus node dysfunction s/p PPM 11/2017  7. CAD s/p remote PCI/BMS to LCX 2010, [facility] 1/2013 showing diffuse vessel calcification with a 30%-50% stenosis of the proximal RCA, normal LM, heavily calcified LAD with mid to distal tapering with 100% distal occlusion, filled by collaterals, mild disease of LCX. 8. Perfusion scan 1/2015 negative for ischemia. 9. Normal LV systolic function with EF 60-65%, moderate AS per echo 12/2017  10. HTN  11. HLP  12. COPD  15. ESRD on HD  14. Hx Hemoptysis / GIB, Post Transfusions  15. Hx Epistatxis  16. Otherwise as per PMH below.     RECOMMENDATIONS:     1. Cardiac, Neurology and Primary Supportive Care. 2.  Resume home PO medications as tolerated. 3.  Increase Cardizem to 240 twice a day.   3.. No anticoagulation therapy per patient and family request.  4.  Okay to discharge home with [facility] or to [facility] or nursing home  5. Renal Care  6   Primary care  7. See Orders.       DR Betts Consult 8/112/18  Impression/Plan:  1. Elevated troponin: pattern not consistent with plaque rupture. Can be explained by seizure in setting of ESRD. 2. Persis a.fib: has been on amiodarone and cardizem and these agents need continued. Will resume  3. HTN: perhaps related to being off some of her meds, will resume(cardizm 120 bid, losartan has to be held, contin hydralazine, may add clonidine if pressure remains up after dialysis)  4. Pacer: normal function on mon/ecg  5. Anticoag: hi risk emboli, resume warfarin as was taking as of 8/10. Monitor inr.     Chief Complaint/Active Symptoms: 82 yo currently on dialysis, lethargic. Has had seizure activity earlier per nursing and post ictal. She awakens enough to deny dyspnea or chest pain. Admitted with weakness and hyperkalemia and confusion.     ROS: lethargic, unable to get adequate history     Cardiac History  11/2017 Pacemaker [employer] Advisa  MRI compatible, dual chamber  CHB with asystole NOV 2017  Coronary artery disease, coronary angiogram January 2013: Diffuse calcification. LM-normal.  RCA-30-50%. LAD-mid vessel occlusion. Circumflex diffuse irregularity.   LVEF normal.  2010: Bare-metal stent to circumflex  January/2017: Normal myocardial perfusion study  Persistent atrial fibrillation  Chronic anticoagulation   Essential hypertension  Hyperlipidemia  Aortic stenosis, echo march 2018 ava1.3  Pulmon htn: rvsp 47     Medical history  Diabetes  COPD  Hypothyroidism  End-stage renal disease on dialysis  Parkinson's disease  Recurrent epistaxis with nasal packing July 2018  Esophageal reflux  Nephrolithiasis    OBJECTIVE:     MEDICATIONS:     Scheduled Meds:   amiodarone  200 mg Oral Daily    aspirin  81 mg Oral Daily    atorvastatin  80 mg Oral Nightly    b complex-C-folic acid  1 capsule Oral Daily    calcium acetate  667 mg Oral TID     carbidopa-levodopa  1 tablet Oral Daily    folic acid  1 mg Oral Daily    hydrALAZINE  100 mg Oral TID    insulin lispro  2 Units Subcutaneous TID AC    pantoprazole  20 mg Oral QAM AC    primidone  50 mg Oral TID    venlafaxine  150 mg Oral Daily with breakfast    diltiazem  120 mg Oral BID    gabapentin  100 mg Oral Nightly    levETIRAcetam  1,000 mg Oral BID    isosorbide mononitrate  60 mg Oral Daily    losartan  100 mg Oral Daily    levothyroxine  50 mcg Oral Every Other Day    levothyroxine  25 mcg Oral Every Other Day    collagenase   Topical Daily    cloNIDine  0.1 mg Oral BID    fluticasone  2 spray Nasal Daily    sodium chloride flush  10 mL Intravenous 2 times per day    insulin lispro  0-6 Units Subcutaneous TID WC    insulin lispro  0-3 Units Subcutaneous Nightly    ipratropium-albuterol  3 mL Inhalation TID    heparin (porcine)  5,000 Units Subcutaneous 2 times per day     Continuous Infusions:   dextrose       PRN Meds:sodium chloride flush, heparin (porcine), cholestyramine light, sodium chloride, sodium chloride flush, magnesium hydroxide, ondansetron, glucose, dextrose, glucagon (rDNA), dextrose, albuterol, hydrALAZINE, labetalol, LORazepam    PHYSICAL EXAM:    CURRENT VITALS: BP (!) 173/71   Pulse 79   Temp 97.5 °F (36.4 °C) (Oral)   Resp 16   Ht 4' 11\" (1.499 m)   Wt 97 lb (44 kg)   LMP  (LMP Unknown)   SpO2 100%   BMI 19.59 kg/m²     CONSTITUTIONAL:  awake, alert, cooperative, no apparent distress,   ENT:  Normocephalic, without obvious abnormality, atraumatic, sinuses nontender on palpation, external ears without lesions,  NECK:  Supple, symmetrical, trachea midline, no adenopathy, thyroid symmetric, not enlarged and no tenderness, skin normal  LUNGS:  No increased work of breathing, good air exchange, clear to auscultation bilaterally, no crackles or wheezing  CARDIOVASCULAR:  Normal apical impulse, regular rate and rhythm, normal S1 and S2,  and no murmur noted  ABDOMEN:  Normal bowel sounds, soft, non-distended, non-tender, no masses palpated, no hepatosplenomegally  EXTREMITIES:  No edema, Pulses strong throughout. NEUROLOGIC:  Awake, alert, oriented to name, place and time.  Following all commands and moving all extremties  SKIN:  no bruising or bleeding, normal skin color, texture, turgor and no rashes     Data:        LABS:      Recent Results (from the past 24 hour(s))   POCT Glucose    Collection Time: 08/15/18 12:53 PM   Result Value Ref Range    POC Glucose 101 60 - 115 mg/dl    Performed on ACCU-CHEK    POCT Glucose    Collection Time: 08/15/18  5:07 PM   Result Value Ref Range    POC Glucose 163 (H) 60 - 115 mg/dl    Performed on ACCU-CHEK    POCT Glucose    Collection Time: 08/15/18  8:27 PM   Result Value Ref Range    POC Glucose 142 (H) 60 - 115 mg/dl    Performed on ACCU-CHEK    CBC Auto Differential    Collection Time: 08/16/18  5:36 AM   Result Value Ref Range    WBC 4.9 4.8 - 10.8 K/uL    RBC 3.34 (L) 4.20 - 5.40 M/uL    Hemoglobin 10.4 (L) 12.0 - 16.0 g/dL    Hematocrit 32.4 (L) 37.0 - 47.0 %    MCV 97.0 82.0 - 100.0 fL    MCH 31.1 27.0 - 31.3 pg    MCHC 32.1 (L) 33.0 - 37.0 %    RDW 19.7 (H) 11.5 - 14.5 %    Platelets 201 440 - 242 K/uL    Neutrophils % 68.0 %    Lymphocytes % 17.1 %    Monocytes % 13.4 %    Eosinophils % 0.9 %    Basophils % 0.6 %    Neutrophils # 3.3 1.4 - 6.5 K/uL    Lymphocytes # 0.8 (L) 1.0 - 4.8 K/uL    Monocytes # 0.7 0.2 - 0.8 K/uL    Eosinophils # 0.0 0.0 - 0.7 K/uL    Basophils # 0.0 0.0 - 0.2 K/uL   Basic Metabolic Panel    Collection Time: 08/16/18  5:36 AM   Result Value Ref Range    Sodium 137 132 - 144 mEq/L    Potassium 4.2 3.5 - 5.1 mEq/L    Chloride 98 98 - 107 mEq/L    CO2 30 (H) 22 - 29 mEq/L    Anion Gap 9 7 - 13 mEq/L    Glucose 60 (L) 74 - 109 mg/dL    BUN 12 8 - 23 mg/dL    CREATININE 1.32 (H) 0.50 - 0.90 mg/dL    GFR Non-African American 38.5 (L) >60    GFR

## 2018-08-17 PROBLEM — I10 HTN (HYPERTENSION), MALIGNANT: Chronic | Status: RESOLVED | Noted: 2018-01-01 | Resolved: 2018-01-01

## 2018-08-17 NOTE — PROGRESS NOTES
Physical Therapy Med Surg Daily Treatment Note  Facility/Department: [facility]  Room: [facility]Y201-05       NAME: Sherrell Richard  : 1935 (81 y.o.)  MRN: [id number]  CODE STATUS: Full Code    Date of Service: 2018    Patient Diagnosis(es): Seizure [facility]) [R56.9]   Chief Complaint   Patient presents with    Altered Mental Status     since this morning.  [place]     Patient Active Problem List    Diagnosis Date Noted    Cardiac pacemaker 2017     Priority: High    Seizure as late effect of cerebrovascular accident (CVA) ([address] 2018    HTN (hypertension), malignant 2018    Hyperglycemia due to type 2 diabetes mellitus ([address] 2018    Dysphagia as late effect of cerebrovascular disease 2018    Hypothyroid 2018    Seizure ([address] 2018    Decubitus ulcer of buttock, stage 4 ([address] 2018    Malnutrition related to chronic disease ([address] 2018    Hyperkalemia 07/15/2018    Hypoxia     Atelectasis, bilateral     CHF (congestive heart failure), NYHA class III, acute on chronic, combined ([address] 2018    Severe malnutrition ([address] 2018    Sacral decubitus ulcer, stage III ([address] 2018    Severe protein-calorie malnutrition ([address] 04/10/2018    Epistaxis 2018    Type 2 diabetes mellitus ([address] 2018    Pleural effusion, bilateral 2018    ESRD (end stage renal disease) on dialysis ([address] 2018    Resistant hypertension 2017    Anxiety 2017    Atrial fibrillation ([address] 10/27/2016    Acid reflux 2015    Idiopathic Parkinson's disease ([address] 2014    A-fib ([address] 2013    CKD (chronic kidney disease) 10/15/2012    Hypothyroidism 10/04/2011    Hypercholesteremia 10/04/2011    Prolapse of vaginal vault after hysterectomy 2008    Urgency of urination 2008    Urinary frequency 2008    Urinary hesitancy 2008    Vaginal enterocele 2008        Past Medical History:   Diagnosis Date    Abnormal presence of protein in urine 6/2004    Dr. Everton Lopez Acute on chronic diastolic heart failure (HCC)     Anemia     Anxiety     Atherosclerotic heart disease     Atrial fibrillation (HCC)     Breath shortness     Cardiac arrhythmia     Chronic kidney disease     Chronic obstructive pulmonary disease (COPD) (HCC)     Constipation, chronic     Difficulty in walking     Diverticular disease of the colon     End stage renal disease (HCC)     Enterocolitis due to Clostridium difficile, not specified as recurrent     Epistaxis 07/17/2018    had surgery severe    [facility] disease) [facility]     Fistula     left    Gastro-esophageal reflux disease without esophagitis     GERD (gastroesophageal reflux disease)     GERD, PUD, Hiatal Hernia    Granulomatous lung disease ([address]     History of endoscopy 2-21-12    Dr. Gwynne Cockayne    Hyperlipemia     Hyperlipidemia     Hypertension     Hypothyroidism     Kidney stones 11/2001    Dr. 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)     legs    Nosebleed 07/17/2018    Osteoarthritis     Parkinsons disease (HCC)     Peptic ulcer, site unspecified, unspecified as acute or chronic, without hemorrhage or perforation     Positive ORTEGA (antinuclear antibody)     1:80    Presence of cardiac pacemaker     Seizures (HCC)     Tachycardia     Tachypnea, not elsewhere classified     Thrombophlebitis leg superficial     Type 2 diabetes mellitus with hypoglycemia without coma ([facility]     Type II or unspecified type diabetes mellitus without mention of complication, not stated as uncontrolled 1998    Unspecified osteoarthritis, unspecified site     Urinary tract infection     Weakness      Past Surgical History:   Procedure Laterality Date    APPENDECTOMY  2007    [facility]  2008 & [facility] CHOLECYSTECTOMY  2/2006    COLONOSCOPY  4/2007    Dr. Vann Holstein [address] N/A 11/2/2017    CATHETER INSERTION HEMODIALYSIS performed by Mario Kapoor MD at [address] [place] [address] Rd  [id number]    Bilateral    NC COLONOSCOPY FLX DX W/COLLJ SPEC WHEN PFRMD N/A 2/1/2018    COLONOSCOPY performed by Shira Lott MD at [address] [facility] N/A 7/16/2018    control epistaxis performed by Ibeth Clifford MD at [address] [facility] EGD TRANSORAL BIOPSY SINGLE/MULTIPLE N/A 2/1/2018    EGD ESOPHAGOGASTRODUODENOSCOPY WITH BIOPSY performed by Shira Lott MD at [address] [place] [address] EGD TRANSORAL BIOPSY SINGLE/MULTIPLE N/A 3/27/2018    EGD ESOPHAGOGASTRODUODENOSCOPY performed by Shira Lott MD at [address] N/A 3/30/2018    EXAM UNDER ANESTHESIA, LARYNGOSCOPY ,PHARYNGOSCOPY performed by Ibeth Clifford MD at [facility] N/A 3/30/2018    REMOVAL PACKING POSS CONTROL EPISTAXIS, NASAL ENDOSCOPY performed by Ibeth Clifford MD at [facility] [place] [address]  3/2003          Restrictions:  Restrictions/Precautions: Fall Risk, Contact Precautions, Seizure    SUBJECTIVE:  Subjective  Subjective: I would like to sit in the chair. Pre Pain Assessment:  Pre Treatment Pain Screening  Pain at present: 0  Intervention List: Patient able to continue with treatment          Post Pain Assessment:   Pain Assessment  Pain Level: 0       OBJECTIVE:         Bed mobility  Rolling to Left: Contact guard assistance  Supine to Sit: Minimal assistance; Moderate assistance  Comment: step by step sequencing; more assist required compared to yesterday for supine to sit. Pt states she is more tired today.      Transfers  Sit to Stand: Minimal Assistance  Stand to sit: Minimal Assistance  Bed to Chair: Moderate assistance (+2)  Comment: Crouched posture,

## 2018-08-17 NOTE — PROGRESS NOTES
Nephrology Progress Note    Assessment:   81y.o. year old female with history s/f ESRD on IHD MWF, A.fib, COPD, HTN, HLD, T2DM, hypothyroidism and parkinsons disease who presented from the SNF after an episode of tremors w/ concern for seizures. Consulted for hyperkalemia in ESRD patient. Has since resolved w/ dialysis and we are following for ESRD management     1. ESRD on IHD MWF: getting session today, if still here on Monday, anticipate next session then  Access: RT IJ TDC  2. Hyperkalemia: corrected with IHD  3. Renal hyperparathyroidism: PTH 23 @ goal, continue phoslo  4. Volume status/HTN: previously unable to tolerate PO, now resumed, on hydralazine, losartan, diltiazem, Imdur, BP getting better  5.  Renal anemia: Hb @ goal      Patient Active Problem List:     Hypothyroidism     Hypercholesteremia     CKD (chronic kidney disease)     Atrial fibrillation (HCC)     Acid reflux     Anxiety     Essential hypertension     Idiopathic Parkinson's disease ([facility]     Cardiac pacemaker     Pleural effusion, bilateral     Type 2 diabetes mellitus ([facility]     Sacral decubitus ulcer, stage III (HCC)     Severe malnutrition (HCC)     CHF (congestive heart failure), NYHA class III, acute on chronic, combined (HCC)     Hypoxia     Atelectasis, bilateral     Hyperkalemia     Malnutrition related to chronic disease (HCC)     Seizure ([facility]      Subjective:   Admit Date: 8/11/2018    Interval History: no acute overnight issues, BP getting better with PO medications, diltiazem was increased, discharge pending      Medications:   Scheduled Meds:   diltiazem  240 mg Oral BID    amiodarone  200 mg Oral Daily    aspirin  81 mg Oral Daily    atorvastatin  80 mg Oral Nightly    b complex-C-folic acid  1 capsule Oral Daily    calcium acetate  667 mg Oral TID WC    carbidopa-levodopa  1 tablet Oral Daily    folic acid  1 mg Oral Daily    hydrALAZINE  100 mg Oral TID    insulin lispro  2 Units Subcutaneous TID AC    pantoprazole Last 3 Encounters:   08/17/18 97 lb (44 kg)   07/30/18 100 lb (45.4 kg)   07/15/18 95 lb 14.4 oz (43.5 kg)      24HR INTAKE/OUTPUT:      Intake/Output Summary (Last 24 hours) at 08/17/18 1031  Last data filed at 08/17/18 0554   Gross per 24 hour   Intake               70 ml   Output                0 ml   Net               70 ml        General: alert, in no apparent distress, elderly, thin  HEENT: normocephalic, atraumatic, anicteric  Neck: supple, no mass  Lungs: non-labored respirations, clear to auscultation bilaterally  Heart: regular rate and rhythm, + 3/6 systolic murmur  Abdomen: soft, non-tender, non-distended  MSK: no joint swelling or tenderness  Ext: no cyanosis, no peripheral edema  Neuro: alert and oriented, no gross abnormalities  Skin: scattered areas of ecchymoses on extremities and torso      Electronically signed by Ector Serrano MD on 8/17/2018 at 10:31 AM

## 2018-08-17 NOTE — FLOWSHEET NOTE
08/17/18 1507   Vital Signs   BP (!) 159/75   Temp 97.6 °F (36.4 °C)   Pulse 68   Height 4' 11\" (1.499 m)   Weight 93 lb 0.6 oz (42.2 kg)   Weight Method Bed scale   Percent Weight Change -4.09   Pain Assessment   Pain Assessment 0-10   Pain Level 0   Post-Hemodialysis Assessment   Post-Treatment Procedures Blood returned;Catheter capped, clamped and heparinized x 2 ports   Machine Disinfection Process Acid/Vinegar Clean;Bleach; Machine Absence of [employer]; Exterior Machine Disinfection   Rinseback Volume (ml) 200 ml   Total Liters Processed (l/min) 88.9 l/min   Dialyzer Clearance Lightly streaked   Duration of Treatment (minutes) 240 minutes   Heparin amount administered during treatment (units) 0 units   Hemodialysis Intake (ml) 400 ml   Hemodialysis Output (ml) 2200 ml   NET Removed (ml) 1800 ml   Tolerated Treatment Good   Patient Response to Treatment vs stable throughout   Bilateral Breath Sounds Diminished   Edema None   Physician Notified? No     Pt arrived having picked and pulled, removing her dialysis catheter dressing, dressing replaced. In the last hour of tx, pt again began to pick and pull at BP cuff, lines, hearing aide, glasses, etc, requiring constant redirection.  Reported this to floor nurse Leelee HOLDER    NET removed 1800ml  Wt on arrival 44kg  End wt 42.2kg    Cecilio Guadalupe RN

## 2018-08-17 NOTE — PROGRESS NOTES
 carbidopa-levodopa  1 tablet Oral Daily    folic acid  1 mg Oral Daily    hydrALAZINE  100 mg Oral TID    insulin lispro  2 Units Subcutaneous TID AC    pantoprazole  20 mg Oral QAM AC    primidone  50 mg Oral TID    venlafaxine  150 mg Oral Daily with breakfast    gabapentin  100 mg Oral Nightly    levETIRAcetam  1,000 mg Oral BID    isosorbide mononitrate  60 mg Oral Daily    losartan  100 mg Oral Daily    levothyroxine  50 mcg Oral Every Other Day    levothyroxine  25 mcg Oral Every Other Day    collagenase   Topical Daily    cloNIDine  0.1 mg Oral BID    fluticasone  2 spray Nasal Daily    sodium chloride flush  10 mL Intravenous 2 times per day    insulin lispro  0-6 Units Subcutaneous TID WC    insulin lispro  0-3 Units Subcutaneous Nightly    ipratropium-albuterol  3 mL Inhalation TID    heparin (porcine)  5,000 Units Subcutaneous 2 times per day     Continuous Infusions:   sodium chloride      dextrose       PRN Meds:sodium chloride flush, heparin (porcine), cholestyramine light, sodium chloride, sodium chloride flush, magnesium hydroxide, ondansetron, glucose, dextrose, glucagon (rDNA), dextrose, albuterol, hydrALAZINE, labetalol, LORazepam    PHYSICAL EXAM:    CURRENT VITALS: BP (!) 147/73   Pulse 66   Temp 97.4 °F (36.3 °C)   Resp 20   Ht 4' 11\" (1.499 m)   Wt 97 lb (44 kg)   LMP  (LMP Unknown)   SpO2 100%   BMI 19.59 kg/m²     CONSTITUTIONAL:  awake, alert, cooperative, no apparent distress, frail.   ENT:  Normocephalic, without obvious abnormality, atraumatic, sinuses nontender on palpation, external ears without lesions,  NECK:  Supple, symmetrical, trachea midline, no adenopathy, thyroid symmetric, not enlarged and no tenderness, skin normal  LUNGS:  No increased work of breathing, good air exchange, clear to auscultation bilaterally, no crackles or wheezing  CARDIOVASCULAR:  Normal apical impulse, regular rate and rhythm, normal S1 and S2,  and 2/6 murmur mEq/L    Chloride 96 (L) 98 - 107 mEq/L    CO2 26 22 - 29 mEq/L    Anion Gap 14 (H) 7 - 13 mEq/L    Glucose 157 (H) 74 - 109 mg/dL    BUN 21 8 - 23 mg/dL    CREATININE 2.60 (H) 0.50 - 0.90 mg/dL    GFR Non-African American 17.6 (L) >60    GFR  21.3 (L) >60    Calcium 8.1 (L) 8.6 - 10.2 mg/dL    Total Protein 6.6 6.4 - 8.1 g/dL    Alb 2.4 (L) 3.9 - 4.9 g/dL    Total Bilirubin 0.3 0.0 - 1.2 mg/dL    Alkaline Phosphatase 89 40 - 130 U/L    ALT 20 0 - 33 U/L    AST 57 (H) 0 - 35 U/L    Globulin 4.2 (H) 2.3 - 3.5 g/dL   POCT Glucose    Collection Time: 08/17/18  7:50 AM   Result Value Ref Range    POC Glucose 134 (H) 60 - 115 mg/dl    Performed on ACCU-CHEK        EKG:   Sinus rhythm with 1st degree AV block  Rate 73        Jordan Gomez MD ,[address] [place] [address] Cardiology

## 2018-08-17 NOTE — DISCHARGE SUMMARY
81 mg by mouth daily              atorvastatin (LIPITOR) 80 MG tablet  Take 1 tablet by mouth nightly             b complex-C-folic acid (NEPHROCAPS) 1 MG capsule  Take 1 capsule by mouth daily             benzonatate (TESSALON) 100 MG capsule  Take 1 capsule by mouth 3 times daily as needed for Cough             calcium acetate (PHOSLO) 667 MG capsule  Take 667 mg by mouth 3 times daily (with meals)             calcium elemental (OSCAL) 500 MG TABS tablet  Take 1 tablet by mouth 2 times daily             capsicum (ZOSTRIX) 0.075 % topical cream  Apply topically every 12 hours as needed Apply topically 3 times daily. carbidopa-levodopa (SINEMET)  MG per tablet  Take 1 tablet by mouth daily             cholestyramine light 4 g packet  Take 0.5 packets by mouth 3 times daily as needed (DIARRHEA)             cloNIDine (CATAPRES) 0.1 MG tablet  Take 1 tablet by mouth 2 times daily             collagenase 250 UNIT/GM ointment  Apply 250 each topically daily Apply to verbal affected area topically in the morning for health maintenance. diltiazem (CARDIZEM 12 HR) 120 MG extended release capsule  Take 2 capsules by mouth 2 times daily             docusate sodium (COLACE) 100 MG capsule  Take 1 capsule by mouth daily             estradiol (ESTRACE VAGINAL) 0.1 MG/GM vaginal cream  Place 2 g vaginally daily             fluticasone (FLONASE) 50 MCG/ACT nasal spray  2 sprays by Nasal route daily. folic acid (FOLVITE) 1 MG tablet  Take 1 mg by mouth daily             gabapentin (NEURONTIN) 100 MG capsule  Take 100 mg by mouth daily. Shekhar Narrow glucose blood VI test strips (TRUETEST TEST) strip  As needed.              guaiFENesin (ROBITUSSIN) 100 MG/5ML SOLN oral solution  Take 10 mLs by mouth every 4 hours as needed for Cough             hydrALAZINE (APRESOLINE) 100 MG tablet  Take 1 tablet by mouth 3 times daily             insulin lispro (HUMALOG) 100 UNIT/ML injection vial  Inject tolerated and no driving for today, fall precautions. Total time taken for discharging this patient: 40 minutes. Greater than 70% of time was spent focused exclusively on this patient. Time was taken to review chart, discuss plans with consultants, reconciling medications, discussing plan answering questions with patient. Signed:  Micaela Kwon  8/17/2018, 2:09 PM  ----------------------------------------------------------------------------------------------------------------------    Savannah Vandana Sherman,     Please return to ER or call 911 if you develop any significant signs or symptoms.     I may not have addressed all of your medical illnesses or the abnormal blood work or imaging therefore please ask your PCP, Cal Egan MD ,  to obtain [facility] record to follow up on all of the abnormal labs, imaging and findings that I have and have not addressed during your hospitalization.      Discharging you from the hospital does not mean that your medical care ends here and now. You may still need additional work up, investigation, monitoring, and treatment to be handled from this point on by outside providers including your PCP, Cal Egan MD , Specialists and other healthcare providers.      Please review your list of discharge medications prior to resuming medications you might still have at home, as the medications you need to be taking, dosages or how often you must take them may have changed. For medication questions, contact your retail pharmacy and your PCP, Cal Egan MD .     ** I STRONGLY RECOMMEND that you follow up with Cal Egan MD within 3 to 5 days for a post hospitalization evaluation. This specific office visit is covered by your insurance, and is not the same as your annual doctor visit/ check up. This office visit is important, as it may prevent need for repeat and/or future hospitalizations. **    Your medical team at [facility]) appreciates the opportunity to work with you to get well!     Sincerely,  Jayme Hou

## 2018-08-17 NOTE — CARE COORDINATION
D/C PLAN REMAINS [facility] IF PRECERTED. SPOKE WITH SON LAST EVENING TO UPDATE HIM ON PLAN OF CARE. HE IS VERY FRUSTRATED AND WANTS TO KNOW WHY IT'S TAKING SO LONG. I INFORMED HIM WE NEEDED THE THERAPY EVALS WHICH WAS A LITTLE DIFFICULT TO OBTAIN WITH PATIENT GOING TO DIALYSIS AND THEN RETURNING VERY TIRED. HE STATES HE IS PLANNING TO GO SOUTH AND HE NEEDS TO KNOW ASAP. I INFORMED HIM HE WOULD BE NOTIFIED AS SOON AS WE HEARD FROM THE INSURANCE COMPANY.  Electronically signed by Lang Vera RN on 8/17/2018 at 7:08 AM

## 2018-08-17 NOTE — PROGRESS NOTES
PM assessment completed. Patient is alert and oriented to self, time, and place. Denies any dizziness, N/V, SOB, or pain at this time. Dressing on sacrum is clean, dry, and intact. No other issues or concerns at this time. Will continue to monitor and follow plan of care. Call light is within reach.   Electronically signed by Nimco Claros RN on 8/16/2018 at 9:30 PM

## 2018-08-17 NOTE — PROGRESS NOTES
Patient is alert and oriented x 4. Reports pain to back but denies need for medication at this time. Patient got up to chair with therapy. Patient updated about potential discharge to nursing home today. Patient to dialysis today, will continue to monitor     1025 - patient going down to dialysis    3405 - precert was obtained for [facility]. All physicians have given okay for discharge. Son has been updated     [phone]- pickup time is scheduled for 1600 today.  Report called to [facility] -

## 2018-08-17 NOTE — PROGRESS NOTES
Nutrition Assessment    Type and Reason for Visit: Reassess    Nutrition Recommendations: Continue current diet, Start ONS (Add frozen oral supplement bid and nutritious pudding x1/day. RD available if further nutrition intervention is opted considering inadequate oral intake/wt loss.)    Malnutrition Assessment:  · Malnutrition Status: Insufficient data  · Context: Chronic illness  · Findings of the 6 clinical characteristics of malnutrition (Minimum of 2 out of 6 clinical characteristics is required to make the diagnosis of moderate or severe Protein Calorie Malnutrition based on AND/ASPEN Guidelines):  1. Energy Intake-Less than or equal to 50%, not able to assess    2. Weight Loss-20% loss or greater,  (in 10 months)  3. Fat Loss-Unable to assess,    4. Muscle Loss-Unable to assess,    5. Fluid Accumulation-Mild fluid accumulation, Extremities  6.  Strength-Not measured    Nutrition Diagnosis:   · Problem: Inadequate oral intake  · Etiology: related to  (Current condition)     Signs and symptoms:  as evidenced by Intake 0-25%, Patient report of, Weight loss (decreased appetite)    Nutrition Assessment:  · Subjective Assessment: Pt c/o decreased appetite. · Nutrition-Focused Physical Findings: +1 BUE and trace BLE edema noted.    · Wound Type: Pressure Ulcer, Stage III (sacrum)  · Current Nutrition Therapies:  · Oral Diet Orders: Dysphagia 2, Nectar Thick (8-15 per speech recommendations)   · Oral Diet intake: 1-25%  · Oral Nutrition Supplement (ONS) Orders: None  · Anthropometric Measures:  · Ht: 4' 11\" (149.9 cm)   · Current Body Wt: 97 lb (44 kg) (8-17)  · Admission Body Wt: 110 lb (49.9 kg) (estimated)  · Usual Body Wt: 124 lb (56.2 kg) ((10/17), 107 lb(1/18), 101 lb (3/18), 95 lb (7/16))  · % Weight Change: 22%,  10 months  · Ideal Body Wt: 95 lb (43.1 kg), % Ideal Body 100%  · BMI Classification: BMI 18.5 - 24.9 Normal Weight  · Comparative Standards (Estimated Nutrition Needs):  · Estimated Daily Total Kcal: 5739-0686  · Estimated Daily Protein (g): 57-66    Estimated Intake vs Estimated Needs: Intake Less Than Needs    Nutrition Risk Level: High    Nutrition Interventions:   Continue current diet, Start ONS (Add frozen oral supplement bid and nutritious pudding x1/day. RD available if further nutrition intervention is opted considering inadequate oral intake/wt loss.)  Continued Inpatient Monitoring    Nutrition Evaluation:   · Evaluation: No progress toward goals   · Goals: Intake >50% of meals/supplement. Weight gain. · Monitoring: Meal Intake, Supplement Intake, Weight, Pertinent Labs, Chewing/Swallowing    See Adult Nutrition Doc Flowsheet for more detail.      Electronically signed by Roxy Chery RD, JIHAN on 8/17/18 at 12:36 PM

## 2018-08-17 NOTE — PROGRESS NOTES
 gabapentin  100 mg Oral Nightly    levETIRAcetam  1,000 mg Oral BID    isosorbide mononitrate  60 mg Oral Daily    losartan  100 mg Oral Daily    levothyroxine  50 mcg Oral Every Other Day    levothyroxine  25 mcg Oral Every Other Day    collagenase   Topical Daily    cloNIDine  0.1 mg Oral BID    fluticasone  2 spray Nasal Daily    sodium chloride flush  10 mL Intravenous 2 times per day    insulin lispro  0-6 Units Subcutaneous TID WC    insulin lispro  0-3 Units Subcutaneous Nightly    ipratropium-albuterol  3 mL Inhalation TID    heparin (porcine)  5,000 Units Subcutaneous 2 times per day       LABS Reviewed    IMAGING Reviewed    Shanta Tang MD  Rounding Hospitalist

## 2018-08-17 NOTE — PROGRESS NOTES
Neurology Follow up    SUBJECTIVE:  NO Headache, double vision,blurry vision,difficulty with speech,difficulty with swallowing,weakness,numbness,pain,nausea,vomitting,chocking,neck pain,dizziness,  improved ,able to swallow well  Some ambulation  PHYSICAL EXAM:    Loco Gee MD, [facility], [facility] Board of Psychiatry & Neurology  Board Certified in Vascular Neurology  Board Certified in Neuromuscular Medicine  Certified in [facility] Appearance:      Mental Status Exam:             Level of Alertness:   awake            Orientation:   person, place, time            Memory:   normal                      Language:  normal      Funduscopic Exam:     Cranial Nerves            Cranial nerve III           Pupils:  equal, round, reactive to light      Cranial nerves III, IV, VI           Extraocular Movements: intact      Cranial nerve V           Facial sensation:  intact      Cranial nerve VII           Facial strength: intact      Cranial nerve VIII           Hearing:  intact      Cranial nerve IX           Palate:  intact      Cranial nerve XI         Shoulder shrug:  intact      Cranial nerve XII          Tongue movement:  normal    Motor:    Drift:  absent  Motor exam is symmetrical 4 out of 5 all extremities bilaterally  Tone:  normal  Abnormal Movements:  absent            Sensory:        Pinprick             Right Upper Extremity:  normal             Left Upper Extremity:  normal             Right Lower Extremity:  normal             Left Lower Extremity:  normal           Vibration                         Touch            Proprioception                 Coordination:           Finger/Nose   Right:  normal              Left:  normal          Heel-Knee-Shin                Right:  normal              Left:  normal          Rapid Alternating Movements              Right:  normal              Left:  normal          Gait:                       Casual:  defered                      Romberg: 137   K  6.5*  6.1*  6.1*  3.6   CL  96*  97*  99   CO2  23  22  26   BUN  44*  45*  11   CREATININE  4.42*  4.53*  1.83*   GLUCOSE  75  99  89     Recent Labs      08/11/18   1821   BILITOT  0.5   ALKPHOS  103   AST  95*   ALT  8     Lab Results   Component Value Date    PROTIME 11.6 08/13/2018    PROTIME 20.7 07/12/2018    PROTIME 25.7 09/29/2016    INR 1.1 08/13/2018     Lab Results   Component Value Date    VALPROATE 41 03/14/2013       ASSESSMENT AND PLAN    Generalized seizure secondary to CVD  Keppra, no seizures  New dyspahgia  Repeat MBS, able to swallow  More awake and non focal neuro  Dialysis yesterday  No CVA  Will see op    Loco RONIT Pollock MD, Akila López, American Board of Psychiatry & Neurology  Board Certified in Vascular Neurology  Board Certified in Neuromuscular Medicine  Certified in . [address]

## 2018-08-21 NOTE — PROGRESS NOTES
PATIENT: Seferino Peñaloza : 1935 DOS: 2018     [facility]    Seen for readmission history and physical.     CHIEF COMPLAINT: Epistaxis, respiratory failure, weakness. HISTORY OF PRESENT ILLNESS: This patient is known to our service. The patient did have renal impairment is on hemodialysis, had been anticoagulated for atrial fibrillation, and recurrent episodes of epistaxis, prior GI bleed. The patient has had numerous falls in the past. The patient did have intermittent nose bleeding, hospitalized. The patient did have elevated potassium, given Kayexalate. The patient was proceeded on hemodialysis. The patient was stabilized in terms of her electrolytes, stabilized in terms of her bleeding. The patient did make gains, did have iv placed. The patient was seen by ENT. Hemoglobin and hematocrit were monitored. She did require 3 units of packed red cells and fresh frozen plasma. The patient's blood pressure was stabilized, maintained on hemodialysis. She did make gains. The patient's blood sugars are monitored closely. She was subsequently transferred back to this facility for ongoing care. PAST MEDICAL HISTORY: Included Parkinson's, end-stage renal disease on hemodialysis, anemia of chronic disease, atrial fibrillation, recurrent epistaxis, recurrent GI bleed, hypertension, prior non-ST elevation MI, weakness, arrhythmia, COPD, diabetes, diverticular disease, degenerative joint disease, GERD, Parkinson's, prior CVA. FAMILY HISTORY: Included coronary artery disease, diabetes in first degree relatives. SOCIAL HISTORY: The patient has approximately 10-pack year history of smoking, but quit over 40 years ago.     MEDICATIONS: Her medications on arrival included the following: She is on amiodarone 200 mg daily, Aranesp 40 mcg subcu weekly, aspirin 81 mg daily, Colace 100 mg daily, Cozaar 100 mg daily, diltiazem  mg daily, DuoNeb 0.5/2.5 every 6 hours, fluticasone 50 mcg 2

## 2018-08-23 PROBLEM — Z51.5 PALLIATIVE CARE PATIENT: Status: ACTIVE | Noted: 2018-01-01

## 2018-08-23 NOTE — PROGRESS NOTES
Katiana Sherman seen and examined this 81y.o. year old pt of Dr. Josr Hansen MD at [facility],   who was referred to palliative care by Dr. Latonya Wilkinson for symptom management, advance care planning, and goals of care conversation following pt's most recent admission and treatment at [address] [facility] for CVA , seizure, dysphagia, saccral pressure ulcer stage IV, malignant HTN, acute metabolic encephalopathy. Pt was on keppra during her entire hospital stay but I do not see this on her current med list at the SNF. Pt has had 15 ER visits and or hospital stays at [address] [facility] since jan 2018. She has multiple comorbid conditions and progressive functional decline and weight  loss of 40 pounds in the last year. She is A&O x3, Falling asleep at times during our visit. Son, Blade, present for visit. He is Pt HCPOA.    Past Medical History:   Diagnosis Date    Abnormal presence of protein in urine 6/2004    Dr. Paradise Sierra Acute on chronic diastolic heart failure (HCC)     Anemia     Anxiety     Atherosclerotic heart disease     Atrial fibrillation (HCC)     Breath shortness     Cardiac arrhythmia     Chronic kidney disease     Chronic obstructive pulmonary disease (COPD) (HCC)     Constipation, chronic     Difficulty in walking     Diverticular disease of the colon     End stage renal disease (HCC)     Enterocolitis due to Clostridium difficile, not specified as recurrent     Epistaxis 07/17/2018    had surgery severe    ESRD (end stage renal disease) ([facility]     Fistula     left    Gastro-esophageal reflux disease without esophagitis     GERD (gastroesophageal reflux disease)     GERD, PUD, Hiatal Hernia    Granulomatous lung disease (HCC)     History of endoscopy 2-21-12    Dr. Alain Spatz    Hyperlipemia     Hyperlipidemia     Hypertension     Hypothyroidism     Kidney stones 11/2001    Dr. Elva Adams    Long-term (current) use of anticoagulants     Muscle weakness (generalized)     Neuropathy in diabetes ([facility] legs    Nosebleed 07/17/2018    Osteoarthritis     Parkinsons disease (HCC)     Peptic ulcer, site unspecified, unspecified as acute or chronic, without hemorrhage or perforation     Positive ORTEGA (antinuclear antibody)     1:80    Presence of cardiac pacemaker     Seizures (HCC)     Tachycardia     Tachypnea, not elsewhere classified     Thrombophlebitis leg superficial     Type 2 diabetes mellitus with hypoglycemia without coma ([facility] [address]     Type II or unspecified type diabetes mellitus without mention of complication, not stated as uncontrolled 1998    Unspecified osteoarthritis, unspecified site     Urinary tract infection     Weakness       Current symptom complaint include: pain to coccyx that is present continually. Pt reports it as 10/10 at times. She has been taking acetaminophen with little relief. Nursing reports pt often screams out in pain. She has stage IV pressure ulcer to coccyx. Pt and son requesting something to help with her pain. Pt has taken opioids in the past without adverse effect. Pt has hx of constipation. Takes prn miralax with some relief. Denies n/v, abd pain. Pt with dysphagia as mentioned above. Currently on thickened liquids. Appetite poor. Pt is nonabulatory and requires assistance for all ADLs. She is wheelchair bound. Spends long hours in bed. She is currently residing at [facility] for  Physical and speech therapy. Son plans to take her home to his house. He states she only has 12 skilled days left. There has been no anxiety or agitation episodes. No reports of suicidal or homicidal ideation. Despite pt overall decline, multiple comorbidities and overall fragile condition she still wishes to remain full code. LW and HCPOA are in place.     Past Surgical History:   Procedure Laterality Date    APPENDECTOMY  2007    [facility]  2008 & [facility]    [facility] at [address]  2/2006    COLONOSCOPY  4/2007    Dr. Odalis Matthew CATHETER N/A 11/2/2017    CATHETER INSERTION HEMODIALYSIS performed by Jeanine Fothergill, MD at [address] [place] [address] Rd  0020,[id number]    Bilateral    ID COLONOSCOPY FLX DX W/COLLJ SPEC WHEN PFRMD N/A 2/1/2018    COLONOSCOPY performed by Autumn Shukla MD at [address] N/A 7/16/2018    control epistaxis performed by Karla Birch MD at [facility] EGD TRANSORAL BIOPSY SINGLE/MULTIPLE N/A 2/1/2018    EGD ESOPHAGOGASTRODUODENOSCOPY WITH BIOPSY performed by Autumn Shukla MD at [address] [place] [address] EGD TRANSORAL BIOPSY SINGLE/MULTIPLE N/A 3/27/2018    EGD ESOPHAGOGASTRODUODENOSCOPY performed by Autumn Shukla MD at [address] N/A 3/30/2018    EXAM UNDER ANESTHESIA, LARYNGOSCOPY ,PHARYNGOSCOPY performed by Karla Birch MD at [facility] N/A 3/30/2018    REMOVAL PACKING POSS CONTROL EPISTAXIS, NASAL ENDOSCOPY performed by Karla Birch MD at [address] [address]  3/2003   Breast Cancer-related family history is not on file. Social History     Social History    Marital status:      Spouse name: N/A    Number of children: 2    Years of education: N/A     Occupational History    Not on file. Social History Main Topics    Smoking status: Former Smoker     Types: Cigarettes     Quit date: 8/23/1966    Smokeless tobacco: Never Used    Alcohol use No    Drug use: No    Sexual activity: Not Currently     Other Topics Concern    Not on file     Social History Narrative    Lives at home w/son    1 dog       Review of Systems   Constitutional: Positive for malaise/fatigue and weight loss. Negative for chills, diaphoresis and fever. HENT: Positive for hearing loss ([place]). Negative for sore throat. Eyes: Positive for double vision (chronic). Negative for pain, discharge and redness.    Respiratory: orders for this visit:    Other chronic pain to coccyx  -     HYDROcodone-acetaminophen (NORCO) 5-325 MG per tablet; Take 1 tablet by mouth every 6 hours as needed for Pain for up to 15 days. Pt is tolerating current pain meds without adverse effects or over sedation. Lowest effective dose used. Pt advised to call and notify palliative care for any adverse effects or sedation  Pt is able to maintain adequate functional level and participate in ADLs  OARRS reviewed. There is no indication of aberrant behavior  Pt advised to call for increasing or uncontrolled pain   Risk vs benefit assessed. Pt educated on risk of addiction. Pt advised to take only as prescribed and not to change frequency of pain meds without consulting palliative care first.    Weight loss  - Small frequent meals  - Dietary supplements TID  - weekly weights  - dietitian to follow at [facility]    Slow transit constipation  -     polyethylene glycol (MIRALAX) powder; Take 17 g by mouth daily    Dysphagia, unspecified type  - Cont thickened liquids  - Cont speech therapy  - aspiration precautions    Debility  - Cont PT/OT  Reviewed safety and comfort measures  Maintain use of assistive devices PRN    Seizure disorder (HCC)  - Pt was on Keppra while hospitalized and this was not on her DC med list nor on her current med list. Called and spoke with nurse at [facility] and instructed her to call neurology, Dr Pedro Luis Oh, to see if pt should be on keppra or not  - Consider weaning off of effexor due to potential to lower seizure threshold. ESRD (end stage rnpPalliative care encounter  - Cont dialysis M,W,F  - Maintain follow up with Dr Rahat Gómez and coordination of care  -     Full code, pt declined change to DNR  - LW and HCPOA in place    I discussed patients disease process and goals of treatment. I discussed the palliative care philosophy in light of those goals.  We talked about care options down the road, including hospice care should

## 2018-08-25 NOTE — ED TRIAGE NOTES
Patient is A&Ox2-3 with some confusion at times. She states She was trying to move from her bed to her chair when she fell over and hit her head. She denies LOC. Patient's son is at bedside and states the patient was here one week ago post seizure.

## 2018-08-25 NOTE — ED PROVIDER NOTES
[address] [facility] ED  eMERGENCY dEPARTMENT eNCOUnter      Pt Name: Margarito Romero  MRN: [id number]  [place] 1935  Date of evaluation: 8/24/2018  Provider: Darren Antoine Dr       Chief Complaint   Patient presents with    Fall     Patient went to sit in a chair and fell on the floor         HISTORY OF PRESENT ILLNESS   (Location/Symptom, Timing/Onset, Context/Setting, Quality, Duration, Modifying Factors, Severity)  Note limiting factors. Margarito Romero is a 81 y.o. female who presents to the emergency department  Patient had mechanical fall she has right posterior hematoma noted she notes headache denies neck pain but has upper back pain. She denies any additional injuries denies any difficult seeing, hearing,speaking. She denies any additional injuries at this time. Given patient's age posterior hematoma we'll obtain CAT scan head neck and general x-rays of her thoracic spine tenderness      HPI    Nursing Notes were reviewed. REVIEW OF SYSTEMS    (2-9 systems for level 4, 10 or more for level 5)     Review of Systems   Constitutional: Negative for activity change, appetite change and unexpected weight change. HENT: Negative for ear discharge, nosebleeds and voice change. Eyes: Negative for photophobia, pain, discharge and visual disturbance. Respiratory: Negative for apnea, cough and shortness of breath. Cardiovascular: Negative for chest pain and leg swelling. Gastrointestinal: Negative for abdominal distention and anal bleeding. Endocrine: Negative for cold intolerance, heat intolerance and polyphagia. Genitourinary: Negative for genital sores. Musculoskeletal: Positive for back pain. Negative for joint swelling and neck stiffness. Skin: Negative for pallor. Allergic/Immunologic: Negative for immunocompromised state. Neurological: Negative for seizures and facial asymmetry. Hematological: Does not bruise/bleed easily. units  201-250=4 units  251-300=6 units  301-350=8 units  351-400=10 units  Before meals    IPRATROPIUM-ALBUTEROL (DUONEB) 0.5-2.5 (3) MG/3ML SOLN NEBULIZER SOLUTION    Inhale 3 mLs into the lungs every 4 hours as needed for Shortness of Breath    ISOSORBIDE MONONITRATE (IMDUR) 60 MG EXTENDED RELEASE TABLET    Take 1 tablet by mouth daily    L-METHYLFOLATE-B6-B12 (FOLTANX) 3-35-2 MG TABS    Take 1 tablet by mouth daily for health maintenance. LEVOTHYROXINE (SYNTHROID) 25 MCG TABLET    Take 25 mcg by mouth Daily    LEVOTHYROXINE (SYNTHROID) 50 MCG TABLET    Take 50 mcg by mouth Daily    LOSARTAN (COZAAR) 100 MG TABLET    Take 1 tablet by mouth daily    MAGNESIUM OXIDE (MAG-OX) 400 (241.3 MG) MG TABS TABLET    Take 1 tablet by mouth daily    MICONAZOLE (ALEX ANTIFUNGAL) 2 % CREAM    Apply topically Apply topically every shift    MIRTAZAPINE (REMERON) 15 MG TABLET    Take 1 tablet by mouth nightly    PANTOPRAZOLE SODIUM (PROTONIX) 40 MG PACK PACKET    Take 20 mg by mouth every morning (before breakfast)     PRIMIDONE (MYSOLINE) 50 MG TABLET    Take 50 mg by mouth 3 times daily    SODIUM CHLORIDE (NASAL MOISTURIZING SPRAY) 0.65 % NASAL SPRAY    2 sprays by Nasal route as needed for Congestion    VENLAFAXINE 150 MG EXTENDED RELEASE TABLET    Take 150 mg by mouth daily (with breakfast)       ALLERGIES     Arthrotec [diclofenac-misoprostol]; Depakote [divalproex sodium]; Dilantin [phenytoin]; Klonopin [clonazepam]; and Statins    FAMILY HISTORY       Family History   Problem Relation Age of Onset    Heart Disease Father     Early Death Father 47    Diabetes Father     Heart Disease Mother     Diabetes Mother     High Blood Pressure Mother     High Blood Pressure Sister     High Cholesterol Sister           SOCIAL HISTORY       Social History     Social History    Marital status:       Spouse name: N/A    Number of children: 2    Years of education: N/A     Social History Main Topics    Smoking status:

## 2018-08-26 NOTE — PROCEDURES
[address]                       [facility], [address] [facility]                            ELECTROENCEPHALOGRAM REPORT    PATIENT NAME: Seda Cordova                    :        1935  MED REC NO:   [id number]                            ROOM:       W266  ACCOUNT NO:   [de-identified]                           ADMIT DATE: 2018  PROVIDER:     Brooke Garcia MD    DATE OF EE2018    This is a spontaneous 21-channel EEG recording for this patient with  idiopathic Parkinson's disease, seizures, chronic kidney disease, anxiety,  cardiac pacemaker. The background rhythm of this EEG shows well-regulated 8 to 9 Hz posterior  dominant rhythm of alpha. This is symmetrical and attenuates with eye  opening. EKG is monitored, this is regular. Photic stimulation performed  without any photic responses. Hyperventilation is not performed. The  record remains symmetrical with some intermittent drowsy patterns. EKG  artifacts and drip artifacts are seen. IMPRESSION:  This is a normal well-regulated EEG recording. Clinical  course is recommended.       Kellie Caraballo MD    D: 2018 19:24:28       T: 2018 20:43:34     [email or web address]  Job#: [id number]     Doc#: [id number]    CC:

## 2018-09-06 NOTE — PROGRESS NOTES
Physical Therapy Missed Treatment   Facility/Department: [facility] MED SURG E715/H505-08    NAME: Maryjane Arias    : 1935 (81 y.o.)  MRN: [id number]    Account: [de-identified]  Gender: female      PT evaluation and treatment orders received. Chart reviewed. PT eval attempted. Pt sleeping, had dialysis this morning. Pt lives at [place], with D/C plan to return. Will follow and attempt PT evaluation again at earliest convenience.         Kim Dietz, PT, 17 at 3:04 PM Head atraumatic, normal cephalic shape.

## 2018-09-07 NOTE — PROGRESS NOTES
Subjective:      Patient Id:  Torsten Harp is a 81 y.o. female who presents today with   Chief Complaint   Patient presents with    Pain    Nausea          HPI  Seen at home for symptom management follow up, her son present, demeanor calm and relaxed, talking on phone without difficulty. Per both Katiana and her son, Standard Jessamine has improved the pain from her sacral ulcer, ulcer being cared for by home health. Positive intermittent vomiting. Jacqualine Room will be eating, have no warning, and vomit. , negative abdominal pain. She has seen gastro specialists, had multiple workups and \" all they find is GERD\". Son gives her prn Zofran and it controls vomiting. Positive constipation, currently treated with Miralax prn. Last BM today, small hard stool. Negative SOB or edema. Cahetic appearance, but weight and appetite stable. Negative significant anxiety, agitation, or depression, sleeps well.     Past Medical History:   Diagnosis Date    Abnormal presence of protein in urine 6/2004    Dr. Lizett Novoa Acute on chronic diastolic heart failure (HCC)     Anemia     Anxiety     Atherosclerotic heart disease     Atrial fibrillation (HCC)     Breath shortness     Cardiac arrhythmia     Chronic kidney disease     Chronic obstructive pulmonary disease (COPD) (HCC)     Constipation, chronic     Difficulty in walking     Diverticular disease of the colon     End stage renal disease (HCC)     Enterocolitis due to Clostridium difficile, not specified as recurrent     Epistaxis 07/17/2018    had surgery severe    ESRD (end stage renal disease) ([facility] [address]     Fistula     left    Gastro-esophageal reflux disease without esophagitis     GERD (gastroesophageal reflux disease)     GERD, PUD, Hiatal Hernia    Granulomatous lung disease (HCC)     History of endoscopy 2-21-12    Dr. Roby Garcia    Hyperlipemia     Hyperlipidemia     Hypertension     Hypothyroidism     Kidney stones 11/2001    Dr. Franklyn Villarreal    Long-term (current) use of anticoagulants     Muscle weakness (generalized)     Neuropathy in diabetes ([facility] [address]     legs    Nosebleed 07/17/2018    Osteoarthritis     Parkinsons disease (HCC)     Peptic ulcer, site unspecified, unspecified as acute or chronic, without hemorrhage or perforation     Positive ORTEGA (antinuclear antibody)     1:80    Presence of cardiac pacemaker     Seizures ([facility] [address]     Tachycardia     Tachypnea, not elsewhere classified     Thrombophlebitis leg superficial     Type 2 diabetes mellitus with hypoglycemia without coma ([facility] [address]     Type II or unspecified type diabetes mellitus without mention of complication, not stated as uncontrolled 1998    Unspecified osteoarthritis, unspecified site     Urinary tract infection     Weakness      Past Surgical History:   Procedure Laterality Date    APPENDECTOMY  2007    BLADDER SUSPENSION  2008 & 2009    [facility], second at [address]  2/2006    COLONOSCOPY  4/2007    Dr. Ayden Borja    [address] N/A 11/2/2017    CATHETER INSERTION HEMODIALYSIS performed by Ariel Gonzalez MD at [address] [place] [address] Rd  [phone]    Bilateral    [place] COLONOSCOPY FLX DX W/COLLJ SPEC WHEN PFRMD N/A 2/1/2018    COLONOSCOPY performed by Jamia Elise MD at [address] [facility] N/A 7/16/2018    control epistaxis performed by Ty Renteria MD at [address] [facility] EGD TRANSORAL BIOPSY SINGLE/MULTIPLE N/A 2/1/2018    EGD ESOPHAGOGASTRODUODENOSCOPY WITH BIOPSY performed by Jamia Elise MD at [address] [facility] EGD TRANSORAL BIOPSY SINGLE/MULTIPLE N/A 3/27/2018    EGD ESOPHAGOGASTRODUODENOSCOPY [facility] N/A 3/30/2018    EXAM UNDER ANESTHESIA, LARYNGOSCOPY ,PHARYNGOSCOPY performed by Ty Renteria MD at [facility] 32 N/A 3/30/2018    REMOVAL PACKING POSS CONTROL EPISTAXIS, NASAL ENDOSCOPY performed by Airam Garcia MD at [address] [facility] THYROID SURGERY      URETHRAL STRICTURE DILATATION  3/2003     Social History     Social History    Marital status:      Spouse name: N/A    Number of children: 2    Years of education: N/A     Occupational History    Not on file. Social History Main Topics    Smoking status: Former Smoker     Types: Cigarettes     Quit date: 8/23/1966    Smokeless tobacco: Never Used    Alcohol use No    Drug use: No    Sexual activity: Not Currently     Other Topics Concern    Not on file     Social History Narrative    Lives at home w/son    1 dog     Family History   Problem Relation Age of Onset    Heart Disease Father     Early Death Father 47    Diabetes Father     Heart Disease Mother     Diabetes Mother     High Blood Pressure Mother     High Blood Pressure Sister     High Cholesterol Sister      Allergies   Allergen Reactions    Arthrotec [Diclofenac-Misoprostol] Nausea Only    Depakote [Divalproex Sodium] Itching    Dilantin [Phenytoin]     Klonopin [Clonazepam]     Statins Other (See Comments) and Rash     achy  achy     Current Outpatient Prescriptions on File Prior to Visit   Medication Sig Dispense Refill    HYDROcodone-acetaminophen (NORCO) 5-325 MG per tablet Take 1 tablet by mouth every 6 hours as needed for Pain for up to 15 days. . 90 tablet 0    levothyroxine (SYNTHROID) 25 MCG tablet Take 25 mcg by mouth Daily      insulin lispro (HUMALOG) 100 UNIT/ML injection vial Inject into the skin Sliding scale  150-200=2 units  201-250=4 units  251-300=6 units  301-350=8 units  351-400=10 units  Before meals      cephALEXin (KEFLEX) 250 MG capsule Take 250 mg by mouth 2 times daily      levothyroxine (SYNTHROID) 50 MCG tablet Take 50 mcg by mouth Daily      cloNIDine (CATAPRES) 0.1 MG tablet Take 1 tablet by mouth 2 times daily      diltiazem (CARDIZEM 12 HR) 120 MG extended release capsule Take 2 capsules by mouth oral solution Take 10 mLs by mouth every 4 hours as needed for Cough      b complex-C-folic acid (NEPHROCAPS) 1 MG capsule Take 1 capsule by mouth daily      acetaminophen (TYLENOL) 325 MG tablet Take 650 mg by mouth every 4 hours as needed for Pain      benzonatate (TESSALON) 100 MG capsule Take 1 capsule by mouth 3 times daily as needed for Cough 20 capsule 0    calcium acetate (PHOSLO) 667 MG capsule Take 667 mg by mouth 3 times daily (with meals)      cholestyramine light 4 g packet Take 0.5 packets by mouth 3 times daily as needed (DIARRHEA) 60 packet 3    magnesium oxide (MAG-OX) 400 (241.3 Mg) MG TABS tablet Take 1 tablet by mouth daily 30 tablet 0    ipratropium-albuterol (DUONEB) 0.5-2.5 (3) MG/3ML SOLN nebulizer solution Inhale 3 mLs into the lungs every 4 hours as needed for Shortness of Breath 360 mL     pantoprazole sodium (PROTONIX) 40 MG PACK packet Take 20 mg by mouth every morning (before breakfast)       glucose blood VI test strips (TRUETEST TEST) strip As needed. 200 strip 11    aspirin 81 MG EC tablet Take 81 mg by mouth daily       fluticasone (FLONASE) 50 MCG/ACT nasal spray 2 sprays by Nasal route daily. No current facility-administered medications on file prior to visit. Review of Systems   Constitutional: Negative for activity change, appetite change, chills, diaphoresis, fatigue, fever and unexpected weight change. HENT: Negative for drooling, hearing loss, mouth sores, sore throat, trouble swallowing and voice change. Eyes: Negative for discharge and visual disturbance. Respiratory: Negative for apnea, cough, choking, chest tightness, shortness of breath, wheezing and stridor. Cardiovascular: Negative for chest pain, palpitations and leg swelling. Gastrointestinal: Positive for constipation and vomiting. Negative for abdominal distention, abdominal pain, anal bleeding, blood in stool, diarrhea, nausea and rectal pain.    Genitourinary: Negative for

## 2018-10-21 PROBLEM — D64.9 ANEMIA: Status: ACTIVE | Noted: 2018-01-01

## 2018-10-21 NOTE — ED NOTES
IV that was inserted in pt infiltrated. ATB stopped. No s/sym of distress. No complaints from pt. Pt is extremely edematous on bilat extremities. 3+ pitting edema. Dr Goran Mari informed. Veins are extremely fragile. IV will be obtained VIA US.        Trino Valencia, GLORY  10/21/18 [id number]

## 2018-10-21 NOTE — ED PROVIDER NOTES
questionable infiltrate patient was treated empirically with antibiotic was put on oxygen and will be admitted under Dr. Heard Overall  for observation and further management       Amount and/or Complexity of Data Reviewed  Clinical lab tests: ordered and reviewed   Tests in the radiology section of CPT®:  ordered       CONSULTS:  None    PROCEDURES:  Unless otherwise noted below, none     Procedures    FINAL IMPRESSION      1. Anemia due to chronic kidney disease, on chronic dialysis (HCC)    2. Pulmonary congestion    3. Epistaxis    4.  Pneumonia due to organism          DISPOSITION/PLAN   DISPOSITION Decision To Admit 10/21/2018 04:51:54 PM      PATIENT REFERRED TO:  Wm Wisdom MD  [facility] [phone]            DISCHARGE MEDICATIONS:  Current Discharge Medication List             (Please note thatportions of this note were completed with a voice recognition program.  Efforts were made to edit the dictations but occasionally words are mis-transcribed.)    Rosas Murillo MD (electronically signed)  Attending Emergency Physician         Loulou Hollis MD  10/21/18 [id number]

## 2018-10-21 NOTE — PROGRESS NOTES
[facility] [place] Respiratory Therapy Evaluation   Current Order:  [place] wa      Home Regimen: pn      Ordering Physician: Jamie Elias  Re-evaluation Date:  10/24     Diagnosis: anemia       Patient Status: Stable / Unstable + Physician notified    The following MDI Criteria must be met in order to convert aerosol to MDI with spacer. If unable to meet, MDI will be converted to aerosol:  []  Patient able to demonstrate the ability to use MDI effectively  []  Patient alert and cooperative  []  Patient able to take deep breath with 5-10 second hold  []  Medication(s) available in this delivery method   []  Peak flow greater than or equal to 200 ml/min            Current Order Substituted To  (same drug, same frequency)   Aerosol to MDI [] Albuterol Sulfate 0.083% unit dose by aerosol Albuterol Sulfate MDI 2 puffs by inhalation with spacer    [] Levalbuterol 1.25 mg unit dose by aerosol Levalbuterol MDI 2 puffs by inhalation with spacer    [] Levalbuterol 0.63 mg unit dose by aerosol Levalbuterol MDI 2 puffs by inhalation with spacer    [] Ipratropium Bromide 0.02% unit dose by aerosol Ipratropium Bromide MDI 2 puffs by inhalation with spacer    [] Duoneb (Ipratropium + Albuterol) unit dose by aerosol Ipratropium MDI + Albuterol MDI 2 puffs by inhalation w/spacer   MDI to Aerosol [] Albuterol Sulfate MDI Albuterol Sulfate 0.083% unit dose by aerosol    [] Levalbuterol MDI 2 puffs by inhalation Levalbuterol 1.25 mg unit dose by aerosol    [] Ipratropium Bromide MDI by inhalation Ipratropium Bromide 0.02% unit dose by aerosol    [] Combivent (Ipratropium + Albuterol) MDI by inhalation Duoneb (Ipratropium + Albuterol) unit dose by aerosol   Treatment Assessment [Frequency/Schedule]:  Change frequency to: duoneb q4 prn __________________________________________________per Protocol, P&T, MEC      Points 0 1 2 3 4   Pulmonary Status  Non-Smoker  []   Smoking history   < 20 pack years  []   Smoking history  ?  20 pack years  [] Pulmonary Disorder  (acute or chronic)  [x]   Severe or Chronic w/ Exacerbation  []     Surgical Status No [x]   Surgeries     General []   Surgery Lower []   Abdominal Thoracic or []   Upper Abdominal Thoracic with  PulmonaryDisorder  []     Chest X-ray Clear/Not  Ordered     []  Chronic Changes  Results Pending  [x]  Infiltrates, atelectasis, pleural effusion, or edema  []  Infiltrates in more than one lobe []  Infiltrate + Atelectasis, &/or pleural effusion  []    Respiratory Pattern Regular,  RR = 12-20 [x]  Increased,  RR = 21-25 []  JAMES, irregular,  or RR = 26-30 []  Decreased FEV1  or RR = 31-35 []  Severe SOB, use  of accessory muscles, or RR ? 35  []    Mental Status Alert, oriented,  Cooperative [x]  Confused but Follows commands []  Lethargic or unable to follow commands []  Obtunded  []  Comatose  []    Breath Sounds Clear to  auscultation  [x]  Decreased unilaterally or  in bases only []  Decreased  bilaterally  []  Crackles or intermittent wheezes []  Wheezes []    Cough Strong, Spontan., & nonproductive [x]  Strong,  spontaneous, &  productive []  Weak,  Nonproductive []  Weak, productive or  with wheezes []  No spontaneous  cough or may require suctioning []    Level of Activity Ambulatory []  Ambulatory w/ Assist  [x]  Non-ambulatory []  Paraplegic []  Quadriplegic []    Total    Score:__5_____     Triage Score:___5_____      Tri       Triage:     1. (>20) Freq: Q3    2. (16-20) Freq: Q4   3. (11-15) Freq: QID & Albuterol Q2 PRN    4. (6-10) Freq: TID & Albuterol Q2 PRN    5. (0-5) Freq Q4prn

## 2018-10-22 PROBLEM — D50.0 BLOOD LOSS ANEMIA: Status: ACTIVE | Noted: 2018-01-01

## 2018-10-22 NOTE — PROGRESS NOTES
Problem: Mobility Impaired (Adult and Pediatric) Goal: *Acute Goals and Plan of Care (Insert Text) Physical Therapy Goals Initiated 10/22/2018 and to be accomplished within 3-5 day(s) 1. Patient will move from supine <> sit with S in prep for out of bed activity and change of position. 2.  Patient will perform sit<> stand with S with LRAD in prep for transfers/ambulation. 3.  Patient will transfer from bed <> chair with S with LRAD for time up in chair for completion of ADL activity. 4.  Patient will ambulate 150 feet with LRAD/S for improved functional mobility/safe discharge. 5.  Patient will ascend/descend 3-5 stairs with B/L handrails with contact guard assist for home re-entry as needed. Outcome: Progressing Towards Goal 
physical Therapy EVALUATION Patient: Moises Ferraro (21 y.o. male) Date: 10/22/2018 Primary Diagnosis: OSTEOARTHRITIS LEFT KNEE, HYPERTENSION, HYPERLIPIDEMIA 
DJD (degenerative joint disease) of knee Procedure(s) (LRB): LEFT TOTAL KNEE REPLACEMENT WITH CONFORMIS  (Left) Day of Surgery Precautions:Fall, WBAT 
 
ASSESSMENT : 
Based on the objective data described below, the patient presents with decrease independence w/ bed mobility, transfers, and gait. Pt seen in supine prior to session w/  IV, BP, pulse ox, and B/L SCDs donned. Pt reported 6/10 pain at this time L knee. Pt has no c/o lightheadedness, dizziness or nausea. Pt's vitals assessed WNLs. Pt able to ambulate a total of 35 ft w/ RW/GB and demonstrates a step to, antalgic gait, decrease elin, decrease stride length, and decrease step clearance. Pt transferred back to bed where pt was left sitting at the EOB after session, call bell and tray in reach, vitals assessed WNLs, nurse notified after session. Patient will benefit from skilled intervention to address the above impairments. Patients rehabilitation potential is considered to be Good Factors which may influence rehabilitation potential include:  
[]         None noted 
[]         Mental ability/status []         Medical condition 
[]         Home/family situation and support systems 
[]         Safety awareness 
[]         Pain tolerance/management 
[]         Other: PLAN : 
Recommendations and Planned Interventions: 
[]           Bed Mobility Training             []    Neuromuscular Re-Education []           Transfer Training                   []    Orthotic/Prosthetic Training 
[]           Gait Training                          []    Modalities []           Therapeutic Exercises          []    Edema Management/Control 
[]           Therapeutic Activities            []    Patient and Family Training/Education 
[]           Other (comment): Frequency/Duration: Patient will be followed by physical therapy twice daily to address goals. Discharge Recommendations: Home Health Further Equipment Recommendations for Discharge: N/A  
 
SUBJECTIVE:  
Patient stated I feel pretty okay.  OBJECTIVE DATA SUMMARY:  
 
Past Medical History:  
Diagnosis Date  Arthritis  GERD (gastroesophageal reflux disease)   
 ulcers  Hypertension  PUD (peptic ulcer disease) History of bleeding ulcers  Thyroid disease   
 hypo  Unspecified sleep apnea   
 does not use a CPAP Past Surgical History:  
Procedure Laterality Date  ABDOMEN SURGERY PROC UNLISTED    
 hernia [address]  HX KNEE REPLACEMENT Right  HX ORTHOPAEDIC    
 left knee arthroscopy Barriers to Learning/Limitations: yes;  physical 
Compensate with: Verbal Cues and Tactile Cues Prior Level of Function/Home Situation:  
Home Situation Home Environment: Private residence # Steps to Enter: 3 Rails to Enter: Yes Hand Rails : Bilateral 
One/Two Story Residence: One story Living Alone: No 
Support Systems: Spouse/Significant Other/Partner oxide  400 mg Oral Daily    pantoprazole sodium  40 mg Oral QAM AC    venlafaxine  150 mg Oral Daily with breakfast    sodium chloride flush  10 mL Intravenous 2 times per day    docusate sodium  100 mg Oral BID    insulin lispro  0-6 Units Subcutaneous TID WC    insulin lispro  0-3 Units Subcutaneous Nightly    guaiFENesin  600 mg Oral BID    pill splitter   Does not apply Once    lactobacillus acidophilus  1 tablet Oral TID     Continuous Infusions:   dextrose         CBC:   Recent Labs      01/28/18   0542  01/29/18   0522   WBC  9.5  9.7   HGB  10.5*  10.0*   PLT  111*  117*     CMP:    Recent Labs      01/27/18   0542  01/28/18   0542  01/29/18   0522   NA  137  137  135   K  4.6  5.5*  5.8*   CL  94*  94*  89*   CO2  26  27  24   BUN  31*  52*  76*   CREATININE  3.43*  5.19*  6.80*   GLUCOSE  131*  221*  265*   CALCIUM  8.6  8.3*  8.3*   LABGLOM  12.8*  7.9*  5.8*     Troponin: No results for input(s): TROPONINI in the last 72 hours. BNP: No results for input(s): BNP in the last 72 hours. INR: No results for input(s): INR in the last 72 hours. Lipids: No results for input(s): CHOL, LDLDIRECT, TRIG, HDL, AMYLASE, LIPASE in the last 72 hours. Liver:   Recent Labs      01/29/18 0522   AST  26   ALT  24   ALKPHOS  72   PROT  6.3*   LABALBU  3.6*   BILITOT  0.3     Iron:  No results for input(s): IRONS, FERRITIN in the last 72 hours. Invalid input(s): LABIRONS  Urinalysis: No results for input(s): UA in the last 72 hours.     Objective:   Vitals: BP (!) 185/70   Pulse 78   Temp 98.3 °F (36.8 °C) (Oral)   Resp 16   Ht 4' 11\" (1.499 m)   Wt 113 lb 8.6 oz (51.5 kg)   SpO2 97%   BMI 22.93 kg/m²    Wt Readings from Last 3 Encounters:   01/29/18 113 lb 8.6 oz (51.5 kg)   01/14/18 125 lb (56.7 kg)   01/02/18 123 lb (55.8 kg)      24HR INTAKE/OUTPUT:      Intake/Output Summary (Last 24 hours) at 01/29/18 1040  Last data filed at 01/29/18 0448   Gross per 24 hour   Intake              700 ml Patient Expects to be Discharged to[de-identified] Private residence Current DME Used/Available at Home: [address], rollingCritical Behavior: 
Neurologic State: Alert Orientation Level: Oriented X4 Psychosocial 
Purposeful Interaction: Yes Pt Identified Daily Priority: Clinical issues (comment) Caritas Process: Establish trust;Nurture loving kindnessSkin Condition/Temp: Dry;Warm 
Skin Integrity: Incision (comment); Scars (comment)(scar to right knee) Skin Integumentary Skin Color: Appropriate for ethnicity Skin Condition/Temp: Dry;Warm 
Skin Integrity: Incision (comment); Scars (comment)(scar to right knee) Turgor: Non-tenting Hair Growth: Present Varicosities: Absent Strength:   
Strength: Generally decreased, functional 
Tone & Sensation:  
Tone: Normal 
Sensation: Intact Range Of Motion: 
AROM: Generally decreased, functional 
Functional Mobility: 
Bed Mobility: 
Supine to Sit: Contact guard assistance Scooting: Contact guard assistance Transfers: 
Sit to Stand: Contact guard assistance Stand to Sit: Contact guard assistance Balance:  
Sitting: Intact Standing: Intact; With supportAmbulation/Gait Training: 
Distance (ft): 35 Feet (ft) Assistive Device: Gait belt;Walker, rolling Ambulation - Level of Assistance: Contact guard assistance Gait Description (WDL): Exceptions to [facility] Gait Abnormalities: Decreased step clearance Left Side Weight Bearing: As tolerated Base of Support: Shift to right Stance: Left decreased Speed/Faby: Slow Step Length: Left shortened;Right shortened Swing Pattern: Left asymmetrical;Right asymmetrical 
Therapeutic Exercises: Therex packet handed to pt upon assessment Pain: 
Pain Scale 1: Numeric (0 - 10) Pain Intensity 1: 6 Pain Location 1: Knee Pain Orientation 1: Left Pain Description 1: Aching;Constant Pain Intervention(s) 1: Repositioned Activity Tolerance:  
Fair Please refer to the flowsheet for vital signs taken during this treatment. After treatment: []         Patient left in no apparent distress sitting up in chair 
[x]         Patient left in no apparent distress in bed (sitting at the EOB) [x]         Call bell left within reach [x]         Nursing notified 
[x]         Caregiver present (spouse) []         Bed alarm activated COMMUNICATION/EDUCATION:  
[x]         Fall prevention education was provided and the patient/caregiver indicated understanding. [x]         Patient/family have participated as able in goal setting and plan of care. [x]         Patient/family agree to work toward stated goals and plan of care. []         Patient understands intent and goals of therapy, but is neutral about his/her participation. []         Patient is unable to participate in goal setting and plan of care. Thank you for this referral. 
Giulia Ponce, PT Time Calculation: 15 mins Mobility: [id number] Current  CI= 1-19%   Goal  CI= 1-19%. The severity rating is based on the Other Based on functional assessment

## 2018-10-22 NOTE — PROGRESS NOTES
Physical Therapy Med Surg Initial Assessment  Facility/Department: [facility]  Room: [place] [address]       NAME: Marlin Knowles  : 1935 (81 y.o.)  MRN: [id number]  CODE STATUS: Full Code    Date of Service: 10/22/2018    Patient Diagnosis(es): Anemia [D64.9]   Chief Complaint   Patient presents with    Epistaxis     Patient Active Problem List    Diagnosis Date Noted    Cardiac pacemaker 2017     Priority: High    Anemia 10/21/2018    Palliative care patient 2018    Seizure as late effect of cerebrovascular accident (CVA) ([address] 2018    Hyperglycemia due to type 2 diabetes mellitus ([address] 2018    Dysphagia as late effect of cerebrovascular disease 2018    Hypothyroid 2018    Seizure ([address] 2018    Decubitus ulcer of buttock, stage 4 ([address] 2018    Malnutrition related to chronic disease ([address] 2018    Hyperkalemia 07/15/2018    Hypoxia     Atelectasis, bilateral     CHF (congestive heart failure), NYHA class III, acute on chronic, combined ([address] 2018    Severe malnutrition ([address] 2018    Sacral decubitus ulcer, stage III ([address] 2018    Severe protein-calorie malnutrition ([address] 04/10/2018    Epistaxis 2018    Type 2 diabetes mellitus ([address] 2018    Pleural effusion, bilateral 2018    ESRD (end stage renal disease) on dialysis ([address] 2018    Resistant hypertension 2017    Anxiety 2017    Atrial fibrillation ([address] 10/27/2016    Acid reflux 2015    Idiopathic Parkinson's disease ([address] 2014    A-fib ([address] 2013    CKD (chronic kidney disease) 10/15/2012    Hypothyroidism 10/04/2011    Hypercholesteremia 10/04/2011    Prolapse of vaginal vault after hysterectomy 2008    Urgency of urination 2008    Urinary frequency 2008    Urinary hesitancy 2008    Vaginal enterocele 2008        Past Medical History:   Diagnosis Date    Abnormal Dr. Clydie Aschoff [address] N/A 11/2/2017    CATHETER INSERTION HEMODIALYSIS performed by Sana De Los Santos MD at [address] [facility]  [phone],1957    Bilateral    MN COLONOSCOPY FLX DX W/COLLJ SPEC WHEN PFRMD N/A 2/1/2018    COLONOSCOPY performed by Jocelyn Chavez MD at [facility] N/A 7/16/2018    control epistaxis performed by Tiffanie Lewis MD at [facility] EGD TRANSORAL BIOPSY SINGLE/MULTIPLE N/A 2/1/2018    EGD ESOPHAGOGASTRODUODENOSCOPY WITH BIOPSY performed by Jocelyn Chavez MD at [facility] EGD TRANSORAL BIOPSY SINGLE/MULTIPLE N/A 3/27/2018    EGD ESOPHAGOGASTRODUODENOSCOPY performed by Jocelyn Chavez MD at [address] N/A 3/30/2018    EXAM UNDER ANESTHESIA, LARYNGOSCOPY ,PHARYNGOSCOPY performed by Tiffanie Lewis MD at [facility] N/A 3/30/2018    REMOVAL PACKING POSS CONTROL EPISTAXIS, NASAL ENDOSCOPY performed by Tiffanie Lewis MD at [address] [place] [address]  3/2003       Chart Reviewed: Yes  Patient assessed for rehabilitation services?: Yes  Family / Caregiver Present: Yes (son)  General Comment  Comments: Patient up in bed with nursing, agreeable to PT eval.    Restrictions:  Restrictions/Precautions: Fall Risk, Contact Precautions     SUBJECTIVE: Subjective: \"I can't do much\"  Pre Treatment Pain Screening  Intervention List: Patient able to continue with treatment    Post Treatment Pain Screening:   Pain Screening  Patient Currently in Pain: Yes  Pain Assessment  Pain Assessment: 0-10  Pain Level: 8  Pain Location: Buttocks    Prior Level of Function:  Social/Functional History  Lives With: Son  Type of Home: House  Home Layout: Two level, Able to Live on Main level with bedroom/bathroom  Home Access: Ramped entrance  Bathroom Toilet: Bedside commode  Home Equipment: Rolling

## 2018-10-22 NOTE — PROGRESS NOTES
[facility] Occupational Therapy      Date: 10/22/2018  Patient Name: Sera Munroe        MRN: [id number]  Account: [de-identified]   : 1935  (81 y.o.)  [id number]    Pt. is of observation status. To comply with medicare and insurance regulations, to see patient we require updated orders including a valid OT treatment diagnosis. Please reorder as appropriate.      Electronically signed by PRIYA Browning on 10/22/2018 at 11:33 AM

## 2018-10-22 NOTE — H&P
[facility]. Nephrology  Admission Note           Reason for Admission:  Nose bleed  Admitting Physician:  Dr. Alexandre Siegel    Chief Complaint:  Nose bleed  History Obtained From:  patient, electronic medical record    History of Present Ilness:    81y.o. year old female admitted with nosebleed. This is been a recurring problem for herseen ENT here before and again has required transfer downtown for further management. Has been severe enough that is required intubation at times due to the large amounts of bleeding. She's been taken off blood thinners for some time. She went to a skilled facility but currently home with home health care. She is a dialysis patient of Dr. Fanny Alas is on dialysis at [facility]. Has been seen by Dr. Ashish Cronin here before. Still had some nose bleeding this am.  Getting HD now. 2k bath. No heparin with HD. No hd related complications.   UF set at 3 liters    Past Medical History:        Diagnosis Date    Abnormal presence of protein in urine 6/2004    Dr. Sheng Ann    Acute on chronic diastolic heart failure (HCC)     Anemia     Anxiety     Atherosclerotic heart disease     Atrial fibrillation (HCC)     Breath shortness     Cardiac arrhythmia     Chronic kidney disease     Chronic obstructive pulmonary disease (COPD) (HCC)     Constipation, chronic     Difficulty in walking     Diverticular disease of the colon     End stage renal disease (HCC)     Enterocolitis due to Clostridium difficile, not specified as recurrent     Epistaxis 07/17/2018    had surgery severe    [facility] ([facility]) [facility]     Fistula     left    Gastro-esophageal reflux disease without esophagitis     GERD (gastroesophageal reflux disease)     GERD, PUD, Hiatal Hernia    Granulomatous lung disease (HCC)     History of endoscopy 2-21-12    Dr. Hood Trevino    Hyperlipemia     Hyperlipidemia     Hypertension     Hypothyroidism     Kidney stones 11/2001    Dr. Sheng Ann    Long-term ENDOSCOPY performed by Tiffanie Lewis MD at [address] [facility] B THYROID SURGERY      URETHRAL STRICTURE DILATATION  3/2003       Home Medications:    No current facility-administered medications on file prior to encounter. Current Outpatient Prescriptions on File Prior to Encounter   Medication Sig Dispense Refill    levothyroxine (SYNTHROID) 25 MCG tablet Take 25 mcg by mouth Daily      insulin lispro (HUMALOG) 100 UNIT/ML injection vial Inject into the skin Sliding scale  150-200=2 units  201-250=4 units  251-300=6 units  301-350=8 units  351-400=10 units  Before meals      levothyroxine (SYNTHROID) 50 MCG tablet Take 50 mcg by mouth Daily Alternating days with 25 mg      diltiazem (CARDIZEM 12 HR) 120 MG extended release capsule Take 2 capsules by mouth 2 times daily (Patient taking differently: Take 240 mg by mouth daily )  0    gabapentin (NEURONTIN) 100 MG capsule Take 100 mg by mouth daily. Cheryle Yarsani capsicum (ZOSTRIX) 0.075 % topical cream Apply topically every 12 hours as needed Apply topically 3 times daily.       primidone (MYSOLINE) 50 MG tablet Take 50 mg by mouth nightly       venlafaxine 150 MG extended release tablet Take 150 mg by mouth daily (with breakfast)      atorvastatin (LIPITOR) 80 MG tablet Take 1 tablet by mouth nightly 30 tablet 3    losartan (COZAAR) 100 MG tablet Take 1 tablet by mouth daily 30 tablet 3    isosorbide mononitrate (IMDUR) 60 MG extended release tablet Take 1 tablet by mouth daily 30 tablet 1    carbidopa-levodopa (SINEMET)  MG per tablet Take 1 tablet by mouth daily 90 tablet 1    hydrALAZINE (APRESOLINE) 100 MG tablet Take 1 tablet by mouth 3 times daily 90 tablet 3    amiodarone (CORDARONE) 200 MG tablet Take 1 tablet by mouth daily 30 tablet 3    docusate sodium (COLACE) 100 MG capsule Take 1 capsule by mouth daily 30 capsule 1    calcium elemental (OSCAL) 500 MG TABS tablet Take 1 tablet by mouth 2 times daily (Patient taking differently: Take 500 mg by mouth daily ) 30 tablet 1    sodium chloride (NASAL MOISTURIZING SPRAY) 0.65 % nasal spray 2 sprays by Nasal route as needed for Congestion      L-Methylfolate-B6-B12 (FOLTANX) 3-35-2 MG TABS Take 1 tablet by mouth daily for health maintenance.  folic acid (FOLVITE) 1 MG tablet Take 1 mg by mouth daily      b complex-C-folic acid (NEPHROCAPS) 1 MG capsule Take 1 capsule by mouth daily      acetaminophen (TYLENOL) 325 MG tablet Take 650 mg by mouth every 6 hours as needed for Pain       calcium acetate (PHOSLO) 667 MG capsule Take 667 mg by mouth 3 times daily (with meals)      magnesium oxide (MAG-OX) 400 (241.3 Mg) MG TABS tablet Take 1 tablet by mouth daily 30 tablet 0    ipratropium-albuterol (DUONEB) 0.5-2.5 (3) MG/3ML SOLN nebulizer solution Inhale 3 mLs into the lungs every 4 hours as needed for Shortness of Breath (Patient taking differently: Inhale 3 mLs into the lungs every 6 hours as needed for Shortness of Breath ) 360 mL     pantoprazole sodium (PROTONIX) 40 MG PACK packet Take 20 mg by mouth every morning (before breakfast)       glucose blood VI test strips (TRUETEST TEST) strip As needed. 200 strip 11    aspirin 81 MG EC tablet Take 81 mg by mouth daily       fluticasone (FLONASE) 50 MCG/ACT nasal spray 2 sprays by Nasal route daily. Allergies:  Arthrotec [diclofenac-misoprostol]; Depakote [divalproex sodium]; Dilantin [phenytoin]; Klonopin [clonazepam]; and Statins    Social History:    Social History     Social History    Marital status:      Spouse name: N/A    Number of children: 2    Years of education: N/A     Occupational History    Not on file.      Social History Main Topics    Smoking status: Former Smoker     Types: Cigarettes     Quit date: 8/23/1966    Smokeless tobacco: Never Used    Alcohol use No    Drug use: No    Sexual activity: Not Currently     Other Topics Concern    Not on file     Social History Narrative    Lives at home w/son    1 dog       Family History:   Family History   Problem Relation Age of Onset    Heart Disease Father     Early Death Father 47    Diabetes Father     Heart Disease Mother     Diabetes Mother     High Blood Pressure Mother     High Blood Pressure Sister     High Cholesterol Sister        Review of Systems:   Review of Systems   Constitutional: Negative. HENT: Positive for nosebleeds. Eyes: Negative. Respiratory: Positive for shortness of breath. Cardiovascular: Negative. Gastrointestinal: Negative. Endocrine: Negative. Genitourinary: Negative. Musculoskeletal: Negative. Allergic/Immunologic: Negative. Neurological: Positive for dizziness. Hematological: Bruises/bleeds easily. Psychiatric/Behavioral: Negative. Physical exam:   Constitutional:  VITALS:  BP (!) 176/66   Pulse 79   Temp 97.6 °F (36.4 °C) (Axillary)   Resp 18   Ht 5' 1\" (1.549 m)   Wt 98 lb (44.5 kg)   LMP  (LMP Unknown)   SpO2 100%   BMI 18.52 kg/m²   Gen: thin - almost cachectic  Skin: no rash, turgor wnl  Heent:  eomi, mmm  Neck: no bruits or jvd noted, thyroid normal  Lungs:  Normal expansion. Clear to auscultation. No rales, rhonchi, or wheezing. Heart:  Heart sounds are normal.  Regular rate and rhythm without murmur, gallop or rub. Abdomen:  +bs, soft, nt, nd, no hepatosplenomegaly   Extremities: Extremities warm to touch, pink, with no edema.   Psychiatric: mood and affect appropriate; judgement and insight intact  Musculoskeletal:  Rom, muscular strength intact; digits, nails normal  Access: right tunnelled IJ permcath  Data/  CBC:   Lab Results   Component Value Date    WBC 8.2 10/21/2018    RBC 2.75 10/21/2018    RBC 3.24 12/17/2011    HGB 8.5 10/21/2018    HCT 25.9 10/21/2018    MCV 94.3 10/21/2018    MCH 31.0 10/21/2018    MCHC 32.9 10/21/2018    RDW 19.0 10/21/2018     10/21/2018     08/07/2018     BMP:    Lab Results   Component cervical spine. All CT scans at this facility use dose modulation, iterative reconstruction, and/or weight based dosing when appropriate to reduce radiation dose to as low as reasonably achievable. Assessment/  81-year-old lady with end-stage renal disease with recurrent epistaxis. Has been off all blood thinners. This is been a significant issue for her for months. He has fluid overload, hypertension, and anemia as well. Got abx in ER for possible PNA. Has decubitus ulcer that was present on admission. Was in SNF before but home with [facility] now. Palliative care has seen her before as well. On HD MWF    Plan/  1- HD today. No heparin  2- ENT consult - would like their clearance before d/c given severity of the issue in past  3- extra dose of abx although more likely fluid overload than PNA  4- PT/OT  5- Aranesp  6- d/c tomorrow if stable      Please do not hesitate to call with questions.     Brandi Knig

## 2018-10-23 NOTE — PROGRESS NOTES
flat lifting assist needed for LE's)  Scooting: Moderate assistance  Comment: pt. able to bridge and assist scooting towards HOB. Transfers  Comment: n/t pt unwilling. Neuromuscular Education  Neuromuscular Comments: unsupported/ 1 UE support. sitting EOB ~10 minutes                            ASSESSMENT:  Body structures, Functions, Activity limitations: Decreased functional mobility ; Decreased strength;Decreased endurance;Decreased coordination;Decreased safe awareness;Decreased ROM; Decreased balance;Decreased fine motor control    Assessment: Pt. able to tolerate sitting EOB majority of the time with no UE support but at times with 1 UE support. Activity Tolerance  Activity Tolerance: Patient Tolerated treatment well       Discharge Recommendations:  Continue to assess pending progress, Patient would benefit from continued therapy after discharge    Goals:  Short term goals  Short term goal 1: Patient will be SBA in HEP  Short term goal 2: Patient will complete bed mobility min A  Short term goal 3: Patient will sit<>stand mod A  Short term goal 4: Patient will transfer bed<>chair mod A  Patient Goals   Patient goals : Patient willing to participate in PT POC    PLAN:   Plan  Times per week: 3-6x/week  Current Treatment Recommendations: Strengthening, ROM, Balance Training, Functional Mobility Training, Neuromuscular Re-education, Home Exercise Program, Patient/Caregiver Education & Training, Modalities, Transfer Training, Gait Training, Safety Education & Training, Equipment Evaluation, Education, & procurement, Positioning, Endurance Training, Pain Management  Safety Devices  Type of devices:  All fall risk precautions in place, Bed alarm in place, Call light within reach, Left in bed     [facility] (6 CLICK) [address] Mobility Raw Score : 10      Therapy Time   Individual   Time In 0844   Time Out 0907   Minutes 23      Bm: 13  NM: [address] [facility], 10/23/18 at

## 2018-10-24 NOTE — TELEPHONE ENCOUNTER
Tad called to report that patient is complaining of chest pain and O2 SAT at 82%. Pls call and advice.

## 2018-10-25 NOTE — ED PROVIDER NOTES
[facility] ED  eMERGENCY dEPARTMENT eNCOUnter      Pt Name: Grace Nickerson  MRN: [id number]  [place] 1935  Date of evaluation: 10/25/2018  Provider: Robbie Marmolejo DO    CHIEF COMPLAINT       Chief Complaint   Patient presents with    Shortness of Breath     decreased saturation at home per home health nurse         HISTORY OF PRESENT ILLNESS   (Location/Symptom, Timing/Onset,Context/Setting, Quality, Duration, Modifying Factors, Severity)  Note limiting factors. Grace Nickerson is a 81 y.o. female who presents to the emergency department For the evaluation of feeling short of breath. She states she thinks it started yesterday. She is not ambulatory. So this occurs at rest.  She says she feels like it's hard to take a deep breath in. No cough. No fever. She was admitted 4 days ago for blood loss anemia. The patient has no history of blood clots. She denies pain in her legs or swelling in her legs. Patient denies chest pain. States she was dialyzed yesterday with a full run. Nursing Notes were reviewed. REVIEW OF SYSTEMS    (2-9systems for level 4, 10 or more for level 5)     Review of Systems   Constitutional: Negative for fever. Respiratory: Positive for shortness of breath. Negative for cough. Cardiovascular: Negative for leg swelling. Gastrointestinal: Negative for vomiting. Genitourinary: Negative for hematuria. Musculoskeletal: Negative for joint swelling. Skin: Negative for rash. Neurological: Negative for weakness. All other systems reviewed and are negative. Except asnoted above the remainder of the review of systems was reviewed and negative.        PAST MEDICAL HISTORY     Past Medical History:   Diagnosis Date    Abnormal presence of protein in urine 6/2004    Dr. Miri Samano Acute on chronic diastolic heart failure (HCC)     Anemia     Anxiety     Atherosclerotic heart disease     Atrial fibrillation (HCC)     Breath shortness     has no rales. She exhibits no tenderness. Abdominal: Soft. She exhibits no distension and no mass. There is no tenderness. There is no rebound and no guarding. Musculoskeletal: Normal range of motion. She exhibits no edema, tenderness or deformity. No calf tenderness bilaterally   Neurological: She is alert. Answering questions appropriately. No gaze deficit. No gait testing. Moving all extremities. Skin: Skin is warm and dry. Some ecchymoses on the arms   Psychiatric: She has a normal mood and affect. Judgment normal.   Nursing note and vitals reviewed. EMERGENCY DEPARTMENT COURSE and DIFFERENTIAL DIAGNOSIS/MDM:   Vitals:    Vitals:    10/25/18 1148 10/25/18 1152 10/25/18 1259 10/25/18 1409   BP: (!) 158/62  (!) 151/60 (!) 153/75   Pulse: 72   75   Resp: 18   18   Temp:  98.4 °F (36.9 °C)     TempSrc:  Oral     SpO2: 95%  100% 100%   Weight: 104 lb (47.2 kg)      Height: 5' 1\" (1.549 m)          Patient resents to the emergency department with the complaint described above. Vital signs were grossly normal except she was hypoxic when I evaluated her. She was 81% on room air with a good plethora. No adventitious breath sounds. She is breathing normally and comfortably. Certainly anemia could be contributing to this, I have also consider the diagnosis of pulmonary embolism. Going to start with EKG, basic labs, chest x-ray and a d-dimer and I will reevaluate. She is placed on 2 L nasal cannula initially, we will titrate up for SPO2 greater than 92%.       DIAGNOSTIC RESULTS     LABS:  Labs Reviewed   CBC - Abnormal; Notable for the following:        Result Value    RBC 2.66 (*)     Hemoglobin 8.6 (*)     Hematocrit 25.1 (*)     MCH 32.3 (*)     RDW 20.9 (*)     All other components within normal limits   BASIC METABOLIC PANEL - Abnormal; Notable for the following:     Potassium 3.4 (*)     Chloride 95 (*)     CO2 31 (*)     Glucose 151 (*)     CREATININE 1.87 (*)     GFR Non- 25.8 (*)     GFR  31.2 (*)     All other components within normal limits   D-DIMER, QUANTITATIVE - Abnormal; Notable for the following:     D-Dimer, Quant 2.89 (*)     All other components within normal limits    Narrative:     Nomi Sandoval  [facility] tel. [phone],  D-Dimer results called to and read back by Yessi Broussard, 10/25/2018  13:16, by Sj Jansen    Narrative:     Chantelle Clark tel. [phone],  D-Dimer results called to and read back by Yessi Broussard, 10/25/2018  13:16, by Meghan Khan   APTT    Narrative:     Chantelle Clark tel. [phone],  D-Dimer results called to and read back by Yessi Broussard, 10/25/2018  13:16, by Meghan Khan       All other labs were within normal range or not returned as of this dictation. XR CHEST PORTABLE   Final Result   BILATERAL PLEURAL EFFUSIONS WITH BILATERAL EDEMA VERSUS ATELECTASIS/PNEUMONIA. FINDINGS ESSENTIALLY UNCHANGED. CTA Chest W WO  (PE study)    (Results Pending)   NM LUNG VENT/PERFUSION (VQ)    (Results Pending)         ED Course as of Oct 25 1433   Thu Oct 25, 2018   1201 Twelve lead EKG interpreted by emergency department physician who either signs or cosigns this chart in the absence of a cardiologist:  A 12 lead EKG done at 1150, interpreted by myself, showed a regular rhythm at a rate of 73bpm.  The SD interval was prolonged. The QRS complex was normal.  There was no ST segment elevation or depression, T wave inversion not present, but nonspecific changes are noted. QRS progression through precordial leads was grossly normal.  Interpretation: Sinus rhythm, no pattern consistent with ischemia or infarct. First-degree AV block noted. [TS]      ED Course User Index  [TS] Deborah Mcadams, DO     Patient found to have an elevated d-dimer. I did talk to her nephrologist about doing a CT for PE, however, the patient does not have any access for this.   She has a previously infiltrated line in her right arm so we cannot go in

## 2018-10-25 NOTE — H&P
redness  Respiratory: shortness of breath, cough, hemoptysis    Cardiovascular: chest pain, palpitations, orthopnea, leg swelling   Gastrointestinal: abdominal pain, nausea, vomiting, diarrhea, constipation   Endocrine: polydipsia, polyphagia, cold intolerance, heat intolerance  Genitourinary: dysuria, flank pain, frequency, urgency   Hematologic: easy bruising, easy bleeding  Musculoskeletal: back pain, neck pain, myalgias, arthalgias  Neurological: syncope, lightheadedness, dizziness, seizures, tremors, weakness  Psychiatric/Behavioral: anxiety, depression, hallucinations, confusion  Skin: rash, itching    PHYSICAL EXAM:  Vitals:  BP (!) 153/75   Pulse 75   Temp 98.4 °F (36.9 °C) (Oral)   Resp 18   Ht 5' 1\" (1.549 m)   Wt 104 lb (47.2 kg)   LMP  (LMP Unknown)   SpO2 100%   BMI 19.65 kg/m²     General: alert, in no apparent distress. cachectic  HEENT: normocephalic, atraumatic, anicteric  Neck: supple, no mass  Lungs: non-labored respirations, clear to auscultation bilaterally. Has permcath in chest  Heart: regular rate and rhythm, no murmurs or rubs  Abdomen: soft, non-tender, non-distended  MSK: no joint swelling or tenderness  Ext: no cyanosis, no peripheral edema  Neuro: alert and oriented, no gross abnormalities  Psych: normal mood and affect  Skin: no rash. Has bruising.       DATA:  CBC with Differential:    Lab Results   Component Value Date    WBC 8.4 10/25/2018    RBC 2.66 10/25/2018    RBC 3.24 12/17/2011    HGB 8.6 10/25/2018    HCT 25.1 10/25/2018     10/25/2018    MCV 94.7 10/25/2018    MCH 32.3 10/25/2018    MCHC 34.1 10/25/2018    RDW 20.9 10/25/2018    BANDSPCT 1 04/07/2018    METASPCT 1 03/23/2018    LYMPHOPCT 7.0 10/23/2018    MONOPCT 14.2 10/23/2018    MYELOPCT 1 03/14/2018    EOSPCT 1.1 08/07/2018    BASOPCT 0.3 10/23/2018    MONOSABS 1.2 10/23/2018    LYMPHSABS 0.6 10/23/2018    EOSABS 0.0 10/23/2018    BASOSABS 0.0 10/23/2018     CMP:    Lab Results   Component Value Date

## 2018-10-26 NOTE — DISCHARGE INSTR - COC
% (l*w) 33.33 10/25/2018  5:23 PM   Wound Assessment Painful;Pink; White;Drainage 10/25/2018  5:23 PM   Drainage Amount Small 10/25/2018  5:23 PM   Drainage Description Yellow 10/25/2018  5:23 PM   Odor None 10/25/2018  5:23 PM   Janneth-wound Assessment Dry;Clean 10/25/2018  5:23 PM   Number of days: 130        Elimination:  Continence:   · Bowel: {YES / BS:22707}  · Bladder: {YES / SA:16299}  Urinary Catheter: {Urinary Catheter:921833412}   Colostomy/Ileostomy/Ileal Conduit: {YES / OP:47440}       Date of Last BM: ***    Intake/Output Summary (Last 24 hours) at 10/26/18 1159  Last data filed at 10/26/18 0754   Gross per 24 hour   Intake              280 ml   Output                0 ml   Net              280 ml     I/O last 3 completed shifts: In: 230 [P.O.:220;  I.V.:10]  Out: -     Safety Concerns:     [employer] Safety Concerns:177955851}    Impairments/Disabilities:      [employer] Impairments/Disabilities:757167395}    Nutrition Therapy:  Current Nutrition Therapy:   [employer] Diet List:405993762}    Routes of Feeding: {[facility] DME Other Feedings:346886821}  Liquids: {Slp liquid thickness:57155}  Daily Fluid Restriction: {CHP DME Yes amt example:457500192}  Last Modified Barium Swallow with Video (Video Swallowing Test): {Done Not Done WWO}    Treatments at the Time of Hospital Discharge:   Respiratory Treatments: ***  Oxygen Therapy:  {Therapy; copd oxygen:35130}  Ventilator:    { CC Vent PXDU:518435423}    Rehab Therapies: {THERAPEUTIC INTERVENTION:9809590448}  Weight Bearing Status/Restrictions: [employer] Weight Bearin}  Other Medical Equipment (for information only, NOT a DME order):  {EQUIPMENT:400883194}  Other Treatments: ***    Patient's personal belongings (please select all that are sent with patient):  {P DME Belongings:938888608}    RN SIGNATURE:  {Esignature:328030089}    CASE MANAGEMENT/SOCIAL WORK SECTION    Inpatient Status Date: ***    Readmission Risk Assessment Score:  Readmission Risk              Risk of Unplanned Readmission:        65           Discharging to Facility/ Agency   · Name: [facility]  · Address:  · Phone:[phone]  · Fax:    Dialysis Facility (if applicable)   · Name:  · Address:  · Dialysis Schedule:  · Phone:  · Fax:    / signature: Electronically signed by ALIDA Wilburn on 10/26/18 at 12:00 PM    PHYSICIAN SECTION    Prognosis: {Prognosis:4899919002}    Condition at Discharge: [address] [place] [address] Patient Condition:239740695}    Rehab Potential (if transferring to Rehab): {Prognosis:0671238662}    Recommended Labs or Other Treatments After Discharge: ***    Physician Certification: I certify the above information and transfer of Beka Graff  is necessary for the continuing treatment of the diagnosis listed and that she requires {Admit to Appropriate Level of Care:76428} for {GREATER/LESS:856709118} 30 days.      Update Admission H&P: {CHP DME Changes in [employer]:546673695}    PHYSICIAN SIGNATURE:  {Esignature:124494352}

## 2018-10-27 NOTE — PROGRESS NOTES
Physical Therapy   Facility/[facility] MED SURG J660/X110-01    NAME: Yessenia Olivares    : 1935 (81 y.o.)  MRN: [id number]    Account: [de-identified]  Gender: female    PT evaluation and treatment orders received. Chart reviewed. PT eval attempted. Hold PT eval: Patient with bed rest ordered. Will need updated activity orders when indicated by physicians to allow for PT assessment. Will attempt PT evaluation again at earliest convenience.       Electronically signed by Jeanette Grier PT on 10/27/18 at 9:09 AM
Pt pm assessment complete. Pt is confused. Pt being turned every 2 hours. Pt is bed rest. Pt is incontinent of urine and stool. Pt sleeping at this time. Pt did take night time pills with applesauce. Will continue to monitor pt.   Electronically signed by Cherrie Chavez RN on 10/26/2018 at 11:14 PM
Units Subcutaneous Nightly Danya Palacio MD   1 Units at 10/25/18 2242    heparin (porcine) injection 1,000 Units  1,000 Units Intercatheter PRN Danya Palacio MD        albumin human 25 % solution 25 g  25 g Intravenous Daily PRN Danya Palacio MD        acetaminophen (TYLENOL) tablet 650 mg  650 mg Oral Q4H PRN Danya Palacio MD        sodium chloride flush 0.9 % injection 10 mL  10 mL Intravenous PRN Robbie Horse, DO   10 mL at 10/25/18 1802     Allergies   Allergen Reactions    Arthrotec [Diclofenac-Misoprostol] Nausea Only    Depakote [Divalproex Sodium] Itching    Dilantin [Phenytoin]     Klonopin [Clonazepam]     Statins Other (See Comments) and Rash     achy  achy     Active Problems:    Hypoxia  Resolved Problems:    * No resolved hospital problems. *    Blood pressure (!) 193/92, pulse 85, temperature 97.4 °F (36.3 °C), resp. rate 19, height 5' 1\" (1.549 m), weight 141 lb 12.1 oz (64.3 kg), SpO2 100 %, not currently breastfeeding. Subjective:  Symptoms:  She reports shortness of breath. No chest pain or chest pressure. Diet:  Poor intake. Activity level: Impaired due to weakness. Objective:  General Appearance:  Uncomfortable and in no acute distress. Vital signs: (most recent): Blood pressure (!) 193/92, pulse 85, temperature 97.4 °F (36.3 °C), resp. rate 19, height 5' 1\" (1.549 m), weight 141 lb 12.1 oz (64.3 kg), SpO2 100 %, not currently breastfeeding. (Elevated BP). Output: Minimal urine output. Lungs:  Normal effort and normal respiratory rate. She is not in respiratory distress. No decreased breath sounds. Heart: Normal rate. Regular rhythm. Positive for murmur. Abdomen: There is no abdominal tenderness. Extremities: There is no dependent edema. Pulses: There are decreased pulses.       Assessment & Plan  ESRDx                                    Hypertension                                    COPD                                    Cachexia
dependent edema. Pulses: There are decreased pulses.       Assessment & Plan  ESRDx                                    Confusion resolved                                    DM type-2                                    Hypertension    Plan ok to discharge/ meds reconcilied medications  Discussed with son NH mick Waldron,   10/27/2018

## 2018-10-27 NOTE — DISCHARGE SUMMARY
Rx suspected UTI with Cipro 6 days        Assessment:  Active Hospital Problems    Diagnosis Date Noted    Hypoxia [R09.02]          Plan:   Medications:  Discharge Medication List as of 10/27/2018  1:02 PM      START taking these medications    Details   minoxidil (LONITEN) 2.5 MG tablet Take 1 tablet by mouth daily, Disp-30 tablet, R-3NO PRINT      ciprofloxacin (CIPRO) 500 MG tablet Take 0.5 tablets by mouth 2 times daily for 6 days, Disp-12 tablet, R-0Normal         CONTINUE these medications which have NOT CHANGED    Details   HYDROcodone-acetaminophen (NORCO) 5-325 MG per tablet Take 1 tablet by mouth every 6 hours as needed for Pain (SOB). .Historical Med      cloNIDine (CATAPRES) 0.1 MG tablet Take 0.1 mg by mouth 2 times dailyHistorical Med      !! levothyroxine (SYNTHROID) 25 MCG tablet TAKE 1 TABLET DAILY ALTERNATING WITH 50 MCG DOSE EVERY OTHER DAY, Disp-50 tablet, R-0Normal      metoprolol tartrate (LOPRESSOR) 25 MG tablet Take 25 mg by mouth 2 times dailyHistorical Med      insulin lispro (HUMALOG) 100 UNIT/ML injection vial Inject into the skin Sliding scale  150-200=2 units  201-250=4 units  251-300=6 units  301-350=8 units  351-400=10 units  Before mealsHistorical Med      !! levothyroxine (SYNTHROID) 50 MCG tablet Take 50 mcg by mouth Daily Alternating days with 25 mgHistorical Med      diltiazem (CARDIZEM 12 HR) 120 MG extended release capsule Take 2 capsules by mouth 2 times daily, R-0DC to SNF      primidone (MYSOLINE) 50 MG tablet Take 50 mg by mouth nightly Historical Med      venlafaxine 150 MG extended release tablet Take 150 mg by mouth daily (with breakfast)Historical Med      atorvastatin (LIPITOR) 80 MG tablet Take 1 tablet by mouth nightly, Disp-30 tablet, R-3Normal      losartan (COZAAR) 100 MG tablet Take 1 tablet by mouth daily, Disp-30 tablet, R-3Normal      isosorbide mononitrate (IMDUR) 60 MG extended release tablet Take 1 tablet by mouth daily, Disp-30 tablet, R-1Normal cream Comments:   Reason for Stopping:         sodium chloride (NASAL MOISTURIZING SPRAY) 0.65 % nasal spray Comments:   Reason for Stopping:         fluticasone (FLONASE) 50 MCG/ACT nasal spray Comments:   Reason for Stopping:              Follow-up visits: Miles Quiroga, [address] [place] [address]  [phone]    In 5 days      [address]  [address] [place] [address] [address] [phone]            Electronically signed by Krys Gómez DO on 10/27/2018 at 2:30 PM

## 2018-10-31 NOTE — TELEPHONE ENCOUNTER
Writer called patient to inform of ED follow-up appointment. Family member reports that patient will not be able to make appointment scheduled for 11/2/2018 at 1:40pm d/t a dialysis appointment at 2:00pm. Writer instructed family to call Dr. Emigdio Gauthier office to reschedule appointment.

## 2018-11-01 PROBLEM — Z99.2 VASCULAR DIALYSIS CATHETER IN PLACE (HCC): Status: ACTIVE | Noted: 2018-01-01

## 2018-11-01 PROBLEM — Z99.2: Status: ACTIVE | Noted: 2018-01-01

## 2018-11-01 NOTE — PROGRESS NOTES
vial Inject into the skin Sliding scale  150-200=2 units  201-250=4 units  251-300=6 units  301-350=8 units  351-400=10 units  Before meals      levothyroxine (SYNTHROID) 50 MCG tablet Take 50 mcg by mouth Daily Alternating days with 25 mg      diltiazem (CARDIZEM 12 HR) 120 MG extended release capsule Take 2 capsules by mouth 2 times daily (Patient taking differently: Take 240 mg by mouth daily )  0    primidone (MYSOLINE) 50 MG tablet Take 50 mg by mouth nightly       venlafaxine 150 MG extended release tablet Take 150 mg by mouth daily (with breakfast)      atorvastatin (LIPITOR) 80 MG tablet Take 1 tablet by mouth nightly 30 tablet 3    losartan (COZAAR) 100 MG tablet Take 1 tablet by mouth daily 30 tablet 3    isosorbide mononitrate (IMDUR) 60 MG extended release tablet Take 1 tablet by mouth daily 30 tablet 1    carbidopa-levodopa (SINEMET)  MG per tablet Take 1 tablet by mouth daily 90 tablet 1    hydrALAZINE (APRESOLINE) 100 MG tablet Take 1 tablet by mouth 3 times daily 90 tablet 3    amiodarone (CORDARONE) 200 MG tablet Take 1 tablet by mouth daily 30 tablet 3    docusate sodium (COLACE) 100 MG capsule Take 1 capsule by mouth daily 30 capsule 1    calcium elemental (OSCAL) 500 MG TABS tablet Take 1 tablet by mouth 2 times daily (Patient taking differently: Take 500 mg by mouth daily ) 30 tablet 1    L-Methylfolate-B6-B12 (FOLTANX) 3-35-2 MG TABS Take 1 tablet by mouth daily for health maintenance.       folic acid (FOLVITE) 1 MG tablet Take 1 mg by mouth daily      b complex-C-folic acid (NEPHROCAPS) 1 MG capsule Take 1 capsule by mouth daily      acetaminophen (TYLENOL) 325 MG tablet Take 650 mg by mouth every 6 hours as needed for Pain       calcium acetate (PHOSLO) 667 MG capsule Take 667 mg by mouth 3 times daily (with meals)      magnesium oxide (MAG-OX) 400 (241.3 Mg) MG TABS tablet Take 1 tablet by mouth daily 30 tablet 0    ipratropium-albuterol (DUONEB) 0.5-2.5 (3) MG/3ML SOLN nebulizer solution Inhale 3 mLs into the lungs every 4 hours as needed for Shortness of Breath (Patient taking differently: Inhale 3 mLs into the lungs every 6 hours as needed for Shortness of Breath ) 360 mL     pantoprazole sodium (PROTONIX) 40 MG PACK packet Take 20 mg by mouth every morning (before breakfast)       glucose blood VI test strips (TRUETEST TEST) strip As needed. 200 strip 11    aspirin 81 MG EC tablet Take 81 mg by mouth daily        No current facility-administered medications on file prior to visit. Review of Systems   Constitutional: Positive for diaphoresis and fatigue. Negative for chills and fever. HENT: Negative. Negative for congestion, ear pain, hearing loss and tinnitus. Eyes: Negative. Negative for photophobia. Respiratory: Negative. Negative for cough, shortness of breath and wheezing. Cardiovascular: Negative. Negative for chest pain, palpitations and leg swelling. Gastrointestinal: Negative. Negative for abdominal pain, constipation, diarrhea, nausea and vomiting. Genitourinary: Negative. Negative for flank pain. Musculoskeletal: Positive for arthralgias. Negative for back pain and neck pain. Skin: Negative. Negative for rash. Allergic/Immunologic: Negative for environmental allergies. Neurological: Positive for dizziness. Negative for tremors, weakness and headaches. Hematological: Bruises/bleeds easily. Psychiatric/Behavioral: Negative. Negative for hallucinations and suicidal ideas. The patient is not nervous/anxious. OBJECTIVE:  /60   Pulse 66   Resp 14   LMP  (LMP Unknown)   SpO2 100%     Physical Exam   Constitutional: She is oriented to person, place, and time. She appears cachectic. She appears ill. No distress. HENT:   Head: Normocephalic and atraumatic. Right Ear: External ear normal.   Left Ear: External ear normal.   Mouth/Throat: Oropharynx is clear and moist. No oropharyngeal exudate.    Eyes:

## 2018-11-11 PROBLEM — M54.9 BACK PAIN: Status: ACTIVE | Noted: 2018-01-01

## 2018-11-12 PROBLEM — J81.1 PULMONARY EDEMA, NONCARDIAC: Status: ACTIVE | Noted: 2018-01-01

## 2018-11-15 NOTE — ANESTHESIA PRE PROCEDURE
Department of Anesthesiology  Preprocedure Note       Name:  Kana Hylton   Age:  81 y.o.  :  1935                                          MRN:  [id number]         Date:  11/15/2018      Surgeon: Carina Velasquez):  Ken Diaz MD    Procedure:     L4-5 DECOMPRESSION (N/A )    Medications prior to admission:   Prior to Admission medications    Medication Sig Start Date End Date Taking? Authorizing Provider   HYDROcodone-acetaminophen (NORCO) 5-325 MG per tablet Take 1 tablet by mouth every 6 hours as needed for Pain (SOB) for up to 30 days. . 18  Pranay Post, APRN - CNP   minoxidil (LONITEN) 2.5 MG tablet Take 1 tablet by mouth daily 10/27/18 10/27/19  Jurgen Meng DO   cloNIDine (CATAPRES) 0.1 MG tablet Take 0.1 mg by mouth 2 times daily    Historical Provider, MD   levothyroxine (SYNTHROID) 25 MCG tablet TAKE 1 TABLET DAILY ALTERNATING WITH 50 MCG DOSE EVERY OTHER DAY 10/22/18   Viviana Henderson MD   metoprolol tartrate (LOPRESSOR) 25 MG tablet Take 25 mg by mouth 2 times daily    Historical Provider, MD   insulin lispro (HUMALOG) 100 UNIT/ML injection vial Inject into the skin Sliding scale  150-200=2 units  201-250=4 units  251-300=6 units  301-350=8 units  351-400=10 units  Before meals    Historical Provider, MD   levothyroxine (SYNTHROID) 50 MCG tablet Take 50 mcg by mouth Daily Alternating days with 25 mg    Historical Provider, MD   diltiazem (CARDIZEM 12 HR) 120 MG extended release capsule Take 2 capsules by mouth 2 times daily  Patient taking differently: Take 240 mg by mouth daily  18   Cara Rosas MD   primidone (MYSOLINE) 50 MG tablet Take 50 mg by mouth nightly     Historical Provider, MD   venlafaxine 150 MG extended release tablet Take 150 mg by mouth daily (with breakfast)    Historical Provider, MD   atorvastatin (LIPITOR) 80 MG tablet Take 1 tablet by mouth nightly 18   Kasey Mattson MD   losartan (COZAAR) 100 MG tablet Take 1 tablet by mouth daily (antinuclear antibody)     1:80    Presence of cardiac pacemaker     Seizures ([facility]     Tachycardia     Tachypnea, not elsewhere classified     Thrombophlebitis leg superficial     Type 2 diabetes mellitus with hypoglycemia without coma ([facility]     Type II or unspecified type diabetes mellitus without mention of complication, not stated as uncontrolled 1998    Unspecified osteoarthritis, unspecified site     Urinary tract infection     Weakness        Past Surgical History:        Procedure Laterality Date    APPENDECTOMY  2007    BLADDER SUSPENSION  2008 & 2009    [facility], second at [address]  2/2006    COLONOSCOPY  4/2007    Dr. Ele Jimenez    [address] N/A 11/2/2017    CATHETER INSERTION HEMODIALYSIS performed by Alesia Larsen MD at [address] [place] [address] Rd  [phone]    Bilateral    [place] COLONOSCOPY FLX DX W/COLLJ SPEC WHEN PFRMD N/A 2/1/2018    COLONOSCOPY performed by Charito Maloney MD at [address] [facility] N/A 7/16/2018    control epistaxis performed by Juliann Meier MD at [address] [facility] EGD TRANSORAL BIOPSY SINGLE/MULTIPLE N/A 2/1/2018    EGD ESOPHAGOGASTRODUODENOSCOPY WITH BIOPSY performed by Charito Maloney MD at [address] [facility] EGD TRANSORAL BIOPSY SINGLE/MULTIPLE N/A 3/27/2018    EGD ESOPHAGOGASTRODUODENOSCOPY performed by Charito Maloney MD at [address] N/A 3/30/2018    EXAM UNDER ANESTHESIA, LARYNGOSCOPY ,PHARYNGOSCOPY performed by Juliann Meier MD at [facility] N/A 3/30/2018    REMOVAL PACKING POSS CONTROL EPISTAXIS, NASAL ENDOSCOPY performed by Juliann Meier MD at [address] [place] [address]  3/2003       Social History:    Social History   Substance Use Topics    Smoking status: Former Smoker     Types: Cigarettes     Quit date: 8/23/1966    Smokeless

## 2018-12-03 NOTE — PROGRESS NOTES
Subjective:      Patient Id:  Navin Lopez is a 81 y.o. female who presents today with   Chief Complaint   Patient presents with    Pain    Constipation          HPI Seen at home for symptom management follow up RT advanced Parkinson's, seen in the home setting due to disease related symptoms and debility create heavy physical and financial burden to get to a clinic  Demeanor calm, but she is not able to respond to questions and is talking randomly. This state has been her norm for some time per her son. He worries she is in pain as she complains about pain in the area of her low back and  sacral area and has been unable to sit for some time. Poor Appetite, cachetic appearance. Positive constipation with routine colace use. Negative SOB or edema. Negative fever, chills, myalgias, increased sputum production or cough, nausea, vomiting, chest pain, or orthopnea. Negative significant Sleep disturbance, depression, anxiety, or agitation episodes.      Past Medical History:   Diagnosis Date    Abnormal presence of protein in urine 6/2004    Dr. Chinyere Mota Acute on chronic diastolic heart failure (HCC)     Anemia     Anxiety     Atherosclerotic heart disease     Atrial fibrillation (HCC)     Breath shortness     Cardiac arrhythmia     Chronic kidney disease     Chronic obstructive pulmonary disease (COPD) (HCC)     Constipation, chronic     Difficulty in walking     Diverticular disease of the colon     End stage renal disease ([facility]     Enterocolitis due to Clostridium difficile, not specified as recurrent     Epistaxis 07/17/2018    had surgery severe    ESRD (end stage renal disease) ([address]     Fistula     left    Gastro-esophageal reflux disease without esophagitis     GERD (gastroesophageal reflux disease)     GERD, PUD, Hiatal Hernia    Granulomatous lung disease (HCC)     History of endoscopy 2-21-12    Dr. Javon Jalloh    Hyperlipemia     Hyperlipidemia     Hypertension     Hypothyroidism  Kidney stones 11/2001    Dr. Myra Escamilla    Long-term (current) use of anticoagulants     Muscle weakness (generalized)     Neuropathy in diabetes ([facility]     legs    Nosebleed 07/17/2018    Osteoarthritis     Parkinsons disease ([facility]     Peptic ulcer, site unspecified, unspecified as acute or chronic, without hemorrhage or perforation     Positive ORTEGA (antinuclear antibody)     1:80    Presence of cardiac pacemaker     Seizures ([facility] [address] [facility]     Tachycardia     Tachypnea, not elsewhere classified     Thrombophlebitis leg superficial     Type 2 diabetes mellitus with hypoglycemia without coma ([facility] [address] [facility]     Type II or unspecified type diabetes mellitus without mention of complication, not stated as uncontrolled 1998    Unspecified osteoarthritis, unspecified site     Urinary tract infection     Weakness      Past Surgical History:   Procedure Laterality Date    APPENDECTOMY  2007    BLADDER SUSPENSION  2008 & 2009    [facility], second at [address]  2/2006    COLONOSCOPY  4/2007    Dr. Benjamin Lewis    [address] N/A 11/2/2017    CATHETER INSERTION HEMODIALYSIS performed by Elif Rider MD at [address] [place] [address] Rd  [phone]    Bilateral    LAMINEC/FACETECT/FORAMIN,LUMBAR 1 SEG N/A 11/15/2018    L4-5 DECOMPRESSION performed by Claus Liu MD at [address] [facility] FLX DX W/COLLJ [address] PFRMD N/A 2/1/2018    COLONOSCOPY performed by Yoly Devi MD at [address] N/A 7/16/2018    control epistaxis performed by Paty Hyde MD at [address] [facility] EGD TRANSORAL BIOPSY SINGLE/MULTIPLE N/A 2/1/2018    EGD ESOPHAGOGASTRODUODENOSCOPY WITH BIOPSY performed by Yoly Devi MD at [address] [facility] EGD TRANSORAL BIOPSY SINGLE/MULTIPLE N/A 3/27/2018    EGD ESOPHAGOGASTRODUODENOSCOPY performed by Yoly Devi MD at [address] N/A 3/30/2018    EXAM UNDER ANESTHESIA, LARYNGOSCOPY ,PHARYNGOSCOPY performed by uHber Amaya MD at [place] [address] N/A 3/30/2018    REMOVAL PACKING POSS CONTROL EPISTAXIS, NASAL ENDOSCOPY performed by Huber Amaya MD at [address] [place] [address]  3/2003     Social History     Social History    Marital status:      Spouse name: N/A    Number of children: 2    Years of education: N/A     Occupational History    Not on file. Social History Main Topics    Smoking status: Former Smoker     Types: Cigarettes     Quit date: 8/23/1966    Smokeless tobacco: Never Used    Alcohol use No    Drug use: No    Sexual activity: Not Currently     Other Topics Concern    Not on file     Social History Narrative    Lives at home w/son    1 dog     Family History   Problem Relation Age of Onset    Heart Disease Father     Early Death Father 47    Diabetes Father     Heart Disease Mother     Diabetes Mother     High Blood Pressure Mother     High Blood Pressure Sister     High Cholesterol Sister      Allergies   Allergen Reactions    Arthrotec [Diclofenac-Misoprostol] Nausea Only    Depakote [Divalproex Sodium] Itching    Dilantin [Phenytoin]     Klonopin [Clonazepam]     Statins Other (See Comments) and Rash     achy  achy     Current Outpatient Prescriptions on File Prior to Visit   Medication Sig Dispense Refill    HYDROcodone-acetaminophen (NORCO) 5-325 MG per tablet Take 1 tablet by mouth every 8 hours as needed for Pain (SOB) for up to 7 days. . 10 tablet 0    cloNIDine (CATAPRES) 0.1 MG tablet Take 0.1 mg by mouth 2 times daily      levothyroxine (SYNTHROID) 25 MCG tablet TAKE 1 TABLET DAILY ALTERNATING WITH 50 MCG DOSE EVERY OTHER DAY 50 tablet 0    metoprolol tartrate (LOPRESSOR) 25 MG tablet Take 25 mg by mouth 2 times daily      insulin lispro (HUMALOG) 100 UNIT/ML injection vial Inject into the skin Sliding scale  150-200=2  pantoprazole sodium (PROTONIX) 40 MG PACK packet Take 20 mg by mouth every morning (before breakfast)       glucose blood VI test strips (TRUETEST TEST) strip As needed. 200 strip 11    aspirin 81 MG EC tablet Take 81 mg by mouth daily        No current facility-administered medications on file prior to visit. Review of Systems   Constitutional: Positive for fatigue. Negative for activity change, appetite change, chills, diaphoresis, fever and unexpected weight change. HENT: Negative for drooling, hearing loss, mouth sores, sore throat, trouble swallowing and voice change. Eyes: Negative for discharge and visual disturbance. Respiratory: Negative for apnea, cough, choking, chest tightness, shortness of breath, wheezing and stridor. Cardiovascular: Negative for chest pain, palpitations and leg swelling. Gastrointestinal: Positive for constipation. Negative for abdominal distention, abdominal pain, anal bleeding, blood in stool, diarrhea, nausea, rectal pain and vomiting. Genitourinary: Negative for difficulty urinating, dysuria, enuresis, frequency and hematuria. Musculoskeletal: Negative for arthralgias, back pain, gait problem, joint swelling and myalgias. Skin: Negative for color change, pallor, rash and wound. Allergic/Immunologic: Negative for food allergies and immunocompromised state. Neurological: Negative for dizziness, tremors, seizures, syncope, facial asymmetry, speech difficulty, weakness, light-headedness, numbness and headaches. Hematological: Negative for adenopathy. Does not bruise/bleed easily. Psychiatric/Behavioral: Positive for confusion and hallucinations. Negative for agitation, behavioral problems, decreased concentration, dysphoric mood, self-injury, sleep disturbance and suicidal ideas. The patient is not nervous/anxious and is not hyperactive.           Objective:   BP (!) 98/52   Pulse 60   Resp 16   LMP  (LMP Unknown)   SpO2 95%     Physical

## 2018-12-17 PROBLEM — E11.21 DIABETIC NEPHROPATHY (HCC): Status: ACTIVE | Noted: 2018-01-01

## 2018-12-17 PROBLEM — J69.0 ASPIRATION PNEUMONIA (HCC): Status: ACTIVE | Noted: 2018-01-01

## 2018-12-17 PROBLEM — Z66 DNR (DO NOT RESUSCITATE): Chronic | Status: ACTIVE | Noted: 2018-01-01

## 2018-12-17 PROBLEM — I21.4 NSTEMI (NON-ST ELEVATED MYOCARDIAL INFARCTION) (HCC): Status: ACTIVE | Noted: 2018-01-01

## 2018-12-17 NOTE — ED PROVIDER NOTES
[facility] ED  eMERGENCY dEPARTMENT eNCOUnter      Pt Name: Eva Gupta  MRN: [id number]  [place] 1935  Date of evaluation: 12/17/2018  Provider: Marivel Dickerson DO    CHIEF COMPLAINT       Chief Complaint   Patient presents with    Fatigue     Family called due to patient being lethargic and oxygen sats in the 80s          HISTORY OF PRESENT ILLNESS   (Location/Symptom, Timing/Onset,Context/Setting, Quality, Duration, Modifying Factors, Severity)  Note limiting factors. Eva Gupta is a 81 y.o. female who presents to the emergency department with complaint lethergy and weakness. Patient found by her son this way. EMS reports patient confused and weak. Vomit is on the pateint's clothing. Patient AOx 2. Patient systoic was 60% and SPO2 was in 60's when EMS had arrived. AFter treatements and IVF's patient's vitals improved. HPI    NursingNotes were reviewed. REVIEW OF SYSTEMS    (2-9 systems for level 4, 10 or more for level 5)     Review of Systems   Unable to perform ROS: Mental status change   Constitutional: Positive for activity change, appetite change and fatigue. Respiratory: Positive for cough. Gastrointestinal: Positive for nausea and vomiting. Psychiatric/Behavioral: Positive for confusion. Except as noted above the remainder of the review of systems was reviewed and negative.        PAST MEDICAL HISTORY     Past Medical History:   Diagnosis Date    Abnormal presence of protein in urine 6/2004    Dr. Queta Perez    Acute on chronic diastolic heart failure (HCC)     Anemia     Anxiety     Arthritis     Atherosclerotic heart disease     Atrial fibrillation (HCC)     Atrial fibrillation (HCC)     Breath shortness     Cardiac arrhythmia     Chronic back pain     Chronic kidney disease     Chronic kidney disease     Chronic obstructive pulmonary disease (COPD) (HCC)     Constipation, chronic     Coronary artery disease     Depression     Diabetes

## 2018-12-17 NOTE — ED NOTES
Patient changed into new brief due to small , loose, bowel movement. Patient 92-93% on 3L.      Tamara Osorio, RN  12/17/18 [id number]

## 2018-12-17 NOTE — H&P
2-21-12    Dr. Shazia Strange Hyperlipidemia     Hypertension     Hypertension     Hypothyroidism     Irritable bowel syndrome     Kidney stones 11/2001    Dr. Daniel Hernadez    Long-term (current) use of anticoagulants     Muscle weakness (generalized)     Neuropathy in diabetes ([facility] [address]     legs    Nosebleed 07/17/2018    Osteoarthritis     Parkinsons disease ([facility]     Peptic ulcer, site unspecified, unspecified as acute or chronic, without hemorrhage or perforation     Positive ORTEGA (antinuclear antibody)     1:80    Presence of cardiac pacemaker     Restless leg syndrome     Seizures (HCC)     Seizures (HCC)     Tachycardia     Tachypnea, not elsewhere classified     Thrombophlebitis leg superficial     Type 2 diabetes mellitus with hypoglycemia without coma ([facility] [address] [facility]     Type II or unspecified type diabetes mellitus without mention of complication, not stated as uncontrolled 1998    Unspecified osteoarthritis, unspecified site     Urinary incontinence     Urinary tract infection     Weakness        Past Surgical History:          Procedure Laterality Date    APPENDECTOMY  2007    BLADDER SUSPENSION  2008 & 2009    [facility] at [facility] [address]  2/2006    COLONOSCOPY  4/2007    Dr. Candida Coats    [address] N/A 11/2/2017    CATHETER INSERTION HEMODIALYSIS performed by Lisa Boykin MD at [address] [place] [address] Rd  [phone]    Bilateral    JOINT REPLACEMENT      KNEE SURGERY      LAMINEC/FACETECT/FORAMIN,LUMBAR 1 SEG N/A 11/15/2018    L4-5 DECOMPRESSION performed by Mariya Salgado MD at [address] [facility] FLX DX W/SOO [place] [address] PFRMD N/A 2/1/2018    COLONOSCOPY performed by Pratik Browning MD at [address] N/A 7/16/2018    control epistaxis performed by Silvano Lechuga MD at [address] [facility] EGD TRANSORAL BIOPSY SINGLE/MULTIPLE N/A 2/1/2018    EGD ESOPHAGOGASTRODUODENOSCOPY WITH BIOPSY performed by Kodak Dennis MD at [address] [facility] EGD TRANSORAL BIOPSY SINGLE/MULTIPLE N/A 3/27/2018    EGD ESOPHAGOGASTRODUODENOSCOPY performed by Kodak Dennis MD at [address] N/A 3/30/2018    EXAM UNDER ANESTHESIA, LARYNGOSCOPY ,PHARYNGOSCOPY performed by Jamey Gray MD at [facility] N/A 3/30/2018    REMOVAL PACKING POSS CONTROL EPISTAXIS, NASAL ENDOSCOPY performed by Jamey Gray MD at [address] [facility]  3/2003       Medications Prior to Admission:      Prior to Admission medications    Medication Sig Start Date End Date Taking? Authorizing Provider   minoxidil (LONITEN) 2.5 MG tablet Take 2.5 mg by mouth 10/31/18   Historical Provider, MD   diltiazem (CARDIZEM CD) 240 MG extended release capsule  11/29/18   Historical Provider, MD   senna (SENOKOT) 8.6 MG tablet Take 1 tablet by mouth daily Hold for Loose Stool 12/3/18 1/2/19  [facility] EMANUEL Nelson CNP   HYDROcodone-acetaminophen (NORCO) 5-325 MG per tablet Take 1 tablet by mouth every 8 hours as needed for Pain for up to 30 days. . 12/10/18 1/9/19  EMANUEL Pedro CNP   HYDROcodone-acetaminophen (NORCO) 5-325 MG per tablet Take 1 tablet by mouth every 8 hours as needed for Pain (SOB) for up to 7 days. . 11/17/18 11/24/18  Giovanni Toribio DO   cloNIDine (CATAPRES) 0.1 MG tablet Take 0.1 mg by mouth 2 times daily    Historical Provider, MD   levothyroxine (SYNTHROID) 25 MCG tablet TAKE 1 TABLET DAILY ALTERNATING WITH 50 MCG DOSE EVERY OTHER DAY 10/22/18   Danuta Mallory MD   metoprolol tartrate (LOPRESSOR) 25 MG tablet Take 25 mg by mouth 2 times daily    Historical Provider, MD   insulin lispro (HUMALOG) 100 UNIT/ML injection vial Inject into the skin Sliding scale  150-200=2 units  201-250=4 units  251-300=6 units  301-350=8 MD Yola   ipratropium-albuterol (DUONEB) 0.5-2.5 (3) MG/3ML SOLN nebulizer solution Inhale 3 mLs into the lungs every 4 hours as needed for Shortness of Breath  Patient taking differently: Inhale 3 mLs into the lungs every 6 hours as needed for Shortness of Breath  11/3/17   Sana Childress MD   pantoprazole sodium (PROTONIX) 40 MG PACK packet Take 20 mg by mouth every morning (before breakfast)     Historical Provider, MD   glucose blood VI test strips (TRUETEST TEST) strip As needed. 5/1/13   Corby Mcneill MD   aspirin 81 MG EC tablet Take 81 mg by mouth daily     Historical Provider, MD       Allergies:  Arthrotec [diclofenac-misoprostol]; Depakote [divalproex sodium]; Dilantin [phenytoin]; Klonopin [clonazepam]; and Statins    Social History:      The patient currently lives with son     TOBACCO:   reports that she quit smoking about 52 years ago. Her smoking use included Cigarettes. She has never used smokeless tobacco.  ETOH:   reports that she does not drink alcohol. Family History:       Positive as follows:    Family History   Problem Relation Age of Onset    Heart Disease Father     Early Death Father 47    Diabetes Father     High Blood Pressure Father     Heart Disease Mother     Diabetes Mother     High Blood Pressure Mother     Arthritis Mother     High Blood Pressure Sister     High Cholesterol Sister      Review of Systems   Unable to perform ROS: Acuity of condition         PHYSICAL EXAM:    BP (!) 92/43   Pulse 68   Temp 98.2 °F (36.8 °C) (Oral)   Resp 16   Ht 4' 10\" (1.473 m)   Wt 85 lb (38.6 kg)   LMP  (LMP Unknown)   SpO2 96%   BMI 17.77 kg/m²     General appearance: cachetic and ill appearing   HEENT:  Normal cephalic, atraumatic without obvious deformity. Pupils equal, round, and reactive to light. Extra ocular muscles intact. Conjunctivae/corneas clear. Dry oral mucosa   Neck: Supple, with full range of motion. No jugular venous distention.  Trachea ischemic event or extension of a chronic ischemic process may not be initially evident on CT. 2. Chronic small vessel ischemic disease. ASSESSMENT:    Active Hospital Problems    Diagnosis Date Noted    NSTEMI (non-ST elevated myocardial infarction) ([facility] [I21.4] 12/17/2018    Hyperglycemia due to type 2 diabetes mellitus ([address] [E11.65] 08/16/2018    Dysphagia as late effect of cerebrovascular disease [I69.991] 08/16/2018    Seizure as late effect of cerebrovascular accident (CVA) ([address] [R60.599, R56.9] 08/16/2018    Hypothyroid [E03.9] 08/13/2018    Seizure ([address] [R56.9] 08/11/2018    Decubitus ulcer of buttock, stage 4 ([address] [L89.304] 07/18/2018    Malnutrition related to chronic disease ([address] [E46] 07/16/2018    CHF (congestive heart failure), NYHA class III, acute on chronic, combined ([address] [I50.43] 06/28/2018    Severe malnutrition ([address] [E43] 06/19/2018    Severe protein-calorie malnutrition ([address] [E43] 04/10/2018    Epistaxis [R04.0] 03/28/2018    Type 2 diabetes mellitus ([address] [E11.9] 03/23/2018    ESRD (end stage renal disease) on dialysis ([address] [N18.6, Z99.2] 01/08/2018    Resistant hypertension [I10] 05/31/2017    Atrial fibrillation ([address] [I48.91] 10/27/2016    Idiopathic Parkinson's disease ([address] Rene Drown 08/28/2014    A-fib ([address] [I48.91] 03/17/2013    Hypothyroidism [E03.9] 10/04/2011    Prolapse of vaginal vault after hysterectomy [N99.3] 02/07/2008    Urgency of urination [R39.15] 02/07/2008    Urinary frequency [R35.0] 02/07/2008    Urinary hesitancy [R39.11] 02/07/2008       PLAN:    1. Sepsis 2/2 bilateral pneumonia- pulmonology and ID consulted, continue on IV antibiotics, breathing treatments as needed, encourage cough and deep breathing with use of continuous oxygen, monitor on telemetry   2. ESRD on HD- nephrology consulted for HD orders and monitoring, did not get dialysis today, will repeat and monitor labs daily  3.  NSTEMI- likely related to

## 2018-12-17 NOTE — CONSULTS
EGD TRANSORAL BIOPSY SINGLE/MULTIPLE N/A 3/27/2018    EGD ESOPHAGOGASTRODUODENOSCOPY performed by Ken Kuo MD at [facility] N/A 3/30/2018    EXAM UNDER ANESTHESIA, LARYNGOSCOPY ,PHARYNGOSCOPY performed by Mario Staton MD at [facility] 32 N/A 3/30/2018    REMOVAL PACKING POSS CONTROL EPISTAXIS, NASAL ENDOSCOPY performed by Mario Staton MD at [address] [place] [address] THYROID SURGERY      URETHRAL STRICTURE DILATATION  3/2003         No current facility-administered medications on file prior to encounter. Current Outpatient Prescriptions on File Prior to Encounter   Medication Sig Dispense Refill    LANTUS SOLOSTAR 100 UNIT/ML injection pen INJECT 14 UNITS IN THE MORNING 75 mL 0    minoxidil (LONITEN) 2.5 MG tablet Take 2.5 mg by mouth      diltiazem (CARDIZEM CD) 240 MG extended release capsule       senna (SENOKOT) 8.6 MG tablet Take 1 tablet by mouth daily Hold for Loose Stool 30 tablet 3    HYDROcodone-acetaminophen (NORCO) 5-325 MG per tablet Take 1 tablet by mouth every 8 hours as needed for Pain for up to 30 days. . 90 tablet 0    HYDROcodone-acetaminophen (NORCO) 5-325 MG per tablet Take 1 tablet by mouth every 8 hours as needed for Pain (SOB) for up to 7 days. . 10 tablet 0    cloNIDine (CATAPRES) 0.1 MG tablet Take 0.1 mg by mouth 2 times daily      levothyroxine (SYNTHROID) 25 MCG tablet TAKE 1 TABLET DAILY ALTERNATING WITH 50 MCG DOSE EVERY OTHER DAY 50 tablet 0    metoprolol tartrate (LOPRESSOR) 25 MG tablet Take 25 mg by mouth 2 times daily      insulin lispro (HUMALOG) 100 UNIT/ML injection vial Inject into the skin Sliding scale  150-200=2 units  201-250=4 units  251-300=6 units  301-350=8 units  351-400=10 units  Before meals      levothyroxine (SYNTHROID) 50 MCG tablet Take 50 mcg by mouth Daily Alternating days with 25 mg      primidone (MYSOLINE) 50 MG tablet Take Nasopharyngeal Updated: 12/17/18 1249    Rapid Influenza A Ag Negative    Rapid Influenza B Ag Negative   Culture Blood #1 [[id number]] Collected: 12/17/18 1227   Order Status: Sent Specimen: Blood Updated: 12/17/18 1245   Culture Blood #2 [[id number]] Collected: 12/17/18 1227   Order Status: Sent Specimen: Blood Updated: 12/17/18 1245       Lab Results   Component Value Date    WBC 6.3 12/17/2018    HGB 9.9 (L) 12/17/2018    HCT 26.2 (L) 12/17/2018    .8 (H) 12/17/2018    PLT 83 (L) 12/17/2018     Lab Results   Component Value Date     12/17/2018    K 4.1 12/17/2018    K 4.1 11/17/2018    CL 96 12/17/2018    CO2 28 12/17/2018    BUN 27 12/17/2018    CREATININE 3.2 12/17/2018    CREATININE 2.93 12/17/2018    GLUCOSE 143 12/17/2018    GLUCOSE 197 02/17/2012    CALCIUM 8.7 12/17/2018          EXAMINATION: XR CHEST PORTABLE, 12/17/2018 12:00 PM        History: 21-year-old female, shortness of breath       COMPARISON:  November 10, 2018       FINDINGS:   Chest-AP erect portable:    There is diffuse density throughout the left lung field. The left hemidiaphragm and left costophrenic angle are obscured consistent with pleural effusion. There is ill-defined density in the right infrahilar region. There is cardiomegaly. Aortic arch    calcifications are present. The pulmonary vascularity is normal size. There are degenerative changes of the spine. There is a dual-lumen right chest wall central catheter, tip terminates at the cavoatrial junction. There is left chest wall cardiac    pacemaker with 2 leads. Monitoring leads project across the thorax.               Impression   1. Diffuse left lung field infiltrate/consolidation consistent with pneumonia. Right infrahilar density likely representing second area of infiltrate. 2. Left pleural effusion and probable associated atelectasis.            ASSESSMENT:  Patient Active Problem List   Diagnosis    Hypothyroidism    Hypercholesteremia    CKD (chronic kidney contralateral side    Decreased platelet count ([facility] [address] [facility]    Lung mass         PLAN:      LEFT LUNG PNEUMONIA      ON ZOSYN AND VANCO   ADD Lise Elms

## 2018-12-18 PROBLEM — A41.9 SEVERE SEPSIS WITH ACUTE ORGAN DYSFUNCTION (HCC): Status: ACTIVE | Noted: 2018-01-01

## 2018-12-18 PROBLEM — A41.9 SEPTIC SHOCK (HCC): Status: ACTIVE | Noted: 2018-01-01

## 2018-12-18 PROBLEM — R64 CACHEXIA (HCC): Chronic | Status: ACTIVE | Noted: 2018-01-01

## 2018-12-18 PROBLEM — R62.7 FAILURE TO THRIVE IN ADULT: Status: ACTIVE | Noted: 2018-01-01

## 2018-12-18 PROBLEM — R65.21 SEPTIC SHOCK (HCC): Status: ACTIVE | Noted: 2018-01-01

## 2018-12-18 PROBLEM — R65.20 SEVERE SEPSIS WITH ACUTE ORGAN DYSFUNCTION (HCC): Status: ACTIVE | Noted: 2018-01-01

## 2018-12-18 PROBLEM — D69.6 THROMBOCYTOPENIA (HCC): Status: ACTIVE | Noted: 2018-01-01

## 2018-12-18 NOTE — DISCHARGE INSTR - COC
Continuity of Care Form    Patient Name: Sera Munroe   :  1935  MRN:  [id number]    Admit date:  2018  Discharge date:  ***    Code Status Order: [facility]   Advance Directives:   [facility] Documentation     Date/Time Healthcare Directive Type of Healthcare Directive Copy in [address] Agent's Name Healthcare Agent's Phone Number    [phone]  Yes, patient has an advance directive for healthcare treatment  Durable power of  for health care  Yes, copy in chart   Copy in [facility]  Ammy Ortiz  [phone]          Admitting Physician:  Ирина Rand MD  PCP: Veronica Colin MD    Discharging Nurse: [facility] Unit/Room#: [facility]  Discharging Unit Phone Number: ***    Emergency Contact:   Extended Emergency Contact Information  Primary Emergency Contact: [address] Phone: [phone]  Relation: Niece/Nephew  Secondary Emergency Contact: [address] Phone: [phone]  Work Phone: [phone]  Mobile Phone: [phone]  Relation: Child    Past Surgical History:  Past Surgical History:   Procedure Laterality Date    APPENDECTOMY     [facility] BLADDER SUSPENSION   &     [facility]  [facility]    COLONOSCOPY  2007    Dr. Dl Bang [address] N/A 2017    CATHETER INSERTION HEMODIALYSIS performed by Eliseo Hylton MD at [address] [place] [address] Rd  [phone]    Bilateral    JOINT REPLACEMENT      KNEE SURGERY      LAMINEC/FACETECT/FORAMIN,LUMBAR 1 SEG N/A 11/15/2018    L4-5 DECOMPRESSION performed by Maria E Greene MD at [address] [facility] FLX DX W/COLLRUTHANN Nye 1977 PFRMD N/A 2018    COLONOSCOPY performed by Sangeetha Call MD at [address] [facility] N/A 2018    control epistaxis performed by I48.91    Decubitus ulcer of buttock, stage 4 ([facility]) L89.304    Epistaxis R04.0    ESRD (end stage renal disease) on dialysis ([facility]) N18.6, Z99.2    Hypothyroid E03.9    Prolapse of vaginal vault after hysterectomy N99.3    Severe protein-calorie malnutrition ([facility]) E43    Urgency of urination R39.15    Urinary frequency R35.0    Urinary hesitancy R39.11    Vaginal enterocele N81.5    Seizure as late effect of cerebrovascular accident (CVA) ([facility] [address] I69.398, R56.9    Hyperglycemia due to type 2 diabetes mellitus ([facility] [address] E11.65    Dysphagia as late effect of cerebrovascular disease I69.991    Palliative care patient Z51.5    Anemia D64.9    Blood loss anemia D50.0    Presence of arteriovenous fistula for hemodialysis, primary ([facility]) Z99.2    Vascular dialysis catheter in place ([facility]) Z99.2    Back pain M54.9    Pulmonary edema, noncardiac J81.1    NSTEMI (non-ST elevated myocardial infarction) ([facility]) I21.4    Aspiration pneumonia ([facility]) J69.0    DNR (do not resuscitate) Z66    Aortic heart valve narrowing I35.0    Chronic pain associated with significant psychosocial dysfunction G89.4    Carotid artery narrowing I65.29    Muscle contracture M62.40    Binocular vision disorder with diplopia H53.2    Disturbance of smell and taste R43.9    Finger joint stiff M25.649    Flatulence, eructation, and gas pain R14.3, R14.1, R14.2    Abnormal gait R26.9    Cerumen impaction H61.20    Artificial lens present Z96.1    Abnormal neurological finding suggestive of lumbar-level spinal disorder R29.818    Restless leg G25.81    Diabetic nephropathy ([facility]) E11.21    Mixed conductive and sensorineural hearing loss, 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 Added Added By Resolved Resolved By Review Date Onset Date    MDRO (multi-drug resistant organism) No 08/13/18 Makenzie Rosas RN    07/15/18    E. Gallinarum MDRO urine on 07/15/2018. Electronically signed by Makenzie Rosas RN on 8/13/2018 at 6:56 AM    VRE No 03/12/18 Makenzie Rosas RN    10/26/18    E. Faecalis VRE urine on 10/26/2018. Electronically signed by Makenzie Rosas RN on 10/31/2018 at 7:30 AM    E. Faecium VRE urine on 03/06/2018. Electronically signed by Makenzie Rosas RN on 3/12/2018 at 7:26 AM          Nurse Assessment:  Last Vital Signs: BP (!) 139/50   Pulse 77   Temp 97.2 °F (36.2 °C)   Resp 15   Ht 4' 10\" (1.473 m)   Wt 99 lb 3.3 oz (45 kg)   LMP  (LMP Unknown)   SpO2 99%   BMI 20.73 kg/m²     Last documented pain score (0-10 scale): Pain Level: 0  Last Weight:   Wt Readings from Last 1 Encounters:   12/18/18 99 lb 3.3 oz (45 kg)     Mental Status:  {IP PT MENTAL STATUS:87649}    IV Access:  { RAYA IV ACCESS:150297281}    Nursing Mobility/ADLs:  Walking   {[facility] KLRO:464863503}  Transfer  {[facility] DME AAFA:438742867}  Bathing  {[facility] PZMN:466797151}  Dressing  {[facility] VSEP:978451118}  Toileting  {[facility] KFDK:937920338}  Feeding  {[facility] DEHF:615391072}  Med Admin  {[facility] OBHP:556103153}  Med Delivery   { RAYA MED Delivery:107649667}    Wound Care Documentation and Therapy:  Wound 06/18/18 Other (Comment) Sacrum Mid;Inner Pressure Injury (Active)   Wound Type Wound 11/17/2018  9:46 AM   Wound Pressure Stage  4 11/17/2018  9:46 AM   Dressing Status Clean;Dry; Intact 11/17/2018  9:46 AM   Dressing Changed Changed/New 11/17/2018  9:46 AM   Dressing/Treatment Pharmaceutical agent (see eMar) 11/17/2018  9:46 AM   Wound Cleansed Rinsed/Irrigated with saline 11/14/2018 11:13 PM   Dressing Change Due 11/16/18 11/16/2018  2:31 PM   Wound Length (cm) 2.5 cm 11/12/2018 12:11 PM   Wound Width (cm) 2.5 cm 11/12/2018 12:11 PM Treatments: ***    Patient's personal belongings (please select all that are sent with patient):  {CHP DME Belongings:686132454}    RN SIGNATURE:  {Esignature:[id number]}    CASE MANAGEMENT/SOCIAL WORK SECTION    Inpatient Status Date: ***    Readmission Risk Assessment Score:  Readmission Risk              Risk of Unplanned Readmission:        73           Discharging to Facility/ Agency   · Name:   · Address:  · Phone:  · Fax:    Dialysis Facility (if applicable)   · Name:  · Address:  · Dialysis Schedule:  · Phone:  · Fax:    / signature: {Esignature:[id number]}    PHYSICIAN SECTION    Prognosis: Poor    Condition at Discharge: Terminal    Rehab Potential (if transferring to Rehab): Poor    Recommended Labs or Other Treatments After Discharge:     Physician Certification: I certify the above information and transfer of Rose Khanna  is necessary for the continuing treatment of the diagnosis listed and that she requires Hospice for less 30 days.      Update Admission H&P: No change in H&P    PHYSICIAN SIGNATURE:  Electronically signed by Yvonne Manning MD on 12/18/18 at 5:21 PM

## 2018-12-18 NOTE — CARE COORDINATION
RUEL bryant meeting with hospice.   Plan pending  Electronically signed by hCarli Mckeon RN on 12/18/18 at 1:54 PM

## 2018-12-18 NOTE — CONSULTS
LARYNGOSCOPY ,PHARYNGOSCOPY performed by Salazar Marie MD at [facility] 32 N/A 3/30/2018    REMOVAL PACKING POSS CONTROL EPISTAXIS, NASAL ENDOSCOPY performed by Salazar Marie MD at [facility] THYROID SURGERY      URETHRAL STRICTURE DILATATION  3/2003       Home Medications:    No current facility-administered medications on file prior to encounter. Current Outpatient Prescriptions on File Prior to Encounter   Medication Sig Dispense Refill    LANTUS SOLOSTAR 100 UNIT/ML injection pen INJECT 14 UNITS IN THE MORNING 75 mL 0    minoxidil (LONITEN) 2.5 MG tablet Take 2.5 mg by mouth      diltiazem (CARDIZEM CD) 240 MG extended release capsule       senna (SENOKOT) 8.6 MG tablet Take 1 tablet by mouth daily Hold for Loose Stool 30 tablet 3    HYDROcodone-acetaminophen (NORCO) 5-325 MG per tablet Take 1 tablet by mouth every 8 hours as needed for Pain for up to 30 days. . 90 tablet 0    HYDROcodone-acetaminophen (NORCO) 5-325 MG per tablet Take 1 tablet by mouth every 8 hours as needed for Pain (SOB) for up to 7 days. . 10 tablet 0    cloNIDine (CATAPRES) 0.1 MG tablet Take 0.1 mg by mouth 2 times daily      levothyroxine (SYNTHROID) 25 MCG tablet TAKE 1 TABLET DAILY ALTERNATING WITH 50 MCG DOSE EVERY OTHER DAY 50 tablet 0    metoprolol tartrate (LOPRESSOR) 25 MG tablet Take 25 mg by mouth 2 times daily      insulin lispro (HUMALOG) 100 UNIT/ML injection vial Inject into the skin Sliding scale  150-200=2 units  201-250=4 units  251-300=6 units  301-350=8 units  351-400=10 units  Before meals      levothyroxine (SYNTHROID) 50 MCG tablet Take 50 mcg by mouth Daily Alternating days with 25 mg      primidone (MYSOLINE) 50 MG tablet Take 50 mg by mouth nightly       venlafaxine 150 MG extended release tablet Take 150 mg by mouth daily (with breakfast)      atorvastatin (LIPITOR) 80 MG tablet Take 1 tablet by mouth COMPARISON:  November 10, 2018 FINDINGS: Chest-AP erect portable: There is diffuse density throughout the left lung field. The left hemidiaphragm and left costophrenic angle are obscured consistent with pleural effusion. There is ill-defined density in the right infrahilar region. There is cardiomegaly. Aortic arch calcifications are present. The pulmonary vascularity is normal size. There are degenerative changes of the spine. There is a dual-lumen right chest wall central catheter, tip terminates at the cavoatrial junction. There is left chest wall cardiac pacemaker with 2 leads. Monitoring leads project across the thorax. 1. Diffuse left lung field infiltrate/consolidation consistent with pneumonia. Right infrahilar density likely representing second area of infiltrate. 2. Left pleural effusion and probable associated atelectasis. Assessment/  80 yo lady with ESRD. Frequent admissions with worsening failure to thrive. Now admitted with PNA,/ Sepsis. WBC elevated. CXR shows sig PNA. Hypotensive. Has been made DNR CCA. Overall prognosis poor. Hasn't had HD since Friday, but labs are not too bad from renal standpoint. Plan/  1- HD today and then will follow after that  2- aranesp for anemia. 3- pressors/ ivf/ abx  4- agree with DNR CCA with transition to DNR CC if family is comfortable with this over next few days to week, etc    Thank you for the consultation. Please do not hesitate to call with questions.     Lidia Garcia

## 2018-12-19 NOTE — PROGRESS NOTES
Report called to Hospice.
PROTIME  13.2*   INR  1.3     APTT:   Recent Labs      12/17/18   1227   APTT  32.1     UA:No results for input(s): NITRITE, COLORU, PHUR, LABCAST, WBCUA, RBCUA, MUCUS, TRICHOMONAS, YEAST, BACTERIA, CLARITYU, SPECGRAV, LEUKOCYTESUR, UROBILINOGEN, BILIRUBINUR, BLOODU, GLUCOSEU, AMORPHOUS in the last 72 hours. Invalid input(s): KETONESU    Cultures:  Wound  culture grew Corynebacterium  Films:  CXR reviewed by me and it showed multilobar pneumonia mainly left side      Assessment: This is a critically ill patient at risk of deterioration / death , needing close ICU monitoring and intervention due to below noted problems      · Septic shock with acute hypoxic respiratory failure  · Due to multilobar pneumonia  · Acute encephalopathy secondary to #1 and 2   · severe cachexia and malnutrition    Recommendations  · Continue current broad-spectrum antibiotics  · Levophed to maintain mean arterial pressure 65-70  · We will consider NG tube placement for tube feeding  · Avoid volume overload  · Monitor blood sugar maintain 140-180  · DVT prophylaxis  · Chest x-ray tomorrow  · Decrease IV fluid rate        Due to the immediate potential for life-threatening deterioration due to shock  , I spent 36  minutes providing critical care.  This time is excluding time spent performing procedures.           Electronically signed by Sofía Torres MD,  [facility] ,on 12/18/2018 at 11:50 AM

## 2018-12-22 LAB
BLOOD CULTURE, ROUTINE: NORMAL
CULTURE, BLOOD 2: NORMAL

## 2020-11-20 NOTE — PROGRESS NOTES
Infectious Diseases Inpatient Progress Note          HISTORY OF PRESENT ILLNESS:  Follow up sepsis, recurrent VRE UTI, ? pneumonia, Large L pleural effusion S/P thoracentesis with decreased SOB, recurrent C diff colitis on IV Cubicin, Rocephin and PO Vanco, well tolerated. Patient had 1 loose BM today, is very weak all over, occasional cough, on 2 lit NC. Poor historian, no pain. improved appetite. Current Medications:     daptomycin (CUBICIN) IVPB  4 mg/kg Intravenous Q48H    collagenase   Topical Daily    sodium chloride flush  10 mL Intravenous 2 times per day    aspirin  81 mg Oral Daily    fluticasone  2 spray Nasal Daily    calcium elemental  500 mg Oral Daily    carbidopa-levodopa  1 tablet Oral Daily    pantoprazole sodium  40 mg Oral QAM AC    chlorthalidone  25 mg Oral Daily    isosorbide mononitrate  60 mg Oral Daily    venlafaxine  150 mg Oral Daily with breakfast    hydrALAZINE  100 mg Oral TID    amiodarone  200 mg Oral Daily    magnesium oxide  400 mg Oral Daily    apixaban  2.5 mg Oral BID    calcium acetate  667 mg Oral TID WC    losartan  50 mg Oral Daily    b complex-C-folic acid  1 capsule Oral Daily    insulin lispro  3 Units Subcutaneous TID AC    insulin glargine  14 Units Subcutaneous Nightly    diltiazem  240 mg Oral BID    sodium chloride flush  10 mL Intravenous 2 times per day    docusate sodium  100 mg Oral BID    insulin lispro  0-12 Units Subcutaneous TID WC    insulin lispro  0-6 Units Subcutaneous Nightly    darbepoetin lizeth-polysorbate  40 mcg Subcutaneous Weekly    cefTRIAXone (ROCEPHIN) IV  1 g Intravenous Q24H    levothyroxine  50 mcg Oral Every Other Day    And    levothyroxine  25 mcg Oral Every Other Day    vancomycin  125 mg Oral 4 times per day       Allergies:  Arthrotec [diclofenac-misoprostol]; Depakote [divalproex sodium]; Dilantin [phenytoin];  Klonopin [clonazepam]; and Statins      ROS  unable to provide ROS because of weakness and · Sepsis  · Recurrent UTI, with VRE  · recurrent C diff  · L pleural effusion, doubt pneumonia    Patient Active Problem List   Diagnosis    Hypothyroidism    Hypercholesteremia    CKD (chronic kidney disease)    Anemia    Acid reflux    Anxiety    Diabetes mellitus due to underlying condition, controlled, with stage 5 chronic kidney disease not on chronic dialysis, with long-term current use of insulin ([facility])    Essential hypertension    Idiopathic Parkinson's disease ([facility] [address]    Hyperkalemia    Acute on chronic diastolic congestive heart failure ([facility])    C. difficile colitis    ESRD (end stage renal disease) on dialysis ([facility])    Leukocytosis    Cardiac pacemaker    Pneumonia    COPD with exacerbation ([facility])    Hypervolemia    Pleural effusion    Pleural effusion associated with pulmonary infection    Pleural effusion    CKD (chronic kidney disease) stage 5, GFR less than 15 ml/min ([facility])    SOB (shortness of breath)    UTI (urinary tract infection)    Sepsis ([facility])       PLAN:    · Check pleural fluid studies  · IV Cubicin, change to PO Zyvox on discharge for UTI  · Continue IV Rocephin, PO Vanco    Discussed with adult child    Sandra Spring MD Detail Level: Zone Quality 226: Preventive Care And Screening: Tobacco Use: Screening And Cessation Intervention: Patient screened for tobacco use and is an ex/non-smoker
